# Patient Record
Sex: MALE | Race: WHITE | Employment: OTHER | URBAN - METROPOLITAN AREA
[De-identification: names, ages, dates, MRNs, and addresses within clinical notes are randomized per-mention and may not be internally consistent; named-entity substitution may affect disease eponyms.]

---

## 2017-03-21 ENCOUNTER — HOSPITAL ENCOUNTER (OUTPATIENT)
Dept: GENERAL RADIOLOGY | Age: 82
Discharge: HOME | End: 2017-03-21
Attending: DERMATOLOGY | Admitting: DERMATOLOGY

## 2017-03-21 LAB
ALP BLD-CCNC: 63 IU/L (ref 40–128)
ALT SERPL-CCNC: 15 U/L (ref 10–40)
AST SERPL-CCNC: 15 IU/L (ref 15–37)
BASOPHILS ABSOLUTE: 0.1 K/CU MM
BASOPHILS RELATIVE PERCENT: 1.4 % (ref 0–1)
BILIRUB SERPL-MCNC: 0.5 MG/DL (ref 0–1)
BUN BLDV-MCNC: 26 MG/DL (ref 6–23)
CREAT SERPL-MCNC: 1.4 MG/DL (ref 0.9–1.3)
DIFFERENTIAL TYPE: ABNORMAL
EOSINOPHILS ABSOLUTE: 0.3 K/CU MM
EOSINOPHILS RELATIVE PERCENT: 4.5 % (ref 0–3)
GFR AFRICAN AMERICAN: 58 ML/MIN/1.73M2
GFR NON-AFRICAN AMERICAN: 48 ML/MIN/1.73M2
HCT VFR BLD CALC: 49 % (ref 42–52)
HEMOGLOBIN: 15.3 GM/DL (ref 13.5–18)
IMMATURE NEUTROPHIL %: 0.3 % (ref 0–0.43)
LYMPHOCYTES ABSOLUTE: 0.8 K/CU MM
LYMPHOCYTES RELATIVE PERCENT: 12.7 % (ref 24–44)
MCH RBC QN AUTO: 28.6 PG (ref 27–31)
MCHC RBC AUTO-ENTMCNC: 31.2 % (ref 32–36)
MCV RBC AUTO: 91.6 FL (ref 78–100)
MONOCYTES ABSOLUTE: 0.6 K/CU MM
MONOCYTES RELATIVE PERCENT: 8.8 % (ref 0–4)
NUCLEATED RBC %: 0 %
PDW BLD-RTO: 14.7 % (ref 11.7–14.9)
PLATELET # BLD: 580 K/CU MM (ref 140–440)
PMV BLD AUTO: 10 FL (ref 7.5–11.1)
RBC # BLD: 5.35 M/CU MM (ref 4.6–6.2)
SEGMENTED NEUTROPHILS ABSOLUTE COUNT: 4.7 K/CU MM
SEGMENTED NEUTROPHILS RELATIVE PERCENT: 72.3 % (ref 36–66)
TOTAL IMMATURE NEUTOROPHIL: 0.02 K/CU MM
TOTAL NUCLEATED RBC: 0 K/CU MM
WBC # BLD: 6.5 K/CU MM (ref 4–10.5)

## 2017-04-18 ENCOUNTER — HOSPITAL ENCOUNTER (OUTPATIENT)
Dept: GENERAL RADIOLOGY | Age: 82
Discharge: HOME | End: 2017-04-18
Attending: DERMATOLOGY | Admitting: DERMATOLOGY

## 2017-04-18 LAB
ALP BLD-CCNC: 66 IU/L (ref 40–128)
ALT SERPL-CCNC: 17 U/L (ref 10–40)
AST SERPL-CCNC: 16 IU/L (ref 15–37)
BASOPHILS ABSOLUTE: 0.1 K/CU MM
BASOPHILS RELATIVE PERCENT: 1.2 % (ref 0–1)
BILIRUB SERPL-MCNC: 0.5 MG/DL (ref 0–1)
BUN BLDV-MCNC: 22 MG/DL (ref 6–23)
CREAT SERPL-MCNC: 1.1 MG/DL (ref 0.9–1.3)
DIFFERENTIAL TYPE: ABNORMAL
EOSINOPHILS ABSOLUTE: 0.2 K/CU MM
EOSINOPHILS RELATIVE PERCENT: 3.2 % (ref 0–3)
GFR AFRICAN AMERICAN: >60 ML/MIN/1.73M2
GFR NON-AFRICAN AMERICAN: >60 ML/MIN/1.73M2
HCT VFR BLD CALC: 49.2 % (ref 42–52)
HEMOGLOBIN: 15.5 GM/DL (ref 13.5–18)
IMMATURE NEUTROPHIL %: 0.4 % (ref 0–0.43)
LYMPHOCYTES ABSOLUTE: 0.8 K/CU MM
LYMPHOCYTES RELATIVE PERCENT: 10.1 % (ref 24–44)
MCH RBC QN AUTO: 28.7 PG (ref 27–31)
MCHC RBC AUTO-ENTMCNC: 31.5 % (ref 32–36)
MCV RBC AUTO: 91.1 FL (ref 78–100)
MONOCYTES ABSOLUTE: 0.7 K/CU MM
MONOCYTES RELATIVE PERCENT: 8.8 % (ref 0–4)
NUCLEATED RBC %: 0 %
PDW BLD-RTO: 15.2 % (ref 11.7–14.9)
PLATELET # BLD: 620 K/CU MM (ref 140–440)
PMV BLD AUTO: 9.7 FL (ref 7.5–11.1)
RBC # BLD: 5.4 M/CU MM (ref 4.6–6.2)
SEGMENTED NEUTROPHILS ABSOLUTE COUNT: 5.6 K/CU MM
SEGMENTED NEUTROPHILS RELATIVE PERCENT: 76.3 % (ref 36–66)
TOTAL IMMATURE NEUTOROPHIL: 0.03 K/CU MM
TOTAL NUCLEATED RBC: 0 K/CU MM
WBC # BLD: 7.4 K/CU MM (ref 4–10.5)

## 2017-06-27 ENCOUNTER — HOSPITAL ENCOUNTER (OUTPATIENT)
Dept: OTHER | Age: 82
Discharge: HOME | End: 2017-06-27
Attending: INTERNAL MEDICINE | Admitting: INTERNAL MEDICINE

## 2017-06-27 LAB
ALBUMIN SERPL-MCNC: 4.4 GM/DL (ref 3.4–5)
ALP BLD-CCNC: 62 IU/L (ref 40–129)
ALT SERPL-CCNC: 20 U/L (ref 10–40)
ANION GAP SERPL CALCULATED.3IONS-SCNC: 17 MMOL/L (ref 4–16)
ANISOCYTOSIS: ABNORMAL
AST SERPL-CCNC: 28 IU/L (ref 15–37)
BANDED NEUTROPHILS ABSOLUTE COUNT: 0.13 K/CU MM
BANDED NEUTROPHILS RELATIVE PERCENT: 2 % (ref 5–11)
BASOPHILS ABSOLUTE: 0.1 K/CU MM
BASOPHILS RELATIVE PERCENT: 1 % (ref 0–1)
BILIRUB SERPL-MCNC: 0.5 MG/DL (ref 0–1)
BUN BLDV-MCNC: 21 MG/DL (ref 6–23)
CALCIUM SERPL-MCNC: 9.3 MG/DL (ref 8.3–10.6)
CHLORIDE BLD-SCNC: 99 MMOL/L (ref 99–110)
CO2: 22 MMOL/L (ref 21–32)
CREAT SERPL-MCNC: 1.1 MG/DL (ref 0.9–1.3)
DIFFERENTIAL TYPE: ABNORMAL
EOSINOPHILS ABSOLUTE: 0.4 K/CU MM
EOSINOPHILS RELATIVE PERCENT: 6 % (ref 0–3)
GFR AFRICAN AMERICAN: >60 ML/MIN/1.73M2
GFR NON-AFRICAN AMERICAN: >60 ML/MIN/1.73M2
GIANT PLATELETS: PRESENT
GLUCOSE BLD-MCNC: 91 MG/DL (ref 70–140)
HCT VFR BLD CALC: 47.8 % (ref 42–52)
HEMOGLOBIN: 15.8 GM/DL (ref 13.5–18)
LACTATE DEHYDROGENASE: 326 IU/L (ref 120–246)
LYMPHOCYTES ABSOLUTE: 1.3 K/CU MM
LYMPHOCYTES RELATIVE PERCENT: 21 % (ref 24–44)
MCH RBC QN AUTO: 30.2 PG (ref 27–31)
MCHC RBC AUTO-ENTMCNC: 33.1 % (ref 32–36)
MCV RBC AUTO: 91.4 FL (ref 78–100)
MONOCYTES ABSOLUTE: 0.6 K/CU MM
MONOCYTES RELATIVE PERCENT: 9 % (ref 0–4)
PDW BLD-RTO: 15.6 % (ref 11.7–14.9)
PLATELET # BLD: 558 K/CU MM (ref 140–440)
PMV BLD AUTO: 10.2 FL (ref 7.5–11.1)
POTASSIUM SERPL-SCNC: 5.2 MMOL/L (ref 3.5–5.1)
RBC # BLD: 5.23 M/CU MM (ref 4.6–6.2)
SEGMENTED NEUTROPHILS ABSOLUTE COUNT: 3.9 K/CU MM
SEGMENTED NEUTROPHILS RELATIVE PERCENT: 61 % (ref 36–66)
SODIUM BLD-SCNC: 138 MMOL/L (ref 135–145)
TOTAL PROTEIN: 6.5 GM/DL (ref 6.4–8.2)
URIC ACID: 6.3 MG/DL (ref 3.5–7.2)
WBC # BLD: 6.4 K/CU MM (ref 4–10.5)

## 2017-08-01 ENCOUNTER — HOSPITAL ENCOUNTER (OUTPATIENT)
Dept: GENERAL RADIOLOGY | Age: 82
Discharge: HOME | End: 2017-08-01
Attending: DERMATOLOGY | Admitting: DERMATOLOGY

## 2017-08-01 LAB
ALP BLD-CCNC: 62 IU/L (ref 40–128)
ALT SERPL-CCNC: 15 U/L (ref 10–40)
AST SERPL-CCNC: 18 IU/L (ref 15–37)
BASOPHILS ABSOLUTE: 0.1 K/CU MM
BASOPHILS RELATIVE PERCENT: 1.2 % (ref 0–1)
BILIRUB SERPL-MCNC: 0.4 MG/DL (ref 0–1)
BUN BLDV-MCNC: 24 MG/DL (ref 6–23)
CREAT SERPL-MCNC: 1.3 MG/DL (ref 0.9–1.3)
DIFFERENTIAL TYPE: ABNORMAL
EOSINOPHILS ABSOLUTE: 0.3 K/CU MM
EOSINOPHILS RELATIVE PERCENT: 4.3 % (ref 0–3)
GFR AFRICAN AMERICAN: >60 ML/MIN/1.73M2
GFR NON-AFRICAN AMERICAN: 52 ML/MIN/1.73M2
HCT VFR BLD CALC: 46.9 % (ref 42–52)
HEMOGLOBIN: 15.4 GM/DL (ref 13.5–18)
IMMATURE NEUTROPHIL %: 0.3 % (ref 0–0.43)
LYMPHOCYTES ABSOLUTE: 1 K/CU MM
LYMPHOCYTES RELATIVE PERCENT: 13.9 % (ref 24–44)
MCH RBC QN AUTO: 29.9 PG (ref 27–31)
MCHC RBC AUTO-ENTMCNC: 32.8 % (ref 32–36)
MCV RBC AUTO: 91.1 FL (ref 78–100)
MONOCYTES ABSOLUTE: 0.7 K/CU MM
MONOCYTES RELATIVE PERCENT: 10.4 % (ref 0–4)
NUCLEATED RBC %: 0 %
PDW BLD-RTO: 14.6 % (ref 11.7–14.9)
PLATELET # BLD: 591 K/CU MM (ref 140–440)
PMV BLD AUTO: 9.7 FL (ref 7.5–11.1)
RBC # BLD: 5.15 M/CU MM (ref 4.6–6.2)
SEGMENTED NEUTROPHILS ABSOLUTE COUNT: 4.8 K/CU MM
SEGMENTED NEUTROPHILS RELATIVE PERCENT: 69.9 % (ref 36–66)
TOTAL IMMATURE NEUTOROPHIL: 0.02 K/CU MM
TOTAL NUCLEATED RBC: 0 K/CU MM
WBC # BLD: 6.9 K/CU MM (ref 4–10.5)

## 2017-08-18 ENCOUNTER — HOSPITAL ENCOUNTER (OUTPATIENT)
Dept: OTHER | Age: 82
Discharge: HOME | End: 2017-08-18
Attending: INTERNAL MEDICINE | Admitting: INTERNAL MEDICINE

## 2017-08-18 LAB
ALBUMIN SERPL-MCNC: 4.5 GM/DL (ref 3.4–5)
ALP BLD-CCNC: 68 IU/L (ref 40–129)
ALT SERPL-CCNC: 17 U/L (ref 10–40)
ANION GAP SERPL CALCULATED.3IONS-SCNC: 14 MMOL/L (ref 4–16)
AST SERPL-CCNC: 17 IU/L (ref 15–37)
BANDED NEUTROPHILS ABSOLUTE COUNT: 0.08 K/CU MM
BANDED NEUTROPHILS RELATIVE PERCENT: 1 % (ref 5–11)
BILIRUB SERPL-MCNC: 0.5 MG/DL (ref 0–1)
BUN BLDV-MCNC: 24 MG/DL (ref 6–23)
CALCIUM SERPL-MCNC: 9.3 MG/DL (ref 8.3–10.6)
CHLORIDE BLD-SCNC: 102 MMOL/L (ref 99–110)
CO2: 24 MMOL/L (ref 21–32)
CREAT SERPL-MCNC: 1.2 MG/DL (ref 0.9–1.3)
DIFFERENTIAL TYPE: ABNORMAL
EOSINOPHILS ABSOLUTE: 0.3 K/CU MM
EOSINOPHILS RELATIVE PERCENT: 4 % (ref 0–3)
GFR AFRICAN AMERICAN: >60 ML/MIN/1.73M2
GFR NON-AFRICAN AMERICAN: 57 ML/MIN/1.73M2
GLUCOSE BLD-MCNC: 95 MG/DL (ref 70–140)
HCT VFR BLD CALC: 46.9 % (ref 42–52)
HEMOGLOBIN: 15.5 GM/DL (ref 13.5–18)
LACTATE DEHYDROGENASE: 198 IU/L (ref 120–246)
LYMPHOCYTES ABSOLUTE: 0.7 K/CU MM
LYMPHOCYTES RELATIVE PERCENT: 9 % (ref 24–44)
MCH RBC QN AUTO: 29.9 PG (ref 27–31)
MCHC RBC AUTO-ENTMCNC: 33 % (ref 32–36)
MCV RBC AUTO: 90.5 FL (ref 78–100)
METAMYELOCYTES ABSOLUTE COUNT: 0.08 K/CU MM
METAMYELOCYTES PERCENT: 1 %
MONOCYTES ABSOLUTE: 0.4 K/CU MM
MONOCYTES RELATIVE PERCENT: 5 % (ref 0–4)
PDW BLD-RTO: 14.4 % (ref 11.7–14.9)
PLATELET # BLD: 635 K/CU MM (ref 140–440)
PLT MORPHOLOGY: ABNORMAL
PMV BLD AUTO: 9.9 FL (ref 7.5–11.1)
POLYCHROMASIA: ABNORMAL
POTASSIUM SERPL-SCNC: 5.1 MMOL/L (ref 3.5–5.1)
RBC # BLD: 5.18 M/CU MM (ref 4.6–6.2)
SEGMENTED NEUTROPHILS ABSOLUTE COUNT: 6.2 K/CU MM
SEGMENTED NEUTROPHILS RELATIVE PERCENT: 80 % (ref 36–66)
SODIUM BLD-SCNC: 140 MMOL/L (ref 135–145)
TOTAL PROTEIN: 6.3 GM/DL (ref 6.4–8.2)
URIC ACID: 6.3 MG/DL (ref 3.5–7.2)
WBC # BLD: 7.8 K/CU MM (ref 4–10.5)

## 2017-11-28 ENCOUNTER — HOSPITAL ENCOUNTER (OUTPATIENT)
Dept: OTHER | Age: 82
Discharge: HOME | End: 2017-11-28
Attending: INTERNAL MEDICINE | Admitting: INTERNAL MEDICINE

## 2017-11-28 LAB
ALBUMIN SERPL-MCNC: 4.2 GM/DL (ref 3.4–5)
ALP BLD-CCNC: 70 IU/L (ref 40–129)
ALT SERPL-CCNC: 14 U/L (ref 10–40)
ANION GAP SERPL CALCULATED.3IONS-SCNC: 13 MMOL/L (ref 4–16)
AST SERPL-CCNC: 15 IU/L (ref 15–37)
BILIRUB SERPL-MCNC: 0.4 MG/DL (ref 0–1)
BUN BLDV-MCNC: 27 MG/DL (ref 6–23)
CALCIUM SERPL-MCNC: 9.2 MG/DL (ref 8.3–10.6)
CHLORIDE BLD-SCNC: 104 MMOL/L (ref 99–110)
CO2: 24 MMOL/L (ref 21–32)
CREAT SERPL-MCNC: 1.2 MG/DL (ref 0.9–1.3)
GFR AFRICAN AMERICAN: >60 ML/MIN/1.73M2
GFR NON-AFRICAN AMERICAN: 57 ML/MIN/1.73M2
GLUCOSE BLD-MCNC: 99 MG/DL (ref 70–99)
LACTATE DEHYDROGENASE: 200 IU/L (ref 120–246)
POTASSIUM SERPL-SCNC: 5 MMOL/L (ref 3.5–5.1)
SODIUM BLD-SCNC: 141 MMOL/L (ref 135–145)
TOTAL PROTEIN: 6.2 GM/DL (ref 6.4–8.2)

## 2018-03-13 ENCOUNTER — HOSPITAL ENCOUNTER (OUTPATIENT)
Dept: OTHER | Age: 83
Discharge: HOME | End: 2018-03-13
Attending: INTERNAL MEDICINE | Admitting: INTERNAL MEDICINE

## 2018-03-13 LAB — LACTATE DEHYDROGENASE: 177 IU/L (ref 120–246)

## 2018-03-23 PROBLEM — K25.3 ACUTE GASTRIC ULCER: Status: ACTIVE | Noted: 2018-03-23

## 2018-03-23 PROBLEM — Z43.3 COLOSTOMY CARE (HCC): Status: ACTIVE | Noted: 2018-03-23

## 2018-03-23 PROBLEM — K80.20 CHOLELITHIASIS: Status: ACTIVE | Noted: 2018-03-23

## 2018-03-24 PROBLEM — K80.50 BILIARY COLIC: Status: ACTIVE | Noted: 2018-03-24

## 2018-03-24 PROBLEM — N12 PYELONEPHRITIS: Status: ACTIVE | Noted: 2018-03-24

## 2018-04-02 PROBLEM — K80.10 CCC (CHRONIC CALCULOUS CHOLECYSTITIS): Status: ACTIVE | Noted: 2018-04-02

## 2018-05-29 ENCOUNTER — HOSPITAL ENCOUNTER (OUTPATIENT)
Dept: OTHER | Age: 83
Discharge: HOME | End: 2018-05-29
Attending: INTERNAL MEDICINE | Admitting: INTERNAL MEDICINE

## 2018-05-29 LAB
FERRITIN: 178 NG/ML (ref 30–400)
IRON: 89 UG/DL (ref 59–158)
PCT TRANSFERRIN: 39 % (ref 10–44)
TOTAL IRON BINDING CAPACITY: 230 UG/DL (ref 250–450)
UNSATURATED IRON BINDING CAPACITY: 141 UG/DL (ref 110–370)

## 2018-06-26 ENCOUNTER — HOSPITAL ENCOUNTER (OUTPATIENT)
Dept: OTHER | Age: 83
Discharge: HOME | End: 2018-06-26
Attending: INTERNAL MEDICINE | Admitting: INTERNAL MEDICINE

## 2018-06-26 LAB
ALBUMIN SERPL-MCNC: 4.5 GM/DL (ref 3.4–5)
ALP BLD-CCNC: 66 IU/L (ref 40–128)
ALT SERPL-CCNC: 15 U/L (ref 10–40)
ANION GAP SERPL CALCULATED.3IONS-SCNC: 16 MMOL/L (ref 4–16)
AST SERPL-CCNC: 17 IU/L (ref 15–37)
BILIRUB SERPL-MCNC: 0.5 MG/DL (ref 0–1)
BUN BLDV-MCNC: 21 MG/DL (ref 6–23)
CALCIUM SERPL-MCNC: 9 MG/DL (ref 8.3–10.6)
CHLORIDE BLD-SCNC: 102 MMOL/L (ref 99–110)
CO2: 22 MMOL/L (ref 21–32)
CREAT SERPL-MCNC: 1.1 MG/DL (ref 0.9–1.3)
GFR AFRICAN AMERICAN: >60 ML/MIN/1.73M2
GFR NON-AFRICAN AMERICAN: >60 ML/MIN/1.73M2
GLUCOSE BLD-MCNC: 98 MG/DL (ref 70–99)
POTASSIUM SERPL-SCNC: 4.7 MMOL/L (ref 3.5–5.1)
SODIUM BLD-SCNC: 140 MMOL/L (ref 135–145)
TOTAL PROTEIN: 6.3 GM/DL (ref 6.4–8.2)

## 2018-09-25 ENCOUNTER — HOSPITAL ENCOUNTER (OUTPATIENT)
Age: 83
Discharge: HOME | End: 2018-09-25
Payer: MEDICARE

## 2018-09-25 LAB
ALBUMIN SERPL-MCNC: 4.1 GM/DL (ref 3.4–5)
ALP BLD-CCNC: 58 IU/L (ref 40–128)
ALT SERPL-CCNC: 12 U/L (ref 10–40)
ANION GAP SERPL CALCULATED.3IONS-SCNC: 11 MMOL/L (ref 4–16)
AST SERPL-CCNC: 14 IU/L (ref 15–37)
BILIRUB SERPL-MCNC: 0.5 MG/DL (ref 0–1)
BUN BLDV-MCNC: 25 MG/DL (ref 6–23)
CALCIUM SERPL-MCNC: 8.9 MG/DL (ref 8.3–10.6)
CHLORIDE BLD-SCNC: 102 MMOL/L (ref 99–110)
CO2: 26 MMOL/L (ref 21–32)
CREAT SERPL-MCNC: 1.3 MG/DL (ref 0.9–1.3)
GFR AFRICAN AMERICAN: >60 ML/MIN/1.73M2
GFR NON-AFRICAN AMERICAN: 52 ML/MIN/1.73M2
GLUCOSE BLD-MCNC: 94 MG/DL (ref 70–99)
POTASSIUM SERPL-SCNC: 4.7 MMOL/L (ref 3.5–5.1)
SODIUM BLD-SCNC: 139 MMOL/L (ref 135–145)
TOTAL PROTEIN: 5.7 GM/DL (ref 6.4–8.2)

## 2018-09-25 PROCEDURE — 80053 COMPREHEN METABOLIC PANEL: CPT

## 2018-11-05 ENCOUNTER — HOSPITAL ENCOUNTER (OUTPATIENT)
Dept: ULTRASOUND IMAGING | Age: 83
Discharge: HOME | End: 2018-11-05
Payer: MEDICARE

## 2018-11-05 DIAGNOSIS — I71.40 ABDOMINAL AORTIC ANEURYSM WITHOUT RUPTURE: ICD-10-CM

## 2018-11-05 PROCEDURE — 93978 VASCULAR STUDY: CPT

## 2018-12-27 ENCOUNTER — HOSPITAL ENCOUNTER (OUTPATIENT)
Age: 83
Setting detail: SPECIMEN
Discharge: HOME | End: 2018-12-27
Payer: MEDICARE

## 2018-12-27 LAB
ALBUMIN SERPL-MCNC: 4.1 GM/DL (ref 3.4–5)
ALP BLD-CCNC: 63 IU/L (ref 40–128)
ALT SERPL-CCNC: 12 U/L (ref 10–40)
ANION GAP SERPL CALCULATED.3IONS-SCNC: 13 MMOL/L (ref 4–16)
AST SERPL-CCNC: 16 IU/L (ref 15–37)
BILIRUB SERPL-MCNC: 0.4 MG/DL (ref 0–1)
BUN BLDV-MCNC: 27 MG/DL (ref 6–23)
CALCIUM SERPL-MCNC: 9 MG/DL (ref 8.3–10.6)
CHLORIDE BLD-SCNC: 99 MMOL/L (ref 99–110)
CO2: 24 MMOL/L (ref 21–32)
CREAT SERPL-MCNC: 1.2 MG/DL (ref 0.9–1.3)
FERRITIN: 187 NG/ML (ref 30–400)
GFR AFRICAN AMERICAN: >60 ML/MIN/1.73M2
GFR NON-AFRICAN AMERICAN: 57 ML/MIN/1.73M2
GLUCOSE BLD-MCNC: 107 MG/DL (ref 70–99)
IRON: 65 UG/DL (ref 59–158)
PCT TRANSFERRIN: 27 % (ref 10–44)
POTASSIUM SERPL-SCNC: 4.7 MMOL/L (ref 3.5–5.1)
SODIUM BLD-SCNC: 136 MMOL/L (ref 135–145)
TOTAL IRON BINDING CAPACITY: 244 UG/DL (ref 250–450)
TOTAL PROTEIN: 6.1 GM/DL (ref 6.4–8.2)
UNSATURATED IRON BINDING CAPACITY: 179 UG/DL (ref 110–370)

## 2018-12-27 PROCEDURE — 80053 COMPREHEN METABOLIC PANEL: CPT

## 2018-12-27 PROCEDURE — 83540 ASSAY OF IRON: CPT

## 2018-12-27 PROCEDURE — 83550 IRON BINDING TEST: CPT

## 2018-12-27 PROCEDURE — 82728 ASSAY OF FERRITIN: CPT

## 2019-03-28 ENCOUNTER — HOSPITAL ENCOUNTER (OUTPATIENT)
Age: 84
Discharge: HOME | End: 2019-03-28
Payer: MEDICARE

## 2019-03-28 LAB
ALP BLD-CCNC: 64 IU/L (ref 40–128)
ALT SERPL-CCNC: 13 U/L (ref 10–40)
AST SERPL-CCNC: 15 IU/L (ref 15–37)
BILIRUB SERPL-MCNC: 0.6 MG/DL (ref 0–1)
BUN BLDV-MCNC: 23 MG/DL (ref 6–23)
CREAT SERPL-MCNC: 1.2 MG/DL (ref 0.9–1.3)
DIFFERENTIAL TYPE: ABNORMAL
EOSINOPHILS ABSOLUTE: 0.1 K/CU MM
EOSINOPHILS RELATIVE PERCENT: 2 % (ref 0–3)
GFR AFRICAN AMERICAN: >60 ML/MIN/1.73M2
GFR NON-AFRICAN AMERICAN: 57 ML/MIN/1.73M2
HCT VFR BLD CALC: 39.8 % (ref 42–52)
HEMOGLOBIN: 12.7 GM/DL (ref 13.5–18)
LYMPHOCYTES ABSOLUTE: 0.8 K/CU MM
LYMPHOCYTES RELATIVE PERCENT: 20 % (ref 24–44)
MACROCYTES: ABNORMAL
MCH RBC QN AUTO: 40.6 PG (ref 27–31)
MCHC RBC AUTO-ENTMCNC: 31.9 % (ref 32–36)
MCV RBC AUTO: 127.2 FL (ref 78–100)
MONOCYTES ABSOLUTE: 0.3 K/CU MM
MONOCYTES RELATIVE PERCENT: 7 % (ref 0–4)
PDW BLD-RTO: 15.4 % (ref 11.7–14.9)
PLATELET # BLD: 270 K/CU MM (ref 140–440)
PMV BLD AUTO: 9.4 FL (ref 7.5–11.1)
RBC # BLD: 3.13 M/CU MM (ref 4.6–6.2)
SEGMENTED NEUTROPHILS ABSOLUTE COUNT: 2.9 K/CU MM
SEGMENTED NEUTROPHILS RELATIVE PERCENT: 71 % (ref 36–66)
WBC # BLD: 4.1 K/CU MM (ref 4–10.5)

## 2019-03-28 PROCEDURE — 84450 TRANSFERASE (AST) (SGOT): CPT

## 2019-03-28 PROCEDURE — 86480 TB TEST CELL IMMUN MEASURE: CPT

## 2019-03-28 PROCEDURE — 85007 BL SMEAR W/DIFF WBC COUNT: CPT

## 2019-03-28 PROCEDURE — 84520 ASSAY OF UREA NITROGEN: CPT

## 2019-03-28 PROCEDURE — 36415 COLL VENOUS BLD VENIPUNCTURE: CPT

## 2019-03-28 PROCEDURE — 85027 COMPLETE CBC AUTOMATED: CPT

## 2019-03-28 PROCEDURE — 82247 BILIRUBIN TOTAL: CPT

## 2019-03-28 PROCEDURE — 84460 ALANINE AMINO (ALT) (SGPT): CPT

## 2019-03-28 PROCEDURE — 82565 ASSAY OF CREATININE: CPT

## 2019-03-28 PROCEDURE — 84075 ASSAY ALKALINE PHOSPHATASE: CPT

## 2019-03-31 LAB
QUANTI TB1 MINUS NIL: 0 IU/ML (ref 0–0.34)
QUANTI TB2 MINUS NIL: 0 IU/ML (ref 0–0.34)
QUANTIFERON (R) TB GOLD (INCUBATED): NEGATIVE
QUANTIFERON (R) TB GOLD (INCUBATED): NORMAL
QUANTIFERON MITOGEN MINUS NIL: >10 IU/ML
QUANTIFERON NIL: 0.14 IU/ML
QUANTIFERON NIL: NORMAL IU/ML

## 2019-05-06 ENCOUNTER — TELEPHONE (OUTPATIENT)
Dept: FAMILY MEDICINE CLINIC | Age: 84
End: 2019-05-06

## 2019-05-06 RX ORDER — ALFUZOSIN HYDROCHLORIDE 10 MG/1
10 TABLET, EXTENDED RELEASE ORAL DAILY
Qty: 30 TABLET | Refills: 5 | Status: SHIPPED | OUTPATIENT
Start: 2019-05-06 | End: 2019-09-05

## 2019-05-06 NOTE — TELEPHONE ENCOUNTER
PT ADVISED PER PREV MESSAGE: PER DR JOHNSON START UROXATROL 10MG DAILY. PT VOICED UNDERSTANDING. /NIK (E) UROXATROL 10MG 1 QD #30/5.       Electronically signed by Sneha Boggs MA on 5/6/2019 at 2:17 PM

## 2019-06-17 ENCOUNTER — HOSPITAL ENCOUNTER (OUTPATIENT)
Age: 84
Setting detail: SPECIMEN
Discharge: HOME | End: 2019-06-17
Payer: MEDICARE

## 2019-06-17 LAB
ALBUMIN SERPL-MCNC: 4.4 GM/DL (ref 3.4–5)
ALP BLD-CCNC: 64 IU/L (ref 40–128)
ALT SERPL-CCNC: 14 U/L (ref 10–40)
ANION GAP SERPL CALCULATED.3IONS-SCNC: 9 MMOL/L (ref 4–16)
AST SERPL-CCNC: 15 IU/L (ref 15–37)
BILIRUB SERPL-MCNC: 0.5 MG/DL (ref 0–1)
BUN BLDV-MCNC: 27 MG/DL (ref 6–23)
CALCIUM SERPL-MCNC: 9 MG/DL (ref 8.3–10.6)
CHLORIDE BLD-SCNC: 102 MMOL/L (ref 99–110)
CO2: 26 MMOL/L (ref 21–32)
CREAT SERPL-MCNC: 1.3 MG/DL (ref 0.9–1.3)
GFR AFRICAN AMERICAN: >60 ML/MIN/1.73M2
GFR NON-AFRICAN AMERICAN: 52 ML/MIN/1.73M2
GLUCOSE BLD-MCNC: 106 MG/DL (ref 70–99)
POTASSIUM SERPL-SCNC: 4.7 MMOL/L (ref 3.5–5.1)
SODIUM BLD-SCNC: 137 MMOL/L (ref 135–145)
TOTAL PROTEIN: 6.4 GM/DL (ref 6.4–8.2)

## 2019-06-17 PROCEDURE — 80053 COMPREHEN METABOLIC PANEL: CPT

## 2019-08-09 ENCOUNTER — TELEPHONE (OUTPATIENT)
Dept: FAMILY MEDICINE CLINIC | Age: 84
End: 2019-08-09

## 2019-08-15 ENCOUNTER — TELEPHONE (OUTPATIENT)
Dept: FAMILY MEDICINE CLINIC | Age: 84
End: 2019-08-15

## 2019-08-17 DIAGNOSIS — C20 PRIMARY MALIGNANT NEOPLASM OF RECTUM (HCC): ICD-10-CM

## 2019-08-17 DIAGNOSIS — I20.8 STABLE ANGINA (HCC): ICD-10-CM

## 2019-08-17 DIAGNOSIS — L40.9 PSORIASIS: ICD-10-CM

## 2019-08-17 DIAGNOSIS — L40.50 PSORIATIC ARTHRITIS (HCC): ICD-10-CM

## 2019-08-17 PROBLEM — I20.89 STABLE ANGINA: Status: ACTIVE | Noted: 2019-08-17

## 2019-08-17 RX ORDER — HYDROXYUREA 500 MG/1
CAPSULE ORAL DAILY
COMMUNITY
End: 2020-09-01 | Stop reason: SDUPTHER

## 2019-08-17 RX ORDER — TAMSULOSIN HYDROCHLORIDE 0.4 MG/1
0.4 CAPSULE ORAL DAILY
COMMUNITY
End: 2019-11-25

## 2019-08-17 RX ORDER — CLOBETASOL PROPIONATE 0.5 MG/G
CREAM TOPICAL 2 TIMES DAILY
COMMUNITY
End: 2021-01-01

## 2019-08-19 ENCOUNTER — TELEPHONE (OUTPATIENT)
Dept: FAMILY MEDICINE CLINIC | Age: 84
End: 2019-08-19

## 2019-08-23 ENCOUNTER — TELEPHONE (OUTPATIENT)
Dept: FAMILY MEDICINE CLINIC | Age: 84
End: 2019-08-23

## 2019-09-05 ENCOUNTER — OFFICE VISIT (OUTPATIENT)
Dept: FAMILY MEDICINE CLINIC | Age: 84
End: 2019-09-05
Payer: MEDICARE

## 2019-09-05 VITALS
TEMPERATURE: 97.7 F | WEIGHT: 205.6 LBS | HEART RATE: 62 BPM | BODY MASS INDEX: 31.16 KG/M2 | HEIGHT: 68 IN | SYSTOLIC BLOOD PRESSURE: 98 MMHG | DIASTOLIC BLOOD PRESSURE: 52 MMHG | OXYGEN SATURATION: 92 %

## 2019-09-05 DIAGNOSIS — N30.00 ACUTE CYSTITIS WITHOUT HEMATURIA: ICD-10-CM

## 2019-09-05 DIAGNOSIS — M25.512 CHRONIC LEFT SHOULDER PAIN: ICD-10-CM

## 2019-09-05 DIAGNOSIS — G89.29 CHRONIC LEFT SHOULDER PAIN: ICD-10-CM

## 2019-09-05 DIAGNOSIS — N39.498 OTHER URINARY INCONTINENCE: Primary | ICD-10-CM

## 2019-09-05 PROCEDURE — 99213 OFFICE O/P EST LOW 20 MIN: CPT | Performed by: PHYSICIAN ASSISTANT

## 2019-09-05 PROCEDURE — 1123F ACP DISCUSS/DSCN MKR DOCD: CPT | Performed by: PHYSICIAN ASSISTANT

## 2019-09-05 PROCEDURE — G8599 NO ASA/ANTIPLAT THER USE RNG: HCPCS | Performed by: PHYSICIAN ASSISTANT

## 2019-09-05 PROCEDURE — 4040F PNEUMOC VAC/ADMIN/RCVD: CPT | Performed by: PHYSICIAN ASSISTANT

## 2019-09-05 PROCEDURE — G8417 CALC BMI ABV UP PARAM F/U: HCPCS | Performed by: PHYSICIAN ASSISTANT

## 2019-09-05 PROCEDURE — 96372 THER/PROPH/DIAG INJ SC/IM: CPT | Performed by: PHYSICIAN ASSISTANT

## 2019-09-05 PROCEDURE — G8427 DOCREV CUR MEDS BY ELIG CLIN: HCPCS | Performed by: PHYSICIAN ASSISTANT

## 2019-09-05 PROCEDURE — 1036F TOBACCO NON-USER: CPT | Performed by: PHYSICIAN ASSISTANT

## 2019-09-05 RX ORDER — CEPHALEXIN 500 MG/1
500 CAPSULE ORAL 3 TIMES DAILY
COMMUNITY
End: 2019-11-25 | Stop reason: ALTCHOICE

## 2019-09-05 RX ORDER — METHYLPREDNISOLONE ACETATE 80 MG/ML
80 INJECTION, SUSPENSION INTRA-ARTICULAR; INTRALESIONAL; INTRAMUSCULAR; SOFT TISSUE ONCE
Status: COMPLETED | OUTPATIENT
Start: 2019-09-05 | End: 2019-09-05

## 2019-09-05 RX ADMIN — METHYLPREDNISOLONE ACETATE 80 MG: 80 INJECTION, SUSPENSION INTRA-ARTICULAR; INTRALESIONAL; INTRAMUSCULAR; SOFT TISSUE at 11:28

## 2019-09-05 ASSESSMENT — ENCOUNTER SYMPTOMS: NAUSEA: 0

## 2019-09-05 NOTE — PROGRESS NOTES
(FLUOCINOLONE CREAM & EMOLLIENT EX) Apply topically daily      VITAMIN E PO Take 1 tablet by mouth daily       No current facility-administered medications for this visit. PHYSICAL EXAM    BP (!) 98/52 (Site: Left Upper Arm, Position: Sitting, Cuff Size: Large Adult)   Pulse 62   Temp 97.7 °F (36.5 °C)   Ht 5' 8\" (1.727 m)   Wt 205 lb 9.6 oz (93.3 kg)   SpO2 92% Comment: RA  BMI 31.26 kg/m²     Physical Exam   Constitutional: He is oriented to person, place, and time. He appears well-developed and well-nourished. Musculoskeletal:        Left shoulder: He exhibits tenderness (over AC joint, supraspinatus tendon, and biceps tendon (short and long head)). He exhibits normal range of motion, no swelling, no deformity and no laceration. Pain with abduction against resistance. Positive empty can test.  Pain with internal and external rotation. Neurological: He is alert and oriented to person, place, and time. Psychiatric: He has a normal mood and affect. His behavior is normal.   Nursing note and vitals reviewed. ASSESSMENT & PLAN    1. Other urinary incontinence  Referral to Dr. Jasmin Moreland per patient request to see if there is any aid he can get for the incontinence  - Tatyana Lora MD, Urology, [facility]    2. Acute cystitis without hematuria  Resolving, finish out antibiotic and notify if symptoms do not completely resolve    3. Chronic left shoulder pain  Patient given Depo-Medrol 80 mg injection, there seems to be poly-tendinopathy. If symptoms do not improve would recommend x-ray. Patient was also given exercises and stretches to do at home. May apply ice for 20 minutes at a time. - methylPREDNISolone acetate (DEPO-MEDROL) injection 80 mg           Electronically signed by Jessica Bronson PA-C on 9/5/2019    Please note that this chart was generated using dragon dictation software.   Although every effort was made to ensure the accuracy of this automated transcription, some errors in transcription may have occurred.

## 2019-09-09 ENCOUNTER — OFFICE VISIT (OUTPATIENT)
Dept: FAMILY MEDICINE CLINIC | Age: 84
End: 2019-09-09
Payer: MEDICARE

## 2019-09-09 ENCOUNTER — TELEPHONE (OUTPATIENT)
Dept: FAMILY MEDICINE CLINIC | Age: 84
End: 2019-09-09

## 2019-09-09 VITALS
OXYGEN SATURATION: 93 % | SYSTOLIC BLOOD PRESSURE: 118 MMHG | HEART RATE: 88 BPM | WEIGHT: 202.6 LBS | BODY MASS INDEX: 30.71 KG/M2 | DIASTOLIC BLOOD PRESSURE: 62 MMHG | HEIGHT: 68 IN | TEMPERATURE: 98.3 F

## 2019-09-09 DIAGNOSIS — R32 URINARY INCONTINENCE, UNSPECIFIED TYPE: Primary | ICD-10-CM

## 2019-09-09 PROCEDURE — 1123F ACP DISCUSS/DSCN MKR DOCD: CPT | Performed by: PHYSICIAN ASSISTANT

## 2019-09-09 PROCEDURE — 4040F PNEUMOC VAC/ADMIN/RCVD: CPT | Performed by: PHYSICIAN ASSISTANT

## 2019-09-09 PROCEDURE — 81002 URINALYSIS NONAUTO W/O SCOPE: CPT | Performed by: PHYSICIAN ASSISTANT

## 2019-09-09 PROCEDURE — G8427 DOCREV CUR MEDS BY ELIG CLIN: HCPCS | Performed by: PHYSICIAN ASSISTANT

## 2019-09-09 PROCEDURE — G8417 CALC BMI ABV UP PARAM F/U: HCPCS | Performed by: PHYSICIAN ASSISTANT

## 2019-09-09 PROCEDURE — 1036F TOBACCO NON-USER: CPT | Performed by: PHYSICIAN ASSISTANT

## 2019-09-09 PROCEDURE — 99213 OFFICE O/P EST LOW 20 MIN: CPT | Performed by: PHYSICIAN ASSISTANT

## 2019-09-09 PROCEDURE — G8599 NO ASA/ANTIPLAT THER USE RNG: HCPCS | Performed by: PHYSICIAN ASSISTANT

## 2019-09-09 RX ORDER — OXYBUTYNIN CHLORIDE 5 MG/1
5 TABLET, EXTENDED RELEASE ORAL DAILY
Qty: 30 TABLET | Refills: 3 | Status: SHIPPED | OUTPATIENT
Start: 2019-09-09 | End: 2019-11-25

## 2019-09-09 ASSESSMENT — ENCOUNTER SYMPTOMS
ABDOMINAL PAIN: 0
DIARRHEA: 0
NAUSEA: 0

## 2019-09-09 NOTE — TELEPHONE ENCOUNTER
----- Message from Theghazala Layman sent at 9/9/2019  8:30 AM EDT -----  Contact: Magalys Gal [phone]  DR KAMARA CALLED IN 2 MED FOR PT ON SAT. PT IS NO BETTER THIS AM. URINATING A LOT. DR KAMARA PLEASE ADVISE.

## 2019-11-01 RX ORDER — DILTIAZEM HYDROCHLORIDE 240 MG/1
240 CAPSULE, EXTENDED RELEASE ORAL DAILY
Qty: 30 CAPSULE | Refills: 0 | Status: SHIPPED | OUTPATIENT
Start: 2019-11-01 | End: 2019-11-25

## 2019-11-07 ENCOUNTER — TELEPHONE (OUTPATIENT)
Dept: FAMILY MEDICINE CLINIC | Age: 84
End: 2019-11-07

## 2019-11-14 ENCOUNTER — TELEPHONE (OUTPATIENT)
Dept: FAMILY MEDICINE CLINIC | Age: 84
End: 2019-11-14

## 2019-11-25 ENCOUNTER — OFFICE VISIT (OUTPATIENT)
Dept: FAMILY MEDICINE CLINIC | Age: 84
End: 2019-11-25
Payer: MEDICARE

## 2019-11-25 VITALS
DIASTOLIC BLOOD PRESSURE: 74 MMHG | SYSTOLIC BLOOD PRESSURE: 110 MMHG | WEIGHT: 193.8 LBS | BODY MASS INDEX: 29.37 KG/M2 | HEIGHT: 68 IN | HEART RATE: 70 BPM

## 2019-11-25 DIAGNOSIS — N40.1 BENIGN PROSTATIC HYPERPLASIA WITH INCOMPLETE BLADDER EMPTYING: Chronic | ICD-10-CM

## 2019-11-25 DIAGNOSIS — I20.8 STABLE ANGINA (HCC): ICD-10-CM

## 2019-11-25 DIAGNOSIS — K56.50 INTESTINAL ADHESIONS WITH OBSTRUCTION, UNSPECIFIED WHETHER PARTIAL OR COMPLETE (HCC): ICD-10-CM

## 2019-11-25 DIAGNOSIS — I10 ESSENTIAL HYPERTENSION: Chronic | ICD-10-CM

## 2019-11-25 DIAGNOSIS — Z23 NEED FOR PROPHYLACTIC VACCINATION AND INOCULATION AGAINST VARICELLA: Primary | ICD-10-CM

## 2019-11-25 DIAGNOSIS — C20 PRIMARY MALIGNANT NEOPLASM OF RECTUM (HCC): ICD-10-CM

## 2019-11-25 DIAGNOSIS — R39.14 BENIGN PROSTATIC HYPERPLASIA WITH INCOMPLETE BLADDER EMPTYING: Chronic | ICD-10-CM

## 2019-11-25 DIAGNOSIS — L40.50 PSORIATIC ARTHRITIS (HCC): ICD-10-CM

## 2019-11-25 PROCEDURE — G8599 NO ASA/ANTIPLAT THER USE RNG: HCPCS | Performed by: FAMILY MEDICINE

## 2019-11-25 PROCEDURE — 99214 OFFICE O/P EST MOD 30 MIN: CPT | Performed by: FAMILY MEDICINE

## 2019-11-25 PROCEDURE — G8427 DOCREV CUR MEDS BY ELIG CLIN: HCPCS | Performed by: FAMILY MEDICINE

## 2019-11-25 PROCEDURE — 1036F TOBACCO NON-USER: CPT | Performed by: FAMILY MEDICINE

## 2019-11-25 PROCEDURE — G8417 CALC BMI ABV UP PARAM F/U: HCPCS | Performed by: FAMILY MEDICINE

## 2019-11-25 PROCEDURE — 4040F PNEUMOC VAC/ADMIN/RCVD: CPT | Performed by: FAMILY MEDICINE

## 2019-11-25 PROCEDURE — 1123F ACP DISCUSS/DSCN MKR DOCD: CPT | Performed by: FAMILY MEDICINE

## 2019-11-25 PROCEDURE — G8482 FLU IMMUNIZE ORDER/ADMIN: HCPCS | Performed by: FAMILY MEDICINE

## 2019-11-25 RX ORDER — ISOSORBIDE MONONITRATE 30 MG/1
30 TABLET, EXTENDED RELEASE ORAL DAILY
Qty: 30 TABLET | Refills: 5 | Status: SHIPPED | OUTPATIENT
Start: 2019-11-25 | End: 2020-05-21 | Stop reason: SDUPTHER

## 2019-11-25 RX ORDER — PANTOPRAZOLE SODIUM 40 MG/1
40 TABLET, DELAYED RELEASE ORAL DAILY
Qty: 30 TABLET | Refills: 5 | Status: SHIPPED | OUTPATIENT
Start: 2019-11-25 | End: 2020-05-21 | Stop reason: SDUPTHER

## 2019-11-25 RX ORDER — DILTIAZEM HYDROCHLORIDE 240 MG/1
CAPSULE, EXTENDED RELEASE ORAL
Qty: 30 CAPSULE | Refills: 5 | Status: SHIPPED | OUTPATIENT
Start: 2019-11-25 | End: 2020-05-21 | Stop reason: SDUPTHER

## 2019-11-25 ASSESSMENT — PATIENT HEALTH QUESTIONNAIRE - PHQ9
1. LITTLE INTEREST OR PLEASURE IN DOING THINGS: 0
SUM OF ALL RESPONSES TO PHQ9 QUESTIONS 1 & 2: 0
2. FEELING DOWN, DEPRESSED OR HOPELESS: 0
SUM OF ALL RESPONSES TO PHQ QUESTIONS 1-9: 0
SUM OF ALL RESPONSES TO PHQ QUESTIONS 1-9: 0

## 2019-11-25 ASSESSMENT — ENCOUNTER SYMPTOMS
SHORTNESS OF BREATH: 0
ABDOMINAL PAIN: 0
RECTAL PAIN: 0
ROS SKIN COMMENTS: CHRONIC PSORIASIS
COUGH: 0
CHEST TIGHTNESS: 0
ABDOMINAL DISTENTION: 0
WHEEZING: 0
EYES NEGATIVE: 1
BLOOD IN STOOL: 0
RESPIRATORY NEGATIVE: 1

## 2019-12-10 ENCOUNTER — HOSPITAL ENCOUNTER (OUTPATIENT)
Age: 84
Setting detail: SPECIMEN
Discharge: HOME | End: 2019-12-10
Payer: MEDICARE

## 2019-12-10 LAB
ALBUMIN SERPL-MCNC: 4 GM/DL (ref 3.4–5)
ALP BLD-CCNC: 70 IU/L (ref 40–128)
ALT SERPL-CCNC: 9 U/L (ref 10–40)
ANION GAP SERPL CALCULATED.3IONS-SCNC: 14 MMOL/L (ref 4–16)
AST SERPL-CCNC: 13 IU/L (ref 15–37)
BILIRUB SERPL-MCNC: 0.4 MG/DL (ref 0–1)
BUN BLDV-MCNC: 20 MG/DL (ref 6–23)
CALCIUM SERPL-MCNC: 9 MG/DL (ref 8.3–10.6)
CHLORIDE BLD-SCNC: 100 MMOL/L (ref 99–110)
CO2: 23 MMOL/L (ref 21–32)
CREAT SERPL-MCNC: 1.2 MG/DL (ref 0.9–1.3)
GFR AFRICAN AMERICAN: >60 ML/MIN/1.73M2
GFR NON-AFRICAN AMERICAN: 57 ML/MIN/1.73M2
GLUCOSE BLD-MCNC: 103 MG/DL (ref 70–99)
POTASSIUM SERPL-SCNC: 4.4 MMOL/L (ref 3.5–5.1)
SODIUM BLD-SCNC: 137 MMOL/L (ref 135–145)
TOTAL PROTEIN: 6.2 GM/DL (ref 6.4–8.2)

## 2019-12-10 PROCEDURE — 80053 COMPREHEN METABOLIC PANEL: CPT

## 2020-01-10 ENCOUNTER — HOSPITAL ENCOUNTER (OUTPATIENT)
Dept: INFUSION THERAPY | Age: 85
Discharge: HOME | End: 2020-01-10
Payer: MEDICARE

## 2020-01-10 LAB
DIFFERENTIAL TYPE: ABNORMAL
EOSINOPHILS ABSOLUTE: 0.1 K/CU MM
EOSINOPHILS RELATIVE PERCENT: 1.3 % (ref 0–3)
HCT VFR BLD CALC: 34.7 % (ref 42–52)
HEMOGLOBIN: 11.8 GM/DL (ref 13.5–18)
LYMPHOCYTES ABSOLUTE: 0.7 K/CU MM
LYMPHOCYTES RELATIVE PERCENT: 13.4 % (ref 24–44)
MCH RBC QN AUTO: 40.5 PG (ref 27–31)
MCHC RBC AUTO-ENTMCNC: 34 % (ref 32–36)
MCV RBC AUTO: 119.2 FL (ref 78–100)
MONOCYTES ABSOLUTE: 0.5 K/CU MM
MONOCYTES RELATIVE PERCENT: 9.7 % (ref 0–4)
PDW BLD-RTO: 13.4 % (ref 11.7–14.9)
PLATELET # BLD: 283 K/CU MM (ref 140–440)
PMV BLD AUTO: 8.9 FL (ref 7.5–11.1)
RBC # BLD: 2.91 M/CU MM (ref 4.6–6.2)
SEGMENTED NEUTROPHILS ABSOLUTE COUNT: 4 K/CU MM
SEGMENTED NEUTROPHILS RELATIVE PERCENT: 75.6 % (ref 36–66)
SEGMENTED NEUTROPHILS RELATIVE PERCENT: ABNORMAL % (ref 36–66)
WBC # BLD: 5.3 K/CU MM (ref 4–10.5)

## 2020-01-10 PROCEDURE — 85025 COMPLETE CBC W/AUTO DIFF WBC: CPT

## 2020-02-28 ENCOUNTER — HOSPITAL ENCOUNTER (OUTPATIENT)
Dept: INFUSION THERAPY | Age: 85
Discharge: HOME | End: 2020-02-28
Payer: MEDICARE

## 2020-02-28 LAB
ALBUMIN SERPL-MCNC: 4.1 GM/DL (ref 3.4–5)
ALP BLD-CCNC: 68 IU/L (ref 40–128)
ALT SERPL-CCNC: 11 U/L (ref 10–40)
ANION GAP SERPL CALCULATED.3IONS-SCNC: 13 MMOL/L (ref 4–16)
AST SERPL-CCNC: 14 IU/L (ref 15–37)
BASOPHILS ABSOLUTE: 0.1 K/CU MM
BASOPHILS RELATIVE PERCENT: 3 % (ref 0–1)
BILIRUB SERPL-MCNC: 0.3 MG/DL (ref 0–1)
BUN BLDV-MCNC: 19 MG/DL (ref 6–23)
CALCIUM SERPL-MCNC: 9 MG/DL (ref 8.3–10.6)
CHLORIDE BLD-SCNC: 101 MMOL/L (ref 99–110)
CO2: 24 MMOL/L (ref 21–32)
CREAT SERPL-MCNC: 1.2 MG/DL (ref 0.9–1.3)
DIFFERENTIAL TYPE: ABNORMAL
EOSINOPHILS ABSOLUTE: 0.1 K/CU MM
EOSINOPHILS RELATIVE PERCENT: 1.4 % (ref 0–3)
GFR AFRICAN AMERICAN: >60 ML/MIN/1.73M2
GFR NON-AFRICAN AMERICAN: 57 ML/MIN/1.73M2
GLUCOSE BLD-MCNC: 97 MG/DL (ref 70–99)
HCT VFR BLD CALC: 36.1 % (ref 42–52)
HEMOGLOBIN: 12.1 GM/DL (ref 13.5–18)
LYMPHOCYTES ABSOLUTE: 0.7 K/CU MM
LYMPHOCYTES RELATIVE PERCENT: 15.6 % (ref 24–44)
MCH RBC QN AUTO: 41.2 PG (ref 27–31)
MCHC RBC AUTO-ENTMCNC: 33.5 % (ref 32–36)
MCV RBC AUTO: 122.8 FL (ref 78–100)
MONOCYTES ABSOLUTE: 0.6 K/CU MM
MONOCYTES RELATIVE PERCENT: 13.1 % (ref 0–4)
PDW BLD-RTO: 13.5 % (ref 11.7–14.9)
PLATELET # BLD: 283 K/CU MM (ref 140–440)
PMV BLD AUTO: 9.1 FL (ref 7.5–11.1)
POTASSIUM SERPL-SCNC: 4.7 MMOL/L (ref 3.5–5.1)
RBC # BLD: 2.94 M/CU MM (ref 4.6–6.2)
SEGMENTED NEUTROPHILS ABSOLUTE COUNT: 2.9 K/CU MM
SEGMENTED NEUTROPHILS RELATIVE PERCENT: 66.9 % (ref 36–66)
SODIUM BLD-SCNC: 138 MMOL/L (ref 135–145)
TOTAL PROTEIN: 6.4 GM/DL (ref 6.4–8.2)
WBC # BLD: ABNORMAL K/CU MM (ref 4–10.5)

## 2020-02-28 PROCEDURE — 85025 COMPLETE CBC W/AUTO DIFF WBC: CPT

## 2020-02-28 PROCEDURE — 36415 COLL VENOUS BLD VENIPUNCTURE: CPT

## 2020-02-28 PROCEDURE — 80053 COMPREHEN METABOLIC PANEL: CPT

## 2020-04-29 RX ORDER — FOLIC ACID 1 MG/1
TABLET ORAL
Qty: 30 TABLET | Refills: 0 | Status: SHIPPED | OUTPATIENT
Start: 2020-04-29 | End: 2020-05-21 | Stop reason: SDUPTHER

## 2020-04-30 PROBLEM — D72.821 MONOCYTOSIS (SYMPTOMATIC): Status: ACTIVE | Noted: 2020-04-30

## 2020-04-30 PROBLEM — D47.1 CHRONIC MYELOPROLIFERATIVE DISEASE (HCC): Status: ACTIVE | Noted: 2020-04-30

## 2020-05-21 ENCOUNTER — VIRTUAL VISIT (OUTPATIENT)
Dept: FAMILY MEDICINE CLINIC | Age: 85
End: 2020-05-21
Payer: MEDICARE

## 2020-05-21 VITALS — WEIGHT: 175 LBS | BODY MASS INDEX: 25.92 KG/M2 | HEIGHT: 69 IN

## 2020-05-21 PROBLEM — K80.50 BILIARY COLIC: Status: RESOLVED | Noted: 2018-03-24 | Resolved: 2020-05-21

## 2020-05-21 PROBLEM — R33.9 URINARY RETENTION: Chronic | Status: ACTIVE | Noted: 2020-05-21

## 2020-05-21 PROBLEM — N12 PYELONEPHRITIS: Status: RESOLVED | Noted: 2018-03-24 | Resolved: 2020-05-21

## 2020-05-21 PROBLEM — K80.10 CCC (CHRONIC CALCULOUS CHOLECYSTITIS): Status: RESOLVED | Noted: 2018-04-02 | Resolved: 2020-05-21

## 2020-05-21 PROBLEM — K21.9 GASTROESOPHAGEAL REFLUX DISEASE WITHOUT ESOPHAGITIS: Chronic | Status: ACTIVE | Noted: 2020-05-21

## 2020-05-21 PROCEDURE — 1036F TOBACCO NON-USER: CPT | Performed by: FAMILY MEDICINE

## 2020-05-21 PROCEDURE — 4040F PNEUMOC VAC/ADMIN/RCVD: CPT | Performed by: FAMILY MEDICINE

## 2020-05-21 PROCEDURE — G8417 CALC BMI ABV UP PARAM F/U: HCPCS | Performed by: FAMILY MEDICINE

## 2020-05-21 PROCEDURE — 99442 PR PHYS/QHP TELEPHONE EVALUATION 11-20 MIN: CPT | Performed by: FAMILY MEDICINE

## 2020-05-21 PROCEDURE — G8427 DOCREV CUR MEDS BY ELIG CLIN: HCPCS | Performed by: FAMILY MEDICINE

## 2020-05-21 PROCEDURE — 1123F ACP DISCUSS/DSCN MKR DOCD: CPT | Performed by: FAMILY MEDICINE

## 2020-05-21 RX ORDER — DILTIAZEM HYDROCHLORIDE 240 MG/1
CAPSULE, EXTENDED RELEASE ORAL
Qty: 90 CAPSULE | Refills: 1 | Status: SHIPPED | OUTPATIENT
Start: 2020-05-21 | End: 2020-12-30

## 2020-05-21 RX ORDER — ISOSORBIDE MONONITRATE 30 MG/1
30 TABLET, EXTENDED RELEASE ORAL DAILY
Qty: 90 TABLET | Refills: 1 | Status: SHIPPED | OUTPATIENT
Start: 2020-05-21 | End: 2020-09-29

## 2020-05-21 RX ORDER — PANTOPRAZOLE SODIUM 40 MG/1
40 TABLET, DELAYED RELEASE ORAL DAILY
Qty: 90 TABLET | Refills: 1 | Status: SHIPPED | OUTPATIENT
Start: 2020-05-21 | End: 2020-11-30

## 2020-05-21 RX ORDER — FOLIC ACID 1 MG/1
1 TABLET ORAL DAILY
Qty: 90 TABLET | Refills: 1 | Status: SHIPPED | OUTPATIENT
Start: 2020-05-21 | End: 2021-01-18 | Stop reason: SDUPTHER

## 2020-05-21 ASSESSMENT — PATIENT HEALTH QUESTIONNAIRE - PHQ9
SUM OF ALL RESPONSES TO PHQ9 QUESTIONS 1 & 2: 0
SUM OF ALL RESPONSES TO PHQ QUESTIONS 1-9: 0
SUM OF ALL RESPONSES TO PHQ QUESTIONS 1-9: 0
1. LITTLE INTEREST OR PLEASURE IN DOING THINGS: 0
2. FEELING DOWN, DEPRESSED OR HOPELESS: 0

## 2020-05-21 NOTE — PROGRESS NOTES
Zia Mari is a 80 y.o. male evaluated via telephone on 5/21/2020. Consent:  He and/or health care decision maker is aware that that he may receive a bill for this telephone service, depending on his insurance coverage, and has provided verbal consent to proceed: Yes      Documentation:  I communicated with the patient and/or health care decision maker about several problems for follow-up.    Details of this discussion including any medical advice provided: Patient is been generally doing well  He is following guidelines for Covid 19 precautions  He lives by himself he has a lady friend that he sees infrequently but she is pretty much confined to home as well neither of them has been sick with fever respiratory symptoms diarrhea or other symptoms of virus  He is up-to-date on immunizations  He is on Cardizem for hypertension which is under control  He sees urology for urinary retention probably due to radiation bladder injury from previous radiation for rectal cancer  He has been cancer free probably for close to 20 years  He has a Weaver catheter that is clean change every month  He is not interested in suprapubic catheter  He has no problem tolerating the catheter is no hematuria there is no history of infections  Patient reports he is on Humira every 2 weeks for psoriatic arthritis  He is doing well on that he was advised of course that he is immunocompromised and needs to take extra precautions for COVID-19 and other infections  Sees oncology for a myeloproliferative disorder  He is on hydroxyurea for that and appears to be responding  He has no bruising or bleeding or recent infections  Weight is stable at 175  Had a cholecystectomy within the past years recovered completely and that resolved his symptoms  I reviewed his labs from oncology and his CBC looks pretty good hemoglobin is in the 12 range  White count is 4.4 and platelets are normal  A- currently stable  p-continue same treatments and consult

## 2020-06-30 ENCOUNTER — HOSPITAL ENCOUNTER (OUTPATIENT)
Dept: INFUSION THERAPY | Age: 85
Discharge: HOME | End: 2020-06-30
Payer: MEDICARE

## 2020-06-30 LAB
ALBUMIN SERPL-MCNC: 4.2 GM/DL (ref 3.4–5)
ALP BLD-CCNC: 75 IU/L (ref 40–128)
ALT SERPL-CCNC: 11 U/L (ref 10–40)
ANION GAP SERPL CALCULATED.3IONS-SCNC: 11 MMOL/L (ref 4–16)
AST SERPL-CCNC: 16 IU/L (ref 15–37)
BASOPHILS ABSOLUTE: 0.1 K/CU MM
BASOPHILS RELATIVE PERCENT: 1.2 % (ref 0–1)
BILIRUB SERPL-MCNC: 0.4 MG/DL (ref 0–1)
BUN BLDV-MCNC: 19 MG/DL (ref 6–23)
CALCIUM SERPL-MCNC: 9 MG/DL (ref 8.3–10.6)
CHLORIDE BLD-SCNC: 105 MMOL/L (ref 99–110)
CO2: 22 MMOL/L (ref 21–32)
CREAT SERPL-MCNC: 1.3 MG/DL (ref 0.9–1.3)
DIFFERENTIAL TYPE: ABNORMAL
EOSINOPHILS ABSOLUTE: 0.1 K/CU MM
EOSINOPHILS RELATIVE PERCENT: 1.9 % (ref 0–3)
GFR AFRICAN AMERICAN: >60 ML/MIN/1.73M2
GFR NON-AFRICAN AMERICAN: 52 ML/MIN/1.73M2
GLUCOSE BLD-MCNC: 113 MG/DL (ref 70–99)
HCT VFR BLD CALC: 36.7 % (ref 42–52)
HEMOGLOBIN: 12.6 GM/DL (ref 13.5–18)
LYMPHOCYTES ABSOLUTE: 0.8 K/CU MM
LYMPHOCYTES RELATIVE PERCENT: 17 % (ref 24–44)
MCH RBC QN AUTO: 39.5 PG (ref 27–31)
MCHC RBC AUTO-ENTMCNC: 34.3 % (ref 32–36)
MCV RBC AUTO: 115 FL (ref 78–100)
MONOCYTES ABSOLUTE: 0.6 K/CU MM
MONOCYTES RELATIVE PERCENT: 12.9 % (ref 0–4)
PDW BLD-RTO: 12.8 % (ref 11.7–14.9)
PLATELET # BLD: 340 K/CU MM (ref 140–440)
PMV BLD AUTO: 9.1 FL (ref 7.5–11.1)
POTASSIUM SERPL-SCNC: 4.4 MMOL/L (ref 3.5–5.1)
RBC # BLD: 3.19 M/CU MM (ref 4.6–6.2)
SEGMENTED NEUTROPHILS ABSOLUTE COUNT: 3.2 K/CU MM
SEGMENTED NEUTROPHILS RELATIVE PERCENT: 67 % (ref 36–66)
SODIUM BLD-SCNC: 138 MMOL/L (ref 135–145)
TOTAL PROTEIN: 6.6 GM/DL (ref 6.4–8.2)
WBC # BLD: 4.8 K/CU MM (ref 4–10.5)

## 2020-06-30 PROCEDURE — 36415 COLL VENOUS BLD VENIPUNCTURE: CPT

## 2020-06-30 PROCEDURE — 85025 COMPLETE CBC W/AUTO DIFF WBC: CPT

## 2020-06-30 PROCEDURE — 99211 OFF/OP EST MAY X REQ PHY/QHP: CPT

## 2020-06-30 PROCEDURE — 80053 COMPREHEN METABOLIC PANEL: CPT

## 2020-09-01 ENCOUNTER — HOSPITAL ENCOUNTER (OUTPATIENT)
Dept: INFUSION THERAPY | Age: 85
Discharge: HOME | End: 2020-09-01
Payer: MEDICARE

## 2020-09-01 DIAGNOSIS — C20 PRIMARY MALIGNANT NEOPLASM OF RECTUM (HCC): ICD-10-CM

## 2020-09-01 LAB
ALBUMIN SERPL-MCNC: 4.4 GM/DL (ref 3.4–5)
ALP BLD-CCNC: 69 IU/L (ref 40–128)
ALT SERPL-CCNC: 12 U/L (ref 10–40)
ANION GAP SERPL CALCULATED.3IONS-SCNC: 12 MMOL/L (ref 4–16)
AST SERPL-CCNC: 15 IU/L (ref 15–37)
BASOPHILS ABSOLUTE: 0.1 K/CU MM
BASOPHILS RELATIVE PERCENT: 1.6 % (ref 0–1)
BILIRUB SERPL-MCNC: 0.5 MG/DL (ref 0–1)
BUN BLDV-MCNC: 20 MG/DL (ref 6–23)
CALCIUM SERPL-MCNC: 9.1 MG/DL (ref 8.3–10.6)
CHLORIDE BLD-SCNC: 103 MMOL/L (ref 99–110)
CO2: 23 MMOL/L (ref 21–32)
CREAT SERPL-MCNC: 1.3 MG/DL (ref 0.9–1.3)
DIFFERENTIAL TYPE: ABNORMAL
EOSINOPHILS ABSOLUTE: 0.1 K/CU MM
EOSINOPHILS RELATIVE PERCENT: 1.6 % (ref 0–3)
GFR AFRICAN AMERICAN: >60 ML/MIN/1.73M2
GFR NON-AFRICAN AMERICAN: 52 ML/MIN/1.73M2
GLUCOSE BLD-MCNC: 116 MG/DL (ref 70–99)
HCT VFR BLD CALC: 37.7 % (ref 42–52)
HEMOGLOBIN: 12.7 GM/DL (ref 13.5–18)
LYMPHOCYTES ABSOLUTE: 0.8 K/CU MM
LYMPHOCYTES RELATIVE PERCENT: 16.4 % (ref 24–44)
MCH RBC QN AUTO: 39.6 PG (ref 27–31)
MCHC RBC AUTO-ENTMCNC: 33.7 % (ref 32–36)
MCV RBC AUTO: 117.4 FL (ref 78–100)
MONOCYTES ABSOLUTE: 0.5 K/CU MM
MONOCYTES RELATIVE PERCENT: 10.7 % (ref 0–4)
PDW BLD-RTO: 12.4 % (ref 11.7–14.9)
PLATELET # BLD: 276 K/CU MM (ref 140–440)
PMV BLD AUTO: 9.1 FL (ref 7.5–11.1)
POTASSIUM SERPL-SCNC: 4.4 MMOL/L (ref 3.5–5.1)
RBC # BLD: 3.21 M/CU MM (ref 4.6–6.2)
SEGMENTED NEUTROPHILS ABSOLUTE COUNT: 3.5 K/CU MM
SEGMENTED NEUTROPHILS RELATIVE PERCENT: 69.7 % (ref 36–66)
SODIUM BLD-SCNC: 138 MMOL/L (ref 135–145)
TOTAL PROTEIN: 6.2 GM/DL (ref 6.4–8.2)
WBC # BLD: 5 K/CU MM (ref 4–10.5)

## 2020-09-01 PROCEDURE — 85025 COMPLETE CBC W/AUTO DIFF WBC: CPT

## 2020-09-01 PROCEDURE — 36415 COLL VENOUS BLD VENIPUNCTURE: CPT

## 2020-09-01 PROCEDURE — 80053 COMPREHEN METABOLIC PANEL: CPT

## 2020-09-01 RX ORDER — HYDROXYUREA 500 MG/1
1000 CAPSULE ORAL DAILY
Qty: 60 CAPSULE | Refills: 1 | Status: SHIPPED | OUTPATIENT
Start: 2020-09-01 | End: 2020-11-30

## 2020-09-01 NOTE — TELEPHONE ENCOUNTER
Hydrea Results:    Current dose 500 MG  New dose Cont same    Lab Results   Component Value Date    WBC 5.0 09/01/2020    WBC 4.8 06/30/2020    HGB 12.7 (L) 09/01/2020    HGB 12.6 (L) 06/30/2020     09/01/2020     06/30/2020        When to recheck CBC? Unsure?     Thank you Patient Information     Patient Name MRN Rajesh Ball [id number] Male 1941      Progress Notes by Lety Marie at 2017  8:19 AM     Author:  Lety Marie Service:  (none) Author Type:  Other Clinical Staff     Filed:  2017  8:19 AM Date of Service:  2017  8:19 AM Status:  Signed     :  Lety Marie (Other Clinical Staff)            Falls Risk Criteria:    Age 65 and older or under age 4        Sensory deficits    Poor vision    Use of ambulatory aides    Impaired judgment    Unable to walk independently    Meets High Risk criteria for falls:  Yes               1.  Do you have dizziness or vertigo?    yes                    2.  Do you need help standing or walking?   no                 3.  Have you fallen within the last 6 months?    no           4.  Has the patient been fasting?      no       If any risks are marked Yes, the following interventions are utilized:    Do not leave patient unattended     Assist patient in the dressing room and bathroom    Have ambulatory aides available throughout procedure    Involve patient s family if available

## 2020-09-03 RX ORDER — HYDROXYUREA 500 MG/1
500 CAPSULE ORAL DAILY
Qty: 30 CAPSULE | Refills: 1 | Status: SHIPPED | OUTPATIENT
Start: 2020-09-03 | End: 2020-11-30

## 2020-09-29 RX ORDER — ISOSORBIDE MONONITRATE 30 MG/1
TABLET, EXTENDED RELEASE ORAL
Qty: 90 TABLET | Refills: 0 | Status: SHIPPED | OUTPATIENT
Start: 2020-09-29 | End: 2021-01-18 | Stop reason: SDUPTHER

## 2020-09-29 NOTE — TELEPHONE ENCOUNTER
Requested Prescriptions     Signed Prescriptions Disp Refills    isosorbide mononitrate (IMDUR) 30 MG extended release tablet 90 tablet 0     Sig: Take 1 tablet by mouth once daily     Authorizing Provider: Jonas Hernandez     Ordering User: Delphine Denson

## 2020-10-29 NOTE — PROGRESS NOTES
Patient Name: Karin Rodriguez  Patient : 1929  Patient MRN: [id number]     Primary Oncologist: Es Medina MD  Referring Provider: Nikki Cornejo MD     Date of Service: 11/3/2020      Chief Complaint:   Chief Complaint   Patient presents with    Follow-up     He came in for follow-up visit. Patient Active Problem List:     Hypertension     Benign prostatic hyperplasia     Psoriatic arthritis ([address]     Primary malignant neoplasm of rectum (HCC)     Psoriasis     Stable angina (HCC)     Chronic myeloproliferative disease (HCC)     Monocytosis (symptomatic)     Gastroesophageal reflux disease without esophagitis     Urinary retention     HPI:   1. Tiffany Mcqueen is a very delightful gentleman [ 1929] who came in to see Dr Gama Maldonado in 2017 for elevated platelets and mild monocytosis. He has been in relatively good health, except for a history of rectal cancer, for which he was treated in [address] [place] [address] with abdominoperineal resection, permanent colostomy, followed by chemotherapy and concurrent radiation therapy. He has been in good clinical remission since. 2. He was also diagnosed with a psoriatic rash on his trunk and legs, for which he was put on methotrexate 7.5 mg q weekly in 2017. 3. However, his CBC in 2017 showed platelet count of 063,955 and repeat values in May and  showed the platelet counts around 570,000 and 580,000  4. In addition to that, he had mild monocytosis ranging from 8 to 18 percent. 5. Looking back in Epic, in  his white count and hemoglobin were normal. Platelet count was also normal between 286,000 and 334,000 and in March of this year platelets were 840,810. He had mild monocytosis between 8 and 10 percent in . 6. Peripheral smear also showed occasional giant platelets, anisocytosis, and elliptocytosis. 7. 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    He had history of left CVA in 1982. We discussed about his risks of CVA due to elevated plaletet counts. I offered him hydrea to normalize his platelet counts. I discussed about risks and benefits of hydrea. He is on ASA. He had history of skin cancer, I advise him to follow up with his dermatologist.  He was in the hospital in March 2018 due to R abdominal pain likely related to gall stone. HIDA scan in March 2018 revealed o cystic obstruction. CXR : no acute process. RUQ abdomen US: cholelithiasis without evidence of acute cholecystitis. CT abdomen: infrarenal aortic aneurysm 6.4 cm in length. Psoriatic lesion has improved with Humira injection since June 2018. Labs in August 2019 were reviewed. Platelet was 774. He is taking Hydrea to 1500 mg alternating with 1000 mg daily. He has a Weaver cath and was seen by Dr. Brisa Persaud for the infection in the bladder. He was told that he has damage to the bladder from the previous radiation therapy. He had 33 days of radiation. Labs in February 2020 was stable. CBC today on June 30, 2020 is stable. On November 3, 2020 he came in for follow-up visit. CBC and CMP in September 2020 was stable. He takes Hydrea 1000 mg daily. He also takes baby aspirin. He remains active. He already had pneumonia and flu shot. He has been receiving shot for the psoriasis in the last 2 years with good response. He was seen by urologist and had permanent catheter through the penis. He denies any nausea, vomiting or diarrhea. He denies any headaches or dizzy spell. He denies any acute pain. No depression. PAST MEDICAL HISTORY:   1. Rectal cancer in 1999, APR, followed by chemotherapy plus RT. 2. Hypertension. 3. Psoriasis as above on Methotrexate 7.5 mg/week. 4. Angina. 5. Some GI bleed 13 years ago, for which he was put on Protonix and has been on it since then. PAST SURGICAL HISTORY:   1. Appendectomy as a young adult. 2. APR surgery as above.      FAMILY HISTORY:   Father had prostate cancer, mother had bone cancer in her 63's. He had six sisters. Two  of nonmalignant causes. PERSONAL HISTORY:   He quit smoking 44 years ago, nondrinker. He has been a  since . He has four stepchildren. A daughter, Johnny Wilson, accompanied him. He is retired, but very active with the [employer]. He participates in multiple programs there and keeps himself very b    Review of Systems: \"Per interval history; otherwise 10 point ROS is negative. \"     Vital Signs:  /80 (Site: Left Upper Arm, Position: Sitting, Cuff Size: Medium Adult)   Pulse 79   Temp 97.2 °F (36.2 °C) (Temporal)   Ht 5' 9\" (1.753 m)   Wt 195 lb 9.6 oz (88.7 kg)   SpO2 95%   BMI 28.89 kg/m²     Physical Exam:  CONSTITUTIONAL: awake, alert, cooperative, no apparent distress   EYES: pupils equal, round and reactive to light, sclera clear and conjunctiva normal  ENT: Normocephalic, without obvious abnormality, atraumatic  NECK: supple, symmetrical, no jugular venous distension and no carotid bruits   HEMATOLOGIC/LYMPHATIC: no cervical, supraclavicular or axillary lymphadenopathy   LUNGS: no increased work of breathing and clear to auscultation   CARDIOVASCULAR: regular rate and rhythm, normal S1 and S2, no murmur noted  ABDOMEN: normal bowel sounds x 4, soft, non-distended, non-tender, no masses palpated, no hepatosplenomegaly. Catheter noted   MUSCULOSKELETAL: full range of motion noted, tone is normal  NEUROLOGIC: awake, alert, oriented to name, place and time. Motor skills grossly intact. SKIN: No jaundice. appears intact. Dry skin. EXTREMITIES: no LE edema. No cyanosis.      Labs:  Hematology:  Lab Results   Component Value Date    WBC 4.6 2020    RBC 3.16 (L) 2020    HGB 12.5 (L) 2020    HCT 36.3 (L) 2020    .9 (H) 2020    MCH 39.6 (H) 2020    MCHC 34.4 2020    RDW 12.7 2020     2020    MPV 9.1 11/03/2020    BANDSPCT 1 (L) 08/18/2017    SEGSPCT 67.5 (H) 11/03/2020    EOSRELPCT 1.3 11/03/2020    BASOPCT 1.5 (H) 11/03/2020    LYMPHOPCT 16.7 (L) 11/03/2020    MONOPCT 13.0 (H) 11/03/2020    BANDABS 0.08 08/18/2017    SEGSABS 3.1 11/03/2020    EOSABS 0.1 11/03/2020    BASOSABS 0.1 11/03/2020    LYMPHSABS 0.8 11/03/2020    MONOSABS 0.6 11/03/2020    DIFFTYPE AUTOMATED DIFFERENTIAL 11/03/2020    ANISOCYTOSIS 1+ 06/27/2017    POLYCHROM 1+ 08/18/2017    PLTM INCREASED 08/18/2017     Chemistry:  Lab Results   Component Value Date     09/01/2020    K 4.4 09/01/2020     09/01/2020    CO2 23 09/01/2020    BUN 20 09/01/2020    CREATININE 1.3 09/01/2020    GLUCOSE 116 (H) 09/01/2020    CALCIUM 9.1 09/01/2020    PROT 6.2 (L) 09/01/2020    LABALBU 4.4 09/01/2020    BILITOT 0.5 09/01/2020    ALKPHOS 69 09/01/2020    AST 15 09/01/2020    ALT 12 09/01/2020    LABGLOM 52 (L) 09/01/2020    GFRAA >60 09/01/2020     Lab Results   Component Value Date     03/13/2018     Immunology:  Lab Results   Component Value Date    PROT 6.2 (L) 09/01/2020     Coagulation Panel:  Lab Results   Component Value Date    PROTIME 10.4 12/11/2011    INR 0.95 12/11/2011    APTT 21.6 (L) 12/11/2011      Observations:  PHQ-9 Total Score: 0 (11/3/2020 10:01 AM)      Assessment & Plan:    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 JAMEL-2 was +ve 19%  Due to risk of CVA, I offered him hydrea. I remind him to continue to take daily ASA. CBC on February 28, 2020 was reviewed. He is agreeable to continue with hydrea and ASA. CBC in June 2020 was stable. He will continue with 1000 mg daily Hydrea. I will check CBC and CMP today. CBC every 12 weeks. 2. Survivorship issues:   He has been cured of rectal cancer more than 18 years. No sign of any recurrence at this time.    Other than psoriasis, he really does not have any major issues or problems and doing very well functionally as an 80year-old gentleman. We talked about rectal cancer, which is now 18 years ago. We talked about colonoscopy; however, at age 80, he may continue to be monitored without putting him through a colonoscopy, except if or when he develops any symptoms related to that. 3. He had bladder infections. He is being followed by Dr. Palomo Callejas. He has permanent catheter through his penis. 4. He has on injection for psoriasis every other week since 2018. RTC in 6 months or sooner. All of his questions have been answered for today. Recent imaging and labs were reviewed and discussed with the patient.       Electronically signed by Yasmin Becerril MD on 10/29/20 at 10:05 AM [facility]

## 2020-11-03 ENCOUNTER — HOSPITAL ENCOUNTER (OUTPATIENT)
Dept: INFUSION THERAPY | Age: 85
Discharge: HOME | End: 2020-11-03
Payer: MEDICARE

## 2020-11-03 ENCOUNTER — OFFICE VISIT (OUTPATIENT)
Dept: ONCOLOGY | Age: 85
End: 2020-11-03
Payer: MEDICARE

## 2020-11-03 VITALS
DIASTOLIC BLOOD PRESSURE: 80 MMHG | HEART RATE: 79 BPM | BODY MASS INDEX: 28.97 KG/M2 | SYSTOLIC BLOOD PRESSURE: 128 MMHG | TEMPERATURE: 97.2 F | HEIGHT: 69 IN | WEIGHT: 195.6 LBS | OXYGEN SATURATION: 95 %

## 2020-11-03 DIAGNOSIS — D47.1 CHRONIC MYELOPROLIFERATIVE DISEASE (HCC): ICD-10-CM

## 2020-11-03 LAB
ALBUMIN SERPL-MCNC: 3.9 GM/DL (ref 3.4–5)
ALP BLD-CCNC: 67 IU/L (ref 40–129)
ALT SERPL-CCNC: 10 U/L (ref 10–40)
ANION GAP SERPL CALCULATED.3IONS-SCNC: 13 MMOL/L (ref 4–16)
AST SERPL-CCNC: 17 IU/L (ref 15–37)
BASOPHILS ABSOLUTE: 0.1 K/CU MM
BASOPHILS RELATIVE PERCENT: 1.5 % (ref 0–1)
BILIRUB SERPL-MCNC: 0.4 MG/DL (ref 0–1)
BUN BLDV-MCNC: 21 MG/DL (ref 6–23)
CALCIUM SERPL-MCNC: 9 MG/DL (ref 8.3–10.6)
CHLORIDE BLD-SCNC: 99 MMOL/L (ref 99–110)
CO2: 23 MMOL/L (ref 21–32)
CREAT SERPL-MCNC: 1.2 MG/DL (ref 0.9–1.3)
DIFFERENTIAL TYPE: ABNORMAL
EOSINOPHILS ABSOLUTE: 0.1 K/CU MM
EOSINOPHILS RELATIVE PERCENT: 1.3 % (ref 0–3)
GFR AFRICAN AMERICAN: >60 ML/MIN/1.73M2
GFR NON-AFRICAN AMERICAN: 57 ML/MIN/1.73M2
GLUCOSE BLD-MCNC: 100 MG/DL (ref 70–99)
HCT VFR BLD CALC: 36.3 % (ref 42–52)
HEMOGLOBIN: 12.5 GM/DL (ref 13.5–18)
LYMPHOCYTES ABSOLUTE: 0.8 K/CU MM
LYMPHOCYTES RELATIVE PERCENT: 16.7 % (ref 24–44)
MCH RBC QN AUTO: 39.6 PG (ref 27–31)
MCHC RBC AUTO-ENTMCNC: 34.4 % (ref 32–36)
MCV RBC AUTO: 114.9 FL (ref 78–100)
MONOCYTES ABSOLUTE: 0.6 K/CU MM
MONOCYTES RELATIVE PERCENT: 13 % (ref 0–4)
PDW BLD-RTO: 12.7 % (ref 11.7–14.9)
PLATELET # BLD: 305 K/CU MM (ref 140–440)
PMV BLD AUTO: 9.1 FL (ref 7.5–11.1)
POTASSIUM SERPL-SCNC: 4.2 MMOL/L (ref 3.5–5.1)
RBC # BLD: 3.16 M/CU MM (ref 4.6–6.2)
SEGMENTED NEUTROPHILS ABSOLUTE COUNT: 3.1 K/CU MM
SEGMENTED NEUTROPHILS RELATIVE PERCENT: 67.5 % (ref 36–66)
SODIUM BLD-SCNC: 135 MMOL/L (ref 135–145)
TOTAL PROTEIN: 6.1 GM/DL (ref 6.4–8.2)
WBC # BLD: 4.6 K/CU MM (ref 4–10.5)

## 2020-11-03 PROCEDURE — 99213 OFFICE O/P EST LOW 20 MIN: CPT | Performed by: INTERNAL MEDICINE

## 2020-11-03 PROCEDURE — 36415 COLL VENOUS BLD VENIPUNCTURE: CPT

## 2020-11-03 PROCEDURE — 99211 OFF/OP EST MAY X REQ PHY/QHP: CPT

## 2020-11-03 PROCEDURE — G8484 FLU IMMUNIZE NO ADMIN: HCPCS | Performed by: INTERNAL MEDICINE

## 2020-11-03 PROCEDURE — 4040F PNEUMOC VAC/ADMIN/RCVD: CPT | Performed by: INTERNAL MEDICINE

## 2020-11-03 PROCEDURE — 80053 COMPREHEN METABOLIC PANEL: CPT

## 2020-11-03 PROCEDURE — 1036F TOBACCO NON-USER: CPT | Performed by: INTERNAL MEDICINE

## 2020-11-03 PROCEDURE — 85025 COMPLETE CBC W/AUTO DIFF WBC: CPT

## 2020-11-03 PROCEDURE — G8417 CALC BMI ABV UP PARAM F/U: HCPCS | Performed by: INTERNAL MEDICINE

## 2020-11-03 PROCEDURE — 1123F ACP DISCUSS/DSCN MKR DOCD: CPT | Performed by: INTERNAL MEDICINE

## 2020-11-03 PROCEDURE — G8427 DOCREV CUR MEDS BY ELIG CLIN: HCPCS | Performed by: INTERNAL MEDICINE

## 2020-11-03 ASSESSMENT — PATIENT HEALTH QUESTIONNAIRE - PHQ9
SUM OF ALL RESPONSES TO PHQ9 QUESTIONS 1 & 2: 0
SUM OF ALL RESPONSES TO PHQ QUESTIONS 1-9: 0
2. FEELING DOWN, DEPRESSED OR HOPELESS: 0
SUM OF ALL RESPONSES TO PHQ QUESTIONS 1-9: 0
SUM OF ALL RESPONSES TO PHQ QUESTIONS 1-9: 0
1. LITTLE INTEREST OR PLEASURE IN DOING THINGS: 0

## 2020-11-03 NOTE — PROGRESS NOTES
MA Rooming Questions  Patient: Ye Lara  MRN: [id number]    Date: 11/3/2020        1. Do you have any new issues?   no         2. Do you need any refills on medications?    no    3. Have you had any imaging done since your last visit?   no    4. Have you been hospitalized or seen in the emergency room since your last visit here?   no    5. Did the patient have a depression screening completed today?  Yes    PHQ-9 Total Score: 0 (11/3/2020 10:01 AM)       PHQ-9 Given to (if applicable):               PHQ-9 Score (if applicable):                     [] Positive     []  Negative              Does question #9 need addressed (if applicable)                     [] Yes    []  No               Moraima Hdez

## 2020-11-30 RX ORDER — PANTOPRAZOLE SODIUM 40 MG/1
TABLET, DELAYED RELEASE ORAL
Qty: 90 TABLET | Refills: 0 | Status: SHIPPED | OUTPATIENT
Start: 2020-11-30 | End: 2021-03-01

## 2020-11-30 RX ORDER — HYDROXYUREA 500 MG/1
CAPSULE ORAL
Qty: 60 CAPSULE | Refills: 0 | Status: SHIPPED | OUTPATIENT
Start: 2020-11-30 | End: 2021-01-04

## 2020-12-30 RX ORDER — DILTIAZEM HYDROCHLORIDE 240 MG/1
CAPSULE, EXTENDED RELEASE ORAL
Qty: 90 CAPSULE | Refills: 0 | Status: SHIPPED | OUTPATIENT
Start: 2020-12-30 | End: 2021-01-18 | Stop reason: SDUPTHER

## 2020-12-30 NOTE — TELEPHONE ENCOUNTER
Patient has 3 pills left ----can this be called in either today or tomorrow------    [facility]

## 2021-01-01 ENCOUNTER — TELEPHONE (OUTPATIENT)
Dept: CARDIOLOGY CLINIC | Age: 86
End: 2021-01-01

## 2021-01-01 ENCOUNTER — PROCEDURE VISIT (OUTPATIENT)
Dept: CARDIOLOGY CLINIC | Age: 86
End: 2021-01-01
Payer: MEDICARE

## 2021-01-01 ENCOUNTER — TELEPHONE (OUTPATIENT)
Dept: FAMILY MEDICINE CLINIC | Age: 86
End: 2021-01-01

## 2021-01-01 ENCOUNTER — HOSPITAL ENCOUNTER (OUTPATIENT)
Dept: INFUSION THERAPY | Age: 86
Discharge: HOME | End: 2021-08-03
Payer: MEDICARE

## 2021-01-01 ENCOUNTER — HOSPITAL ENCOUNTER (OUTPATIENT)
Dept: INFUSION THERAPY | Age: 86
Discharge: HOME | End: 2021-11-04
Payer: MEDICARE

## 2021-01-01 ENCOUNTER — CLINICAL DOCUMENTATION (OUTPATIENT)
Dept: ONCOLOGY | Age: 86
End: 2021-01-01

## 2021-01-01 ENCOUNTER — INITIAL CONSULT (OUTPATIENT)
Dept: CARDIOLOGY CLINIC | Age: 86
End: 2021-01-01
Payer: MEDICARE

## 2021-01-01 ENCOUNTER — OFFICE VISIT (OUTPATIENT)
Dept: FAMILY MEDICINE CLINIC | Age: 86
End: 2021-01-01
Payer: MEDICARE

## 2021-01-01 ENCOUNTER — OFFICE VISIT (OUTPATIENT)
Dept: CARDIOLOGY CLINIC | Age: 86
End: 2021-01-01
Payer: MEDICARE

## 2021-01-01 ENCOUNTER — NURSE ONLY (OUTPATIENT)
Dept: CARDIOLOGY CLINIC | Age: 86
End: 2021-01-01
Payer: MEDICARE

## 2021-01-01 ENCOUNTER — OFFICE VISIT (OUTPATIENT)
Dept: ONCOLOGY | Age: 86
End: 2021-01-01
Payer: MEDICARE

## 2021-01-01 VITALS
SYSTOLIC BLOOD PRESSURE: 108 MMHG | BODY MASS INDEX: 28.64 KG/M2 | HEIGHT: 68 IN | DIASTOLIC BLOOD PRESSURE: 68 MMHG | OXYGEN SATURATION: 96 % | WEIGHT: 189 LBS | HEART RATE: 76 BPM

## 2021-01-01 VITALS
OXYGEN SATURATION: 96 % | WEIGHT: 183 LBS | RESPIRATION RATE: 12 BRPM | SYSTOLIC BLOOD PRESSURE: 127 MMHG | DIASTOLIC BLOOD PRESSURE: 77 MMHG | HEIGHT: 68 IN | HEART RATE: 86 BPM | BODY MASS INDEX: 27.74 KG/M2 | TEMPERATURE: 96.4 F

## 2021-01-01 VITALS
BODY MASS INDEX: 28.85 KG/M2 | HEIGHT: 68 IN | RESPIRATION RATE: 18 BRPM | DIASTOLIC BLOOD PRESSURE: 78 MMHG | HEART RATE: 77 BPM | OXYGEN SATURATION: 98 % | WEIGHT: 190.4 LBS | SYSTOLIC BLOOD PRESSURE: 130 MMHG

## 2021-01-01 VITALS
HEIGHT: 68 IN | DIASTOLIC BLOOD PRESSURE: 82 MMHG | BODY MASS INDEX: 28.4 KG/M2 | WEIGHT: 187.4 LBS | SYSTOLIC BLOOD PRESSURE: 132 MMHG | HEART RATE: 88 BPM

## 2021-01-01 DIAGNOSIS — I10 ESSENTIAL HYPERTENSION: Chronic | ICD-10-CM

## 2021-01-01 DIAGNOSIS — D47.1 CHRONIC MYELOPROLIFERATIVE DISEASE (HCC): ICD-10-CM

## 2021-01-01 DIAGNOSIS — I35.0 NONRHEUMATIC AORTIC VALVE STENOSIS: ICD-10-CM

## 2021-01-01 DIAGNOSIS — I49.9 IRREGULAR HEART BEAT: ICD-10-CM

## 2021-01-01 DIAGNOSIS — R53.1 WEAKNESS: Primary | ICD-10-CM

## 2021-01-01 DIAGNOSIS — D47.1 CHRONIC MYELOPROLIFERATIVE DISEASE (HCC): Primary | ICD-10-CM

## 2021-01-01 DIAGNOSIS — I10 PRIMARY HYPERTENSION: Primary | Chronic | ICD-10-CM

## 2021-01-01 DIAGNOSIS — I49.9 CARDIAC ARRHYTHMIA, UNSPECIFIED CARDIAC ARRHYTHMIA TYPE: ICD-10-CM

## 2021-01-01 DIAGNOSIS — Z86.73 H/O: CVA (CEREBROVASCULAR ACCIDENT): ICD-10-CM

## 2021-01-01 DIAGNOSIS — I48.0 PAROXYSMAL A-FIB (HCC): Primary | ICD-10-CM

## 2021-01-01 DIAGNOSIS — I48.91 ATRIAL FIBRILLATION, UNSPECIFIED TYPE (HCC): ICD-10-CM

## 2021-01-01 DIAGNOSIS — I48.0 PAROXYSMAL ATRIAL FIBRILLATION (HCC): ICD-10-CM

## 2021-01-01 DIAGNOSIS — L40.50 PSORIATIC ARTHRITIS (HCC): ICD-10-CM

## 2021-01-01 DIAGNOSIS — I10 ESSENTIAL HYPERTENSION: Primary | Chronic | ICD-10-CM

## 2021-01-01 LAB
ALBUMIN SERPL-MCNC: 4.1 GM/DL (ref 3.4–5)
ALBUMIN SERPL-MCNC: 4.2 GM/DL (ref 3.4–5)
ALP BLD-CCNC: 65 IU/L (ref 40–129)
ALP BLD-CCNC: 67 IU/L (ref 40–128)
ALT SERPL-CCNC: 11 U/L (ref 10–40)
ALT SERPL-CCNC: 11 U/L (ref 10–40)
ANION GAP SERPL CALCULATED.3IONS-SCNC: 11 MMOL/L (ref 4–16)
ANION GAP SERPL CALCULATED.3IONS-SCNC: 9 MMOL/L (ref 4–16)
AST SERPL-CCNC: 14 IU/L (ref 15–37)
AST SERPL-CCNC: 16 IU/L (ref 15–37)
BASOPHILS ABSOLUTE: 0 K/CU MM
BASOPHILS ABSOLUTE: 0.1 K/CU MM
BASOPHILS RELATIVE PERCENT: 0.7 % (ref 0–1)
BASOPHILS RELATIVE PERCENT: 1.4 % (ref 0–1)
BILIRUB SERPL-MCNC: 0.3 MG/DL (ref 0–1)
BILIRUB SERPL-MCNC: 0.5 MG/DL (ref 0–1)
BUN BLDV-MCNC: 23 MG/DL (ref 6–23)
BUN BLDV-MCNC: 24 MG/DL (ref 6–23)
CALCIUM SERPL-MCNC: 9 MG/DL (ref 8.3–10.6)
CALCIUM SERPL-MCNC: 9.3 MG/DL (ref 8.3–10.6)
CHLORIDE BLD-SCNC: 100 MMOL/L (ref 99–110)
CHLORIDE BLD-SCNC: 102 MMOL/L (ref 99–110)
CO2: 23 MMOL/L (ref 21–32)
CO2: 26 MMOL/L (ref 21–32)
CREAT SERPL-MCNC: 1.2 MG/DL (ref 0.9–1.3)
CREAT SERPL-MCNC: 1.2 MG/DL (ref 0.9–1.3)
DIFFERENTIAL TYPE: ABNORMAL
DIFFERENTIAL TYPE: ABNORMAL
EOSINOPHILS ABSOLUTE: 0.1 K/CU MM
EOSINOPHILS ABSOLUTE: 0.1 K/CU MM
EOSINOPHILS RELATIVE PERCENT: 1.1 % (ref 0–3)
EOSINOPHILS RELATIVE PERCENT: 1.3 % (ref 0–3)
FERRITIN: 248 NG/ML (ref 30–400)
GFR AFRICAN AMERICAN: >60 ML/MIN/1.73M2
GFR AFRICAN AMERICAN: >60 ML/MIN/1.73M2
GFR NON-AFRICAN AMERICAN: 57 ML/MIN/1.73M2
GFR NON-AFRICAN AMERICAN: 57 ML/MIN/1.73M2
GLUCOSE BLD-MCNC: 107 MG/DL (ref 70–99)
GLUCOSE BLD-MCNC: 95 MG/DL (ref 70–99)
HCT VFR BLD CALC: 36.4 % (ref 42–52)
HCT VFR BLD CALC: 37.5 % (ref 42–52)
HEMOGLOBIN: 12.4 GM/DL (ref 13.5–18)
HEMOGLOBIN: 12.6 GM/DL (ref 13.5–18)
IRON: 76 UG/DL (ref 59–158)
LV EF: 58 %
LV EF: 62 %
LVEF MODALITY: NORMAL
LVEF MODALITY: NORMAL
LYMPHOCYTES ABSOLUTE: 0.7 K/CU MM
LYMPHOCYTES ABSOLUTE: 0.8 K/CU MM
LYMPHOCYTES RELATIVE PERCENT: 13 % (ref 24–44)
LYMPHOCYTES RELATIVE PERCENT: 18.1 % (ref 24–44)
MCH RBC QN AUTO: 38.7 PG (ref 27–31)
MCH RBC QN AUTO: 39.2 PG (ref 27–31)
MCHC RBC AUTO-ENTMCNC: 33.6 % (ref 32–36)
MCHC RBC AUTO-ENTMCNC: 34.1 % (ref 32–36)
MCV RBC AUTO: 115 FL (ref 78–100)
MCV RBC AUTO: 115.2 FL (ref 78–100)
MONOCYTES ABSOLUTE: 0.7 K/CU MM
MONOCYTES ABSOLUTE: 0.7 K/CU MM
MONOCYTES RELATIVE PERCENT: 11.9 % (ref 0–4)
MONOCYTES RELATIVE PERCENT: 14.9 % (ref 0–4)
PCT TRANSFERRIN: 24 % (ref 10–44)
PDW BLD-RTO: 12.5 % (ref 11.7–14.9)
PDW BLD-RTO: 12.6 % (ref 11.7–14.9)
PLATELET # BLD: 270 K/CU MM (ref 140–440)
PLATELET # BLD: 303 K/CU MM (ref 140–440)
PMV BLD AUTO: 8.9 FL (ref 7.5–11.1)
PMV BLD AUTO: 9.1 FL (ref 7.5–11.1)
POTASSIUM SERPL-SCNC: 4.3 MMOL/L (ref 3.5–5.1)
POTASSIUM SERPL-SCNC: 4.6 MMOL/L (ref 3.5–5.1)
RBC # BLD: 3.16 M/CU MM (ref 4.6–6.2)
RBC # BLD: 3.26 M/CU MM (ref 4.6–6.2)
SEGMENTED NEUTROPHILS ABSOLUTE COUNT: 2.8 K/CU MM
SEGMENTED NEUTROPHILS ABSOLUTE COUNT: 4 K/CU MM
SEGMENTED NEUTROPHILS RELATIVE PERCENT: 65.2 % (ref 36–66)
SEGMENTED NEUTROPHILS RELATIVE PERCENT: 72.4 % (ref 36–66)
SODIUM BLD-SCNC: 134 MMOL/L (ref 135–145)
SODIUM BLD-SCNC: 137 MMOL/L (ref 135–145)
TOTAL IRON BINDING CAPACITY: 311 UG/DL (ref 250–450)
TOTAL PROTEIN: 6.4 GM/DL (ref 6.4–8.2)
TOTAL PROTEIN: 6.4 GM/DL (ref 6.4–8.2)
UNSATURATED IRON BINDING CAPACITY: 235 UG/DL (ref 110–370)
WBC # BLD: 4.4 K/CU MM (ref 4–10.5)
WBC # BLD: 5.5 K/CU MM (ref 4–10.5)

## 2021-01-01 PROCEDURE — 4040F PNEUMOC VAC/ADMIN/RCVD: CPT | Performed by: INTERNAL MEDICINE

## 2021-01-01 PROCEDURE — 93000 ELECTROCARDIOGRAM COMPLETE: CPT | Performed by: INTERNAL MEDICINE

## 2021-01-01 PROCEDURE — 99214 OFFICE O/P EST MOD 30 MIN: CPT | Performed by: INTERNAL MEDICINE

## 2021-01-01 PROCEDURE — G8417 CALC BMI ABV UP PARAM F/U: HCPCS | Performed by: INTERNAL MEDICINE

## 2021-01-01 PROCEDURE — 1123F ACP DISCUSS/DSCN MKR DOCD: CPT | Performed by: FAMILY MEDICINE

## 2021-01-01 PROCEDURE — 1036F TOBACCO NON-USER: CPT | Performed by: INTERNAL MEDICINE

## 2021-01-01 PROCEDURE — 36415 COLL VENOUS BLD VENIPUNCTURE: CPT

## 2021-01-01 PROCEDURE — 93015 CV STRESS TEST SUPVJ I&R: CPT | Performed by: INTERNAL MEDICINE

## 2021-01-01 PROCEDURE — 1036F TOBACCO NON-USER: CPT | Performed by: FAMILY MEDICINE

## 2021-01-01 PROCEDURE — G8428 CUR MEDS NOT DOCUMENT: HCPCS | Performed by: INTERNAL MEDICINE

## 2021-01-01 PROCEDURE — 78452 HT MUSCLE IMAGE SPECT MULT: CPT | Performed by: INTERNAL MEDICINE

## 2021-01-01 PROCEDURE — 93000 ELECTROCARDIOGRAM COMPLETE: CPT | Performed by: FAMILY MEDICINE

## 2021-01-01 PROCEDURE — 93228 REMOTE 30 DAY ECG REV/REPORT: CPT | Performed by: INTERNAL MEDICINE

## 2021-01-01 PROCEDURE — 99214 OFFICE O/P EST MOD 30 MIN: CPT | Performed by: FAMILY MEDICINE

## 2021-01-01 PROCEDURE — G8417 CALC BMI ABV UP PARAM F/U: HCPCS | Performed by: FAMILY MEDICINE

## 2021-01-01 PROCEDURE — G8427 DOCREV CUR MEDS BY ELIG CLIN: HCPCS | Performed by: FAMILY MEDICINE

## 2021-01-01 PROCEDURE — 83550 IRON BINDING TEST: CPT

## 2021-01-01 PROCEDURE — 99204 OFFICE O/P NEW MOD 45 MIN: CPT | Performed by: INTERNAL MEDICINE

## 2021-01-01 PROCEDURE — 80053 COMPREHEN METABOLIC PANEL: CPT

## 2021-01-01 PROCEDURE — G8427 DOCREV CUR MEDS BY ELIG CLIN: HCPCS | Performed by: INTERNAL MEDICINE

## 2021-01-01 PROCEDURE — 3288F FALL RISK ASSESSMENT DOCD: CPT | Performed by: FAMILY MEDICINE

## 2021-01-01 PROCEDURE — 99211 OFF/OP EST MAY X REQ PHY/QHP: CPT

## 2021-01-01 PROCEDURE — G8484 FLU IMMUNIZE NO ADMIN: HCPCS | Performed by: INTERNAL MEDICINE

## 2021-01-01 PROCEDURE — 93306 TTE W/DOPPLER COMPLETE: CPT | Performed by: INTERNAL MEDICINE

## 2021-01-01 PROCEDURE — 1123F ACP DISCUSS/DSCN MKR DOCD: CPT | Performed by: INTERNAL MEDICINE

## 2021-01-01 PROCEDURE — A9500 TC99M SESTAMIBI: HCPCS | Performed by: INTERNAL MEDICINE

## 2021-01-01 PROCEDURE — 85025 COMPLETE CBC W/AUTO DIFF WBC: CPT

## 2021-01-01 PROCEDURE — 99213 OFFICE O/P EST LOW 20 MIN: CPT | Performed by: INTERNAL MEDICINE

## 2021-01-01 PROCEDURE — 82728 ASSAY OF FERRITIN: CPT

## 2021-01-01 PROCEDURE — 83540 ASSAY OF IRON: CPT

## 2021-01-01 PROCEDURE — 4040F PNEUMOC VAC/ADMIN/RCVD: CPT | Performed by: FAMILY MEDICINE

## 2021-01-01 RX ORDER — HYDROXYUREA 500 MG/1
CAPSULE ORAL
Qty: 180 CAPSULE | Refills: 1 | Status: SHIPPED | OUTPATIENT
Start: 2021-01-01 | End: 2022-01-01 | Stop reason: SDUPTHER

## 2021-01-01 RX ORDER — DILTIAZEM HYDROCHLORIDE 240 MG/1
CAPSULE, EXTENDED RELEASE ORAL
Qty: 90 CAPSULE | Refills: 1 | Status: SHIPPED | OUTPATIENT
Start: 2021-01-01 | End: 2022-01-01 | Stop reason: SDUPTHER

## 2021-01-01 RX ORDER — PANTOPRAZOLE SODIUM 40 MG/1
TABLET, DELAYED RELEASE ORAL
Qty: 90 TABLET | Refills: 0 | Status: SHIPPED | OUTPATIENT
Start: 2021-01-01 | End: 2021-01-01

## 2021-01-01 RX ORDER — ISOSORBIDE MONONITRATE 30 MG/1
TABLET, EXTENDED RELEASE ORAL
Qty: 90 TABLET | Refills: 1 | Status: SHIPPED | OUTPATIENT
Start: 2021-01-01 | End: 2021-01-01

## 2021-01-01 RX ORDER — ASPIRIN 81 MG/1
81 TABLET, CHEWABLE ORAL DAILY
COMMUNITY
End: 2022-01-01 | Stop reason: SDUPTHER

## 2021-01-01 RX ORDER — HYDROXYUREA 500 MG/1
CAPSULE ORAL
Qty: 180 CAPSULE | Refills: 1 | Status: SHIPPED | OUTPATIENT
Start: 2021-01-01 | End: 2021-01-01 | Stop reason: SDUPTHER

## 2021-01-01 RX ORDER — HYDROXYUREA 500 MG/1
CAPSULE ORAL
Qty: 180 CAPSULE | Refills: 1 | OUTPATIENT
Start: 2021-01-01

## 2021-01-01 RX ORDER — FOLIC ACID 1 MG/1
1 TABLET ORAL DAILY
Qty: 90 TABLET | Refills: 1 | Status: SHIPPED | OUTPATIENT
Start: 2021-01-01 | End: 2022-01-01 | Stop reason: SDUPTHER

## 2021-01-01 ASSESSMENT — PATIENT HEALTH QUESTIONNAIRE - PHQ9
SUM OF ALL RESPONSES TO PHQ QUESTIONS 1-9: 0
1. LITTLE INTEREST OR PLEASURE IN DOING THINGS: 0
SUM OF ALL RESPONSES TO PHQ9 QUESTIONS 1 & 2: 0
SUM OF ALL RESPONSES TO PHQ QUESTIONS 1-9: 0
SUM OF ALL RESPONSES TO PHQ QUESTIONS 1-9: 0
2. FEELING DOWN, DEPRESSED OR HOPELESS: 0

## 2021-01-04 RX ORDER — HYDROXYUREA 500 MG/1
CAPSULE ORAL
Qty: 60 CAPSULE | Refills: 0 | Status: SHIPPED | OUTPATIENT
Start: 2021-01-04 | End: 2021-01-18 | Stop reason: SDUPTHER

## 2021-01-13 ENCOUNTER — TELEPHONE (OUTPATIENT)
Dept: FAMILY MEDICINE CLINIC | Age: 86
End: 2021-01-13

## 2021-01-13 NOTE — TELEPHONE ENCOUNTER
To Dr. Yash Sandhu-    Patient wants to know if he should get the vaccine---his name is on a vaccine list and he will be contacted next week by the [facility] to get one.

## 2021-01-18 ENCOUNTER — OFFICE VISIT (OUTPATIENT)
Dept: FAMILY MEDICINE CLINIC | Age: 86
End: 2021-01-18
Payer: MEDICARE

## 2021-01-18 VITALS
TEMPERATURE: 97.2 F | BODY MASS INDEX: 28.85 KG/M2 | HEART RATE: 68 BPM | WEIGHT: 190.4 LBS | DIASTOLIC BLOOD PRESSURE: 78 MMHG | HEIGHT: 68 IN | OXYGEN SATURATION: 98 % | SYSTOLIC BLOOD PRESSURE: 120 MMHG

## 2021-01-18 DIAGNOSIS — L40.9 PSORIASIS: ICD-10-CM

## 2021-01-18 DIAGNOSIS — L40.50 PSORIATIC ARTHRITIS (HCC): Primary | ICD-10-CM

## 2021-01-18 DIAGNOSIS — I10 ESSENTIAL HYPERTENSION: Chronic | ICD-10-CM

## 2021-01-18 DIAGNOSIS — D47.1 CHRONIC MYELOPROLIFERATIVE DISEASE (HCC): ICD-10-CM

## 2021-01-18 PROCEDURE — 1036F TOBACCO NON-USER: CPT | Performed by: FAMILY MEDICINE

## 2021-01-18 PROCEDURE — 1123F ACP DISCUSS/DSCN MKR DOCD: CPT | Performed by: FAMILY MEDICINE

## 2021-01-18 PROCEDURE — G8417 CALC BMI ABV UP PARAM F/U: HCPCS | Performed by: FAMILY MEDICINE

## 2021-01-18 PROCEDURE — G8427 DOCREV CUR MEDS BY ELIG CLIN: HCPCS | Performed by: FAMILY MEDICINE

## 2021-01-18 PROCEDURE — G8484 FLU IMMUNIZE NO ADMIN: HCPCS | Performed by: FAMILY MEDICINE

## 2021-01-18 PROCEDURE — 4040F PNEUMOC VAC/ADMIN/RCVD: CPT | Performed by: FAMILY MEDICINE

## 2021-01-18 PROCEDURE — 99214 OFFICE O/P EST MOD 30 MIN: CPT | Performed by: FAMILY MEDICINE

## 2021-01-18 RX ORDER — FOLIC ACID 1 MG/1
1 TABLET ORAL DAILY
Qty: 90 TABLET | Refills: 1 | Status: SHIPPED | OUTPATIENT
Start: 2021-01-18 | End: 2021-01-01 | Stop reason: SDUPTHER

## 2021-01-18 RX ORDER — ISOSORBIDE MONONITRATE 30 MG/1
TABLET, EXTENDED RELEASE ORAL
Qty: 90 TABLET | Refills: 1 | Status: SHIPPED | OUTPATIENT
Start: 2021-01-18 | End: 2021-01-01 | Stop reason: SDUPTHER

## 2021-01-18 RX ORDER — HYDROXYUREA 500 MG/1
CAPSULE ORAL
Qty: 180 CAPSULE | Refills: 1 | Status: SHIPPED | OUTPATIENT
Start: 2021-01-18 | End: 2021-01-01 | Stop reason: SDUPTHER

## 2021-01-18 RX ORDER — PANTOPRAZOLE SODIUM 40 MG/1
TABLET, DELAYED RELEASE ORAL
Qty: 90 TABLET | Refills: 1 | Status: CANCELLED | OUTPATIENT
Start: 2021-01-18

## 2021-01-18 RX ORDER — DILTIAZEM HYDROCHLORIDE 240 MG/1
CAPSULE, EXTENDED RELEASE ORAL
Qty: 90 CAPSULE | Refills: 1 | Status: SHIPPED | OUTPATIENT
Start: 2021-01-18 | End: 2021-01-01 | Stop reason: SDUPTHER

## 2021-01-18 ASSESSMENT — ENCOUNTER SYMPTOMS
SHORTNESS OF BREATH: 0
SORE THROAT: 0
COUGH: 0

## 2021-01-18 ASSESSMENT — PATIENT HEALTH QUESTIONNAIRE - PHQ9
SUM OF ALL RESPONSES TO PHQ QUESTIONS 1-9: 0
SUM OF ALL RESPONSES TO PHQ9 QUESTIONS 1 & 2: 0
1. LITTLE INTEREST OR PLEASURE IN DOING THINGS: 0

## 2021-01-18 NOTE — PROGRESS NOTES
2021     [facility]      Chief Complaint   Patient presents with    6 Month Follow-Up    Immunizations     Scheduled this Wed for his COVID vaccine       HPI      Marvel Ramirez is a 80 y.o. male who presents today with the followin. Psoriatic arthritis ([address]    2. Essential hypertension    3. Psoriasis    4. Chronic myeloproliferative disease ([address]      Patient doing very well  He lives by himself  He is doing all the precautions for COVID-19 avoidance and he will be immunized next week  REVIEW OF SYMPTOMS    Review of Systems   Constitutional: Negative for activity change. HENT: Negative for congestion and sore throat. Respiratory: Negative for cough and shortness of breath. Cardiovascular: Negative for chest pain. Has a history of hypertension which is well controlled on calcium channel blocker   Genitourinary:        History of mild mild BPH   Hematological:        Myeloproliferative disorder  Predominantly thrombocytosis  He is on medication and follows up with oncology is responding well  Distant history of AP resection of a rectal cancer  Is been over 20 years no signs of recurrence   Psychiatric/Behavioral: Negative.         PAST MEDICAL HISTORY  Past Medical History:   Diagnosis Date    AAA (abdominal aortic aneurysm) (HCC)     Angina, class I ([address]     Benign prostatic hyperplasia 2011     Updating Deprecated Diagnoses    Bowel obstruction ([address]     CAD (coronary artery disease)     Cancer ([address]     rectal    CCC (chronic calculous cholecystitis) 2018    Gastroesophageal reflux disease without esophagitis 2020    Hypertension     Primary malignant neoplasm of rectum ([address] 2019    Psoriasis     Psoriatic arthritis ([address] 2019    Pyelonephritis 3/24/2018    Stable angina ([address] 2019    Unspecified cerebral artery occlusion with cerebral infarction        FAMILY HISTORY  Family History   Problem Relation Age of Onset    Cancer Mother  Cancer Father        SOCIAL HISTORY  Social History     Socioeconomic History    Marital status:       Spouse name: None    Number of children: None    Years of education: None    Highest education level: None   Occupational History    None   Social Needs    Financial resource strain: None    Food insecurity     Worry: None     Inability: None    Transportation needs     Medical: None     Non-medical: None   Tobacco Use    Smoking status: Former Smoker     Packs/day: 1.50     Years: 40.00     Pack years: 60.00     Types: Cigarettes     Quit date:      Years since quittin.0    Smokeless tobacco: Never Used   Substance and Sexual Activity    Alcohol use: No    Drug use: No    Sexual activity: Never   Lifestyle    Physical activity     Days per week: None     Minutes per session: None    Stress: None   Relationships    Social connections     Talks on phone: None     Gets together: None     Attends [employer] service: None     Active member of club or organization: None     Attends meetings of clubs or organizations: None     Relationship status: None    Intimate partner violence     Fear of current or ex partner: None     Emotionally abused: None     Physically abused: None     Forced sexual activity: None   Other Topics Concern    None   Social History Narrative    None        SURGICAL HISTORY  Past Surgical History:   Procedure Laterality Date    APPENDECTOMY      COLOSTOMY         CURRENT MEDICATIONS  Current Outpatient Medications   Medication Sig Dispense Refill    isosorbide mononitrate (IMDUR) 30 MG extended release tablet Take 1 tablet by mouth once daily 90 tablet 1    hydroxyurea (HYDREA) 500 MG chemo capsule Take 2 capsules by mouth once daily 180 capsule 1    folic acid (FOLVITE) 1 MG tablet Take 1 tablet by mouth daily 90 tablet 1    dilTIAZem (DILT-XR) 240 MG extended release capsule Take 1 capsule by mouth once daily 90 capsule 1  pantoprazole (PROTONIX) 40 MG tablet Take 1 tablet by mouth once daily 90 tablet 0    adalimumab (HUMIRA) 40 MG/0.8ML injection Inject 40 mg into the skin once      clobetasol (TEMOVATE) 0.05 % cream Apply topically 2 times daily Apply topically 2 times daily.  Multiple Vitamins-Minerals (THERAPEUTIC MULTIVITAMIN-MINERALS) tablet Take 1 tablet by mouth daily      Omega-3 Fatty Acids (FISH OIL) 1000 MG CAPS Take 1,000 mg by mouth every other day      Fluocinolone-Emollient (FLUOCINOLONE CREAM & EMOLLIENT EX) Apply topically daily      VITAMIN E PO Take 1 tablet by mouth daily       No current facility-administered medications for this visit. ALLERGIES  Allergies   Allergen Reactions    Morphine     Sulfa Antibiotics        PHYSICAL EXAM    /78 (Site: Left Upper Arm, Position: Sitting, Cuff Size: Medium Adult)   Pulse 68   Temp 97.2 °F (36.2 °C) (Temporal)   Ht 5' 8\" (1.727 m)   Wt 190 lb 6.4 oz (86.4 kg)   SpO2 98%   BMI 28.95 kg/m²     Physical Exam  Nursing note reviewed. Patient general exam is negative  He has normal vital signs  Weight is stable      ASSESSMENT and PLAN    Garrison Luna was seen today for 6 month follow-up and immunizations. Diagnoses and all orders for this visit:    Psoriatic arthritis ([facility]    Essential hypertension    Psoriasis    Chronic myeloproliferative disease ([facility]    Other orders  -     isosorbide mononitrate (IMDUR) 30 MG extended release tablet; Take 1 tablet by mouth once daily  -     hydroxyurea (HYDREA) 500 MG chemo capsule; Take 2 capsules by mouth once daily  -     folic acid (FOLVITE) 1 MG tablet; Take 1 tablet by mouth daily  -     dilTIAZem (DILT-XR) 240 MG extended release capsule; Take 1 capsule by mouth once daily    Proceed with Covid vaccination  Follow-up here in 6 months  We will get labs then    Return in about 6 months (around 7/18/2021).           Electronically signed by Montse Thomas MD on 1/18/2021 Please note that this chart was generated using dragon dictation software. Although every effort was made to ensure the accuracy of this automated transcription, some errors in transcription may have occurred.

## 2021-02-03 ENCOUNTER — CLINICAL DOCUMENTATION (OUTPATIENT)
Dept: ONCOLOGY | Age: 86
End: 2021-02-03

## 2021-02-03 ENCOUNTER — HOSPITAL ENCOUNTER (OUTPATIENT)
Dept: INFUSION THERAPY | Age: 86
Discharge: HOME | End: 2021-02-03
Payer: MEDICARE

## 2021-02-03 DIAGNOSIS — D47.1 CHRONIC MYELOPROLIFERATIVE DISEASE (HCC): ICD-10-CM

## 2021-02-03 LAB
ALBUMIN SERPL-MCNC: 4 GM/DL (ref 3.4–5)
ALP BLD-CCNC: 66 IU/L (ref 40–128)
ALT SERPL-CCNC: 9 U/L (ref 10–40)
ANION GAP SERPL CALCULATED.3IONS-SCNC: 14 MMOL/L (ref 4–16)
AST SERPL-CCNC: 15 IU/L (ref 15–37)
BASOPHILS ABSOLUTE: 0.1 K/CU MM
BASOPHILS RELATIVE PERCENT: 1.5 % (ref 0–1)
BILIRUB SERPL-MCNC: 0.5 MG/DL (ref 0–1)
BUN BLDV-MCNC: 20 MG/DL (ref 6–23)
CALCIUM SERPL-MCNC: 8.7 MG/DL (ref 8.3–10.6)
CHLORIDE BLD-SCNC: 102 MMOL/L (ref 99–110)
CO2: 22 MMOL/L (ref 21–32)
CREAT SERPL-MCNC: 1.4 MG/DL (ref 0.9–1.3)
DIFFERENTIAL TYPE: ABNORMAL
EOSINOPHILS ABSOLUTE: 0.1 K/CU MM
EOSINOPHILS RELATIVE PERCENT: 1.5 % (ref 0–3)
GFR AFRICAN AMERICAN: 57 ML/MIN/1.73M2
GFR NON-AFRICAN AMERICAN: 48 ML/MIN/1.73M2
GLUCOSE BLD-MCNC: 110 MG/DL (ref 70–99)
HCT VFR BLD CALC: 37.4 % (ref 42–52)
HEMOGLOBIN: 12.7 GM/DL (ref 13.5–18)
LYMPHOCYTES ABSOLUTE: 0.7 K/CU MM
LYMPHOCYTES RELATIVE PERCENT: 15.4 % (ref 24–44)
MCH RBC QN AUTO: 39.7 PG (ref 27–31)
MCHC RBC AUTO-ENTMCNC: 34 % (ref 32–36)
MCV RBC AUTO: 116.9 FL (ref 78–100)
MONOCYTES ABSOLUTE: 0.6 K/CU MM
MONOCYTES RELATIVE PERCENT: 12.6 % (ref 0–4)
PDW BLD-RTO: 13 % (ref 11.7–14.9)
PLATELET # BLD: 302 K/CU MM (ref 140–440)
PMV BLD AUTO: 9.3 FL (ref 7.5–11.1)
POTASSIUM SERPL-SCNC: 4.3 MMOL/L (ref 3.5–5.1)
RBC # BLD: 3.2 M/CU MM (ref 4.6–6.2)
SEGMENTED NEUTROPHILS ABSOLUTE COUNT: 3.2 K/CU MM
SEGMENTED NEUTROPHILS RELATIVE PERCENT: 69 % (ref 36–66)
SODIUM BLD-SCNC: 138 MMOL/L (ref 135–145)
TOTAL PROTEIN: 6.3 GM/DL (ref 6.4–8.2)
WBC # BLD: 4.6 K/CU MM (ref 4–10.5)

## 2021-02-03 PROCEDURE — 80053 COMPREHEN METABOLIC PANEL: CPT

## 2021-02-03 PROCEDURE — 36415 COLL VENOUS BLD VENIPUNCTURE: CPT

## 2021-02-03 PROCEDURE — 85025 COMPLETE CBC W/AUTO DIFF WBC: CPT

## 2021-02-03 NOTE — PROGRESS NOTES
Hydrea Results:    Current dose 500mg 2x a day  New dose continue same dose    Lab Results   Component Value Date    WBC 4.6 02/03/2021    WBC 4.6 11/03/2020    HGB 12.7 (L) 02/03/2021    HGB 12.5 (L) 11/03/2020     02/03/2021     11/03/2020     Recheck CBC on 5/3/21    Thank you

## 2021-03-01 RX ORDER — PANTOPRAZOLE SODIUM 40 MG/1
TABLET, DELAYED RELEASE ORAL
Qty: 90 TABLET | Refills: 0 | Status: SHIPPED | OUTPATIENT
Start: 2021-03-01 | End: 2021-01-01

## 2021-03-03 RX ORDER — PANTOPRAZOLE SODIUM 40 MG/1
TABLET, DELAYED RELEASE ORAL
Qty: 90 TABLET | Refills: 0 | OUTPATIENT
Start: 2021-03-03

## 2021-03-03 RX ORDER — ISOSORBIDE MONONITRATE 30 MG/1
TABLET, EXTENDED RELEASE ORAL
Qty: 90 TABLET | Refills: 1 | OUTPATIENT
Start: 2021-03-03

## 2021-03-03 RX ORDER — FOLIC ACID 1 MG/1
1 TABLET ORAL DAILY
Qty: 90 TABLET | Refills: 1 | OUTPATIENT
Start: 2021-03-03

## 2021-04-06 NOTE — PROGRESS NOTES
Patient Name: Elpidio Older  Patient : 1929  Patient MRN: [id number]     Primary Oncologist: Kendall Mojica MD  Referring Provider: Jarod Castillo MD     Date of Service: 5/3/2021      Chief Complaint:   Chief Complaint   Patient presents with    Follow-up     He came in for follow-up visit. Patient Active Problem List:     Hypertension     Benign prostatic hyperplasia     Psoriatic arthritis ([address]     Primary malignant neoplasm of rectum (HCC)     Psoriasis     Stable angina (HCC)     Chronic myeloproliferative disease (HCC)     Monocytosis (symptomatic)     Gastroesophageal reflux disease without esophagitis     Urinary retention     HPI:   1. Yadiel Tapia is a very delightful gentleman [ 1929] who came in to see Dr Merlin Beckwith in 2017 for elevated platelets and mild monocytosis. He has been in relatively good health, except for a history of rectal cancer, for which he was treated in [address] [place] [address] with abdominoperineal resection, permanent colostomy, followed by chemotherapy and concurrent radiation therapy. He has been in good clinical remission since. 2. He was also diagnosed with a psoriatic rash on his trunk and legs, for which he was put on methotrexate 7.5 mg q weekly in 2017. 3. However, CBC in 2017 showed platelet count of 800,851 and repeat values in May and  showed the platelet counts around 570,000 and 580,000  4. In addition to that, he had mild monocytosis ranging from 8 to 18 percent. 5. Looking back in Epic, in  his white count and hemoglobin were normal. Platelet count was also normal between 286,000 and 334,000 and in March of this year platelets were 174,704. He had mild monocytosis between 8 and 10 percent in . 6. Peripheral smear also showed occasional giant platelets, anisocytosis, and elliptocytosis. 7. 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 He had history of left CVA in 1982. We discussed about his risks of CVA due to elevated plaletet counts. I offered him hydrea to normalize his platelet counts. I discussed about risks and benefits of hydrea. He is on ASA. He had history of skin cancer, I advise him to follow up with his dermatologist.  He was in the hospital in March 2018 due to R abdominal pain likely related to gall stone. HIDA scan in March 2018 revealed o cystic obstruction. CXR : no acute process. RUQ abdomen US: cholelithiasis without evidence of acute cholecystitis. CT abdomen: infrarenal aortic aneurysm 6.4 cm in length. Psoriatic lesion has improved with Humira injection since June 2018. Labs in August 2019 were reviewed. Platelet was 375. He is taking Hydrea to 1500 mg alternating with 1000 mg daily. He has a Weaver cath and was seen by Dr. Triston Saravia for the infection in the bladder. He was told that he has damage to the bladder from the previous radiation therapy. He had 33 days of radiation. Labs in February 2020 was stable. CBC on June 30, 2020 is stable. On November 3, 2020 he came in for follow-up visit. CBC and CMP in September 2020 was stable. On May 3, 2021 he came in for follow up visit. He takes Hydrea 1000 mg daily. He also takes baby aspirin. He had Covid vaccine. He has been receiving shot for the psoriasis in the last 2 years with good response. He was seen by urologist and had permanent catheter through the penis. No acute pain. I will check labs today. Advised to call for the test result. He denies any nausea, vomiting or diarrhea. He denies any headaches or dizzy spell. Denied any chest pain, shortness of breath or palpitation. No depression. PAST MEDICAL HISTORY:   1. Rectal cancer in 1999, APR, followed by chemotherapy plus RT. 2. Hypertension. 3. Psoriasis as above on Methotrexate 7.5 mg/week. 4. Angina.    5. Some GI bleed 13 years ago, for which he was put on Protonix and HCT 36.3 (L) 05/03/2021    .9 (H) 05/03/2021    MCH 39.9 (H) 05/03/2021    MCHC 33.9 05/03/2021    RDW 12.3 05/03/2021     05/03/2021    MPV 9.5 05/03/2021    BANDSPCT 1 (L) 08/18/2017    SEGSPCT 69.6 (H) 05/03/2021    EOSRELPCT 1.7 05/03/2021    BASOPCT 1.5 (H) 05/03/2021    LYMPHOPCT 15.6 (L) 05/03/2021    MONOPCT 11.6 (H) 05/03/2021    BANDABS 0.08 08/18/2017    SEGSABS 3.3 05/03/2021    EOSABS 0.1 05/03/2021    BASOSABS 0.1 05/03/2021    LYMPHSABS 0.7 05/03/2021    MONOSABS 0.5 05/03/2021    DIFFTYPE AUTOMATED DIFFERENTIAL 05/03/2021    ANISOCYTOSIS 1+ 06/27/2017    POLYCHROM 1+ 08/18/2017    PLTM INCREASED 08/18/2017     Chemistry:  Lab Results   Component Value Date     02/03/2021    K 4.3 02/03/2021     02/03/2021    CO2 22 02/03/2021    BUN 20 02/03/2021    CREATININE 1.4 (H) 02/03/2021    GLUCOSE 110 (H) 02/03/2021    CALCIUM 8.7 02/03/2021    PROT 6.3 (L) 02/03/2021    LABALBU 4.0 02/03/2021    BILITOT 0.5 02/03/2021    ALKPHOS 66 02/03/2021    AST 15 02/03/2021    ALT 9 (L) 02/03/2021    LABGLOM 48 (L) 02/03/2021    GFRAA 57 (L) 02/03/2021     Lab Results   Component Value Date     03/13/2018     Immunology:  Lab Results   Component Value Date    PROT 6.3 (L) 02/03/2021     Coagulation Panel:  Lab Results   Component Value Date    PROTIME 10.4 12/11/2011    INR 0.95 12/11/2011    APTT 21.6 (L) 12/11/2011      Observations:  No data recorded      Assessment & Plan:    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 JAMEL-2 was +ve 19%  Due to risk of CVA, I offered him hydrea. I remind him to continue to take daily ASA. CBC in June 2020 was stable. He will continue with 1000 mg daily Hydrea. He does not need any refill. I will check CBC and CMP today. CBC every 12 weeks. 2. Survivorship issues:   He has been cured of rectal cancer more than 18 years. No sign of any recurrence at this time.    Other than psoriasis, he really does not

## 2021-05-03 ENCOUNTER — HOSPITAL ENCOUNTER (OUTPATIENT)
Dept: INFUSION THERAPY | Age: 86
Discharge: HOME | End: 2021-05-03
Payer: MEDICARE

## 2021-05-03 ENCOUNTER — OFFICE VISIT (OUTPATIENT)
Dept: ONCOLOGY | Age: 86
End: 2021-05-03
Payer: MEDICARE

## 2021-05-03 ENCOUNTER — CLINICAL DOCUMENTATION (OUTPATIENT)
Dept: ONCOLOGY | Age: 86
End: 2021-05-03

## 2021-05-03 VITALS
HEIGHT: 68 IN | TEMPERATURE: 98.5 F | SYSTOLIC BLOOD PRESSURE: 147 MMHG | RESPIRATION RATE: 16 BRPM | WEIGHT: 184 LBS | BODY MASS INDEX: 27.89 KG/M2 | OXYGEN SATURATION: 95 % | DIASTOLIC BLOOD PRESSURE: 87 MMHG | HEART RATE: 94 BPM

## 2021-05-03 DIAGNOSIS — D47.1 CHRONIC MYELOPROLIFERATIVE DISEASE (HCC): ICD-10-CM

## 2021-05-03 DIAGNOSIS — D47.1 CHRONIC MYELOPROLIFERATIVE DISEASE (HCC): Primary | ICD-10-CM

## 2021-05-03 DIAGNOSIS — C20 PRIMARY MALIGNANT NEOPLASM OF RECTUM (HCC): ICD-10-CM

## 2021-05-03 LAB
ALBUMIN SERPL-MCNC: 4.2 GM/DL (ref 3.4–5)
ALP BLD-CCNC: 65 IU/L (ref 40–128)
ALT SERPL-CCNC: 10 U/L (ref 10–40)
ANION GAP SERPL CALCULATED.3IONS-SCNC: 10 MMOL/L (ref 4–16)
AST SERPL-CCNC: 16 IU/L (ref 15–37)
BASOPHILS ABSOLUTE: 0.1 K/CU MM
BASOPHILS RELATIVE PERCENT: 1.5 % (ref 0–1)
BILIRUB SERPL-MCNC: 0.3 MG/DL (ref 0–1)
BUN BLDV-MCNC: 22 MG/DL (ref 6–23)
CALCIUM SERPL-MCNC: 9.2 MG/DL (ref 8.3–10.6)
CHLORIDE BLD-SCNC: 104 MMOL/L (ref 99–110)
CO2: 22 MMOL/L (ref 21–32)
CREAT SERPL-MCNC: 1.3 MG/DL (ref 0.9–1.3)
DIFFERENTIAL TYPE: ABNORMAL
EOSINOPHILS ABSOLUTE: 0.1 K/CU MM
EOSINOPHILS RELATIVE PERCENT: 1.7 % (ref 0–3)
GFR AFRICAN AMERICAN: >60 ML/MIN/1.73M2
GFR NON-AFRICAN AMERICAN: 52 ML/MIN/1.73M2
GLUCOSE BLD-MCNC: 110 MG/DL (ref 70–99)
HCT VFR BLD CALC: 36.3 % (ref 42–52)
HEMOGLOBIN: 12.3 GM/DL (ref 13.5–18)
LYMPHOCYTES ABSOLUTE: 0.7 K/CU MM
LYMPHOCYTES RELATIVE PERCENT: 15.6 % (ref 24–44)
MCH RBC QN AUTO: 39.9 PG (ref 27–31)
MCHC RBC AUTO-ENTMCNC: 33.9 % (ref 32–36)
MCV RBC AUTO: 117.9 FL (ref 78–100)
MONOCYTES ABSOLUTE: 0.5 K/CU MM
MONOCYTES RELATIVE PERCENT: 11.6 % (ref 0–4)
PDW BLD-RTO: 12.3 % (ref 11.7–14.9)
PLATELET # BLD: 286 K/CU MM (ref 140–440)
PMV BLD AUTO: 9.5 FL (ref 7.5–11.1)
POTASSIUM SERPL-SCNC: 4.6 MMOL/L (ref 3.5–5.1)
RBC # BLD: 3.08 M/CU MM (ref 4.6–6.2)
SEGMENTED NEUTROPHILS ABSOLUTE COUNT: 3.3 K/CU MM
SEGMENTED NEUTROPHILS RELATIVE PERCENT: 69.6 % (ref 36–66)
SODIUM BLD-SCNC: 136 MMOL/L (ref 135–145)
TOTAL PROTEIN: 6.4 GM/DL (ref 6.4–8.2)
WBC # BLD: 4.7 K/CU MM (ref 4–10.5)

## 2021-05-03 PROCEDURE — 99213 OFFICE O/P EST LOW 20 MIN: CPT | Performed by: INTERNAL MEDICINE

## 2021-05-03 PROCEDURE — 80053 COMPREHEN METABOLIC PANEL: CPT

## 2021-05-03 PROCEDURE — 4040F PNEUMOC VAC/ADMIN/RCVD: CPT | Performed by: INTERNAL MEDICINE

## 2021-05-03 PROCEDURE — 36415 COLL VENOUS BLD VENIPUNCTURE: CPT

## 2021-05-03 PROCEDURE — 1036F TOBACCO NON-USER: CPT | Performed by: INTERNAL MEDICINE

## 2021-05-03 PROCEDURE — G8417 CALC BMI ABV UP PARAM F/U: HCPCS | Performed by: INTERNAL MEDICINE

## 2021-05-03 PROCEDURE — 99211 OFF/OP EST MAY X REQ PHY/QHP: CPT

## 2021-05-03 PROCEDURE — 1123F ACP DISCUSS/DSCN MKR DOCD: CPT | Performed by: INTERNAL MEDICINE

## 2021-05-03 PROCEDURE — G8427 DOCREV CUR MEDS BY ELIG CLIN: HCPCS | Performed by: INTERNAL MEDICINE

## 2021-05-03 PROCEDURE — 85025 COMPLETE CBC W/AUTO DIFF WBC: CPT

## 2021-05-03 NOTE — PROGRESS NOTES
MA Rooming Questions  Patient: Jennifer Buenrostro  MRN: [id number]    Date: 5/3/2021        1. Do you have any new issues?   no         2. Do you need any refills on medications?    no    3. Have you had any imaging done since your last visit?   no    4. Have you been hospitalized or seen in the emergency room since your last visit here?   no    5. Did the patient have a depression screening completed today?  No    No data recorded     PHQ-9 Given to (if applicable):               PHQ-9 Score (if applicable):                     [] Positive     []  Negative              Does question #9 need addressed (if applicable)                     [] Yes    []  No               Estrella Engle MA

## 2021-05-03 NOTE — PROGRESS NOTES
Hydrea Results:    Current dose 1000 MG   New dose Cont same    Lab Results   Component Value Date    WBC 4.7 05/03/2021    WBC 4.6 02/03/2021    HGB 12.3 (L) 05/03/2021    HGB 12.7 (L) 02/03/2021     05/03/2021     02/03/2021        When to recheck CBC?  3 Months    Thank you

## 2021-07-27 PROBLEM — I20.8 STABLE ANGINA (HCC): Status: RESOLVED | Noted: 2019-08-17 | Resolved: 2021-01-01

## 2021-07-27 PROBLEM — I20.89 STABLE ANGINA: Status: RESOLVED | Noted: 2019-08-17 | Resolved: 2021-01-01

## 2021-07-27 NOTE — PROGRESS NOTES
2021     [facility]      Chief Complaint   Patient presents with    6 Month Follow-Up    Fatigue     pt reprots extreme weakness on 21, pt states \"i felt like i was about to die\", pt states he has not felt like this since       HPI      Tiffany Mcqueen is a 80 y.o. male who presents today with the followin. Weakness    2. Chronic myeloproliferative disease ([address]    3. Essential hypertension    4. Psoriatic arthritis ([address]    5. Cardiac arrhythmia, unspecified cardiac arrhythmia type    6.  Atrial fibrillation, unspecified type ([address]      Is here for his routine follow-up he said a couple weeks ago he got up one morning he was so weak he could not get out of the chair he thought he was going to die he did not have any chest pain he did not have any palpitation did not feel like he was going to faint  A 4 hours later he is better got up and was fine the rest of the day  Has never had anything like this before  REVIEW OF SYMPTOMS    Review of Systems   Cardiovascular:        See history of hypertension  Distant history of mild coronary artery disease     Gastrointestinal:        Treated over 20 years ago with AP resection for rectal cancer no signs of recurrence   Hematological:        He has a chronic myeloproliferative disease which is under control on his current treatment he goes regularly to oncology       PAST MEDICAL HISTORY  Past Medical History:   Diagnosis Date    AAA (abdominal aortic aneurysm) ([address]     Angina, class I ([address]     Benign prostatic hyperplasia 2011     Updating Deprecated Diagnoses    Bowel obstruction ([address]     CAD (coronary artery disease)     Cancer ([address]     rectal    CCC (chronic calculous cholecystitis) 2018    Gastroesophageal reflux disease without esophagitis 2020    Hypertension     Primary malignant neoplasm of rectum ([address] 2019    Psoriasis     Psoriatic arthritis ([address] 2019    Pyelonephritis 3/24/2018    Stable angina ([address] 2019 extended release capsule Take 1 capsule by mouth once daily 90 capsule 1    folic acid (FOLVITE) 1 MG tablet Take 1 tablet by mouth daily 90 tablet 1    hydroxyurea (HYDREA) 500 MG chemo capsule Take 2 capsules by mouth once daily 180 capsule 1    isosorbide mononitrate (IMDUR) 30 MG extended release tablet Take 1 tablet by mouth once daily 90 tablet 1    apixaban (ELIQUIS) 2.5 MG TABS tablet Take 1 tablet by mouth 2 times daily 60 tablet 5    adalimumab (HUMIRA) 40 MG/0.8ML injection Inject 40 mg into the skin once      Multiple Vitamins-Minerals (THERAPEUTIC MULTIVITAMIN-MINERALS) tablet Take 1 tablet by mouth daily      VITAMIN E PO Take 1 tablet by mouth daily      pantoprazole (PROTONIX) 40 MG tablet Take 1 tablet by mouth once daily (Patient not taking: Reported on 7/27/2021) 90 tablet 0    clobetasol (TEMOVATE) 0.05 % cream Apply topically 2 times daily Apply topically 2 times daily. (Patient not taking: Reported on 7/27/2021)      Omega-3 Fatty Acids (FISH OIL) 1000 MG CAPS Take 1,000 mg by mouth every other day (Patient not taking: Reported on 7/27/2021)      Fluocinolone-Emollient (FLUOCINOLONE CREAM & EMOLLIENT EX) Apply topically daily (Patient not taking: Reported on 7/27/2021)       No current facility-administered medications for this visit. ALLERGIES  Allergies   Allergen Reactions    Morphine     Sulfa Antibiotics        PHYSICAL EXAM    /68 (Site: Right Upper Arm, Position: Sitting, Cuff Size: Medium Adult)   Pulse 76   Ht 5' 8\" (1.727 m)   Wt 189 lb (85.7 kg)   SpO2 96%   BMI 28.74 kg/m²     Physical Exam  Vitals and nursing note reviewed. Constitutional:       Appearance: Normal appearance. Cardiovascular:      Rate and Rhythm: Normal rate. Rhythm irregular. Pulmonary:      Effort: Pulmonary effort is normal.     EKG shows atrial fibrillation with a normal rate    ASSESSMENT and PLAN    Rafa Vu was seen today for 6 month follow-up and fatigue.     Diagnoses and all orders for this visit:    Weakness    Chronic myeloproliferative disease ([facility]    Essential hypertension  -     Comprehensive Metabolic Panel; Future    Psoriatic arthritis (HCC)    Cardiac arrhythmia, unspecified cardiac arrhythmia type  -     [address] - [place] ELECTROCARDIOGRAM, COMPLETE    Atrial fibrillation, unspecified type [facility])  -     Zeenat Blair MD, Cardiology, [place]    Other orders  -     dilTIAZem (DILT-XR) 240 MG extended release capsule; Take 1 capsule by mouth once daily  -     folic acid (FOLVITE) 1 MG tablet; Take 1 tablet by mouth daily  -     hydroxyurea (HYDREA) 500 MG chemo capsule; Take 2 capsules by mouth once daily  -     isosorbide mononitrate (IMDUR) 30 MG extended release tablet; Take 1 tablet by mouth once daily  -     apixaban (ELIQUIS) 2.5 MG TABS tablet; Take 1 tablet by mouth 2 times daily    Patient has new onset atrial fibrillation I wonder whether he had bradycardia because of his profound weakness  Today I started on Eliquis 2.5 mg twice daily and make an appoint with cardiology  He is to call the squad if he feels like this again I showed him how to check his pulse rate at home  He is to follow-up here in 6 months    Return in about 6 months (around 1/27/2022). Electronically signed by Hobert Runner, MD on 7/27/2021    Please note that this chart was generated using dragon dictation software. Although every effort was made to ensure the accuracy of this automated transcription, some errors in transcription may have occurred.

## 2021-08-02 PROBLEM — I49.9 IRREGULAR HEART BEAT: Status: ACTIVE | Noted: 2021-01-01

## 2021-08-02 PROBLEM — Z86.73 H/O: CVA (CEREBROVASCULAR ACCIDENT): Status: ACTIVE | Noted: 2021-01-01

## 2021-08-02 NOTE — PROGRESS NOTES
30 days e-cardio monitor placed.  # Bgm [id number]. Instructed patient on how to record the event and to call monitoring center at [phone] if any problems arise. Instructed patient to disconnect the lead wires from the electrodes before bathing or showering and reattach them afterwards. Instructed patient that the electrodes should be changed every 3 days or if they no longer adhere to the skin. Patient to mail package after the monitor has ended. Patient verbalized understanding.

## 2021-08-02 NOTE — PROGRESS NOTES
CARDIOLOGY CONSULT   NOTE    Chief Complaint: Afib? HPI:   [facility] is a 80y.o. year old who has Past medical history as noted below. Very pleasant gentleman who is overall in very good shape and good health he looks much younger than his stated age. He was noted to have irregular heartbeat by Dr. Todd Jonas and started on anticoagulation due to concerns for A. fib hence was started on anticoagulation. He does not have any palpitations or shortness of breath denies any previous history of stroke. He does have history of colostomy due to rectal cancer and the urinary bladder retention hence s/p chronic Weaver      Current Outpatient Medications   Medication Sig Dispense Refill    dilTIAZem (DILT-XR) 240 MG extended release capsule Take 1 capsule by mouth once daily 90 capsule 1    folic acid (FOLVITE) 1 MG tablet Take 1 tablet by mouth daily 90 tablet 1    hydroxyurea (HYDREA) 500 MG chemo capsule Take 2 capsules by mouth once daily 180 capsule 1    isosorbide mononitrate (IMDUR) 30 MG extended release tablet Take 1 tablet by mouth once daily 90 tablet 1    apixaban (ELIQUIS) 2.5 MG TABS tablet Take 1 tablet by mouth 2 times daily 60 tablet 5    pantoprazole (PROTONIX) 40 MG tablet Take 1 tablet by mouth once daily 90 tablet 0    adalimumab (HUMIRA) 40 MG/0.8ML injection Inject 40 mg into the skin once      clobetasol (TEMOVATE) 0.05 % cream Apply topically 2 times daily Apply topically 2 times daily.  Multiple Vitamins-Minerals (THERAPEUTIC MULTIVITAMIN-MINERALS) tablet Take 1 tablet by mouth daily      Omega-3 Fatty Acids (FISH OIL) 1000 MG CAPS Take 1,000 mg by mouth every other day       Fluocinolone-Emollient (FLUOCINOLONE CREAM & EMOLLIENT EX) Apply topically daily       VITAMIN E PO Take 1 tablet by mouth daily       No current facility-administered medications for this visit.        Allergies:   Morphine and Sulfa antibiotics    Patient History:  Past Medical History:   Diagnosis Date    AAA (abdominal aortic aneurysm) (HCC)     [facility]     Benign prostatic hyperplasia 2011     Updating Deprecated Diagnoses    Bowel obstruction ([facility]     CAD (coronary artery disease)     Cancer ([facility]     rectal    CCC (chronic calculous cholecystitis) 2018    Gastroesophageal reflux disease without esophagitis 2020    Hypertension     Primary malignant neoplasm of rectum ([facility] 2019    Psoriasis     Psoriatic arthritis ([facility] 2019    Pyelonephritis 3/24/2018    St[facility] 2019    Unspecified cerebral artery occlusion with cerebral infarction      Past Surgical History:   Procedure Laterality Date    APPENDECTOMY      COLOSTOMY       Family History   Problem Relation Age of Onset    Cancer Mother     Cancer Father      Social History     Tobacco Use    Smoking status: Former Smoker     Packs/day: 1.50     Years: 40.00     Pack years: 60.00     Types: Cigarettes     Quit date:      Years since quittin.6    Smokeless tobacco: Never Used   Substance Use Topics    Alcohol use: No        Review of Systems:   · Constitutional: No Fever or Weight Loss   · Eyes: No Decreased Vision  · ENT: No Headaches, Hearing Loss or Vertigo  · Cardiovascular: as per note above   · Respiratory: No cough or wheezing and as per note above.    · Gastrointestinal: No abdominal pain, appetite loss, blood in stools, constipation, diarrhea or heartburn  · Genitourinary: No dysuria, trouble voiding, or hematuria  · Musculoskeletal:  denies any new  joint aches , swelling  or pain   · Integumentary: No rash or pruritis  · Neurological: No TIA or stroke symptoms  · Psychiatric: No anxiety or depression  · Endocrine: No malaise, fatigue or temperature intolerance  · Hematologic/Lymphatic: No bleeding problems, blood clots or swollen lymph nodes  · Allergic/Immunologic: No nasal congestion or hives    Objective:      Physical Exam:  BP 130/78   Pulse 77   Resp 18   Ht 5' 8\" (1.727 m)   Wt 190 lb 6.4 oz (86.4 kg)   SpO2 98%   BMI 28.95 kg/m²   Wt Readings from Last 3 Encounters:   08/02/21 190 lb 6.4 oz (86.4 kg)   07/27/21 189 lb (85.7 kg)   05/03/21 184 lb (83.5 kg)     Body mass index is 28.95 kg/m². Vitals:    08/02/21 1032   BP: 130/78   Pulse: 77   Resp: 18   SpO2: 98%        General Appearance:  No distress, conversant  Constitutional:  Well developed, Well nourished, No acute distress, Non-toxic appearance. HENT:  Normocephalic, Atraumatic, Bilateral external ears normal, Oropharynx moist, No oral exudates, Nose normal. Neck- Normal range of motion, No tenderness, Supple, No stridor,no apical-carotid delay  Eyes:  PERRL, EOMI, Conjunctiva normal, No discharge. Respiratory:  Normal breath sounds, No respiratory distress, No wheezing, No chest tenderness. ,no use of accessory muscles, NO crackles  Cardiovascular: (PMI) apex non displaced,no lifts no thrills,S1 and S2 audible, No added heart sounds, No signs of ankle edema, or volume overload, No evidence of JVD, No crackles  GI: post colostomy bag bowel sounds normal, Soft, No tenderness, No masses, No gross visceromegaly   : Chronic Weaver's catheter no costovertebral angle tenderness   Musculoskeletal:  No edema, no tenderness, no deformities.  Back- no tenderness  Integument:  Well hydrated, no rash   Lymphatic:  No lymphadenopathy noted   Neurologic:  Alert & oriented x 3, CN 2-12 normal, normal motor function, normal sensory function, no focal deficits noted   Psychiatric:  Speech and behavior appropriate       Medical decision making and Data review:  DATA:  Lab Results   Component Value Date    TROPONINT <0.010 03/23/2018     BNP:    Lab Results   Component Value Date    PROBNP 115.2 03/22/2018     PT/INR:  No results found for: PTINR  No results found for: LABA1C  No results found for: CHOL, TRIG, HDL, LDLCALC, LDLDIRECT  Lab Results   Component Value Date    ALT 10 05/03/2021    AST 16 05/03/2021     No results for input(s): WBC, HGB, HCT, MCV, PLT in the last 72 hours. TSH: No results found for: TSH  Lab Results   Component Value Date    AST 16 05/03/2021    ALT 10 05/03/2021    BILIDIR 0.1 02/11/2011    BILITOT 0.3 05/03/2021    ALKPHOS 65 05/03/2021     Lab Results   Component Value Date    PROBNP 115.2 03/22/2018     No results found for: LABA1C  Lab Results   Component Value Date    WBC 4.7 05/03/2021    HGB 12.3 (L) 05/03/2021    HCT 36.3 (L) 05/03/2021     05/03/2021     All labs, medications and tests reviewed by myself including data and history from outside source , patient and available family . Assessment & Plan:      1. Essential hypertension    2. Chronic myeloproliferative disease (HCC)    3. Paroxysmal atrial fibrillation ([address]    4. H/O: CVA (cerebrovascular accident)    5. Irregular heart beat         Irregular heart beat  His EKG suggests possible sinus with PACs we will place him on a 30-day monitor to look for A. fib burden for now continue anticoagulation since he started on it. We will also get an echo to rule out structural heart disease and a stress test ideally a Cardiolite to see if he can unmask any arrhythmia and look for ischemia as etiology? Check TSH    H/O: CVA (cerebrovascular accident)  History of CVA if he really has A. fib he would need anticoagulation    Chronic myeloproliferative disease ([address]  Currently hematocrit is stable at 36    Hypertension   Pressures well controlled get echo     Dyslipidemia :  All available lab work was reviewed. Patient was advised to repeat lab work before next visit. Necessary orders were placed , instructions given by myself       Counseled extensively and medication compliance urged. We discussed that for the  prevention of ASCVD our  goal is aggressive risk modification. Patient is encouraged to exercise if they can , educated about  brisk walk for 30 minutes  at least 3 to 4 times a week if there are no physical limitations  Various goals were discussed and questions answered. Continue current medications. Appropriate prescriptions are addressed and refills ordered. Questions answered and patient verbalizes understanding. Call for any problems, questions, or concerns. Greater than 60 % of time spent counseling besides reviewing data and images     Continue all other medications of all above medical condition listed as is. Return in about 2 months (around 10/2/2021). Please note this report has been partially produced using speech recognition software and may contain errors related to that system including errors in grammar, punctuation, and spelling, as well as words and phrases that may be inappropriate. If there are any questions or concerns please feel free to contact the dictating provider for clarification.

## 2021-08-02 NOTE — PATIENT INSTRUCTIONS
Please be informed that if you contact our office outside of normal business hours the physician on call cannot help with any scheduling or rescheduling issues, procedure instruction questions or any type of medication issue. We advise you for any urgent/emergency that you go to the nearest emergency room! PLEASE CALL OUR OFFICE DURING NORMAL BUSINESS HOURS    Monday - Friday   8 am to 5 pm    [place] [address]: [phone]    [place]:  [phone]  **It is YOUR responsibilty to bring medication bottles and/or updated medication list to [address] [place] [address]. This will allow us to better serve you and all your healthcare needs**  Please hold on to these instructions the  will call you within 1-9 business days when we receive authorization from your insurance. Nuclear Stress Test    WHAT TO EXPECT:   ? You will need to confirm the test or it could be cancelled. ? This test will take approximately 2 hours: 1 hour in the AM &    1 hour in the PM. You will be given a time by the Technologist after the first part is completed to come back. ? You will be given a medication, through an IV in the hand, this will safely simulate exercise. This IV is also needed to inject the radioactive isotope unless you are able toe walk the treadmill. ? You will receive an injection in the AM & PM before the pictures. ? Using a special camera, you will have one set of pictures of your heart taken in the AM and a set of pictures in the PM.     PREPARATION FOR TEST:  ? Eat a light breakfast such as water or juice and toast.  ? If you are DIABETIC: Eat a normal breakfast with NO CAFFEINE and take your insulin as normal.   ? AVOID ALL FOODS & DRINKS containing CAFFEINE 12 HOURS PRIOR TO THE TEST: Including coffee, Tea, Javy and other soft drinks even those labeled  caffeine free or decaffeinated.  ? HOLD THESE MEDICATIONS Persantine & Theophylline (Theodur)  24 hours prior & bring your inhaler with you. The physician will specify if the following Beta blockers need to be held for 24 hours prior to test:

## 2021-08-02 NOTE — ASSESSMENT & PLAN NOTE
His EKG suggests possible sinus with PACs we will place him on a 30-day monitor to look for A. fib burden for now continue anticoagulation since he started on it. We will also get an echo to rule out structural heart disease and a stress test ideally a Cardiolite to see if he can unmask any arrhythmia and look for ischemia as etiology?

## 2021-08-03 NOTE — TELEPHONE ENCOUNTER
LV-07/27  NV-08/12    DONTE PRIETO         PT HAD ALREADY TRIED TO REFILL MEDICATION WITH [facility] AND HE SAID IT HAS GOTTEN HIM NO WHERE. HE IS TRYING TO REFILL HIS CHEMO PILL.

## 2021-08-03 NOTE — TELEPHONE ENCOUNTER
Patient called and stated the Eliquis is a $500.00 co pay. Patient also stated that he saw his heart doctor and he did not think that patient needed to be on a blood thinner.

## 2021-08-03 NOTE — PROGRESS NOTES
Hydrea Results:    Current dose 1000 MG  New dose 1000 MG    Lab Results   Component Value Date    WBC 4.4 08/03/2021    WBC 4.7 05/03/2021    HGB 12.4 (L) 08/03/2021    HGB 12.3 (L) 05/03/2021     08/03/2021     05/03/2021        When to recheck CBC?  3 MONTHS  Thank you

## 2021-08-06 NOTE — TELEPHONE ENCOUNTER
Pt called and states he was getting up ready for the day, got very dizzy and weak. He sat back down and is felling a little better. He has been sitting since 730 am. He is a non diabetic he is wearing 30 day event monitor. Please advise. Viki Alvarado

## 2021-08-06 NOTE — TELEPHONE ENCOUNTER
Patient states he is feeling better now, just fatigued. Does not know B/P. Printed event monitor reading from time he was feeling dizziness which show afib. Patient states he is on eliquis only for 2 weeks, that he doesn't need to continue because he takes hydroxyurea (?).

## 2021-08-06 NOTE — TELEPHONE ENCOUNTER
Continue cadizem and eliquis until seen in office , do not stop eliquis in 2 weeks  Will see in office   Check bp at home and document sitting and standing bp especially when dizzy

## 2021-08-09 NOTE — TELEPHONE ENCOUNTER
Left detailed voice message for the patient's daughter informing her we do have samples for the patient. Asked the patients daughter to return my call with any questions.

## 2021-08-09 NOTE — TELEPHONE ENCOUNTER
To Dr. Kathy Ngo--    Patient needs some sort of help with the cost of the Eliquis----it is $500.00 and he cannot afford that. Do you have any samples? He doesn't have any more right now.

## 2021-08-13 NOTE — TELEPHONE ENCOUNTER
Summary    Normal study.    Normal perfusion study with normal distribution in all coronal, short, and    horizontal axis.    The observed defect is consistent with diaphragmatic attenuation.    Normal LV function. LVEF is 62 %.    Supervising physician Dr. Santa Miles .        Recommendation    Recommendation: Routine follow-up. Spoke to patient regarding results of stress test. Pt voiced understanding.

## 2021-08-16 NOTE — TELEPHONE ENCOUNTER
To Dr. Chago Wren-    Patient wants to know if he should get the COVID vaccine booster because of his compromised immune system due to taking adalimumab (HUMIRA) 40 MG/0.8ML injection

## 2021-08-23 NOTE — TELEPHONE ENCOUNTER
Yes he should take this check with his local pharmacy make sure he takes the same vaccine that he had before

## 2021-10-04 PROBLEM — I35.0 NONRHEUMATIC AORTIC VALVE STENOSIS: Status: ACTIVE | Noted: 2021-01-01

## 2021-10-04 NOTE — PROGRESS NOTES
(Patient not taking: Reported on 10/4/2021) 90 tablet 1    clobetasol (TEMOVATE) 0.05 % cream Apply topically 2 times daily Apply topically 2 times daily. (Patient not taking: Reported on 10/4/2021)      Fluocinolone-Emollient (FLUOCINOLONE CREAM & EMOLLIENT EX) Apply topically daily  (Patient not taking: Reported on 10/4/2021)       No current facility-administered medications for this visit. Allergies:   Morphine and Sulfa antibiotics    Patient History:  Past Medical History:   Diagnosis Date    AAA (abdominal aortic aneurysm) (HCC)     Angina, class I ([address]     Benign prostatic hyperplasia 2011     Updating Deprecated Diagnoses    Bowel obstruction ([address]     CAD (coronary artery disease)     Cancer ([address]     rectal    CCC (chronic calculous cholecystitis) 2018    Gastroesophageal reflux disease without esophagitis 2020    Hx of cardiovascular stress test 2021    EF 62% Normal study    Hypertension     Primary malignant neoplasm of rectum ([address] 2019    Psoriasis     Psoriatic arthritis ([address] 2019    Pyelonephritis 2018    Stable angina ([address] 2019    Unspecified cerebral artery occlusion with cerebral infarction      Past Surgical History:   Procedure Laterality Date    APPENDECTOMY      COLOSTOMY       Family History   Problem Relation Age of Onset    Cancer Mother     Cancer Father      Social History     Tobacco Use    Smoking status: Former Smoker     Packs/day: 1.50     Years: 40.00     Pack years: 60.00     Types: Cigarettes     Quit date:      Years since quittin.7    Smokeless tobacco: Never Used   Substance Use Topics    Alcohol use: No        Review of Systems:   · Constitutional: No Fever or Weight Loss   · Eyes: No Decreased Vision  · ENT: No Headaches, Hearing Loss or Vertigo  · Cardiovascular: as per note above   · Respiratory: No cough or wheezing and as per note above.    · Gastrointestinal: No abdominal pain, appetite loss, blood in stools, constipation, diarrhea or heartburn  · Genitourinary: No dysuria, trouble voiding, or hematuria  · Musculoskeletal:  denies any new  joint aches , swelling  or pain   · Integumentary: No rash or pruritis  · Neurological: No TIA or stroke symptoms  · Psychiatric: No anxiety or depression  · Endocrine: No malaise, fatigue or temperature intolerance  · Hematologic/Lymphatic: No bleeding problems, blood clots or swollen lymph nodes  · Allergic/Immunologic: No nasal congestion or hives    Objective:      Physical Exam:  /82   Pulse 88   Ht 5' 8\" (1.727 m)   Wt 187 lb 6.4 oz (85 kg)   BMI 28.49 kg/m²   Wt Readings from Last 3 Encounters:   10/04/21 187 lb 6.4 oz (85 kg)   08/02/21 190 lb 6.4 oz (86.4 kg)   07/27/21 189 lb (85.7 kg)     Body mass index is 28.49 kg/m². Vitals:    10/04/21 1050   BP: 132/82   Pulse: 88        General Appearance:  No distress, conversant  Constitutional:  Well developed, Well nourished, No acute distress, Non-toxic appearance. HENT:  Normocephalic, Atraumatic, Bilateral external ears normal, Oropharynx moist, No oral exudates, Nose normal. Neck- Normal range of motion, No tenderness, Supple, No stridor,no apical-carotid delay  Eyes:  PERRL, EOMI, Conjunctiva normal, No discharge. Respiratory:  Normal breath sounds, No respiratory distress, No wheezing, No chest tenderness. ,no use of accessory muscles, NO crackles  Cardiovascular: (PMI) apex non displaced,no lifts no thrills,S1 and S2 audible, systolic murmur, No signs of ankle edema, or volume overload, No evidence of JVD, No crackles  GI: post colostomy bag bowel sounds normal, Soft, No tenderness, No masses, No gross visceromegaly   : Chronic Weaver's catheter no costovertebral angle tenderness   Musculoskeletal:  No edema, no tenderness, no deformities.  Back- no tenderness  Integument:  Well hydrated, no rash   Lymphatic:  No lymphadenopathy noted   Neurologic:  Alert & oriented x 3, CN 2-12 normal, normal motor function, normal sensory function, no focal deficits noted   Psychiatric:  Speech and behavior appropriate       Medical decision making and Data review:  DATA:  Lab Results   Component Value Date    TROPONINT <0.010 03/23/2018     BNP:    Lab Results   Component Value Date    PROBNP 115.2 03/22/2018     PT/INR:  No results found for: PTINR  No results found for: LABA1C  No results found for: CHOL, TRIG, HDL, LDLCALC, LDLDIRECT  Lab Results   Component Value Date    ALT 11 08/03/2021    AST 14 (L) 08/03/2021     No results for input(s): WBC, HGB, HCT, MCV, PLT in the last 72 hours. TSH: No results found for: TSH  Lab Results   Component Value Date    AST 14 (L) 08/03/2021    ALT 11 08/03/2021    BILIDIR 0.1 02/11/2011    BILITOT 0.3 08/03/2021    ALKPHOS 67 08/03/2021     Lab Results   Component Value Date    PROBNP 115.2 03/22/2018     No results found for: LABA1C  Lab Results   Component Value Date    WBC 4.4 08/03/2021    HGB 12.4 (L) 08/03/2021    HCT 36.4 (L) 08/03/2021     08/03/2021     Echo 8/24/21  Summary   Left ventricular function and size is normal, EF is estimated at 55-60%. Normal diastolic filling pattern for age. No regional wall motion abnormalities were detected. Bi atrial enlargement noted. Mildly sclerotic aortic valve with mild aortic stenosis mean gradient is   13mmHg, NADEEM 1.6 cm2. Mild mitral , tricuspid and moderate aortic regurgitation is present. RVSP is 29 mmHg. No evidence of pericardial effusion. Stress  8/12/21  Normal study.    Normal perfusion study with normal distribution in all coronal, short, and    horizontal axis.    The observed defect is consistent with diaphragmatic attenuation.    Normal LV function.  LVEF is 62 %.    Supervising physician Dr. Justin Cardoso      Event monitor  30-day monitor showing episodes of atrial fibrillation worn from 8/2/2021 to 8/31/2021 there were 3 patient triggers and 43 auto triggers.  Lowest heart rate was 49 average heart rate was 82 maximum heart rate was 159.  Patient was noted to be in A. fib 5% of the time with a maximal heart rate around 60.  Patient did not report any symptoms associated with A. fib.  Reported symptoms of lightheadedness and dizziness did not correlate with any arrhythmias but was noted to have PACs with heart rate of 119 at that time.  Abnormal 30-day monitor showing runs of SVT, atrial fibrillation, PACs, reported symptoms of lightheadedness associated with PVCs and occasional tachycardia clinical correlation needed abnormal monitor. All labs, medications and tests reviewed by myself including data and history from outside source , patient and available family . Assessment & Plan:      1. Primary hypertension    2. Chronic myeloproliferative disease ([facility]    3. H/O: CVA (cerebrovascular accident)    4. Irregular heart beat    5. Nonrheumatic aortic valve stenosis         AFIB  Noted to be in afib during 30 day monitor in august 2021 , Eliquis is too expensive he cannot afford it   We will switch to coumadin if we cannot get him any assistance   HE had one fall but it was mechanical . If he has more falls will revaluate anticoagulation   HE does have thrombocytosis hence he is on hydroxyurea        H/O: CVA (cerebrovascular accident)  History of CVA , no deficit had a mechanical fall     Chronic myeloproliferative disease (HCC)  Currently hematocrit is stable at 36    Aortic stenosis  Moderate stenosis , will repeat echo in 1 year     Hypertension   Pressures well controlled . Dyslipidemia :  All available lab work was reviewed. Patient was advised to repeat lab work before next visit. Necessary orders were placed , instructions given by myself       Counseled extensively and medication compliance urged. We discussed that for the  prevention of ASCVD our  goal is aggressive risk modification. Patient is encouraged to exercise if they can , educated about  brisk walk for 30 minutes  at least 3 to 4 times a week if there are no physical limitations  Various goals were discussed and questions answered. Continue current medications. Appropriate prescriptions are addressed and refills ordered. Questions answered and patient verbalizes understanding. Call for any problems, questions, or concerns. Greater than 60 % of time spent counseling besides reviewing data and images     Continue all other medications of all above medical condition listed as is. Return in about 6 months (around 4/4/2022). Please note this report has been partially produced using speech recognition software and may contain errors related to that system including errors in grammar, punctuation, and spelling, as well as words and phrases that may be inappropriate. If there are any questions or concerns please feel free to contact the dictating provider for clarification.

## 2021-10-04 NOTE — PATIENT INSTRUCTIONS
**It is YOUR responsibilty to bring medication bottles and/or updated medication list to [address] [place] [address]. This will allow us to better serve you and all your healthcare needs**   Please be informed that if you contact our office outside of normal business hours the physician on call cannot help with any scheduling or rescheduling issues, procedure instruction questions or any type of medication issue. We advise you for any urgent/emergency that you go to the nearest emergency room!     PLEASE CALL OUR OFFICE DURING NORMAL BUSINESS HOURS    Monday - Friday   8 am to 5 pm    [place]: [address]: [phone]    [place]:  [phone]

## 2021-10-05 NOTE — PROGRESS NOTES
Patient Name: Nixon Bernstein  Patient : 1929  Patient MRN: [id number]     Primary Oncologist: Edmar Ruggiero MD  Referring Provider: Rsusell Ramires MD     Date of Service: 2021      Chief Complaint:   No chief complaint on file. He came in for follow-up visit. Patient Active Problem List:     Hypertension     Benign prostatic hyperplasia     Psoriatic arthritis ([address]     Primary malignant neoplasm of rectum (HCC)     Psoriasis     Stable angina (HCC)     Chronic myeloproliferative disease (HCC)     Monocytosis (symptomatic)     Gastroesophageal reflux disease without esophagitis     Urinary retention     HPI:   1. George Salas is a very delightful gentleman [ 1929] who came in to see Dr Jorge Benites in 2017 for elevated platelets and mild monocytosis. He has been in relatively good health, except for a history of rectal cancer, for which he was treated in [address] [place] [address] with abdominoperineal resection, permanent colostomy, followed by chemotherapy and concurrent radiation therapy. He has been in good clinical remission since. 2. He was also diagnosed with a psoriatic rash on his trunk and legs, for which he was put on methotrexate 7.5 mg q weekly in 2017. 3. However, CBC in 2017 showed platelet count of 620,315 and repeat values in May and  showed the platelet counts around 570,000 and 580,000  4. In addition to that, he had mild monocytosis ranging from 8 to 18 percent. 5. Looking back in Epic, in  his white count and hemoglobin were normal. Platelet count was also normal between 286,000 and 334,000 and in March of this year platelets were 221,554. He had mild monocytosis between 8 and 10 percent in . 6. Peripheral smear also showed occasional giant platelets, anisocytosis, and elliptocytosis. 7. 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    He had history of left CVA in 1982. We discussed about his risks of CVA due to elevated plaletet counts. I offered him hydrea to normalize his platelet counts. I discussed about risks and benefits of hydrea. He is on ASA. He had history of skin cancer, I advise him to follow up with his dermatologist.  He was in the hospital in March 2018 due to R abdominal pain likely related to gall stone. HIDA scan in March 2018 revealed o cystic obstruction. CXR : no acute process. RUQ abdomen US: cholelithiasis without evidence of acute cholecystitis. CT abdomen: infrarenal aortic aneurysm 6.4 cm in length. Psoriatic lesion has improved with Humira injection since June 2018. Labs in August 2019 were reviewed. Platelet was 476. He has a Weaver cath and was seen by Dr. Willey Curling for the infection in the bladder. He was told that he has damage to the bladder from the previous radiation therapy. He had 33 days of radiation. Labs in February 2020 was stable. CBC on June 30, 2020 is stable. CBC and CMP in September 2020 was stable. He had Covid vaccine. He has been receiving shot for the psoriasis in the last 2 years with good response. He was seen by urologist and had permanent catheter through the penis. On 11/4/2021 he came in for follow-up visit. He has been placed on Eliquis for A. fib in August 2021. I advised him to discuss with cardiologist whether he should continue with aspirin. He understands that aspirin and Eliquis increased risk of bleeding. He has been adherent to Hydrea 1000 mg daily. CBC on November 4, 2021 was stable with hemoglobin 12.6, platelet 131, WBC 5.5, ferritin was 248, TIBC 211, transferrin saturation 24%. No acute pain. Denied any nausea, vomiting or diarrhea. No fever or chills. No chest pain, shortness of breath or palpitation. No headache or dizzy spell. He denies any specific bone pain. No melena or hematochezia. Denied any dysuria or hematuria. PAST MEDICAL HISTORY:   1.  Rectal cancer in [address] [place] [address], APR, followed by chemotherapy plus RT. 2. Hypertension. 3. Psoriasis as above on Methotrexate 7.5 mg/week. 4. Angina. 5. Some GI bleed 13 years ago, for which he was put on Protonix and has been on it since then. PAST SURGICAL HISTORY:   1. Appendectomy as a young adult. 2. APR surgery as above. FAMILY HISTORY:   Father had prostate cancer, mother had bone cancer in her 63's. He had six sisters. Two  of nonmalignant causes. PERSONAL HISTORY:   He quit smoking 44 years ago, nondrinker. He has been a  since . He has four stepchildren. A daughter, Jamee Siegel, accompanied him. He is retired, but very active with the [employer]. He participates in multiple programs there and keeps himself very b    Review of Systems: \"Per interval history; otherwise 10 point ROS is negative. \"     Vital Signs: There were no vitals taken for this visit. Physical Exam:  CONSTITUTIONAL: awake, alert, cooperative, no apparent distress   EYES: pupils equal, round and reactive to light, sclera clear and conjunctiva normal  ENT: Normocephalic, without obvious abnormality, atraumatic  NECK: supple, symmetrical, no jugular venous distension and no carotid bruits   HEMATOLOGIC/LYMPHATIC: no cervical, supraclavicular or axillary lymphadenopathy   LUNGS: no increased work of breathing and clear to auscultation   CARDIOVASCULAR: irregular rhythm, normal S1 and S2, no murmur  ABDOMEN: normal bowel sound soft, non-distended, non-tender, no masses palpated, no hepatosplenomegaly. Weaver catheter noted  MUSCULOSKELETAL: full range of motion noted, tone is normal  NEUROLOGIC: awake, alert, oriented to name, place and time. Motor grossly intact. CN II - XII grossly intact. SKIN: No jaundice. appears intact. Dry skin. EXTREMITIES: no LE edema. No cyanosis.      Labs:  Hematology:  Lab Results   Component Value Date    WBC 4.4 2021    RBC 3.16 (L) 2021    HGB 12.4 (L) 2021 HCT 36.4 (L) 08/03/2021    .2 (H) 08/03/2021    MCH 39.2 (H) 08/03/2021    MCHC 34.1 08/03/2021    RDW 12.5 08/03/2021     08/03/2021    MPV 8.9 08/03/2021    BANDSPCT 1 (L) 08/18/2017    SEGSPCT 65.2 08/03/2021    EOSRELPCT 1.1 08/03/2021    BASOPCT 0.7 08/03/2021    LYMPHOPCT 18.1 (L) 08/03/2021    MONOPCT 14.9 (H) 08/03/2021    BANDABS 0.08 08/18/2017    SEGSABS 2.8 08/03/2021    EOSABS 0.1 08/03/2021    BASOSABS 0.0 08/03/2021    LYMPHSABS 0.8 08/03/2021    MONOSABS 0.7 08/03/2021    DIFFTYPE AUTOMATED DIFFERENTIAL 08/03/2021    ANISOCYTOSIS 1+ 06/27/2017    POLYCHROM 1+ 08/18/2017    PLTM INCREASED 08/18/2017     Chemistry:  Lab Results   Component Value Date     08/03/2021    K 4.3 08/03/2021     08/03/2021    CO2 26 08/03/2021    BUN 23 08/03/2021    CREATININE 1.2 08/03/2021    GLUCOSE 95 08/03/2021    CALCIUM 9.0 08/03/2021    PROT 6.4 08/03/2021    LABALBU 4.1 08/03/2021    BILITOT 0.3 08/03/2021    ALKPHOS 67 08/03/2021    AST 14 (L) 08/03/2021    ALT 11 08/03/2021    LABGLOM 57 (L) 08/03/2021    GFRAA >60 08/03/2021     Lab Results   Component Value Date     03/13/2018     Immunology:  Lab Results   Component Value Date    PROT 6.4 08/03/2021     Coagulation Panel:  Lab Results   Component Value Date    PROTIME 10.4 12/11/2011    INR 0.95 12/11/2011    APTT 21.6 (L) 12/11/2011      Observations:  No data recorded      Assessment & Plan:    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 JAMEL-2 was +ve 19%  Due to risk of CVA, I offered him hydrea. I remind him to continue to take daily ASA. He has been adherent to Hydrea 1000 mg daily. Recent CBC was reviewed. 2. Survivorship issues:   He has been cured of rectal cancer more than 18 years. No sign of any recurrence at this time. Other than psoriasis, he really does not have any major issues or problems and doing very well functionally as an 80year-old gentleman.    We talked

## 2021-10-20 NOTE — TELEPHONE ENCOUNTER
2021 Patient assistance application complete, scanned into [employer] and faxed to [employer] for Eliquis. Awaiting response.

## 2021-10-25 NOTE — TELEPHONE ENCOUNTER
Left message on voicemail of patient to advise that he has been approved for assistance through the [facility] patient assistance foundation. Patient approved through 12/31/21. Patient advised to contact [facility] to set up delivery and to call the office with further needs. Approval scanned to chart.

## 2021-11-04 NOTE — PROGRESS NOTES
MA Rooming Questions  Patient: Timmy Velasquez  MRN: [id number]    Date: 11/4/2021        1. Do you have any new issues?   no         2. Do you need any refills on medications?    no    3. Have you had any imaging done since your last visit?   no    4. Have you been hospitalized or seen in the emergency room since your last visit here?   no    5. Did the patient have a depression screening completed today?  Yes    No data recorded     PHQ-9 Given to (if applicable):               PHQ-9 Score (if applicable):                     [] Positive     [x]  Negative              Does question #9 need addressed (if applicable)                     [] Yes    []  No               Brandyn Watts MA

## 2021-11-05 NOTE — PROGRESS NOTES
Hydrea Results:    Current dose 1,000MG  New dose Same    Lab Results   Component Value Date    WBC 5.5 11/04/2021    WBC 4.4 08/03/2021    HGB 12.6 (L) 11/04/2021    HGB 12.4 (L) 08/03/2021     11/04/2021     08/03/2021        When to recheck CBC?  3 months    Thank you

## 2021-12-20 NOTE — TELEPHONE ENCOUNTER
2022 Patient assistance application complete, scanned into [employer] and faxed to [employer] for Eliquis. Awaiting response.

## 2021-12-21 NOTE — TELEPHONE ENCOUNTER
The patient called and doubled checked his patient asst. Forms . He said that he is in good shape with the medication right now he has a couple months left .

## 2022-01-01 ENCOUNTER — APPOINTMENT (OUTPATIENT)
Dept: GENERAL RADIOLOGY | Age: 87
DRG: 389 | End: 2022-01-01
Payer: MEDICARE

## 2022-01-01 ENCOUNTER — APPOINTMENT (OUTPATIENT)
Dept: GENERAL RADIOLOGY | Age: 87
DRG: 853 | End: 2022-01-01
Payer: MEDICARE

## 2022-01-01 ENCOUNTER — APPOINTMENT (OUTPATIENT)
Dept: ULTRASOUND IMAGING | Age: 87
DRG: 853 | End: 2022-01-01
Payer: MEDICARE

## 2022-01-01 ENCOUNTER — CARE COORDINATION (OUTPATIENT)
Dept: CASE MANAGEMENT | Age: 87
End: 2022-01-01

## 2022-01-01 ENCOUNTER — APPOINTMENT (OUTPATIENT)
Dept: CT IMAGING | Age: 87
DRG: 862 | End: 2022-01-01
Payer: MEDICARE

## 2022-01-01 ENCOUNTER — APPOINTMENT (OUTPATIENT)
Dept: GENERAL RADIOLOGY | Age: 87
DRG: 862 | End: 2022-01-01
Payer: MEDICARE

## 2022-01-01 ENCOUNTER — APPOINTMENT (OUTPATIENT)
Dept: ULTRASOUND IMAGING | Age: 87
DRG: 862 | End: 2022-01-01
Payer: MEDICARE

## 2022-01-01 ENCOUNTER — TELEPHONE (OUTPATIENT)
Dept: CARDIOLOGY CLINIC | Age: 87
End: 2022-01-01

## 2022-01-01 ENCOUNTER — ANESTHESIA EVENT (OUTPATIENT)
Dept: OPERATING ROOM | Age: 87
DRG: 853 | End: 2022-01-01
Payer: MEDICARE

## 2022-01-01 ENCOUNTER — ANESTHESIA (OUTPATIENT)
Dept: ENDOSCOPY | Age: 87
DRG: 378 | End: 2022-01-01
Payer: MEDICARE

## 2022-01-01 ENCOUNTER — ANESTHESIA (OUTPATIENT)
Dept: OPERATING ROOM | Age: 87
DRG: 853 | End: 2022-01-01
Payer: MEDICARE

## 2022-01-01 ENCOUNTER — APPOINTMENT (OUTPATIENT)
Dept: CT IMAGING | Age: 87
DRG: 853 | End: 2022-01-01
Payer: MEDICARE

## 2022-01-01 ENCOUNTER — APPOINTMENT (OUTPATIENT)
Dept: GENERAL RADIOLOGY | Age: 87
DRG: 378 | End: 2022-01-01
Payer: MEDICARE

## 2022-01-01 ENCOUNTER — APPOINTMENT (OUTPATIENT)
Dept: CT IMAGING | Age: 87
DRG: 389 | End: 2022-01-01
Payer: MEDICARE

## 2022-01-01 ENCOUNTER — ANESTHESIA EVENT (OUTPATIENT)
Dept: ENDOSCOPY | Age: 87
DRG: 378 | End: 2022-01-01
Payer: MEDICARE

## 2022-01-01 ENCOUNTER — HOSPITAL ENCOUNTER (INPATIENT)
Age: 87
LOS: 23 days | DRG: 862 | End: 2022-05-15
Attending: EMERGENCY MEDICINE | Admitting: INTERNAL MEDICINE
Payer: MEDICARE

## 2022-01-01 ENCOUNTER — HOSPITAL ENCOUNTER (OUTPATIENT)
Dept: INFUSION THERAPY | Age: 87
Discharge: HOME | End: 2022-02-08
Payer: MEDICARE

## 2022-01-01 ENCOUNTER — OFFICE VISIT (OUTPATIENT)
Dept: FAMILY MEDICINE CLINIC | Age: 87
End: 2022-01-01
Payer: MEDICARE

## 2022-01-01 ENCOUNTER — HOSPITAL ENCOUNTER (INPATIENT)
Age: 87
LOS: 19 days | Discharge: NURSING, REHAB OR LONG-TERM CARE | DRG: 853 | End: 2022-04-21
Attending: EMERGENCY MEDICINE | Admitting: INTERNAL MEDICINE
Payer: MEDICARE

## 2022-01-01 ENCOUNTER — HOSPITAL ENCOUNTER (INPATIENT)
Age: 87
LOS: 5 days | Discharge: HOME | DRG: 378 | End: 2022-03-04
Attending: EMERGENCY MEDICINE | Admitting: INTERNAL MEDICINE
Payer: MEDICARE

## 2022-01-01 ENCOUNTER — HOSPITAL ENCOUNTER (OUTPATIENT)
Dept: INFUSION THERAPY | Age: 87
Setting detail: INFUSION SERIES
Discharge: HOME | End: 2022-01-25
Payer: MEDICARE

## 2022-01-01 ENCOUNTER — TELEPHONE (OUTPATIENT)
Dept: FAMILY MEDICINE CLINIC | Age: 87
End: 2022-01-01

## 2022-01-01 ENCOUNTER — APPOINTMENT (OUTPATIENT)
Dept: INTERVENTIONAL RADIOLOGY/VASCULAR | Age: 87
DRG: 853 | End: 2022-01-01
Payer: MEDICARE

## 2022-01-01 ENCOUNTER — APPOINTMENT (OUTPATIENT)
Dept: INTERVENTIONAL RADIOLOGY/VASCULAR | Age: 87
DRG: 862 | End: 2022-01-01
Payer: MEDICARE

## 2022-01-01 ENCOUNTER — HOSPITAL ENCOUNTER (INPATIENT)
Age: 87
LOS: 5 days | Discharge: HOME HEALTH | DRG: 389 | End: 2022-03-25
Attending: STUDENT IN AN ORGANIZED HEALTH CARE EDUCATION/TRAINING PROGRAM | Admitting: INTERNAL MEDICINE
Payer: MEDICARE

## 2022-01-01 ENCOUNTER — APPOINTMENT (OUTPATIENT)
Dept: MRI IMAGING | Age: 87
DRG: 862 | End: 2022-01-01
Payer: MEDICARE

## 2022-01-01 VITALS
SYSTOLIC BLOOD PRESSURE: 106 MMHG | BODY MASS INDEX: 23.42 KG/M2 | DIASTOLIC BLOOD PRESSURE: 59 MMHG | RESPIRATION RATE: 10 BRPM | HEART RATE: 90 BPM | OXYGEN SATURATION: 93 % | WEIGHT: 158.09 LBS | HEIGHT: 69 IN | TEMPERATURE: 97.7 F

## 2022-01-01 VITALS — SYSTOLIC BLOOD PRESSURE: 126 MMHG | OXYGEN SATURATION: 99 % | DIASTOLIC BLOOD PRESSURE: 79 MMHG

## 2022-01-01 VITALS
DIASTOLIC BLOOD PRESSURE: 92 MMHG | OXYGEN SATURATION: 97 % | RESPIRATION RATE: 16 BRPM | TEMPERATURE: 98.1 F | WEIGHT: 178.3 LBS | SYSTOLIC BLOOD PRESSURE: 156 MMHG | BODY MASS INDEX: 26.41 KG/M2 | HEIGHT: 69 IN | HEART RATE: 77 BPM

## 2022-01-01 VITALS
TEMPERATURE: 96.3 F | DIASTOLIC BLOOD PRESSURE: 76 MMHG | HEART RATE: 82 BPM | WEIGHT: 166.25 LBS | SYSTOLIC BLOOD PRESSURE: 105 MMHG | OXYGEN SATURATION: 97 % | HEIGHT: 69 IN | BODY MASS INDEX: 24.62 KG/M2 | RESPIRATION RATE: 17 BRPM

## 2022-01-01 VITALS
SYSTOLIC BLOOD PRESSURE: 93 MMHG | OXYGEN SATURATION: 100 % | TEMPERATURE: 97.4 F | DIASTOLIC BLOOD PRESSURE: 60 MMHG | RESPIRATION RATE: 39 BRPM

## 2022-01-01 VITALS
OXYGEN SATURATION: 98 % | HEART RATE: 99 BPM | DIASTOLIC BLOOD PRESSURE: 78 MMHG | TEMPERATURE: 97.9 F | SYSTOLIC BLOOD PRESSURE: 118 MMHG | WEIGHT: 181 LBS | BODY MASS INDEX: 26.73 KG/M2

## 2022-01-01 VITALS
HEIGHT: 68 IN | SYSTOLIC BLOOD PRESSURE: 120 MMHG | BODY MASS INDEX: 26.67 KG/M2 | HEART RATE: 82 BPM | DIASTOLIC BLOOD PRESSURE: 74 MMHG | WEIGHT: 176 LBS | OXYGEN SATURATION: 96 %

## 2022-01-01 VITALS
SYSTOLIC BLOOD PRESSURE: 134 MMHG | OXYGEN SATURATION: 98 % | DIASTOLIC BLOOD PRESSURE: 73 MMHG | BODY MASS INDEX: 27.82 KG/M2 | RESPIRATION RATE: 16 BRPM | WEIGHT: 182.98 LBS | TEMPERATURE: 96.8 F | HEART RATE: 106 BPM

## 2022-01-01 VITALS
HEART RATE: 94 BPM | BODY MASS INDEX: 25.48 KG/M2 | SYSTOLIC BLOOD PRESSURE: 125 MMHG | TEMPERATURE: 97.9 F | OXYGEN SATURATION: 97 % | DIASTOLIC BLOOD PRESSURE: 76 MMHG | HEIGHT: 69 IN | WEIGHT: 172 LBS | RESPIRATION RATE: 17 BRPM

## 2022-01-01 VITALS
SYSTOLIC BLOOD PRESSURE: 131 MMHG | DIASTOLIC BLOOD PRESSURE: 65 MMHG | OXYGEN SATURATION: 100 % | RESPIRATION RATE: 19 BRPM

## 2022-01-01 DIAGNOSIS — Z09 HOSPITAL DISCHARGE FOLLOW-UP: Primary | ICD-10-CM

## 2022-01-01 DIAGNOSIS — L40.50 PSORIATIC ARTHRITIS (HCC): ICD-10-CM

## 2022-01-01 DIAGNOSIS — K94.11 HEMORRHAGE FROM ILEOSTOMY (HCC): Primary | ICD-10-CM

## 2022-01-01 DIAGNOSIS — C20 PRIMARY MALIGNANT NEOPLASM OF RECTUM (HCC): ICD-10-CM

## 2022-01-01 DIAGNOSIS — D72.829 LEUKOCYTOSIS, UNSPECIFIED TYPE: ICD-10-CM

## 2022-01-01 DIAGNOSIS — N39.0 URINARY TRACT INFECTION WITH HEMATURIA, SITE UNSPECIFIED: ICD-10-CM

## 2022-01-01 DIAGNOSIS — D47.1 CHRONIC MYELOPROLIFERATIVE DISEASE (HCC): ICD-10-CM

## 2022-01-01 DIAGNOSIS — Z86.73 H/O: CVA (CEREBROVASCULAR ACCIDENT): Primary | ICD-10-CM

## 2022-01-01 DIAGNOSIS — U07.1 COVID-19: ICD-10-CM

## 2022-01-01 DIAGNOSIS — R53.1 GENERAL WEAKNESS: ICD-10-CM

## 2022-01-01 DIAGNOSIS — Z87.19 HISTORY OF GI BLEED: ICD-10-CM

## 2022-01-01 DIAGNOSIS — K56.7 ILEUS (HCC): ICD-10-CM

## 2022-01-01 DIAGNOSIS — K52.9 COLITIS: Primary | ICD-10-CM

## 2022-01-01 DIAGNOSIS — I48.0 PAROXYSMAL ATRIAL FIBRILLATION (HCC): ICD-10-CM

## 2022-01-01 DIAGNOSIS — Z86.2 HISTORY OF ANEMIA: ICD-10-CM

## 2022-01-01 DIAGNOSIS — K92.2 GASTROINTESTINAL HEMORRHAGE, UNSPECIFIED GASTROINTESTINAL HEMORRHAGE TYPE: Primary | ICD-10-CM

## 2022-01-01 DIAGNOSIS — T81.43XA POSTOPERATIVE INTRA-ABDOMINAL ABSCESS: ICD-10-CM

## 2022-01-01 DIAGNOSIS — I48.91 ATRIAL FIBRILLATION WITH RVR (HCC): ICD-10-CM

## 2022-01-01 DIAGNOSIS — A41.9 SEPSIS WITHOUT ACUTE ORGAN DYSFUNCTION, DUE TO UNSPECIFIED ORGANISM (HCC): ICD-10-CM

## 2022-01-01 DIAGNOSIS — R19.5 OCCULT BLOOD POSITIVE STOOL: ICD-10-CM

## 2022-01-01 DIAGNOSIS — N39.0 URINARY TRACT INFECTION WITHOUT HEMATURIA, SITE UNSPECIFIED: Primary | ICD-10-CM

## 2022-01-01 DIAGNOSIS — R31.9 URINARY TRACT INFECTION WITH HEMATURIA, SITE UNSPECIFIED: ICD-10-CM

## 2022-01-01 DIAGNOSIS — J06.9 VIRAL URI: ICD-10-CM

## 2022-01-01 DIAGNOSIS — K92.2 GASTROINTESTINAL HEMORRHAGE, UNSPECIFIED GASTROINTESTINAL HEMORRHAGE TYPE: ICD-10-CM

## 2022-01-01 LAB
1,3 BETA-D-GLUCAN INTERP: NEGATIVE
1,3 BETA-D-GLUCAN: 35 PG/ML
A/G RATIO: 1.6 (ref 1.1–2.2)
ABO/RH: NORMAL
ALBUMIN SERPL-MCNC: 2.2 GM/DL (ref 3.4–5)
ALBUMIN SERPL-MCNC: 2.3 GM/DL (ref 3.4–5)
ALBUMIN SERPL-MCNC: 2.3 GM/DL (ref 3.4–5)
ALBUMIN SERPL-MCNC: 2.4 GM/DL (ref 3.4–5)
ALBUMIN SERPL-MCNC: 2.6 GM/DL (ref 3.4–5)
ALBUMIN SERPL-MCNC: 2.8 GM/DL (ref 3.4–5)
ALBUMIN SERPL-MCNC: 3 GM/DL (ref 3.4–5)
ALBUMIN SERPL-MCNC: 3 GM/DL (ref 3.4–5)
ALBUMIN SERPL-MCNC: 3.1 GM/DL (ref 3.4–5)
ALBUMIN SERPL-MCNC: 3.1 GM/DL (ref 3.4–5)
ALBUMIN SERPL-MCNC: 3.2 GM/DL (ref 3.4–5)
ALBUMIN SERPL-MCNC: 3.2 GM/DL (ref 3.4–5)
ALBUMIN SERPL-MCNC: 3.3 GM/DL (ref 3.4–5)
ALBUMIN SERPL-MCNC: 3.4 GM/DL (ref 3.4–5)
ALBUMIN SERPL-MCNC: 3.5 GM/DL (ref 3.4–5)
ALBUMIN SERPL-MCNC: 3.6 GM/DL (ref 3.4–5)
ALBUMIN SERPL-MCNC: 4.1 G/DL (ref 3.4–5)
ALP BLD-CCNC: 106 IU/L (ref 40–129)
ALP BLD-CCNC: 121 IU/L (ref 40–129)
ALP BLD-CCNC: 203 IU/L (ref 40–128)
ALP BLD-CCNC: 210 IU/L (ref 40–128)
ALP BLD-CCNC: 263 IU/L (ref 40–128)
ALP BLD-CCNC: 273 IU/L (ref 40–129)
ALP BLD-CCNC: 278 IU/L (ref 40–128)
ALP BLD-CCNC: 299 IU/L (ref 40–128)
ALP BLD-CCNC: 308 IU/L (ref 40–129)
ALP BLD-CCNC: 325 IU/L (ref 40–128)
ALP BLD-CCNC: 346 IU/L (ref 40–128)
ALP BLD-CCNC: 64 IU/L (ref 40–129)
ALP BLD-CCNC: 66 IU/L (ref 40–128)
ALP BLD-CCNC: 66 IU/L (ref 40–129)
ALP BLD-CCNC: 67 U/L (ref 40–129)
ALP BLD-CCNC: 76 IU/L (ref 40–129)
ALP BLD-CCNC: 86 IU/L (ref 40–129)
ALT SERPL-CCNC: 10 U/L (ref 10–40)
ALT SERPL-CCNC: 10 U/L (ref 10–40)
ALT SERPL-CCNC: 13 U/L (ref 10–40)
ALT SERPL-CCNC: 13 U/L (ref 10–40)
ALT SERPL-CCNC: 21 U/L (ref 10–40)
ALT SERPL-CCNC: 21 U/L (ref 10–40)
ALT SERPL-CCNC: 22 U/L (ref 10–40)
ALT SERPL-CCNC: 24 U/L (ref 10–40)
ALT SERPL-CCNC: 28 U/L (ref 10–40)
ALT SERPL-CCNC: 29 U/L (ref 10–40)
ALT SERPL-CCNC: 38 U/L (ref 10–40)
ALT SERPL-CCNC: 46 U/L (ref 10–40)
ALT SERPL-CCNC: 46 U/L (ref 10–40)
ALT SERPL-CCNC: 6 U/L (ref 10–40)
ALT SERPL-CCNC: 7 U/L (ref 10–40)
ALT SERPL-CCNC: 8 U/L (ref 10–40)
ALT SERPL-CCNC: 9 U/L (ref 10–40)
AMMONIA: 17 UMOL/L (ref 16–60)
ANION GAP SERPL CALCULATED.3IONS-SCNC: 10 MMOL/L (ref 4–16)
ANION GAP SERPL CALCULATED.3IONS-SCNC: 11 MMOL/L (ref 4–16)
ANION GAP SERPL CALCULATED.3IONS-SCNC: 12 MMOL/L (ref 4–16)
ANION GAP SERPL CALCULATED.3IONS-SCNC: 13 MMOL/L (ref 4–16)
ANION GAP SERPL CALCULATED.3IONS-SCNC: 14 MMOL/L (ref 4–16)
ANION GAP SERPL CALCULATED.3IONS-SCNC: 15 MMOL/L (ref 4–16)
ANION GAP SERPL CALCULATED.3IONS-SCNC: 16 MMOL/L (ref 4–16)
ANION GAP SERPL CALCULATED.3IONS-SCNC: 16 MMOL/L (ref 4–16)
ANION GAP SERPL CALCULATED.3IONS-SCNC: 17 MMOL/L (ref 4–16)
ANION GAP SERPL CALCULATED.3IONS-SCNC: 18 MMOL/L (ref 3–16)
ANION GAP SERPL CALCULATED.3IONS-SCNC: 18 MMOL/L (ref 4–16)
ANION GAP SERPL CALCULATED.3IONS-SCNC: 20 MMOL/L (ref 4–16)
ANION GAP SERPL CALCULATED.3IONS-SCNC: 7 MMOL/L (ref 4–16)
ANION GAP SERPL CALCULATED.3IONS-SCNC: 8 MMOL/L (ref 4–16)
ANION GAP SERPL CALCULATED.3IONS-SCNC: 9 MMOL/L (ref 4–16)
ANISOCYTOSIS: ABNORMAL
ANTIBODY SCREEN: NEGATIVE
APTT: 26.2 SECONDS (ref 25.1–37.1)
APTT: 29.9 SECONDS (ref 25.1–37.1)
APTT: 30.9 SECONDS (ref 25.1–37.1)
APTT: 33.6 SECONDS (ref 25.1–37.1)
APTT: 35 SECONDS (ref 25.1–37.1)
APTT: 37.2 SECONDS (ref 25.1–37.1)
AST SERPL-CCNC: 11 IU/L (ref 15–37)
AST SERPL-CCNC: 12 IU/L (ref 15–37)
AST SERPL-CCNC: 13 IU/L (ref 15–37)
AST SERPL-CCNC: 14 IU/L (ref 15–37)
AST SERPL-CCNC: 14 U/L (ref 15–37)
AST SERPL-CCNC: 15 IU/L (ref 15–37)
AST SERPL-CCNC: 17 IU/L (ref 15–37)
AST SERPL-CCNC: 19 IU/L (ref 15–37)
AST SERPL-CCNC: 20 IU/L (ref 15–37)
AST SERPL-CCNC: 22 IU/L (ref 15–37)
AST SERPL-CCNC: 22 IU/L (ref 15–37)
AST SERPL-CCNC: 29 IU/L (ref 15–37)
AST SERPL-CCNC: 34 IU/L (ref 15–37)
AST SERPL-CCNC: 39 IU/L (ref 15–37)
AST SERPL-CCNC: 9 IU/L (ref 15–37)
BACTERIA: ABNORMAL /HPF
BACTERIA: NEGATIVE /HPF
BANDED NEUTROPHILS ABSOLUTE COUNT: 0.54 K/CU MM
BANDED NEUTROPHILS ABSOLUTE COUNT: 0.8 K/CU MM
BANDED NEUTROPHILS ABSOLUTE COUNT: 0.83 K/CU MM
BANDED NEUTROPHILS ABSOLUTE COUNT: 0.97 K/CU MM
BANDED NEUTROPHILS ABSOLUTE COUNT: 1.56 K/CU MM
BANDED NEUTROPHILS ABSOLUTE COUNT: 1.76 K/CU MM
BANDED NEUTROPHILS ABSOLUTE COUNT: 1.99 K/CU MM
BANDED NEUTROPHILS RELATIVE PERCENT: 4 % (ref 5–11)
BANDED NEUTROPHILS RELATIVE PERCENT: 5 % (ref 5–11)
BANDED NEUTROPHILS RELATIVE PERCENT: 5 % (ref 5–11)
BANDED NEUTROPHILS RELATIVE PERCENT: 6 % (ref 5–11)
BANDED NEUTROPHILS RELATIVE PERCENT: 6 % (ref 5–11)
BANDED NEUTROPHILS RELATIVE PERCENT: 9 % (ref 5–11)
BANDED NEUTROPHILS RELATIVE PERCENT: 9 % (ref 5–11)
BASOPHILS ABSOLUTE: 0 K/CU MM
BASOPHILS ABSOLUTE: 0.1 K/CU MM
BASOPHILS ABSOLUTE: 0.1 K/UL (ref 0–0.2)
BASOPHILS RELATIVE PERCENT: 0.1 % (ref 0–1)
BASOPHILS RELATIVE PERCENT: 0.2 % (ref 0–1)
BASOPHILS RELATIVE PERCENT: 0.3 % (ref 0–1)
BASOPHILS RELATIVE PERCENT: 0.4 % (ref 0–1)
BASOPHILS RELATIVE PERCENT: 0.5 % (ref 0–1)
BASOPHILS RELATIVE PERCENT: 0.5 % (ref 0–1)
BASOPHILS RELATIVE PERCENT: 0.6 % (ref 0–1)
BASOPHILS RELATIVE PERCENT: 0.6 % (ref 0–1)
BASOPHILS RELATIVE PERCENT: 0.7 % (ref 0–1)
BASOPHILS RELATIVE PERCENT: 0.9 % (ref 0–1)
BASOPHILS RELATIVE PERCENT: 1.3 %
BASOPHILS RELATIVE PERCENT: 2.1 % (ref 0–1)
BILIRUB SERPL-MCNC: 0.3 MG/DL (ref 0–1)
BILIRUB SERPL-MCNC: 0.4 MG/DL (ref 0–1)
BILIRUB SERPL-MCNC: 0.5 MG/DL (ref 0–1)
BILIRUB SERPL-MCNC: 0.6 MG/DL (ref 0–1)
BILIRUBIN DIRECT: 0.2 MG/DL (ref 0–0.3)
BILIRUBIN DIRECT: 0.2 MG/DL (ref 0–0.3)
BILIRUBIN DIRECT: 0.3 MG/DL (ref 0–0.3)
BILIRUBIN DIRECT: 0.4 MG/DL (ref 0–0.3)
BILIRUBIN URINE: NEGATIVE MG/DL
BILIRUBIN, INDIRECT: 0.1 MG/DL (ref 0–0.7)
BILIRUBIN, INDIRECT: 0.2 MG/DL (ref 0–0.7)
BLOOD, URINE: ABNORMAL
BUN BLDV-MCNC: 10 MG/DL (ref 6–23)
BUN BLDV-MCNC: 101 MG/DL (ref 6–23)
BUN BLDV-MCNC: 11 MG/DL (ref 6–23)
BUN BLDV-MCNC: 11 MG/DL (ref 6–23)
BUN BLDV-MCNC: 12 MG/DL (ref 6–23)
BUN BLDV-MCNC: 134 MG/DL (ref 6–23)
BUN BLDV-MCNC: 14 MG/DL (ref 6–23)
BUN BLDV-MCNC: 15 MG/DL (ref 6–23)
BUN BLDV-MCNC: 16 MG/DL (ref 6–23)
BUN BLDV-MCNC: 18 MG/DL (ref 6–23)
BUN BLDV-MCNC: 19 MG/DL (ref 6–23)
BUN BLDV-MCNC: 20 MG/DL (ref 6–23)
BUN BLDV-MCNC: 20 MG/DL (ref 7–20)
BUN BLDV-MCNC: 21 MG/DL (ref 6–23)
BUN BLDV-MCNC: 22 MG/DL (ref 6–23)
BUN BLDV-MCNC: 23 MG/DL (ref 6–23)
BUN BLDV-MCNC: 23 MG/DL (ref 6–23)
BUN BLDV-MCNC: 24 MG/DL (ref 6–23)
BUN BLDV-MCNC: 27 MG/DL (ref 6–23)
BUN BLDV-MCNC: 27 MG/DL (ref 6–23)
BUN BLDV-MCNC: 28 MG/DL (ref 6–23)
BUN BLDV-MCNC: 28 MG/DL (ref 6–23)
BUN BLDV-MCNC: 29 MG/DL (ref 6–23)
BUN BLDV-MCNC: 30 MG/DL (ref 6–23)
BUN BLDV-MCNC: 30 MG/DL (ref 6–23)
BUN BLDV-MCNC: 31 MG/DL (ref 6–23)
BUN BLDV-MCNC: 33 MG/DL (ref 6–23)
BUN BLDV-MCNC: 34 MG/DL (ref 6–23)
BUN BLDV-MCNC: 34 MG/DL (ref 6–23)
BUN BLDV-MCNC: 35 MG/DL (ref 6–23)
BUN BLDV-MCNC: 37 MG/DL (ref 6–23)
BUN BLDV-MCNC: 39 MG/DL (ref 6–23)
BUN BLDV-MCNC: 39 MG/DL (ref 6–23)
BUN BLDV-MCNC: 50 MG/DL (ref 6–23)
BUN BLDV-MCNC: 54 MG/DL (ref 6–23)
BUN BLDV-MCNC: 67 MG/DL (ref 6–23)
BUN BLDV-MCNC: 83 MG/DL (ref 6–23)
BUN BLDV-MCNC: 9 MG/DL (ref 6–23)
C DIFF AG + TOXIN: NORMAL
CALCIUM SERPL-MCNC: 6.9 MG/DL (ref 8.3–10.6)
CALCIUM SERPL-MCNC: 7.1 MG/DL (ref 8.3–10.6)
CALCIUM SERPL-MCNC: 7.1 MG/DL (ref 8.3–10.6)
CALCIUM SERPL-MCNC: 7.3 MG/DL (ref 8.3–10.6)
CALCIUM SERPL-MCNC: 7.3 MG/DL (ref 8.3–10.6)
CALCIUM SERPL-MCNC: 7.4 MG/DL (ref 8.3–10.6)
CALCIUM SERPL-MCNC: 7.5 MG/DL (ref 8.3–10.6)
CALCIUM SERPL-MCNC: 7.5 MG/DL (ref 8.3–10.6)
CALCIUM SERPL-MCNC: 7.6 MG/DL (ref 8.3–10.6)
CALCIUM SERPL-MCNC: 7.6 MG/DL (ref 8.3–10.6)
CALCIUM SERPL-MCNC: 7.7 MG/DL (ref 8.3–10.6)
CALCIUM SERPL-MCNC: 8 MG/DL (ref 8.3–10.6)
CALCIUM SERPL-MCNC: 8.1 MG/DL (ref 8.3–10.6)
CALCIUM SERPL-MCNC: 8.1 MG/DL (ref 8.3–10.6)
CALCIUM SERPL-MCNC: 8.2 MG/DL (ref 8.3–10.6)
CALCIUM SERPL-MCNC: 8.3 MG/DL (ref 8.3–10.6)
CALCIUM SERPL-MCNC: 8.3 MG/DL (ref 8.3–10.6)
CALCIUM SERPL-MCNC: 8.4 MG/DL (ref 8.3–10.6)
CALCIUM SERPL-MCNC: 8.5 MG/DL (ref 8.3–10.6)
CALCIUM SERPL-MCNC: 8.6 MG/DL (ref 8.3–10.6)
CALCIUM SERPL-MCNC: 8.7 MG/DL (ref 8.3–10.6)
CALCIUM SERPL-MCNC: 8.8 MG/DL (ref 8.3–10.6)
CALCIUM SERPL-MCNC: 8.9 MG/DL (ref 8.3–10.6)
CALCIUM SERPL-MCNC: 8.9 MG/DL (ref 8.3–10.6)
CALCIUM SERPL-MCNC: 9 MG/DL (ref 8.3–10.6)
CALCIUM SERPL-MCNC: 9.1 MG/DL (ref 8.3–10.6)
CALCIUM SERPL-MCNC: 9.2 MG/DL (ref 8.3–10.6)
CEA: 6.1 NG/ML
CHLORIDE BLD-SCNC: 100 MMOL/L (ref 99–110)
CHLORIDE BLD-SCNC: 101 MMOL/L (ref 99–110)
CHLORIDE BLD-SCNC: 102 MMOL/L (ref 99–110)
CHLORIDE BLD-SCNC: 103 MMOL/L (ref 99–110)
CHLORIDE BLD-SCNC: 104 MMOL/L (ref 99–110)
CHLORIDE BLD-SCNC: 105 MMOL/L (ref 99–110)
CHLORIDE BLD-SCNC: 105 MMOL/L (ref 99–110)
CHLORIDE BLD-SCNC: 86 MMOL/L (ref 99–110)
CHLORIDE BLD-SCNC: 91 MMOL/L (ref 99–110)
CHLORIDE BLD-SCNC: 92 MMOL/L (ref 99–110)
CHLORIDE BLD-SCNC: 94 MMOL/L (ref 99–110)
CHLORIDE BLD-SCNC: 94 MMOL/L (ref 99–110)
CHLORIDE BLD-SCNC: 95 MMOL/L (ref 99–110)
CHLORIDE BLD-SCNC: 96 MMOL/L (ref 99–110)
CHLORIDE BLD-SCNC: 97 MMOL/L (ref 99–110)
CHLORIDE BLD-SCNC: 98 MMOL/L (ref 99–110)
CHLORIDE BLD-SCNC: 99 MMOL/L (ref 99–110)
CHOLESTEROL: 140 MG/DL
CLARITY: ABNORMAL
CLARITY: CLEAR
CO2: 12 MMOL/L (ref 21–32)
CO2: 15 MMOL/L (ref 21–32)
CO2: 16 MMOL/L (ref 21–32)
CO2: 17 MMOL/L (ref 21–32)
CO2: 18 MMOL/L (ref 21–32)
CO2: 19 MMOL/L (ref 21–32)
CO2: 20 MMOL/L (ref 21–32)
CO2: 21 MMOL/L (ref 21–32)
CO2: 22 MMOL/L (ref 21–32)
CO2: 23 MMOL/L (ref 21–32)
CO2: 24 MMOL/L (ref 21–32)
CO2: 25 MMOL/L (ref 21–32)
CO2: 27 MMOL/L (ref 21–32)
COLOR: YELLOW
COMPLEMENT C3: 155 MG/DL (ref 90–180)
COMPLEMENT C4: 31 MG/DL (ref 10–40)
COMPONENT: NORMAL
CREAT SERPL-MCNC: 1 MG/DL (ref 0.9–1.3)
CREAT SERPL-MCNC: 1.1 MG/DL (ref 0.9–1.3)
CREAT SERPL-MCNC: 1.2 MG/DL (ref 0.8–1.3)
CREAT SERPL-MCNC: 1.2 MG/DL (ref 0.9–1.3)
CREAT SERPL-MCNC: 1.3 MG/DL (ref 0.9–1.3)
CREAT SERPL-MCNC: 1.4 MG/DL (ref 0.9–1.3)
CREAT SERPL-MCNC: 1.5 MG/DL (ref 0.9–1.3)
CREAT SERPL-MCNC: 1.6 MG/DL (ref 0.9–1.3)
CREAT SERPL-MCNC: 1.8 MG/DL (ref 0.9–1.3)
CREAT SERPL-MCNC: 1.8 MG/DL (ref 0.9–1.3)
CREAT SERPL-MCNC: 2.8 MG/DL (ref 0.9–1.3)
CREAT SERPL-MCNC: 3.2 MG/DL (ref 0.9–1.3)
CREAT SERPL-MCNC: 3.5 MG/DL (ref 0.9–1.3)
CREAT SERPL-MCNC: 3.8 MG/DL (ref 0.9–1.3)
CREAT SERPL-MCNC: 3.9 MG/DL (ref 0.9–1.3)
CREAT SERPL-MCNC: 4 MG/DL (ref 0.9–1.3)
CREAT SERPL-MCNC: 5.1 MG/DL (ref 0.9–1.3)
CREAT SERPL-MCNC: 5.9 MG/DL (ref 0.9–1.3)
CREAT SERPL-MCNC: 7.2 MG/DL (ref 0.9–1.3)
CREATININE URINE: 20.4 MG/DL (ref 39–259)
CROSSMATCH RESULT: NORMAL
CULTURE: ABNORMAL
CULTURE: NORMAL
DIFFERENTIAL TYPE: ABNORMAL
DIGOXIN LEVEL: 1.1 NG/ML (ref 0.8–2)
DIGOXIN LEVEL: 1.6 NG/ML (ref 0.8–2)
DIGOXIN LEVEL: 2.1 NG/ML (ref 0.8–2)
DIGOXIN LEVEL: 2.8 NG/ML (ref 0.8–2)
DIGOXIN LEVEL: 3 NG/ML (ref 0.8–2)
DOHLE BODIES: PRESENT
DOHLE BODIES: PRESENT
DOSE AMOUNT: ABNORMAL
DOSE AMOUNT: NORMAL
DOSE TIME: ABNORMAL
DOSE TIME: NORMAL
EKG ATRIAL RATE: 108 BPM
EKG ATRIAL RATE: 127 BPM
EKG ATRIAL RATE: 138 BPM
EKG ATRIAL RATE: 159 BPM
EKG ATRIAL RATE: 174 BPM
EKG ATRIAL RATE: 197 BPM
EKG ATRIAL RATE: 76 BPM
EKG DIAGNOSIS: NORMAL
EKG Q-T INTERVAL: 260 MS
EKG Q-T INTERVAL: 290 MS
EKG Q-T INTERVAL: 292 MS
EKG Q-T INTERVAL: 332 MS
EKG Q-T INTERVAL: 342 MS
EKG Q-T INTERVAL: 364 MS
EKG Q-T INTERVAL: 372 MS
EKG QRS DURATION: 74 MS
EKG QRS DURATION: 76 MS
EKG QRS DURATION: 78 MS
EKG QRS DURATION: 82 MS
EKG QTC CALCULATION (BAZETT): 401 MS
EKG QTC CALCULATION (BAZETT): 411 MS
EKG QTC CALCULATION (BAZETT): 426 MS
EKG QTC CALCULATION (BAZETT): 438 MS
EKG QTC CALCULATION (BAZETT): 445 MS
EKG QTC CALCULATION (BAZETT): 450 MS
EKG QTC CALCULATION (BAZETT): 471 MS
EKG R AXIS: -11 DEGREES
EKG R AXIS: -16 DEGREES
EKG R AXIS: -4 DEGREES
EKG R AXIS: 0 DEGREES
EKG R AXIS: 10 DEGREES
EKG R AXIS: 5 DEGREES
EKG R AXIS: 6 DEGREES
EKG T AXIS: 11 DEGREES
EKG T AXIS: 20 DEGREES
EKG T AXIS: 25 DEGREES
EKG T AXIS: 42 DEGREES
EKG T AXIS: 43 DEGREES
EKG T AXIS: 46 DEGREES
EKG T AXIS: 57 DEGREES
EKG VENTRICULAR RATE: 121 BPM
EKG VENTRICULAR RATE: 121 BPM
EKG VENTRICULAR RATE: 143 BPM
EKG VENTRICULAR RATE: 162 BPM
EKG VENTRICULAR RATE: 83 BPM
EKG VENTRICULAR RATE: 86 BPM
EKG VENTRICULAR RATE: 87 BPM
EOSINOPHIL, URINE: POSITIVE /HPF
EOSINOPHILS ABSOLUTE: 0 K/CU MM
EOSINOPHILS ABSOLUTE: 0 K/UL (ref 0–0.6)
EOSINOPHILS ABSOLUTE: 0.1 K/CU MM
EOSINOPHILS ABSOLUTE: 0.2 K/CU MM
EOSINOPHILS ABSOLUTE: 0.3 K/CU MM
EOSINOPHILS ABSOLUTE: 0.4 K/CU MM
EOSINOPHILS ABSOLUTE: 0.5 K/CU MM
EOSINOPHILS RELATIVE PERCENT: 0 % (ref 0–3)
EOSINOPHILS RELATIVE PERCENT: 0.1 % (ref 0–3)
EOSINOPHILS RELATIVE PERCENT: 0.2 % (ref 0–3)
EOSINOPHILS RELATIVE PERCENT: 0.6 %
EOSINOPHILS RELATIVE PERCENT: 0.6 % (ref 0–3)
EOSINOPHILS RELATIVE PERCENT: 0.6 % (ref 0–3)
EOSINOPHILS RELATIVE PERCENT: 0.7 % (ref 0–3)
EOSINOPHILS RELATIVE PERCENT: 0.9 % (ref 0–3)
EOSINOPHILS RELATIVE PERCENT: 1 % (ref 0–3)
EOSINOPHILS RELATIVE PERCENT: 1.1 % (ref 0–3)
EOSINOPHILS RELATIVE PERCENT: 1.1 % (ref 0–3)
EOSINOPHILS RELATIVE PERCENT: 1.2 % (ref 0–3)
EOSINOPHILS RELATIVE PERCENT: 1.3 % (ref 0–3)
EOSINOPHILS RELATIVE PERCENT: 1.4 % (ref 0–3)
EOSINOPHILS RELATIVE PERCENT: 1.5 % (ref 0–3)
EOSINOPHILS RELATIVE PERCENT: 1.7 % (ref 0–3)
EOSINOPHILS RELATIVE PERCENT: 1.8 % (ref 0–3)
EOSINOPHILS RELATIVE PERCENT: 1.8 % (ref 0–3)
EOSINOPHILS RELATIVE PERCENT: 2 % (ref 0–3)
EOSINOPHILS RELATIVE PERCENT: 2.2 % (ref 0–3)
EOSINOPHILS RELATIVE PERCENT: 2.6 % (ref 0–3)
EOSINOPHILS RELATIVE PERCENT: 2.7 % (ref 0–3)
EOSINOPHILS RELATIVE PERCENT: 2.8 % (ref 0–3)
EOSINOPHILS RELATIVE PERCENT: 3.2 % (ref 0–3)
EOSINOPHILS RELATIVE PERCENT: 3.6 % (ref 0–3)
EOSINOPHILS RELATIVE PERCENT: 3.9 % (ref 0–3)
EOSINOPHILS RELATIVE PERCENT: 5 % (ref 0–3)
ESTIMATED AVERAGE GLUCOSE: 103 MG/DL
FERRITIN: 210 NG/ML (ref 30–400)
FOLATE: >20 NG/ML (ref 3.1–17.5)
GFR AFRICAN AMERICAN: 11 ML/MIN/1.73M2
GFR AFRICAN AMERICAN: 13 ML/MIN/1.73M2
GFR AFRICAN AMERICAN: 17 ML/MIN/1.73M2
GFR AFRICAN AMERICAN: 18 ML/MIN/1.73M2
GFR AFRICAN AMERICAN: 18 ML/MIN/1.73M2
GFR AFRICAN AMERICAN: 20 ML/MIN/1.73M2
GFR AFRICAN AMERICAN: 22 ML/MIN/1.73M2
GFR AFRICAN AMERICAN: 26 ML/MIN/1.73M2
GFR AFRICAN AMERICAN: 43 ML/MIN/1.73M2
GFR AFRICAN AMERICAN: 43 ML/MIN/1.73M2
GFR AFRICAN AMERICAN: 49 ML/MIN/1.73M2
GFR AFRICAN AMERICAN: 53 ML/MIN/1.73M2
GFR AFRICAN AMERICAN: 57 ML/MIN/1.73M2
GFR AFRICAN AMERICAN: 9 ML/MIN/1.73M2
GFR AFRICAN AMERICAN: >60
GFR AFRICAN AMERICAN: >60 ML/MIN/1.73M2
GFR NON-AFRICAN AMERICAN: 11 ML/MIN/1.73M2
GFR NON-AFRICAN AMERICAN: 14 ML/MIN/1.73M2
GFR NON-AFRICAN AMERICAN: 15 ML/MIN/1.73M2
GFR NON-AFRICAN AMERICAN: 15 ML/MIN/1.73M2
GFR NON-AFRICAN AMERICAN: 16 ML/MIN/1.73M2
GFR NON-AFRICAN AMERICAN: 18 ML/MIN/1.73M2
GFR NON-AFRICAN AMERICAN: 21 ML/MIN/1.73M2
GFR NON-AFRICAN AMERICAN: 35 ML/MIN/1.73M2
GFR NON-AFRICAN AMERICAN: 35 ML/MIN/1.73M2
GFR NON-AFRICAN AMERICAN: 41 ML/MIN/1.73M2
GFR NON-AFRICAN AMERICAN: 44 ML/MIN/1.73M2
GFR NON-AFRICAN AMERICAN: 47 ML/MIN/1.73M2
GFR NON-AFRICAN AMERICAN: 52 ML/MIN/1.73M2
GFR NON-AFRICAN AMERICAN: 57
GFR NON-AFRICAN AMERICAN: 57 ML/MIN/1.73M2
GFR NON-AFRICAN AMERICAN: 7 ML/MIN/1.73M2
GFR NON-AFRICAN AMERICAN: 9 ML/MIN/1.73M2
GFR NON-AFRICAN AMERICAN: >60 ML/MIN/1.73M2
GLUCOSE BLD-MCNC: 100 MG/DL (ref 70–99)
GLUCOSE BLD-MCNC: 101 MG/DL (ref 70–99)
GLUCOSE BLD-MCNC: 101 MG/DL (ref 70–99)
GLUCOSE BLD-MCNC: 102 MG/DL (ref 70–99)
GLUCOSE BLD-MCNC: 102 MG/DL (ref 70–99)
GLUCOSE BLD-MCNC: 104 MG/DL (ref 70–99)
GLUCOSE BLD-MCNC: 104 MG/DL (ref 70–99)
GLUCOSE BLD-MCNC: 105 MG/DL (ref 70–99)
GLUCOSE BLD-MCNC: 106 MG/DL (ref 70–99)
GLUCOSE BLD-MCNC: 107 MG/DL (ref 70–99)
GLUCOSE BLD-MCNC: 108 MG/DL (ref 70–99)
GLUCOSE BLD-MCNC: 108 MG/DL (ref 70–99)
GLUCOSE BLD-MCNC: 109 MG/DL (ref 70–99)
GLUCOSE BLD-MCNC: 109 MG/DL (ref 70–99)
GLUCOSE BLD-MCNC: 110 MG/DL (ref 70–99)
GLUCOSE BLD-MCNC: 111 MG/DL (ref 70–99)
GLUCOSE BLD-MCNC: 111 MG/DL (ref 70–99)
GLUCOSE BLD-MCNC: 112 MG/DL (ref 70–99)
GLUCOSE BLD-MCNC: 113 MG/DL (ref 70–99)
GLUCOSE BLD-MCNC: 114 MG/DL (ref 70–99)
GLUCOSE BLD-MCNC: 115 MG/DL (ref 70–99)
GLUCOSE BLD-MCNC: 116 MG/DL (ref 70–99)
GLUCOSE BLD-MCNC: 117 MG/DL (ref 70–99)
GLUCOSE BLD-MCNC: 120 MG/DL (ref 70–99)
GLUCOSE BLD-MCNC: 120 MG/DL (ref 70–99)
GLUCOSE BLD-MCNC: 121 MG/DL (ref 70–99)
GLUCOSE BLD-MCNC: 121 MG/DL (ref 70–99)
GLUCOSE BLD-MCNC: 122 MG/DL (ref 70–99)
GLUCOSE BLD-MCNC: 123 MG/DL (ref 70–99)
GLUCOSE BLD-MCNC: 124 MG/DL (ref 70–99)
GLUCOSE BLD-MCNC: 125 MG/DL (ref 70–99)
GLUCOSE BLD-MCNC: 126 MG/DL (ref 70–99)
GLUCOSE BLD-MCNC: 126 MG/DL (ref 70–99)
GLUCOSE BLD-MCNC: 127 MG/DL (ref 70–99)
GLUCOSE BLD-MCNC: 128 MG/DL (ref 70–99)
GLUCOSE BLD-MCNC: 128 MG/DL (ref 70–99)
GLUCOSE BLD-MCNC: 129 MG/DL (ref 70–99)
GLUCOSE BLD-MCNC: 130 MG/DL (ref 70–99)
GLUCOSE BLD-MCNC: 130 MG/DL (ref 70–99)
GLUCOSE BLD-MCNC: 131 MG/DL (ref 70–99)
GLUCOSE BLD-MCNC: 133 MG/DL (ref 70–99)
GLUCOSE BLD-MCNC: 133 MG/DL (ref 70–99)
GLUCOSE BLD-MCNC: 134 MG/DL (ref 70–99)
GLUCOSE BLD-MCNC: 136 MG/DL (ref 70–99)
GLUCOSE BLD-MCNC: 137 MG/DL (ref 70–99)
GLUCOSE BLD-MCNC: 138 MG/DL (ref 70–99)
GLUCOSE BLD-MCNC: 139 MG/DL (ref 70–99)
GLUCOSE BLD-MCNC: 140 MG/DL (ref 70–99)
GLUCOSE BLD-MCNC: 140 MG/DL (ref 70–99)
GLUCOSE BLD-MCNC: 141 MG/DL (ref 70–99)
GLUCOSE BLD-MCNC: 142 MG/DL (ref 70–99)
GLUCOSE BLD-MCNC: 142 MG/DL (ref 70–99)
GLUCOSE BLD-MCNC: 145 MG/DL (ref 70–99)
GLUCOSE BLD-MCNC: 146 MG/DL (ref 70–99)
GLUCOSE BLD-MCNC: 146 MG/DL (ref 70–99)
GLUCOSE BLD-MCNC: 147 MG/DL (ref 70–99)
GLUCOSE BLD-MCNC: 149 MG/DL (ref 70–99)
GLUCOSE BLD-MCNC: 150 MG/DL (ref 70–99)
GLUCOSE BLD-MCNC: 150 MG/DL (ref 70–99)
GLUCOSE BLD-MCNC: 152 MG/DL (ref 70–99)
GLUCOSE BLD-MCNC: 154 MG/DL (ref 70–99)
GLUCOSE BLD-MCNC: 156 MG/DL (ref 70–99)
GLUCOSE BLD-MCNC: 156 MG/DL (ref 70–99)
GLUCOSE BLD-MCNC: 158 MG/DL (ref 70–99)
GLUCOSE BLD-MCNC: 160 MG/DL (ref 70–99)
GLUCOSE BLD-MCNC: 170 MG/DL (ref 70–99)
GLUCOSE BLD-MCNC: 172 MG/DL (ref 70–99)
GLUCOSE BLD-MCNC: 175 MG/DL (ref 70–99)
GLUCOSE BLD-MCNC: 183 MG/DL (ref 70–99)
GLUCOSE BLD-MCNC: 199 MG/DL (ref 70–99)
GLUCOSE BLD-MCNC: 216 MG/DL (ref 70–99)
GLUCOSE BLD-MCNC: 229 MG/DL (ref 70–99)
GLUCOSE BLD-MCNC: 230 MG/DL (ref 70–99)
GLUCOSE BLD-MCNC: 245 MG/DL (ref 70–99)
GLUCOSE BLD-MCNC: 50 MG/DL (ref 70–99)
GLUCOSE BLD-MCNC: 78 MG/DL (ref 70–99)
GLUCOSE BLD-MCNC: 87 MG/DL (ref 70–99)
GLUCOSE BLD-MCNC: 88 MG/DL (ref 70–99)
GLUCOSE BLD-MCNC: 89 MG/DL (ref 70–99)
GLUCOSE BLD-MCNC: 90 MG/DL (ref 70–99)
GLUCOSE BLD-MCNC: 95 MG/DL (ref 70–99)
GLUCOSE BLD-MCNC: 95 MG/DL (ref 70–99)
GLUCOSE BLD-MCNC: 96 MG/DL (ref 70–99)
GLUCOSE BLD-MCNC: 97 MG/DL (ref 70–99)
GLUCOSE BLD-MCNC: 98 MG/DL (ref 70–99)
GLUCOSE BLD-MCNC: 98 MG/DL (ref 70–99)
GLUCOSE BLD-MCNC: 99 MG/DL (ref 70–99)
GLUCOSE, URINE: NEGATIVE MG/DL
GRAM SMEAR: ABNORMAL
GRAM SMEAR: NORMAL
GRAM SMEAR: NORMAL
GRANULAR CASTS: 3 /LPF
HBA1C MFR BLD: 5.2 % (ref 4.2–6.3)
HCT VFR BLD CALC: 22.1 % (ref 42–52)
HCT VFR BLD CALC: 22.8 % (ref 42–52)
HCT VFR BLD CALC: 23.6 % (ref 42–52)
HCT VFR BLD CALC: 24.7 % (ref 42–52)
HCT VFR BLD CALC: 25.8 % (ref 42–52)
HCT VFR BLD CALC: 25.8 % (ref 42–52)
HCT VFR BLD CALC: 25.9 % (ref 42–52)
HCT VFR BLD CALC: 26.1 % (ref 42–52)
HCT VFR BLD CALC: 26.2 % (ref 42–52)
HCT VFR BLD CALC: 26.3 % (ref 42–52)
HCT VFR BLD CALC: 26.4 % (ref 42–52)
HCT VFR BLD CALC: 26.4 % (ref 42–52)
HCT VFR BLD CALC: 26.5 % (ref 42–52)
HCT VFR BLD CALC: 26.7 % (ref 42–52)
HCT VFR BLD CALC: 26.7 % (ref 42–52)
HCT VFR BLD CALC: 26.9 % (ref 42–52)
HCT VFR BLD CALC: 27 % (ref 42–52)
HCT VFR BLD CALC: 27.3 % (ref 42–52)
HCT VFR BLD CALC: 27.5 % (ref 42–52)
HCT VFR BLD CALC: 27.8 % (ref 42–52)
HCT VFR BLD CALC: 27.9 % (ref 42–52)
HCT VFR BLD CALC: 28.1 % (ref 42–52)
HCT VFR BLD CALC: 28.2 % (ref 42–52)
HCT VFR BLD CALC: 28.5 % (ref 42–52)
HCT VFR BLD CALC: 28.5 % (ref 42–52)
HCT VFR BLD CALC: 28.6 % (ref 42–52)
HCT VFR BLD CALC: 28.8 % (ref 42–52)
HCT VFR BLD CALC: 28.9 % (ref 42–52)
HCT VFR BLD CALC: 29 % (ref 42–52)
HCT VFR BLD CALC: 29 % (ref 42–52)
HCT VFR BLD CALC: 29.1 % (ref 42–52)
HCT VFR BLD CALC: 29.2 % (ref 42–52)
HCT VFR BLD CALC: 29.5 % (ref 42–52)
HCT VFR BLD CALC: 29.7 % (ref 42–52)
HCT VFR BLD CALC: 29.7 % (ref 42–52)
HCT VFR BLD CALC: 29.8 % (ref 40.5–52.5)
HCT VFR BLD CALC: 29.9 % (ref 42–52)
HCT VFR BLD CALC: 30.3 % (ref 42–52)
HCT VFR BLD CALC: 30.5 % (ref 42–52)
HCT VFR BLD CALC: 30.9 % (ref 42–52)
HCT VFR BLD CALC: 30.9 % (ref 42–52)
HCT VFR BLD CALC: 31 % (ref 42–52)
HCT VFR BLD CALC: 31.3 % (ref 42–52)
HCT VFR BLD CALC: 31.3 % (ref 42–52)
HCT VFR BLD CALC: 32 % (ref 42–52)
HCT VFR BLD CALC: 32.2 % (ref 42–52)
HCT VFR BLD CALC: 32.2 % (ref 42–52)
HCT VFR BLD CALC: 33.2 % (ref 42–52)
HCT VFR BLD CALC: 33.3 % (ref 42–52)
HCT VFR BLD CALC: 33.4 % (ref 42–52)
HCT VFR BLD CALC: 34.6 % (ref 42–52)
HCT VFR BLD CALC: 36.3 % (ref 42–52)
HCT VFR BLD CALC: 39 % (ref 42–52)
HDLC SERPL-MCNC: 53 MG/DL
HEMATOLOGY PATH CONSULT: NORMAL
HEMATOLOGY PATH CONSULT: YES
HEMOCCULT STL QL: POSITIVE
HEMOGLOBIN: 10.1 GM/DL (ref 13.5–18)
HEMOGLOBIN: 10.2 GM/DL (ref 13.5–18)
HEMOGLOBIN: 10.4 GM/DL (ref 13.5–18)
HEMOGLOBIN: 10.5 GM/DL (ref 13.5–18)
HEMOGLOBIN: 10.5 GM/DL (ref 13.5–18)
HEMOGLOBIN: 10.8 GM/DL (ref 13.5–18)
HEMOGLOBIN: 11.3 GM/DL (ref 13.5–18)
HEMOGLOBIN: 12 GM/DL (ref 13.5–18)
HEMOGLOBIN: 6.7 GM/DL (ref 13.5–18)
HEMOGLOBIN: 7.1 GM/DL (ref 13.5–18)
HEMOGLOBIN: 7.4 GM/DL (ref 13.5–18)
HEMOGLOBIN: 7.8 GM/DL (ref 13.5–18)
HEMOGLOBIN: 7.9 GM/DL (ref 13.5–18)
HEMOGLOBIN: 8 GM/DL (ref 13.5–18)
HEMOGLOBIN: 8.1 GM/DL (ref 13.5–18)
HEMOGLOBIN: 8.2 GM/DL (ref 13.5–18)
HEMOGLOBIN: 8.4 GM/DL (ref 13.5–18)
HEMOGLOBIN: 8.4 GM/DL (ref 13.5–18)
HEMOGLOBIN: 8.7 GM/DL (ref 13.5–18)
HEMOGLOBIN: 8.8 GM/DL (ref 13.5–18)
HEMOGLOBIN: 8.9 GM/DL (ref 13.5–18)
HEMOGLOBIN: 9 GM/DL (ref 13.5–18)
HEMOGLOBIN: 9 GM/DL (ref 13.5–18)
HEMOGLOBIN: 9.1 GM/DL (ref 13.5–18)
HEMOGLOBIN: 9.1 GM/DL (ref 13.5–18)
HEMOGLOBIN: 9.2 GM/DL (ref 13.5–18)
HEMOGLOBIN: 9.3 GM/DL (ref 13.5–18)
HEMOGLOBIN: 9.4 GM/DL (ref 13.5–18)
HEMOGLOBIN: 9.5 GM/DL (ref 13.5–18)
HEMOGLOBIN: 9.5 GM/DL (ref 13.5–18)
HEMOGLOBIN: 9.6 GM/DL (ref 13.5–18)
HEMOGLOBIN: 9.6 GM/DL (ref 13.5–18)
HEMOGLOBIN: 9.7 GM/DL (ref 13.5–18)
HEMOGLOBIN: 9.8 GM/DL (ref 13.5–18)
HEMOGLOBIN: 9.8 GM/DL (ref 13.5–18)
HEMOGLOBIN: 9.9 G/DL (ref 13.5–17.5)
HEMOGLOBIN: 9.9 GM/DL (ref 13.5–18)
HIGH SENSITIVE C-REACTIVE PROTEIN: 104 MG/L
HIGH SENSITIVE C-REACTIVE PROTEIN: 106 MG/L
HIGH SENSITIVE C-REACTIVE PROTEIN: 106.4 MG/L
HIGH SENSITIVE C-REACTIVE PROTEIN: 108.9 MG/L
HIGH SENSITIVE C-REACTIVE PROTEIN: 109.4 MG/L
HIGH SENSITIVE C-REACTIVE PROTEIN: 11.8 MG/L
HIGH SENSITIVE C-REACTIVE PROTEIN: 110.7 MG/L
HIGH SENSITIVE C-REACTIVE PROTEIN: 112.6 MG/L
HIGH SENSITIVE C-REACTIVE PROTEIN: 119.5 MG/L
HIGH SENSITIVE C-REACTIVE PROTEIN: 12.8 MG/L
HIGH SENSITIVE C-REACTIVE PROTEIN: 131.7 MG/L
HIGH SENSITIVE C-REACTIVE PROTEIN: 146.8 MG/L
HIGH SENSITIVE C-REACTIVE PROTEIN: 15.4 MG/L
HIGH SENSITIVE C-REACTIVE PROTEIN: 17.4 MG/L
HIGH SENSITIVE C-REACTIVE PROTEIN: 17.9 MG/L
HIGH SENSITIVE C-REACTIVE PROTEIN: 189.1 MG/L
HIGH SENSITIVE C-REACTIVE PROTEIN: 199.1 MG/L
HIGH SENSITIVE C-REACTIVE PROTEIN: 27 MG/L
HIGH SENSITIVE C-REACTIVE PROTEIN: 29.9 MG/L
HIGH SENSITIVE C-REACTIVE PROTEIN: 33 MG/L
HIGH SENSITIVE C-REACTIVE PROTEIN: 42.5 MG/L
HIGH SENSITIVE C-REACTIVE PROTEIN: 63 MG/L
HIGH SENSITIVE C-REACTIVE PROTEIN: 67 MG/L
HIGH SENSITIVE C-REACTIVE PROTEIN: 87.1 MG/L
HIGH SENSITIVE C-REACTIVE PROTEIN: 98.7 MG/L
IMMATURE NEUTROPHIL %: 0.2 % (ref 0–0.43)
IMMATURE NEUTROPHIL %: 0.3 % (ref 0–0.43)
IMMATURE NEUTROPHIL %: 0.4 % (ref 0–0.43)
IMMATURE NEUTROPHIL %: 0.5 % (ref 0–0.43)
IMMATURE NEUTROPHIL %: 0.5 % (ref 0–0.43)
IMMATURE NEUTROPHIL %: 0.7 % (ref 0–0.43)
IMMATURE NEUTROPHIL %: 0.8 % (ref 0–0.43)
IMMATURE NEUTROPHIL %: 0.8 % (ref 0–0.43)
IMMATURE NEUTROPHIL %: 1 % (ref 0–0.43)
IMMATURE NEUTROPHIL %: 1.1 % (ref 0–0.43)
IMMATURE NEUTROPHIL %: 1.2 % (ref 0–0.43)
IMMATURE NEUTROPHIL %: 1.2 % (ref 0–0.43)
IMMATURE NEUTROPHIL %: 1.3 % (ref 0–0.43)
IMMATURE NEUTROPHIL %: 1.5 % (ref 0–0.43)
IMMATURE NEUTROPHIL %: 1.6 % (ref 0–0.43)
IMMATURE NEUTROPHIL %: 1.7 % (ref 0–0.43)
IMMATURE NEUTROPHIL %: 1.8 % (ref 0–0.43)
IMMATURE NEUTROPHIL %: 2 % (ref 0–0.43)
IMMATURE NEUTROPHIL %: 2 % (ref 0–0.43)
IMMATURE NEUTROPHIL %: 2.2 % (ref 0–0.43)
IMMATURE NEUTROPHIL %: 2.3 % (ref 0–0.43)
IMMATURE NEUTROPHIL %: 2.4 % (ref 0–0.43)
IMMATURE NEUTROPHIL %: 2.4 % (ref 0–0.43)
IMMATURE NEUTROPHIL %: 2.8 % (ref 0–0.43)
IMMATURE NEUTROPHIL %: 2.9 % (ref 0–0.43)
INR BLD: 1.09 INDEX
INR BLD: 1.16 INDEX
INR BLD: 1.21 INDEX
INR BLD: 1.25 INDEX
INR BLD: 1.36 INDEX
INR BLD: 1.39 INDEX
INR BLD: 1.51 INDEX
IRON: 35 UG/DL (ref 59–158)
KETONES, URINE: ABNORMAL MG/DL
KETONES, URINE: NEGATIVE MG/DL
LACTATE DEHYDROGENASE, FLUID: 90 IU/L
LACTATE DEHYDROGENASE, FLUID: 94 IU/L
LACTATE DEHYDROGENASE: 198 IU/L (ref 120–246)
LACTATE: 1.1 MMOL/L (ref 0.4–2)
LACTATE: 1.2 MMOL/L (ref 0.4–2)
LACTATE: 1.2 MMOL/L (ref 0.4–2)
LACTATE: 1.5 MMOL/L (ref 0.4–2)
LACTIC ACID, SEPSIS: 1 MMOL/L (ref 0.5–1.9)
LDL CHOLESTEROL CALCULATED: 63 MG/DL
LEUKOCYTE ESTERASE, URINE: ABNORMAL
LEUKOCYTE ESTERASE, URINE: NEGATIVE
LIPASE: 11 IU/L (ref 13–60)
LIPASE: 15 IU/L (ref 13–60)
LIPASE: 27 IU/L (ref 13–60)
LIPASE: 28 IU/L (ref 13–60)
LYMPHOCYTES ABSOLUTE: 0.3 K/CU MM
LYMPHOCYTES ABSOLUTE: 0.3 K/CU MM
LYMPHOCYTES ABSOLUTE: 0.4 K/CU MM
LYMPHOCYTES ABSOLUTE: 0.5 K/CU MM
LYMPHOCYTES ABSOLUTE: 0.6 K/CU MM
LYMPHOCYTES ABSOLUTE: 0.7 K/CU MM
LYMPHOCYTES ABSOLUTE: 0.7 K/UL (ref 1–5.1)
LYMPHOCYTES ABSOLUTE: 0.8 K/CU MM
LYMPHOCYTES ABSOLUTE: 0.9 K/CU MM
LYMPHOCYTES ABSOLUTE: 1 K/CU MM
LYMPHOCYTES ABSOLUTE: 1.1 K/CU MM
LYMPHOCYTES ABSOLUTE: 1.2 K/CU MM
LYMPHOCYTES ABSOLUTE: 1.3 K/CU MM
LYMPHOCYTES ABSOLUTE: 1.4 K/CU MM
LYMPHOCYTES ABSOLUTE: 1.5 K/CU MM
LYMPHOCYTES RELATIVE PERCENT: 1 % (ref 24–44)
LYMPHOCYTES RELATIVE PERCENT: 1 % (ref 24–44)
LYMPHOCYTES RELATIVE PERCENT: 1.7 % (ref 24–44)
LYMPHOCYTES RELATIVE PERCENT: 10 % (ref 24–44)
LYMPHOCYTES RELATIVE PERCENT: 10.3 % (ref 24–44)
LYMPHOCYTES RELATIVE PERCENT: 10.5 % (ref 24–44)
LYMPHOCYTES RELATIVE PERCENT: 11.9 % (ref 24–44)
LYMPHOCYTES RELATIVE PERCENT: 12.2 % (ref 24–44)
LYMPHOCYTES RELATIVE PERCENT: 13.4 % (ref 24–44)
LYMPHOCYTES RELATIVE PERCENT: 14.4 % (ref 24–44)
LYMPHOCYTES RELATIVE PERCENT: 15.6 %
LYMPHOCYTES RELATIVE PERCENT: 16.7 % (ref 24–44)
LYMPHOCYTES RELATIVE PERCENT: 18.1 % (ref 24–44)
LYMPHOCYTES RELATIVE PERCENT: 18.4 % (ref 24–44)
LYMPHOCYTES RELATIVE PERCENT: 2 % (ref 24–44)
LYMPHOCYTES RELATIVE PERCENT: 2.9 % (ref 24–44)
LYMPHOCYTES RELATIVE PERCENT: 2.9 % (ref 24–44)
LYMPHOCYTES RELATIVE PERCENT: 3 % (ref 24–44)
LYMPHOCYTES RELATIVE PERCENT: 3 % (ref 24–44)
LYMPHOCYTES RELATIVE PERCENT: 3.5 % (ref 24–44)
LYMPHOCYTES RELATIVE PERCENT: 3.9 % (ref 24–44)
LYMPHOCYTES RELATIVE PERCENT: 4.5 % (ref 24–44)
LYMPHOCYTES RELATIVE PERCENT: 4.5 % (ref 24–44)
LYMPHOCYTES RELATIVE PERCENT: 4.6 % (ref 24–44)
LYMPHOCYTES RELATIVE PERCENT: 5 % (ref 24–44)
LYMPHOCYTES RELATIVE PERCENT: 5 % (ref 24–44)
LYMPHOCYTES RELATIVE PERCENT: 5.2 % (ref 24–44)
LYMPHOCYTES RELATIVE PERCENT: 5.6 % (ref 24–44)
LYMPHOCYTES RELATIVE PERCENT: 5.8 % (ref 24–44)
LYMPHOCYTES RELATIVE PERCENT: 6 % (ref 24–44)
LYMPHOCYTES RELATIVE PERCENT: 6 % (ref 24–44)
LYMPHOCYTES RELATIVE PERCENT: 6.8 % (ref 24–44)
LYMPHOCYTES RELATIVE PERCENT: 7.2 % (ref 24–44)
LYMPHOCYTES RELATIVE PERCENT: 7.9 % (ref 24–44)
LYMPHOCYTES RELATIVE PERCENT: 8 % (ref 24–44)
LYMPHOCYTES RELATIVE PERCENT: 8 % (ref 24–44)
LYMPHOCYTES RELATIVE PERCENT: 8.4 % (ref 24–44)
LYMPHOCYTES RELATIVE PERCENT: 8.5 % (ref 24–44)
Lab: ABNORMAL
Lab: NORMAL
MACROCYTES: ABNORMAL
MAGNESIUM: 1.6 MG/DL (ref 1.8–2.4)
MAGNESIUM: 1.7 MG/DL (ref 1.8–2.4)
MAGNESIUM: 1.8 MG/DL (ref 1.8–2.4)
MAGNESIUM: 1.9 MG/DL (ref 1.8–2.4)
MAGNESIUM: 2 MG/DL (ref 1.8–2.4)
MAGNESIUM: 2.1 MG/DL (ref 1.8–2.4)
MAGNESIUM: 2.1 MG/DL (ref 1.8–2.4)
MAGNESIUM: 2.2 MG/DL (ref 1.8–2.4)
MAGNESIUM: 2.3 MG/DL (ref 1.8–2.4)
MCH RBC QN AUTO: 30 PG (ref 27–31)
MCH RBC QN AUTO: 30.7 PG (ref 27–31)
MCH RBC QN AUTO: 30.7 PG (ref 27–31)
MCH RBC QN AUTO: 30.8 PG (ref 27–31)
MCH RBC QN AUTO: 30.8 PG (ref 27–31)
MCH RBC QN AUTO: 30.9 PG (ref 27–31)
MCH RBC QN AUTO: 30.9 PG (ref 27–31)
MCH RBC QN AUTO: 31 PG (ref 27–31)
MCH RBC QN AUTO: 31.1 PG (ref 27–31)
MCH RBC QN AUTO: 31.5 PG (ref 27–31)
MCH RBC QN AUTO: 31.6 PG (ref 27–31)
MCH RBC QN AUTO: 32.4 PG (ref 27–31)
MCH RBC QN AUTO: 32.5 PG (ref 27–31)
MCH RBC QN AUTO: 32.8 PG (ref 27–31)
MCH RBC QN AUTO: 33.1 PG (ref 27–31)
MCH RBC QN AUTO: 33.1 PG (ref 27–31)
MCH RBC QN AUTO: 33.3 PG (ref 27–31)
MCH RBC QN AUTO: 33.3 PG (ref 27–31)
MCH RBC QN AUTO: 33.5 PG (ref 27–31)
MCH RBC QN AUTO: 33.6 PG (ref 27–31)
MCH RBC QN AUTO: 33.7 PG (ref 27–31)
MCH RBC QN AUTO: 33.9 PG (ref 27–31)
MCH RBC QN AUTO: 34 PG (ref 27–31)
MCH RBC QN AUTO: 34.3 PG (ref 27–31)
MCH RBC QN AUTO: 34.6 PG (ref 27–31)
MCH RBC QN AUTO: 34.9 PG (ref 27–31)
MCH RBC QN AUTO: 35 PG (ref 27–31)
MCH RBC QN AUTO: 35.3 PG (ref 27–31)
MCH RBC QN AUTO: 36.2 PG (ref 27–31)
MCH RBC QN AUTO: 36.7 PG (ref 27–31)
MCH RBC QN AUTO: 36.8 PG (ref 27–31)
MCH RBC QN AUTO: 37.2 PG (ref 27–31)
MCH RBC QN AUTO: 37.3 PG (ref 27–31)
MCH RBC QN AUTO: 37.6 PG (ref 27–31)
MCH RBC QN AUTO: 37.6 PG (ref 27–31)
MCH RBC QN AUTO: 37.7 PG (ref 27–31)
MCH RBC QN AUTO: 37.9 PG (ref 27–31)
MCH RBC QN AUTO: 38 PG (ref 27–31)
MCH RBC QN AUTO: 38.1 PG (ref 27–31)
MCH RBC QN AUTO: 38.3 PG (ref 27–31)
MCH RBC QN AUTO: 38.5 PG (ref 26–34)
MCH RBC QN AUTO: 38.7 PG (ref 27–31)
MCHC RBC AUTO-ENTMCNC: 28.3 % (ref 32–36)
MCHC RBC AUTO-ENTMCNC: 29.7 % (ref 32–36)
MCHC RBC AUTO-ENTMCNC: 30.2 % (ref 32–36)
MCHC RBC AUTO-ENTMCNC: 30.3 % (ref 32–36)
MCHC RBC AUTO-ENTMCNC: 30.4 % (ref 32–36)
MCHC RBC AUTO-ENTMCNC: 30.5 % (ref 32–36)
MCHC RBC AUTO-ENTMCNC: 30.5 % (ref 32–36)
MCHC RBC AUTO-ENTMCNC: 30.6 % (ref 32–36)
MCHC RBC AUTO-ENTMCNC: 30.7 % (ref 32–36)
MCHC RBC AUTO-ENTMCNC: 30.7 % (ref 32–36)
MCHC RBC AUTO-ENTMCNC: 30.8 % (ref 32–36)
MCHC RBC AUTO-ENTMCNC: 30.9 % (ref 32–36)
MCHC RBC AUTO-ENTMCNC: 30.9 % (ref 32–36)
MCHC RBC AUTO-ENTMCNC: 31 % (ref 32–36)
MCHC RBC AUTO-ENTMCNC: 31 % (ref 32–36)
MCHC RBC AUTO-ENTMCNC: 31.1 % (ref 32–36)
MCHC RBC AUTO-ENTMCNC: 31.3 % (ref 32–36)
MCHC RBC AUTO-ENTMCNC: 31.4 % (ref 32–36)
MCHC RBC AUTO-ENTMCNC: 31.4 % (ref 32–36)
MCHC RBC AUTO-ENTMCNC: 31.6 % (ref 32–36)
MCHC RBC AUTO-ENTMCNC: 31.6 % (ref 32–36)
MCHC RBC AUTO-ENTMCNC: 31.7 % (ref 32–36)
MCHC RBC AUTO-ENTMCNC: 31.8 % (ref 32–36)
MCHC RBC AUTO-ENTMCNC: 32 % (ref 32–36)
MCHC RBC AUTO-ENTMCNC: 32.2 % (ref 32–36)
MCHC RBC AUTO-ENTMCNC: 32.3 % (ref 32–36)
MCHC RBC AUTO-ENTMCNC: 32.3 % (ref 32–36)
MCHC RBC AUTO-ENTMCNC: 32.6 % (ref 32–36)
MCHC RBC AUTO-ENTMCNC: 32.7 % (ref 32–36)
MCHC RBC AUTO-ENTMCNC: 33.1 G/DL (ref 31–36)
MCV RBC AUTO: 100 FL (ref 78–100)
MCV RBC AUTO: 101 FL (ref 78–100)
MCV RBC AUTO: 101.3 FL (ref 78–100)
MCV RBC AUTO: 101.5 FL (ref 78–100)
MCV RBC AUTO: 101.6 FL (ref 78–100)
MCV RBC AUTO: 106.6 FL (ref 78–100)
MCV RBC AUTO: 106.8 FL (ref 78–100)
MCV RBC AUTO: 107.4 FL (ref 78–100)
MCV RBC AUTO: 107.8 FL (ref 78–100)
MCV RBC AUTO: 108.2 FL (ref 78–100)
MCV RBC AUTO: 108.5 FL (ref 78–100)
MCV RBC AUTO: 108.5 FL (ref 78–100)
MCV RBC AUTO: 108.6 FL (ref 78–100)
MCV RBC AUTO: 108.6 FL (ref 78–100)
MCV RBC AUTO: 108.8 FL (ref 78–100)
MCV RBC AUTO: 109.2 FL (ref 78–100)
MCV RBC AUTO: 109.2 FL (ref 78–100)
MCV RBC AUTO: 109.6 FL (ref 78–100)
MCV RBC AUTO: 109.7 FL (ref 78–100)
MCV RBC AUTO: 111 FL (ref 78–100)
MCV RBC AUTO: 111.3 FL (ref 78–100)
MCV RBC AUTO: 111.6 FL (ref 78–100)
MCV RBC AUTO: 112.3 FL (ref 78–100)
MCV RBC AUTO: 112.6 FL (ref 78–100)
MCV RBC AUTO: 116.2 FL (ref 80–100)
MCV RBC AUTO: 116.4 FL (ref 78–100)
MCV RBC AUTO: 117.3 FL (ref 78–100)
MCV RBC AUTO: 117.5 FL (ref 78–100)
MCV RBC AUTO: 118.1 FL (ref 78–100)
MCV RBC AUTO: 118.4 FL (ref 78–100)
MCV RBC AUTO: 118.9 FL (ref 78–100)
MCV RBC AUTO: 119 FL (ref 78–100)
MCV RBC AUTO: 119.1 FL (ref 78–100)
MCV RBC AUTO: 120.2 FL (ref 78–100)
MCV RBC AUTO: 120.5 FL (ref 78–100)
MCV RBC AUTO: 120.9 FL (ref 78–100)
MCV RBC AUTO: 121.1 FL (ref 78–100)
MCV RBC AUTO: 91.9 FL (ref 78–100)
MCV RBC AUTO: 94.3 FL (ref 78–100)
MCV RBC AUTO: 97.1 FL (ref 78–100)
MCV RBC AUTO: 97.8 FL (ref 78–100)
MCV RBC AUTO: 98.9 FL (ref 78–100)
MCV RBC AUTO: 99.2 FL (ref 78–100)
MCV RBC AUTO: 99.4 FL (ref 78–100)
METAMYELOCYTES ABSOLUTE COUNT: 0.16 K/CU MM
METAMYELOCYTES ABSOLUTE COUNT: 0.2 K/CU MM
METAMYELOCYTES ABSOLUTE COUNT: 0.32 K/CU MM
METAMYELOCYTES ABSOLUTE COUNT: 0.36 K/CU MM
METAMYELOCYTES ABSOLUTE COUNT: 0.5 K/CU MM
METAMYELOCYTES PERCENT: 1 %
METAMYELOCYTES PERCENT: 1 %
METAMYELOCYTES PERCENT: 2 %
METAMYELOCYTES PERCENT: 2 %
METAMYELOCYTES PERCENT: 3 %
MICROCYTES: ABNORMAL
MONOCYTES ABSOLUTE: 0.2 K/CU MM
MONOCYTES ABSOLUTE: 0.3 K/CU MM
MONOCYTES ABSOLUTE: 0.4 K/CU MM
MONOCYTES ABSOLUTE: 0.4 K/CU MM
MONOCYTES ABSOLUTE: 0.4 K/UL (ref 0–1.3)
MONOCYTES ABSOLUTE: 0.5 K/CU MM
MONOCYTES ABSOLUTE: 0.6 K/CU MM
MONOCYTES ABSOLUTE: 0.7 K/CU MM
MONOCYTES ABSOLUTE: 0.8 K/CU MM
MONOCYTES ABSOLUTE: 0.9 K/CU MM
MONOCYTES ABSOLUTE: 1 K/CU MM
MONOCYTES ABSOLUTE: 1.1 K/CU MM
MONOCYTES ABSOLUTE: 1.1 K/CU MM
MONOCYTES ABSOLUTE: 1.2 K/CU MM
MONOCYTES ABSOLUTE: 1.3 K/CU MM
MONOCYTES ABSOLUTE: 1.4 K/CU MM
MONOCYTES ABSOLUTE: 1.4 K/CU MM
MONOCYTES ABSOLUTE: 1.5 K/CU MM
MONOCYTES ABSOLUTE: 1.6 K/CU MM
MONOCYTES ABSOLUTE: 1.7 K/CU MM
MONOCYTES ABSOLUTE: 1.9 K/CU MM
MONOCYTES ABSOLUTE: 2.3 K/CU MM
MONOCYTES ABSOLUTE: 2.6 K/CU MM
MONOCYTES RELATIVE PERCENT: 1 % (ref 0–4)
MONOCYTES RELATIVE PERCENT: 1 % (ref 0–4)
MONOCYTES RELATIVE PERCENT: 10 % (ref 0–4)
MONOCYTES RELATIVE PERCENT: 10.1 % (ref 0–4)
MONOCYTES RELATIVE PERCENT: 10.2 % (ref 0–4)
MONOCYTES RELATIVE PERCENT: 10.3 %
MONOCYTES RELATIVE PERCENT: 10.6 % (ref 0–4)
MONOCYTES RELATIVE PERCENT: 11.2 % (ref 0–4)
MONOCYTES RELATIVE PERCENT: 11.4 % (ref 0–4)
MONOCYTES RELATIVE PERCENT: 11.7 % (ref 0–4)
MONOCYTES RELATIVE PERCENT: 13 % (ref 0–4)
MONOCYTES RELATIVE PERCENT: 13.6 % (ref 0–4)
MONOCYTES RELATIVE PERCENT: 14.6 % (ref 0–4)
MONOCYTES RELATIVE PERCENT: 4 % (ref 0–4)
MONOCYTES RELATIVE PERCENT: 4.6 % (ref 0–4)
MONOCYTES RELATIVE PERCENT: 5 % (ref 0–4)
MONOCYTES RELATIVE PERCENT: 5 % (ref 0–4)
MONOCYTES RELATIVE PERCENT: 5.2 % (ref 0–4)
MONOCYTES RELATIVE PERCENT: 5.4 % (ref 0–4)
MONOCYTES RELATIVE PERCENT: 6.1 % (ref 0–4)
MONOCYTES RELATIVE PERCENT: 6.4 % (ref 0–4)
MONOCYTES RELATIVE PERCENT: 6.9 % (ref 0–4)
MONOCYTES RELATIVE PERCENT: 7 % (ref 0–4)
MONOCYTES RELATIVE PERCENT: 7 % (ref 0–4)
MONOCYTES RELATIVE PERCENT: 7.3 % (ref 0–4)
MONOCYTES RELATIVE PERCENT: 7.5 % (ref 0–4)
MONOCYTES RELATIVE PERCENT: 7.6 % (ref 0–4)
MONOCYTES RELATIVE PERCENT: 7.7 % (ref 0–4)
MONOCYTES RELATIVE PERCENT: 8.1 % (ref 0–4)
MONOCYTES RELATIVE PERCENT: 8.1 % (ref 0–4)
MONOCYTES RELATIVE PERCENT: 8.5 % (ref 0–4)
MONOCYTES RELATIVE PERCENT: 8.7 % (ref 0–4)
MONOCYTES RELATIVE PERCENT: 8.8 % (ref 0–4)
MONOCYTES RELATIVE PERCENT: 8.9 % (ref 0–4)
MONOCYTES RELATIVE PERCENT: 9 % (ref 0–4)
MONOCYTES RELATIVE PERCENT: 9 % (ref 0–4)
MONOCYTES RELATIVE PERCENT: 9.1 % (ref 0–4)
MONOCYTES RELATIVE PERCENT: 9.5 % (ref 0–4)
MONOCYTES RELATIVE PERCENT: 9.6 % (ref 0–4)
MONOCYTES RELATIVE PERCENT: 9.7 % (ref 0–4)
MONOCYTES RELATIVE PERCENT: 9.8 % (ref 0–4)
MONOCYTES RELATIVE PERCENT: 9.9 % (ref 0–4)
MUCUS: ABNORMAL HPF
MYELOCYTE PERCENT: 1 %
MYELOCYTE PERCENT: 1 %
MYELOCYTE PERCENT: 2 %
MYELOCYTES ABSOLUTE COUNT: 0.17 K/CU MM
MYELOCYTES ABSOLUTE COUNT: 0.2 K/CU MM
MYELOCYTES ABSOLUTE COUNT: 0.36 K/CU MM
NEUTROPHILS ABSOLUTE: 3.1 K/UL (ref 1.7–7.7)
NEUTROPHILS RELATIVE PERCENT: 72.2 %
NITRITE URINE, QUANTITATIVE: NEGATIVE
NITRITE URINE, QUANTITATIVE: POSITIVE
NUCLEATED RBC %: 0 %
OSMOLALITY URINE: 285 MOS/L (ref 292–1090)
OTHER CELL MORPHOLOGY: ABNORMAL
OVALOCYTES: ABNORMAL
PCT TRANSFERRIN: 13 % (ref 10–44)
PDW BLD-RTO: 12.3 % (ref 11.7–14.9)
PDW BLD-RTO: 12.8 % (ref 11.7–14.9)
PDW BLD-RTO: 12.9 % (ref 11.7–14.9)
PDW BLD-RTO: 13 % (ref 11.7–14.9)
PDW BLD-RTO: 13.1 % (ref 11.7–14.9)
PDW BLD-RTO: 13.2 % (ref 11.7–14.9)
PDW BLD-RTO: 13.2 % (ref 11.7–14.9)
PDW BLD-RTO: 14 % (ref 11.7–14.9)
PDW BLD-RTO: 14.3 % (ref 11.7–14.9)
PDW BLD-RTO: 14.4 % (ref 11.7–14.9)
PDW BLD-RTO: 14.5 % (ref 11.7–14.9)
PDW BLD-RTO: 14.8 % (ref 11.7–14.9)
PDW BLD-RTO: 15 % (ref 12.4–15.4)
PDW BLD-RTO: 17.8 % (ref 11.7–14.9)
PDW BLD-RTO: 17.9 % (ref 11.7–14.9)
PDW BLD-RTO: 18.2 % (ref 11.7–14.9)
PDW BLD-RTO: 18.2 % (ref 11.7–14.9)
PDW BLD-RTO: 18.3 % (ref 11.7–14.9)
PDW BLD-RTO: 18.5 % (ref 11.7–14.9)
PDW BLD-RTO: 18.6 % (ref 11.7–14.9)
PDW BLD-RTO: 18.7 % (ref 11.7–14.9)
PDW BLD-RTO: 18.8 % (ref 11.7–14.9)
PDW BLD-RTO: 18.9 % (ref 11.7–14.9)
PDW BLD-RTO: 19.1 % (ref 11.7–14.9)
PDW BLD-RTO: 19.1 % (ref 11.7–14.9)
PDW BLD-RTO: 19.3 % (ref 11.7–14.9)
PDW BLD-RTO: 19.4 % (ref 11.7–14.9)
PDW BLD-RTO: 19.8 % (ref 11.7–14.9)
PDW BLD-RTO: 19.9 % (ref 11.7–14.9)
PDW BLD-RTO: 20 % (ref 11.7–14.9)
PDW BLD-RTO: 20 % (ref 11.7–14.9)
PDW BLD-RTO: 20.5 % (ref 11.7–14.9)
PDW BLD-RTO: 20.7 % (ref 11.7–14.9)
PDW BLD-RTO: 20.8 % (ref 11.7–14.9)
PDW BLD-RTO: 20.9 % (ref 11.7–14.9)
PH, URINE: 5 (ref 5–8)
PH, URINE: 5.5 (ref 5–8)
PH, URINE: 5.5 (ref 5–8)
PH, URINE: 6 (ref 5–8)
PH, URINE: 6 (ref 5–8)
PHOSPHORUS: 2.2 MG/DL (ref 2.5–4.9)
PHOSPHORUS: 2.5 MG/DL (ref 2.5–4.9)
PHOSPHORUS: 2.5 MG/DL (ref 2.5–4.9)
PHOSPHORUS: 2.6 MG/DL (ref 2.5–4.9)
PHOSPHORUS: 2.7 MG/DL (ref 2.5–4.9)
PHOSPHORUS: 2.8 MG/DL (ref 2.5–4.9)
PHOSPHORUS: 2.8 MG/DL (ref 2.5–4.9)
PHOSPHORUS: 2.9 MG/DL (ref 2.5–4.9)
PHOSPHORUS: 2.9 MG/DL (ref 2.5–4.9)
PHOSPHORUS: 3 MG/DL (ref 2.5–4.9)
PHOSPHORUS: 3 MG/DL (ref 2.5–4.9)
PHOSPHORUS: 3.1 MG/DL (ref 2.5–4.9)
PHOSPHORUS: 3.2 MG/DL (ref 2.5–4.9)
PHOSPHORUS: 3.3 MG/DL (ref 2.5–4.9)
PHOSPHORUS: 3.5 MG/DL (ref 2.5–4.9)
PHOSPHORUS: 3.7 MG/DL (ref 2.5–4.9)
PHOSPHORUS: 3.8 MG/DL (ref 2.5–4.9)
PHOSPHORUS: 3.9 MG/DL (ref 2.5–4.9)
PHOSPHORUS: 4.1 MG/DL (ref 2.5–4.9)
PHOSPHORUS: 4.2 MG/DL (ref 2.5–4.9)
PHOSPHORUS: 4.4 MG/DL (ref 2.5–4.9)
PLATELET # BLD: 202 K/CU MM (ref 140–440)
PLATELET # BLD: 218 K/CU MM (ref 140–440)
PLATELET # BLD: 227 K/CU MM (ref 140–440)
PLATELET # BLD: 234 K/CU MM (ref 140–440)
PLATELET # BLD: 235 K/CU MM (ref 140–440)
PLATELET # BLD: 246 K/CU MM (ref 140–440)
PLATELET # BLD: 248 K/CU MM (ref 140–440)
PLATELET # BLD: 257 K/CU MM (ref 140–440)
PLATELET # BLD: 258 K/CU MM (ref 140–440)
PLATELET # BLD: 266 K/CU MM (ref 140–440)
PLATELET # BLD: 270 K/CU MM (ref 140–440)
PLATELET # BLD: 331 K/CU MM (ref 140–440)
PLATELET # BLD: 340 K/CU MM (ref 140–440)
PLATELET # BLD: 363 K/CU MM (ref 140–440)
PLATELET # BLD: 368 K/CU MM (ref 140–440)
PLATELET # BLD: 370 K/CU MM (ref 140–440)
PLATELET # BLD: 374 K/CU MM (ref 140–440)
PLATELET # BLD: 381 K/CU MM (ref 140–440)
PLATELET # BLD: 384 K/CU MM (ref 140–440)
PLATELET # BLD: 387 K/CU MM (ref 140–440)
PLATELET # BLD: 390 K/CU MM (ref 140–440)
PLATELET # BLD: 391 K/CU MM (ref 140–440)
PLATELET # BLD: 400 K/CU MM (ref 140–440)
PLATELET # BLD: 404 K/CU MM (ref 140–440)
PLATELET # BLD: 408 K/CU MM (ref 140–440)
PLATELET # BLD: 409 K/CU MM (ref 140–440)
PLATELET # BLD: 411 K/UL (ref 135–450)
PLATELET # BLD: 421 K/CU MM (ref 140–440)
PLATELET # BLD: 423 K/CU MM (ref 140–440)
PLATELET # BLD: 429 K/CU MM (ref 140–440)
PLATELET # BLD: 431 K/CU MM (ref 140–440)
PLATELET # BLD: 434 K/CU MM (ref 140–440)
PLATELET # BLD: 437 K/CU MM (ref 140–440)
PLATELET # BLD: 444 K/CU MM (ref 140–440)
PLATELET # BLD: 444 K/CU MM (ref 140–440)
PLATELET # BLD: 448 K/CU MM (ref 140–440)
PLATELET # BLD: 466 K/CU MM (ref 140–440)
PLATELET # BLD: 492 K/CU MM (ref 140–440)
PLATELET # BLD: 524 K/CU MM (ref 140–440)
PLATELET # BLD: 533 K/CU MM (ref 140–440)
PLATELET # BLD: 539 K/CU MM (ref 140–440)
PLATELET # BLD: 559 K/CU MM (ref 140–440)
PLATELET # BLD: 577 K/CU MM (ref 140–440)
PLATELET # BLD: 663 K/CU MM (ref 140–440)
PLT MORPHOLOGY: ABNORMAL
PLT MORPHOLOGY: ADEQUATE
PLT MORPHOLOGY: ADEQUATE
PMV BLD AUTO: 10 FL (ref 7.5–11.1)
PMV BLD AUTO: 10 FL (ref 7.5–11.1)
PMV BLD AUTO: 10.1 FL (ref 7.5–11.1)
PMV BLD AUTO: 10.3 FL (ref 7.5–11.1)
PMV BLD AUTO: 10.4 FL (ref 7.5–11.1)
PMV BLD AUTO: 10.6 FL (ref 7.5–11.1)
PMV BLD AUTO: 7.3 FL (ref 5–10.5)
PMV BLD AUTO: 8.6 FL (ref 7.5–11.1)
PMV BLD AUTO: 8.9 FL (ref 7.5–11.1)
PMV BLD AUTO: 9 FL (ref 7.5–11.1)
PMV BLD AUTO: 9 FL (ref 7.5–11.1)
PMV BLD AUTO: 9.1 FL (ref 7.5–11.1)
PMV BLD AUTO: 9.2 FL (ref 7.5–11.1)
PMV BLD AUTO: 9.2 FL (ref 7.5–11.1)
PMV BLD AUTO: 9.3 FL (ref 7.5–11.1)
PMV BLD AUTO: 9.4 FL (ref 7.5–11.1)
PMV BLD AUTO: 9.4 FL (ref 7.5–11.1)
PMV BLD AUTO: 9.5 FL (ref 7.5–11.1)
PMV BLD AUTO: 9.6 FL (ref 7.5–11.1)
PMV BLD AUTO: 9.7 FL (ref 7.5–11.1)
PMV BLD AUTO: 9.8 FL (ref 7.5–11.1)
PMV BLD AUTO: 9.9 FL (ref 7.5–11.1)
POLYCHROMASIA: ABNORMAL
POTASSIUM SERPL-SCNC: 3.3 MMOL/L (ref 3.5–5.1)
POTASSIUM SERPL-SCNC: 3.7 MMOL/L (ref 3.5–5.1)
POTASSIUM SERPL-SCNC: 3.8 MMOL/L (ref 3.5–5.1)
POTASSIUM SERPL-SCNC: 3.9 MMOL/L (ref 3.5–5.1)
POTASSIUM SERPL-SCNC: 4 MMOL/L (ref 3.5–5.1)
POTASSIUM SERPL-SCNC: 4.1 MMOL/L (ref 3.5–5.1)
POTASSIUM SERPL-SCNC: 4.2 MMOL/L (ref 3.5–5.1)
POTASSIUM SERPL-SCNC: 4.3 MMOL/L (ref 3.5–5.1)
POTASSIUM SERPL-SCNC: 4.3 MMOL/L (ref 3.5–5.1)
POTASSIUM SERPL-SCNC: 4.4 MMOL/L (ref 3.5–5.1)
POTASSIUM SERPL-SCNC: 4.6 MMOL/L (ref 3.5–5.1)
PREALBUMIN: 10 MG/DL (ref 20–40)
PREALBUMIN: 3 MG/DL (ref 20–40)
PRO-BNP: 217.9 PG/ML
PRO-BNP: 2348 PG/ML
PRO-BNP: 2585 PG/ML
PRO-BNP: 3534 PG/ML
PROCALCITONIN: 0.1
PROCALCITONIN: 0.11
PROCALCITONIN: 0.12
PROCALCITONIN: 0.13
PROCALCITONIN: 0.16
PROCALCITONIN: 0.25
PROCALCITONIN: 0.26
PROCALCITONIN: 0.31
PROCALCITONIN: 0.32
PROCALCITONIN: 0.32
PROCALCITONIN: 0.52
PROCALCITONIN: 1.1
PROCALCITONIN: 1.38
PROCALCITONIN: 10.54
PROT/CREAT RATIO, UR: 0.3
PROTEIN FLUID: 1.6 GM/DL
PROTEIN FLUID: 1.6 GM/DL
PROTEIN UA: 100 MG/DL
PROTEIN UA: 100 MG/DL
PROTEIN UA: 30 MG/DL
PROTEIN UA: 30 MG/DL
PROTEIN UA: ABNORMAL MG/DL
PROTEIN UA: NEGATIVE MG/DL
PROTEIN UA: NEGATIVE MG/DL
PROTHROMBIN TIME: 14.1 SECONDS (ref 11.7–14.5)
PROTHROMBIN TIME: 15 SECONDS (ref 11.7–14.5)
PROTHROMBIN TIME: 15.7 SECONDS (ref 11.7–14.5)
PROTHROMBIN TIME: 16.1 SECONDS (ref 11.7–14.5)
PROTHROMBIN TIME: 17.6 SECONDS (ref 11.7–14.5)
PROTHROMBIN TIME: 18 SECONDS (ref 11.7–14.5)
PROTHROMBIN TIME: 19.6 SECONDS (ref 11.7–14.5)
RBC # BLD: 2.03 M/CU MM (ref 4.6–6.2)
RBC # BLD: 2.07 M/CU MM (ref 4.6–6.2)
RBC # BLD: 2.12 M/CU MM (ref 4.6–6.2)
RBC # BLD: 2.15 M/CU MM (ref 4.6–6.2)
RBC # BLD: 2.17 M/CU MM (ref 4.6–6.2)
RBC # BLD: 2.21 M/CU MM (ref 4.6–6.2)
RBC # BLD: 2.39 M/CU MM (ref 4.6–6.2)
RBC # BLD: 2.39 M/CU MM (ref 4.6–6.2)
RBC # BLD: 2.42 M/CU MM (ref 4.6–6.2)
RBC # BLD: 2.43 M/CU MM (ref 4.6–6.2)
RBC # BLD: 2.44 M/CU MM (ref 4.6–6.2)
RBC # BLD: 2.46 M/CU MM (ref 4.6–6.2)
RBC # BLD: 2.47 M/CU MM (ref 4.6–6.2)
RBC # BLD: 2.49 M/CU MM (ref 4.6–6.2)
RBC # BLD: 2.52 M/CU MM (ref 4.6–6.2)
RBC # BLD: 2.56 M/CU MM (ref 4.6–6.2)
RBC # BLD: 2.57 M/UL (ref 4.2–5.9)
RBC # BLD: 2.59 M/CU MM (ref 4.6–6.2)
RBC # BLD: 2.61 M/CU MM (ref 4.6–6.2)
RBC # BLD: 2.63 M/CU MM (ref 4.6–6.2)
RBC # BLD: 2.66 M/CU MM (ref 4.6–6.2)
RBC # BLD: 2.69 M/CU MM (ref 4.6–6.2)
RBC # BLD: 2.69 M/CU MM (ref 4.6–6.2)
RBC # BLD: 2.71 M/CU MM (ref 4.6–6.2)
RBC # BLD: 2.72 M/CU MM (ref 4.6–6.2)
RBC # BLD: 2.72 M/CU MM (ref 4.6–6.2)
RBC # BLD: 2.87 M/CU MM (ref 4.6–6.2)
RBC # BLD: 2.9 M/CU MM (ref 4.6–6.2)
RBC # BLD: 2.96 M/CU MM (ref 4.6–6.2)
RBC # BLD: 3.06 M/CU MM (ref 4.6–6.2)
RBC # BLD: 3.08 M/CU MM (ref 4.6–6.2)
RBC # BLD: 3.1 M/CU MM (ref 4.6–6.2)
RBC # BLD: 3.14 M/CU MM (ref 4.6–6.2)
RBC # BLD: 3.18 M/CU MM (ref 4.6–6.2)
RBC # BLD: 3.2 M/CU MM (ref 4.6–6.2)
RBC # BLD: 3.22 M/CU MM (ref 4.6–6.2)
RBC # BLD: 3.28 M/CU MM (ref 4.6–6.2)
RBC # BLD: 3.34 M/CU MM (ref 4.6–6.2)
RBC # BLD: 3.67 M/CU MM (ref 4.6–6.2)
RBC # BLD: 3.86 M/CU MM (ref 4.6–6.2)
RBC URINE: 1 /HPF (ref 0–3)
RBC URINE: 15 /HPF (ref 0–3)
RBC URINE: 2 /HPF (ref 0–3)
RBC URINE: 30 /HPF (ref 0–3)
RBC URINE: 38 /HPF (ref 0–3)
RBC URINE: 4 /HPF (ref 0–3)
RBC URINE: 91 /HPF (ref 0–3)
REASON FOR REJECTION: NORMAL
REASON FOR REJECTION: NORMAL
REJECTED TEST: NORMAL
REJECTED TEST: NORMAL
SARS-COV-2, NAAT: NOT DETECTED
SARS-COV-2, NAAT: NOT DETECTED
SEGMENTED NEUTROPHILS ABSOLUTE COUNT: 10 K/CU MM
SEGMENTED NEUTROPHILS ABSOLUTE COUNT: 10.3 K/CU MM
SEGMENTED NEUTROPHILS ABSOLUTE COUNT: 10.3 K/CU MM
SEGMENTED NEUTROPHILS ABSOLUTE COUNT: 10.4 K/CU MM
SEGMENTED NEUTROPHILS ABSOLUTE COUNT: 10.5 K/CU MM
SEGMENTED NEUTROPHILS ABSOLUTE COUNT: 10.5 K/CU MM
SEGMENTED NEUTROPHILS ABSOLUTE COUNT: 10.7 K/CU MM
SEGMENTED NEUTROPHILS ABSOLUTE COUNT: 11 K/CU MM
SEGMENTED NEUTROPHILS ABSOLUTE COUNT: 11.5 K/CU MM
SEGMENTED NEUTROPHILS ABSOLUTE COUNT: 11.6 K/CU MM
SEGMENTED NEUTROPHILS ABSOLUTE COUNT: 12.7 K/CU MM
SEGMENTED NEUTROPHILS ABSOLUTE COUNT: 13.2 K/CU MM
SEGMENTED NEUTROPHILS ABSOLUTE COUNT: 13.4 K/CU MM
SEGMENTED NEUTROPHILS ABSOLUTE COUNT: 13.5 K/CU MM
SEGMENTED NEUTROPHILS ABSOLUTE COUNT: 13.6 K/CU MM
SEGMENTED NEUTROPHILS ABSOLUTE COUNT: 14.1 K/CU MM
SEGMENTED NEUTROPHILS ABSOLUTE COUNT: 14.3 K/CU MM
SEGMENTED NEUTROPHILS ABSOLUTE COUNT: 15.1 K/CU MM
SEGMENTED NEUTROPHILS ABSOLUTE COUNT: 15.8 K/CU MM
SEGMENTED NEUTROPHILS ABSOLUTE COUNT: 16.7 K/CU MM
SEGMENTED NEUTROPHILS ABSOLUTE COUNT: 16.9 K/CU MM
SEGMENTED NEUTROPHILS ABSOLUTE COUNT: 17 K/CU MM
SEGMENTED NEUTROPHILS ABSOLUTE COUNT: 2.5 K/CU MM
SEGMENTED NEUTROPHILS ABSOLUTE COUNT: 2.8 K/CU MM
SEGMENTED NEUTROPHILS ABSOLUTE COUNT: 24.7 K/CU MM
SEGMENTED NEUTROPHILS ABSOLUTE COUNT: 25.7 K/CU MM
SEGMENTED NEUTROPHILS ABSOLUTE COUNT: 3.1 K/CU MM
SEGMENTED NEUTROPHILS ABSOLUTE COUNT: 3.4 K/CU MM
SEGMENTED NEUTROPHILS ABSOLUTE COUNT: 3.5 K/CU MM
SEGMENTED NEUTROPHILS ABSOLUTE COUNT: 30.2 K/CU MM
SEGMENTED NEUTROPHILS ABSOLUTE COUNT: 31.7 K/CU MM
SEGMENTED NEUTROPHILS ABSOLUTE COUNT: 4.1 K/CU MM
SEGMENTED NEUTROPHILS ABSOLUTE COUNT: 4.2 K/CU MM
SEGMENTED NEUTROPHILS ABSOLUTE COUNT: 4.7 K/CU MM
SEGMENTED NEUTROPHILS ABSOLUTE COUNT: 5.2 K/CU MM
SEGMENTED NEUTROPHILS ABSOLUTE COUNT: 7.2 K/CU MM
SEGMENTED NEUTROPHILS ABSOLUTE COUNT: 7.4 K/CU MM
SEGMENTED NEUTROPHILS ABSOLUTE COUNT: 7.7 K/CU MM
SEGMENTED NEUTROPHILS ABSOLUTE COUNT: 9.7 K/CU MM
SEGMENTED NEUTROPHILS RELATIVE PERCENT: 65.4 % (ref 36–66)
SEGMENTED NEUTROPHILS RELATIVE PERCENT: 67.6 % (ref 36–66)
SEGMENTED NEUTROPHILS RELATIVE PERCENT: 68.8 % (ref 36–66)
SEGMENTED NEUTROPHILS RELATIVE PERCENT: 70.3 % (ref 36–66)
SEGMENTED NEUTROPHILS RELATIVE PERCENT: 73.3 % (ref 36–66)
SEGMENTED NEUTROPHILS RELATIVE PERCENT: 74.7 % (ref 36–66)
SEGMENTED NEUTROPHILS RELATIVE PERCENT: 74.8 % (ref 36–66)
SEGMENTED NEUTROPHILS RELATIVE PERCENT: 75.5 % (ref 36–66)
SEGMENTED NEUTROPHILS RELATIVE PERCENT: 75.9 % (ref 36–66)
SEGMENTED NEUTROPHILS RELATIVE PERCENT: 76 % (ref 36–66)
SEGMENTED NEUTROPHILS RELATIVE PERCENT: 76.1 % (ref 36–66)
SEGMENTED NEUTROPHILS RELATIVE PERCENT: 76.6 % (ref 36–66)
SEGMENTED NEUTROPHILS RELATIVE PERCENT: 77 % (ref 36–66)
SEGMENTED NEUTROPHILS RELATIVE PERCENT: 78 % (ref 36–66)
SEGMENTED NEUTROPHILS RELATIVE PERCENT: 78 % (ref 36–66)
SEGMENTED NEUTROPHILS RELATIVE PERCENT: 80.9 % (ref 36–66)
SEGMENTED NEUTROPHILS RELATIVE PERCENT: 81 % (ref 36–66)
SEGMENTED NEUTROPHILS RELATIVE PERCENT: 81.2 % (ref 36–66)
SEGMENTED NEUTROPHILS RELATIVE PERCENT: 81.2 % (ref 36–66)
SEGMENTED NEUTROPHILS RELATIVE PERCENT: 81.3 % (ref 36–66)
SEGMENTED NEUTROPHILS RELATIVE PERCENT: 82 % (ref 36–66)
SEGMENTED NEUTROPHILS RELATIVE PERCENT: 82.4 % (ref 36–66)
SEGMENTED NEUTROPHILS RELATIVE PERCENT: 82.7 % (ref 36–66)
SEGMENTED NEUTROPHILS RELATIVE PERCENT: 82.8 % (ref 36–66)
SEGMENTED NEUTROPHILS RELATIVE PERCENT: 82.9 % (ref 36–66)
SEGMENTED NEUTROPHILS RELATIVE PERCENT: 83 % (ref 36–66)
SEGMENTED NEUTROPHILS RELATIVE PERCENT: 83 % (ref 36–66)
SEGMENTED NEUTROPHILS RELATIVE PERCENT: 83.1 % (ref 36–66)
SEGMENTED NEUTROPHILS RELATIVE PERCENT: 83.7 % (ref 36–66)
SEGMENTED NEUTROPHILS RELATIVE PERCENT: 83.7 % (ref 36–66)
SEGMENTED NEUTROPHILS RELATIVE PERCENT: 84 % (ref 36–66)
SEGMENTED NEUTROPHILS RELATIVE PERCENT: 84.2 % (ref 36–66)
SEGMENTED NEUTROPHILS RELATIVE PERCENT: 84.9 % (ref 36–66)
SEGMENTED NEUTROPHILS RELATIVE PERCENT: 87 % (ref 36–66)
SEGMENTED NEUTROPHILS RELATIVE PERCENT: 87.7 % (ref 36–66)
SEGMENTED NEUTROPHILS RELATIVE PERCENT: 88.5 % (ref 36–66)
SEGMENTED NEUTROPHILS RELATIVE PERCENT: 90.3 % (ref 36–66)
SEGMENTED NEUTROPHILS RELATIVE PERCENT: 90.4 % (ref 36–66)
SEGMENTED NEUTROPHILS RELATIVE PERCENT: 91 % (ref 36–66)
SEGMENTED NEUTROPHILS RELATIVE PERCENT: 91.3 % (ref 36–66)
SEGMENTED NEUTROPHILS RELATIVE PERCENT: 91.7 % (ref 36–66)
SLIDE REVIEW: ABNORMAL
SODIUM BLD-SCNC: 119 MMOL/L (ref 135–145)
SODIUM BLD-SCNC: 123 MMOL/L (ref 135–145)
SODIUM BLD-SCNC: 124 MMOL/L (ref 135–145)
SODIUM BLD-SCNC: 127 MMOL/L (ref 135–145)
SODIUM BLD-SCNC: 128 MMOL/L (ref 135–145)
SODIUM BLD-SCNC: 129 MMOL/L (ref 135–145)
SODIUM BLD-SCNC: 130 MMOL/L (ref 135–145)
SODIUM BLD-SCNC: 131 MMOL/L (ref 135–145)
SODIUM BLD-SCNC: 131 MMOL/L (ref 135–145)
SODIUM BLD-SCNC: 132 MMOL/L (ref 135–145)
SODIUM BLD-SCNC: 133 MMOL/L (ref 135–145)
SODIUM BLD-SCNC: 134 MMOL/L (ref 135–145)
SODIUM BLD-SCNC: 135 MMOL/L (ref 135–145)
SODIUM BLD-SCNC: 136 MMOL/L (ref 135–145)
SODIUM BLD-SCNC: 136 MMOL/L (ref 136–145)
SODIUM BLD-SCNC: 137 MMOL/L (ref 135–145)
SODIUM BLD-SCNC: 138 MMOL/L (ref 135–145)
SODIUM URINE: 111 MMOL/L (ref 35–167)
SOURCE: NORMAL
SPECIFIC GRAVITY UA: 1.01 (ref 1–1.03)
SPECIFIC GRAVITY UA: 1.02 (ref 1–1.03)
SPECIFIC GRAVITY UA: 1.02 (ref 1–1.03)
SPECIFIC GRAVITY UA: >1.03 (ref 1–1.03)
SPECIFIC GRAVITY UA: >1.03 (ref 1–1.03)
SPECIMEN: ABNORMAL
SPECIMEN: NORMAL
SQUAMOUS EPITHELIAL: 1 /HPF
SQUAMOUS EPITHELIAL: <1 /HPF
SQUAMOUS EPITHELIAL: <1 /HPF
STATUS: NORMAL
TEAR DROP CELLS: ABNORMAL
TOTAL IMMATURE NEUTOROPHIL: 0.01 K/CU MM
TOTAL IMMATURE NEUTOROPHIL: 0.02 K/CU MM
TOTAL IMMATURE NEUTOROPHIL: 0.03 K/CU MM
TOTAL IMMATURE NEUTOROPHIL: 0.06 K/CU MM
TOTAL IMMATURE NEUTOROPHIL: 0.08 K/CU MM
TOTAL IMMATURE NEUTOROPHIL: 0.1 K/CU MM
TOTAL IMMATURE NEUTOROPHIL: 0.11 K/CU MM
TOTAL IMMATURE NEUTOROPHIL: 0.13 K/CU MM
TOTAL IMMATURE NEUTOROPHIL: 0.13 K/CU MM
TOTAL IMMATURE NEUTOROPHIL: 0.14 K/CU MM
TOTAL IMMATURE NEUTOROPHIL: 0.17 K/CU MM
TOTAL IMMATURE NEUTOROPHIL: 0.17 K/CU MM
TOTAL IMMATURE NEUTOROPHIL: 0.18 K/CU MM
TOTAL IMMATURE NEUTOROPHIL: 0.2 K/CU MM
TOTAL IMMATURE NEUTOROPHIL: 0.23 K/CU MM
TOTAL IMMATURE NEUTOROPHIL: 0.24 K/CU MM
TOTAL IMMATURE NEUTOROPHIL: 0.26 K/CU MM
TOTAL IMMATURE NEUTOROPHIL: 0.27 K/CU MM
TOTAL IMMATURE NEUTOROPHIL: 0.29 K/CU MM
TOTAL IMMATURE NEUTOROPHIL: 0.3 K/CU MM
TOTAL IMMATURE NEUTOROPHIL: 0.36 K/CU MM
TOTAL IMMATURE NEUTOROPHIL: 0.38 K/CU MM
TOTAL IMMATURE NEUTOROPHIL: 0.4 K/CU MM
TOTAL IMMATURE NEUTOROPHIL: 0.46 K/CU MM
TOTAL IMMATURE NEUTOROPHIL: 0.48 K/CU MM
TOTAL IMMATURE NEUTOROPHIL: 0.62 K/CU MM
TOTAL IRON BINDING CAPACITY: 280 UG/DL (ref 250–450)
TOTAL NUCLEATED RBC: 0 K/CU MM
TOTAL PROTEIN: 3.8 GM/DL (ref 6.4–8.2)
TOTAL PROTEIN: 4 GM/DL (ref 6.4–8.2)
TOTAL PROTEIN: 4.2 GM/DL (ref 6.4–8.2)
TOTAL PROTEIN: 4.4 GM/DL (ref 6.4–8.2)
TOTAL PROTEIN: 4.9 GM/DL (ref 6.4–8.2)
TOTAL PROTEIN: 5 GM/DL (ref 6.4–8.2)
TOTAL PROTEIN: 5.1 GM/DL (ref 6.4–8.2)
TOTAL PROTEIN: 5.3 GM/DL (ref 6.4–8.2)
TOTAL PROTEIN: 5.6 GM/DL (ref 6.4–8.2)
TOTAL PROTEIN: 5.7 GM/DL (ref 6.4–8.2)
TOTAL PROTEIN: 5.9 GM/DL (ref 6.4–8.2)
TOTAL PROTEIN: 6.2 GM/DL (ref 6.4–8.2)
TOTAL PROTEIN: 6.3 GM/DL (ref 6.4–8.2)
TOTAL PROTEIN: 6.3 GM/DL (ref 6.4–8.2)
TOTAL PROTEIN: 6.4 GM/DL (ref 6.4–8.2)
TOTAL PROTEIN: 6.6 G/DL (ref 6.4–8.2)
TOTAL PROTEIN: 6.6 GM/DL (ref 6.4–8.2)
TOXIC GRANULATION: PRESENT
TOXIC GRANULATION: PRESENT
TRANSFUSION STATUS: NORMAL
TRANSITIONAL EPITHELIAL: <1 /HPF
TRICHOMONAS: ABNORMAL /HPF
TRIGL SERPL-MCNC: 113 MG/DL
TRIGL SERPL-MCNC: 118 MG/DL
TRIGL SERPL-MCNC: 136 MG/DL
TRIGL SERPL-MCNC: 194 MG/DL
TRIGL SERPL-MCNC: 56 MG/DL
TROPONIN T: 0.01 NG/ML
TROPONIN T: 0.01 NG/ML
TROPONIN T: <0.01 NG/ML
TSH HIGH SENSITIVITY: 1.99 UIU/ML (ref 0.27–4.2)
UNDIFFERENTIATED CELL: 1 %
UNIT DIVISION: 0
UNIT NUMBER: NORMAL
UNSATURATED IRON BINDING CAPACITY: 245 UG/DL (ref 110–370)
URINE TOTAL PROTEIN: 5.7 MG/DL
UROBILINOGEN, URINE: 0.2 MG/DL (ref 0.2–1)
UROBILINOGEN, URINE: NORMAL MG/DL (ref 0.2–1)
VANCOMYCIN RANDOM: 12.7 UG/ML
VANCOMYCIN RANDOM: 15.1 UG/ML
VANCOMYCIN RANDOM: 8.5 UG/ML
VITAMIN B-12: 1143 PG/ML (ref 211–911)
VITAMIN B-12: 517.7 PG/ML (ref 211–911)
WBC # BLD: 10 K/CU MM (ref 4–10.5)
WBC # BLD: 10.1 K/CU MM (ref 4–10.5)
WBC # BLD: 11.9 K/CU MM (ref 4–10.5)
WBC # BLD: 11.9 K/CU MM (ref 4–10.5)
WBC # BLD: 12.3 K/CU MM (ref 4–10.5)
WBC # BLD: 12.7 K/CU MM (ref 4–10.5)
WBC # BLD: 12.7 K/CU MM (ref 4–10.5)
WBC # BLD: 13 K/CU MM (ref 4–10.5)
WBC # BLD: 13.4 K/CU MM (ref 4–10.5)
WBC # BLD: 13.7 K/CU MM (ref 4–10.5)
WBC # BLD: 13.8 K/CU MM (ref 4–10.5)
WBC # BLD: 13.8 K/CU MM (ref 4–10.5)
WBC # BLD: 13.9 K/CU MM (ref 4–10.5)
WBC # BLD: 14 K/CU MM (ref 4–10.5)
WBC # BLD: 15.2 K/CU MM (ref 4–10.5)
WBC # BLD: 15.3 K/CU MM (ref 4–10.5)
WBC # BLD: 15.4 K/CU MM (ref 4–10.5)
WBC # BLD: 16 K/CU MM (ref 4–10.5)
WBC # BLD: 16.1 K/CU MM (ref 4–10.5)
WBC # BLD: 16.2 K/CU MM (ref 4–10.5)
WBC # BLD: 16.6 K/CU MM (ref 4–10.5)
WBC # BLD: 17.3 K/CU MM (ref 4–10.5)
WBC # BLD: 18 K/CU MM (ref 4–10.5)
WBC # BLD: 18.5 K/CU MM (ref 4–10.5)
WBC # BLD: 19 K/CU MM (ref 4–10.5)
WBC # BLD: 19.2 K/CU MM (ref 4–10.5)
WBC # BLD: 19.4 K/CU MM (ref 4–10.5)
WBC # BLD: 19.6 K/CU MM (ref 4–10.5)
WBC # BLD: 26.9 K/CU MM (ref 4–10.5)
WBC # BLD: 28.4 K/CU MM (ref 4–10.5)
WBC # BLD: 3.8 K/CU MM (ref 4–10.5)
WBC # BLD: 33.2 K/CU MM (ref 4–10.5)
WBC # BLD: 35.1 K/CU MM (ref 4–10.5)
WBC # BLD: 4 K/CU MM (ref 4–10.5)
WBC # BLD: 4.2 K/CU MM (ref 4–10.5)
WBC # BLD: 4.2 K/UL (ref 4–11)
WBC # BLD: 4.5 K/CU MM (ref 4–10.5)
WBC # BLD: 5.1 K/CU MM (ref 4–10.5)
WBC # BLD: 5.5 K/CU MM (ref 4–10.5)
WBC # BLD: 5.8 K/CU MM (ref 4–10.5)
WBC # BLD: 6.1 K/CU MM (ref 4–10.5)
WBC # BLD: 6.5 K/CU MM (ref 4–10.5)
WBC # BLD: 8.8 K/CU MM (ref 4–10.5)
WBC # BLD: 9.5 K/CU MM (ref 4–10.5)
WBC # BLD: ABNORMAL 10*3/UL
WBC CLUMP: ABNORMAL /HPF
WBC UA: 109 /HPF (ref 0–2)
WBC UA: 11 /HPF (ref 0–2)
WBC UA: 607 /HPF (ref 0–2)
WBC UA: 715 /HPF (ref 0–2)
WBC UA: 9 /HPF (ref 0–2)
WBC UA: <1 /HPF (ref 0–2)
WBC UA: <1 /HPF (ref 0–2)

## 2022-01-01 PROCEDURE — 2580000003 HC RX 258: Performed by: SURGERY

## 2022-01-01 PROCEDURE — 51702 INSERT TEMP BLADDER CATH: CPT

## 2022-01-01 PROCEDURE — 99233 SBSQ HOSP IP/OBS HIGH 50: CPT | Performed by: INTERNAL MEDICINE

## 2022-01-01 PROCEDURE — 1200000000 HC SEMI PRIVATE

## 2022-01-01 PROCEDURE — 2580000003 HC RX 258: Performed by: INTERNAL MEDICINE

## 2022-01-01 PROCEDURE — 2580000003 HC RX 258: Performed by: NURSE PRACTITIONER

## 2022-01-01 PROCEDURE — 93010 ELECTROCARDIOGRAM REPORT: CPT | Performed by: INTERNAL MEDICINE

## 2022-01-01 PROCEDURE — 6370000000 HC RX 637 (ALT 250 FOR IP): Performed by: INTERNAL MEDICINE

## 2022-01-01 PROCEDURE — 6370000000 HC RX 637 (ALT 250 FOR IP): Performed by: SPECIALIST

## 2022-01-01 PROCEDURE — 45385 COLONOSCOPY W/LESION REMOVAL: CPT | Performed by: SPECIALIST

## 2022-01-01 PROCEDURE — 83735 ASSAY OF MAGNESIUM: CPT

## 2022-01-01 PROCEDURE — 6360000002 HC RX W HCPCS: Performed by: SPECIALIST

## 2022-01-01 PROCEDURE — 86900 BLOOD TYPING SEROLOGIC ABO: CPT

## 2022-01-01 PROCEDURE — 76705 ECHO EXAM OF ABDOMEN: CPT

## 2022-01-01 PROCEDURE — 2500000003 HC RX 250 WO HCPCS: Performed by: INTERNAL MEDICINE

## 2022-01-01 PROCEDURE — 70450 CT HEAD/BRAIN W/O DYE: CPT

## 2022-01-01 PROCEDURE — 6360000002 HC RX W HCPCS: Performed by: NURSE PRACTITIONER

## 2022-01-01 PROCEDURE — APPSS15 APP SPLIT SHARED TIME 0-15 MINUTES: Performed by: NURSE PRACTITIONER

## 2022-01-01 PROCEDURE — 2060000000 HC ICU INTERMEDIATE R&B

## 2022-01-01 PROCEDURE — 2709999900 HC NON-CHARGEABLE SUPPLY: Performed by: SPECIALIST

## 2022-01-01 PROCEDURE — 6360000002 HC RX W HCPCS: Performed by: INTERNAL MEDICINE

## 2022-01-01 PROCEDURE — 87040 BLOOD CULTURE FOR BACTERIA: CPT

## 2022-01-01 PROCEDURE — 84478 ASSAY OF TRIGLYCERIDES: CPT

## 2022-01-01 PROCEDURE — 2709999900 HC NON-CHARGEABLE SUPPLY

## 2022-01-01 PROCEDURE — 74018 RADEX ABDOMEN 1 VIEW: CPT

## 2022-01-01 PROCEDURE — C9113 INJ PANTOPRAZOLE SODIUM, VIA: HCPCS | Performed by: SURGERY

## 2022-01-01 PROCEDURE — 82962 GLUCOSE BLOOD TEST: CPT

## 2022-01-01 PROCEDURE — 2140000000 HC CCU INTERMEDIATE R&B

## 2022-01-01 PROCEDURE — 97116 GAIT TRAINING THERAPY: CPT

## 2022-01-01 PROCEDURE — 80048 BASIC METABOLIC PNL TOTAL CA: CPT

## 2022-01-01 PROCEDURE — 80053 COMPREHEN METABOLIC PANEL: CPT

## 2022-01-01 PROCEDURE — C1713 ANCHOR/SCREW BN/BN,TIS/BN: HCPCS | Performed by: SPECIALIST

## 2022-01-01 PROCEDURE — 2500000003 HC RX 250 WO HCPCS: Performed by: SURGERY

## 2022-01-01 PROCEDURE — 97535 SELF CARE MNGMENT TRAINING: CPT

## 2022-01-01 PROCEDURE — 6360000002 HC RX W HCPCS: Performed by: SURGERY

## 2022-01-01 PROCEDURE — 6360000002 HC RX W HCPCS: Performed by: HOSPITALIST

## 2022-01-01 PROCEDURE — 85730 THROMBOPLASTIN TIME PARTIAL: CPT

## 2022-01-01 PROCEDURE — 99231 SBSQ HOSP IP/OBS SF/LOW 25: CPT | Performed by: NURSE PRACTITIONER

## 2022-01-01 PROCEDURE — 84484 ASSAY OF TROPONIN QUANT: CPT

## 2022-01-01 PROCEDURE — 99232 SBSQ HOSP IP/OBS MODERATE 35: CPT | Performed by: NURSE PRACTITIONER

## 2022-01-01 PROCEDURE — 76937 US GUIDE VASCULAR ACCESS: CPT

## 2022-01-01 PROCEDURE — 94761 N-INVAS EAR/PLS OXIMETRY MLT: CPT

## 2022-01-01 PROCEDURE — 2000000000 HC ICU R&B

## 2022-01-01 PROCEDURE — 2580000003 HC RX 258: Performed by: HOSPITALIST

## 2022-01-01 PROCEDURE — 99222 1ST HOSP IP/OBS MODERATE 55: CPT | Performed by: SPECIALIST

## 2022-01-01 PROCEDURE — 71045 X-RAY EXAM CHEST 1 VIEW: CPT

## 2022-01-01 PROCEDURE — 36592 COLLECT BLOOD FROM PICC: CPT

## 2022-01-01 PROCEDURE — 99231 SBSQ HOSP IP/OBS SF/LOW 25: CPT | Performed by: SPECIALIST

## 2022-01-01 PROCEDURE — A4216 STERILE WATER/SALINE, 10 ML: HCPCS | Performed by: SPECIALIST

## 2022-01-01 PROCEDURE — 94150 VITAL CAPACITY TEST: CPT

## 2022-01-01 PROCEDURE — 36415 COLL VENOUS BLD VENIPUNCTURE: CPT

## 2022-01-01 PROCEDURE — P9047 ALBUMIN (HUMAN), 25%, 50ML: HCPCS | Performed by: INTERNAL MEDICINE

## 2022-01-01 PROCEDURE — 85027 COMPLETE CBC AUTOMATED: CPT

## 2022-01-01 PROCEDURE — 2580000003 HC RX 258: Performed by: EMERGENCY MEDICINE

## 2022-01-01 PROCEDURE — 84145 PROCALCITONIN (PCT): CPT

## 2022-01-01 PROCEDURE — 84100 ASSAY OF PHOSPHORUS: CPT

## 2022-01-01 PROCEDURE — 85025 COMPLETE CBC W/AUTO DIFF WBC: CPT

## 2022-01-01 PROCEDURE — 87205 SMEAR GRAM STAIN: CPT

## 2022-01-01 PROCEDURE — 97530 THERAPEUTIC ACTIVITIES: CPT

## 2022-01-01 PROCEDURE — 81001 URINALYSIS AUTO W/SCOPE: CPT

## 2022-01-01 PROCEDURE — 86850 RBC ANTIBODY SCREEN: CPT

## 2022-01-01 PROCEDURE — 96376 TX/PRO/DX INJ SAME DRUG ADON: CPT

## 2022-01-01 PROCEDURE — G8417 CALC BMI ABV UP PARAM F/U: HCPCS | Performed by: FAMILY MEDICINE

## 2022-01-01 PROCEDURE — 80069 RENAL FUNCTION PANEL: CPT

## 2022-01-01 PROCEDURE — 6370000000 HC RX 637 (ALT 250 FOR IP): Performed by: STUDENT IN AN ORGANIZED HEALTH CARE EDUCATION/TRAINING PROGRAM

## 2022-01-01 PROCEDURE — 97162 PT EVAL MOD COMPLEX 30 MIN: CPT

## 2022-01-01 PROCEDURE — 6360000002 HC RX W HCPCS

## 2022-01-01 PROCEDURE — 6370000000 HC RX 637 (ALT 250 FOR IP): Performed by: SURGERY

## 2022-01-01 PROCEDURE — 86141 C-REACTIVE PROTEIN HS: CPT

## 2022-01-01 PROCEDURE — 99495 TRANSJ CARE MGMT MOD F2F 14D: CPT | Performed by: PHYSICIAN ASSISTANT

## 2022-01-01 PROCEDURE — 51798 US URINE CAPACITY MEASURE: CPT

## 2022-01-01 PROCEDURE — 2580000003 HC RX 258: Performed by: SPECIALIST

## 2022-01-01 PROCEDURE — 85007 BL SMEAR W/DIFF WBC COUNT: CPT

## 2022-01-01 PROCEDURE — 2500000003 HC RX 250 WO HCPCS: Performed by: EMERGENCY MEDICINE

## 2022-01-01 PROCEDURE — 87635 SARS-COV-2 COVID-19 AMP PRB: CPT

## 2022-01-01 PROCEDURE — 83036 HEMOGLOBIN GLYCOSYLATED A1C: CPT

## 2022-01-01 PROCEDURE — M0245 HC IV INFUSION BAMLANIVIMAB & ETESEVIMAB W/MONITORING: HCPCS

## 2022-01-01 PROCEDURE — 6370000000 HC RX 637 (ALT 250 FOR IP): Performed by: NURSE PRACTITIONER

## 2022-01-01 PROCEDURE — 6360000004 HC RX CONTRAST MEDICATION: Performed by: PHYSICIAN ASSISTANT

## 2022-01-01 PROCEDURE — 6370000000 HC RX 637 (ALT 250 FOR IP): Performed by: HOSPITALIST

## 2022-01-01 PROCEDURE — 93005 ELECTROCARDIOGRAM TRACING: CPT | Performed by: EMERGENCY MEDICINE

## 2022-01-01 PROCEDURE — 0W993ZZ DRAINAGE OF RIGHT PLEURAL CAVITY, PERCUTANEOUS APPROACH: ICD-10-PCS | Performed by: STUDENT IN AN ORGANIZED HEALTH CARE EDUCATION/TRAINING PROGRAM

## 2022-01-01 PROCEDURE — 87086 URINE CULTURE/COLONY COUNT: CPT

## 2022-01-01 PROCEDURE — 93971 EXTREMITY STUDY: CPT

## 2022-01-01 PROCEDURE — 87186 SC STD MICRODIL/AGAR DIL: CPT

## 2022-01-01 PROCEDURE — 99285 EMERGENCY DEPT VISIT HI MDM: CPT

## 2022-01-01 PROCEDURE — 87181 SC STD AGAR DILUTION PER AGT: CPT

## 2022-01-01 PROCEDURE — 85018 HEMOGLOBIN: CPT

## 2022-01-01 PROCEDURE — 99232 SBSQ HOSP IP/OBS MODERATE 35: CPT | Performed by: INTERNAL MEDICINE

## 2022-01-01 PROCEDURE — 99223 1ST HOSP IP/OBS HIGH 75: CPT | Performed by: PSYCHIATRY & NEUROLOGY

## 2022-01-01 PROCEDURE — C9113 INJ PANTOPRAZOLE SODIUM, VIA: HCPCS | Performed by: SPECIALIST

## 2022-01-01 PROCEDURE — 2700000000 HC OXYGEN THERAPY PER DAY

## 2022-01-01 PROCEDURE — 2580000003 HC RX 258: Performed by: PHYSICIAN ASSISTANT

## 2022-01-01 PROCEDURE — APPSS45 APP SPLIT SHARED TIME 31-45 MINUTES: Performed by: NURSE PRACTITIONER

## 2022-01-01 PROCEDURE — 6360000002 HC RX W HCPCS: Performed by: NURSE ANESTHETIST, CERTIFIED REGISTERED

## 2022-01-01 PROCEDURE — 83690 ASSAY OF LIPASE: CPT

## 2022-01-01 PROCEDURE — 84443 ASSAY THYROID STIM HORMONE: CPT

## 2022-01-01 PROCEDURE — C1894 INTRO/SHEATH, NON-LASER: HCPCS

## 2022-01-01 PROCEDURE — 70551 MRI BRAIN STEM W/O DYE: CPT

## 2022-01-01 PROCEDURE — 44369 SMALL BOWEL ENDOSCOPY: CPT | Performed by: SPECIALIST

## 2022-01-01 PROCEDURE — 85014 HEMATOCRIT: CPT

## 2022-01-01 PROCEDURE — 0W3P8ZZ CONTROL BLEEDING IN GASTROINTESTINAL TRACT, VIA NATURAL OR ARTIFICIAL OPENING ENDOSCOPIC: ICD-10-PCS | Performed by: SPECIALIST

## 2022-01-01 PROCEDURE — 0DBH8ZX EXCISION OF CECUM, VIA NATURAL OR ARTIFICIAL OPENING ENDOSCOPIC, DIAGNOSTIC: ICD-10-PCS | Performed by: SPECIALIST

## 2022-01-01 PROCEDURE — 96365 THER/PROPH/DIAG IV INF INIT: CPT

## 2022-01-01 PROCEDURE — 87070 CULTURE OTHR SPECIMN AEROBIC: CPT

## 2022-01-01 PROCEDURE — 92526 ORAL FUNCTION THERAPY: CPT | Performed by: SPEECH-LANGUAGE PATHOLOGIST

## 2022-01-01 PROCEDURE — 83605 ASSAY OF LACTIC ACID: CPT

## 2022-01-01 PROCEDURE — 83880 ASSAY OF NATRIURETIC PEPTIDE: CPT

## 2022-01-01 PROCEDURE — 84134 ASSAY OF PREALBUMIN: CPT

## 2022-01-01 PROCEDURE — 93005 ELECTROCARDIOGRAM TRACING: CPT | Performed by: PHYSICIAN ASSISTANT

## 2022-01-01 PROCEDURE — 87077 CULTURE AEROBIC IDENTIFY: CPT

## 2022-01-01 PROCEDURE — 85610 PROTHROMBIN TIME: CPT

## 2022-01-01 PROCEDURE — 86922 COMPATIBILITY TEST ANTIGLOB: CPT

## 2022-01-01 PROCEDURE — 97166 OT EVAL MOD COMPLEX 45 MIN: CPT

## 2022-01-01 PROCEDURE — 3700000001 HC ADD 15 MINUTES (ANESTHESIA): Performed by: SURGERY

## 2022-01-01 PROCEDURE — 1123F ACP DISCUSS/DSCN MKR DOCD: CPT | Performed by: FAMILY MEDICINE

## 2022-01-01 PROCEDURE — 83615 LACTATE (LD) (LDH) ENZYME: CPT

## 2022-01-01 PROCEDURE — 0D1B0Z4 BYPASS ILEUM TO CUTANEOUS, OPEN APPROACH: ICD-10-PCS | Performed by: SURGERY

## 2022-01-01 PROCEDURE — 2500000003 HC RX 250 WO HCPCS: Performed by: NURSE PRACTITIONER

## 2022-01-01 PROCEDURE — G8427 DOCREV CUR MEDS BY ELIG CLIN: HCPCS | Performed by: FAMILY MEDICINE

## 2022-01-01 PROCEDURE — G0328 FECAL BLOOD SCRN IMMUNOASSAY: HCPCS

## 2022-01-01 PROCEDURE — 87324 CLOSTRIDIUM AG IA: CPT

## 2022-01-01 PROCEDURE — 82248 BILIRUBIN DIRECT: CPT

## 2022-01-01 PROCEDURE — 2580000003 HC RX 258: Performed by: NURSE ANESTHETIST, CERTIFIED REGISTERED

## 2022-01-01 PROCEDURE — 97163 PT EVAL HIGH COMPLEX 45 MIN: CPT

## 2022-01-01 PROCEDURE — 99221 1ST HOSP IP/OBS SF/LOW 40: CPT | Performed by: INTERNAL MEDICINE

## 2022-01-01 PROCEDURE — 80162 ASSAY OF DIGOXIN TOTAL: CPT

## 2022-01-01 PROCEDURE — 2580000003 HC RX 258: Performed by: STUDENT IN AN ORGANIZED HEALTH CARE EDUCATION/TRAINING PROGRAM

## 2022-01-01 PROCEDURE — 3609010100 HC COLONOSCOPY STOMA ABLATION LESION: Performed by: SPECIALIST

## 2022-01-01 PROCEDURE — 10030 IMG GID FLU COLL DRG SFT TIS: CPT

## 2022-01-01 PROCEDURE — 84300 ASSAY OF URINE SODIUM: CPT

## 2022-01-01 PROCEDURE — APPSS60 APP SPLIT SHARED TIME 46-60 MINUTES: Performed by: NURSE PRACTITIONER

## 2022-01-01 PROCEDURE — 97110 THERAPEUTIC EXERCISES: CPT

## 2022-01-01 PROCEDURE — 7100000000 HC PACU RECOVERY - FIRST 15 MIN

## 2022-01-01 PROCEDURE — P9045 ALBUMIN (HUMAN), 5%, 250 ML: HCPCS

## 2022-01-01 PROCEDURE — 74177 CT ABD & PELVIS W/CONTRAST: CPT

## 2022-01-01 PROCEDURE — 82378 CARCINOEMBRYONIC ANTIGEN: CPT

## 2022-01-01 PROCEDURE — 74176 CT ABD & PELVIS W/O CONTRAST: CPT

## 2022-01-01 PROCEDURE — C9113 INJ PANTOPRAZOLE SODIUM, VIA: HCPCS | Performed by: INTERNAL MEDICINE

## 2022-01-01 PROCEDURE — 83550 IRON BINDING TEST: CPT

## 2022-01-01 PROCEDURE — 84157 ASSAY OF PROTEIN OTHER: CPT

## 2022-01-01 PROCEDURE — 6360000004 HC RX CONTRAST MEDICATION: Performed by: SURGERY

## 2022-01-01 PROCEDURE — 6360000002 HC RX W HCPCS: Performed by: EMERGENCY MEDICINE

## 2022-01-01 PROCEDURE — 45390 COLONOSCOPY W/RESECTION: CPT | Performed by: SPECIALIST

## 2022-01-01 PROCEDURE — 88304 TISSUE EXAM BY PATHOLOGIST: CPT

## 2022-01-01 PROCEDURE — 3600000004 HC SURGERY LEVEL 4 BASE: Performed by: SURGERY

## 2022-01-01 PROCEDURE — 82570 ASSAY OF URINE CREATININE: CPT

## 2022-01-01 PROCEDURE — 3700000000 HC ANESTHESIA ATTENDED CARE: Performed by: SPECIALIST

## 2022-01-01 PROCEDURE — 02HV33Z INSERTION OF INFUSION DEVICE INTO SUPERIOR VENA CAVA, PERCUTANEOUS APPROACH: ICD-10-PCS | Performed by: SURGERY

## 2022-01-01 PROCEDURE — 6360000002 HC RX W HCPCS: Performed by: STUDENT IN AN ORGANIZED HEALTH CARE EDUCATION/TRAINING PROGRAM

## 2022-01-01 PROCEDURE — 99223 1ST HOSP IP/OBS HIGH 75: CPT | Performed by: INTERNAL MEDICINE

## 2022-01-01 PROCEDURE — 82607 VITAMIN B-12: CPT

## 2022-01-01 PROCEDURE — 88307 TISSUE EXAM BY PATHOLOGIST: CPT

## 2022-01-01 PROCEDURE — 80076 HEPATIC FUNCTION PANEL: CPT

## 2022-01-01 PROCEDURE — 88305 TISSUE EXAM BY PATHOLOGIST: CPT

## 2022-01-01 PROCEDURE — 83935 ASSAY OF URINE OSMOLALITY: CPT

## 2022-01-01 PROCEDURE — 97167 OT EVAL HIGH COMPLEX 60 MIN: CPT

## 2022-01-01 PROCEDURE — 0W9F30Z DRAINAGE OF ABDOMINAL WALL WITH DRAINAGE DEVICE, PERCUTANEOUS APPROACH: ICD-10-PCS | Performed by: INTERNAL MEDICINE

## 2022-01-01 PROCEDURE — 4040F PNEUMOC VAC/ADMIN/RCVD: CPT | Performed by: FAMILY MEDICINE

## 2022-01-01 PROCEDURE — 2500000003 HC RX 250 WO HCPCS

## 2022-01-01 PROCEDURE — 3600000014 HC SURGERY LEVEL 4 ADDTL 15MIN: Performed by: SURGERY

## 2022-01-01 PROCEDURE — C1751 CATH, INF, PER/CENT/MIDLINE: HCPCS

## 2022-01-01 PROCEDURE — 6360000002 HC RX W HCPCS: Performed by: FAMILY MEDICINE

## 2022-01-01 PROCEDURE — 84156 ASSAY OF PROTEIN URINE: CPT

## 2022-01-01 PROCEDURE — 02HV33Z INSERTION OF INFUSION DEVICE INTO SUPERIOR VENA CAVA, PERCUTANEOUS APPROACH: ICD-10-PCS | Performed by: INTERNAL MEDICINE

## 2022-01-01 PROCEDURE — C9113 INJ PANTOPRAZOLE SODIUM, VIA: HCPCS | Performed by: PHYSICIAN ASSISTANT

## 2022-01-01 PROCEDURE — C1729 CATH, DRAINAGE: HCPCS

## 2022-01-01 PROCEDURE — 93005 ELECTROCARDIOGRAM TRACING: CPT | Performed by: STUDENT IN AN ORGANIZED HEALTH CARE EDUCATION/TRAINING PROGRAM

## 2022-01-01 PROCEDURE — 87449 NOS EACH ORGANISM AG IA: CPT

## 2022-01-01 PROCEDURE — 92610 EVALUATE SWALLOWING FUNCTION: CPT

## 2022-01-01 PROCEDURE — 82728 ASSAY OF FERRITIN: CPT

## 2022-01-01 PROCEDURE — 1111F DSCHRG MED/CURRENT MED MERGE: CPT | Performed by: FAMILY MEDICINE

## 2022-01-01 PROCEDURE — 86901 BLOOD TYPING SEROLOGIC RH(D): CPT

## 2022-01-01 PROCEDURE — 2580000003 HC RX 258

## 2022-01-01 PROCEDURE — 3700000001 HC ADD 15 MINUTES (ANESTHESIA): Performed by: SPECIALIST

## 2022-01-01 PROCEDURE — 2500000003 HC RX 250 WO HCPCS: Performed by: NURSE ANESTHETIST, CERTIFIED REGISTERED

## 2022-01-01 PROCEDURE — 99233 SBSQ HOSP IP/OBS HIGH 50: CPT | Performed by: NURSE PRACTITIONER

## 2022-01-01 PROCEDURE — P9016 RBC LEUKOCYTES REDUCED: HCPCS

## 2022-01-01 PROCEDURE — G8484 FLU IMMUNIZE NO ADMIN: HCPCS | Performed by: FAMILY MEDICINE

## 2022-01-01 PROCEDURE — 99213 OFFICE O/P EST LOW 20 MIN: CPT | Performed by: FAMILY MEDICINE

## 2022-01-01 PROCEDURE — 82746 ASSAY OF FOLIC ACID SERUM: CPT

## 2022-01-01 PROCEDURE — 6360000004 HC RX CONTRAST MEDICATION: Performed by: EMERGENCY MEDICINE

## 2022-01-01 PROCEDURE — 96374 THER/PROPH/DIAG INJ IV PUSH: CPT

## 2022-01-01 PROCEDURE — 2500000003 HC RX 250 WO HCPCS: Performed by: FAMILY MEDICINE

## 2022-01-01 PROCEDURE — 99223 1ST HOSP IP/OBS HIGH 75: CPT | Performed by: SPECIALIST

## 2022-01-01 PROCEDURE — 87075 CULTR BACTERIA EXCEPT BLOOD: CPT

## 2022-01-01 PROCEDURE — 96361 HYDRATE IV INFUSION ADD-ON: CPT

## 2022-01-01 PROCEDURE — 0DB80ZZ EXCISION OF SMALL INTESTINE, OPEN APPROACH: ICD-10-PCS | Performed by: SURGERY

## 2022-01-01 PROCEDURE — 82140 ASSAY OF AMMONIA: CPT

## 2022-01-01 PROCEDURE — 92526 ORAL FUNCTION THERAPY: CPT

## 2022-01-01 PROCEDURE — C9113 INJ PANTOPRAZOLE SODIUM, VIA: HCPCS | Performed by: EMERGENCY MEDICINE

## 2022-01-01 PROCEDURE — 99284 EMERGENCY DEPT VISIT MOD MDM: CPT

## 2022-01-01 PROCEDURE — 93005 ELECTROCARDIOGRAM TRACING: CPT | Performed by: INTERNAL MEDICINE

## 2022-01-01 PROCEDURE — 36430 TRANSFUSION BLD/BLD COMPNT: CPT

## 2022-01-01 PROCEDURE — C1769 GUIDE WIRE: HCPCS

## 2022-01-01 PROCEDURE — 2709999900 HC NON-CHARGEABLE SUPPLY: Performed by: SURGERY

## 2022-01-01 PROCEDURE — 32555 ASPIRATE PLEURA W/ IMAGING: CPT

## 2022-01-01 PROCEDURE — 99232 SBSQ HOSP IP/OBS MODERATE 35: CPT | Performed by: SPECIALIST

## 2022-01-01 PROCEDURE — 80061 LIPID PANEL: CPT

## 2022-01-01 PROCEDURE — 76775 US EXAM ABDO BACK WALL LIM: CPT

## 2022-01-01 PROCEDURE — 87150 DNA/RNA AMPLIFIED PROBE: CPT

## 2022-01-01 PROCEDURE — 2580000003 HC RX 258: Performed by: FAMILY MEDICINE

## 2022-01-01 PROCEDURE — 80202 ASSAY OF VANCOMYCIN: CPT

## 2022-01-01 PROCEDURE — 86160 COMPLEMENT ANTIGEN: CPT

## 2022-01-01 PROCEDURE — 1036F TOBACCO NON-USER: CPT | Performed by: FAMILY MEDICINE

## 2022-01-01 PROCEDURE — 93005 ELECTROCARDIOGRAM TRACING: CPT | Performed by: NURSE PRACTITIONER

## 2022-01-01 PROCEDURE — 3700000000 HC ANESTHESIA ATTENDED CARE: Performed by: SURGERY

## 2022-01-01 PROCEDURE — 2720000010 HC SURG SUPPLY STERILE: Performed by: SPECIALIST

## 2022-01-01 PROCEDURE — 6360000004 HC RX CONTRAST MEDICATION: Performed by: SPECIALIST

## 2022-01-01 PROCEDURE — 6370000000 HC RX 637 (ALT 250 FOR IP)

## 2022-01-01 PROCEDURE — 83540 ASSAY OF IRON: CPT

## 2022-01-01 PROCEDURE — 0DBK8ZX EXCISION OF ASCENDING COLON, VIA NATURAL OR ARTIFICIAL OPENING ENDOSCOPIC, DIAGNOSTIC: ICD-10-PCS | Performed by: SPECIALIST

## 2022-01-01 PROCEDURE — 96375 TX/PRO/DX INJ NEW DRUG ADDON: CPT

## 2022-01-01 PROCEDURE — 99222 1ST HOSP IP/OBS MODERATE 55: CPT | Performed by: INTERNAL MEDICINE

## 2022-01-01 PROCEDURE — 6360000002 HC RX W HCPCS: Performed by: PHYSICIAN ASSISTANT

## 2022-01-01 PROCEDURE — 97112 NEUROMUSCULAR REEDUCATION: CPT

## 2022-01-01 PROCEDURE — 82272 OCCULT BLD FECES 1-3 TESTS: CPT

## 2022-01-01 PROCEDURE — 3609014100 HC ENTEROSCOPY > 2ND PRTN W/CONTROL BLEEDING: Performed by: SPECIALIST

## 2022-01-01 PROCEDURE — 36569 INSJ PICC 5 YR+ W/O IMAGING: CPT

## 2022-01-01 PROCEDURE — 0W9B3ZZ DRAINAGE OF LEFT PLEURAL CAVITY, PERCUTANEOUS APPROACH: ICD-10-PCS | Performed by: STUDENT IN AN ORGANIZED HEALTH CARE EDUCATION/TRAINING PROGRAM

## 2022-01-01 PROCEDURE — 2720000010 HC SURG SUPPLY STERILE: Performed by: SURGERY

## 2022-01-01 RX ORDER — ALBUMIN (HUMAN) 12.5 G/50ML
25 SOLUTION INTRAVENOUS ONCE
Status: COMPLETED | OUTPATIENT
Start: 2022-01-01 | End: 2022-01-01

## 2022-01-01 RX ORDER — DIPHENHYDRAMINE HYDROCHLORIDE 50 MG/ML
12.5 INJECTION INTRAMUSCULAR; INTRAVENOUS
Status: DISCONTINUED | OUTPATIENT
Start: 2022-01-01 | End: 2022-01-01 | Stop reason: HOSPADM

## 2022-01-01 RX ORDER — DIGOXIN 0.25 MG/ML
250 INJECTION INTRAMUSCULAR; INTRAVENOUS DAILY
Status: DISCONTINUED | OUTPATIENT
Start: 2022-01-01 | End: 2022-01-01

## 2022-01-01 RX ORDER — SODIUM CHLORIDE 9 MG/ML
INJECTION, SOLUTION INTRAVENOUS CONTINUOUS
Status: ACTIVE | OUTPATIENT
Start: 2022-01-01 | End: 2022-01-01

## 2022-01-01 RX ORDER — DIGOXIN 0.25 MG/ML
625 INJECTION INTRAMUSCULAR; INTRAVENOUS ONCE
Status: COMPLETED | OUTPATIENT
Start: 2022-01-01 | End: 2022-01-01

## 2022-01-01 RX ORDER — MAGNESIUM SULFATE IN WATER 40 MG/ML
2000 INJECTION, SOLUTION INTRAVENOUS ONCE
Status: COMPLETED | OUTPATIENT
Start: 2022-01-01 | End: 2022-01-01

## 2022-01-01 RX ORDER — FENTANYL CITRATE 50 UG/ML
12.5 INJECTION, SOLUTION INTRAMUSCULAR; INTRAVENOUS EVERY 4 HOURS PRN
Status: DISCONTINUED | OUTPATIENT
Start: 2022-01-01 | End: 2022-01-01

## 2022-01-01 RX ORDER — 0.9 % SODIUM CHLORIDE 0.9 %
500 INTRAVENOUS SOLUTION INTRAVENOUS ONCE
Status: COMPLETED | OUTPATIENT
Start: 2022-01-01 | End: 2022-01-01

## 2022-01-01 RX ORDER — SODIUM CHLORIDE 0.9 % (FLUSH) 0.9 %
10 SYRINGE (ML) INJECTION PRN
Status: DISCONTINUED | OUTPATIENT
Start: 2022-01-01 | End: 2022-05-16 | Stop reason: HOSPADM

## 2022-01-01 RX ORDER — LORAZEPAM 2 MG/ML
0.5 INJECTION INTRAMUSCULAR EVERY 6 HOURS PRN
Status: DISCONTINUED | OUTPATIENT
Start: 2022-01-01 | End: 2022-01-01

## 2022-01-01 RX ORDER — SODIUM CHLORIDE 0.9 % (FLUSH) 0.9 %
10 SYRINGE (ML) INJECTION PRN
Status: DISCONTINUED | OUTPATIENT
Start: 2022-01-01 | End: 2022-01-01 | Stop reason: HOSPADM

## 2022-01-01 RX ORDER — PROPOFOL 10 MG/ML
INJECTION, EMULSION INTRAVENOUS PRN
Status: DISCONTINUED | OUTPATIENT
Start: 2022-01-01 | End: 2022-01-01 | Stop reason: SDUPTHER

## 2022-01-01 RX ORDER — METOPROLOL TARTRATE 5 MG/5ML
5 INJECTION INTRAVENOUS ONCE
Status: COMPLETED | OUTPATIENT
Start: 2022-01-01 | End: 2022-01-01

## 2022-01-01 RX ORDER — IPRATROPIUM BROMIDE AND ALBUTEROL SULFATE 2.5; .5 MG/3ML; MG/3ML
1 SOLUTION RESPIRATORY (INHALATION)
Status: DISCONTINUED | OUTPATIENT
Start: 2022-01-01 | End: 2022-01-01 | Stop reason: HOSPADM

## 2022-01-01 RX ORDER — FUROSEMIDE 10 MG/ML
20 INJECTION INTRAMUSCULAR; INTRAVENOUS ONCE
Status: COMPLETED | OUTPATIENT
Start: 2022-01-01 | End: 2022-01-01

## 2022-01-01 RX ORDER — OXYCODONE HYDROCHLORIDE AND ACETAMINOPHEN 5; 325 MG/1; MG/1
1 TABLET ORAL EVERY 6 HOURS PRN
Status: DISCONTINUED | OUTPATIENT
Start: 2022-01-01 | End: 2022-01-01

## 2022-01-01 RX ORDER — LIDOCAINE HYDROCHLORIDE 20 MG/ML
INJECTION, SOLUTION EPIDURAL; INFILTRATION; INTRACAUDAL; PERINEURAL PRN
Status: DISCONTINUED | OUTPATIENT
Start: 2022-01-01 | End: 2022-01-01 | Stop reason: SDUPTHER

## 2022-01-01 RX ORDER — FUROSEMIDE 10 MG/ML
80 INJECTION INTRAMUSCULAR; INTRAVENOUS 2 TIMES DAILY
Status: DISCONTINUED | OUTPATIENT
Start: 2022-01-01 | End: 2022-01-01

## 2022-01-01 RX ORDER — METOPROLOL TARTRATE 5 MG/5ML
5 INJECTION INTRAVENOUS EVERY 6 HOURS
Status: DISCONTINUED | OUTPATIENT
Start: 2022-01-01 | End: 2022-01-01 | Stop reason: SDUPTHER

## 2022-01-01 RX ORDER — SUCCINYLCHOLINE/SOD CL,ISO/PF 100 MG/5ML
SYRINGE (ML) INTRAVENOUS PRN
Status: DISCONTINUED | OUTPATIENT
Start: 2022-01-01 | End: 2022-01-01 | Stop reason: SDUPTHER

## 2022-01-01 RX ORDER — DEXTROSE AND SODIUM CHLORIDE 5; .45 G/100ML; G/100ML
INJECTION, SOLUTION INTRAVENOUS CONTINUOUS
Status: DISCONTINUED | OUTPATIENT
Start: 2022-01-01 | End: 2022-01-01

## 2022-01-01 RX ORDER — DILTIAZEM HYDROCHLORIDE 240 MG/1
240 CAPSULE, COATED, EXTENDED RELEASE ORAL DAILY
Status: DISCONTINUED | OUTPATIENT
Start: 2022-01-01 | End: 2022-01-01

## 2022-01-01 RX ORDER — PANTOPRAZOLE SODIUM 40 MG/1
40 TABLET, DELAYED RELEASE ORAL
Status: DISCONTINUED | OUTPATIENT
Start: 2022-01-01 | End: 2022-01-01 | Stop reason: HOSPADM

## 2022-01-01 RX ORDER — M-VIT,TX,IRON,MINS/CALC/FOLIC 27MG-0.4MG
1 TABLET ORAL DAILY
Status: DISCONTINUED | OUTPATIENT
Start: 2022-01-01 | End: 2022-01-01

## 2022-01-01 RX ORDER — DIGOXIN 0.25 MG/ML
125 INJECTION INTRAMUSCULAR; INTRAVENOUS DAILY
Status: DISCONTINUED | OUTPATIENT
Start: 2022-01-01 | End: 2022-01-01

## 2022-01-01 RX ORDER — SODIUM CHLORIDE 0.9 % (FLUSH) 0.9 %
5-40 SYRINGE (ML) INJECTION PRN
Status: DISCONTINUED | OUTPATIENT
Start: 2022-01-01 | End: 2022-05-16 | Stop reason: HOSPADM

## 2022-01-01 RX ORDER — DIGOXIN 0.25 MG/ML
250 INJECTION INTRAMUSCULAR; INTRAVENOUS ONCE
Status: COMPLETED | OUTPATIENT
Start: 2022-01-01 | End: 2022-01-01

## 2022-01-01 RX ORDER — LIDOCAINE HYDROCHLORIDE 20 MG/ML
INJECTION, SOLUTION INFILTRATION; PERINEURAL PRN
Status: DISCONTINUED | OUTPATIENT
Start: 2022-01-01 | End: 2022-01-01 | Stop reason: SDUPTHER

## 2022-01-01 RX ORDER — FERROUS SULFATE 325(65) MG
325 TABLET ORAL 2 TIMES DAILY
Status: DISCONTINUED | OUTPATIENT
Start: 2022-01-01 | End: 2022-01-01

## 2022-01-01 RX ORDER — FERROUS SULFATE 325(65) MG
325 TABLET ORAL 2 TIMES DAILY
Qty: 60 TABLET | Refills: 1 | Status: SHIPPED | OUTPATIENT
Start: 2022-01-01 | End: 2022-01-01 | Stop reason: SDUPTHER

## 2022-01-01 RX ORDER — ACETAMINOPHEN 650 MG/1
650 SUPPOSITORY RECTAL EVERY 6 HOURS PRN
Status: DISCONTINUED | OUTPATIENT
Start: 2022-01-01 | End: 2022-01-01

## 2022-01-01 RX ORDER — CALCIUM GLUCONATE 20 MG/ML
2000 INJECTION, SOLUTION INTRAVENOUS ONCE
Status: COMPLETED | OUTPATIENT
Start: 2022-01-01 | End: 2022-01-01

## 2022-01-01 RX ORDER — ONDANSETRON 4 MG/1
4 TABLET, ORALLY DISINTEGRATING ORAL EVERY 8 HOURS PRN
Status: DISCONTINUED | OUTPATIENT
Start: 2022-01-01 | End: 2022-01-01

## 2022-01-01 RX ORDER — SODIUM CHLORIDE 9 MG/ML
INJECTION, SOLUTION INTRAVENOUS PRN
Status: DISCONTINUED | OUTPATIENT
Start: 2022-01-01 | End: 2022-01-01

## 2022-01-01 RX ORDER — PANTOPRAZOLE SODIUM 40 MG/10ML
40 INJECTION, POWDER, LYOPHILIZED, FOR SOLUTION INTRAVENOUS EVERY 12 HOURS
Status: DISCONTINUED | OUTPATIENT
Start: 2022-01-01 | End: 2022-01-01

## 2022-01-01 RX ORDER — FERROUS SULFATE 325(65) MG
325 TABLET ORAL DAILY
Status: DISCONTINUED | OUTPATIENT
Start: 2022-01-01 | End: 2022-01-01

## 2022-01-01 RX ORDER — METOPROLOL TARTRATE 5 MG/5ML
5 INJECTION INTRAVENOUS EVERY 4 HOURS
Status: DISCONTINUED | OUTPATIENT
Start: 2022-01-01 | End: 2022-01-01

## 2022-01-01 RX ORDER — MEPERIDINE HYDROCHLORIDE 25 MG/ML
12.5 INJECTION INTRAMUSCULAR; INTRAVENOUS; SUBCUTANEOUS EVERY 5 MIN PRN
Status: DISCONTINUED | OUTPATIENT
Start: 2022-01-01 | End: 2022-01-01 | Stop reason: HOSPADM

## 2022-01-01 RX ORDER — ACETAMINOPHEN 650 MG/1
650 SUPPOSITORY RECTAL EVERY 6 HOURS PRN
Status: DISCONTINUED | OUTPATIENT
Start: 2022-01-01 | End: 2022-01-01 | Stop reason: HOSPADM

## 2022-01-01 RX ORDER — DILTIAZEM HYDROCHLORIDE 240 MG/1
CAPSULE, EXTENDED RELEASE ORAL
Qty: 90 CAPSULE | Refills: 1 | Status: SHIPPED | OUTPATIENT
Start: 2022-01-01 | End: 2022-01-01 | Stop reason: SDUPTHER

## 2022-01-01 RX ORDER — ASPIRIN 81 MG/1
81 TABLET, CHEWABLE ORAL DAILY
Status: DISCONTINUED | OUTPATIENT
Start: 2022-01-01 | End: 2022-01-01 | Stop reason: HOSPADM

## 2022-01-01 RX ORDER — ACETAMINOPHEN 325 MG/1
650 TABLET ORAL EVERY 6 HOURS PRN
Status: DISCONTINUED | OUTPATIENT
Start: 2022-01-01 | End: 2022-01-01 | Stop reason: HOSPADM

## 2022-01-01 RX ORDER — DEXTROSE MONOHYDRATE 25 G/50ML
12.5 INJECTION, SOLUTION INTRAVENOUS PRN
Status: DISCONTINUED | OUTPATIENT
Start: 2022-01-01 | End: 2022-01-01 | Stop reason: CLARIF

## 2022-01-01 RX ORDER — SODIUM CHLORIDE 0.9 % (FLUSH) 0.9 %
5-40 SYRINGE (ML) INJECTION EVERY 12 HOURS SCHEDULED
Status: DISCONTINUED | OUTPATIENT
Start: 2022-01-01 | End: 2022-01-01

## 2022-01-01 RX ORDER — ONDANSETRON 2 MG/ML
4 INJECTION INTRAMUSCULAR; INTRAVENOUS EVERY 6 HOURS PRN
Status: DISCONTINUED | OUTPATIENT
Start: 2022-01-01 | End: 2022-01-01

## 2022-01-01 RX ORDER — FERROUS SULFATE 325(65) MG
325 TABLET ORAL 2 TIMES DAILY
Qty: 180 TABLET | Refills: 1 | Status: SHIPPED | OUTPATIENT
Start: 2022-01-01

## 2022-01-01 RX ORDER — DEXTROSE AND SODIUM CHLORIDE 5; .9 G/100ML; G/100ML
INJECTION, SOLUTION INTRAVENOUS CONTINUOUS
Status: DISCONTINUED | OUTPATIENT
Start: 2022-01-01 | End: 2022-01-01 | Stop reason: HOSPADM

## 2022-01-01 RX ORDER — LANSOPRAZOLE
30 KIT
Status: DISCONTINUED | OUTPATIENT
Start: 2022-01-01 | End: 2022-01-01

## 2022-01-01 RX ORDER — KETAMINE HCL 50MG/ML(1)
SYRINGE (ML) INTRAVENOUS PRN
Status: DISCONTINUED | OUTPATIENT
Start: 2022-01-01 | End: 2022-01-01 | Stop reason: SDUPTHER

## 2022-01-01 RX ORDER — SODIUM CHLORIDE 9 MG/ML
25 INJECTION, SOLUTION INTRAVENOUS PRN
Status: DISCONTINUED | OUTPATIENT
Start: 2022-01-01 | End: 2022-01-01 | Stop reason: HOSPADM

## 2022-01-01 RX ORDER — SODIUM CHLORIDE 9 MG/ML
INJECTION, SOLUTION INTRAVENOUS CONTINUOUS
Status: DISCONTINUED | OUTPATIENT
Start: 2022-01-01 | End: 2022-01-01

## 2022-01-01 RX ORDER — LORAZEPAM 2 MG/ML
1 INJECTION INTRAMUSCULAR EVERY 6 HOURS PRN
Status: DISCONTINUED | OUTPATIENT
Start: 2022-01-01 | End: 2022-01-01

## 2022-01-01 RX ORDER — LOPERAMIDE HYDROCHLORIDE 2 MG/1
4 CAPSULE ORAL 2 TIMES DAILY
Status: DISCONTINUED | OUTPATIENT
Start: 2022-01-01 | End: 2022-01-01 | Stop reason: HOSPADM

## 2022-01-01 RX ORDER — INSULIN LISPRO 100 [IU]/ML
0-3 INJECTION, SOLUTION INTRAVENOUS; SUBCUTANEOUS
Status: DISCONTINUED | OUTPATIENT
Start: 2022-01-01 | End: 2022-05-16 | Stop reason: HOSPADM

## 2022-01-01 RX ORDER — DIGOXIN 125 MCG
0.06 TABLET ORAL ONCE
Status: DISCONTINUED | OUTPATIENT
Start: 2022-01-01 | End: 2022-01-01

## 2022-01-01 RX ORDER — LACTOBACILLUS RHAMNOSUS GG 10B CELL
1 CAPSULE ORAL
Status: COMPLETED | OUTPATIENT
Start: 2022-01-01 | End: 2022-01-01

## 2022-01-01 RX ORDER — SODIUM CHLORIDE, SODIUM LACTATE, POTASSIUM CHLORIDE, CALCIUM CHLORIDE 600; 310; 30; 20 MG/100ML; MG/100ML; MG/100ML; MG/100ML
INJECTION, SOLUTION INTRAVENOUS CONTINUOUS PRN
Status: DISCONTINUED | OUTPATIENT
Start: 2022-01-01 | End: 2022-01-01 | Stop reason: SDUPTHER

## 2022-01-01 RX ORDER — HALOPERIDOL 5 MG/ML
1 INJECTION INTRAMUSCULAR EVERY 4 HOURS PRN
Status: CANCELLED | OUTPATIENT
Start: 2022-01-01

## 2022-01-01 RX ORDER — ONDANSETRON 2 MG/ML
4 INJECTION INTRAMUSCULAR; INTRAVENOUS EVERY 6 HOURS PRN
Status: DISCONTINUED | OUTPATIENT
Start: 2022-01-01 | End: 2022-01-01 | Stop reason: HOSPADM

## 2022-01-01 RX ORDER — PROMETHAZINE HYDROCHLORIDE 25 MG/ML
25 INJECTION, SOLUTION INTRAMUSCULAR; INTRAVENOUS ONCE
Status: COMPLETED | OUTPATIENT
Start: 2022-01-01 | End: 2022-01-01

## 2022-01-01 RX ORDER — DILTIAZEM HYDROCHLORIDE 120 MG/1
240 CAPSULE, COATED, EXTENDED RELEASE ORAL DAILY
Status: DISCONTINUED | OUTPATIENT
Start: 2022-01-01 | End: 2022-01-01 | Stop reason: HOSPADM

## 2022-01-01 RX ORDER — LORAZEPAM 2 MG/ML
1 INJECTION INTRAMUSCULAR EVERY 4 HOURS PRN
Status: DISCONTINUED | OUTPATIENT
Start: 2022-01-01 | End: 2022-05-16 | Stop reason: HOSPADM

## 2022-01-01 RX ORDER — SODIUM CHLORIDE 0.9 % (FLUSH) 0.9 %
5-40 SYRINGE (ML) INJECTION PRN
Status: DISCONTINUED | OUTPATIENT
Start: 2022-01-01 | End: 2022-01-01 | Stop reason: HOSPADM

## 2022-01-01 RX ORDER — ISOSORBIDE MONONITRATE 30 MG/1
TABLET, EXTENDED RELEASE ORAL
Qty: 90 TABLET | Refills: 0 | OUTPATIENT
Start: 2022-01-01

## 2022-01-01 RX ORDER — FUROSEMIDE 10 MG/ML
60 INJECTION INTRAMUSCULAR; INTRAVENOUS ONCE
Status: COMPLETED | OUTPATIENT
Start: 2022-01-01 | End: 2022-01-01

## 2022-01-01 RX ORDER — MORPHINE SULFATE 2 MG/ML
1 INJECTION, SOLUTION INTRAMUSCULAR; INTRAVENOUS
Status: DISCONTINUED | OUTPATIENT
Start: 2022-01-01 | End: 2022-01-01

## 2022-01-01 RX ORDER — MORPHINE SULFATE 4 MG/ML
4 INJECTION INTRAVENOUS
Status: DISCONTINUED | OUTPATIENT
Start: 2022-01-01 | End: 2022-01-01 | Stop reason: HOSPADM

## 2022-01-01 RX ORDER — HYDROXYUREA 500 MG/1
1000 CAPSULE ORAL DAILY
Status: DISCONTINUED | OUTPATIENT
Start: 2022-01-01 | End: 2022-01-01 | Stop reason: HOSPADM

## 2022-01-01 RX ORDER — LEVOFLOXACIN 500 MG/1
250 TABLET, FILM COATED ORAL DAILY
Status: DISCONTINUED | OUTPATIENT
Start: 2022-01-01 | End: 2022-01-01

## 2022-01-01 RX ORDER — MORPHINE SULFATE 2 MG/ML
2 INJECTION, SOLUTION INTRAMUSCULAR; INTRAVENOUS EVERY 4 HOURS PRN
Status: DISCONTINUED | OUTPATIENT
Start: 2022-01-01 | End: 2022-05-16 | Stop reason: HOSPADM

## 2022-01-01 RX ORDER — FOLIC ACID 1 MG/1
1 TABLET ORAL DAILY
Status: DISCONTINUED | OUTPATIENT
Start: 2022-01-01 | End: 2022-01-01

## 2022-01-01 RX ORDER — DILTIAZEM HYDROCHLORIDE 5 MG/ML
2.5 INJECTION INTRAVENOUS ONCE
Status: COMPLETED | OUTPATIENT
Start: 2022-01-01 | End: 2022-01-01

## 2022-01-01 RX ORDER — LEVOFLOXACIN 5 MG/ML
250 INJECTION, SOLUTION INTRAVENOUS DAILY
Status: DISCONTINUED | OUTPATIENT
Start: 2022-01-01 | End: 2022-01-01

## 2022-01-01 RX ORDER — HALOPERIDOL 5 MG/ML
0.5 INJECTION INTRAMUSCULAR EVERY 4 HOURS PRN
Status: DISCONTINUED | OUTPATIENT
Start: 2022-01-01 | End: 2022-01-01

## 2022-01-01 RX ORDER — SODIUM CHLORIDE 0.9 % (FLUSH) 0.9 %
5-40 SYRINGE (ML) INJECTION EVERY 12 HOURS SCHEDULED
Status: DISCONTINUED | OUTPATIENT
Start: 2022-01-01 | End: 2022-01-01 | Stop reason: HOSPADM

## 2022-01-01 RX ORDER — FOLIC ACID 1 MG/1
1 TABLET ORAL DAILY
Status: DISCONTINUED | OUTPATIENT
Start: 2022-01-01 | End: 2022-01-01 | Stop reason: HOSPADM

## 2022-01-01 RX ORDER — LINEZOLID 2 MG/ML
600 INJECTION, SOLUTION INTRAVENOUS EVERY 12 HOURS
Status: DISCONTINUED | OUTPATIENT
Start: 2022-01-01 | End: 2022-01-01

## 2022-01-01 RX ORDER — DIPHENHYDRAMINE HCL 25 MG
50 TABLET ORAL NIGHTLY PRN
Status: DISCONTINUED | OUTPATIENT
Start: 2022-01-01 | End: 2022-05-16 | Stop reason: HOSPADM

## 2022-01-01 RX ORDER — CHOLESTYRAMINE LIGHT 4 G/5.7G
1 POWDER, FOR SUSPENSION ORAL 2 TIMES DAILY
Status: DISCONTINUED | OUTPATIENT
Start: 2022-01-01 | End: 2022-01-01

## 2022-01-01 RX ORDER — PANTOPRAZOLE SODIUM 40 MG/1
40 TABLET, DELAYED RELEASE ORAL DAILY
Qty: 30 TABLET | Refills: 5 | Status: SHIPPED | OUTPATIENT
Start: 2022-01-01 | End: 2022-01-01 | Stop reason: SDUPTHER

## 2022-01-01 RX ORDER — DILTIAZEM HYDROCHLORIDE 240 MG/1
240 CAPSULE, COATED, EXTENDED RELEASE ORAL DAILY
Status: DISCONTINUED | OUTPATIENT
Start: 2022-01-01 | End: 2022-01-01 | Stop reason: HOSPADM

## 2022-01-01 RX ORDER — POLYETHYLENE GLYCOL 3350 17 G/17G
17 POWDER, FOR SOLUTION ORAL DAILY PRN
Status: DISCONTINUED | OUTPATIENT
Start: 2022-01-01 | End: 2022-01-01

## 2022-01-01 RX ORDER — ACETAMINOPHEN 160 MG/5ML
10 SUSPENSION, ORAL (FINAL DOSE FORM) ORAL EVERY 6 HOURS PRN
Status: DISCONTINUED | OUTPATIENT
Start: 2022-01-01 | End: 2022-01-01 | Stop reason: CLARIF

## 2022-01-01 RX ORDER — TRANEXAMIC ACID 10 MG/ML
1000 INJECTION, SOLUTION INTRAVENOUS ONCE
Status: DISCONTINUED | OUTPATIENT
Start: 2022-01-01 | End: 2022-01-01

## 2022-01-01 RX ORDER — LIDOCAINE HYDROCHLORIDE AND EPINEPHRINE 10; 10 MG/ML; UG/ML
20 INJECTION, SOLUTION INFILTRATION; PERINEURAL ONCE
Status: COMPLETED | OUTPATIENT
Start: 2022-01-01 | End: 2022-01-01

## 2022-01-01 RX ORDER — HYDROXYUREA 500 MG/1
CAPSULE ORAL
Qty: 180 CAPSULE | Refills: 1 | Status: ON HOLD | OUTPATIENT
Start: 2022-01-01 | End: 2022-01-01 | Stop reason: HOSPADM

## 2022-01-01 RX ORDER — LACTOBACILLUS RHAMNOSUS GG 10B CELL
1 CAPSULE ORAL
Status: DISCONTINUED | OUTPATIENT
Start: 2022-01-01 | End: 2022-01-01

## 2022-01-01 RX ORDER — HYDROXYZINE HYDROCHLORIDE 50 MG/ML
50 INJECTION, SOLUTION INTRAMUSCULAR ONCE
Status: COMPLETED | OUTPATIENT
Start: 2022-01-01 | End: 2022-01-01

## 2022-01-01 RX ORDER — DILTIAZEM HYDROCHLORIDE 120 MG/1
120 CAPSULE, COATED, EXTENDED RELEASE ORAL DAILY
Qty: 30 CAPSULE | Refills: 3 | Status: SHIPPED | OUTPATIENT
Start: 2022-01-01

## 2022-01-01 RX ORDER — FOLIC ACID 1 MG/1
1 TABLET ORAL DAILY
Qty: 90 TABLET | Refills: 1 | Status: SHIPPED | OUTPATIENT
Start: 2022-01-01 | End: 2022-01-01 | Stop reason: SDUPTHER

## 2022-01-01 RX ORDER — ASPIRIN 81 MG/1
81 TABLET, CHEWABLE ORAL DAILY
Qty: 30 TABLET | Refills: 5 | Status: SHIPPED | OUTPATIENT
Start: 2022-01-01

## 2022-01-01 RX ORDER — FOLIC ACID 1 MG/1
1 TABLET ORAL DAILY
Qty: 90 TABLET | Refills: 1 | Status: SHIPPED | OUTPATIENT
Start: 2022-01-01

## 2022-01-01 RX ORDER — MORPHINE SULFATE 2 MG/ML
2 INJECTION, SOLUTION INTRAMUSCULAR; INTRAVENOUS EVERY 4 HOURS PRN
Status: DISCONTINUED | OUTPATIENT
Start: 2022-01-01 | End: 2022-01-01

## 2022-01-01 RX ORDER — ROCURONIUM BROMIDE 10 MG/ML
INJECTION, SOLUTION INTRAVENOUS PRN
Status: DISCONTINUED | OUTPATIENT
Start: 2022-01-01 | End: 2022-01-01 | Stop reason: SDUPTHER

## 2022-01-01 RX ORDER — MORPHINE SULFATE 2 MG/ML
2 INJECTION, SOLUTION INTRAMUSCULAR; INTRAVENOUS
Status: DISCONTINUED | OUTPATIENT
Start: 2022-01-01 | End: 2022-01-01

## 2022-01-01 RX ORDER — FUROSEMIDE 10 MG/ML
20 INJECTION INTRAMUSCULAR; INTRAVENOUS DAILY
Status: COMPLETED | OUTPATIENT
Start: 2022-01-01 | End: 2022-01-01

## 2022-01-01 RX ORDER — CALCIUM GLUCONATE 20 MG/ML
1000 INJECTION, SOLUTION INTRAVENOUS ONCE
Status: COMPLETED | OUTPATIENT
Start: 2022-01-01 | End: 2022-01-01

## 2022-01-01 RX ORDER — MORPHINE SULFATE 2 MG/ML
2 INJECTION, SOLUTION INTRAMUSCULAR; INTRAVENOUS EVERY 4 HOURS PRN
Status: DISCONTINUED | OUTPATIENT
Start: 2022-01-01 | End: 2022-01-01 | Stop reason: SDUPTHER

## 2022-01-01 RX ORDER — SODIUM CHLORIDE 9 MG/ML
INJECTION, SOLUTION INTRAVENOUS PRN
Status: DISCONTINUED | OUTPATIENT
Start: 2022-01-01 | End: 2022-05-16 | Stop reason: HOSPADM

## 2022-01-01 RX ORDER — METOPROLOL TARTRATE 5 MG/5ML
5 INJECTION INTRAVENOUS EVERY 6 HOURS PRN
Status: DISCONTINUED | OUTPATIENT
Start: 2022-01-01 | End: 2022-01-01

## 2022-01-01 RX ORDER — LIDOCAINE HYDROCHLORIDE 10 MG/ML
5 INJECTION, SOLUTION EPIDURAL; INFILTRATION; INTRACAUDAL; PERINEURAL ONCE
Status: DISCONTINUED | OUTPATIENT
Start: 2022-01-01 | End: 2022-01-01

## 2022-01-01 RX ORDER — VITAMIN E 268 MG
400 CAPSULE ORAL EVERY OTHER DAY
Status: DISCONTINUED | OUTPATIENT
Start: 2022-01-01 | End: 2022-01-01

## 2022-01-01 RX ORDER — PHENYLEPHRINE HCL IN 0.9% NACL 1 MG/10 ML
SYRINGE (ML) INTRAVENOUS PRN
Status: DISCONTINUED | OUTPATIENT
Start: 2022-01-01 | End: 2022-01-01

## 2022-01-01 RX ORDER — HYDRALAZINE HYDROCHLORIDE 20 MG/ML
10 INJECTION INTRAMUSCULAR; INTRAVENOUS
Status: DISCONTINUED | OUTPATIENT
Start: 2022-01-01 | End: 2022-01-01 | Stop reason: HOSPADM

## 2022-01-01 RX ORDER — DIGOXIN 125 MCG
125 TABLET ORAL DAILY
Qty: 30 TABLET | Refills: 3 | Status: SHIPPED | OUTPATIENT
Start: 2022-01-01

## 2022-01-01 RX ORDER — ISOSORBIDE MONONITRATE 30 MG/1
30 TABLET, EXTENDED RELEASE ORAL DAILY
Status: DISCONTINUED | OUTPATIENT
Start: 2022-01-01 | End: 2022-01-01

## 2022-01-01 RX ORDER — PANTOPRAZOLE SODIUM 40 MG/1
40 TABLET, DELAYED RELEASE ORAL DAILY
Qty: 90 TABLET | Refills: 1 | Status: SHIPPED | OUTPATIENT
Start: 2022-01-01

## 2022-01-01 RX ORDER — GLYCOPYRROLATE 1 MG/5 ML
0.2 SYRINGE (ML) INTRAVENOUS EVERY 4 HOURS PRN
Status: DISCONTINUED | OUTPATIENT
Start: 2022-01-01 | End: 2022-05-16 | Stop reason: HOSPADM

## 2022-01-01 RX ORDER — PANTOPRAZOLE SODIUM 40 MG/10ML
40 INJECTION, POWDER, LYOPHILIZED, FOR SOLUTION INTRAVENOUS ONCE
Status: COMPLETED | OUTPATIENT
Start: 2022-01-01 | End: 2022-01-01

## 2022-01-01 RX ORDER — DILTIAZEM HYDROCHLORIDE 5 MG/ML
10 INJECTION INTRAVENOUS ONCE
Status: COMPLETED | OUTPATIENT
Start: 2022-01-01 | End: 2022-01-01

## 2022-01-01 RX ORDER — METOPROLOL TARTRATE 50 MG/1
50 TABLET, FILM COATED ORAL ONCE
Status: COMPLETED | OUTPATIENT
Start: 2022-01-01 | End: 2022-01-01

## 2022-01-01 RX ORDER — PANTOPRAZOLE SODIUM 40 MG/1
40 TABLET, DELAYED RELEASE ORAL
Status: DISCONTINUED | OUTPATIENT
Start: 2022-01-01 | End: 2022-01-01 | Stop reason: SDUPTHER

## 2022-01-01 RX ORDER — MAGNESIUM HYDROXIDE/ALUMINUM HYDROXICE/SIMETHICONE 120; 1200; 1200 MG/30ML; MG/30ML; MG/30ML
30 SUSPENSION ORAL EVERY 6 HOURS PRN
Status: DISCONTINUED | OUTPATIENT
Start: 2022-01-01 | End: 2022-01-01 | Stop reason: HOSPADM

## 2022-01-01 RX ORDER — DILTIAZEM HYDROCHLORIDE 240 MG/1
CAPSULE, EXTENDED RELEASE ORAL
Qty: 90 CAPSULE | Refills: 1 | Status: ON HOLD | OUTPATIENT
Start: 2022-01-01 | End: 2022-01-01 | Stop reason: HOSPADM

## 2022-01-01 RX ORDER — PHENYLEPHRINE HYDROCHLORIDE 10 MG/ML
INJECTION INTRAVENOUS PRN
Status: DISCONTINUED | OUTPATIENT
Start: 2022-01-01 | End: 2022-01-01 | Stop reason: SDUPTHER

## 2022-01-01 RX ORDER — MORPHINE SULFATE 4 MG/ML
4 INJECTION, SOLUTION INTRAMUSCULAR; INTRAVENOUS ONCE
Status: COMPLETED | OUTPATIENT
Start: 2022-01-01 | End: 2022-01-01

## 2022-01-01 RX ORDER — M-VIT,TX,IRON,MINS/CALC/FOLIC 27MG-0.4MG
1 TABLET ORAL DAILY
Status: DISCONTINUED | OUTPATIENT
Start: 2022-01-01 | End: 2022-01-01 | Stop reason: HOSPADM

## 2022-01-01 RX ORDER — METOCLOPRAMIDE HYDROCHLORIDE 5 MG/ML
5 INJECTION INTRAMUSCULAR; INTRAVENOUS ONCE
Status: COMPLETED | OUTPATIENT
Start: 2022-01-01 | End: 2022-01-01

## 2022-01-01 RX ORDER — INSULIN LISPRO 100 [IU]/ML
0-6 INJECTION, SOLUTION INTRAVENOUS; SUBCUTANEOUS
Status: DISCONTINUED | OUTPATIENT
Start: 2022-01-01 | End: 2022-05-16 | Stop reason: HOSPADM

## 2022-01-01 RX ORDER — ONDANSETRON 2 MG/ML
4 INJECTION INTRAMUSCULAR; INTRAVENOUS EVERY 4 HOURS PRN
Status: DISCONTINUED | OUTPATIENT
Start: 2022-01-01 | End: 2022-01-01 | Stop reason: HOSPADM

## 2022-01-01 RX ORDER — DIGOXIN 125 MCG
125 TABLET ORAL DAILY
Status: DISCONTINUED | OUTPATIENT
Start: 2022-01-01 | End: 2022-01-01

## 2022-01-01 RX ORDER — 0.9 % SODIUM CHLORIDE 0.9 %
500 INTRAVENOUS SOLUTION INTRAVENOUS ONCE
Status: DISCONTINUED | OUTPATIENT
Start: 2022-01-01 | End: 2022-01-01

## 2022-01-01 RX ORDER — INSULIN LISPRO 100 [IU]/ML
0-6 INJECTION, SOLUTION INTRAVENOUS; SUBCUTANEOUS
Status: DISCONTINUED | OUTPATIENT
Start: 2022-01-01 | End: 2022-01-01

## 2022-01-01 RX ORDER — 0.9 % SODIUM CHLORIDE 0.9 %
1000 INTRAVENOUS SOLUTION INTRAVENOUS ONCE
Status: COMPLETED | OUTPATIENT
Start: 2022-01-01 | End: 2022-01-01

## 2022-01-01 RX ORDER — HYDROXYUREA 500 MG/1
500 CAPSULE ORAL DAILY
Status: DISCONTINUED | OUTPATIENT
Start: 2022-01-01 | End: 2022-01-01

## 2022-01-01 RX ORDER — DRONABINOL 2.5 MG/1
2.5 CAPSULE ORAL 2 TIMES DAILY
Status: DISCONTINUED | OUTPATIENT
Start: 2022-01-01 | End: 2022-01-01

## 2022-01-01 RX ORDER — SODIUM CHLORIDE 9 MG/ML
20 INJECTION, SOLUTION INTRAVENOUS CONTINUOUS PRN
Status: DISCONTINUED | OUTPATIENT
Start: 2022-01-01 | End: 2022-01-01 | Stop reason: HOSPADM

## 2022-01-01 RX ORDER — LABETALOL HYDROCHLORIDE 5 MG/ML
10 INJECTION, SOLUTION INTRAVENOUS
Status: DISCONTINUED | OUTPATIENT
Start: 2022-01-01 | End: 2022-01-01 | Stop reason: HOSPADM

## 2022-01-01 RX ORDER — MIRTAZAPINE 15 MG/1
15 TABLET, ORALLY DISINTEGRATING ORAL NIGHTLY
Status: DISCONTINUED | OUTPATIENT
Start: 2022-01-01 | End: 2022-01-01

## 2022-01-01 RX ORDER — ISOSORBIDE MONONITRATE 30 MG/1
30 TABLET, EXTENDED RELEASE ORAL DAILY
Status: ON HOLD | COMMUNITY
End: 2022-01-01 | Stop reason: HOSPADM

## 2022-01-01 RX ORDER — DIGOXIN 0.25 MG/ML
INJECTION INTRAMUSCULAR; INTRAVENOUS
Status: COMPLETED
Start: 2022-01-01 | End: 2022-01-01

## 2022-01-01 RX ORDER — SIMETHICONE 20 MG/.3ML
40 EMULSION ORAL 4 TIMES DAILY PRN
Status: DISCONTINUED | OUTPATIENT
Start: 2022-01-01 | End: 2022-05-16 | Stop reason: HOSPADM

## 2022-01-01 RX ORDER — DIGOXIN 0.25 MG/ML
125 INJECTION INTRAMUSCULAR; INTRAVENOUS ONCE
Status: DISCONTINUED | OUTPATIENT
Start: 2022-01-01 | End: 2022-01-01

## 2022-01-01 RX ORDER — LORAZEPAM 2 MG/ML
0.25 INJECTION INTRAMUSCULAR DAILY PRN
Status: DISCONTINUED | OUTPATIENT
Start: 2022-01-01 | End: 2022-01-01

## 2022-01-01 RX ORDER — PANTOPRAZOLE SODIUM 40 MG/10ML
40 INJECTION, POWDER, LYOPHILIZED, FOR SOLUTION INTRAVENOUS DAILY
Status: DISCONTINUED | OUTPATIENT
Start: 2022-01-01 | End: 2022-01-01 | Stop reason: HOSPADM

## 2022-01-01 RX ORDER — MORPHINE SULFATE 2 MG/ML
2 INJECTION, SOLUTION INTRAMUSCULAR; INTRAVENOUS
Status: COMPLETED | OUTPATIENT
Start: 2022-01-01 | End: 2022-01-01

## 2022-01-01 RX ORDER — HYDROXYUREA 500 MG/1
500 CAPSULE ORAL 2 TIMES DAILY
Status: DISCONTINUED | OUTPATIENT
Start: 2022-01-01 | End: 2022-01-01

## 2022-01-01 RX ORDER — PANTOPRAZOLE SODIUM 40 MG/1
40 TABLET, DELAYED RELEASE ORAL DAILY
Status: DISCONTINUED | OUTPATIENT
Start: 2022-01-01 | End: 2022-01-01

## 2022-01-01 RX ORDER — ONDANSETRON 2 MG/ML
4 INJECTION INTRAMUSCULAR; INTRAVENOUS ONCE
Status: COMPLETED | OUTPATIENT
Start: 2022-01-01 | End: 2022-01-01

## 2022-01-01 RX ORDER — ACETAMINOPHEN 160 MG/5ML
650 SOLUTION ORAL EVERY 6 HOURS PRN
Status: DISCONTINUED | OUTPATIENT
Start: 2022-01-01 | End: 2022-01-01 | Stop reason: HOSPADM

## 2022-01-01 RX ORDER — SODIUM CHLORIDE 9 MG/ML
10 INJECTION INTRAVENOUS EVERY 12 HOURS
Status: DISCONTINUED | OUTPATIENT
Start: 2022-01-01 | End: 2022-01-01

## 2022-01-01 RX ORDER — ONDANSETRON 4 MG/1
4 TABLET, ORALLY DISINTEGRATING ORAL EVERY 8 HOURS PRN
Status: DISCONTINUED | OUTPATIENT
Start: 2022-01-01 | End: 2022-01-01 | Stop reason: HOSPADM

## 2022-01-01 RX ORDER — HYDROXYUREA 500 MG/1
500 CAPSULE ORAL 2 TIMES DAILY
Status: DISCONTINUED | OUTPATIENT
Start: 2022-01-01 | End: 2022-01-01 | Stop reason: HOSPADM

## 2022-01-01 RX ORDER — INSULIN LISPRO 100 [IU]/ML
0-3 INJECTION, SOLUTION INTRAVENOUS; SUBCUTANEOUS NIGHTLY
Status: DISCONTINUED | OUTPATIENT
Start: 2022-01-01 | End: 2022-01-01

## 2022-01-01 RX ORDER — ONDANSETRON 2 MG/ML
4 INJECTION INTRAMUSCULAR; INTRAVENOUS ONCE
Status: DISCONTINUED | OUTPATIENT
Start: 2022-01-01 | End: 2022-01-01

## 2022-01-01 RX ORDER — GLYCOPYRROLATE 0.2 MG/ML
0.2 INJECTION INTRAMUSCULAR; INTRAVENOUS EVERY 4 HOURS PRN
Status: CANCELLED | OUTPATIENT
Start: 2022-01-01

## 2022-01-01 RX ORDER — LORAZEPAM 2 MG/ML
0.5 INJECTION INTRAMUSCULAR NIGHTLY PRN
Status: DISCONTINUED | OUTPATIENT
Start: 2022-01-01 | End: 2022-01-01

## 2022-01-01 RX ORDER — DEXAMETHASONE SODIUM PHOSPHATE 4 MG/ML
INJECTION, SOLUTION INTRA-ARTICULAR; INTRALESIONAL; INTRAMUSCULAR; INTRAVENOUS; SOFT TISSUE PRN
Status: DISCONTINUED | OUTPATIENT
Start: 2022-01-01 | End: 2022-01-01 | Stop reason: SDUPTHER

## 2022-01-01 RX ORDER — ONDANSETRON 2 MG/ML
INJECTION INTRAMUSCULAR; INTRAVENOUS PRN
Status: DISCONTINUED | OUTPATIENT
Start: 2022-01-01 | End: 2022-01-01 | Stop reason: SDUPTHER

## 2022-01-01 RX ORDER — POLYETHYLENE GLYCOL 3350 17 G/17G
17 POWDER, FOR SOLUTION ORAL DAILY PRN
Status: DISCONTINUED | OUTPATIENT
Start: 2022-01-01 | End: 2022-01-01 | Stop reason: HOSPADM

## 2022-01-01 RX ORDER — DEXTROSE MONOHYDRATE 50 MG/ML
100 INJECTION, SOLUTION INTRAVENOUS PRN
Status: DISCONTINUED | OUTPATIENT
Start: 2022-01-01 | End: 2022-01-01 | Stop reason: HOSPADM

## 2022-01-01 RX ORDER — AMOXICILLIN 875 MG/1
875 TABLET, COATED ORAL 2 TIMES DAILY
Qty: 20 TABLET | Refills: 0 | Status: SHIPPED | OUTPATIENT
Start: 2022-01-01 | End: 2022-01-01

## 2022-01-01 RX ORDER — ACETAMINOPHEN 325 MG/1
650 TABLET ORAL EVERY 4 HOURS PRN
Status: DISCONTINUED | OUTPATIENT
Start: 2022-01-01 | End: 2022-01-01 | Stop reason: SDUPTHER

## 2022-01-01 RX ORDER — SIMETHICONE 20 MG/.3ML
40 EMULSION ORAL 4 TIMES DAILY
Status: DISCONTINUED | OUTPATIENT
Start: 2022-01-01 | End: 2022-01-01

## 2022-01-01 RX ORDER — SODIUM CHLORIDE 0.9 % (FLUSH) 0.9 %
5-40 SYRINGE (ML) INJECTION 2 TIMES DAILY
Status: DISCONTINUED | OUTPATIENT
Start: 2022-01-01 | End: 2022-01-01

## 2022-01-01 RX ORDER — DILTIAZEM HYDROCHLORIDE 120 MG/1
120 CAPSULE, COATED, EXTENDED RELEASE ORAL DAILY
Status: DISCONTINUED | OUTPATIENT
Start: 2022-01-01 | End: 2022-01-01

## 2022-01-01 RX ORDER — FENTANYL CITRATE 50 UG/ML
INJECTION, SOLUTION INTRAMUSCULAR; INTRAVENOUS PRN
Status: DISCONTINUED | OUTPATIENT
Start: 2022-01-01 | End: 2022-01-01 | Stop reason: SDUPTHER

## 2022-01-01 RX ORDER — FENTANYL CITRATE 50 UG/ML
50 INJECTION, SOLUTION INTRAMUSCULAR; INTRAVENOUS EVERY 5 MIN PRN
Status: DISCONTINUED | OUTPATIENT
Start: 2022-01-01 | End: 2022-01-01 | Stop reason: HOSPADM

## 2022-01-01 RX ORDER — METOPROLOL TARTRATE 5 MG/5ML
2.5 INJECTION INTRAVENOUS EVERY 6 HOURS
Status: DISCONTINUED | OUTPATIENT
Start: 2022-01-01 | End: 2022-01-01

## 2022-01-01 RX ORDER — FUROSEMIDE 10 MG/ML
80 INJECTION INTRAMUSCULAR; INTRAVENOUS ONCE
Status: COMPLETED | OUTPATIENT
Start: 2022-01-01 | End: 2022-01-01

## 2022-01-01 RX ORDER — ONDANSETRON 2 MG/ML
4 INJECTION INTRAMUSCULAR; INTRAVENOUS EVERY 6 HOURS PRN
Status: DISCONTINUED | OUTPATIENT
Start: 2022-01-01 | End: 2022-05-16 | Stop reason: HOSPADM

## 2022-01-01 RX ORDER — LEVOFLOXACIN 500 MG/1
500 TABLET, FILM COATED ORAL ONCE
Status: COMPLETED | OUTPATIENT
Start: 2022-01-01 | End: 2022-01-01

## 2022-01-01 RX ORDER — METRONIDAZOLE 500 MG/100ML
500 INJECTION, SOLUTION INTRAVENOUS EVERY 8 HOURS
Status: DISCONTINUED | OUTPATIENT
Start: 2022-01-01 | End: 2022-01-01

## 2022-01-01 RX ORDER — MORPHINE SULFATE 4 MG/ML
4 INJECTION, SOLUTION INTRAMUSCULAR; INTRAVENOUS
Status: DISCONTINUED | OUTPATIENT
Start: 2022-01-01 | End: 2022-01-01

## 2022-01-01 RX ORDER — ACETAMINOPHEN 325 MG/1
650 TABLET ORAL EVERY 6 HOURS PRN
Status: DISCONTINUED | OUTPATIENT
Start: 2022-01-01 | End: 2022-05-16 | Stop reason: HOSPADM

## 2022-01-01 RX ORDER — LEVOFLOXACIN 500 MG/1
250 TABLET, FILM COATED ORAL DAILY
Status: DISCONTINUED | OUTPATIENT
Start: 2022-01-01 | End: 2022-01-01 | Stop reason: DRUGHIGH

## 2022-01-01 RX ORDER — DIGOXIN 125 MCG
125 TABLET ORAL DAILY
Status: DISCONTINUED | OUTPATIENT
Start: 2022-01-01 | End: 2022-01-01 | Stop reason: HOSPADM

## 2022-01-01 RX ORDER — DILTIAZEM HYDROCHLORIDE 60 MG/1
60 TABLET, FILM COATED ORAL EVERY 12 HOURS SCHEDULED
Status: DISCONTINUED | OUTPATIENT
Start: 2022-01-01 | End: 2022-01-01 | Stop reason: HOSPADM

## 2022-01-01 RX ORDER — LORAZEPAM 2 MG/ML
1 INJECTION INTRAMUSCULAR EVERY 4 HOURS PRN
Status: CANCELLED | OUTPATIENT
Start: 2022-01-01

## 2022-01-01 RX ORDER — PANTOPRAZOLE SODIUM 40 MG/10ML
40 INJECTION, POWDER, LYOPHILIZED, FOR SOLUTION INTRAVENOUS DAILY
Status: DISCONTINUED | OUTPATIENT
Start: 2022-01-01 | End: 2022-01-01

## 2022-01-01 RX ORDER — DILTIAZEM HYDROCHLORIDE 60 MG/1
60 TABLET, FILM COATED ORAL EVERY 6 HOURS SCHEDULED
Status: DISCONTINUED | OUTPATIENT
Start: 2022-01-01 | End: 2022-01-01

## 2022-01-01 RX ORDER — ONDANSETRON 4 MG/1
4 TABLET, ORALLY DISINTEGRATING ORAL EVERY 8 HOURS PRN
Status: DISCONTINUED | OUTPATIENT
Start: 2022-01-01 | End: 2022-05-16 | Stop reason: HOSPADM

## 2022-01-01 RX ORDER — NOREPINEPHRINE BIT/0.9 % NACL 16MG/250ML
1-50 INFUSION BOTTLE (ML) INTRAVENOUS CONTINUOUS
Status: DISCONTINUED | OUTPATIENT
Start: 2022-01-01 | End: 2022-01-01

## 2022-01-01 RX ORDER — DIGOXIN 0.25 MG/ML
125 INJECTION INTRAMUSCULAR; INTRAVENOUS ONCE
Status: COMPLETED | OUTPATIENT
Start: 2022-01-01 | End: 2022-01-01

## 2022-01-01 RX ORDER — PANTOPRAZOLE SODIUM 40 MG/10ML
80 INJECTION, POWDER, LYOPHILIZED, FOR SOLUTION INTRAVENOUS ONCE
Status: COMPLETED | OUTPATIENT
Start: 2022-01-01 | End: 2022-01-01

## 2022-01-01 RX ORDER — LACTOBACILLUS RHAMNOSUS GG 10B CELL
1 CAPSULE ORAL DAILY
Status: DISCONTINUED | OUTPATIENT
Start: 2022-01-01 | End: 2022-01-01 | Stop reason: HOSPADM

## 2022-01-01 RX ORDER — NICOTINE POLACRILEX 4 MG
15 LOZENGE BUCCAL PRN
Status: DISCONTINUED | OUTPATIENT
Start: 2022-01-01 | End: 2022-01-01 | Stop reason: HOSPADM

## 2022-01-01 RX ORDER — HEPARIN SODIUM 5000 [USP'U]/ML
5000 INJECTION, SOLUTION INTRAVENOUS; SUBCUTANEOUS EVERY 8 HOURS SCHEDULED
Status: DISCONTINUED | OUTPATIENT
Start: 2022-01-01 | End: 2022-01-01

## 2022-01-01 RX ORDER — ACETAMINOPHEN 325 MG/1
650 TABLET ORAL EVERY 6 HOURS PRN
Status: DISCONTINUED | OUTPATIENT
Start: 2022-01-01 | End: 2022-01-01

## 2022-01-01 RX ORDER — DIGOXIN 0.25 MG/ML
250 INJECTION INTRAMUSCULAR; INTRAVENOUS EVERY 4 HOURS
Status: COMPLETED | OUTPATIENT
Start: 2022-01-01 | End: 2022-01-01

## 2022-01-01 RX ORDER — HYDROCODONE BITARTRATE AND ACETAMINOPHEN 5; 325 MG/1; MG/1
1 TABLET ORAL EVERY 6 HOURS PRN
Status: DISCONTINUED | OUTPATIENT
Start: 2022-01-01 | End: 2022-01-01 | Stop reason: HOSPADM

## 2022-01-01 RX ORDER — KETOROLAC TROMETHAMINE 30 MG/ML
15 INJECTION, SOLUTION INTRAMUSCULAR; INTRAVENOUS EVERY 12 HOURS PRN
Status: DISCONTINUED | OUTPATIENT
Start: 2022-01-01 | End: 2022-01-01

## 2022-01-01 RX ORDER — SODIUM CHLORIDE, SODIUM LACTATE, POTASSIUM CHLORIDE, CALCIUM CHLORIDE 600; 310; 30; 20 MG/100ML; MG/100ML; MG/100ML; MG/100ML
INJECTION, SOLUTION INTRAVENOUS CONTINUOUS
Status: DISCONTINUED | OUTPATIENT
Start: 2022-01-01 | End: 2022-01-01

## 2022-01-01 RX ORDER — SCOLOPAMINE TRANSDERMAL SYSTEM 1 MG/1
1 PATCH, EXTENDED RELEASE TRANSDERMAL
Status: DISCONTINUED | OUTPATIENT
Start: 2022-01-01 | End: 2022-05-16 | Stop reason: HOSPADM

## 2022-01-01 RX ORDER — METOPROLOL TARTRATE 50 MG/1
50 TABLET, FILM COATED ORAL ONCE
Status: DISCONTINUED | OUTPATIENT
Start: 2022-01-01 | End: 2022-01-01

## 2022-01-01 RX ORDER — FERROUS SULFATE 325(65) MG
325 TABLET ORAL 2 TIMES DAILY WITH MEALS
Status: DISCONTINUED | OUTPATIENT
Start: 2022-01-01 | End: 2022-01-01

## 2022-01-01 RX ORDER — CEPHALEXIN 500 MG/1
500 CAPSULE ORAL 2 TIMES DAILY
Qty: 10 CAPSULE | Refills: 0 | Status: SHIPPED | OUTPATIENT
Start: 2022-01-01 | End: 2022-01-01

## 2022-01-01 RX ORDER — ALBUMIN, HUMAN INJ 5% 5 %
SOLUTION INTRAVENOUS PRN
Status: DISCONTINUED | OUTPATIENT
Start: 2022-01-01 | End: 2022-01-01 | Stop reason: SDUPTHER

## 2022-01-01 RX ADMIN — DRONABINOL 2.5 MG: 2.5 CAPSULE ORAL at 20:34

## 2022-01-01 RX ADMIN — SODIUM CHLORIDE, PRESERVATIVE FREE 10 ML: 5 INJECTION INTRAVENOUS at 09:33

## 2022-01-01 RX ADMIN — PROPOFOL 220 MG: 10 INJECTION, EMULSION INTRAVENOUS at 09:17

## 2022-01-01 RX ADMIN — HYDROMORPHONE HYDROCHLORIDE 1 MG: 1 INJECTION, SOLUTION INTRAMUSCULAR; INTRAVENOUS; SUBCUTANEOUS at 11:36

## 2022-01-01 RX ADMIN — DILTIAZEM HYDROCHLORIDE 60 MG: 60 TABLET, FILM COATED ORAL at 21:54

## 2022-01-01 RX ADMIN — HYDROMORPHONE HYDROCHLORIDE 0.5 MG: 1 INJECTION, SOLUTION INTRAMUSCULAR; INTRAVENOUS; SUBCUTANEOUS at 15:44

## 2022-01-01 RX ADMIN — DIGOXIN 250 MCG: 0.25 INJECTION INTRAMUSCULAR; INTRAVENOUS at 15:37

## 2022-01-01 RX ADMIN — METRONIDAZOLE 500 MG: 500 SOLUTION INTRAVENOUS at 13:55

## 2022-01-01 RX ADMIN — Medication 1 CAPSULE: at 10:27

## 2022-01-01 RX ADMIN — DILTIAZEM HYDROCHLORIDE 30 MG: 30 TABLET, FILM COATED ORAL at 21:06

## 2022-01-01 RX ADMIN — PIPERACILLIN SODIUM AND TAZOBACTAM SODIUM 3375 MG: 3; 375 INJECTION, POWDER, FOR SOLUTION INTRAVENOUS at 20:12

## 2022-01-01 RX ADMIN — FERROUS SULFATE TAB 325 MG (65 MG ELEMENTAL FE) 325 MG: 325 (65 FE) TAB at 18:23

## 2022-01-01 RX ADMIN — FERROUS SULFATE TAB 325 MG (65 MG ELEMENTAL FE) 325 MG: 325 (65 FE) TAB at 10:18

## 2022-01-01 RX ADMIN — Medication 1 CAPSULE: at 17:44

## 2022-01-01 RX ADMIN — SODIUM CHLORIDE, PRESERVATIVE FREE 10 ML: 5 INJECTION INTRAVENOUS at 09:04

## 2022-01-01 RX ADMIN — APIXABAN 2.5 MG: 2.5 TABLET, FILM COATED ORAL at 09:37

## 2022-01-01 RX ADMIN — PIPERACILLIN AND TAZOBACTAM 3375 MG: 3; .375 INJECTION, POWDER, LYOPHILIZED, FOR SOLUTION INTRAVENOUS at 09:32

## 2022-01-01 RX ADMIN — DEXTROSE 100 MG: 50 INJECTION, SOLUTION INTRAVENOUS at 16:04

## 2022-01-01 RX ADMIN — ACETAMINOPHEN 650 MG: 650 SOLUTION ORAL at 13:58

## 2022-01-01 RX ADMIN — PANTOPRAZOLE SODIUM 40 MG: 40 TABLET, DELAYED RELEASE ORAL at 06:31

## 2022-01-01 RX ADMIN — PROPOFOL 20 MG: 10 INJECTION, EMULSION INTRAVENOUS at 07:10

## 2022-01-01 RX ADMIN — HYDROMORPHONE HYDROCHLORIDE 0.5 MG: 1 INJECTION, SOLUTION INTRAMUSCULAR; INTRAVENOUS; SUBCUTANEOUS at 14:13

## 2022-01-01 RX ADMIN — METOPROLOL TARTRATE 2.5 MG: 5 INJECTION, SOLUTION INTRAVENOUS at 14:34

## 2022-01-01 RX ADMIN — PIPERACILLIN AND TAZOBACTAM 2250 MG: 2; .25 INJECTION, POWDER, LYOPHILIZED, FOR SOLUTION INTRAVENOUS at 04:09

## 2022-01-01 RX ADMIN — SODIUM CHLORIDE, PRESERVATIVE FREE 10 ML: 5 INJECTION INTRAVENOUS at 22:32

## 2022-01-01 RX ADMIN — SODIUM CHLORIDE, PRESERVATIVE FREE 10 ML: 5 INJECTION INTRAVENOUS at 21:21

## 2022-01-01 RX ADMIN — NYSTATIN 500000 UNITS: 100000 SUSPENSION ORAL at 08:54

## 2022-01-01 RX ADMIN — IRON SUPPLEMENT 325 MG: 325 TABLET ORAL at 08:19

## 2022-01-01 RX ADMIN — FERROUS SULFATE TAB 325 MG (65 MG ELEMENTAL FE) 325 MG: 325 (65 FE) TAB at 10:14

## 2022-01-01 RX ADMIN — APIXABAN 2.5 MG: 2.5 TABLET, FILM COATED ORAL at 09:58

## 2022-01-01 RX ADMIN — DILTIAZEM HYDROCHLORIDE 60 MG: 60 TABLET, FILM COATED ORAL at 20:31

## 2022-01-01 RX ADMIN — GLUCAGON HYDROCHLORIDE 0.5 MG: KIT at 07:50

## 2022-01-01 RX ADMIN — APIXABAN 2.5 MG: 2.5 TABLET, FILM COATED ORAL at 08:06

## 2022-01-01 RX ADMIN — ENOXAPARIN SODIUM 70 MG: 100 INJECTION SUBCUTANEOUS at 08:25

## 2022-01-01 RX ADMIN — SODIUM CHLORIDE, PRESERVATIVE FREE 10 ML: 5 INJECTION INTRAVENOUS at 22:52

## 2022-01-01 RX ADMIN — CEFTRIAXONE SODIUM 1000 MG: 1 INJECTION, POWDER, FOR SOLUTION INTRAMUSCULAR; INTRAVENOUS at 17:45

## 2022-01-01 RX ADMIN — SODIUM CHLORIDE, PRESERVATIVE FREE 10 ML: 5 INJECTION INTRAVENOUS at 20:32

## 2022-01-01 RX ADMIN — DILTIAZEM HYDROCHLORIDE 120 MG: 120 CAPSULE, COATED, EXTENDED RELEASE ORAL at 09:38

## 2022-01-01 RX ADMIN — PANTOPRAZOLE SODIUM 40 MG: 40 INJECTION, POWDER, FOR SOLUTION INTRAVENOUS at 08:50

## 2022-01-01 RX ADMIN — SODIUM CHLORIDE 500 ML: 9 INJECTION, SOLUTION INTRAVENOUS at 11:35

## 2022-01-01 RX ADMIN — SODIUM CHLORIDE, POTASSIUM CHLORIDE, SODIUM LACTATE AND CALCIUM CHLORIDE: 600; 310; 30; 20 INJECTION, SOLUTION INTRAVENOUS at 13:35

## 2022-01-01 RX ADMIN — ENOXAPARIN SODIUM 70 MG: 100 INJECTION SUBCUTANEOUS at 12:11

## 2022-01-01 RX ADMIN — Medication 1 TABLET: at 08:43

## 2022-01-01 RX ADMIN — HYDROMORPHONE HYDROCHLORIDE 0.5 MG: 1 INJECTION, SOLUTION INTRAMUSCULAR; INTRAVENOUS; SUBCUTANEOUS at 22:03

## 2022-01-01 RX ADMIN — FIDAXOMICIN 200 MG: 200 TABLET, FILM COATED ORAL at 22:13

## 2022-01-01 RX ADMIN — FOLIC ACID 1 MG: 1 TABLET ORAL at 09:19

## 2022-01-01 RX ADMIN — LEVOFLOXACIN 250 MG: 500 TABLET, FILM COATED ORAL at 10:17

## 2022-01-01 RX ADMIN — SODIUM ACETATE: 164 INJECTION, SOLUTION, CONCENTRATE INTRAVENOUS at 18:09

## 2022-01-01 RX ADMIN — SODIUM CHLORIDE, PRESERVATIVE FREE 10 ML: 5 INJECTION INTRAVENOUS at 11:08

## 2022-01-01 RX ADMIN — Medication 1 TABLET: at 08:18

## 2022-01-01 RX ADMIN — Medication 0.2 MG: at 09:53

## 2022-01-01 RX ADMIN — SOYBEAN OIL 250 ML: 20 INJECTION, SOLUTION INTRAVENOUS at 18:41

## 2022-01-01 RX ADMIN — GLUCAGON HYDROCHLORIDE 0.5 MG: KIT at 09:24

## 2022-01-01 RX ADMIN — SOYBEAN OIL 250 ML: 20 INJECTION, SOLUTION INTRAVENOUS at 16:49

## 2022-01-01 RX ADMIN — SODIUM CHLORIDE, PRESERVATIVE FREE 10 ML: 5 INJECTION INTRAVENOUS at 21:54

## 2022-01-01 RX ADMIN — ACETAMINOPHEN 650 MG: 325 TABLET ORAL at 08:00

## 2022-01-01 RX ADMIN — SODIUM CHLORIDE, PRESERVATIVE FREE 10 ML: 5 INJECTION INTRAVENOUS at 22:09

## 2022-01-01 RX ADMIN — SODIUM CHLORIDE: 9 INJECTION, SOLUTION INTRAVENOUS at 23:26

## 2022-01-01 RX ADMIN — PROPOFOL 20 MG: 10 INJECTION, EMULSION INTRAVENOUS at 07:21

## 2022-01-01 RX ADMIN — SODIUM CHLORIDE: 9 INJECTION, SOLUTION INTRAVENOUS at 00:03

## 2022-01-01 RX ADMIN — FUROSEMIDE 80 MG: 10 INJECTION, SOLUTION INTRAMUSCULAR; INTRAVENOUS at 08:44

## 2022-01-01 RX ADMIN — HYDROCODONE BITARTRATE AND ACETAMINOPHEN 5 ML: 7.5; 325 SOLUTION ORAL at 21:40

## 2022-01-01 RX ADMIN — TRACE ELEMENTS INJECTION 4: 7.4; .75; 98; 151 INJECTION, SOLUTION INTRAVENOUS at 18:02

## 2022-01-01 RX ADMIN — HEPARIN SODIUM 5000 UNITS: 5000 INJECTION INTRAVENOUS; SUBCUTANEOUS at 05:43

## 2022-01-01 RX ADMIN — CEFEPIME HYDROCHLORIDE 2000 MG: 2 INJECTION, POWDER, FOR SOLUTION INTRAVENOUS at 03:01

## 2022-01-01 RX ADMIN — HYDROXYUREA 500 MG: 500 CAPSULE ORAL at 21:25

## 2022-01-01 RX ADMIN — SODIUM CHLORIDE, PRESERVATIVE FREE 10 ML: 5 INJECTION INTRAVENOUS at 08:21

## 2022-01-01 RX ADMIN — HEPARIN SODIUM 5000 UNITS: 5000 INJECTION INTRAVENOUS; SUBCUTANEOUS at 14:44

## 2022-01-01 RX ADMIN — SODIUM CHLORIDE: 9 INJECTION, SOLUTION INTRAVENOUS at 18:38

## 2022-01-01 RX ADMIN — IRON SUPPLEMENT 325 MG: 325 TABLET ORAL at 08:44

## 2022-01-01 RX ADMIN — DICLOFENAC SODIUM 2 G: 10 GEL TOPICAL at 09:15

## 2022-01-01 RX ADMIN — SODIUM CHLORIDE, PRESERVATIVE FREE 10 ML: 5 INJECTION INTRAVENOUS at 08:55

## 2022-01-01 RX ADMIN — SODIUM CHLORIDE, PRESERVATIVE FREE 10 ML: 5 INJECTION INTRAVENOUS at 21:50

## 2022-01-01 RX ADMIN — CEFTRIAXONE SODIUM 1000 MG: 1 INJECTION, POWDER, FOR SOLUTION INTRAMUSCULAR; INTRAVENOUS at 18:41

## 2022-01-01 RX ADMIN — MEROPENEM 1000 MG: 1 INJECTION, POWDER, FOR SOLUTION INTRAVENOUS at 10:34

## 2022-01-01 RX ADMIN — APIXABAN 2.5 MG: 2.5 TABLET, FILM COATED ORAL at 20:34

## 2022-01-01 RX ADMIN — MEROPENEM 1000 MG: 1 INJECTION, POWDER, FOR SOLUTION INTRAVENOUS at 19:09

## 2022-01-01 RX ADMIN — SODIUM CHLORIDE, PRESERVATIVE FREE 10 ML: 5 INJECTION INTRAVENOUS at 20:48

## 2022-01-01 RX ADMIN — METOPROLOL TARTRATE 25 MG: 25 TABLET, FILM COATED ORAL at 21:31

## 2022-01-01 RX ADMIN — DILTIAZEM HYDROCHLORIDE 240 MG: 240 CAPSULE, COATED, EXTENDED RELEASE ORAL at 08:05

## 2022-01-01 RX ADMIN — Medication 1 TABLET: at 09:38

## 2022-01-01 RX ADMIN — SOYBEAN OIL 250 ML: 20 INJECTION, SOLUTION INTRAVENOUS at 18:37

## 2022-01-01 RX ADMIN — METOPROLOL TARTRATE 25 MG: 25 TABLET, FILM COATED ORAL at 22:41

## 2022-01-01 RX ADMIN — FOLIC ACID 1 MG: 1 TABLET ORAL at 09:38

## 2022-01-01 RX ADMIN — APIXABAN 2.5 MG: 2.5 TABLET, FILM COATED ORAL at 08:04

## 2022-01-01 RX ADMIN — IOPAMIDOL 80 ML: 755 INJECTION, SOLUTION INTRAVENOUS at 13:37

## 2022-01-01 RX ADMIN — PANTOPRAZOLE SODIUM 40 MG: 40 TABLET, DELAYED RELEASE ORAL at 08:33

## 2022-01-01 RX ADMIN — LOPERAMIDE HYDROCHLORIDE 4 MG: 2 CAPSULE ORAL at 08:16

## 2022-01-01 RX ADMIN — SODIUM CHLORIDE, POTASSIUM CHLORIDE, SODIUM LACTATE AND CALCIUM CHLORIDE: 600; 310; 30; 20 INJECTION, SOLUTION INTRAVENOUS at 18:20

## 2022-01-01 RX ADMIN — HYDROMORPHONE HYDROCHLORIDE 1 MG: 1 INJECTION, SOLUTION INTRAMUSCULAR; INTRAVENOUS; SUBCUTANEOUS at 18:02

## 2022-01-01 RX ADMIN — NYSTATIN 500000 UNITS: 100000 SUSPENSION ORAL at 21:34

## 2022-01-01 RX ADMIN — HEPARIN SODIUM 5000 UNITS: 5000 INJECTION INTRAVENOUS; SUBCUTANEOUS at 06:36

## 2022-01-01 RX ADMIN — SODIUM CHLORIDE, PRESERVATIVE FREE 10 ML: 5 INJECTION INTRAVENOUS at 11:00

## 2022-01-01 RX ADMIN — SODIUM ACETATE: 164 INJECTION, SOLUTION, CONCENTRATE INTRAVENOUS at 16:41

## 2022-01-01 RX ADMIN — DILTIAZEM HYDROCHLORIDE 240 MG: 240 CAPSULE, COATED, EXTENDED RELEASE ORAL at 08:02

## 2022-01-01 RX ADMIN — SODIUM CHLORIDE, PRESERVATIVE FREE 10 ML: 5 INJECTION INTRAVENOUS at 21:32

## 2022-01-01 RX ADMIN — METRONIDAZOLE 500 MG: 500 INJECTION, SOLUTION INTRAVENOUS at 12:08

## 2022-01-01 RX ADMIN — CEFEPIME HYDROCHLORIDE 2000 MG: 2 INJECTION, POWDER, FOR SOLUTION INTRAVENOUS at 02:59

## 2022-01-01 RX ADMIN — SODIUM CHLORIDE, PRESERVATIVE FREE 40 ML: 5 INJECTION INTRAVENOUS at 10:27

## 2022-01-01 RX ADMIN — DILTIAZEM HYDROCHLORIDE 120 MG: 120 CAPSULE, COATED, EXTENDED RELEASE ORAL at 08:49

## 2022-01-01 RX ADMIN — SODIUM CHLORIDE, PRESERVATIVE FREE 10 ML: 5 INJECTION INTRAVENOUS at 21:30

## 2022-01-01 RX ADMIN — SODIUM BICARBONATE: 84 INJECTION, SOLUTION INTRAVENOUS at 22:58

## 2022-01-01 RX ADMIN — Medication 1 TABLET: at 08:03

## 2022-01-01 RX ADMIN — DILTIAZEM HYDROCHLORIDE 30 MG: 30 TABLET, FILM COATED ORAL at 21:42

## 2022-01-01 RX ADMIN — METRONIDAZOLE 500 MG: 500 INJECTION, SOLUTION INTRAVENOUS at 10:27

## 2022-01-01 RX ADMIN — DILTIAZEM HYDROCHLORIDE 60 MG: 60 TABLET, FILM COATED ORAL at 08:06

## 2022-01-01 RX ADMIN — SODIUM CHLORIDE, PRESERVATIVE FREE 10 ML: 5 INJECTION INTRAVENOUS at 11:25

## 2022-01-01 RX ADMIN — METOPROLOL TARTRATE 25 MG: 25 TABLET, FILM COATED ORAL at 20:34

## 2022-01-01 RX ADMIN — FOLIC ACID 1 MG: 1 TABLET ORAL at 09:56

## 2022-01-01 RX ADMIN — SODIUM CHLORIDE, PRESERVATIVE FREE 10 ML: 5 INJECTION INTRAVENOUS at 10:13

## 2022-01-01 RX ADMIN — SODIUM ACETATE: 164 INJECTION, SOLUTION, CONCENTRATE INTRAVENOUS at 18:39

## 2022-01-01 RX ADMIN — DIGOXIN 250 MCG: 0.25 INJECTION INTRAMUSCULAR; INTRAVENOUS at 07:58

## 2022-01-01 RX ADMIN — ASCORBIC ACID, VITAMIN A PALMITATE, CHOLECALCIFEROL, THIAMINE HYDROCHLORIDE, RIBOFLAVIN-5 PHOSPHATE SODIUM, PYRIDOXINE HYDROCHLORIDE, NIACINAMIDE, DEXPANTHENOL, ALPHA-TOCOPHEROL ACETATE, VITAMIN K1, FOLIC ACID, BIOTIN, CYANOCOBALAMIN: 200; 3300; 200; 6; 3.6; 6; 40; 15; 10; 150; 600; 60; 5 INJECTION, SOLUTION INTRAVENOUS at 18:50

## 2022-01-01 RX ADMIN — DIGOXIN 125 MCG: 125 TABLET ORAL at 08:55

## 2022-01-01 RX ADMIN — SODIUM CHLORIDE, PRESERVATIVE FREE 10 ML: 5 INJECTION INTRAVENOUS at 21:10

## 2022-01-01 RX ADMIN — IRON SUCROSE 300 MG: 20 INJECTION, SOLUTION INTRAVENOUS at 06:31

## 2022-01-01 RX ADMIN — DILTIAZEM HYDROCHLORIDE 120 MG: 120 CAPSULE, COATED, EXTENDED RELEASE ORAL at 09:14

## 2022-01-01 RX ADMIN — LOPERAMIDE HYDROCHLORIDE 4 MG: 2 CAPSULE ORAL at 12:07

## 2022-01-01 RX ADMIN — ASCORBIC ACID, VITAMIN A PALMITATE, CHOLECALCIFEROL, THIAMINE HYDROCHLORIDE, RIBOFLAVIN-5 PHOSPHATE SODIUM, PYRIDOXINE HYDROCHLORIDE, NIACINAMIDE, DEXPANTHENOL, ALPHA-TOCOPHEROL ACETATE, VITAMIN K1, FOLIC ACID, BIOTIN, CYANOCOBALAMIN: 200; 3300; 200; 6; 3.6; 6; 40; 15; 10; 150; 600; 60; 5 INJECTION, SOLUTION INTRAVENOUS at 18:15

## 2022-01-01 RX ADMIN — HYDROMORPHONE HYDROCHLORIDE 1 MG: 1 INJECTION, SOLUTION INTRAMUSCULAR; INTRAVENOUS; SUBCUTANEOUS at 07:48

## 2022-01-01 RX ADMIN — METOPROLOL TARTRATE 2.5 MG: 5 INJECTION, SOLUTION INTRAVENOUS at 16:33

## 2022-01-01 RX ADMIN — NYSTATIN 500000 UNITS: 100000 SUSPENSION ORAL at 20:33

## 2022-01-01 RX ADMIN — LINEZOLID 600 MG: 600 INJECTION, SOLUTION INTRAVENOUS at 18:42

## 2022-01-01 RX ADMIN — DILTIAZEM HYDROCHLORIDE 10 MG: 5 INJECTION INTRAVENOUS at 12:18

## 2022-01-01 RX ADMIN — ASPIRIN 81 MG 81 MG: 81 TABLET ORAL at 08:54

## 2022-01-01 RX ADMIN — APIXABAN 2.5 MG: 2.5 TABLET, FILM COATED ORAL at 22:52

## 2022-01-01 RX ADMIN — Medication 500 MG: at 05:31

## 2022-01-01 RX ADMIN — Medication 1 TABLET: at 07:50

## 2022-01-01 RX ADMIN — FOLIC ACID 1 MG: 1 TABLET ORAL at 09:52

## 2022-01-01 RX ADMIN — DIGOXIN 125 MCG: 125 TABLET ORAL at 09:47

## 2022-01-01 RX ADMIN — METRONIDAZOLE 500 MG: 500 INJECTION, SOLUTION INTRAVENOUS at 20:17

## 2022-01-01 RX ADMIN — MORPHINE SULFATE 2 MG: 2 INJECTION, SOLUTION INTRAMUSCULAR; INTRAVENOUS at 09:52

## 2022-01-01 RX ADMIN — PANTOPRAZOLE SODIUM 40 MG: 40 INJECTION, POWDER, FOR SOLUTION INTRAVENOUS at 07:58

## 2022-01-01 RX ADMIN — DIPHENHYDRAMINE HYDROCHLORIDE 50 MG: 25 TABLET ORAL at 23:15

## 2022-01-01 RX ADMIN — DILTIAZEM HYDROCHLORIDE 120 MG: 120 CAPSULE, COATED, EXTENDED RELEASE ORAL at 09:56

## 2022-01-01 RX ADMIN — GLUCAGON HYDROCHLORIDE 0.5 MG: KIT at 07:41

## 2022-01-01 RX ADMIN — APIXABAN 2.5 MG: 2.5 TABLET, FILM COATED ORAL at 10:26

## 2022-01-01 RX ADMIN — SODIUM CHLORIDE, PRESERVATIVE FREE 10 ML: 5 INJECTION INTRAVENOUS at 09:59

## 2022-01-01 RX ADMIN — CALCIUM GLUCONATE 2000 MG: 20 INJECTION, SOLUTION INTRAVENOUS at 20:51

## 2022-01-01 RX ADMIN — SODIUM CHLORIDE, PRESERVATIVE FREE 10 ML: 5 INJECTION INTRAVENOUS at 09:25

## 2022-01-01 RX ADMIN — METOPROLOL TARTRATE 25 MG: 25 TABLET, FILM COATED ORAL at 09:14

## 2022-01-01 RX ADMIN — DILTIAZEM HYDROCHLORIDE 30 MG: 30 TABLET, FILM COATED ORAL at 09:33

## 2022-01-01 RX ADMIN — DILTIAZEM HYDROCHLORIDE 60 MG: 60 TABLET, FILM COATED ORAL at 23:27

## 2022-01-01 RX ADMIN — DILTIAZEM HYDROCHLORIDE 30 MG: 30 TABLET, FILM COATED ORAL at 09:03

## 2022-01-01 RX ADMIN — PANTOPRAZOLE SODIUM 40 MG: 40 INJECTION, POWDER, FOR SOLUTION INTRAVENOUS at 10:04

## 2022-01-01 RX ADMIN — LOPERAMIDE HYDROCHLORIDE 4 MG: 2 CAPSULE ORAL at 08:54

## 2022-01-01 RX ADMIN — FOLIC ACID 1 MG: 1 TABLET ORAL at 09:32

## 2022-01-01 RX ADMIN — SODIUM CHLORIDE, PRESERVATIVE FREE 10 ML: 5 INJECTION INTRAVENOUS at 08:07

## 2022-01-01 RX ADMIN — DILTIAZEM HYDROCHLORIDE 30 MG: 30 TABLET, FILM COATED ORAL at 21:50

## 2022-01-01 RX ADMIN — FIDAXOMICIN 200 MG: 200 TABLET, FILM COATED ORAL at 21:36

## 2022-01-01 RX ADMIN — SODIUM CHLORIDE, PRESERVATIVE FREE 5 ML: 5 INJECTION INTRAVENOUS at 05:15

## 2022-01-01 RX ADMIN — Medication 50 MG: at 13:45

## 2022-01-01 RX ADMIN — METOPROLOL TARTRATE 25 MG: 25 TABLET, FILM COATED ORAL at 22:07

## 2022-01-01 RX ADMIN — FUROSEMIDE 80 MG: 10 INJECTION, SOLUTION INTRAMUSCULAR; INTRAVENOUS at 12:36

## 2022-01-01 RX ADMIN — LEVOFLOXACIN 250 MG: 500 TABLET, FILM COATED ORAL at 09:14

## 2022-01-01 RX ADMIN — DICLOFENAC SODIUM 2 G: 10 GEL TOPICAL at 20:51

## 2022-01-01 RX ADMIN — HYDROCODONE BITARTRATE AND ACETAMINOPHEN 5 ML: 7.5; 325 SOLUTION ORAL at 10:06

## 2022-01-01 RX ADMIN — NYSTATIN 500000 UNITS: 100000 SUSPENSION ORAL at 18:17

## 2022-01-01 RX ADMIN — PANTOPRAZOLE SODIUM 40 MG: 40 TABLET, DELAYED RELEASE ORAL at 05:32

## 2022-01-01 RX ADMIN — DEXTROSE MONOHYDRATE 5 MG/HR: 50 INJECTION, SOLUTION INTRAVENOUS at 20:33

## 2022-01-01 RX ADMIN — PANTOPRAZOLE SODIUM 40 MG: 40 INJECTION, POWDER, FOR SOLUTION INTRAVENOUS at 11:27

## 2022-01-01 RX ADMIN — FUROSEMIDE 20 MG: 10 INJECTION, SOLUTION INTRAMUSCULAR; INTRAVENOUS at 08:54

## 2022-01-01 RX ADMIN — CHOLESTYRAMINE 4 G: 4 POWDER, FOR SUSPENSION ORAL at 10:31

## 2022-01-01 RX ADMIN — ASPIRIN 81 MG 81 MG: 81 TABLET ORAL at 10:25

## 2022-01-01 RX ADMIN — METOPROLOL TARTRATE 25 MG: 25 TABLET, FILM COATED ORAL at 21:13

## 2022-01-01 RX ADMIN — METOPROLOL TARTRATE 25 MG: 25 TABLET, FILM COATED ORAL at 23:18

## 2022-01-01 RX ADMIN — DILTIAZEM HYDROCHLORIDE 120 MG: 120 CAPSULE, COATED, EXTENDED RELEASE ORAL at 09:47

## 2022-01-01 RX ADMIN — KETOROLAC TROMETHAMINE 15 MG: 30 INJECTION, SOLUTION INTRAMUSCULAR at 08:06

## 2022-01-01 RX ADMIN — DILTIAZEM HYDROCHLORIDE 60 MG: 60 TABLET, FILM COATED ORAL at 00:15

## 2022-01-01 RX ADMIN — METRONIDAZOLE 500 MG: 500 INJECTION, SOLUTION INTRAVENOUS at 12:12

## 2022-01-01 RX ADMIN — AMIODARONE HYDROCHLORIDE 1 MG/MIN: 50 INJECTION, SOLUTION INTRAVENOUS at 18:09

## 2022-01-01 RX ADMIN — METOPROLOL TARTRATE 25 MG: 25 TABLET, FILM COATED ORAL at 22:00

## 2022-01-01 RX ADMIN — LEVOFLOXACIN 250 MG: 500 TABLET, FILM COATED ORAL at 09:47

## 2022-01-01 RX ADMIN — MORPHINE SULFATE 2 MG: 2 INJECTION, SOLUTION INTRAMUSCULAR; INTRAVENOUS at 11:27

## 2022-01-01 RX ADMIN — METOPROLOL TARTRATE 2.5 MG: 5 INJECTION, SOLUTION INTRAVENOUS at 21:50

## 2022-01-01 RX ADMIN — Medication 1 TABLET: at 09:14

## 2022-01-01 RX ADMIN — SODIUM CHLORIDE, POTASSIUM CHLORIDE, SODIUM LACTATE AND CALCIUM CHLORIDE: 600; 310; 30; 20 INJECTION, SOLUTION INTRAVENOUS at 06:50

## 2022-01-01 RX ADMIN — Medication 1 TABLET: at 08:30

## 2022-01-01 RX ADMIN — PROPOFOL 20 MG: 10 INJECTION, EMULSION INTRAVENOUS at 07:31

## 2022-01-01 RX ADMIN — NYSTATIN 500000 UNITS: 100000 SUSPENSION ORAL at 12:25

## 2022-01-01 RX ADMIN — METOPROLOL TARTRATE 25 MG: 25 TABLET, FILM COATED ORAL at 09:37

## 2022-01-01 RX ADMIN — FOLIC ACID 1 MG: 1 TABLET ORAL at 10:18

## 2022-01-01 RX ADMIN — DEXTROSE AND SODIUM CHLORIDE: 5; 450 INJECTION, SOLUTION INTRAVENOUS at 21:48

## 2022-01-01 RX ADMIN — Medication 1 TABLET: at 08:23

## 2022-01-01 RX ADMIN — SODIUM CHLORIDE, PRESERVATIVE FREE 10 ML: 5 INJECTION INTRAVENOUS at 20:18

## 2022-01-01 RX ADMIN — ONDANSETRON 4 MG: 2 INJECTION INTRAMUSCULAR; INTRAVENOUS at 13:55

## 2022-01-01 RX ADMIN — SODIUM CHLORIDE, PRESERVATIVE FREE 10 ML: 5 INJECTION INTRAVENOUS at 00:15

## 2022-01-01 RX ADMIN — Medication 1 CAPSULE: at 09:00

## 2022-01-01 RX ADMIN — PIPERACILLIN SODIUM AND TAZOBACTAM SODIUM 3375 MG: 3; 375 INJECTION, POWDER, FOR SOLUTION INTRAVENOUS at 04:45

## 2022-01-01 RX ADMIN — PANTOPRAZOLE SODIUM 40 MG: 40 INJECTION, POWDER, FOR SOLUTION INTRAVENOUS at 08:25

## 2022-01-01 RX ADMIN — ENOXAPARIN SODIUM 70 MG: 100 INJECTION SUBCUTANEOUS at 21:34

## 2022-01-01 RX ADMIN — DILTIAZEM HYDROCHLORIDE 2.5 MG: 5 INJECTION INTRAVENOUS at 22:18

## 2022-01-01 RX ADMIN — HYDROXYUREA 500 MG: 500 CAPSULE ORAL at 09:03

## 2022-01-01 RX ADMIN — NYSTATIN 500000 UNITS: 100000 SUSPENSION ORAL at 21:41

## 2022-01-01 RX ADMIN — METOPROLOL TARTRATE 2.5 MG: 5 INJECTION, SOLUTION INTRAVENOUS at 04:22

## 2022-01-01 RX ADMIN — SODIUM CHLORIDE: 9 INJECTION, SOLUTION INTRAVENOUS at 05:05

## 2022-01-01 RX ADMIN — PANTOPRAZOLE SODIUM 40 MG: 40 INJECTION, POWDER, FOR SOLUTION INTRAVENOUS at 06:00

## 2022-01-01 RX ADMIN — DICLOFENAC SODIUM 2 G: 10 GEL TOPICAL at 20:44

## 2022-01-01 RX ADMIN — APIXABAN 2.5 MG: 2.5 TABLET, FILM COATED ORAL at 20:31

## 2022-01-01 RX ADMIN — METRONIDAZOLE 500 MG: 500 INJECTION, SOLUTION INTRAVENOUS at 21:55

## 2022-01-01 RX ADMIN — DILTIAZEM HYDROCHLORIDE 60 MG: 60 TABLET, FILM COATED ORAL at 10:25

## 2022-01-01 RX ADMIN — APIXABAN 2.5 MG: 2.5 TABLET, FILM COATED ORAL at 21:54

## 2022-01-01 RX ADMIN — DIGOXIN 125 MCG: 125 TABLET ORAL at 07:49

## 2022-01-01 RX ADMIN — PANTOPRAZOLE SODIUM 40 MG: 40 INJECTION, POWDER, FOR SOLUTION INTRAVENOUS at 09:55

## 2022-01-01 RX ADMIN — APIXABAN 2.5 MG: 2.5 TABLET, FILM COATED ORAL at 21:20

## 2022-01-01 RX ADMIN — NYSTATIN 500000 UNITS: 100000 SUSPENSION ORAL at 13:32

## 2022-01-01 RX ADMIN — ASCORBIC ACID, VITAMIN A PALMITATE, CHOLECALCIFEROL, THIAMINE HYDROCHLORIDE, RIBOFLAVIN-5 PHOSPHATE SODIUM, PYRIDOXINE HYDROCHLORIDE, NIACINAMIDE, DEXPANTHENOL, ALPHA-TOCOPHEROL ACETATE, VITAMIN K1, FOLIC ACID, BIOTIN, CYANOCOBALAMIN: 200; 3300; 200; 6; 3.6; 6; 40; 15; 10; 150; 600; 60; 5 INJECTION, SOLUTION INTRAVENOUS at 18:41

## 2022-01-01 RX ADMIN — HYDROXYUREA 1000 MG: 500 CAPSULE ORAL at 08:03

## 2022-01-01 RX ADMIN — Medication 1 TABLET: at 09:18

## 2022-01-01 RX ADMIN — METOPROLOL TARTRATE 25 MG: 25 TABLET, FILM COATED ORAL at 10:20

## 2022-01-01 RX ADMIN — CEFEPIME HYDROCHLORIDE 1000 MG: 1 INJECTION, POWDER, FOR SOLUTION INTRAMUSCULAR; INTRAVENOUS at 05:04

## 2022-01-01 RX ADMIN — SODIUM CHLORIDE, PRESERVATIVE FREE 10 ML: 5 INJECTION INTRAVENOUS at 21:46

## 2022-01-01 RX ADMIN — SODIUM CHLORIDE: 9 INJECTION, SOLUTION INTRAVENOUS at 02:58

## 2022-01-01 RX ADMIN — DILTIAZEM HYDROCHLORIDE 120 MG: 120 CAPSULE, COATED, EXTENDED RELEASE ORAL at 08:54

## 2022-01-01 RX ADMIN — SODIUM CHLORIDE, PRESERVATIVE FREE 10 ML: 5 INJECTION INTRAVENOUS at 20:24

## 2022-01-01 RX ADMIN — FERROUS SULFATE TAB 325 MG (65 MG ELEMENTAL FE) 325 MG: 325 (65 FE) TAB at 09:59

## 2022-01-01 RX ADMIN — LOPERAMIDE HYDROCHLORIDE 4 MG: 2 CAPSULE ORAL at 09:58

## 2022-01-01 RX ADMIN — FERROUS SULFATE TAB 325 MG (65 MG ELEMENTAL FE) 325 MG: 325 (65 FE) TAB at 09:11

## 2022-01-01 RX ADMIN — APIXABAN 2.5 MG: 2.5 TABLET, FILM COATED ORAL at 08:31

## 2022-01-01 RX ADMIN — FUROSEMIDE 80 MG: 10 INJECTION, SOLUTION INTRAMUSCULAR; INTRAVENOUS at 11:08

## 2022-01-01 RX ADMIN — METOPROLOL TARTRATE 2.5 MG: 5 INJECTION, SOLUTION INTRAVENOUS at 02:58

## 2022-01-01 RX ADMIN — Medication 1 CAPSULE: at 08:49

## 2022-01-01 RX ADMIN — PIPERACILLIN SODIUM AND TAZOBACTAM SODIUM 3375 MG: 3; 375 INJECTION, POWDER, FOR SOLUTION INTRAVENOUS at 21:44

## 2022-01-01 RX ADMIN — SODIUM CHLORIDE, PRESERVATIVE FREE 10 ML: 5 INJECTION INTRAVENOUS at 09:22

## 2022-01-01 RX ADMIN — Medication 1 CAPSULE: at 10:05

## 2022-01-01 RX ADMIN — CEFEPIME HYDROCHLORIDE 1000 MG: 1 INJECTION, POWDER, FOR SOLUTION INTRAMUSCULAR; INTRAVENOUS at 12:20

## 2022-01-01 RX ADMIN — APIXABAN 2.5 MG: 2.5 TABLET, FILM COATED ORAL at 08:16

## 2022-01-01 RX ADMIN — ONDANSETRON 4 MG: 2 INJECTION INTRAMUSCULAR; INTRAVENOUS at 19:08

## 2022-01-01 RX ADMIN — SODIUM CHLORIDE, PRESERVATIVE FREE 10 ML: 5 INJECTION INTRAVENOUS at 20:29

## 2022-01-01 RX ADMIN — FIDAXOMICIN 200 MG: 200 TABLET, FILM COATED ORAL at 10:01

## 2022-01-01 RX ADMIN — LANSOPRAZOLE 30 MG: KIT at 09:39

## 2022-01-01 RX ADMIN — METOPROLOL TARTRATE 2.5 MG: 5 INJECTION, SOLUTION INTRAVENOUS at 15:23

## 2022-01-01 RX ADMIN — LANSOPRAZOLE 30 MG: KIT at 06:24

## 2022-01-01 RX ADMIN — DILTIAZEM HYDROCHLORIDE 120 MG: 120 CAPSULE, COATED, EXTENDED RELEASE ORAL at 08:30

## 2022-01-01 RX ADMIN — DIGOXIN 125 MCG: 125 TABLET ORAL at 09:51

## 2022-01-01 RX ADMIN — PANTOPRAZOLE SODIUM 40 MG: 40 INJECTION, POWDER, FOR SOLUTION INTRAVENOUS at 17:49

## 2022-01-01 RX ADMIN — NYSTATIN 500000 UNITS: 100000 SUSPENSION ORAL at 17:23

## 2022-01-01 RX ADMIN — MORPHINE SULFATE 4 MG: 4 INJECTION, SOLUTION INTRAMUSCULAR; INTRAVENOUS at 02:53

## 2022-01-01 RX ADMIN — Medication 1 CAPSULE: at 10:25

## 2022-01-01 RX ADMIN — LOPERAMIDE HYDROCHLORIDE 4 MG: 2 CAPSULE ORAL at 09:26

## 2022-01-01 RX ADMIN — PANTOPRAZOLE SODIUM 40 MG: 40 INJECTION, POWDER, FOR SOLUTION INTRAVENOUS at 08:47

## 2022-01-01 RX ADMIN — FUROSEMIDE 20 MG: 10 INJECTION, SOLUTION INTRAMUSCULAR; INTRAVENOUS at 09:26

## 2022-01-01 RX ADMIN — HYDROXYUREA 500 MG: 500 CAPSULE ORAL at 10:43

## 2022-01-01 RX ADMIN — PANTOPRAZOLE SODIUM 80 MG: 40 INJECTION, POWDER, FOR SOLUTION INTRAVENOUS at 06:59

## 2022-01-01 RX ADMIN — SODIUM CHLORIDE, PRESERVATIVE FREE 10 ML: 5 INJECTION INTRAVENOUS at 11:38

## 2022-01-01 RX ADMIN — PHENYLEPHRINE HYDROCHLORIDE 50 MCG: 10 INJECTION INTRAVENOUS at 14:02

## 2022-01-01 RX ADMIN — METOPROLOL TARTRATE 2.5 MG: 5 INJECTION, SOLUTION INTRAVENOUS at 10:26

## 2022-01-01 RX ADMIN — DICLOFENAC SODIUM 2 G: 10 GEL TOPICAL at 08:29

## 2022-01-01 RX ADMIN — PANTOPRAZOLE SODIUM 40 MG: 40 TABLET, DELAYED RELEASE ORAL at 06:09

## 2022-01-01 RX ADMIN — PROMETHAZINE HYDROCHLORIDE 25 MG: 25 INJECTION INTRAMUSCULAR; INTRAVENOUS at 02:54

## 2022-01-01 RX ADMIN — FUROSEMIDE 80 MG: 10 INJECTION, SOLUTION INTRAMUSCULAR; INTRAVENOUS at 18:10

## 2022-01-01 RX ADMIN — SODIUM ACETATE: 164 INJECTION, SOLUTION, CONCENTRATE INTRAVENOUS at 18:13

## 2022-01-01 RX ADMIN — METOPROLOL TARTRATE 25 MG: 25 TABLET, FILM COATED ORAL at 21:59

## 2022-01-01 RX ADMIN — LINEZOLID 600 MG: 600 INJECTION, SOLUTION INTRAVENOUS at 15:35

## 2022-01-01 RX ADMIN — MORPHINE SULFATE 2 MG: 2 INJECTION, SOLUTION INTRAMUSCULAR; INTRAVENOUS at 18:17

## 2022-01-01 RX ADMIN — SODIUM ACETATE: 164 INJECTION, SOLUTION, CONCENTRATE INTRAVENOUS at 18:51

## 2022-01-01 RX ADMIN — PIPERACILLIN AND TAZOBACTAM 2250 MG: 2; .25 INJECTION, POWDER, LYOPHILIZED, FOR SOLUTION INTRAVENOUS at 12:16

## 2022-01-01 RX ADMIN — DRONABINOL 2.5 MG: 2.5 CAPSULE ORAL at 09:14

## 2022-01-01 RX ADMIN — ASCORBIC ACID, VITAMIN A PALMITATE, CHOLECALCIFEROL, THIAMINE HYDROCHLORIDE, RIBOFLAVIN-5 PHOSPHATE SODIUM, PYRIDOXINE HYDROCHLORIDE, NIACINAMIDE, DEXPANTHENOL, ALPHA-TOCOPHEROL ACETATE, VITAMIN K1, FOLIC ACID, BIOTIN, CYANOCOBALAMIN: 200; 3300; 200; 6; 3.6; 6; 40; 15; 10; 150; 600; 60; 5 INJECTION, SOLUTION INTRAVENOUS at 18:40

## 2022-01-01 RX ADMIN — SODIUM ACETATE: 164 INJECTION, SOLUTION, CONCENTRATE INTRAVENOUS at 17:50

## 2022-01-01 RX ADMIN — PANTOPRAZOLE SODIUM 40 MG: 40 INJECTION, POWDER, FOR SOLUTION INTRAVENOUS at 06:50

## 2022-01-01 RX ADMIN — METOPROLOL TARTRATE 2.5 MG: 5 INJECTION, SOLUTION INTRAVENOUS at 21:42

## 2022-01-01 RX ADMIN — FIDAXOMICIN 200 MG: 200 TABLET, FILM COATED ORAL at 09:53

## 2022-01-01 RX ADMIN — METOPROLOL TARTRATE 2.5 MG: 5 INJECTION, SOLUTION INTRAVENOUS at 08:24

## 2022-01-01 RX ADMIN — METOPROLOL TARTRATE 5 MG: 5 INJECTION, SOLUTION INTRAVENOUS at 21:20

## 2022-01-01 RX ADMIN — LIDOCAINE HYDROCHLORIDE 100 MG: 20 INJECTION, SOLUTION EPIDURAL; INFILTRATION; INTRACAUDAL; PERINEURAL at 13:45

## 2022-01-01 RX ADMIN — METOPROLOL TARTRATE 2.5 MG: 5 INJECTION, SOLUTION INTRAVENOUS at 14:21

## 2022-01-01 RX ADMIN — FOLIC ACID 1 MG: 1 TABLET ORAL at 08:17

## 2022-01-01 RX ADMIN — LINEZOLID 600 MG: 600 INJECTION, SOLUTION INTRAVENOUS at 03:18

## 2022-01-01 RX ADMIN — LEVOFLOXACIN 250 MG: 500 TABLET, FILM COATED ORAL at 08:30

## 2022-01-01 RX ADMIN — METOPROLOL TARTRATE 25 MG: 25 TABLET, FILM COATED ORAL at 21:35

## 2022-01-01 RX ADMIN — APIXABAN 2.5 MG: 2.5 TABLET, FILM COATED ORAL at 08:17

## 2022-01-01 RX ADMIN — PANTOPRAZOLE SODIUM 40 MG: 40 INJECTION, POWDER, FOR SOLUTION INTRAVENOUS at 09:50

## 2022-01-01 RX ADMIN — Medication 1 CAPSULE: at 08:16

## 2022-01-01 RX ADMIN — CHOLESTYRAMINE 4 G: 4 POWDER, FOR SUSPENSION ORAL at 09:38

## 2022-01-01 RX ADMIN — FOLIC ACID 1 MG: 1 TABLET ORAL at 10:23

## 2022-01-01 RX ADMIN — FERROUS SULFATE TAB 325 MG (65 MG ELEMENTAL FE) 325 MG: 325 (65 FE) TAB at 18:06

## 2022-01-01 RX ADMIN — LINEZOLID 600 MG: 600 INJECTION, SOLUTION INTRAVENOUS at 19:56

## 2022-01-01 RX ADMIN — APIXABAN 2.5 MG: 2.5 TABLET, FILM COATED ORAL at 20:48

## 2022-01-01 RX ADMIN — METOCLOPRAMIDE 5 MG: 5 INJECTION, SOLUTION INTRAMUSCULAR; INTRAVENOUS at 22:42

## 2022-01-01 RX ADMIN — FOLIC ACID 1 MG: 1 TABLET ORAL at 09:16

## 2022-01-01 RX ADMIN — MORPHINE SULFATE 2 MG: 2 INJECTION, SOLUTION INTRAMUSCULAR; INTRAVENOUS at 19:19

## 2022-01-01 RX ADMIN — HYDROXYUREA 500 MG: 500 CAPSULE ORAL at 20:46

## 2022-01-01 RX ADMIN — PIPERACILLIN SODIUM AND TAZOBACTAM SODIUM 3375 MG: 3; 375 INJECTION, POWDER, FOR SOLUTION INTRAVENOUS at 11:37

## 2022-01-01 RX ADMIN — HYDROXYUREA 500 MG: 500 CAPSULE ORAL at 12:25

## 2022-01-01 RX ADMIN — APIXABAN 2.5 MG: 2.5 TABLET, FILM COATED ORAL at 08:43

## 2022-01-01 RX ADMIN — CHOLESTYRAMINE 4 G: 4 POWDER, FOR SUSPENSION ORAL at 17:05

## 2022-01-01 RX ADMIN — PIPERACILLIN AND TAZOBACTAM 3375 MG: 3; .375 INJECTION, POWDER, LYOPHILIZED, FOR SOLUTION INTRAVENOUS at 17:42

## 2022-01-01 RX ADMIN — SODIUM CHLORIDE, PRESERVATIVE FREE 10 ML: 5 INJECTION INTRAVENOUS at 20:04

## 2022-01-01 RX ADMIN — LINEZOLID 600 MG: 600 INJECTION, SOLUTION INTRAVENOUS at 03:06

## 2022-01-01 RX ADMIN — SOYBEAN OIL 250 ML: 20 INJECTION, SOLUTION INTRAVENOUS at 18:53

## 2022-01-01 RX ADMIN — DIGOXIN 125 MCG: 125 TABLET ORAL at 11:00

## 2022-01-01 RX ADMIN — MORPHINE SULFATE 2 MG: 2 INJECTION, SOLUTION INTRAMUSCULAR; INTRAVENOUS at 23:21

## 2022-01-01 RX ADMIN — PANTOPRAZOLE SODIUM 40 MG: 40 TABLET, DELAYED RELEASE ORAL at 06:21

## 2022-01-01 RX ADMIN — FUROSEMIDE 20 MG: 10 INJECTION, SOLUTION INTRAMUSCULAR; INTRAVENOUS at 07:58

## 2022-01-01 RX ADMIN — CEFTRIAXONE SODIUM 1000 MG: 1 INJECTION, POWDER, FOR SOLUTION INTRAMUSCULAR; INTRAVENOUS at 15:11

## 2022-01-01 RX ADMIN — PROPOFOL 20 MG: 10 INJECTION, EMULSION INTRAVENOUS at 07:07

## 2022-01-01 RX ADMIN — PANTOPRAZOLE SODIUM 40 MG: 40 INJECTION, POWDER, FOR SOLUTION INTRAVENOUS at 17:53

## 2022-01-01 RX ADMIN — MORPHINE SULFATE 2 MG: 2 INJECTION, SOLUTION INTRAMUSCULAR; INTRAVENOUS at 18:29

## 2022-01-01 RX ADMIN — HYDROMORPHONE HYDROCHLORIDE 1 MG: 1 INJECTION, SOLUTION INTRAMUSCULAR; INTRAVENOUS; SUBCUTANEOUS at 08:29

## 2022-01-01 RX ADMIN — IRON SUCROSE 300 MG: 20 INJECTION, SOLUTION INTRAVENOUS at 06:30

## 2022-01-01 RX ADMIN — SODIUM CHLORIDE: 9 INJECTION, SOLUTION INTRAVENOUS at 13:49

## 2022-01-01 RX ADMIN — METOPROLOL TARTRATE 2.5 MG: 5 INJECTION, SOLUTION INTRAVENOUS at 04:31

## 2022-01-01 RX ADMIN — LIDOCAINE HYDROCHLORIDE 100 MG: 20 INJECTION, SOLUTION INFILTRATION; PERINEURAL at 09:16

## 2022-01-01 RX ADMIN — VANCOMYCIN HYDROCHLORIDE 1000 MG: 1 INJECTION, POWDER, LYOPHILIZED, FOR SOLUTION INTRAVENOUS at 01:03

## 2022-01-01 RX ADMIN — ENOXAPARIN SODIUM 70 MG: 100 INJECTION SUBCUTANEOUS at 09:03

## 2022-01-01 RX ADMIN — LINEZOLID 600 MG: 600 INJECTION, SOLUTION INTRAVENOUS at 06:20

## 2022-01-01 RX ADMIN — SODIUM CHLORIDE, PRESERVATIVE FREE 10 ML: 5 INJECTION INTRAVENOUS at 08:17

## 2022-01-01 RX ADMIN — MORPHINE SULFATE 2 MG: 2 INJECTION, SOLUTION INTRAMUSCULAR; INTRAVENOUS at 08:49

## 2022-01-01 RX ADMIN — APIXABAN 2.5 MG: 2.5 TABLET, FILM COATED ORAL at 13:35

## 2022-01-01 RX ADMIN — CEFEPIME 2000 MG: 2 INJECTION, POWDER, FOR SOLUTION INTRAVENOUS at 14:20

## 2022-01-01 RX ADMIN — DILTIAZEM HYDROCHLORIDE 60 MG: 60 TABLET, FILM COATED ORAL at 18:36

## 2022-01-01 RX ADMIN — MORPHINE SULFATE 2 MG: 2 INJECTION, SOLUTION INTRAMUSCULAR; INTRAVENOUS at 06:48

## 2022-01-01 RX ADMIN — CEFTRIAXONE 1000 MG: 1 INJECTION, POWDER, FOR SOLUTION INTRAMUSCULAR; INTRAVENOUS at 18:57

## 2022-01-01 RX ADMIN — Medication 1 CAPSULE: at 08:54

## 2022-01-01 RX ADMIN — ONDANSETRON 4 MG: 2 INJECTION INTRAMUSCULAR; INTRAVENOUS at 09:14

## 2022-01-01 RX ADMIN — SOYBEAN OIL 250 ML: 20 INJECTION, SOLUTION INTRAVENOUS at 18:30

## 2022-01-01 RX ADMIN — HYDROMORPHONE HYDROCHLORIDE 0.5 MG: 1 INJECTION, SOLUTION INTRAMUSCULAR; INTRAVENOUS; SUBCUTANEOUS at 03:39

## 2022-01-01 RX ADMIN — DILTIAZEM HYDROCHLORIDE 240 MG: 120 CAPSULE, COATED, EXTENDED RELEASE ORAL at 08:23

## 2022-01-01 RX ADMIN — SODIUM CHLORIDE, PRESERVATIVE FREE 10 ML: 5 INJECTION INTRAVENOUS at 21:42

## 2022-01-01 RX ADMIN — SODIUM CHLORIDE, PRESERVATIVE FREE 10 ML: 5 INJECTION INTRAVENOUS at 09:17

## 2022-01-01 RX ADMIN — DIGOXIN 125 MCG: 125 TABLET ORAL at 08:54

## 2022-01-01 RX ADMIN — MORPHINE SULFATE 4 MG: 4 INJECTION, SOLUTION INTRAMUSCULAR; INTRAVENOUS at 12:20

## 2022-01-01 RX ADMIN — Medication 1 CAPSULE: at 09:59

## 2022-01-01 RX ADMIN — SODIUM ACETATE: 164 INJECTION, SOLUTION, CONCENTRATE INTRAVENOUS at 18:24

## 2022-01-01 RX ADMIN — METOPROLOL TARTRATE 25 MG: 25 TABLET, FILM COATED ORAL at 09:52

## 2022-01-01 RX ADMIN — SODIUM CHLORIDE, PRESERVATIVE FREE 10 ML: 5 INJECTION INTRAVENOUS at 22:01

## 2022-01-01 RX ADMIN — METOPROLOL TARTRATE 25 MG: 25 TABLET, FILM COATED ORAL at 08:43

## 2022-01-01 RX ADMIN — Medication 500 MG: at 00:00

## 2022-01-01 RX ADMIN — SODIUM CHLORIDE, PRESERVATIVE FREE 10 ML: 5 INJECTION INTRAVENOUS at 21:45

## 2022-01-01 RX ADMIN — SODIUM CHLORIDE, PRESERVATIVE FREE 10 ML: 5 INJECTION INTRAVENOUS at 16:15

## 2022-01-01 RX ADMIN — METOPROLOL TARTRATE 2.5 MG: 5 INJECTION, SOLUTION INTRAVENOUS at 21:05

## 2022-01-01 RX ADMIN — LORAZEPAM 0.5 MG: 2 INJECTION INTRAMUSCULAR; INTRAVENOUS at 11:33

## 2022-01-01 RX ADMIN — SODIUM CHLORIDE, PRESERVATIVE FREE 10 ML: 5 INJECTION INTRAVENOUS at 08:32

## 2022-01-01 RX ADMIN — FERROUS SULFATE TAB 325 MG (65 MG ELEMENTAL FE) 325 MG: 325 (65 FE) TAB at 10:13

## 2022-01-01 RX ADMIN — Medication 1 CAPSULE: at 07:58

## 2022-01-01 RX ADMIN — Medication 1 TABLET: at 10:43

## 2022-01-01 RX ADMIN — MORPHINE SULFATE 2 MG: 2 INJECTION, SOLUTION INTRAMUSCULAR; INTRAVENOUS at 14:50

## 2022-01-01 RX ADMIN — APIXABAN 2.5 MG: 2.5 TABLET, FILM COATED ORAL at 20:35

## 2022-01-01 RX ADMIN — METOPROLOL TARTRATE 2.5 MG: 5 INJECTION, SOLUTION INTRAVENOUS at 09:34

## 2022-01-01 RX ADMIN — PHENYLEPHRINE HYDROCHLORIDE 100 MCG: 10 INJECTION INTRAVENOUS at 13:51

## 2022-01-01 RX ADMIN — DILTIAZEM HYDROCHLORIDE 30 MG: 30 TABLET, FILM COATED ORAL at 09:00

## 2022-01-01 RX ADMIN — METOPROLOL TARTRATE 5 MG: 5 INJECTION INTRAVENOUS at 18:39

## 2022-01-01 RX ADMIN — DILTIAZEM HYDROCHLORIDE 120 MG: 120 CAPSULE, COATED, EXTENDED RELEASE ORAL at 07:50

## 2022-01-01 RX ADMIN — Medication 1 CAPSULE: at 10:13

## 2022-01-01 RX ADMIN — SODIUM CHLORIDE, PRESERVATIVE FREE 10 ML: 5 INJECTION INTRAVENOUS at 10:33

## 2022-01-01 RX ADMIN — SODIUM CHLORIDE, PRESERVATIVE FREE 10 ML: 5 INJECTION INTRAVENOUS at 08:05

## 2022-01-01 RX ADMIN — DILTIAZEM HYDROCHLORIDE 60 MG: 60 TABLET, FILM COATED ORAL at 06:00

## 2022-01-01 RX ADMIN — HYDROXYUREA 1000 MG: 500 CAPSULE ORAL at 09:16

## 2022-01-01 RX ADMIN — MORPHINE SULFATE 2 MG: 2 INJECTION, SOLUTION INTRAMUSCULAR; INTRAVENOUS at 23:42

## 2022-01-01 RX ADMIN — SODIUM CHLORIDE, PRESERVATIVE FREE 10 ML: 5 INJECTION INTRAVENOUS at 21:15

## 2022-01-01 RX ADMIN — ASCORBIC ACID, VITAMIN A PALMITATE, CHOLECALCIFEROL, THIAMINE HYDROCHLORIDE, RIBOFLAVIN-5 PHOSPHATE SODIUM, PYRIDOXINE HYDROCHLORIDE, NIACINAMIDE, DEXPANTHENOL, ALPHA-TOCOPHEROL ACETATE, VITAMIN K1, FOLIC ACID, BIOTIN, CYANOCOBALAMIN: 200; 3300; 200; 6; 3.6; 6; 40; 15; 10; 150; 600; 60; 5 INJECTION, SOLUTION INTRAVENOUS at 19:04

## 2022-01-01 RX ADMIN — CHOLESTYRAMINE 4 G: 4 POWDER, FOR SUSPENSION ORAL at 16:41

## 2022-01-01 RX ADMIN — METOPROLOL TARTRATE 25 MG: 25 TABLET, FILM COATED ORAL at 20:40

## 2022-01-01 RX ADMIN — MORPHINE SULFATE 2 MG: 2 INJECTION, SOLUTION INTRAMUSCULAR; INTRAVENOUS at 00:36

## 2022-01-01 RX ADMIN — Medication 1 TABLET: at 11:02

## 2022-01-01 RX ADMIN — MEROPENEM 1000 MG: 1 INJECTION, POWDER, FOR SOLUTION INTRAVENOUS at 12:40

## 2022-01-01 RX ADMIN — MORPHINE SULFATE 2 MG: 2 INJECTION, SOLUTION INTRAMUSCULAR; INTRAVENOUS at 20:23

## 2022-01-01 RX ADMIN — SODIUM CHLORIDE, PRESERVATIVE FREE 10 ML: 5 INJECTION INTRAVENOUS at 09:15

## 2022-01-01 RX ADMIN — DICLOFENAC SODIUM 2 G: 10 GEL TOPICAL at 21:14

## 2022-01-01 RX ADMIN — DILTIAZEM HYDROCHLORIDE 60 MG: 60 TABLET, FILM COATED ORAL at 12:36

## 2022-01-01 RX ADMIN — FERROUS SULFATE TAB 325 MG (65 MG ELEMENTAL FE) 325 MG: 325 (65 FE) TAB at 16:54

## 2022-01-01 RX ADMIN — DIGOXIN 125 MCG: 125 TABLET ORAL at 08:16

## 2022-01-01 RX ADMIN — NYSTATIN 500000 UNITS: 100000 SUSPENSION ORAL at 21:20

## 2022-01-01 RX ADMIN — METOPROLOL TARTRATE 25 MG: 25 TABLET, FILM COATED ORAL at 08:16

## 2022-01-01 RX ADMIN — OXYCODONE AND ACETAMINOPHEN 1 TABLET: 5; 325 TABLET ORAL at 21:55

## 2022-01-01 RX ADMIN — ACETAMINOPHEN 650 MG: 325 TABLET ORAL at 23:17

## 2022-01-01 RX ADMIN — DEXTROSE MONOHYDRATE 5 MG/HR: 50 INJECTION, SOLUTION INTRAVENOUS at 17:01

## 2022-01-01 RX ADMIN — FERROUS SULFATE TAB 325 MG (65 MG ELEMENTAL FE) 325 MG: 325 (65 FE) TAB at 17:45

## 2022-01-01 RX ADMIN — METOPROLOL TARTRATE 25 MG: 25 TABLET, FILM COATED ORAL at 08:30

## 2022-01-01 RX ADMIN — FUROSEMIDE 20 MG: 10 INJECTION, SOLUTION INTRAMUSCULAR; INTRAVENOUS at 10:26

## 2022-01-01 RX ADMIN — SODIUM CHLORIDE, PRESERVATIVE FREE 10 ML: 5 INJECTION INTRAVENOUS at 21:51

## 2022-01-01 RX ADMIN — DILTIAZEM HYDROCHLORIDE 60 MG: 60 TABLET, FILM COATED ORAL at 07:58

## 2022-01-01 RX ADMIN — METOPROLOL TARTRATE 25 MG: 25 TABLET, FILM COATED ORAL at 19:58

## 2022-01-01 RX ADMIN — PIPERACILLIN AND TAZOBACTAM 2250 MG: 2; .25 INJECTION, POWDER, LYOPHILIZED, FOR SOLUTION INTRAVENOUS at 06:33

## 2022-01-01 RX ADMIN — HYDROCODONE BITARTRATE AND ACETAMINOPHEN 5 ML: 7.5; 325 SOLUTION ORAL at 13:33

## 2022-01-01 RX ADMIN — METRONIDAZOLE 500 MG: 500 INJECTION, SOLUTION INTRAVENOUS at 11:44

## 2022-01-01 RX ADMIN — MORPHINE SULFATE 2 MG: 2 INJECTION, SOLUTION INTRAMUSCULAR; INTRAVENOUS at 05:08

## 2022-01-01 RX ADMIN — FERROUS SULFATE TAB 325 MG (65 MG ELEMENTAL FE) 325 MG: 325 (65 FE) TAB at 09:14

## 2022-01-01 RX ADMIN — Medication 1 CAPSULE: at 09:57

## 2022-01-01 RX ADMIN — NYSTATIN 500000 UNITS: 100000 SUSPENSION ORAL at 17:05

## 2022-01-01 RX ADMIN — APIXABAN 2.5 MG: 2.5 TABLET, FILM COATED ORAL at 20:24

## 2022-01-01 RX ADMIN — METOPROLOL TARTRATE 25 MG: 25 TABLET, FILM COATED ORAL at 09:25

## 2022-01-01 RX ADMIN — PANTOPRAZOLE SODIUM 40 MG: 40 INJECTION, POWDER, FOR SOLUTION INTRAVENOUS at 09:33

## 2022-01-01 RX ADMIN — Medication: at 01:44

## 2022-01-01 RX ADMIN — DILTIAZEM HYDROCHLORIDE 60 MG: 60 TABLET, FILM COATED ORAL at 20:48

## 2022-01-01 RX ADMIN — MIRTAZAPINE 15 MG: 15 TABLET, ORALLY DISINTEGRATING ORAL at 21:10

## 2022-01-01 RX ADMIN — CALCIUM GLUCONATE 2000 MG: 20 INJECTION, SOLUTION INTRAVENOUS at 09:44

## 2022-01-01 RX ADMIN — VANCOMYCIN HYDROCHLORIDE 1250 MG: 5 INJECTION, POWDER, LYOPHILIZED, FOR SOLUTION INTRAVENOUS at 16:19

## 2022-01-01 RX ADMIN — Medication 500 MG: at 12:25

## 2022-01-01 RX ADMIN — OXYCODONE AND ACETAMINOPHEN 1 TABLET: 5; 325 TABLET ORAL at 14:34

## 2022-01-01 RX ADMIN — METOPROLOL TARTRATE 25 MG: 25 TABLET, FILM COATED ORAL at 09:58

## 2022-01-01 RX ADMIN — SOYBEAN OIL 250 ML: 20 INJECTION, SOLUTION INTRAVENOUS at 16:42

## 2022-01-01 RX ADMIN — METOPROLOL TARTRATE 25 MG: 25 TABLET, FILM COATED ORAL at 21:45

## 2022-01-01 RX ADMIN — METOPROLOL TARTRATE 2.5 MG: 5 INJECTION, SOLUTION INTRAVENOUS at 08:26

## 2022-01-01 RX ADMIN — FOLIC ACID 1 MG: 1 TABLET ORAL at 11:00

## 2022-01-01 RX ADMIN — DILTIAZEM HYDROCHLORIDE 60 MG: 60 TABLET, FILM COATED ORAL at 20:28

## 2022-01-01 RX ADMIN — CEFEPIME HYDROCHLORIDE 1000 MG: 1 INJECTION, POWDER, FOR SOLUTION INTRAMUSCULAR; INTRAVENOUS at 21:53

## 2022-01-01 RX ADMIN — METOPROLOL TARTRATE 25 MG: 25 TABLET, FILM COATED ORAL at 08:06

## 2022-01-01 RX ADMIN — FUROSEMIDE 20 MG: 10 INJECTION, SOLUTION INTRAVENOUS at 08:54

## 2022-01-01 RX ADMIN — Medication 1 CAPSULE: at 09:36

## 2022-01-01 RX ADMIN — METOPROLOL TARTRATE 25 MG: 25 TABLET, FILM COATED ORAL at 06:58

## 2022-01-01 RX ADMIN — Medication 1 CAPSULE: at 07:48

## 2022-01-01 RX ADMIN — SODIUM CHLORIDE, PRESERVATIVE FREE 10 ML: 5 INJECTION INTRAVENOUS at 08:50

## 2022-01-01 RX ADMIN — DEXTROSE AND SODIUM CHLORIDE: 5; 900 INJECTION, SOLUTION INTRAVENOUS at 04:34

## 2022-01-01 RX ADMIN — METOPROLOL TARTRATE 25 MG: 25 TABLET, FILM COATED ORAL at 21:30

## 2022-01-01 RX ADMIN — NYSTATIN 500000 UNITS: 100000 SUSPENSION ORAL at 16:33

## 2022-01-01 RX ADMIN — DIGOXIN 125 MCG: 0.25 INJECTION INTRAMUSCULAR; INTRAVENOUS at 14:31

## 2022-01-01 RX ADMIN — Medication 1 CAPSULE: at 13:06

## 2022-01-01 RX ADMIN — FUROSEMIDE 20 MG: 10 INJECTION, SOLUTION INTRAMUSCULAR; INTRAVENOUS at 08:17

## 2022-01-01 RX ADMIN — DILTIAZEM HYDROCHLORIDE 60 MG: 60 TABLET, FILM COATED ORAL at 05:50

## 2022-01-01 RX ADMIN — DILTIAZEM HYDROCHLORIDE 30 MG: 30 TABLET, FILM COATED ORAL at 20:33

## 2022-01-01 RX ADMIN — METOPROLOL TARTRATE 25 MG: 25 TABLET, FILM COATED ORAL at 07:50

## 2022-01-01 RX ADMIN — MEROPENEM 1000 MG: 1 INJECTION, POWDER, FOR SOLUTION INTRAVENOUS at 02:19

## 2022-01-01 RX ADMIN — IOPAMIDOL 80 ML: 755 INJECTION, SOLUTION INTRAVENOUS at 09:13

## 2022-01-01 RX ADMIN — DIGOXIN 250 MCG: 0.25 INJECTION INTRAMUSCULAR; INTRAVENOUS at 06:54

## 2022-01-01 RX ADMIN — AMIODARONE HYDROCHLORIDE 0.5 MG/MIN: 50 INJECTION, SOLUTION INTRAVENOUS at 23:41

## 2022-01-01 RX ADMIN — LIDOCAINE HYDROCHLORIDE,EPINEPHRINE BITARTRATE 20 ML: 10; .01 INJECTION, SOLUTION INFILTRATION; PERINEURAL at 01:43

## 2022-01-01 RX ADMIN — LOPERAMIDE HYDROCHLORIDE 4 MG: 2 CAPSULE ORAL at 21:54

## 2022-01-01 RX ADMIN — HYDROCODONE BITARTRATE AND ACETAMINOPHEN 5 ML: 7.5; 325 SOLUTION ORAL at 05:11

## 2022-01-01 RX ADMIN — CEFEPIME HYDROCHLORIDE 2000 MG: 2 INJECTION, POWDER, FOR SOLUTION INTRAVENOUS at 13:07

## 2022-01-01 RX ADMIN — MORPHINE SULFATE 2 MG: 2 INJECTION, SOLUTION INTRAMUSCULAR; INTRAVENOUS at 04:08

## 2022-01-01 RX ADMIN — DILTIAZEM HYDROCHLORIDE 120 MG: 120 CAPSULE, COATED, EXTENDED RELEASE ORAL at 09:06

## 2022-01-01 RX ADMIN — DIGOXIN 125 MCG: 125 TABLET ORAL at 08:49

## 2022-01-01 RX ADMIN — METOPROLOL TARTRATE 2.5 MG: 5 INJECTION, SOLUTION INTRAVENOUS at 21:23

## 2022-01-01 RX ADMIN — DIGOXIN 125 MCG: 125 TABLET ORAL at 10:25

## 2022-01-01 RX ADMIN — METOPROLOL TARTRATE 2.5 MG: 5 INJECTION, SOLUTION INTRAVENOUS at 20:26

## 2022-01-01 RX ADMIN — DILTIAZEM HYDROCHLORIDE 240 MG: 240 CAPSULE, COATED, EXTENDED RELEASE ORAL at 10:18

## 2022-01-01 RX ADMIN — DEXTROSE AND SODIUM CHLORIDE: 5; 450 INJECTION, SOLUTION INTRAVENOUS at 16:39

## 2022-01-01 RX ADMIN — DICLOFENAC SODIUM 2 G: 10 GEL TOPICAL at 21:48

## 2022-01-01 RX ADMIN — CEFEPIME HYDROCHLORIDE 2000 MG: 2 INJECTION, POWDER, FOR SOLUTION INTRAVENOUS at 14:26

## 2022-01-01 RX ADMIN — HYDROMORPHONE HYDROCHLORIDE 0.5 MG: 1 INJECTION, SOLUTION INTRAMUSCULAR; INTRAVENOUS; SUBCUTANEOUS at 20:38

## 2022-01-01 RX ADMIN — ACETAMINOPHEN 650 MG: 325 TABLET ORAL at 23:52

## 2022-01-01 RX ADMIN — DILTIAZEM HYDROCHLORIDE 30 MG: 30 TABLET, FILM COATED ORAL at 07:47

## 2022-01-01 RX ADMIN — PROPOFOL 20 MG: 10 INJECTION, EMULSION INTRAVENOUS at 07:36

## 2022-01-01 RX ADMIN — SODIUM CHLORIDE: 9 INJECTION, SOLUTION INTRAVENOUS at 19:40

## 2022-01-01 RX ADMIN — METOPROLOL TARTRATE 25 MG: 25 TABLET, FILM COATED ORAL at 20:48

## 2022-01-01 RX ADMIN — METRONIDAZOLE 500 MG: 500 INJECTION, SOLUTION INTRAVENOUS at 16:56

## 2022-01-01 RX ADMIN — FERROUS SULFATE TAB 325 MG (65 MG ELEMENTAL FE) 325 MG: 325 (65 FE) TAB at 08:49

## 2022-01-01 RX ADMIN — PIPERACILLIN AND TAZOBACTAM 2250 MG: 2; .25 INJECTION, POWDER, LYOPHILIZED, FOR SOLUTION INTRAVENOUS at 20:43

## 2022-01-01 RX ADMIN — METOPROLOL TARTRATE 2.5 MG: 5 INJECTION, SOLUTION INTRAVENOUS at 02:25

## 2022-01-01 RX ADMIN — DEXTROSE MONOHYDRATE 150 MG: 50 INJECTION, SOLUTION INTRAVENOUS at 17:48

## 2022-01-01 RX ADMIN — DILTIAZEM HYDROCHLORIDE 60 MG: 60 TABLET, FILM COATED ORAL at 18:11

## 2022-01-01 RX ADMIN — CEFTRIAXONE SODIUM 1000 MG: 1 INJECTION, POWDER, FOR SOLUTION INTRAMUSCULAR; INTRAVENOUS at 17:47

## 2022-01-01 RX ADMIN — SODIUM CHLORIDE, PRESERVATIVE FREE 10 ML: 5 INJECTION INTRAVENOUS at 09:13

## 2022-01-01 RX ADMIN — SODIUM CHLORIDE, PRESERVATIVE FREE 10 ML: 5 INJECTION INTRAVENOUS at 23:03

## 2022-01-01 RX ADMIN — DILTIAZEM HYDROCHLORIDE 60 MG: 60 TABLET, FILM COATED ORAL at 09:36

## 2022-01-01 RX ADMIN — NYSTATIN 500000 UNITS: 100000 SUSPENSION ORAL at 17:47

## 2022-01-01 RX ADMIN — Medication 1 TABLET: at 10:12

## 2022-01-01 RX ADMIN — FERROUS SULFATE TAB 325 MG (65 MG ELEMENTAL FE) 325 MG: 325 (65 FE) TAB at 17:42

## 2022-01-01 RX ADMIN — PANTOPRAZOLE SODIUM 40 MG: 40 TABLET, DELAYED RELEASE ORAL at 06:54

## 2022-01-01 RX ADMIN — DILTIAZEM HYDROCHLORIDE 240 MG: 120 CAPSULE, COATED, EXTENDED RELEASE ORAL at 09:50

## 2022-01-01 RX ADMIN — ENOXAPARIN SODIUM 70 MG: 100 INJECTION SUBCUTANEOUS at 20:34

## 2022-01-01 RX ADMIN — PIPERACILLIN AND TAZOBACTAM 2250 MG: 2; .25 INJECTION, POWDER, LYOPHILIZED, FOR SOLUTION INTRAVENOUS at 14:57

## 2022-01-01 RX ADMIN — METOPROLOL TARTRATE 25 MG: 25 TABLET, FILM COATED ORAL at 10:59

## 2022-01-01 RX ADMIN — SODIUM CHLORIDE: 9 INJECTION, SOLUTION INTRAVENOUS at 07:13

## 2022-01-01 RX ADMIN — FERROUS SULFATE TAB 325 MG (65 MG ELEMENTAL FE) 325 MG: 325 (65 FE) TAB at 18:46

## 2022-01-01 RX ADMIN — DICLOFENAC SODIUM 2 G: 10 GEL TOPICAL at 21:07

## 2022-01-01 RX ADMIN — FOLIC ACID 1 MG: 1 TABLET ORAL at 08:54

## 2022-01-01 RX ADMIN — ACETAMINOPHEN 650 MG: 325 TABLET ORAL at 13:35

## 2022-01-01 RX ADMIN — PANTOPRAZOLE SODIUM 40 MG: 40 TABLET, DELAYED RELEASE ORAL at 06:33

## 2022-01-01 RX ADMIN — SODIUM CHLORIDE 25 ML: 9 INJECTION, SOLUTION INTRAVENOUS at 06:17

## 2022-01-01 RX ADMIN — SODIUM CHLORIDE: 9 INJECTION, SOLUTION INTRAVENOUS at 21:46

## 2022-01-01 RX ADMIN — ASPIRIN 81 MG 81 MG: 81 TABLET ORAL at 08:16

## 2022-01-01 RX ADMIN — DEXTROSE AND SODIUM CHLORIDE: 5; 900 INJECTION, SOLUTION INTRAVENOUS at 08:13

## 2022-01-01 RX ADMIN — NYSTATIN 500000 UNITS: 100000 SUSPENSION ORAL at 18:32

## 2022-01-01 RX ADMIN — MEROPENEM 1000 MG: 1 INJECTION, POWDER, FOR SOLUTION INTRAVENOUS at 19:03

## 2022-01-01 RX ADMIN — FUROSEMIDE 20 MG: 10 INJECTION, SOLUTION INTRAMUSCULAR; INTRAVENOUS at 09:58

## 2022-01-01 RX ADMIN — PANTOPRAZOLE SODIUM 40 MG: 40 TABLET, DELAYED RELEASE ORAL at 06:12

## 2022-01-01 RX ADMIN — DIGOXIN 125 MCG: 125 TABLET ORAL at 08:30

## 2022-01-01 RX ADMIN — NYSTATIN 500000 UNITS: 100000 SUSPENSION ORAL at 09:57

## 2022-01-01 RX ADMIN — SOYBEAN OIL 250 ML: 20 INJECTION, SOLUTION INTRAVENOUS at 18:57

## 2022-01-01 RX ADMIN — CEFTRIAXONE 1000 MG: 1 INJECTION, POWDER, FOR SOLUTION INTRAMUSCULAR; INTRAVENOUS at 17:53

## 2022-01-01 RX ADMIN — DILTIAZEM HYDROCHLORIDE 120 MG: 120 CAPSULE, COATED, EXTENDED RELEASE ORAL at 11:00

## 2022-01-01 RX ADMIN — ACETAMINOPHEN 650 MG: 325 TABLET ORAL at 06:06

## 2022-01-01 RX ADMIN — MEROPENEM 1000 MG: 1 INJECTION, POWDER, FOR SOLUTION INTRAVENOUS at 21:47

## 2022-01-01 RX ADMIN — PIPERACILLIN AND TAZOBACTAM 2250 MG: 2; .25 INJECTION, POWDER, LYOPHILIZED, FOR SOLUTION INTRAVENOUS at 05:33

## 2022-01-01 RX ADMIN — SODIUM CHLORIDE, PRESERVATIVE FREE 10 ML: 5 INJECTION INTRAVENOUS at 11:03

## 2022-01-01 RX ADMIN — DIGOXIN 125 MCG: 0.25 INJECTION INTRAMUSCULAR; INTRAVENOUS at 09:38

## 2022-01-01 RX ADMIN — SODIUM CHLORIDE, PRESERVATIVE FREE 10 ML: 5 INJECTION INTRAVENOUS at 22:44

## 2022-01-01 RX ADMIN — ENOXAPARIN SODIUM 70 MG: 100 INJECTION SUBCUTANEOUS at 21:41

## 2022-01-01 RX ADMIN — NYSTATIN 500000 UNITS: 100000 SUSPENSION ORAL at 13:57

## 2022-01-01 RX ADMIN — CALCIUM CHLORIDE 1000 MG: 100 INJECTION, SOLUTION INTRAVENOUS at 11:39

## 2022-01-01 RX ADMIN — DILTIAZEM HYDROCHLORIDE 120 MG: 120 CAPSULE, COATED, EXTENDED RELEASE ORAL at 09:19

## 2022-01-01 RX ADMIN — Medication 1 CAPSULE: at 11:59

## 2022-01-01 RX ADMIN — PANTOPRAZOLE SODIUM 40 MG: 40 INJECTION, POWDER, FOR SOLUTION INTRAVENOUS at 10:43

## 2022-01-01 RX ADMIN — PANTOPRAZOLE SODIUM 40 MG: 40 INJECTION, POWDER, FOR SOLUTION INTRAVENOUS at 18:23

## 2022-01-01 RX ADMIN — ENOXAPARIN SODIUM 70 MG: 100 INJECTION SUBCUTANEOUS at 09:00

## 2022-01-01 RX ADMIN — IRON SUPPLEMENT 325 MG: 325 TABLET ORAL at 09:39

## 2022-01-01 RX ADMIN — NYSTATIN 500000 UNITS: 100000 SUSPENSION ORAL at 21:06

## 2022-01-01 RX ADMIN — ASPIRIN 81 MG 81 MG: 81 TABLET ORAL at 08:06

## 2022-01-01 RX ADMIN — METOPROLOL TARTRATE 25 MG: 25 TABLET, FILM COATED ORAL at 09:06

## 2022-01-01 RX ADMIN — DEXTROSE 100 MG: 50 INJECTION, SOLUTION INTRAVENOUS at 16:31

## 2022-01-01 RX ADMIN — LIDOCAINE HYDROCHLORIDE 100 MG: 20 INJECTION, SOLUTION EPIDURAL; INFILTRATION; INTRACAUDAL; PERINEURAL at 07:03

## 2022-01-01 RX ADMIN — PANTOPRAZOLE SODIUM 40 MG: 40 TABLET, DELAYED RELEASE ORAL at 09:20

## 2022-01-01 RX ADMIN — FOLIC ACID 1 MG: 1 TABLET ORAL at 11:02

## 2022-01-01 RX ADMIN — SODIUM ACETATE: 164 INJECTION, SOLUTION, CONCENTRATE INTRAVENOUS at 18:30

## 2022-01-01 RX ADMIN — SODIUM CHLORIDE, PRESERVATIVE FREE 10 ML: 5 INJECTION INTRAVENOUS at 20:52

## 2022-01-01 RX ADMIN — HYDROXYUREA 1000 MG: 500 CAPSULE ORAL at 10:18

## 2022-01-01 RX ADMIN — MEROPENEM 1000 MG: 1 INJECTION, POWDER, FOR SOLUTION INTRAVENOUS at 01:54

## 2022-01-01 RX ADMIN — DIGOXIN 125 MCG: 125 TABLET ORAL at 08:04

## 2022-01-01 RX ADMIN — IOPAMIDOL 75 ML: 755 INJECTION, SOLUTION INTRAVENOUS at 06:21

## 2022-01-01 RX ADMIN — INSULIN LISPRO 1 UNITS: 100 INJECTION, SOLUTION INTRAVENOUS; SUBCUTANEOUS at 12:17

## 2022-01-01 RX ADMIN — CEFEPIME HYDROCHLORIDE 2000 MG: 2 INJECTION, POWDER, FOR SOLUTION INTRAVENOUS at 02:17

## 2022-01-01 RX ADMIN — DRONABINOL 2.5 MG: 2.5 CAPSULE ORAL at 21:50

## 2022-01-01 RX ADMIN — SOYBEAN OIL 250 ML: 20 INJECTION, SOLUTION INTRAVENOUS at 17:37

## 2022-01-01 RX ADMIN — PANTOPRAZOLE SODIUM 40 MG: 40 INJECTION, POWDER, FOR SOLUTION INTRAVENOUS at 06:41

## 2022-01-01 RX ADMIN — DEXAMETHASONE SODIUM PHOSPHATE 4 MG: 4 INJECTION, SOLUTION INTRAMUSCULAR; INTRAVENOUS at 13:55

## 2022-01-01 RX ADMIN — METOPROLOL TARTRATE 10 MG: 1 INJECTION, SOLUTION INTRAVENOUS at 09:47

## 2022-01-01 RX ADMIN — ASPIRIN 81 MG 81 MG: 81 TABLET ORAL at 09:26

## 2022-01-01 RX ADMIN — PIPERACILLIN AND TAZOBACTAM 2250 MG: 2; .25 INJECTION, POWDER, LYOPHILIZED, FOR SOLUTION INTRAVENOUS at 05:12

## 2022-01-01 RX ADMIN — HYDROMORPHONE HYDROCHLORIDE 0.5 MG: 1 INJECTION, SOLUTION INTRAMUSCULAR; INTRAVENOUS; SUBCUTANEOUS at 13:01

## 2022-01-01 RX ADMIN — METOPROLOL TARTRATE 25 MG: 25 TABLET, FILM COATED ORAL at 21:29

## 2022-01-01 RX ADMIN — SODIUM CHLORIDE, PRESERVATIVE FREE 10 ML: 5 INJECTION INTRAVENOUS at 21:23

## 2022-01-01 RX ADMIN — DILTIAZEM HYDROCHLORIDE 120 MG: 120 CAPSULE, COATED, EXTENDED RELEASE ORAL at 08:03

## 2022-01-01 RX ADMIN — SODIUM CHLORIDE, PRESERVATIVE FREE 10 ML: 5 INJECTION INTRAVENOUS at 11:46

## 2022-01-01 RX ADMIN — PANTOPRAZOLE SODIUM 40 MG: 40 INJECTION, POWDER, FOR SOLUTION INTRAVENOUS at 09:00

## 2022-01-01 RX ADMIN — SODIUM CHLORIDE, PRESERVATIVE FREE 10 ML: 5 INJECTION INTRAVENOUS at 23:18

## 2022-01-01 RX ADMIN — METOPROLOL TARTRATE 25 MG: 25 TABLET, FILM COATED ORAL at 08:18

## 2022-01-01 RX ADMIN — DICLOFENAC SODIUM 2 G: 10 GEL TOPICAL at 20:53

## 2022-01-01 RX ADMIN — Medication 1 CAPSULE: at 09:25

## 2022-01-01 RX ADMIN — APIXABAN 2.5 MG: 2.5 TABLET, FILM COATED ORAL at 19:58

## 2022-01-01 RX ADMIN — HYDROXYUREA 500 MG: 500 CAPSULE ORAL at 22:10

## 2022-01-01 RX ADMIN — PANTOPRAZOLE SODIUM 40 MG: 40 INJECTION, POWDER, FOR SOLUTION INTRAVENOUS at 08:26

## 2022-01-01 RX ADMIN — NYSTATIN 500000 UNITS: 100000 SUSPENSION ORAL at 14:52

## 2022-01-01 RX ADMIN — ONDANSETRON 4 MG: 2 INJECTION INTRAMUSCULAR; INTRAVENOUS at 00:20

## 2022-01-01 RX ADMIN — Medication 1 CAPSULE: at 19:52

## 2022-01-01 RX ADMIN — DIGOXIN 250 MCG: 0.25 INJECTION INTRAMUSCULAR; INTRAVENOUS at 19:51

## 2022-01-01 RX ADMIN — Medication 1 CAPSULE: at 08:17

## 2022-01-01 RX ADMIN — ACETAMINOPHEN 650 MG: 325 TABLET ORAL at 21:06

## 2022-01-01 RX ADMIN — FERROUS SULFATE TAB 325 MG (65 MG ELEMENTAL FE) 325 MG: 325 (65 FE) TAB at 11:00

## 2022-01-01 RX ADMIN — SODIUM CHLORIDE, PRESERVATIVE FREE 10 ML: 5 INJECTION INTRAVENOUS at 21:34

## 2022-01-01 RX ADMIN — LOPERAMIDE HYDROCHLORIDE 4 MG: 2 CAPSULE ORAL at 20:28

## 2022-01-01 RX ADMIN — SODIUM CHLORIDE, PRESERVATIVE FREE 10 ML: 5 INJECTION INTRAVENOUS at 00:21

## 2022-01-01 RX ADMIN — Medication 1 CAPSULE: at 16:54

## 2022-01-01 RX ADMIN — SODIUM CHLORIDE, PRESERVATIVE FREE 10 ML: 5 INJECTION INTRAVENOUS at 06:59

## 2022-01-01 RX ADMIN — MEROPENEM 1000 MG: 1 INJECTION, POWDER, FOR SOLUTION INTRAVENOUS at 09:14

## 2022-01-01 RX ADMIN — DILTIAZEM HYDROCHLORIDE 30 MG: 30 TABLET, FILM COATED ORAL at 11:34

## 2022-01-01 RX ADMIN — DICLOFENAC SODIUM 2 G: 10 GEL TOPICAL at 20:27

## 2022-01-01 RX ADMIN — METOPROLOL TARTRATE 25 MG: 25 TABLET, FILM COATED ORAL at 20:28

## 2022-01-01 RX ADMIN — PIPERACILLIN AND TAZOBACTAM 2250 MG: 2; .25 INJECTION, POWDER, LYOPHILIZED, FOR SOLUTION INTRAVENOUS at 20:47

## 2022-01-01 RX ADMIN — CEFEPIME HYDROCHLORIDE 2000 MG: 2 INJECTION, POWDER, FOR SOLUTION INTRAVENOUS at 14:50

## 2022-01-01 RX ADMIN — METOPROLOL TARTRATE 25 MG: 25 TABLET, FILM COATED ORAL at 21:54

## 2022-01-01 RX ADMIN — ENOXAPARIN SODIUM 70 MG: 100 INJECTION SUBCUTANEOUS at 20:26

## 2022-01-01 RX ADMIN — METOPROLOL TARTRATE 25 MG: 25 TABLET, FILM COATED ORAL at 20:49

## 2022-01-01 RX ADMIN — Medication 1 CAPSULE: at 08:30

## 2022-01-01 RX ADMIN — APIXABAN 2.5 MG: 2.5 TABLET, FILM COATED ORAL at 21:07

## 2022-01-01 RX ADMIN — VANCOMYCIN HYDROCHLORIDE 1250 MG: 5 INJECTION, POWDER, LYOPHILIZED, FOR SOLUTION INTRAVENOUS at 14:47

## 2022-01-01 RX ADMIN — HYDROMORPHONE HYDROCHLORIDE 1 MG: 1 INJECTION, SOLUTION INTRAMUSCULAR; INTRAVENOUS; SUBCUTANEOUS at 12:08

## 2022-01-01 RX ADMIN — SODIUM BICARBONATE: 84 INJECTION, SOLUTION INTRAVENOUS at 16:26

## 2022-01-01 RX ADMIN — DILTIAZEM HYDROCHLORIDE 240 MG: 120 CAPSULE, COATED, EXTENDED RELEASE ORAL at 10:43

## 2022-01-01 RX ADMIN — MORPHINE SULFATE 2 MG: 2 INJECTION, SOLUTION INTRAMUSCULAR; INTRAVENOUS at 00:44

## 2022-01-01 RX ADMIN — SODIUM CHLORIDE, PRESERVATIVE FREE 10 ML: 5 INJECTION INTRAVENOUS at 09:39

## 2022-01-01 RX ADMIN — INSULIN LISPRO 1 UNITS: 100 INJECTION, SOLUTION INTRAVENOUS; SUBCUTANEOUS at 16:21

## 2022-01-01 RX ADMIN — PROPOFOL 20 MG: 10 INJECTION, EMULSION INTRAVENOUS at 07:17

## 2022-01-01 RX ADMIN — DIGOXIN 125 MCG: 125 TABLET ORAL at 09:58

## 2022-01-01 RX ADMIN — SODIUM CHLORIDE, PRESERVATIVE FREE 10 ML: 5 INJECTION INTRAVENOUS at 21:29

## 2022-01-01 RX ADMIN — LINEZOLID 600 MG: 600 INJECTION, SOLUTION INTRAVENOUS at 18:10

## 2022-01-01 RX ADMIN — METOPROLOL TARTRATE 25 MG: 25 TABLET, FILM COATED ORAL at 21:58

## 2022-01-01 RX ADMIN — LOPERAMIDE HYDROCHLORIDE 4 MG: 2 CAPSULE ORAL at 21:35

## 2022-01-01 RX ADMIN — SODIUM CHLORIDE, PRESERVATIVE FREE 10 ML: 5 INJECTION INTRAVENOUS at 08:56

## 2022-01-01 RX ADMIN — PIPERACILLIN AND TAZOBACTAM 3375 MG: 3; .375 INJECTION, POWDER, LYOPHILIZED, FOR SOLUTION INTRAVENOUS at 03:18

## 2022-01-01 RX ADMIN — APIXABAN 2.5 MG: 2.5 TABLET, FILM COATED ORAL at 07:49

## 2022-01-01 RX ADMIN — DILTIAZEM HYDROCHLORIDE 60 MG: 60 TABLET, FILM COATED ORAL at 12:19

## 2022-01-01 RX ADMIN — Medication 1 CAPSULE: at 09:43

## 2022-01-01 RX ADMIN — SODIUM CHLORIDE, PRESERVATIVE FREE 10 ML: 5 INJECTION INTRAVENOUS at 08:03

## 2022-01-01 RX ADMIN — METOPROLOL TARTRATE 2.5 MG: 5 INJECTION, SOLUTION INTRAVENOUS at 12:26

## 2022-01-01 RX ADMIN — DIGOXIN 125 MCG: 125 TABLET ORAL at 09:37

## 2022-01-01 RX ADMIN — FUROSEMIDE 20 MG: 10 INJECTION, SOLUTION INTRAMUSCULAR; INTRAVENOUS at 21:01

## 2022-01-01 RX ADMIN — LINEZOLID 600 MG: 600 INJECTION, SOLUTION INTRAVENOUS at 06:29

## 2022-01-01 RX ADMIN — DILTIAZEM HYDROCHLORIDE 60 MG: 60 TABLET, FILM COATED ORAL at 21:35

## 2022-01-01 RX ADMIN — DICLOFENAC SODIUM 2 G: 10 GEL TOPICAL at 11:20

## 2022-01-01 RX ADMIN — LANSOPRAZOLE 30 MG: KIT at 06:00

## 2022-01-01 RX ADMIN — METOPROLOL TARTRATE 5 MG: 5 INJECTION, SOLUTION INTRAVENOUS at 17:05

## 2022-01-01 RX ADMIN — MAGNESIUM SULFATE 2000 MG: 2 INJECTION INTRAVENOUS at 10:25

## 2022-01-01 RX ADMIN — GLUCAGON HYDROCHLORIDE 0.5 MG: KIT at 09:33

## 2022-01-01 RX ADMIN — METOPROLOL TARTRATE 2.5 MG: 5 INJECTION, SOLUTION INTRAVENOUS at 03:04

## 2022-01-01 RX ADMIN — APIXABAN 2.5 MG: 2.5 TABLET, FILM COATED ORAL at 21:45

## 2022-01-01 RX ADMIN — PANTOPRAZOLE SODIUM 40 MG: 40 TABLET, DELAYED RELEASE ORAL at 06:16

## 2022-01-01 RX ADMIN — FERROUS SULFATE TAB 325 MG (65 MG ELEMENTAL FE) 325 MG: 325 (65 FE) TAB at 19:07

## 2022-01-01 RX ADMIN — SODIUM CHLORIDE, PRESERVATIVE FREE 10 ML: 5 INJECTION INTRAVENOUS at 21:25

## 2022-01-01 RX ADMIN — CEFTRIAXONE 1000 MG: 1 INJECTION, POWDER, FOR SOLUTION INTRAMUSCULAR; INTRAVENOUS at 18:33

## 2022-01-01 RX ADMIN — ACETAMINOPHEN 650 MG: 650 SOLUTION ORAL at 11:36

## 2022-01-01 RX ADMIN — METOPROLOL TARTRATE 2.5 MG: 5 INJECTION, SOLUTION INTRAVENOUS at 03:08

## 2022-01-01 RX ADMIN — APIXABAN 2.5 MG: 2.5 TABLET, FILM COATED ORAL at 21:59

## 2022-01-01 RX ADMIN — DRONABINOL 2.5 MG: 2.5 CAPSULE ORAL at 08:51

## 2022-01-01 RX ADMIN — APIXABAN 2.5 MG: 2.5 TABLET, FILM COATED ORAL at 09:38

## 2022-01-01 RX ADMIN — PROPOFOL 20 MG: 10 INJECTION, EMULSION INTRAVENOUS at 07:26

## 2022-01-01 RX ADMIN — ACETAMINOPHEN 650 MG: 325 TABLET ORAL at 20:36

## 2022-01-01 RX ADMIN — ASPIRIN 81 MG 81 MG: 81 TABLET ORAL at 09:57

## 2022-01-01 RX ADMIN — PROPOFOL 20 MG: 10 INJECTION, EMULSION INTRAVENOUS at 07:41

## 2022-01-01 RX ADMIN — METOPROLOL TARTRATE 25 MG: 25 TABLET, FILM COATED ORAL at 20:26

## 2022-01-01 RX ADMIN — MIRTAZAPINE 15 MG: 15 TABLET, ORALLY DISINTEGRATING ORAL at 22:52

## 2022-01-01 RX ADMIN — FERROUS SULFATE TAB 325 MG (65 MG ELEMENTAL FE) 325 MG: 325 (65 FE) TAB at 17:43

## 2022-01-01 RX ADMIN — APIXABAN 2.5 MG: 2.5 TABLET, FILM COATED ORAL at 09:26

## 2022-01-01 RX ADMIN — HEPARIN SODIUM 5000 UNITS: 5000 INJECTION INTRAVENOUS; SUBCUTANEOUS at 22:00

## 2022-01-01 RX ADMIN — NYSTATIN 500000 UNITS: 100000 SUSPENSION ORAL at 20:31

## 2022-01-01 RX ADMIN — METOPROLOL TARTRATE 25 MG: 25 TABLET, FILM COATED ORAL at 20:31

## 2022-01-01 RX ADMIN — SODIUM CHLORIDE, PRESERVATIVE FREE 10 ML: 5 INJECTION INTRAVENOUS at 08:58

## 2022-01-01 RX ADMIN — HYDROCODONE BITARTRATE AND ACETAMINOPHEN 5 ML: 7.5; 325 SOLUTION ORAL at 19:03

## 2022-01-01 RX ADMIN — APIXABAN 2.5 MG: 2.5 TABLET, FILM COATED ORAL at 10:19

## 2022-01-01 RX ADMIN — METOPROLOL TARTRATE 25 MG: 25 TABLET, FILM COATED ORAL at 09:50

## 2022-01-01 RX ADMIN — SODIUM CHLORIDE, PRESERVATIVE FREE 10 ML: 5 INJECTION INTRAVENOUS at 17:53

## 2022-01-01 RX ADMIN — DIGOXIN 125 MCG: 125 TABLET ORAL at 10:11

## 2022-01-01 RX ADMIN — FENTANYL CITRATE 50 MCG: 50 INJECTION, SOLUTION INTRAMUSCULAR; INTRAVENOUS at 13:46

## 2022-01-01 RX ADMIN — DILTIAZEM HYDROCHLORIDE 240 MG: 120 CAPSULE, COATED, EXTENDED RELEASE ORAL at 09:56

## 2022-01-01 RX ADMIN — DIGOXIN 125 MCG: 125 TABLET ORAL at 09:25

## 2022-01-01 RX ADMIN — PANTOPRAZOLE SODIUM 40 MG: 40 TABLET, DELAYED RELEASE ORAL at 06:13

## 2022-01-01 RX ADMIN — PROPOFOL 50 MG: 10 INJECTION, EMULSION INTRAVENOUS at 13:45

## 2022-01-01 RX ADMIN — VANCOMYCIN HYDROCHLORIDE 1250 MG: 5 INJECTION, POWDER, LYOPHILIZED, FOR SOLUTION INTRAVENOUS at 16:16

## 2022-01-01 RX ADMIN — METOPROLOL TARTRATE 25 MG: 25 TABLET, FILM COATED ORAL at 20:52

## 2022-01-01 RX ADMIN — SODIUM CHLORIDE, PRESERVATIVE FREE 10 ML: 5 INJECTION INTRAVENOUS at 12:37

## 2022-01-01 RX ADMIN — PANTOPRAZOLE SODIUM 40 MG: 40 TABLET, DELAYED RELEASE ORAL at 06:24

## 2022-01-01 RX ADMIN — ENOXAPARIN SODIUM 70 MG: 100 INJECTION SUBCUTANEOUS at 08:26

## 2022-01-01 RX ADMIN — CALCIUM GLUCONATE 2000 MG: 20 INJECTION, SOLUTION INTRAVENOUS at 11:25

## 2022-01-01 RX ADMIN — PANTOPRAZOLE SODIUM 40 MG: 40 TABLET, DELAYED RELEASE ORAL at 06:23

## 2022-01-01 RX ADMIN — SODIUM CHLORIDE, PRESERVATIVE FREE 10 ML: 5 INJECTION INTRAVENOUS at 19:00

## 2022-01-01 RX ADMIN — FOLIC ACID 1 MG: 1 TABLET ORAL at 09:50

## 2022-01-01 RX ADMIN — FOLIC ACID 1 MG: 1 TABLET ORAL at 08:23

## 2022-01-01 RX ADMIN — DILTIAZEM HYDROCHLORIDE 240 MG: 240 CAPSULE, COATED, EXTENDED RELEASE ORAL at 09:16

## 2022-01-01 RX ADMIN — NYSTATIN 500000 UNITS: 100000 SUSPENSION ORAL at 09:00

## 2022-01-01 RX ADMIN — METRONIDAZOLE 500 MG: 500 INJECTION, SOLUTION INTRAVENOUS at 00:01

## 2022-01-01 RX ADMIN — DILTIAZEM HYDROCHLORIDE 60 MG: 60 TABLET, FILM COATED ORAL at 09:58

## 2022-01-01 RX ADMIN — PIPERACILLIN AND TAZOBACTAM 2250 MG: 2; .25 INJECTION, POWDER, LYOPHILIZED, FOR SOLUTION INTRAVENOUS at 22:01

## 2022-01-01 RX ADMIN — Medication 1 TABLET: at 09:06

## 2022-01-01 RX ADMIN — DEXTROSE 100 MG: 50 INJECTION, SOLUTION INTRAVENOUS at 16:42

## 2022-01-01 RX ADMIN — METOPROLOL TARTRATE 25 MG: 25 TABLET, FILM COATED ORAL at 09:36

## 2022-01-01 RX ADMIN — DILTIAZEM HYDROCHLORIDE 120 MG: 120 CAPSULE, COATED, EXTENDED RELEASE ORAL at 13:46

## 2022-01-01 RX ADMIN — FOLIC ACID 1 MG: 1 TABLET ORAL at 09:14

## 2022-01-01 RX ADMIN — FIDAXOMICIN 200 MG: 200 TABLET, FILM COATED ORAL at 21:35

## 2022-01-01 RX ADMIN — FUROSEMIDE 60 MG: 10 INJECTION, SOLUTION INTRAVENOUS at 09:51

## 2022-01-01 RX ADMIN — Medication 1 CAPSULE: at 09:35

## 2022-01-01 RX ADMIN — MORPHINE SULFATE 4 MG: 4 INJECTION, SOLUTION INTRAMUSCULAR; INTRAVENOUS at 20:48

## 2022-01-01 RX ADMIN — HYDROCODONE BITARTRATE AND ACETAMINOPHEN 5 ML: 7.5; 325 SOLUTION ORAL at 00:02

## 2022-01-01 RX ADMIN — SODIUM BICARBONATE: 84 INJECTION, SOLUTION INTRAVENOUS at 10:20

## 2022-01-01 RX ADMIN — VANCOMYCIN HYDROCHLORIDE 1250 MG: 5 INJECTION, POWDER, LYOPHILIZED, FOR SOLUTION INTRAVENOUS at 14:40

## 2022-01-01 RX ADMIN — METOPROLOL TARTRATE 25 MG: 25 TABLET, FILM COATED ORAL at 10:43

## 2022-01-01 RX ADMIN — SODIUM CHLORIDE, PRESERVATIVE FREE 10 ML: 5 INJECTION INTRAVENOUS at 09:30

## 2022-01-01 RX ADMIN — PIPERACILLIN AND TAZOBACTAM 2250 MG: 2; .25 INJECTION, POWDER, LYOPHILIZED, FOR SOLUTION INTRAVENOUS at 14:50

## 2022-01-01 RX ADMIN — SODIUM CHLORIDE, PRESERVATIVE FREE 10 ML: 5 INJECTION INTRAVENOUS at 08:25

## 2022-01-01 RX ADMIN — HYDROCODONE BITARTRATE AND ACETAMINOPHEN 1 TABLET: 5; 325 TABLET ORAL at 08:06

## 2022-01-01 RX ADMIN — PANTOPRAZOLE SODIUM 40 MG: 40 TABLET, DELAYED RELEASE ORAL at 06:28

## 2022-01-01 RX ADMIN — PIPERACILLIN SODIUM AND TAZOBACTAM SODIUM 3375 MG: 3; 375 INJECTION, POWDER, FOR SOLUTION INTRAVENOUS at 04:53

## 2022-01-01 RX ADMIN — SODIUM CHLORIDE, PRESERVATIVE FREE 10 ML: 5 INJECTION INTRAVENOUS at 20:25

## 2022-01-01 RX ADMIN — IRON SUCROSE 200 MG: 20 INJECTION, SOLUTION INTRAVENOUS at 09:38

## 2022-01-01 RX ADMIN — ASCORBIC ACID, VITAMIN A PALMITATE, CHOLECALCIFEROL, THIAMINE HYDROCHLORIDE, RIBOFLAVIN-5 PHOSPHATE SODIUM, PYRIDOXINE HYDROCHLORIDE, NIACINAMIDE, DEXPANTHENOL, ALPHA-TOCOPHEROL ACETATE, VITAMIN K1, FOLIC ACID, BIOTIN, CYANOCOBALAMIN: 200; 3300; 200; 6; 3.6; 6; 40; 15; 10; 150; 600; 60; 5 INJECTION, SOLUTION INTRAVENOUS at 17:37

## 2022-01-01 RX ADMIN — SODIUM CHLORIDE, PRESERVATIVE FREE 10 ML: 5 INJECTION INTRAVENOUS at 13:23

## 2022-01-01 RX ADMIN — CALCIUM GLUCONATE 1000 MG: 20 INJECTION, SOLUTION INTRAVENOUS at 13:08

## 2022-01-01 RX ADMIN — METOPROLOL TARTRATE 5 MG: 5 INJECTION, SOLUTION INTRAVENOUS at 00:39

## 2022-01-01 RX ADMIN — SODIUM CHLORIDE, PRESERVATIVE FREE 10 ML: 5 INJECTION INTRAVENOUS at 22:00

## 2022-01-01 RX ADMIN — Medication 1 TABLET: at 09:52

## 2022-01-01 RX ADMIN — Medication 500 MG: at 19:05

## 2022-01-01 RX ADMIN — Medication 1 TABLET: at 10:48

## 2022-01-01 RX ADMIN — DIGOXIN 250 MCG: 0.25 INJECTION INTRAMUSCULAR; INTRAVENOUS at 20:55

## 2022-01-01 RX ADMIN — Medication 1 TABLET: at 09:56

## 2022-01-01 RX ADMIN — HYDROCODONE BITARTRATE AND ACETAMINOPHEN 5 ML: 7.5; 325 SOLUTION ORAL at 00:10

## 2022-01-01 RX ADMIN — NYSTATIN 500000 UNITS: 100000 SUSPENSION ORAL at 16:22

## 2022-01-01 RX ADMIN — HYDROCODONE BITARTRATE AND ACETAMINOPHEN 5 ML: 7.5; 325 SOLUTION ORAL at 15:31

## 2022-01-01 RX ADMIN — PANTOPRAZOLE SODIUM 40 MG: 40 TABLET, DELAYED RELEASE ORAL at 08:05

## 2022-01-01 RX ADMIN — INSULIN LISPRO 1 UNITS: 100 INJECTION, SOLUTION INTRAVENOUS; SUBCUTANEOUS at 11:39

## 2022-01-01 RX ADMIN — SODIUM CHLORIDE, PRESERVATIVE FREE 10 ML: 5 INJECTION INTRAVENOUS at 10:04

## 2022-01-01 RX ADMIN — Medication 1 CAPSULE: at 09:26

## 2022-01-01 RX ADMIN — SODIUM CHLORIDE, PRESERVATIVE FREE 10 ML: 5 INJECTION INTRAVENOUS at 13:07

## 2022-01-01 RX ADMIN — Medication 1 CAPSULE: at 12:18

## 2022-01-01 RX ADMIN — METOPROLOL TARTRATE 25 MG: 25 TABLET, FILM COATED ORAL at 11:08

## 2022-01-01 RX ADMIN — SODIUM CHLORIDE, PRESERVATIVE FREE 10 ML: 5 INJECTION INTRAVENOUS at 10:45

## 2022-01-01 RX ADMIN — SODIUM CHLORIDE, PRESERVATIVE FREE 10 ML: 5 INJECTION INTRAVENOUS at 09:57

## 2022-01-01 RX ADMIN — CEFTRIAXONE 1000 MG: 1 INJECTION, POWDER, FOR SOLUTION INTRAMUSCULAR; INTRAVENOUS at 17:54

## 2022-01-01 RX ADMIN — MORPHINE SULFATE 4 MG: 4 INJECTION, SOLUTION INTRAMUSCULAR; INTRAVENOUS at 15:36

## 2022-01-01 RX ADMIN — FIDAXOMICIN 200 MG: 200 TABLET, FILM COATED ORAL at 10:24

## 2022-01-01 RX ADMIN — SODIUM CHLORIDE, PRESERVATIVE FREE 10 ML: 5 INJECTION INTRAVENOUS at 08:19

## 2022-01-01 RX ADMIN — IRON SUPPLEMENT 325 MG: 325 TABLET ORAL at 09:15

## 2022-01-01 RX ADMIN — METOPROLOL TARTRATE 25 MG: 25 TABLET, FILM COATED ORAL at 21:06

## 2022-01-01 RX ADMIN — DEXTROSE AND SODIUM CHLORIDE: 5; 900 INJECTION, SOLUTION INTRAVENOUS at 17:33

## 2022-01-01 RX ADMIN — FOLIC ACID 1 MG: 1 TABLET ORAL at 08:05

## 2022-01-01 RX ADMIN — PANTOPRAZOLE SODIUM 40 MG: 40 INJECTION, POWDER, FOR SOLUTION INTRAVENOUS at 09:37

## 2022-01-01 RX ADMIN — FUROSEMIDE 80 MG: 10 INJECTION, SOLUTION INTRAMUSCULAR; INTRAVENOUS at 16:39

## 2022-01-01 RX ADMIN — NYSTATIN 500000 UNITS: 100000 SUSPENSION ORAL at 10:25

## 2022-01-01 RX ADMIN — FERROUS SULFATE TAB 325 MG (65 MG ELEMENTAL FE) 325 MG: 325 (65 FE) TAB at 08:31

## 2022-01-01 RX ADMIN — METOPROLOL TARTRATE 2.5 MG: 5 INJECTION, SOLUTION INTRAVENOUS at 09:10

## 2022-01-01 RX ADMIN — MAGNESIUM SULFATE HEPTAHYDRATE 2000 MG: 2 INJECTION, SOLUTION INTRAVENOUS at 22:59

## 2022-01-01 RX ADMIN — DILTIAZEM HYDROCHLORIDE 240 MG: 240 CAPSULE, COATED, EXTENDED RELEASE ORAL at 09:33

## 2022-01-01 RX ADMIN — SODIUM CHLORIDE, PRESERVATIVE FREE 10 ML: 5 INJECTION INTRAVENOUS at 06:00

## 2022-01-01 RX ADMIN — LEVOFLOXACIN 250 MG: 500 TABLET, FILM COATED ORAL at 07:49

## 2022-01-01 RX ADMIN — PANTOPRAZOLE SODIUM 40 MG: 40 TABLET, DELAYED RELEASE ORAL at 07:00

## 2022-01-01 RX ADMIN — METRONIDAZOLE 500 MG: 500 INJECTION, SOLUTION INTRAVENOUS at 06:30

## 2022-01-01 RX ADMIN — PROPOFOL 20 MG: 10 INJECTION, EMULSION INTRAVENOUS at 07:14

## 2022-01-01 RX ADMIN — METOPROLOL TARTRATE 25 MG: 25 TABLET, FILM COATED ORAL at 20:24

## 2022-01-01 RX ADMIN — APIXABAN 2.5 MG: 2.5 TABLET, FILM COATED ORAL at 09:14

## 2022-01-01 RX ADMIN — DIGOXIN 250 MCG: 0.25 INJECTION INTRAMUSCULAR; INTRAVENOUS at 01:13

## 2022-01-01 RX ADMIN — METOPROLOL TARTRATE 25 MG: 25 TABLET, FILM COATED ORAL at 09:19

## 2022-01-01 RX ADMIN — FERROUS SULFATE TAB 325 MG (65 MG ELEMENTAL FE) 325 MG: 325 (65 FE) TAB at 08:03

## 2022-01-01 RX ADMIN — SODIUM CHLORIDE, PRESERVATIVE FREE 10 ML: 5 INJECTION INTRAVENOUS at 20:26

## 2022-01-01 RX ADMIN — METOPROLOL TARTRATE 25 MG: 25 TABLET, FILM COATED ORAL at 08:54

## 2022-01-01 RX ADMIN — Medication 100 MG: at 13:45

## 2022-01-01 RX ADMIN — MEROPENEM 1000 MG: 1 INJECTION, POWDER, FOR SOLUTION INTRAVENOUS at 01:56

## 2022-01-01 RX ADMIN — METRONIDAZOLE 500 MG: 500 INJECTION, SOLUTION INTRAVENOUS at 04:09

## 2022-01-01 RX ADMIN — SODIUM CHLORIDE, PRESERVATIVE FREE 10 ML: 5 INJECTION INTRAVENOUS at 21:07

## 2022-01-01 RX ADMIN — SODIUM CHLORIDE 1000 ML: 9 INJECTION, SOLUTION INTRAVENOUS at 21:00

## 2022-01-01 RX ADMIN — Medication 1 CAPSULE: at 10:59

## 2022-01-01 RX ADMIN — APIXABAN 2.5 MG: 2.5 TABLET, FILM COATED ORAL at 20:28

## 2022-01-01 RX ADMIN — SODIUM CHLORIDE, PRESERVATIVE FREE 10 ML: 5 INJECTION INTRAVENOUS at 22:07

## 2022-01-01 RX ADMIN — APIXABAN 2.5 MG: 2.5 TABLET, FILM COATED ORAL at 10:13

## 2022-01-01 RX ADMIN — Medication 4 MCG/MIN: at 08:42

## 2022-01-01 RX ADMIN — FENTANYL CITRATE 50 MCG: 50 INJECTION, SOLUTION INTRAMUSCULAR; INTRAVENOUS at 17:05

## 2022-01-01 RX ADMIN — METOPROLOL TARTRATE 25 MG: 25 TABLET, FILM COATED ORAL at 20:43

## 2022-01-01 RX ADMIN — FOLIC ACID 1 MG: 1 TABLET ORAL at 07:49

## 2022-01-01 RX ADMIN — LANSOPRAZOLE 30 MG: KIT at 06:20

## 2022-01-01 RX ADMIN — PIPERACILLIN AND TAZOBACTAM 2250 MG: 2; .25 INJECTION, POWDER, LYOPHILIZED, FOR SOLUTION INTRAVENOUS at 12:09

## 2022-01-01 RX ADMIN — PHENYLEPHRINE HYDROCHLORIDE 30 MCG/MIN: 10 INJECTION INTRAVENOUS at 14:16

## 2022-01-01 RX ADMIN — METOPROLOL TARTRATE 25 MG: 25 TABLET, FILM COATED ORAL at 10:26

## 2022-01-01 RX ADMIN — NYSTATIN 500000 UNITS: 100000 SUSPENSION ORAL at 20:28

## 2022-01-01 RX ADMIN — METOPROLOL TARTRATE 5 MG: 5 INJECTION, SOLUTION INTRAVENOUS at 13:32

## 2022-01-01 RX ADMIN — LINEZOLID 600 MG: 600 INJECTION, SOLUTION INTRAVENOUS at 19:22

## 2022-01-01 RX ADMIN — SODIUM CHLORIDE, PRESERVATIVE FREE 10 ML: 5 INJECTION INTRAVENOUS at 08:00

## 2022-01-01 RX ADMIN — METOPROLOL TARTRATE 2.5 MG: 5 INJECTION, SOLUTION INTRAVENOUS at 08:17

## 2022-01-01 RX ADMIN — LOPERAMIDE HYDROCHLORIDE 4 MG: 2 CAPSULE ORAL at 08:06

## 2022-01-01 RX ADMIN — FUROSEMIDE 60 MG: 10 INJECTION, SOLUTION INTRAVENOUS at 22:42

## 2022-01-01 RX ADMIN — SODIUM ACETATE: 164 INJECTION, SOLUTION, CONCENTRATE INTRAVENOUS at 18:41

## 2022-01-01 RX ADMIN — METOPROLOL TARTRATE 25 MG: 25 TABLET, FILM COATED ORAL at 22:52

## 2022-01-01 RX ADMIN — Medication 1 TABLET: at 08:54

## 2022-01-01 RX ADMIN — SODIUM CHLORIDE, PRESERVATIVE FREE 10 ML: 5 INJECTION INTRAVENOUS at 08:48

## 2022-01-01 RX ADMIN — METOPROLOL TARTRATE 2.5 MG: 5 INJECTION, SOLUTION INTRAVENOUS at 16:22

## 2022-01-01 RX ADMIN — ACETAMINOPHEN 650 MG: 650 SOLUTION ORAL at 14:37

## 2022-01-01 RX ADMIN — SODIUM CHLORIDE, PRESERVATIVE FREE 10 ML: 5 INJECTION INTRAVENOUS at 06:41

## 2022-01-01 RX ADMIN — IOPAMIDOL 75 ML: 755 INJECTION, SOLUTION INTRAVENOUS at 02:23

## 2022-01-01 RX ADMIN — APIXABAN 2.5 MG: 2.5 TABLET, FILM COATED ORAL at 21:35

## 2022-01-01 RX ADMIN — Medication 1 CAPSULE: at 08:43

## 2022-01-01 RX ADMIN — Medication 1 TABLET: at 08:49

## 2022-01-01 RX ADMIN — APIXABAN 2.5 MG: 2.5 TABLET, FILM COATED ORAL at 20:41

## 2022-01-01 RX ADMIN — LEVOFLOXACIN 250 MG: 500 TABLET, FILM COATED ORAL at 08:03

## 2022-01-01 RX ADMIN — FIDAXOMICIN 200 MG: 200 TABLET, FILM COATED ORAL at 20:48

## 2022-01-01 RX ADMIN — MEROPENEM 1000 MG: 1 INJECTION, POWDER, FOR SOLUTION INTRAVENOUS at 18:43

## 2022-01-01 RX ADMIN — FUROSEMIDE 80 MG: 10 INJECTION, SOLUTION INTRAMUSCULAR; INTRAVENOUS at 09:04

## 2022-01-01 RX ADMIN — FIDAXOMICIN 200 MG: 200 TABLET, FILM COATED ORAL at 11:17

## 2022-01-01 RX ADMIN — PIPERACILLIN AND TAZOBACTAM 2250 MG: 2; .25 INJECTION, POWDER, LYOPHILIZED, FOR SOLUTION INTRAVENOUS at 23:28

## 2022-01-01 RX ADMIN — DILTIAZEM HYDROCHLORIDE 60 MG: 60 TABLET, FILM COATED ORAL at 08:16

## 2022-01-01 RX ADMIN — SODIUM ACETATE: 164 INJECTION, SOLUTION, CONCENTRATE INTRAVENOUS at 18:29

## 2022-01-01 RX ADMIN — APIXABAN 2.5 MG: 2.5 TABLET, FILM COATED ORAL at 21:13

## 2022-01-01 RX ADMIN — HALOPERIDOL LACTATE 0.5 MG: 5 INJECTION, SOLUTION INTRAMUSCULAR at 15:09

## 2022-01-01 RX ADMIN — SODIUM CHLORIDE, PRESERVATIVE FREE 10 ML: 5 INJECTION INTRAVENOUS at 20:41

## 2022-01-01 RX ADMIN — PANTOPRAZOLE SODIUM 40 MG: 40 TABLET, DELAYED RELEASE ORAL at 06:01

## 2022-01-01 RX ADMIN — PANTOPRAZOLE SODIUM 40 MG: 40 INJECTION, POWDER, FOR SOLUTION INTRAVENOUS at 07:48

## 2022-01-01 RX ADMIN — ALUMINUM HYDROXIDE, MAGNESIUM HYDROXIDE, AND SIMETHICONE 30 ML: 200; 200; 20 SUSPENSION ORAL at 23:24

## 2022-01-01 RX ADMIN — PHENYLEPHRINE HYDROCHLORIDE 100 MCG: 10 INJECTION INTRAVENOUS at 14:07

## 2022-01-01 RX ADMIN — MORPHINE SULFATE 2 MG: 2 INJECTION, SOLUTION INTRAMUSCULAR; INTRAVENOUS at 20:32

## 2022-01-01 RX ADMIN — SODIUM CHLORIDE, PRESERVATIVE FREE 10 ML: 5 INJECTION INTRAVENOUS at 12:31

## 2022-01-01 RX ADMIN — DICLOFENAC SODIUM 2 G: 10 GEL TOPICAL at 16:36

## 2022-01-01 RX ADMIN — PANTOPRAZOLE SODIUM 40 MG: 40 TABLET, DELAYED RELEASE ORAL at 06:06

## 2022-01-01 RX ADMIN — PANTOPRAZOLE SODIUM 40 MG: 40 TABLET, DELAYED RELEASE ORAL at 10:25

## 2022-01-01 RX ADMIN — Medication 1 CAPSULE: at 18:18

## 2022-01-01 RX ADMIN — ALBUMIN (HUMAN) 12.5 G: 12.5 INJECTION, SOLUTION INTRAVENOUS at 15:07

## 2022-01-01 RX ADMIN — Medication 1 CAPSULE: at 09:15

## 2022-01-01 RX ADMIN — SODIUM CHLORIDE: 9 INJECTION, SOLUTION INTRAVENOUS at 03:16

## 2022-01-01 RX ADMIN — SODIUM CHLORIDE, POTASSIUM CHLORIDE, SODIUM LACTATE AND CALCIUM CHLORIDE: 600; 310; 30; 20 INJECTION, SOLUTION INTRAVENOUS at 09:12

## 2022-01-01 RX ADMIN — LANSOPRAZOLE 30 MG: KIT at 08:20

## 2022-01-01 RX ADMIN — PANTOPRAZOLE SODIUM 40 MG: 40 TABLET, DELAYED RELEASE ORAL at 05:55

## 2022-01-01 RX ADMIN — APIXABAN 2.5 MG: 2.5 TABLET, FILM COATED ORAL at 22:07

## 2022-01-01 RX ADMIN — APIXABAN 2.5 MG: 2.5 TABLET, FILM COATED ORAL at 20:30

## 2022-01-01 RX ADMIN — DILTIAZEM HYDROCHLORIDE 30 MG: 30 TABLET, FILM COATED ORAL at 21:41

## 2022-01-01 RX ADMIN — SODIUM CHLORIDE, PRESERVATIVE FREE 10 ML: 5 INJECTION INTRAVENOUS at 21:28

## 2022-01-01 RX ADMIN — ONDANSETRON 4 MG: 2 INJECTION INTRAMUSCULAR; INTRAVENOUS at 23:03

## 2022-01-01 RX ADMIN — HYDROXYUREA 1000 MG: 500 CAPSULE ORAL at 08:05

## 2022-01-01 RX ADMIN — FERROUS SULFATE TAB 325 MG (65 MG ELEMENTAL FE) 325 MG: 325 (65 FE) TAB at 18:20

## 2022-01-01 RX ADMIN — INSULIN LISPRO 1 UNITS: 100 INJECTION, SOLUTION INTRAVENOUS; SUBCUTANEOUS at 08:36

## 2022-01-01 RX ADMIN — APIXABAN 2.5 MG: 2.5 TABLET, FILM COATED ORAL at 21:58

## 2022-01-01 RX ADMIN — SODIUM CHLORIDE, PRESERVATIVE FREE 10 ML: 5 INJECTION INTRAVENOUS at 17:36

## 2022-01-01 RX ADMIN — ASPIRIN 81 MG 81 MG: 81 TABLET ORAL at 09:36

## 2022-01-01 RX ADMIN — DILTIAZEM HYDROCHLORIDE 2.5 MG: 5 INJECTION INTRAVENOUS at 12:09

## 2022-01-01 RX ADMIN — ACETAMINOPHEN 650 MG: 650 SOLUTION ORAL at 03:01

## 2022-01-01 RX ADMIN — DICLOFENAC SODIUM 2 G: 10 GEL TOPICAL at 12:20

## 2022-01-01 RX ADMIN — METOPROLOL TARTRATE 2.5 MG: 5 INJECTION, SOLUTION INTRAVENOUS at 15:54

## 2022-01-01 RX ADMIN — LEVOFLOXACIN 250 MG: 500 TABLET, FILM COATED ORAL at 08:51

## 2022-01-01 RX ADMIN — HYDROMORPHONE HYDROCHLORIDE 0.5 MG: 1 INJECTION, SOLUTION INTRAMUSCULAR; INTRAVENOUS; SUBCUTANEOUS at 05:57

## 2022-01-01 RX ADMIN — MORPHINE SULFATE 2 MG: 2 INJECTION, SOLUTION INTRAMUSCULAR; INTRAVENOUS at 00:32

## 2022-01-01 RX ADMIN — DILTIAZEM HYDROCHLORIDE 120 MG: 120 CAPSULE, COATED, EXTENDED RELEASE ORAL at 10:26

## 2022-01-01 RX ADMIN — METOPROLOL TARTRATE 2.5 MG: 5 INJECTION, SOLUTION INTRAVENOUS at 11:30

## 2022-01-01 RX ADMIN — ENOXAPARIN SODIUM 70 MG: 100 INJECTION SUBCUTANEOUS at 21:01

## 2022-01-01 RX ADMIN — ONDANSETRON 4 MG: 2 INJECTION INTRAMUSCULAR; INTRAVENOUS at 12:18

## 2022-01-01 RX ADMIN — METOPROLOL TARTRATE 25 MG: 25 TABLET, FILM COATED ORAL at 08:49

## 2022-01-01 RX ADMIN — SODIUM CHLORIDE, PRESERVATIVE FREE 10 ML: 5 INJECTION INTRAVENOUS at 09:21

## 2022-01-01 RX ADMIN — SODIUM CHLORIDE: 9 INJECTION, SOLUTION INTRAVENOUS at 14:48

## 2022-01-01 RX ADMIN — ACETAMINOPHEN 650 MG: 325 TABLET ORAL at 03:09

## 2022-01-01 RX ADMIN — FUROSEMIDE 20 MG: 10 INJECTION, SOLUTION INTRAMUSCULAR; INTRAVENOUS at 09:37

## 2022-01-01 RX ADMIN — ENOXAPARIN SODIUM 70 MG: 100 INJECTION SUBCUTANEOUS at 21:30

## 2022-01-01 RX ADMIN — SODIUM CHLORIDE, PRESERVATIVE FREE 10 ML: 5 INJECTION INTRAVENOUS at 09:34

## 2022-01-01 RX ADMIN — SODIUM CHLORIDE, PRESERVATIVE FREE 10 ML: 5 INJECTION INTRAVENOUS at 20:28

## 2022-01-01 RX ADMIN — FOLIC ACID 1 MG: 1 TABLET ORAL at 10:43

## 2022-01-01 RX ADMIN — PANTOPRAZOLE SODIUM 40 MG: 40 INJECTION, POWDER, FOR SOLUTION INTRAVENOUS at 18:53

## 2022-01-01 RX ADMIN — HYDROXYZINE HYDROCHLORIDE 50 MG: 50 INJECTION, SOLUTION INTRAMUSCULAR at 23:23

## 2022-01-01 RX ADMIN — METOPROLOL TARTRATE 2.5 MG: 5 INJECTION, SOLUTION INTRAVENOUS at 20:33

## 2022-01-01 RX ADMIN — DIGOXIN 625 MCG: 0.25 INJECTION INTRAMUSCULAR; INTRAVENOUS at 13:22

## 2022-01-01 RX ADMIN — SODIUM ACETATE: 164 INJECTION, SOLUTION, CONCENTRATE INTRAVENOUS at 18:44

## 2022-01-01 RX ADMIN — LEVOFLOXACIN 500 MG: 500 TABLET, FILM COATED ORAL at 11:58

## 2022-01-01 RX ADMIN — Medication 1 CAPSULE: at 09:14

## 2022-01-01 RX ADMIN — FUROSEMIDE 20 MG: 10 INJECTION, SOLUTION INTRAVENOUS at 11:34

## 2022-01-01 RX ADMIN — METOPROLOL TARTRATE 5 MG: 5 INJECTION, SOLUTION INTRAVENOUS at 04:30

## 2022-01-01 RX ADMIN — FOLIC ACID 1 MG: 1 TABLET ORAL at 08:49

## 2022-01-01 RX ADMIN — Medication 1 CAPSULE: at 08:06

## 2022-01-01 RX ADMIN — SODIUM CHLORIDE, PRESERVATIVE FREE 10 ML: 5 INJECTION INTRAVENOUS at 21:13

## 2022-01-01 RX ADMIN — SODIUM CHLORIDE, PRESERVATIVE FREE 10 ML: 5 INJECTION INTRAVENOUS at 20:03

## 2022-01-01 RX ADMIN — METOPROLOL TARTRATE 50 MG: 50 TABLET ORAL at 16:23

## 2022-01-01 RX ADMIN — DILTIAZEM HYDROCHLORIDE 60 MG: 60 TABLET, FILM COATED ORAL at 08:54

## 2022-01-01 RX ADMIN — ASCORBIC ACID, VITAMIN A PALMITATE, CHOLECALCIFEROL, THIAMINE HYDROCHLORIDE, RIBOFLAVIN-5 PHOSPHATE SODIUM, PYRIDOXINE HYDROCHLORIDE, NIACINAMIDE, DEXPANTHENOL, ALPHA-TOCOPHEROL ACETATE, VITAMIN K1, FOLIC ACID, BIOTIN, CYANOCOBALAMIN: 200; 3300; 200; 6; 3.6; 6; 40; 15; 10; 150; 600; 60; 5 INJECTION, SOLUTION INTRAVENOUS at 19:20

## 2022-01-01 RX ADMIN — FIDAXOMICIN 200 MG: 200 TABLET, FILM COATED ORAL at 23:00

## 2022-01-01 RX ADMIN — METRONIDAZOLE 500 MG: 500 INJECTION, SOLUTION INTRAVENOUS at 04:54

## 2022-01-01 RX ADMIN — HYDROXYUREA 1000 MG: 500 CAPSULE ORAL at 09:32

## 2022-01-01 RX ADMIN — DILTIAZEM HYDROCHLORIDE 60 MG: 60 TABLET, FILM COATED ORAL at 23:15

## 2022-01-01 RX ADMIN — HYDROCODONE BITARTRATE AND ACETAMINOPHEN 5 ML: 7.5; 325 SOLUTION ORAL at 07:57

## 2022-01-01 RX ADMIN — DILTIAZEM HYDROCHLORIDE 60 MG: 60 TABLET, FILM COATED ORAL at 06:24

## 2022-01-01 RX ADMIN — FERROUS SULFATE TAB 325 MG (65 MG ELEMENTAL FE) 325 MG: 325 (65 FE) TAB at 19:52

## 2022-01-01 RX ADMIN — ALBUMIN (HUMAN) 25 G: 0.25 INJECTION, SOLUTION INTRAVENOUS at 21:51

## 2022-01-01 RX ADMIN — PROPOFOL 40 MG: 10 INJECTION, EMULSION INTRAVENOUS at 07:03

## 2022-01-01 RX ADMIN — CEFEPIME HYDROCHLORIDE 2000 MG: 2 INJECTION, POWDER, FOR SOLUTION INTRAVENOUS at 01:56

## 2022-01-01 RX ADMIN — FIDAXOMICIN 200 MG: 200 TABLET, FILM COATED ORAL at 10:05

## 2022-01-01 RX ADMIN — SODIUM CHLORIDE, PRESERVATIVE FREE 10 ML: 5 INJECTION INTRAVENOUS at 09:51

## 2022-01-01 RX ADMIN — FERROUS SULFATE TAB 325 MG (65 MG ELEMENTAL FE) 325 MG: 325 (65 FE) TAB at 18:38

## 2022-01-01 RX ADMIN — SOYBEAN OIL 250 ML: 20 INJECTION, SOLUTION INTRAVENOUS at 18:01

## 2022-01-01 RX ADMIN — IRON SUCROSE 200 MG: 20 INJECTION, SOLUTION INTRAVENOUS at 12:43

## 2022-01-01 RX ADMIN — DILTIAZEM HYDROCHLORIDE 60 MG: 60 TABLET, FILM COATED ORAL at 00:13

## 2022-01-01 RX ADMIN — MORPHINE SULFATE 4 MG: 4 INJECTION, SOLUTION INTRAMUSCULAR; INTRAVENOUS at 11:17

## 2022-01-01 RX ADMIN — DIGOXIN 125 MCG: 125 TABLET ORAL at 09:14

## 2022-01-01 RX ADMIN — SODIUM CHLORIDE, PRESERVATIVE FREE 10 ML: 5 INJECTION INTRAVENOUS at 09:38

## 2022-01-01 RX ADMIN — FERROUS SULFATE TAB 325 MG (65 MG ELEMENTAL FE) 325 MG: 325 (65 FE) TAB at 09:36

## 2022-01-01 RX ADMIN — FERROUS SULFATE TAB 325 MG (65 MG ELEMENTAL FE) 325 MG: 325 (65 FE) TAB at 08:55

## 2022-01-01 RX ADMIN — MORPHINE SULFATE 2 MG: 2 INJECTION, SOLUTION INTRAMUSCULAR; INTRAVENOUS at 15:14

## 2022-01-01 RX ADMIN — POLYETHYLENE GLYCOL 3350, SODIUM SULFATE ANHYDROUS, SODIUM BICARBONATE, SODIUM CHLORIDE, POTASSIUM CHLORIDE 2000 ML: 236; 22.74; 6.74; 5.86; 2.97 POWDER, FOR SOLUTION ORAL at 23:15

## 2022-01-01 RX ADMIN — CHOLESTYRAMINE 4 G: 4 POWDER, FOR SUSPENSION ORAL at 16:33

## 2022-01-01 RX ADMIN — VANCOMYCIN HYDROCHLORIDE 1250 MG: 5 INJECTION, POWDER, LYOPHILIZED, FOR SOLUTION INTRAVENOUS at 15:37

## 2022-01-01 RX ADMIN — DILTIAZEM HYDROCHLORIDE 60 MG: 60 TABLET, FILM COATED ORAL at 06:21

## 2022-01-01 RX ADMIN — ACETAMINOPHEN 650 MG: 325 TABLET ORAL at 23:15

## 2022-01-01 RX ADMIN — SODIUM BICARBONATE: 84 INJECTION, SOLUTION INTRAVENOUS at 07:51

## 2022-01-01 RX ADMIN — SODIUM CHLORIDE: 9 INJECTION, SOLUTION INTRAVENOUS at 00:22

## 2022-01-01 RX ADMIN — FENTANYL CITRATE 12.5 MCG: 50 INJECTION, SOLUTION INTRAMUSCULAR; INTRAVENOUS at 09:13

## 2022-01-01 RX ADMIN — DIGOXIN 125 MCG: 125 TABLET ORAL at 08:06

## 2022-01-01 RX ADMIN — CEFTRIAXONE SODIUM 1000 MG: 1 INJECTION, POWDER, FOR SOLUTION INTRAMUSCULAR; INTRAVENOUS at 17:13

## 2022-01-01 RX ADMIN — SODIUM CHLORIDE: 9 INJECTION, SOLUTION INTRAVENOUS at 20:32

## 2022-01-01 RX ADMIN — SODIUM CHLORIDE, PRESERVATIVE FREE 10 ML: 5 INJECTION INTRAVENOUS at 20:34

## 2022-01-01 RX ADMIN — SODIUM CHLORIDE, PRESERVATIVE FREE 10 ML: 5 INJECTION INTRAVENOUS at 03:11

## 2022-01-01 RX ADMIN — Medication 1 CAPSULE: at 09:10

## 2022-01-01 RX ADMIN — PANTOPRAZOLE SODIUM 40 MG: 40 TABLET, DELAYED RELEASE ORAL at 06:10

## 2022-01-01 RX ADMIN — CEFEPIME HYDROCHLORIDE 1000 MG: 1 INJECTION, POWDER, FOR SOLUTION INTRAMUSCULAR; INTRAVENOUS at 20:38

## 2022-01-01 RX ADMIN — SODIUM CHLORIDE 25 ML: 9 INJECTION, SOLUTION INTRAVENOUS at 06:08

## 2022-01-01 RX ADMIN — NYSTATIN 500000 UNITS: 100000 SUSPENSION ORAL at 07:58

## 2022-01-01 RX ADMIN — DEXTROSE AND SODIUM CHLORIDE: 5; 450 INJECTION, SOLUTION INTRAVENOUS at 00:14

## 2022-01-01 RX ADMIN — PANTOPRAZOLE SODIUM 40 MG: 40 INJECTION, POWDER, FOR SOLUTION INTRAVENOUS at 11:31

## 2022-01-01 RX ADMIN — Medication 500 MG: at 12:52

## 2022-01-01 RX ADMIN — METOPROLOL TARTRATE 25 MG: 25 TABLET, FILM COATED ORAL at 21:10

## 2022-01-01 RX ADMIN — MEROPENEM 1000 MG: 1 INJECTION, POWDER, FOR SOLUTION INTRAVENOUS at 09:06

## 2022-01-01 RX ADMIN — SODIUM CHLORIDE, PRESERVATIVE FREE 10 ML: 5 INJECTION INTRAVENOUS at 20:50

## 2022-01-01 RX ADMIN — SODIUM CHLORIDE, PRESERVATIVE FREE 10 ML: 5 INJECTION INTRAVENOUS at 10:14

## 2022-01-01 RX ADMIN — SODIUM ACETATE: 164 INJECTION, SOLUTION, CONCENTRATE INTRAVENOUS at 18:57

## 2022-01-01 RX ADMIN — PANTOPRAZOLE SODIUM 40 MG: 40 TABLET, DELAYED RELEASE ORAL at 09:10

## 2022-01-01 RX ADMIN — FUROSEMIDE 80 MG: 10 INJECTION, SOLUTION INTRAMUSCULAR; INTRAVENOUS at 09:14

## 2022-01-01 RX ADMIN — HYDROMORPHONE HYDROCHLORIDE 1 MG: 1 INJECTION, SOLUTION INTRAMUSCULAR; INTRAVENOUS; SUBCUTANEOUS at 01:30

## 2022-01-01 RX ADMIN — PANTOPRAZOLE SODIUM 40 MG: 40 TABLET, DELAYED RELEASE ORAL at 05:04

## 2022-01-01 RX ADMIN — MAGNESIUM CITRATE 296 ML: 1.75 LIQUID ORAL at 12:39

## 2022-01-01 RX ADMIN — CEFTRIAXONE 1000 MG: 1 INJECTION, POWDER, FOR SOLUTION INTRAMUSCULAR; INTRAVENOUS at 19:43

## 2022-01-01 RX ADMIN — CALCIUM GLUCONATE 2000 MG: 20 INJECTION, SOLUTION INTRAVENOUS at 08:55

## 2022-01-01 RX ADMIN — APIXABAN 2.5 MG: 2.5 TABLET, FILM COATED ORAL at 08:50

## 2022-01-01 RX ADMIN — FUROSEMIDE 80 MG: 10 INJECTION, SOLUTION INTRAMUSCULAR; INTRAVENOUS at 18:36

## 2022-01-01 RX ADMIN — DILTIAZEM HYDROCHLORIDE 120 MG: 120 CAPSULE, COATED, EXTENDED RELEASE ORAL at 10:13

## 2022-01-01 RX ADMIN — BISACODYL 10 MG: 5 TABLET, COATED ORAL at 13:11

## 2022-01-01 RX ADMIN — METOPROLOL TARTRATE 25 MG: 25 TABLET, FILM COATED ORAL at 21:50

## 2022-01-01 RX ADMIN — DILTIAZEM HYDROCHLORIDE 30 MG: 30 TABLET, FILM COATED ORAL at 10:05

## 2022-01-01 RX ADMIN — SODIUM CHLORIDE, PRESERVATIVE FREE 10 ML: 5 INJECTION INTRAVENOUS at 06:51

## 2022-01-01 RX ADMIN — DEXTROSE MONOHYDRATE 200 MG: 50 INJECTION, SOLUTION INTRAVENOUS at 18:37

## 2022-01-01 RX ADMIN — SODIUM CHLORIDE, PRESERVATIVE FREE 10 ML: 5 INJECTION INTRAVENOUS at 21:26

## 2022-01-01 RX ADMIN — PANTOPRAZOLE SODIUM 40 MG: 40 TABLET, DELAYED RELEASE ORAL at 06:36

## 2022-01-01 RX ADMIN — DEXTROSE AND SODIUM CHLORIDE: 5; 450 INJECTION, SOLUTION INTRAVENOUS at 08:44

## 2022-01-01 RX ADMIN — APIXABAN 2.5 MG: 2.5 TABLET, FILM COATED ORAL at 22:41

## 2022-01-01 RX ADMIN — FOLIC ACID 1 MG: 1 TABLET ORAL at 08:03

## 2022-01-01 RX ADMIN — SODIUM CHLORIDE, PRESERVATIVE FREE 10 ML: 5 INJECTION INTRAVENOUS at 21:06

## 2022-01-01 RX ADMIN — MORPHINE SULFATE 2 MG: 2 INJECTION, SOLUTION INTRAMUSCULAR; INTRAVENOUS at 12:28

## 2022-01-01 RX ADMIN — APIXABAN 2.5 MG: 2.5 TABLET, FILM COATED ORAL at 09:06

## 2022-01-01 RX ADMIN — METOPROLOL TARTRATE 2.5 MG: 5 INJECTION, SOLUTION INTRAVENOUS at 14:32

## 2022-01-01 RX ADMIN — FOLIC ACID 1 MG: 1 TABLET ORAL at 08:02

## 2022-01-01 RX ADMIN — PANTOPRAZOLE SODIUM 40 MG: 40 INJECTION, POWDER, FOR SOLUTION INTRAVENOUS at 19:55

## 2022-01-01 RX ADMIN — INSULIN LISPRO 1 UNITS: 100 INJECTION, SOLUTION INTRAVENOUS; SUBCUTANEOUS at 18:24

## 2022-01-01 RX ADMIN — SODIUM CHLORIDE, PRESERVATIVE FREE 10 ML: 5 INJECTION INTRAVENOUS at 12:36

## 2022-01-01 RX ADMIN — APIXABAN 2.5 MG: 2.5 TABLET, FILM COATED ORAL at 21:10

## 2022-01-01 RX ADMIN — DEXTROSE AND SODIUM CHLORIDE: 5; 450 INJECTION, SOLUTION INTRAVENOUS at 15:35

## 2022-01-01 RX ADMIN — METRONIDAZOLE 500 MG: 500 INJECTION, SOLUTION INTRAVENOUS at 20:46

## 2022-01-01 RX ADMIN — FOLIC ACID 1 MG: 1 TABLET ORAL at 10:13

## 2022-01-01 RX ADMIN — METOPROLOL TARTRATE 25 MG: 25 TABLET, FILM COATED ORAL at 20:35

## 2022-01-01 RX ADMIN — APIXABAN 2.5 MG: 2.5 TABLET, FILM COATED ORAL at 09:46

## 2022-01-01 RX ADMIN — Medication 1 TABLET: at 11:00

## 2022-01-01 RX ADMIN — HYDROXYUREA 500 MG: 500 CAPSULE ORAL at 20:49

## 2022-01-01 RX ADMIN — CHOLESTYRAMINE 4 G: 4 POWDER, FOR SUSPENSION ORAL at 17:23

## 2022-01-01 RX ADMIN — LINEZOLID 600 MG: 600 INJECTION, SOLUTION INTRAVENOUS at 06:08

## 2022-01-01 RX ADMIN — FOLIC ACID 1 MG: 1 TABLET ORAL at 08:43

## 2022-01-01 RX ADMIN — SOYBEAN OIL 250 ML: 20 INJECTION, SOLUTION INTRAVENOUS at 17:41

## 2022-01-01 RX ADMIN — PIPERACILLIN AND TAZOBACTAM 2250 MG: 2; .25 INJECTION, POWDER, LYOPHILIZED, FOR SOLUTION INTRAVENOUS at 14:34

## 2022-01-01 RX ADMIN — Medication 1 CAPSULE: at 18:46

## 2022-01-01 RX ADMIN — MORPHINE SULFATE 2 MG: 2 INJECTION, SOLUTION INTRAMUSCULAR; INTRAVENOUS at 07:00

## 2022-01-01 RX ADMIN — SODIUM CHLORIDE, POTASSIUM CHLORIDE, SODIUM LACTATE AND CALCIUM CHLORIDE: 600; 310; 30; 20 INJECTION, SOLUTION INTRAVENOUS at 14:50

## 2022-01-01 RX ADMIN — DILTIAZEM HYDROCHLORIDE 60 MG: 60 TABLET, FILM COATED ORAL at 13:38

## 2022-01-01 RX ADMIN — SODIUM CHLORIDE, PRESERVATIVE FREE 10 ML: 5 INJECTION INTRAVENOUS at 07:49

## 2022-01-01 RX ADMIN — FOLIC ACID 1 MG: 1 TABLET ORAL at 08:31

## 2022-01-01 RX ADMIN — METOPROLOL TARTRATE 2.5 MG: 5 INJECTION, SOLUTION INTRAVENOUS at 10:31

## 2022-01-01 RX ADMIN — FERROUS SULFATE TAB 325 MG (65 MG ELEMENTAL FE) 325 MG: 325 (65 FE) TAB at 09:25

## 2022-01-01 RX ADMIN — METOPROLOL TARTRATE 25 MG: 25 TABLET, FILM COATED ORAL at 10:13

## 2022-01-01 RX ADMIN — APIXABAN 2.5 MG: 2.5 TABLET, FILM COATED ORAL at 08:54

## 2022-01-01 RX ADMIN — DICLOFENAC SODIUM 2 G: 10 GEL TOPICAL at 08:03

## 2022-01-01 RX ADMIN — HYDROXYUREA 500 MG: 500 CAPSULE ORAL at 21:31

## 2022-01-01 RX ADMIN — CEFAZOLIN 1000 MG: 1 INJECTION, POWDER, FOR SOLUTION INTRAMUSCULAR; INTRAVENOUS; PARENTERAL at 13:30

## 2022-01-01 RX ADMIN — SODIUM CHLORIDE, PRESERVATIVE FREE 10 ML: 5 INJECTION INTRAVENOUS at 10:22

## 2022-01-01 RX ADMIN — DIGOXIN 125 MCG: 125 TABLET ORAL at 09:18

## 2022-01-01 RX ADMIN — Medication 1 TABLET: at 09:50

## 2022-01-01 RX ADMIN — FERROUS SULFATE TAB 325 MG (65 MG ELEMENTAL FE) 325 MG: 325 (65 FE) TAB at 07:48

## 2022-01-01 RX ADMIN — NYSTATIN 500000 UNITS: 100000 SUSPENSION ORAL at 09:37

## 2022-01-01 RX ADMIN — HEPARIN SODIUM 5000 UNITS: 5000 INJECTION INTRAVENOUS; SUBCUTANEOUS at 14:22

## 2022-01-01 RX ADMIN — SODIUM CHLORIDE, PRESERVATIVE FREE 10 ML: 5 INJECTION INTRAVENOUS at 10:43

## 2022-01-01 RX ADMIN — LINEZOLID 600 MG: 600 INJECTION, SOLUTION INTRAVENOUS at 07:00

## 2022-01-01 RX ADMIN — MORPHINE SULFATE 2 MG: 2 INJECTION, SOLUTION INTRAMUSCULAR; INTRAVENOUS at 14:22

## 2022-01-01 RX ADMIN — MORPHINE SULFATE 4 MG: 4 INJECTION INTRAVENOUS at 21:40

## 2022-01-01 RX ADMIN — DILTIAZEM HYDROCHLORIDE 60 MG: 60 TABLET, FILM COATED ORAL at 22:06

## 2022-01-01 RX ADMIN — METRONIDAZOLE 500 MG: 500 INJECTION, SOLUTION INTRAVENOUS at 04:49

## 2022-01-01 RX ADMIN — METOPROLOL TARTRATE 2.5 MG: 5 INJECTION, SOLUTION INTRAVENOUS at 03:00

## 2022-01-01 RX ADMIN — SOYBEAN OIL 250 ML: 20 INJECTION, SOLUTION INTRAVENOUS at 19:46

## 2022-01-01 RX ADMIN — HYDROCODONE BITARTRATE AND ACETAMINOPHEN 5 ML: 7.5; 325 SOLUTION ORAL at 16:33

## 2022-01-01 RX ADMIN — APIXABAN 2.5 MG: 2.5 TABLET, FILM COATED ORAL at 21:29

## 2022-01-01 RX ADMIN — METOPROLOL TARTRATE 5 MG: 1 INJECTION, SOLUTION INTRAVENOUS at 21:47

## 2022-01-01 RX ADMIN — SODIUM CHLORIDE, PRESERVATIVE FREE 10 ML: 5 INJECTION INTRAVENOUS at 21:44

## 2022-01-01 RX ADMIN — Medication 5 MCG/MIN: at 00:54

## 2022-01-01 RX ADMIN — LOPERAMIDE HYDROCHLORIDE 4 MG: 2 CAPSULE ORAL at 20:48

## 2022-01-01 RX ADMIN — MEROPENEM 1000 MG: 1 INJECTION, POWDER, FOR SOLUTION INTRAVENOUS at 02:13

## 2022-01-01 RX ADMIN — SODIUM ACETATE: 164 INJECTION, SOLUTION, CONCENTRATE INTRAVENOUS at 18:12

## 2022-01-01 RX ADMIN — FOLIC ACID 1 MG: 1 TABLET ORAL at 09:06

## 2022-01-01 RX ADMIN — IRON SUCROSE 200 MG: 20 INJECTION, SOLUTION INTRAVENOUS at 09:22

## 2022-01-01 RX ADMIN — PANTOPRAZOLE SODIUM 40 MG: 40 INJECTION, POWDER, FOR SOLUTION INTRAVENOUS at 09:03

## 2022-01-01 RX ADMIN — HYDROMORPHONE HYDROCHLORIDE 1 MG: 1 INJECTION, SOLUTION INTRAMUSCULAR; INTRAVENOUS; SUBCUTANEOUS at 00:38

## 2022-01-01 RX ADMIN — METRONIDAZOLE 500 MG: 500 INJECTION, SOLUTION INTRAVENOUS at 21:35

## 2022-01-01 RX ADMIN — Medication 1 CAPSULE: at 12:11

## 2022-01-01 RX ADMIN — SODIUM CHLORIDE, PRESERVATIVE FREE 10 ML: 5 INJECTION INTRAVENOUS at 20:30

## 2022-01-01 RX ADMIN — LINEZOLID 600 MG: 600 INJECTION, SOLUTION INTRAVENOUS at 15:31

## 2022-01-01 RX ADMIN — DEXTROSE MONOHYDRATE 5 MG/HR: 50 INJECTION, SOLUTION INTRAVENOUS at 13:42

## 2022-01-01 RX ADMIN — ENOXAPARIN SODIUM 70 MG: 100 INJECTION SUBCUTANEOUS at 07:47

## 2022-01-01 RX ADMIN — ASCORBIC ACID, VITAMIN A PALMITATE, CHOLECALCIFEROL, THIAMINE HYDROCHLORIDE, RIBOFLAVIN-5 PHOSPHATE SODIUM, PYRIDOXINE HYDROCHLORIDE, NIACINAMIDE, DEXPANTHENOL, ALPHA-TOCOPHEROL ACETATE, VITAMIN K1, FOLIC ACID, BIOTIN, CYANOCOBALAMIN: 200; 3300; 200; 6; 3.6; 6; 40; 15; 10; 150; 600; 60; 5 INJECTION, SOLUTION INTRAVENOUS at 16:45

## 2022-01-01 RX ADMIN — DRONABINOL 2.5 MG: 2.5 CAPSULE ORAL at 21:07

## 2022-01-01 RX ADMIN — NYSTATIN 500000 UNITS: 100000 SUSPENSION ORAL at 20:48

## 2022-01-01 RX ADMIN — PROPOFOL 20 MG: 10 INJECTION, EMULSION INTRAVENOUS at 07:47

## 2022-01-01 RX ADMIN — Medication 500 MG: at 18:27

## 2022-01-01 RX ADMIN — APIXABAN 2.5 MG: 2.5 TABLET, FILM COATED ORAL at 21:50

## 2022-01-01 RX ADMIN — APIXABAN 2.5 MG: 2.5 TABLET, FILM COATED ORAL at 11:02

## 2022-01-01 RX ADMIN — NYSTATIN 500000 UNITS: 100000 SUSPENSION ORAL at 10:05

## 2022-01-01 RX ADMIN — ROCURONIUM BROMIDE 50 MG: 50 INJECTION, SOLUTION INTRAVENOUS at 13:55

## 2022-01-01 RX ADMIN — APIXABAN 2.5 MG: 2.5 TABLET, FILM COATED ORAL at 23:18

## 2022-01-01 RX ADMIN — DILTIAZEM HYDROCHLORIDE 30 MG: 30 TABLET, FILM COATED ORAL at 21:34

## 2022-01-01 RX ADMIN — ASPIRIN 81 MG 81 MG: 81 TABLET ORAL at 08:05

## 2022-01-01 RX ADMIN — FIDAXOMICIN 200 MG: 200 TABLET, FILM COATED ORAL at 22:00

## 2022-01-01 RX ADMIN — FIDAXOMICIN 200 MG: 200 TABLET, FILM COATED ORAL at 08:54

## 2022-01-01 RX ADMIN — LOPERAMIDE HYDROCHLORIDE 4 MG: 2 CAPSULE ORAL at 22:07

## 2022-01-01 RX ADMIN — APIXABAN 2.5 MG: 2.5 TABLET, FILM COATED ORAL at 20:52

## 2022-01-01 RX ADMIN — DILTIAZEM HYDROCHLORIDE 60 MG: 60 TABLET, FILM COATED ORAL at 21:20

## 2022-01-01 RX ADMIN — SODIUM CHLORIDE, PRESERVATIVE FREE 10 ML: 5 INJECTION INTRAVENOUS at 09:36

## 2022-01-01 RX ADMIN — Medication 500 MG: at 07:01

## 2022-01-01 RX ADMIN — HEPARIN SODIUM 5000 UNITS: 5000 INJECTION INTRAVENOUS; SUBCUTANEOUS at 22:33

## 2022-01-01 RX ADMIN — NYSTATIN 500000 UNITS: 100000 SUSPENSION ORAL at 16:41

## 2022-01-01 RX ADMIN — PHENYLEPHRINE HYDROCHLORIDE 100 MCG: 10 INJECTION INTRAVENOUS at 13:56

## 2022-01-01 RX ADMIN — MORPHINE SULFATE 2 MG: 2 INJECTION, SOLUTION INTRAMUSCULAR; INTRAVENOUS at 20:52

## 2022-01-01 RX ADMIN — METOPROLOL TARTRATE 2.5 MG: 5 INJECTION, SOLUTION INTRAVENOUS at 05:08

## 2022-01-01 RX ADMIN — MORPHINE SULFATE 2 MG: 2 INJECTION, SOLUTION INTRAMUSCULAR; INTRAVENOUS at 02:52

## 2022-01-01 RX ADMIN — CEFEPIME HYDROCHLORIDE 1000 MG: 1 INJECTION, POWDER, FOR SOLUTION INTRAMUSCULAR; INTRAVENOUS at 05:52

## 2022-01-01 RX ADMIN — DILTIAZEM HYDROCHLORIDE 60 MG: 60 TABLET, FILM COATED ORAL at 09:25

## 2022-01-01 RX ADMIN — SODIUM CHLORIDE, PRESERVATIVE FREE 10 ML: 5 INJECTION INTRAVENOUS at 21:24

## 2022-01-01 RX ADMIN — INSULIN LISPRO 1 UNITS: 100 INJECTION, SOLUTION INTRAVENOUS; SUBCUTANEOUS at 17:25

## 2022-01-01 ASSESSMENT — PULMONARY FUNCTION TESTS
PIF_VALUE: 18
PIF_VALUE: 12
PIF_VALUE: 19
PIF_VALUE: 19
PIF_VALUE: 0
PIF_VALUE: 22
PIF_VALUE: 22
PIF_VALUE: 0
PIF_VALUE: 21
PIF_VALUE: 11
PIF_VALUE: 20
PIF_VALUE: 0
PIF_VALUE: 0
PIF_VALUE: 19
PIF_VALUE: 22
PIF_VALUE: 19
PIF_VALUE: 21
PIF_VALUE: 22
PIF_VALUE: 20
PIF_VALUE: 22
PIF_VALUE: 13
PIF_VALUE: 22
PIF_VALUE: 22
PIF_VALUE: 20
PIF_VALUE: 18
PIF_VALUE: 20
PIF_VALUE: 19
PIF_VALUE: 22
PIF_VALUE: 19
PIF_VALUE: 16
PIF_VALUE: 20
PIF_VALUE: 20
PIF_VALUE: 11
PIF_VALUE: 11
PIF_VALUE: 22
PIF_VALUE: 1
PIF_VALUE: 11
PIF_VALUE: 18
PIF_VALUE: 19
PIF_VALUE: 18
PIF_VALUE: 0
PIF_VALUE: 11
PIF_VALUE: 20
PIF_VALUE: 22
PIF_VALUE: 22
PIF_VALUE: 20
PIF_VALUE: 19
PIF_VALUE: 21
PIF_VALUE: 1
PIF_VALUE: 11
PIF_VALUE: 11
PIF_VALUE: 1
PIF_VALUE: 18
PIF_VALUE: 20
PIF_VALUE: 11
PIF_VALUE: 12
PIF_VALUE: 16
PIF_VALUE: 0
PIF_VALUE: 22
PIF_VALUE: 21
PIF_VALUE: 0
PIF_VALUE: 11
PIF_VALUE: 21
PIF_VALUE: 18
PIF_VALUE: 22
PIF_VALUE: 21
PIF_VALUE: 22
PIF_VALUE: 11
PIF_VALUE: 20
PIF_VALUE: 22
PIF_VALUE: 0
PIF_VALUE: 0
PIF_VALUE: 4
PIF_VALUE: 22
PIF_VALUE: 0
PIF_VALUE: 21
PIF_VALUE: 19
PIF_VALUE: 1
PIF_VALUE: 21
PIF_VALUE: 22
PIF_VALUE: 20
PIF_VALUE: 11
PIF_VALUE: 0
PIF_VALUE: 11
PIF_VALUE: 21
PIF_VALUE: 20
PIF_VALUE: 21
PIF_VALUE: 16
PIF_VALUE: 11
PIF_VALUE: 20
PIF_VALUE: 22
PIF_VALUE: 23
PIF_VALUE: 21
PIF_VALUE: 23
PIF_VALUE: 21
PIF_VALUE: 0
PIF_VALUE: 18
PIF_VALUE: 22
PIF_VALUE: 0
PIF_VALUE: 21
PIF_VALUE: 2
PIF_VALUE: 19
PIF_VALUE: 22
PIF_VALUE: 0
PIF_VALUE: 11
PIF_VALUE: 11
PIF_VALUE: 4
PIF_VALUE: 21
PIF_VALUE: 22
PIF_VALUE: 11
PIF_VALUE: 22
PIF_VALUE: 21
PIF_VALUE: 23
PIF_VALUE: 21
PIF_VALUE: 19
PIF_VALUE: 22
PIF_VALUE: 19
PIF_VALUE: 21
PIF_VALUE: 20
PIF_VALUE: 18
PIF_VALUE: 19
PIF_VALUE: 19
PIF_VALUE: 11
PIF_VALUE: 21
PIF_VALUE: 0
PIF_VALUE: 20
PIF_VALUE: 21
PIF_VALUE: 21
PIF_VALUE: 0
PIF_VALUE: 11
PIF_VALUE: 21
PIF_VALUE: 23
PIF_VALUE: 19
PIF_VALUE: 19
PIF_VALUE: 11
PIF_VALUE: 0
PIF_VALUE: 19
PIF_VALUE: 22
PIF_VALUE: 23
PIF_VALUE: 0
PIF_VALUE: 19
PIF_VALUE: 20
PIF_VALUE: 21
PIF_VALUE: 18
PIF_VALUE: 20
PIF_VALUE: 20
PIF_VALUE: 22
PIF_VALUE: 21
PIF_VALUE: 21
PIF_VALUE: 20
PIF_VALUE: 11
PIF_VALUE: 19
PIF_VALUE: 20
PIF_VALUE: 22
PIF_VALUE: 19
PIF_VALUE: 21
PIF_VALUE: 12
PIF_VALUE: 19
PIF_VALUE: 21
PIF_VALUE: 2
PIF_VALUE: 19
PIF_VALUE: 22
PIF_VALUE: 0
PIF_VALUE: 11
PIF_VALUE: 21
PIF_VALUE: 20
PIF_VALUE: 0
PIF_VALUE: 24
PIF_VALUE: 11
PIF_VALUE: 18
PIF_VALUE: 19
PIF_VALUE: 11
PIF_VALUE: 11
PIF_VALUE: 19
PIF_VALUE: 19
PIF_VALUE: 1
PIF_VALUE: 18
PIF_VALUE: 18
PIF_VALUE: 20
PIF_VALUE: 11
PIF_VALUE: 0
PIF_VALUE: 22
PIF_VALUE: 11
PIF_VALUE: 11
PIF_VALUE: 19
PIF_VALUE: 20
PIF_VALUE: 11
PIF_VALUE: 19
PIF_VALUE: 22
PIF_VALUE: 19
PIF_VALUE: 0
PIF_VALUE: 2
PIF_VALUE: 19

## 2022-01-01 ASSESSMENT — PAIN SCALES - GENERAL
PAINLEVEL_OUTOF10: 7
PAINLEVEL_OUTOF10: 2
PAINLEVEL_OUTOF10: 10
PAINLEVEL_OUTOF10: 0
PAINLEVEL_OUTOF10: 4
PAINLEVEL_OUTOF10: 0
PAINLEVEL_OUTOF10: 8
PAINLEVEL_OUTOF10: 7
PAINLEVEL_OUTOF10: 0
PAINLEVEL_OUTOF10: 2
PAINLEVEL_OUTOF10: 0
PAINLEVEL_OUTOF10: 4
PAINLEVEL_OUTOF10: 0
PAINLEVEL_OUTOF10: 0
PAINLEVEL_OUTOF10: 3
PAINLEVEL_OUTOF10: 2
PAINLEVEL_OUTOF10: 2
PAINLEVEL_OUTOF10: 5
PAINLEVEL_OUTOF10: 0
PAINLEVEL_OUTOF10: 4
PAINLEVEL_OUTOF10: 0
PAINLEVEL_OUTOF10: 0
PAINLEVEL_OUTOF10: 1
PAINLEVEL_OUTOF10: 10
PAINLEVEL_OUTOF10: 6
PAINLEVEL_OUTOF10: 10
PAINLEVEL_OUTOF10: 10
PAINLEVEL_OUTOF10: 8
PAINLEVEL_OUTOF10: 9
PAINLEVEL_OUTOF10: 9
PAINLEVEL_OUTOF10: 3
PAINLEVEL_OUTOF10: 8
PAINLEVEL_OUTOF10: 10
PAINLEVEL_OUTOF10: 8
PAINLEVEL_OUTOF10: 0
PAINLEVEL_OUTOF10: 6
PAINLEVEL_OUTOF10: 10
PAINLEVEL_OUTOF10: 0
PAINLEVEL_OUTOF10: 10
PAINLEVEL_OUTOF10: 9
PAINLEVEL_OUTOF10: 0
PAINLEVEL_OUTOF10: 0
PAINLEVEL_OUTOF10: 2
PAINLEVEL_OUTOF10: 2
PAINLEVEL_OUTOF10: 0
PAINLEVEL_OUTOF10: 7
PAINLEVEL_OUTOF10: 0
PAINLEVEL_OUTOF10: 3
PAINLEVEL_OUTOF10: 5
PAINLEVEL_OUTOF10: 8
PAINLEVEL_OUTOF10: 0
PAINLEVEL_OUTOF10: 10
PAINLEVEL_OUTOF10: 5
PAINLEVEL_OUTOF10: 0
PAINLEVEL_OUTOF10: 8
PAINLEVEL_OUTOF10: 0
PAINLEVEL_OUTOF10: 0
PAINLEVEL_OUTOF10: 6
PAINLEVEL_OUTOF10: 2
PAINLEVEL_OUTOF10: 2
PAINLEVEL_OUTOF10: 0
PAINLEVEL_OUTOF10: 1
PAINLEVEL_OUTOF10: 0
PAINLEVEL_OUTOF10: 10
PAINLEVEL_OUTOF10: 0
PAINLEVEL_OUTOF10: 0
PAINLEVEL_OUTOF10: 3
PAINLEVEL_OUTOF10: 5
PAINLEVEL_OUTOF10: 0
PAINLEVEL_OUTOF10: 10
PAINLEVEL_OUTOF10: 6
PAINLEVEL_OUTOF10: 7
PAINLEVEL_OUTOF10: 10
PAINLEVEL_OUTOF10: 10
PAINLEVEL_OUTOF10: 3
PAINLEVEL_OUTOF10: 0
PAINLEVEL_OUTOF10: 0
PAINLEVEL_OUTOF10: 7
PAINLEVEL_OUTOF10: 0
PAINLEVEL_OUTOF10: 1
PAINLEVEL_OUTOF10: 0
PAINLEVEL_OUTOF10: 0
PAINLEVEL_OUTOF10: 10
PAINLEVEL_OUTOF10: 2
PAINLEVEL_OUTOF10: 10
PAINLEVEL_OUTOF10: 0
PAINLEVEL_OUTOF10: 6
PAINLEVEL_OUTOF10: 10
PAINLEVEL_OUTOF10: 0
PAINLEVEL_OUTOF10: 2
PAINLEVEL_OUTOF10: 8
PAINLEVEL_OUTOF10: 0
PAINLEVEL_OUTOF10: 2
PAINLEVEL_OUTOF10: 0
PAINLEVEL_OUTOF10: 9
PAINLEVEL_OUTOF10: 10
PAINLEVEL_OUTOF10: 8
PAINLEVEL_OUTOF10: 0
PAINLEVEL_OUTOF10: 2
PAINLEVEL_OUTOF10: 8
PAINLEVEL_OUTOF10: 0
PAINLEVEL_OUTOF10: 1
PAINLEVEL_OUTOF10: 9
PAINLEVEL_OUTOF10: 0
PAINLEVEL_OUTOF10: 0
PAINLEVEL_OUTOF10: 10
PAINLEVEL_OUTOF10: 0
PAINLEVEL_OUTOF10: 6
PAINLEVEL_OUTOF10: 3
PAINLEVEL_OUTOF10: 4
PAINLEVEL_OUTOF10: 0
PAINLEVEL_OUTOF10: 10
PAINLEVEL_OUTOF10: 0
PAINLEVEL_OUTOF10: 0
PAINLEVEL_OUTOF10: 10
PAINLEVEL_OUTOF10: 10
PAINLEVEL_OUTOF10: 0
PAINLEVEL_OUTOF10: 10
PAINLEVEL_OUTOF10: 0
PAINLEVEL_OUTOF10: 9
PAINLEVEL_OUTOF10: 0
PAINLEVEL_OUTOF10: 0
PAINLEVEL_OUTOF10: 4
PAINLEVEL_OUTOF10: 0
PAINLEVEL_OUTOF10: 4
PAINLEVEL_OUTOF10: 0
PAINLEVEL_OUTOF10: 0
PAINLEVEL_OUTOF10: 10
PAINLEVEL_OUTOF10: 2
PAINLEVEL_OUTOF10: 10
PAINLEVEL_OUTOF10: 10
PAINLEVEL_OUTOF10: 2
PAINLEVEL_OUTOF10: 10
PAINLEVEL_OUTOF10: 8
PAINLEVEL_OUTOF10: 0
PAINLEVEL_OUTOF10: 0
PAINLEVEL_OUTOF10: 2
PAINLEVEL_OUTOF10: 10
PAINLEVEL_OUTOF10: 8
PAINLEVEL_OUTOF10: 0
PAINLEVEL_OUTOF10: 2
PAINLEVEL_OUTOF10: 6
PAINLEVEL_OUTOF10: 0
PAINLEVEL_OUTOF10: 6
PAINLEVEL_OUTOF10: 0
PAINLEVEL_OUTOF10: 0
PAINLEVEL_OUTOF10: 8
PAINLEVEL_OUTOF10: 0
PAINLEVEL_OUTOF10: 2
PAINLEVEL_OUTOF10: 8
PAINLEVEL_OUTOF10: 0
PAINLEVEL_OUTOF10: 6
PAINLEVEL_OUTOF10: 0
PAINLEVEL_OUTOF10: 3
PAINLEVEL_OUTOF10: 0
PAINLEVEL_OUTOF10: 3
PAINLEVEL_OUTOF10: 0
PAINLEVEL_OUTOF10: 8
PAINLEVEL_OUTOF10: 9
PAINLEVEL_OUTOF10: 0
PAINLEVEL_OUTOF10: 8
PAINLEVEL_OUTOF10: 4
PAINLEVEL_OUTOF10: 2
PAINLEVEL_OUTOF10: 10
PAINLEVEL_OUTOF10: 8
PAINLEVEL_OUTOF10: 0
PAINLEVEL_OUTOF10: 4
PAINLEVEL_OUTOF10: 0
PAINLEVEL_OUTOF10: 0
PAINLEVEL_OUTOF10: 9
PAINLEVEL_OUTOF10: 0
PAINLEVEL_OUTOF10: 5
PAINLEVEL_OUTOF10: 0
PAINLEVEL_OUTOF10: 2
PAINLEVEL_OUTOF10: 4
PAINLEVEL_OUTOF10: 0
PAINLEVEL_OUTOF10: 3
PAINLEVEL_OUTOF10: 10
PAINLEVEL_OUTOF10: 0
PAINLEVEL_OUTOF10: 6
PAINLEVEL_OUTOF10: 4
PAINLEVEL_OUTOF10: 8
PAINLEVEL_OUTOF10: 3
PAINLEVEL_OUTOF10: 0
PAINLEVEL_OUTOF10: 3
PAINLEVEL_OUTOF10: 6
PAINLEVEL_OUTOF10: 6
PAINLEVEL_OUTOF10: 0
PAINLEVEL_OUTOF10: 0
PAINLEVEL_OUTOF10: 5
PAINLEVEL_OUTOF10: 0
PAINLEVEL_OUTOF10: 4
PAINLEVEL_OUTOF10: 5

## 2022-01-01 ASSESSMENT — PAIN DESCRIPTION - PROGRESSION
CLINICAL_PROGRESSION: NOT CHANGED
CLINICAL_PROGRESSION: GRADUALLY IMPROVING
CLINICAL_PROGRESSION: NOT CHANGED
CLINICAL_PROGRESSION: GRADUALLY WORSENING
CLINICAL_PROGRESSION: NOT CHANGED
CLINICAL_PROGRESSION: GRADUALLY WORSENING
CLINICAL_PROGRESSION: GRADUALLY WORSENING
CLINICAL_PROGRESSION: NOT CHANGED
CLINICAL_PROGRESSION: GRADUALLY WORSENING
CLINICAL_PROGRESSION: NOT CHANGED
CLINICAL_PROGRESSION: GRADUALLY WORSENING
CLINICAL_PROGRESSION: NOT CHANGED
CLINICAL_PROGRESSION: GRADUALLY WORSENING
CLINICAL_PROGRESSION: NOT CHANGED
CLINICAL_PROGRESSION: GRADUALLY IMPROVING
CLINICAL_PROGRESSION: GRADUALLY WORSENING
CLINICAL_PROGRESSION: NOT CHANGED
CLINICAL_PROGRESSION: GRADUALLY WORSENING
CLINICAL_PROGRESSION: GRADUALLY WORSENING
CLINICAL_PROGRESSION: NOT CHANGED
CLINICAL_PROGRESSION: GRADUALLY WORSENING
CLINICAL_PROGRESSION: NOT CHANGED
CLINICAL_PROGRESSION: GRADUALLY WORSENING
CLINICAL_PROGRESSION: NOT CHANGED
CLINICAL_PROGRESSION: GRADUALLY WORSENING
CLINICAL_PROGRESSION: NOT CHANGED
CLINICAL_PROGRESSION: GRADUALLY WORSENING
CLINICAL_PROGRESSION: NOT CHANGED
CLINICAL_PROGRESSION: GRADUALLY WORSENING
CLINICAL_PROGRESSION: NOT CHANGED
CLINICAL_PROGRESSION: GRADUALLY WORSENING
CLINICAL_PROGRESSION: NOT CHANGED
CLINICAL_PROGRESSION: NOT CHANGED
CLINICAL_PROGRESSION: GRADUALLY IMPROVING
CLINICAL_PROGRESSION: NOT CHANGED
CLINICAL_PROGRESSION: GRADUALLY WORSENING
CLINICAL_PROGRESSION: NOT CHANGED
CLINICAL_PROGRESSION: GRADUALLY WORSENING
CLINICAL_PROGRESSION: NOT CHANGED
CLINICAL_PROGRESSION: NOT CHANGED
CLINICAL_PROGRESSION: GRADUALLY WORSENING
CLINICAL_PROGRESSION: NOT CHANGED
CLINICAL_PROGRESSION: GRADUALLY WORSENING
CLINICAL_PROGRESSION: NOT CHANGED
CLINICAL_PROGRESSION: NOT CHANGED
CLINICAL_PROGRESSION: GRADUALLY WORSENING
CLINICAL_PROGRESSION: GRADUALLY WORSENING
CLINICAL_PROGRESSION: NOT CHANGED
CLINICAL_PROGRESSION: NOT CHANGED
CLINICAL_PROGRESSION: GRADUALLY WORSENING
CLINICAL_PROGRESSION: NOT CHANGED
CLINICAL_PROGRESSION: GRADUALLY WORSENING
CLINICAL_PROGRESSION: NOT CHANGED
CLINICAL_PROGRESSION: GRADUALLY WORSENING
CLINICAL_PROGRESSION: NOT CHANGED
CLINICAL_PROGRESSION: GRADUALLY WORSENING
CLINICAL_PROGRESSION: NOT CHANGED
CLINICAL_PROGRESSION: GRADUALLY WORSENING
CLINICAL_PROGRESSION: NOT CHANGED
CLINICAL_PROGRESSION: GRADUALLY WORSENING
CLINICAL_PROGRESSION: GRADUALLY WORSENING
CLINICAL_PROGRESSION: NOT CHANGED
CLINICAL_PROGRESSION: GRADUALLY WORSENING
CLINICAL_PROGRESSION: NOT CHANGED
CLINICAL_PROGRESSION: NOT CHANGED

## 2022-01-01 ASSESSMENT — PAIN DESCRIPTION - FREQUENCY
FREQUENCY: CONTINUOUS
FREQUENCY: INTERMITTENT
FREQUENCY: CONTINUOUS
FREQUENCY: INTERMITTENT
FREQUENCY: CONTINUOUS
FREQUENCY: INTERMITTENT
FREQUENCY: INTERMITTENT
FREQUENCY: CONTINUOUS
FREQUENCY: CONTINUOUS
FREQUENCY: INTERMITTENT
FREQUENCY: CONTINUOUS
FREQUENCY: INTERMITTENT
FREQUENCY: CONTINUOUS
FREQUENCY: INTERMITTENT
FREQUENCY: CONTINUOUS
FREQUENCY: INTERMITTENT
FREQUENCY: CONTINUOUS
FREQUENCY: INTERMITTENT
FREQUENCY: INTERMITTENT
FREQUENCY: CONTINUOUS
FREQUENCY: INTERMITTENT
FREQUENCY: CONTINUOUS
FREQUENCY: INTERMITTENT
FREQUENCY: CONTINUOUS
FREQUENCY: INTERMITTENT
FREQUENCY: CONTINUOUS
FREQUENCY: INTERMITTENT
FREQUENCY: INTERMITTENT
FREQUENCY: CONTINUOUS
FREQUENCY: INTERMITTENT
FREQUENCY: INTERMITTENT
FREQUENCY: CONTINUOUS
FREQUENCY: INTERMITTENT
FREQUENCY: INTERMITTENT
FREQUENCY: CONTINUOUS
FREQUENCY: CONTINUOUS

## 2022-01-01 ASSESSMENT — PAIN SCALES - WONG BAKER
WONGBAKER_NUMERICALRESPONSE: 0
WONGBAKER_NUMERICALRESPONSE: 4
WONGBAKER_NUMERICALRESPONSE: 2
WONGBAKER_NUMERICALRESPONSE: 0
WONGBAKER_NUMERICALRESPONSE: 4
WONGBAKER_NUMERICALRESPONSE: 0
WONGBAKER_NUMERICALRESPONSE: 0
WONGBAKER_NUMERICALRESPONSE: 2
WONGBAKER_NUMERICALRESPONSE: 0
WONGBAKER_NUMERICALRESPONSE: 0
WONGBAKER_NUMERICALRESPONSE: 8
WONGBAKER_NUMERICALRESPONSE: 0
WONGBAKER_NUMERICALRESPONSE: 0
WONGBAKER_NUMERICALRESPONSE: 4
WONGBAKER_NUMERICALRESPONSE: 2
WONGBAKER_NUMERICALRESPONSE: 0
WONGBAKER_NUMERICALRESPONSE: 0
WONGBAKER_NUMERICALRESPONSE: 4
WONGBAKER_NUMERICALRESPONSE: 0
WONGBAKER_NUMERICALRESPONSE: 4
WONGBAKER_NUMERICALRESPONSE: 0
WONGBAKER_NUMERICALRESPONSE: 2
WONGBAKER_NUMERICALRESPONSE: 4
WONGBAKER_NUMERICALRESPONSE: 2
WONGBAKER_NUMERICALRESPONSE: 0
WONGBAKER_NUMERICALRESPONSE: 4
WONGBAKER_NUMERICALRESPONSE: 0
WONGBAKER_NUMERICALRESPONSE: 2
WONGBAKER_NUMERICALRESPONSE: 4
WONGBAKER_NUMERICALRESPONSE: 0
WONGBAKER_NUMERICALRESPONSE: 2
WONGBAKER_NUMERICALRESPONSE: 2
WONGBAKER_NUMERICALRESPONSE: 4
WONGBAKER_NUMERICALRESPONSE: 0
WONGBAKER_NUMERICALRESPONSE: 2
WONGBAKER_NUMERICALRESPONSE: 0
WONGBAKER_NUMERICALRESPONSE: 4
WONGBAKER_NUMERICALRESPONSE: 2
WONGBAKER_NUMERICALRESPONSE: 0
WONGBAKER_NUMERICALRESPONSE: 0
WONGBAKER_NUMERICALRESPONSE: 6
WONGBAKER_NUMERICALRESPONSE: 0
WONGBAKER_NUMERICALRESPONSE: 6
WONGBAKER_NUMERICALRESPONSE: 2
WONGBAKER_NUMERICALRESPONSE: 0
WONGBAKER_NUMERICALRESPONSE: 2
WONGBAKER_NUMERICALRESPONSE: 0
WONGBAKER_NUMERICALRESPONSE: 4
WONGBAKER_NUMERICALRESPONSE: 2
WONGBAKER_NUMERICALRESPONSE: 6
WONGBAKER_NUMERICALRESPONSE: 0
WONGBAKER_NUMERICALRESPONSE: 4
WONGBAKER_NUMERICALRESPONSE: 0
WONGBAKER_NUMERICALRESPONSE: 10
WONGBAKER_NUMERICALRESPONSE: 4
WONGBAKER_NUMERICALRESPONSE: 4
WONGBAKER_NUMERICALRESPONSE: 0
WONGBAKER_NUMERICALRESPONSE: 2
WONGBAKER_NUMERICALRESPONSE: 0
WONGBAKER_NUMERICALRESPONSE: 0
WONGBAKER_NUMERICALRESPONSE: 4
WONGBAKER_NUMERICALRESPONSE: 2
WONGBAKER_NUMERICALRESPONSE: 2
WONGBAKER_NUMERICALRESPONSE: 4
WONGBAKER_NUMERICALRESPONSE: 2
WONGBAKER_NUMERICALRESPONSE: 0
WONGBAKER_NUMERICALRESPONSE: 4
WONGBAKER_NUMERICALRESPONSE: 0
WONGBAKER_NUMERICALRESPONSE: 0
WONGBAKER_NUMERICALRESPONSE: 4
WONGBAKER_NUMERICALRESPONSE: 0
WONGBAKER_NUMERICALRESPONSE: 2
WONGBAKER_NUMERICALRESPONSE: 0
WONGBAKER_NUMERICALRESPONSE: 4
WONGBAKER_NUMERICALRESPONSE: 2
WONGBAKER_NUMERICALRESPONSE: 2
WONGBAKER_NUMERICALRESPONSE: 0
WONGBAKER_NUMERICALRESPONSE: 2
WONGBAKER_NUMERICALRESPONSE: 0
WONGBAKER_NUMERICALRESPONSE: 4
WONGBAKER_NUMERICALRESPONSE: 4
WONGBAKER_NUMERICALRESPONSE: 0
WONGBAKER_NUMERICALRESPONSE: 4
WONGBAKER_NUMERICALRESPONSE: 4
WONGBAKER_NUMERICALRESPONSE: 0
WONGBAKER_NUMERICALRESPONSE: 2
WONGBAKER_NUMERICALRESPONSE: 0
WONGBAKER_NUMERICALRESPONSE: 4
WONGBAKER_NUMERICALRESPONSE: 2
WONGBAKER_NUMERICALRESPONSE: 0
WONGBAKER_NUMERICALRESPONSE: 0
WONGBAKER_NUMERICALRESPONSE: 4
WONGBAKER_NUMERICALRESPONSE: 4
WONGBAKER_NUMERICALRESPONSE: 0

## 2022-01-01 ASSESSMENT — PAIN DESCRIPTION - DESCRIPTORS
DESCRIPTORS: DISCOMFORT
DESCRIPTORS: DISCOMFORT
DESCRIPTORS: ACHING
DESCRIPTORS: ACHING;DISCOMFORT
DESCRIPTORS: ACHING
DESCRIPTORS: SORE
DESCRIPTORS: ACHING;CONSTANT
DESCRIPTORS: ACHING;CONSTANT;SHARP
DESCRIPTORS: ACHING
DESCRIPTORS: ACHING
DESCRIPTORS: ACHING;CONSTANT
DESCRIPTORS: CRAMPING
DESCRIPTORS: ACHING;SHARP;SORE
DESCRIPTORS: ACHING
DESCRIPTORS: ACHING;SHARP
DESCRIPTORS: ACHING;DISCOMFORT
DESCRIPTORS: ACHING
DESCRIPTORS: ACHING;CONSTANT
DESCRIPTORS: ACHING
DESCRIPTORS: ACHING
DESCRIPTORS: DISCOMFORT
DESCRIPTORS: ACHING;CRAMPING;DISCOMFORT
DESCRIPTORS: ACHING;CONSTANT
DESCRIPTORS: ACHING
DESCRIPTORS: SHARP
DESCRIPTORS: ACHING
DESCRIPTORS: ACHING;CONSTANT;SHARP
DESCRIPTORS: ACHING;DISCOMFORT
DESCRIPTORS: ACHING;CONSTANT;SHARP
DESCRIPTORS: ACHING
DESCRIPTORS: ACHING;CONSTANT
DESCRIPTORS: ACHING;DISCOMFORT
DESCRIPTORS: PATIENT UNABLE TO DESCRIBE
DESCRIPTORS: DISCOMFORT
DESCRIPTORS: SORE
DESCRIPTORS: ACHING;DISCOMFORT
DESCRIPTORS: ACHING;CRAMPING
DESCRIPTORS: ACHING;DISCOMFORT
DESCRIPTORS: ACHING
DESCRIPTORS: ACHING;CONSTANT
DESCRIPTORS: ACHING;DISCOMFORT;CRAMPING
DESCRIPTORS: ACHING;CRAMPING;DISCOMFORT
DESCRIPTORS: ACHING;CONSTANT;SHARP
DESCRIPTORS: PRESSURE
DESCRIPTORS: DISCOMFORT
DESCRIPTORS: DISCOMFORT
DESCRIPTORS: CRUSHING
DESCRIPTORS: SORE
DESCRIPTORS: ACHING
DESCRIPTORS: PATIENT UNABLE TO DESCRIBE
DESCRIPTORS: SORE

## 2022-01-01 ASSESSMENT — PAIN DESCRIPTION - LOCATION
LOCATION: NECK
LOCATION: ABDOMEN
LOCATION: SHOULDER
LOCATION: ABDOMEN
LOCATION: SHOULDER
LOCATION: ABDOMEN
LOCATION: ABDOMEN;RIB CAGE
LOCATION: ABDOMEN;CHEST
LOCATION: BACK
LOCATION: ABDOMEN
LOCATION: OTHER (COMMENT)
LOCATION: ABDOMEN
LOCATION: BACK
LOCATION: ABDOMEN
LOCATION: ABDOMEN
LOCATION: OTHER (COMMENT)
LOCATION: ABDOMEN
LOCATION: OTHER (COMMENT)
LOCATION: ABDOMEN;CHEST;BACK
LOCATION: ABDOMEN
LOCATION: BACK
LOCATION: ABDOMEN
LOCATION: OTHER (COMMENT)

## 2022-01-01 ASSESSMENT — PAIN DESCRIPTION - ORIENTATION
ORIENTATION: LEFT
ORIENTATION: MID
ORIENTATION: MID
ORIENTATION: UPPER;MID
ORIENTATION: LOWER
ORIENTATION: OTHER (COMMENT)
ORIENTATION: RIGHT
ORIENTATION: RIGHT;LOWER;MID
ORIENTATION: RIGHT
ORIENTATION: RIGHT;LEFT
ORIENTATION: LEFT;RIGHT
ORIENTATION: MID;UPPER
ORIENTATION: MID;UPPER
ORIENTATION: RIGHT;LOWER;MID
ORIENTATION: MID
ORIENTATION: UPPER;MID
ORIENTATION: RIGHT;LOWER;MID
ORIENTATION: RIGHT
ORIENTATION: LOWER
ORIENTATION: RIGHT
ORIENTATION: UPPER;MID
ORIENTATION: UPPER;MID
ORIENTATION: LOWER
ORIENTATION: RIGHT;LEFT
ORIENTATION: RIGHT;LEFT
ORIENTATION: RIGHT
ORIENTATION: LEFT
ORIENTATION: MID
ORIENTATION: RIGHT;LEFT
ORIENTATION: MID;LOWER
ORIENTATION: RIGHT;LEFT
ORIENTATION: RIGHT;LEFT
ORIENTATION: MID
ORIENTATION: RIGHT;MID;LOWER

## 2022-01-01 ASSESSMENT — PAIN DESCRIPTION - ONSET
ONSET: ON-GOING
ONSET: GRADUAL
ONSET: ON-GOING
ONSET: PROGRESSIVE
ONSET: ON-GOING
ONSET: ON-GOING
ONSET: SUDDEN
ONSET: ON-GOING
ONSET: ON-GOING
ONSET: SUDDEN
ONSET: GRADUAL
ONSET: ON-GOING
ONSET: ON-GOING
ONSET: PROGRESSIVE
ONSET: ON-GOING
ONSET: ON-GOING
ONSET: GRADUAL
ONSET: ON-GOING
ONSET: SUDDEN
ONSET: ON-GOING
ONSET: ON-GOING
ONSET: GRADUAL
ONSET: SUDDEN
ONSET: ON-GOING
ONSET: ON-GOING
ONSET: GRADUAL
ONSET: GRADUAL
ONSET: ON-GOING
ONSET: ON-GOING
ONSET: PROGRESSIVE
ONSET: GRADUAL
ONSET: ON-GOING
ONSET: AWAKENED FROM SLEEP
ONSET: OTHER (COMMENT)
ONSET: ON-GOING
ONSET: GRADUAL

## 2022-01-01 ASSESSMENT — PAIN - FUNCTIONAL ASSESSMENT
PAIN_FUNCTIONAL_ASSESSMENT: PREVENTS OR INTERFERES SOME ACTIVE ACTIVITIES AND ADLS
PAIN_FUNCTIONAL_ASSESSMENT: ACTIVITIES ARE NOT PREVENTED
PAIN_FUNCTIONAL_ASSESSMENT: PREVENTS OR INTERFERES SOME ACTIVE ACTIVITIES AND ADLS
PAIN_FUNCTIONAL_ASSESSMENT: 0-10
PAIN_FUNCTIONAL_ASSESSMENT: PREVENTS OR INTERFERES SOME ACTIVE ACTIVITIES AND ADLS
PAIN_FUNCTIONAL_ASSESSMENT: PREVENTS OR INTERFERES WITH ALL ACTIVE AND SOME PASSIVE ACTIVITIES
PAIN_FUNCTIONAL_ASSESSMENT: PREVENTS OR INTERFERES SOME ACTIVE ACTIVITIES AND ADLS
PAIN_FUNCTIONAL_ASSESSMENT: ACTIVITIES ARE NOT PREVENTED
PAIN_FUNCTIONAL_ASSESSMENT: PREVENTS OR INTERFERES SOME ACTIVE ACTIVITIES AND ADLS
PAIN_FUNCTIONAL_ASSESSMENT: PREVENTS OR INTERFERES SOME ACTIVE ACTIVITIES AND ADLS
PAIN_FUNCTIONAL_ASSESSMENT: ACTIVITIES ARE NOT PREVENTED
PAIN_FUNCTIONAL_ASSESSMENT: PREVENTS OR INTERFERES SOME ACTIVE ACTIVITIES AND ADLS
PAIN_FUNCTIONAL_ASSESSMENT: PREVENTS OR INTERFERES SOME ACTIVE ACTIVITIES AND ADLS
PAIN_FUNCTIONAL_ASSESSMENT: PREVENTS OR INTERFERES WITH MANY ACTIVE NOT PASSIVE ACTIVITIES
PAIN_FUNCTIONAL_ASSESSMENT: ACTIVITIES ARE NOT PREVENTED
PAIN_FUNCTIONAL_ASSESSMENT: ACTIVITIES ARE NOT PREVENTED
PAIN_FUNCTIONAL_ASSESSMENT: PREVENTS OR INTERFERES SOME ACTIVE ACTIVITIES AND ADLS
PAIN_FUNCTIONAL_ASSESSMENT: PREVENTS OR INTERFERES WITH MANY ACTIVE NOT PASSIVE ACTIVITIES
PAIN_FUNCTIONAL_ASSESSMENT: 0-10
PAIN_FUNCTIONAL_ASSESSMENT: PREVENTS OR INTERFERES WITH MANY ACTIVE NOT PASSIVE ACTIVITIES
PAIN_FUNCTIONAL_ASSESSMENT: PREVENTS OR INTERFERES SOME ACTIVE ACTIVITIES AND ADLS
PAIN_FUNCTIONAL_ASSESSMENT: ACTIVITIES ARE NOT PREVENTED
PAIN_FUNCTIONAL_ASSESSMENT: PREVENTS OR INTERFERES SOME ACTIVE ACTIVITIES AND ADLS
PAIN_FUNCTIONAL_ASSESSMENT: PREVENTS OR INTERFERES WITH ALL ACTIVE AND SOME PASSIVE ACTIVITIES
PAIN_FUNCTIONAL_ASSESSMENT: PREVENTS OR INTERFERES SOME ACTIVE ACTIVITIES AND ADLS
PAIN_FUNCTIONAL_ASSESSMENT: ACTIVITIES ARE NOT PREVENTED
PAIN_FUNCTIONAL_ASSESSMENT: PREVENTS OR INTERFERES SOME ACTIVE ACTIVITIES AND ADLS
PAIN_FUNCTIONAL_ASSESSMENT: PREVENTS OR INTERFERES WITH ALL ACTIVE AND SOME PASSIVE ACTIVITIES

## 2022-01-01 ASSESSMENT — PAIN DESCRIPTION - PAIN TYPE
TYPE: ACUTE PAIN;SURGICAL PAIN
TYPE: ACUTE PAIN
TYPE: ACUTE PAIN;SURGICAL PAIN
TYPE: SURGICAL PAIN
TYPE: ACUTE PAIN;SURGICAL PAIN
TYPE: SURGICAL PAIN
TYPE: ACUTE PAIN
TYPE: SURGICAL PAIN
TYPE: ACUTE PAIN
TYPE: ACUTE PAIN
TYPE: SURGICAL PAIN
TYPE: ACUTE PAIN
TYPE: ACUTE PAIN
TYPE: OTHER (COMMENT)
TYPE: ACUTE PAIN
TYPE: ACUTE PAIN;SURGICAL PAIN
TYPE: ACUTE PAIN
TYPE: SURGICAL PAIN
TYPE: ACUTE PAIN
TYPE: ACUTE PAIN;SURGICAL PAIN
TYPE: ACUTE PAIN
TYPE: ACUTE PAIN
TYPE: ACUTE PAIN;SURGICAL PAIN
TYPE: SURGICAL PAIN
TYPE: SURGICAL PAIN
TYPE: ACUTE PAIN
TYPE: ACUTE PAIN;SURGICAL PAIN
TYPE: ACUTE PAIN;SURGICAL PAIN
TYPE: SURGICAL PAIN
TYPE: ACUTE PAIN;SURGICAL PAIN
TYPE: ACUTE PAIN;SURGICAL PAIN
TYPE: ACUTE PAIN
TYPE: SURGICAL PAIN
TYPE: SURGICAL PAIN
TYPE: ACUTE PAIN
TYPE: ACUTE PAIN
TYPE: ACUTE PAIN;SURGICAL PAIN
TYPE: SURGICAL PAIN
TYPE: ACUTE PAIN
TYPE: ACUTE PAIN;SURGICAL PAIN
TYPE: ACUTE PAIN;SURGICAL PAIN
TYPE: ACUTE PAIN
TYPE: SURGICAL PAIN
TYPE: ACUTE PAIN

## 2022-01-01 ASSESSMENT — ENCOUNTER SYMPTOMS
SORE THROAT: 0
SHORTNESS OF BREATH: 0
COUGH: 0
NAUSEA: 0
EYE PAIN: 0
VOMITING: 0
CHEST TIGHTNESS: 0
DIARRHEA: 0
ABDOMINAL PAIN: 0
BLOOD IN STOOL: 1
VOICE CHANGE: 0
RHINORRHEA: 0
EYE REDNESS: 0

## 2022-01-01 ASSESSMENT — PAIN DESCRIPTION - DIRECTION: RADIATING_TOWARDS: UPWARD

## 2022-01-25 NOTE — PROGRESS NOTES
Tolerated INFUSION well. Reviewed discharge instruction, voiced understanding. Copies of AVS given. Pt discharged HOME. Pt to exit via [facility]. Orders Placed This Encounter   Medications    0.9 % sodium chloride infusion    bamlanivimab 700 mg, etesevimab 1,400 mg in sodium chloride 0.9 % 160 mL IVPB     Order Specific Question:   Does this patient qualify for COVID-19 antibody therapy based on criteria for treatment? Answer:    Yes

## 2022-01-26 NOTE — TELEPHONE ENCOUNTER
----- Message from Audrey Lora sent at 1/26/2022  9:32 AM EST -----  Subject: Message to Provider    QUESTIONS  Information for Provider? bárbara ortiz, positive for covid on 01/21/2022--   needs to know quartine period, does not have symptoms, when can he   schedule next doctor's appointment? --- next appointment scheduled at   1/31/2022 at 10:45   ---------------------------------------------------------------------------  --------------  [address]  What is the best way for the office to contact you? OK to leave message on   voicemail  Preferred Call Back Phone Number? [phone]  ---------------------------------------------------------------------------  --------------  SCRIPT ANSWERS  Relationship to Patient?  Self

## 2022-01-26 NOTE — TELEPHONE ENCOUNTER
Spoke with the patient and advised him he is past day 5 of being positive for covid and as long as he is no longer having an symptoms, he does not need to quarantine. Advised patient to still wear a mask when out in public spaces, patient voiced understanding.

## 2022-01-31 PROBLEM — I48.0 PAROXYSMAL ATRIAL FIBRILLATION (HCC): Status: ACTIVE | Noted: 2022-01-01

## 2022-01-31 NOTE — TELEPHONE ENCOUNTER
Received approval for patient assistance for Eliquis from [employer]. Left message on voicemail to advise patient.

## 2022-01-31 NOTE — TELEPHONE ENCOUNTER
Patient called to let Steffany Elizondo know that he received his Eliquis from [facility].

## 2022-01-31 NOTE — PROGRESS NOTES
2022     Sree Martinez      Chief Complaint   Patient presents with    6 Month Follow-Up    Fatigue     pt reports concerns for having little energy and SOB, patient feeling like this since summer       HPI      Jessica Pineda is a 80 y.o. male who presents today with the followin. H/O: CVA (cerebrovascular accident)    2. Paroxysmal atrial fibrillation (HCC)    3. Psoriatic arthritis ([address]    4. Primary malignant neoplasm of rectum ([address]    5. Chronic myeloproliferative disease ([address]    6. COVID-19    7.  Viral URI    Is here for follow-up  The patient had COVID-19 pneumonia he was treated with an infusion of antibiotic as an outpatient  He avoided hospitalization he says he is better but still is weak  Thinks he has a secondary bacterial sinus to which developed after the Covid is pretty well gone away    REVIEW OF SYMPTOMS    Review of Systems   Cardiovascular:        Has a history of paroxysmal atrial fibrillation he is on anticoagulation  History of hypertension which is well controlled     Neurological:        May have had a TIA or small stroke in the past   Hematological:        Remote history of rectal cancer treated successfully with radical excision       PAST MEDICAL HISTORY  Past Medical History:   Diagnosis Date    AAA (abdominal aortic aneurysm) ([address]     Angina, class I ([address]     Benign prostatic hyperplasia 2011     Updating Deprecated Diagnoses    Bowel obstruction ([address]     CAD (coronary artery disease)     Cancer ([address]     rectal    CCC (chronic calculous cholecystitis) 2018    Gastroesophageal reflux disease without esophagitis 2020    Hx of cardiovascular stress test 2021    EF 62% Normal study    Hypertension     Primary malignant neoplasm of rectum ([address] 2019    Psoriasis     Psoriatic arthritis ([address] 2019    Pyelonephritis 2018    Stable angina ([address] 2019    Unspecified cerebral artery occlusion with cerebral infarction FAMILY HISTORY  Family History   Problem Relation Age of Onset    Cancer Mother     Cancer Father        SOCIAL HISTORY  Social History     Socioeconomic History    Marital status:      Spouse name: Not on file    Number of children: Not on file    Years of education: Not on file    Highest education level: Not on file   Occupational History    Not on file   Tobacco Use    Smoking status: Former Smoker     Packs/day: 1.50     Years: 40.00     Pack years: 60.00     Types: Cigarettes     Quit date: 80     Years since quittin.1    Smokeless tobacco: Never Used   Vaping Use    Vaping Use: Never used   Substance and Sexual Activity    Alcohol use: No    Drug use: No    Sexual activity: Never   Other Topics Concern    Not on file   Social History Narrative    Not on file     Social Determinants of Health     Financial Resource Strain:     Difficulty of Paying Living Expenses: Not on file   Food Insecurity:     Worried About [address] in the Last Year: Not on file    Haily of Food in the Last Year: Not on file   Transportation Needs:     Lack of Transportation (Medical): Not on file    Lack of Transportation (Non-Medical):  Not on file   Physical Activity:     Days of Exercise per Week: Not on file    Minutes of Exercise per Session: Not on file   Stress:     Feeling of Stress : Not on file   Social Connections:     Frequency of Communication with Friends and Family: Not on file    Frequency of Social Gatherings with Friends and Family: Not on file    Attends Yazidi Services: Not on file    Active Member of Clubs or Organizations: Not on file    Attends Club or Organization Meetings: Not on file    Marital Status: Not on file   Intimate Partner Violence:     Fear of Current or Ex-Partner: Not on file    Emotionally Abused: Not on file    Physically Abused: Not on file    Sexually Abused: Not on file   Housing Stability:     Unable to Pay for Housing in the Last Year: Not on file    Number of Places Lived in the Last Year: Not on file    Unstable Housing in the Last Year: Not on file        SURGICAL HISTORY  Past Surgical History:   Procedure Laterality Date    APPENDECTOMY      COLOSTOMY         CURRENT MEDICATIONS  Current Outpatient Medications   Medication Sig Dispense Refill    dilTIAZem (DILT-XR) 240 MG extended release capsule Take 1 capsule by mouth once daily 90 capsule 1    folic acid (FOLVITE) 1 MG tablet Take 1 tablet by mouth daily 90 tablet 1    amoxicillin (AMOXIL) 875 MG tablet Take 1 tablet by mouth 2 times daily for 10 days 20 tablet 0    hydroxyurea (HYDREA) 500 MG chemo capsule Take 2 capsules by mouth once daily 180 capsule 1    apixaban (ELIQUIS) 2.5 MG TABS tablet Take 1 tablet by mouth 2 times daily 42 tablet 0    aspirin 81 MG chewable tablet Take 81 mg by mouth daily      apixaban (ELIQUIS) 2.5 MG TABS tablet Take 1 tablet by mouth 2 times daily 60 tablet 5    adalimumab (HUMIRA) 40 MG/0.8ML injection Inject 40 mg into the skin once      Multiple Vitamins-Minerals (THERAPEUTIC MULTIVITAMIN-MINERALS) tablet Take 1 tablet by mouth daily      VITAMIN E PO Take 1 tablet by mouth daily       No current facility-administered medications for this visit. ALLERGIES  Allergies   Allergen Reactions    Sulfa Antibiotics        PHYSICAL EXAM    /74   Pulse 82   Ht 5' 8\" (1.727 m)   Wt 176 lb (79.8 kg)   SpO2 96%   BMI 26.76 kg/m²     Physical Exam  Vitals and nursing note reviewed. Constitutional:       Appearance: Normal appearance. Cardiovascular:      Rate and Rhythm: Normal rate. Pulmonary:      Effort: Pulmonary effort is normal.     Reviewed his most recent labs  Reviewed cardiology notes  Reviewed immunizations    ASSESSMENT and PLAN    Tucker Black was seen today for 6 month follow-up and fatigue.     Diagnoses and all orders for this visit:    H/O: CVA (cerebrovascular accident)    Paroxysmal atrial fibrillation ([facility]    Psoriatic arthritis ([facility]    Primary malignant neoplasm of rectum (HCC)    Chronic myeloproliferative disease (HCC)    COVID-19    Viral URI    Other orders  -     dilTIAZem (DILT-XR) 240 MG extended release capsule; Take 1 capsule by mouth once daily  -     folic acid (FOLVITE) 1 MG tablet; Take 1 tablet by mouth daily  -     amoxicillin (AMOXIL) 875 MG tablet; Take 1 tablet by mouth 2 times daily for 10 days    Amoxicillin 875 twice daily for possible bacterial sinusitis  Continue other medicines the same  Follow-up here in 6 months    Return in about 6 months (around 7/31/2022). Electronically signed by Jocelyn Gurrola MD on 1/31/2022    Please note that this chart was generated using dragon dictation software. Although every effort was made to ensure the accuracy of this automated transcription, some errors in transcription may have occurred.

## 2022-02-27 PROBLEM — K92.2 GI BLEED: Status: ACTIVE | Noted: 2022-01-01

## 2022-02-27 NOTE — ED PROVIDER NOTES
EMERGENCY DEPARTMENT ENCOUNTER      PCP: Chelle Carranza MD    CHIEF COMPLAINT    Chief Complaint   Patient presents with    Fatigue    Rectal Bleeding     noticing dark tarry stools in colostomy        Of note, this patient was also evaluated by the attending physician, Dr. Narda Shields      HPI    Natacha Vargas is a 80 y.o. male who presents to the emergency department today with rectal bleeding, fatigue, dark-colored stools and associated generalized weakness. Patient states this has been progressive in nature, states that he been noticing dark color stool within his colostomy bag intermittently over the last week. He states that he was at [employer] today and could not stand and get around. He states that been having some dyspnea/weakness on exertion lately. Patient is on Eliquis. States he has history of GI bleeding. Also has history of A. fib. He denies any other chest pain shortness of breath peripheral edema. No cough congestion fevers. No abdominal pain. REVIEW OF SYSTEMS    Constitutional: Admits generalized weakness. denies fever, chills, weight loss or weakness   HENT:  Denies sore throat or ear pain   Cardiovascular:  Denies chest pain, palpitations   Respiratory:  Denies cough or shortness of breath    GI: See HPI. denies abdominal pain, nausea, vomiting, or diarrhea  :  Denies any urinary symptoms or vaginal symptoms.    Musculoskeletal:  Denies back pain  Skin:  Denies rash  Neurologic:  Denies headache, focal weakness or sensory changes   Endocrine:  Denies polyuria or polydypsia   Lymphatic:  Denies swollen glands     All other review of systems are negative  See HPI and nursing notes for additional information     PAST MEDICAL AND SURGICAL HISTORY    Past Medical History:   Diagnosis Date    [facility]) [facility]     Angina, class I ([address]     Benign prostatic hyperplasia 12/12/2011     Updating Deprecated Diagnoses    Bowel obstruction ([address]     CAD (coronary artery disease)     Cancer ([facility]     rectal    CCC (chronic calculous cholecystitis) 2018    Gastroesophageal reflux disease without esophagitis 2020    Hx of cardiovascular stress test 2021    EF 62% Normal study    Hypertension     Primary malignant neoplasm of rectum ([facility] 2019    Psoriasis     Psoriatic arthritis ([facility] 2019    Pyelonephritis 2018    Stable angina (HCC) 2019    Unspecified cerebral artery occlusion with cerebral infarction      Past Surgical History:   Procedure Laterality Date    APPENDECTOMY      COLOSTOMY         CURRENT MEDICATIONS    Current Outpatient Rx   Medication Sig Dispense Refill    dilTIAZem (DILT-XR) 240 MG extended release capsule Take 1 capsule by mouth once daily 90 capsule 1    folic acid (FOLVITE) 1 MG tablet Take 1 tablet by mouth daily 90 tablet 1    hydroxyurea (HYDREA) 500 MG chemo capsule Take 2 capsules by mouth once daily 180 capsule 1    apixaban (ELIQUIS) 2.5 MG TABS tablet Take 1 tablet by mouth 2 times daily 42 tablet 0    aspirin 81 MG chewable tablet Take 81 mg by mouth daily      apixaban (ELIQUIS) 2.5 MG TABS tablet Take 1 tablet by mouth 2 times daily 60 tablet 5    adalimumab (HUMIRA) 40 MG/0.8ML injection Inject 40 mg into the skin once      Multiple Vitamins-Minerals (THERAPEUTIC MULTIVITAMIN-MINERALS) tablet Take 1 tablet by mouth daily      VITAMIN E PO Take 1 tablet by mouth daily         ALLERGIES    Allergies   Allergen Reactions    Sulfa Antibiotics        SOCIAL AND FAMILY HISTORY    Social History     Socioeconomic History    Marital status:       Spouse name: None    Number of children: None    Years of education: None    Highest education level: None   Occupational History    None   Tobacco Use    Smoking status: Former Smoker     Packs/day: 1.50     Years: 40.00     Pack years: 60.00     Types: Cigarettes     Quit date:      Years since quittin.1    Smokeless tobacco: Never Used   Vaping Use    Vaping Use: Never used   Substance and Sexual Activity    Alcohol use: No    Drug use: No    Sexual activity: Never   Other Topics Concern    None   Social History Narrative    None     Social Determinants of Health     Financial Resource Strain:     Difficulty of Paying Living Expenses: Not on file   Food Insecurity:     Worried About Running Out of Food in the Last Year: Not on file    Haily of Food in the Last Year: Not on file   Transportation Needs:     Lack of Transportation (Medical): Not on file    Lack of Transportation (Non-Medical): Not on file   Physical Activity:     Days of Exercise per Week: Not on file    Minutes of Exercise per Session: Not on file   Stress:     Feeling of Stress : Not on file   Social Connections:     Frequency of Communication with Friends and Family: Not on file    Frequency of Social Gatherings with Friends and Family: Not on file    Attends Hindu Services: Not on file    Active Member of [address] [place] [address] or Organizations: Not on file    Attends Club or Organization Meetings: Not on file    Marital Status: Not on file   Intimate Partner Violence:     Fear of Current or Ex-Partner: Not on file    Emotionally Abused: Not on file    Physically Abused: Not on file    Sexually Abused: Not on file   Housing Stability:     Unable to Pay for Housing in the Last Year: Not on file    Number of [place] in the Last Year: Not on file    Unstable Housing in the Last Year: Not on file     Family History   Problem Relation Age of Onset    Cancer Mother     Cancer Father          PHYSICAL EXAM    VITAL SIGNS: /82   Pulse 105   Temp 97.9 °F (36.6 °C) (Oral)   Resp 18   SpO2 97%    Constitutional:  Well developed, Well nourished. No distress  HENT:  Normocephalic, Atraumatic, PERRL. EOMI. Sclera clear. Conjunctiva normal, No discharge.   Neck/Lymphatics: supple, no JVD, no swollen nodes  Cardiovascular:   RRR,  no murmurs/rubs/gallops. No JVD  No carotid bruits or murmurs heard in carotids. Respiratory:  Nonlabored breathing. Normal breath sounds, No wheezing  Abdomen: Bowel sounds normal, Soft, No tenderness, no masses. There is evidence of some dark-colored stool in patient's colostomy bag. No obvious maroon or red blood  Musculoskeletal:    There is no edema, asymmetry, or calf / thigh tenderness bilaterally. No cyanosis. No cool or pale-appearing limb. Distal cap refill and pulses intact bilateral upper and lower extremities  Bilateral upper and lower extremity ROM intact without pain or obvious deficit  Integument:  Warm, Dry  Neurologic: Alert & oriented , No focal deficits noted. Cranial nerves II through XII grossly intact. Normal gross motor coordination & motor strength bilateral upper and lower extremities  Sensation intact.   Psychiatric:  Affect normal, Mood normal.       Labs:  Results for orders placed or performed during the hospital encounter of 02/27/22   CBC with Auto Differential   Result Value Ref Range    WBC 4.5 4.0 - 10.5 K/CU MM    RBC 2.39 (L) 4.6 - 6.2 M/CU MM    Hemoglobin 8.9 (L) 13.5 - 18.0 GM/DL    Hematocrit 28.9 (L) 42 - 52 %    .9 (H) 78 - 100 FL    MCH 37.2 (H) 27 - 31 PG    MCHC 30.8 (L) 32.0 - 36.0 %    RDW 13.1 11.7 - 14.9 %    Platelets 293 005 - 260 K/CU MM    MPV 9.3 7.5 - 11.1 FL    Differential Type AUTOMATED DIFFERENTIAL     Segs Relative 76.1 (H) 36 - 66 %    Lymphocytes % 13.4 (L) 24 - 44 %    Monocytes % 8.7 (H) 0 - 4 %    Eosinophils % 0.9 0 - 3 %    Basophils % 0.7 0 - 1 %    Segs Absolute 3.4 K/CU MM    Lymphocytes Absolute 0.6 K/CU MM    Monocytes Absolute 0.4 K/CU MM    Eosinophils Absolute 0.0 K/CU MM    Basophils Absolute 0.0 K/CU MM    Nucleated RBC % 0.0 %    Total Nucleated RBC 0.0 K/CU MM    Total Immature Neutrophil 0.01 K/CU MM    Immature Neutrophil % 0.2 0 - 0.43 %   Comprehensive Metabolic Panel   Result Value Ref Range    Sodium 129 (L) 135 - 145 MMOL/L    Potassium 4.2 3.5 - 5.1 MMOL/L    Chloride 97 (L) 99 - 110 mMol/L    CO2 23 21 - 32 MMOL/L    BUN 24 (H) 6 - 23 MG/DL    CREATININE 1.4 (H) 0.9 - 1.3 MG/DL    Glucose 115 (H) 70 - 99 MG/DL    Calcium 8.7 8.3 - 10.6 MG/DL    Albumin 3.5 3.4 - 5.0 GM/DL    Total Protein 5.9 (L) 6.4 - 8.2 GM/DL    Total Bilirubin 0.3 0.0 - 1.0 MG/DL    ALT 10 10 - 40 U/L    AST 15 15 - 37 IU/L    Alkaline Phosphatase 66 40 - 129 IU/L    GFR Non- 47 (L) >60 mL/min/1.73m2    GFR  57 (L) >60 mL/min/1.73m2    Anion Gap 9 4 - 16   Lipase   Result Value Ref Range    Lipase 27 13 - 60 IU/L   Protime-INR   Result Value Ref Range    Protime 14.1 11.7 - 14.5 SECONDS    INR 1.09 INDEX   PTT   Result Value Ref Range    aPTT 30.9 25.1 - 37.1 SECONDS   Urinalysis   Result Value Ref Range    Color, UA YELLOW YELLOW    Clarity, UA CLOUDY (A) CLEAR    Glucose, Urine NEGATIVE NEGATIVE MG/DL    Bilirubin Urine NEGATIVE NEGATIVE MG/DL    Ketones, Urine TRACE (A) NEGATIVE MG/DL    Specific Gravity, UA >1.030 1.001 - 1.035    Blood, Urine MODERATE (A) NEGATIVE    pH, Urine 5.5 5.0 - 8.0    Protein, UA 30 (A) NEGATIVE MG/DL    Urobilinogen, Urine NORMAL 0.2 - 1.0 MG/DL    Nitrite Urine, Quantitative POSITIVE (A) NEGATIVE    Leukocyte Esterase, Urine MODERATE (A) NEGATIVE    RBC, UA 38 (H) 0 - 3 /HPF    WBC,  (H) 0 - 2 /HPF    Bacteria, UA OCCASIONAL (A) NEGATIVE /HPF    WBC Clumps, UA MODERATE /HPF    Mucus, UA FEW (A) NEGATIVE HPF   Troponin   Result Value Ref Range    Troponin T <0.010 <0.01 NG/ML   Brain Natriuretic Peptide   Result Value Ref Range    Pro-.9 <300 PG/ML   EKG 12 Lead   Result Value Ref Range    Ventricular Rate 86 BPM    Atrial Rate 76 BPM    QRS Duration 82 ms    Q-T Interval 372 ms    QTc Calculation (Bazett) 445 ms    R Axis -16 degrees    T Axis 11 degrees    Diagnosis       Atrial fibrillation  Abnormal ECG  When compared with ECG of 23-MAR-2018 11:04,  Atrial fibrillation has replaced Sinus rhythm  Criteria for Septal infarct are no longer present     TYPE AND SCREEN   Result Value Ref Range    ABO/Rh A POSITIVE     Antibody Screen NEGATIVE      EKG    See supervising physician note for EKG interpretation. RADIOLOGY    XR CHEST PORTABLE    Result Date: 2/27/2022  EXAMINATION: ONE XRAY VIEW OF THE CHEST 2/27/2022 5:36 pm COMPARISON: 03/23/2018 HISTORY: ORDERING SYSTEM PROVIDED HISTORY: weakness TECHNOLOGIST PROVIDED HISTORY: Reason for exam:->weakness Reason for Exam: weakness Additional signs and symptoms: na Relevant Medical/Surgical History: cad, rectal cancer FINDINGS: The cardiomediastinal silhouette is unchanged in appearance. Mild cardiac silhouette enlargement. Generalized interstitial prominence again demonstrated. There is no consolidation, pneumothorax, or evidence of edema. No effusion is appreciated. The osseous structures are unchanged in appearance. Unchanged appearance of the chest without acute airspace disease identified. ED COURSE & MEDICAL DECISION MAKING       Patient presents as above. Emergent etiologies considered. Patient seen and examined. Patient having generalized weakness, contributing it to having dark-colored stools in his colostomy and being on blood thinning medication. He denies any chest pain shortness of breath  In the point he has significant dyspnea on exertion with unable to  [employer] today. Hemoglobin is downtrending. EKG demonstrating no significant abnormalities, chest x-ray negative. Did have a nitrite positive urine, will send a culture and treat empirically with antibiotics. Secondary to his downtrending hemoglobin and being on Eliquis and being symptomatic, did touch base with gastroenterology, family is requesting Dr. Danny Ross service. Will give Protonix, advised to hold Eliquis. Will admit to the hospitalist team for further monitoring.     Vital signs and nursing notes reviewed during ED course. Clinical  IMPRESSION    1. Gastrointestinal hemorrhage, unspecified gastrointestinal hemorrhage type    2. General weakness    3. Urinary tract infection with hematuria, site unspecified      Comment: Please note this report has been produced using speech recognition software and may contain errors related to that system including errors in grammar, punctuation, and spelling, as well as words and phrases that may be inappropriate. If there are any questions or concerns please feel free to contact the dictating provider for clarification.           Danny Ayoub [address], PA  02/27/22 2022

## 2022-02-27 NOTE — ED PROVIDER NOTES
Please refer to the documentation completed by FESTUS Sharp PA-C for full details of the encounter. I have personally performed a face-to-face evaluation and have fully participated in the care of this patient. I have discussed this case with the Advance Practice Practitioner. I have reviewed and agreed with all pertinent clinical information including history, physical exam, and plan. I have also reviewed and agreed with the medications, allergies, and past medical history for this patient. My pertinent findings are as follows. Patient presents to the emergency department with concerns for rectal bleeding into his colostomy. He has noted very dark stools as well. He has had generalized weakness and fatigue. He has noted this over about the past week. The weakness today was generalized, causing significant difficulty standing. There was no other focal weakness, numbness, or tingling. He is on Eliquis for history of atrial fibrillation. He denies other chest pain or discomfort. There have been no recent fevers. Physician evaluation:  Vital signs are documented and reviewed. Patient is resting comfortably on the exam table. GCS 15.  Appropriately alert and oriented. Speaks in full and complete sentences with a clear voice. Respiratory pattern is unlabored without wheeze or stridor. Colostomy noted. No gross blood noted. Very mild generalized tenderness without McBurney's or Lea's point tenderness. No rebound or rigidity. Results:  Results for orders placed or performed during the hospital encounter of 02/27/22   Culture, Urine    Specimen: Urine, clean catch   Result Value Ref Range    Specimen URINE CLEAN CATCH     Special Requests NONE     Culture       Final Report >50,000 CFU/ml Mixed pathogens Multiple organisms isolated, no predominance. Culture indicates probable contamination. Please review colony count and clinical indications to determine if a repeat culture is necessary. YELLOW YELLOW    Clarity, UA CLOUDY (A) CLEAR    Glucose, Urine NEGATIVE NEGATIVE MG/DL    Bilirubin Urine NEGATIVE NEGATIVE MG/DL    Ketones, Urine TRACE (A) NEGATIVE MG/DL    Specific Gravity, UA >1.030 1.001 - 1.035    Blood, Urine MODERATE (A) NEGATIVE    pH, Urine 5.5 5.0 - 8.0    Protein, UA 30 (A) NEGATIVE MG/DL    Urobilinogen, Urine NORMAL 0.2 - 1.0 MG/DL    Nitrite Urine, Quantitative POSITIVE (A) NEGATIVE    Leukocyte Esterase, Urine MODERATE (A) NEGATIVE    RBC, UA 38 (H) 0 - 3 /HPF    WBC,  (H) 0 - 2 /HPF    Bacteria, UA OCCASIONAL (A) NEGATIVE /HPF    WBC Clumps, UA MODERATE /HPF    Mucus, UA FEW (A) NEGATIVE HPF   Troponin   Result Value Ref Range    Troponin T <0.010 <0.01 NG/ML   Brain Natriuretic Peptide   Result Value Ref Range    Pro-.9 <300 PG/ML   Basic Metabolic Panel w/ Reflex to MG   Result Value Ref Range    Sodium 136 135 - 145 MMOL/L    Potassium 4.2 3.5 - 5.1 MMOL/L    Chloride 101 99 - 110 mMol/L    CO2 20 (L) 21 - 32 MMOL/L    Anion Gap 15 4 - 16    BUN 20 6 - 23 MG/DL    CREATININE 1.1 0.9 - 1.3 MG/DL    Glucose 87 70 - 99 MG/DL    Calcium 8.7 8.3 - 10.6 MG/DL    GFR Non-African American >60 >60 mL/min/1.73m2    GFR African American >60 >60 mL/min/1.73m2   CBC with Auto Differential   Result Value Ref Range    WBC 6.5 4.0 - 10.5 K/CU MM    RBC 2.56 (L) 4.6 - 6.2 M/CU MM    Hemoglobin 9.7 (L) 13.5 - 18.0 GM/DL    Hematocrit 30.5 (L) 42 - 52 %    .1 (H) 78 - 100 FL    MCH 37.9 (H) 27 - 31 PG    MCHC 31.8 (L) 32.0 - 36.0 %    RDW 13.0 11.7 - 14.9 %    Platelets 335 613 - 802 K/CU MM    MPV 9.7 7.5 - 11.1 FL    Differential Type AUTOMATED DIFFERENTIAL     Segs Relative 80.9 (H) 36 - 66 %    Lymphocytes % 10.0 (L) 24 - 44 %    Monocytes % 7.7 (H) 0 - 4 %    Eosinophils % 0.6 0 - 3 %    Basophils % 0.3 0 - 1 %    Segs Absolute 5.2 K/CU MM    Lymphocytes Absolute 0.7 K/CU MM    Monocytes Absolute 0.5 K/CU MM    Eosinophils Absolute 0.0 K/CU MM    Basophils Absolute 0.0 K/CU MM    Nucleated RBC % 0.0 %    Total Nucleated RBC 0.0 K/CU MM    Total Immature Neutrophil 0.03 K/CU MM    Immature Neutrophil % 0.5 (H) 0 - 0.43 %   Basic Metabolic Panel   Result Value Ref Range    Sodium 135 135 - 145 MMOL/L    Potassium 4.1 3.5 - 5.1 MMOL/L    Chloride 102 99 - 110 mMol/L    CO2 24 21 - 32 MMOL/L    Anion Gap 9 4 - 16    BUN 15 6 - 23 MG/DL    CREATININE 1.1 0.9 - 1.3 MG/DL    Glucose 97 70 - 99 MG/DL    Calcium 8.2 (L) 8.3 - 10.6 MG/DL    GFR Non-African American >60 >60 mL/min/1.73m2    GFR African American >60 >60 mL/min/1.73m2   CBC with Auto Differential   Result Value Ref Range    WBC 4.2 4.0 - 10.5 K/CU MM    RBC 2.42 (L) 4.6 - 6.2 M/CU MM    Hemoglobin 8.9 (L) 13.5 - 18.0 GM/DL    Hematocrit 28.8 (L) 42 - 52 %    .0 (H) 78 - 100 FL    MCH 36.8 (H) 27 - 31 PG    MCHC 30.9 (L) 32.0 - 36.0 %    RDW 12.8 11.7 - 14.9 %    Platelets 124 973 - 278 K/CU MM    MPV 9.6 7.5 - 11.1 FL    Differential Type AUTOMATED DIFFERENTIAL     Segs Relative 73.3 (H) 36 - 66 %    Lymphocytes % 14.4 (L) 24 - 44 %    Monocytes % 9.9 (H) 0 - 4 %    Eosinophils % 1.2 0 - 3 %    Basophils % 0.5 0 - 1 %    Segs Absolute 3.1 K/CU MM    Lymphocytes Absolute 0.6 K/CU MM    Monocytes Absolute 0.4 K/CU MM    Eosinophils Absolute 0.1 K/CU MM    Basophils Absolute 0.0 K/CU MM    Nucleated RBC % 0.0 %    Total Nucleated RBC 0.0 K/CU MM    Total Immature Neutrophil 0.03 K/CU MM    Immature Neutrophil % 0.7 (H) 0 - 0.43 %   Iron and TIBC   Result Value Ref Range    Iron 35 (L) 59 - 158 ug/dL    UIBC 245 110 - 370 ug/dL    TIBC 280 250 - 450 ug/dL    Transferrin % 13 10 - 44 %   Ferritin   Result Value Ref Range    Ferritin 210 30 - 400 NG/ML   Vitamin B12 & Folate   Result Value Ref Range    Vitamin B-12 517.7 211 - 911 pg/ml    Folate >20.0 (H) 3.1 - 17.5 NG/ML   Basic Metabolic Panel   Result Value Ref Range    Sodium 138 135 - 145 MMOL/L    Potassium 4.0 3.5 - 5.1 MMOL/L    Chloride 102 99 - 110 mMol/L    CO2 24 21 - 32 MMOL/L    Anion Gap 12 4 - 16    BUN 15 6 - 23 MG/DL    CREATININE 1.1 0.9 - 1.3 MG/DL    Glucose 100 (H) 70 - 99 MG/DL    Calcium 8.5 8.3 - 10.6 MG/DL    GFR Non-African American >60 >60 mL/min/1.73m2    GFR African American >60 >60 mL/min/1.73m2   CBC with Auto Differential   Result Value Ref Range    WBC 3.8 (L) 4.0 - 10.5 K/CU MM    RBC 2.21 (L) 4.6 - 6.2 M/CU MM    Hemoglobin 8.4 (L) 13.5 - 18.0 GM/DL    Hematocrit 26.1 (L) 42 - 52 %    .1 (H) 78 - 100 FL    MCH 38.0 (H) 27 - 31 PG    MCHC 32.2 32.0 - 36.0 %    RDW 12.9 11.7 - 14.9 %    Platelets 007 057 - 222 K/CU MM    MPV 9.5 7.5 - 11.1 FL    Differential Type AUTOMATED DIFFERENTIAL     Segs Relative 65.4 36 - 66 %    Lymphocytes % 18.4 (L) 24 - 44 %    Monocytes % 13.6 (H) 0 - 4 %    Eosinophils % 1.8 0 - 3 %    Basophils % 0.3 0 - 1 %    Segs Absolute 2.5 K/CU MM    Lymphocytes Absolute 0.7 K/CU MM    Monocytes Absolute 0.5 K/CU MM    Eosinophils Absolute 0.1 K/CU MM    Basophils Absolute 0.0 K/CU MM    Nucleated RBC % 0.0 %    Total Nucleated RBC 0.0 K/CU MM    Total Immature Neutrophil 0.02 K/CU MM    Immature Neutrophil % 0.5 (H) 0 - 0.43 %   EKG 12 Lead   Result Value Ref Range    Ventricular Rate 86 BPM    Atrial Rate 76 BPM    QRS Duration 82 ms    Q-T Interval 372 ms    QTc Calculation (Bazett) 445 ms    R Axis -16 degrees    T Axis 11 degrees    Diagnosis       Atrial fibrillation  Abnormal ECG  When compared with ECG of 23-MAR-2018 11:04,  Atrial fibrillation has replaced Sinus rhythm  Criteria for Septal infarct are no longer present  Confirmed by GENIE Ordaz ([id number]) on 2/28/2022 8:28:38 PM     TYPE AND SCREEN   Result Value Ref Range    ABO/Rh A POSITIVE     Antibody Screen NEGATIVE      Radiology:        XR CHEST PORTABLE (Final result)  Result time 02/27/22 18:10:16  Final result by Jamie Robertson MD (02/27/22 18:10:16)                Impression:    Unchanged appearance of the chest without acute airspace disease identified. Narrative:    EXAMINATION:   ONE XRAY VIEW OF THE CHEST     2/27/2022 5:36 pm     COMPARISON:   03/23/2018     HISTORY:   ORDERING SYSTEM PROVIDED HISTORY: weakness   TECHNOLOGIST PROVIDED HISTORY:   Reason for exam:->weakness   Reason for Exam: weakness   Additional signs and symptoms: na   Relevant Medical/Surgical History: cad, rectal cancer     FINDINGS:   The cardiomediastinal silhouette is unchanged in appearance.  Mild cardiac   silhouette enlargement.  Generalized interstitial prominence again   demonstrated.  There is no consolidation, pneumothorax, or evidence of edema. No effusion is appreciated. The osseous structures are unchanged in   appearance. EKG:  Twelve-lead EKG obtained at 1730 on 2/27/2022  and interpreted by me in the absence of a cardiologist.  There is no criteria ST elevation or reciprocal change. There are no hyperacute T wave changes. There is no sign of acute ischemia or infarction. This tracing shows atrial fibrillation. Rate and intervals are 86 beats per minute, WV interval indeterminate milliseconds, QRS duration 82 milliseconds, QTc interval 445 milliseconds, and R axis normal at -16 degrees. Emergency department course:  Patient was initially seen by the Advanced Practice Practitioner. . Patient is assessed shortly after. Abdomen is soft and nontender. There has been no respiratory distress, hypoxia, or cyanosis. Exam does not suggest an acute surgical emergency. Based upon the apparent GI hemorrhage, patient will be admitted to the hospital for further evaluation. Patient does have incidental finding of probable urinary tract infection, though this may represent contamination due to catheterization. Care has been discussed with hospital service. Clinical Impression:  1. Gastrointestinal hemorrhage, unspecified gastrointestinal hemorrhage type    2. General weakness    3.  Urinary tract infection with hematuria, site unspecified Disposition referral (if applicable):  No follow-up provider specified.   Disposition medications (if applicable):  Current Discharge Medication List        ED Provider Disposition Time  DISPOSITION Admitted 02/27/2022 07:13:44 PM        Yudelka Neves MD  03/02/22 [id number]

## 2022-02-27 NOTE — ED NOTES
José Miguel Liu covering Dr Soco Mcgowan  02/27/22 1062 2025 Dr Sanchez Liu returned call      Juancarlos Massey  02/27/22 8162

## 2022-02-27 NOTE — ED NOTES
2223 paged hospitalist     Mic Millan  02/27/22 25 [facility] hospitalist returned call      Mic Millan  02/27/22 1917

## 2022-02-28 NOTE — ANESTHESIA PRE PROCEDURE
Department of Anesthesiology  Preprocedure Note       Name:  Emily Lopez   Age:  80 y.o.  :  1929                                          MRN:  [id number]         Date:  2022      Surgeon: Ok Floor):  Dimas Hutson MD    Procedure: Procedure(s):  EGD ESOPHAGOGASTRODUODENOSCOPY    Medications prior to admission:   Prior to Admission medications    Medication Sig Start Date End Date Taking? Authorizing Provider   dilTIAZem (DILT-XR) 240 MG extended release capsule Take 1 capsule by mouth once daily 22   Ezequiel uLcero MD   folic acid (FOLVITE) 1 MG tablet Take 1 tablet by mouth daily 22   Ezequiel Lucero MD   hydroxyurea (HYDREA) 500 MG chemo capsule Take 2 capsules by mouth once daily 21   Gilbert Davis MD   apixaban (ELIQUIS) 2.5 MG TABS tablet Take 1 tablet by mouth 2 times daily 10/21/21   Mariah Pickett APRN - CNP   aspirin 81 MG chewable tablet Take 81 mg by mouth daily    Historical Provider, MD   apixaban (ELIQUIS) 2.5 MG TABS tablet Take 1 tablet by mouth 2 times daily 21   Ezequiel Lucero MD   adalimumab (HUMIRA) 40 MG/0.8ML injection Inject 40 mg into the skin once    Historical Provider, MD   Multiple Vitamins-Minerals (THERAPEUTIC MULTIVITAMIN-MINERALS) tablet Take 1 tablet by mouth daily    Historical Provider, MD   VITAMIN E PO Take 1 tablet by mouth daily    Historical Provider, MD       Current medications:    No current facility-administered medications for this visit. No current outpatient medications on file.      Facility-Administered Medications Ordered in Other Visits   Medication Dose Route Frequency Provider Last Rate Last Admin    sodium chloride flush 0.9 % injection 5-40 mL  5-40 mL IntraVENous 2 times per day Sheryle Shone, MD   10 mL at 22 0015    sodium chloride flush 0.9 % injection 5-40 mL  5-40 mL IntraVENous PRN Sheryle Shone, MD        0.9 % sodium chloride infusion  25 mL IntraVENous PRN Olegario Polanco MD        ondansetron (ZOFRAN-ODT) disintegrating tablet 4 mg  4 mg Oral Q8H PRN Ivonne Joyce MD        Or    [facility]dansetron [facility]) injection 4 mg  4 mg IntraVENous Q6H PRN Ivonne Joyce MD        acetaminophen (TYLENOL) tablet 650 mg  650 mg Oral Q6H PRN Ivonne Joyce MD        Or    acetaminophen (TYLENOL) suppository 650 mg  650 mg Rectal Q6H PRN Ivonne Joyce MD        pantoprazole (PROTONIX) injection 40 mg  40 mg IntraVENous Q12H Ivonne Joyce MD   40 mg at 02/28/22 0650    And    sodium chloride (PF) 0.9 % injection 10 mL  10 mL IntraVENous Q12H Ivonne Joyce MD   10 mL at 02/28/22 0651    cefTRIAXone (ROCEPHIN) 1000 mg IVPB in 50 mL D5W minibag  1,000 mg IntraVENous Q24H Ivonne Joyce MD        dilTIAZem (CARDIZEM CD) extended release capsule 240 mg  240 mg Oral Daily Ivonne Joyce MD        folic acid (FOLVITE) tablet 1 mg  1 mg Oral Daily Ivonne Joyce MD        hydroxyurea (HYDREA) chemo capsule 1,000 mg  1,000 mg Oral Daily Ivonne Joyce MD           Allergies:     Allergies   Allergen Reactions    Sulfa Antibiotics        Problem List:    Patient Active Problem List   Diagnosis Code    Hypertension I10    Benign prostatic hyperplasia N40.0    Psoriatic arthritis ([facility] L40.50    Primary malignant neoplasm of rectum ([address] C20    Psoriasis L40.9    Chronic myeloproliferative disease ([address] D47.1    Monocytosis (symptomatic) G14.960    Gastroesophageal reflux disease without esophagitis K21.9    Urinary retention R33.9    H/O: CVA (cerebrovascular accident) Z80.78    Irregular heart beat I49.9    Nonrheumatic aortic valve stenosis I35.0    Paroxysmal atrial fibrillation (HCC) I48.0    GI bleed K92.2    Melena K92.1       Past Medical History:        Diagnosis Date    AAA (abdominal aortic aneurysm) (HCC)     Angina, class I ([address]     Benign prostatic hyperplasia 12/12/2011     Updating Deprecated Diagnoses    Bowel obstruction (HCC)     CAD (coronary artery disease)     Cancer (HCC)     rectal    CCC (chronic calculous cholecystitis) 2018    Gastroesophageal reflux disease without esophagitis 2020    Hx of cardiovascular stress test 2021    EF 62% Normal study    Hypertension     Primary malignant neoplasm of rectum ([facility] 2019    Psoriasis     Psoriatic arthritis ([facility] 2019    Pyelonephritis 2018    Stable angina ([facility] 2019    Unspecified cerebral artery occlusion with cerebral infarction        Past Surgical History:        Procedure Laterality Date    APPENDECTOMY      COLOSTOMY         Social History:    Social History     Tobacco Use    Smoking status: Former Smoker     Packs/day: 1.50     Years: 40.00     Pack years: 60.00     Types: Cigarettes     Quit date:      Years since quittin.1    Smokeless tobacco: Never Used   Substance Use Topics    Alcohol use: No                                Counseling given: Not Answered      Vital Signs (Current): There were no vitals filed for this visit.                                            BP Readings from Last 3 Encounters:   22 134/87   22 120/74   22 134/73       NPO Status:                                                                                 BMI:   Wt Readings from Last 3 Encounters:   22 172 lb (78 kg)   22 176 lb (79.8 kg)   22 182 lb 15.7 oz (83 kg)     There is no height or weight on file to calculate BMI.    CBC:   Lab Results   Component Value Date    WBC 4.5 2022    RBC 2.39 2022    RBC 5.19 2017    HGB 8.9 2022    HCT 28.9 2022    .9 2022    RDW 13.1 2022     2022       CMP:   Lab Results   Component Value Date     2022    K 4.2 2022    CL 97 2022    CO2 23 2022    BUN 24 2022    CREATININE 1.4 2022    GFRAA 57 2022    LABGLOM 47 2022 GLUCOSE 115 02/27/2022    PROT 5.9 02/27/2022    PROT 6.2 12/11/2011    CALCIUM 8.7 02/27/2022    BILITOT 0.3 02/27/2022    ALKPHOS 66 02/27/2022    AST 15 02/27/2022    ALT 10 02/27/2022       POC Tests: No results for input(s): POCGLU, POCNA, POCK, POCCL, POCBUN, POCHEMO, POCHCT in the last 72 hours. Coags:   Lab Results   Component Value Date    PROTIME 14.1 02/27/2022    PROTIME 10.4 12/11/2011    INR 1.09 02/27/2022    APTT 30.9 02/27/2022       HCG (If Applicable): No results found for: PREGTESTUR, PREGSERUM, HCG, HCGQUANT     ABGs: No results found for: PHART, PO2ART, FBS8KBZ, TZI8OHK, BEART, Y8RKUBTA     Type & Screen (If Applicable):  No results found for: LABABO, LABRH    Drug/Infectious Status (If Applicable):  No results found for: HIV, HEPCAB    COVID-19 Screening (If Applicable):   Lab Results   Component Value Date    COVID19 NOT DETECTED 02/28/2022           Anesthesia Evaluation  Patient summary reviewed  Airway: Mallampati: II        Dental:    (+) edentulous      Pulmonary:                              Cardiovascular:    (+) hypertension:, valvular problems/murmurs: AS, angina:, CAD:, dysrhythmias: atrial fibrillation,       ECG reviewed      Echocardiogram reviewed  Stress test reviewed             ROS comment:  Summary   Left ventricular function and size is normal, EF is estimated at 55-60%. Normal diastolic filling pattern for age. No regional wall motion abnormalities were detected. Bi atrial enlargement noted. Mildly sclerotic aortic valve with mild aortic stenosis mean gradient is   13mmHg, NADEEM 1.6 cm2. Mild mitral , tricuspid and moderate aortic regurgitation is present. RVSP is 29 mmHg. No evidence of pericardial effusion.       Signature      ------------------------------------------------------------------   Electronically signed by Arnol Ortega MD (Interpreting   physician) on 08/24/2021 at 06:26 PM   Summary   Normal study.   Normal perfusion study with normal distribution in all coronal, short, and   horizontal axis.   The observed defect is consistent with diaphragmatic attenuation.   Normal LV function. LVEF is 62 %.   Supervising physician Dr. Soco Hanson .      Recommendation   Recommendation: Routine follow-up.      Signatures      ------------------------------------------------------------------   Electronically signed by Williams Warren MD (Interpreting   cardiologist) on 08/12/2021 at 15:28   ------------------------------------------------------------------           Neuro/Psych:   (+) CVA:,             GI/Hepatic/Renal:   (+) GERD:,           Endo/Other:    (+) blood dyscrasia: anticoagulation therapy, arthritis:., malignancy/cancer. Abdominal:             Vascular:   + PVD, aortic or cerebral, . ROS comment: aaa. Other Findings:             Anesthesia Plan      MAC     ASA 3       Induction: intravenous. Anesthetic plan and risks discussed with patient. Use of blood products discussed with patient whom. Plan discussed with surgical team and attending.                   Michael Matos, APRN - CRNA   2/28/2022

## 2022-02-28 NOTE — ANESTHESIA POSTPROCEDURE EVALUATION
Department of Anesthesiology  Postprocedure Note    Patient: José Miguel Mittal  MRN: [id number]  YOB: 1929  Date of evaluation: 2/28/2022  Time:  9:52 AM     Procedure Summary     Date: 02/28/22 Room / Location: [address] [place] [address]    Anesthesia Start: 0912 Anesthesia Stop: 9390    Procedure: ENTEROSCOPY PUSH CONTROL HEMORRHAGE WITH APC ABLATION OF AVM (N/A ) Diagnosis: (GI BLEEDING)    Surgeons: Charles Clifton MD Responsible Provider: CHEMA Hu CRNA    Anesthesia Type: MAC ASA Status: 3          Anesthesia Type: MAC    Nesha Phase I:      Nesha Phase II:      Last vitals: Reviewed and per EMR flowsheets.        Anesthesia Post Evaluation    Patient location during evaluation: bedside  Patient participation: complete - patient participated  Level of consciousness: awake and alert  Pain score: 0  Airway patency: patent  Nausea & Vomiting: no vomiting and no nausea  Complications: no  Cardiovascular status: hemodynamically stable  Respiratory status: room air  Hydration status: stable

## 2022-02-28 NOTE — CARE COORDINATION
.CM met with pt for d/c planning. Introduced self and updated white board. Pt lives alone in a condo and is independent with ADL's. Pt drives, has a PCP, has insurance, and is able to afford his medication. Pt has  Walker, cane, and a life alert. [facility] offered and pt declined. Pt denies any d/c needs at this time. D/c plan is home alone, no needs. Notify CM if any d/c needs arise.   TE

## 2022-02-28 NOTE — H&P
Original H &P in soft chart. I have examined the patient immediately before the procedure and there is no change in the previous history and physical exam, which has been reviewed. There is no history of sleep apnea, snoring, or stridor. There has been no  previous adverse experience with sedation/anesthesia. There is no increased risk for aspiration of gastric contents. The patient has been instructed that all resuscitative measures (during the operative and immediate perioperative period) will be instituted in the unlikely event that they will be needed. The patient has no pertinent past surgical or family history other than listed in the original H&P. The patient was counseled about the risks of shahzad Covid-19 during their perioperative period and any recovery window from their procedure. The patient was made aware that shahzad Covid-19  may worsen their prognosis for recovering from their procedure  and lend to a higher morbidity and/or mortality risk. All material risks, benefits, and reasonable alternatives including postponing the procedure were discussed. The patient does wish to proceed with the procedure at this time.     ASA Class: 3  AIRWAY Class: 1

## 2022-02-28 NOTE — ED PROVIDER NOTES
12 lead EKG per my interpretation:  Atrial Fibrillation 86  Axis is   Left axis deviation  QTc is  within an acceptable range  There is no specific T wave changes appreciated. There is no specific ST wave changes appreciated.   No STEMI  Prior EKG to compare with was available and similar        Nabeel Interiano MD  02/27/22 1698

## 2022-02-28 NOTE — CONSULTS
Department of Internal Medicine  Gastroenterology Consult Note  Yimi Shaikh MD      Reason for Consult:  GI bleed    Primary Care Physician:  Citlalli Narayan MD    History Obtained From:  patient    CC: dark stool and weakness    HISTORY OF PRESENT ILLNESS:              The patient is a 80 y.o.  male with a history of rectal cancer S/P colostomy in 2019. I 2013 he had GI bleeding treated elsewhere- he had \"2 blood vessels that burst\" but not sure if colonic or UGI. He was admitted now with dark colostomy output intermittently for a couple of months but much worse the past few days--- and associated with weakness. He denies abd pain or vomiting. He husain rarely take Advil.he denies diarrhea or constipation. His past history includes atrial fib on anticoag, hypertension, AAA, chronic myeloproliferative disorder, SP catheter for urinary retention,psoriatic arthritis, prior CVA. 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    Gastroesophageal reflux disease without esophagitis 05/21/2020    Hx of cardiovascular stress test 08/12/2021    EF 62% Normal study    Hypertension     Primary malignant neoplasm of rectum ([address] 08/17/2019    Psoriasis     Psoriatic arthritis ([address] 08/17/2019    Pyelonephritis 03/24/2018    Stable angina ([address] 08/17/2019    Unspecified cerebral artery occlusion with cerebral infarction        Past Surgical History:        Procedure Laterality Date    APPENDECTOMY      COLOSTOMY         Medications Prior to Admission:    Prior to Admission medications    Medication Sig Start Date End Date Taking?  Authorizing Provider   dilTIAZem (DILT-XR) 240 MG extended release capsule Take 1 capsule by mouth once daily 1/31/22   Alicia Link MD   folic acid (FOLVITE) 1 MG tablet Take 1 tablet by mouth daily 1/31/22   Alicia Link MD   hydroxyurea (HYDREA) 500 MG chemo capsule Take 2 capsules by mouth once daily 11/4/21   Tereza Samano MD   apixaban (ELIQUIS) 2.5 MG TABS tablet Take 1 tablet by mouth 2 times daily 10/21/21   Noreen Love APRN - CNP   aspirin 81 MG chewable tablet Take 81 mg by mouth daily    Historical Provider, MD   apixaban (ELIQUIS) 2.5 MG TABS tablet Take 1 tablet by mouth 2 times daily 7/27/21   Alicia Link MD   adalimumab (HUMIRA) 40 MG/0.8ML injection Inject 40 mg into the skin once    Historical Provider, MD   Multiple Vitamins-Minerals (THERAPEUTIC MULTIVITAMIN-MINERALS) tablet Take 1 tablet by mouth daily    Historical Provider, MD   VITAMIN E PO Take 1 tablet by mouth daily    Historical Provider, MD       Allergies: Allergies   Allergen Reactions    Sulfa Antibiotics    . Social History:    TOBACCO:  No  ETOH:  No    Family History: reviewed and positives included in HPI- all other pertinent GI family history negative      REVIEW OF SYSTEMS: see HPI for positives and pertinent negatives. All other systems reviewed and are negative    PHYSICAL EXAM:    Vitals:  /65   Pulse 66   Temp 98 °F (36.7 °C) (Oral)   Resp 15   SpO2 98%   CONSTITUTIONAL: alert, cooperative, no apparent distress,   EYES:  pupils equal, round and reactive to light and sclera clear  ENT:  normocepalic, without obvious abnormality  NECK:  supple, symmetrical, trachea midline  HEMATOLOGIC/LYMPHATICS:  no cervical lymphadenopathy and no supraclavicular lymphadenopathy  LUNGS:  clear to auscultation  CARDIOVASCULAR:  regular rate and rhythm and no murmur noted  ABDOMEN:  Soft, non-tender with normal bowel sounds. No organomegaly or masses.  Colostomy in place  NEUROLOGIC: no focal deficit detected  SKIN:  no lesions  EXTREMITIES: no clubbing, cyanosis, or edema    IMPRESSION:  1) history of rectal cancer- no recent colonoscopy  2) melena      RECOMMENDATIONS:  1) EGD this am- increased risk due to atrial fib and other comorbidities  2) needs colonoscopy at some point          [facility].  Rey Lacy M.D

## 2022-02-28 NOTE — H&P
History and Physical      Name:  Ankit Lynch /Age/Sex: 1929  (80 y.o. male)   MRN & CSN:  [id number] & [id number] Encounter Date/Time: 2022 7:14 PM EST   Location:  [facility] PCP: Ml Chavez MD       Hospital Day: 1    Assessment and Plan:     #. GI bleed:  -Patient complains of melanotic stool in colostomy  -Hemoglobin 8.9, was 10.1-2022  -Patient is on aspirin, Eliquis-hold  -Check H&H every 6 hours. Transfuse as needed.  -N.p.o. after midnight  -IV Protonix twice daily  -Consult GI    #.  Generalized weakness/exertional dyspnea  -Could be secondary to above    #. Hypertension-Cardizem    #. History of CVA- as per PCPs documentation  -No focal deficits  -Patient is on aspirin, statin. #.  Paroxysmal atrial fibrillation  -On Cardizem, Eliquis    #. Infrarenal abdominal aortic aneurysm measuring 4.3 x 4 cm-2018  -Measuring 48 x 48 mm as per CT abdomen-2019    #. Psoriatic arthritis    #. History of rectal cancer status post colostomy    #. History of urinary retention s/p chronic suprapubic catheter    #. Chronic myeloproliferative disease    #. History of COVID-19 2022. Disposition:   Current Living situation: Home    Diet  n.p.o. after midnight   DVT Prophylaxis [] Lovenox, []  Heparin, [x] SCDs, [] Ambulation,  [] Eliquis, [] Xarelto   Code Status  FULL-discussed with patient   Surrogate Decision Maker/ POA      History from:   EMR, patient. History of Present Illness:     Chief Complaint: GI bleed  Ankit Lynch is a 80 y.o. male with hypertension, atrial fibrillation, on anticoagulation, AAA, psoriatic arthritis, history of rectal cancer status post colostomy, has chronic suprapubic catheter, chronic myeloproliferative disorder, history of COVID- presented to ED with complaints of generalized weakness. Patient reported that he was at [employer] and was unable to get up and move around. Felt extremely fatigued.   Patient reported similar symptoms for the last month, progressively getting worse. Patient also complained of shortness of breath with minimal activity. Patient reported noticing black-colored stool in his colostomy bag for the last 1 week. Denied any bright red blood. Patient denied any chest pain, shortness of breath, dizziness. Patient denied any nausea, vomiting, fever, chills, cough, denied any abdominal pain. Denied any urinary complaints. Has chronic bilateral lower extremity edema. At presentation patient was noted to have /82, , RR 18, temp 97.9, saturating 97% on room air. Lab work significant for sodium 129, chloride 97, BUN 24, creatinine 1.4, troponin<0.010, hemoglobin 8.9. INR 1.09. UA-moderate leukocyte esterase, nitrite positive, , bacteria occasional.  Pt received protonix IV 40mg, IV ceftriaxone. Review of Systems: Need 10 Elements   10 point review of systems conducted and pertinent positives and negatives as per HPI. Objective:   No intake or output data in the 24 hours ending 02/27/22 1914   Vitals:   Vitals:    02/27/22 1650   BP: 135/82   Pulse: 105   Resp: 18   Temp: 97.9 °F (36.6 °C)   TempSrc: Oral   SpO2: 97%       Medications Prior to Admission   Reviewed medications with patient    Prior to Admission medications    Medication Sig Start Date End Date Taking?  Authorizing Provider   dilTIAZem (DILT-XR) 240 MG extended release capsule Take 1 capsule by mouth once daily 1/31/22   Flavio Martin MD   folic acid (FOLVITE) 1 MG tablet Take 1 tablet by mouth daily 1/31/22   Flavio Martin MD   hydroxyurea (HYDREA) 500 MG chemo capsule Take 2 capsules by mouth once daily 11/4/21   Dianne Arita MD   apixaban (ELIQUIS) 2.5 MG TABS tablet Take 1 tablet by mouth 2 times daily 10/21/21   CHEMA Avila - CNP   aspirin 81 MG chewable tablet Take 81 mg by mouth daily    Historical Provider, MD   apixaban (ELIQUIS) 2.5 MG TABS tablet Take 1 tablet by mouth 2 times daily 7/27/21 Danny Higuera MD   adalimumab (HUMIRA) 40 MG/0.8ML injection Inject 40 mg into the skin once    Historical Provider, MD   Multiple Vitamins-Minerals (THERAPEUTIC MULTIVITAMIN-MINERALS) tablet Take 1 tablet by mouth daily    Historical Provider, MD   VITAMIN E PO Take 1 tablet by mouth daily    Historical Provider, MD       Physical Exam: Need 8 Elements   Physical Exam     GEN  -Awake, alert, NAD.   EYES   -PERRL. HENT  -MM are moist.   RESP  -LS CTA equal bilat, no wheezes, rales or rhonchi. Symmetric chest movement. No respiratory distress noted. C/V  -S1/S2 auscultated, tachycardia without appreciable M/R/G. + peripheral edema. No reproducible chest wall tenderness. GI  -Abdomen is soft, non-distended, no significant tenderness. Colostomy with dark-colored stool     -No CVA tenderness. SPC present  MS  -B/L extremities- No gross joint deformities. + swelling, intact sensation symmetrical.   SKIN  -Normal coloration, warm, dry. NEURO  - Awake, alert, oriented x 3, no focal deficits. PSYC  - Appropriate affect. Past Medical History:   Reviewed patient's past medical, surgical, social, family history and allergies.       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 Allergies: Allergies   Allergen Reactions    Sulfa Antibiotics      Fam HX: family history includes Cancer in his father and mother. Soc HX:   Social History     Socioeconomic History    Marital status:      Spouse name: None    Number of children: None    Years of education: None    Highest education level: None   Occupational History    None   Tobacco Use    Smoking status: Former Smoker     Packs/day: 1.50     Years: 40.00     Pack years: 60.00     Types: Cigarettes     Quit date:      Years since quittin.1    Smokeless tobacco: Never Used   Vaping Use    Vaping Use: Never used   Substance and Sexual Activity    Alcohol use: No    Drug use: No    Sexual activity: Never   Other Topics Concern    None   Social History Narrative    None     Social Determinants of Health     Financial Resource Strain:     Difficulty of Paying Living Expenses: Not on file   Food Insecurity:     Worried About Running Out of Food in the Last Year: Not on file    Haily of Food in the Last Year: Not on file   Transportation Needs:     Lack of Transportation (Medical): Not on file    Lack of Transportation (Non-Medical):  Not on file   Physical Activity:     Days of Exercise per Week: Not on file    Minutes of Exercise per Session: Not on file   Stress:     Feeling of Stress : Not on file   Social Connections:     Frequency of Communication with Friends and Family: Not on file    Frequency of Social Gatherings with Friends and Family: Not on file    Attends Hinduism Services: Not on file    Active Member of Clubs or Organizations: Not on file    Attends Club or Organization Meetings: Not on file    Marital Status: Not on file   Intimate Partner Violence:     Fear of Current or Ex-Partner: Not on file    Emotionally Abused: Not on file    Physically Abused: Not on file    Sexually Abused: Not on file   Housing Stability:     Unable to Pay for Housing in the Last Year: Not on file    Number of Places Lived in the Last Year: Not on file    Unstable Housing in the Last Year: Not on file       Medications:   Medications:    Infusions:   PRN Meds:     Labs      CBC:   Recent Labs     02/27/22  1700   WBC 4.5   HGB 8.9*        BMP:    Recent Labs     02/27/22  1700   *   K 4.2   CL 97*   CO2 23   BUN 24*   CREATININE 1.4*   GLUCOSE 115*     Hepatic:   Recent Labs     02/27/22  1700   AST 15   ALT 10   BILITOT 0.3   ALKPHOS 66     Lipids: No results found for: CHOL, HDL, TRIG  Hemoglobin A1C: No results found for: LABA1C  TSH: No results found for: TSH  Troponin:   Lab Results   Component Value Date    TROPONINT <0.010 02/27/2022    TROPONINT <0.010 03/23/2018    TROPONINT <0.010 03/22/2018     Lactic Acid: No results for input(s): LACTA in the last 72 hours.   BNP:   Recent Labs     02/27/22  1700   PROBNP 217.9     UA:  Lab Results   Component Value Date    NITRU POSITIVE 02/27/2022    COLORU YELLOW 02/27/2022    WBCUA 715 02/27/2022    RBCUA 38 02/27/2022    MUCUS FEW 02/27/2022    TRICHOMONAS NONE SEEN 03/23/2018    BACTERIA OCCASIONAL 02/27/2022    CLARITYU CLOUDY 02/27/2022    SPECGRAV >1.030 02/27/2022    LEUKOCYTESUR MODERATE 02/27/2022    UROBILINOGEN NORMAL 02/27/2022    BILIRUBINUR NEGATIVE 02/27/2022    BLOODU MODERATE 02/27/2022    KETUA TRACE 02/27/2022     Urine Cultures: No results found for: Joseph Neves  Blood Cultures: No results found for: BC  No results found for: BLOODCULT2  Organism: No results found for: ORG    Imaging/Diagnostics Last 24 Hours   XR CHEST PORTABLE    Result Date: 2/27/2022  EXAMINATION: ONE XRAY VIEW OF THE CHEST 2/27/2022 5:36 pm COMPARISON: 03/23/2018 HISTORY: ORDERING SYSTEM PROVIDED HISTORY: weakness TECHNOLOGIST PROVIDED HISTORY: Reason for exam:->weakness Reason for Exam: weakness Additional signs and symptoms: na Relevant Medical/Surgical History: cad, rectal cancer FINDINGS: The cardiomediastinal silhouette is unchanged in appearance. Mild cardiac silhouette enlargement. Generalized interstitial prominence again demonstrated. There is no consolidation, pneumothorax, or evidence of edema. No effusion is appreciated. The osseous structures are unchanged in appearance. Unchanged appearance of the chest without acute airspace disease identified. Personally reviewed Lab Studies, Imaging, and discussed case with ED physician.     Electronically signed by Yoly Deng MD on 2/27/2022 at 7:14 PM

## 2022-02-28 NOTE — PROGRESS NOTES
Hospitalist Progress Note      PCP: Chelle Carranza MD    Date of Admission: 2022    Chief Complaint on Admission: melena    Pt Seen/Examined and Chart Reviewed. Admitting dx acute GI hemorrhage    SUBJECTIVE:     Pt with chronic colostomy, reports of melena since starting on eliquis several months ago, has chronic silveira as well, had EGD this am      OBJECTIVE:   Vitals    TEMPERATURE:  Current - Temp: 98 °F (36.7 °C); Max - Temp  Av.9 °F (36.6 °C)  Min: 97.5 °F (36.4 °C)  Max: 98 °F (36.7 °C)  RESPIRATIONS RANGE: Resp  Av.5  Min: 15  Max: 19  PULSE RANGE: Pulse  Av  Min: 63  Max: 105  BLOOD PRESSURE RANGE:  Systolic (50FEV), TEV:181 , Min:100 , KIY:717   ; Diastolic (93SMQ), XOT:25, Min:65, Max:102    PULSE OXIMETRY RANGE: SpO2  Av.6 %  Min: 96 %  Max: 100 %  24HR INTAKE/OUTPUT:    Intake/Output Summary (Last 24 hours) at 2022 1621  Last data filed at 2022 1310  Gross per 24 hour   Intake 880 ml   Output 1075 ml   Net -195 ml       Exam:    General appearance: Well, no apparent distress, appears stated age and cooperative. HEENT Normal cephalic, atraumatic without obvious deformity. Pupils equal, round, and reactive to light. Extra ocular muscles intact. Conjunctivae/corneas clear. Neck: Supple, No jugular venous distention/bruits. Trachea midline without thyromegaly or adenopathy with full range of motion. Lungs: Clear to ascultation, bilaterally without Rales/Wheezes/Rhonchi with good respiratory effort. Heart: Regular rate and rhythm with Normal S1/S2 without  murmurs, rubs or gallops, point of maximum impulse non-displaced  Abdomen: Soft, non-tender or non-distended without rigidity or guarding and positive bowel sounds all four quadrants. Colostomy in place  Extremities: No clubbing, cyanosis, or trace bilateral LE edema bilaterally. Full range of motion without deformity   Skin: Skin color, texture, turgor normal. No rashes or lesions.   Neurologic: Alert and oriented X 3,  neurovascularly intact with sensory/motor intact upper extremities/lower extremities, bilaterally. Cranial nerves:II-XII intact, grossly non-focal.  Mental status: Alert, oriented, thought content appropriate. : silveira in place      Data    Recent Labs     02/27/22  1700   WBC 4.5   HGB 8.9*   HCT 28.9*         Recent Labs     02/27/22  1700   *   K 4.2   CL 97*   CO2 23   BUN 24*   CREATININE 1.4*     Recent Labs     02/27/22  1700   AST 15   ALT 10   BILITOT 0.3   ALKPHOS 66     Recent Labs     02/27/22  1700   INR 1.09     No results for input(s): CKTOTAL, CKMB, CKMBINDEX, TROPONINI in the last 72 hours.     Imaging/Test Results    Na 129  CXR no acute process  Hb 8.9  Cr 1.4  UA with WBC        Consults:     IP CONSULT TO GI  IP CONSULT TO HOSPITALIST  IP CONSULT TO GI  IP CONSULT TO UROLOGY    Allergies  Sulfa antibiotics    Medications      Scheduled Meds:   [START ON 3/1/2022] magnesium citrate  296 mL Oral Once    [START ON 3/1/2022] polyethylene glycol  2,000 mL Oral Once    sodium chloride flush  5-40 mL IntraVENous 2 times per day    pantoprazole  40 mg IntraVENous Q12H    And    sodium chloride (PF)  10 mL IntraVENous Q12H    cefTRIAXone (ROCEPHIN) IV  1,000 mg IntraVENous Q24H    dilTIAZem  240 mg Oral Daily    folic acid  1 mg Oral Daily    hydroxyurea  1,000 mg Oral Daily       Infusions:   sodium chloride         PRN Meds:  sodium chloride flush, sodium chloride, ondansetron **OR** ondansetron, acetaminophen **OR** acetaminophen    ASSESSMENT AND PLAN      Active Hospital Problems    Diagnosis Date Noted    Melena [K92.1]     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 History of urinary retention s/p chronic suprapubic catheter--UA with WBC, urine cx pending, consult urology, may need catheter changed during admission, currently scheduled for next week     #. Hypertension-Cardizem     #. History of CVA-1982   -No focal deficits  -Patient is on aspirin, statin.     #.  Paroxysmal atrial fibrillation  -On Cardizem, holding AC d/t acute GI hemorrhage     #. Infrarenal abdominal aortic aneurysm measuring 4.3 x 4 cm-11/2018  -Measuring 48 x 48 mm as per CT abdomen-11/2019     #.  Psoriatic arthritis     #. History of rectal cancer status post colostomy--colonoscopy wed        #. Chronic myeloproliferative disease     #. History of COVID-19 1/2022.       Acute anemia due to GI hemorrhage--monitor h/h closely, check iron studies, transfuse for h/h less than 7      PT/OT Eval Status:deferred    DVT Prophylaxis: SCDs d/t GI bleed  Diet: ADULT DIET; Full Liquid  ADULT DIET;  Clear Liquid  Diet NPO  Code Status: Full Code      Dispo - colonoscopy wed, follow up urine cx, urology c/s to possible change suprapubic catheter during admission, likely d/c wed after colonoscopy, will have to decide about AC/restarting asa and eliquis prior to discharge    Mart Harris MD

## 2022-02-28 NOTE — ANESTHESIA PRE PROCEDURE
Department of Anesthesiology  Preprocedure Note       Name:  Madyson Edouard   Age:  80 y.o.  :  1929                                          MRN:  [id number]         Date:  2022      Surgeon: Rosa De):  Nixon Rico MD    Procedure: Procedure(s):  EGD ESOPHAGOGASTRODUODENOSCOPY    Medications prior to admission:   Prior to Admission medications    Medication Sig Start Date End Date Taking?  Authorizing Provider   dilTIAZem (DILT-XR) 240 MG extended release capsule Take 1 capsule by mouth once daily 22   Ansley Candelario MD   folic acid (FOLVITE) 1 MG tablet Take 1 tablet by mouth daily 22   Ansley Candelario MD   hydroxyurea (HYDREA) 500 MG chemo capsule Take 2 capsules by mouth once daily 21   Umu Johnson MD   apixaban (ELIQUIS) 2.5 MG TABS tablet Take 1 tablet by mouth 2 times daily 10/21/21   CHEMA Salazar - CNP   aspirin 81 MG chewable tablet Take 81 mg by mouth daily    Historical Provider, MD   apixaban (ELIQUIS) 2.5 MG TABS tablet Take 1 tablet by mouth 2 times daily 21   Ansley Candelario MD   adalimumab (HUMIRA) 40 MG/0.8ML injection Inject 40 mg into the skin once    Historical Provider, MD   Multiple Vitamins-Minerals (THERAPEUTIC MULTIVITAMIN-MINERALS) tablet Take 1 tablet by mouth daily    Historical Provider, MD   VITAMIN E PO Take 1 tablet by mouth daily    Historical Provider, MD       Current medications:    Current Facility-Administered Medications   Medication Dose Route Frequency Provider Last Rate Last Admin    sodium chloride flush 0.9 % injection 5-40 mL  5-40 mL IntraVENous 2 times per day Ivonne Joyce MD   10 mL at 22 0015    sodium chloride flush 0.9 % injection 5-40 mL  5-40 mL IntraVENous PRN Ivonne Joyce MD        0.9 % sodium chloride infusion  25 mL IntraVENous PRN Ivonne Joyce MD        ondansetron (ZOFRAN-ODT) disintegrating tablet 4 mg  4 mg Oral Q8H PRN Ivonne Joyce MD        Or   David Caicedo ondansetron (ZOFRAN) injection 4 mg  4 mg IntraVENous Q6H PRN Antonino Gaines MD        acetaminophen (TYLENOL) tablet 650 mg  650 mg Oral Q6H PRN Antonino Gaines MD        Or    acetaminophen (TYLENOL) suppository 650 mg  650 mg Rectal Q6H PRN Antonino Gaines MD        pantoprazole (PROTONIX) injection 40 mg  40 mg IntraVENous Q12H Antonino Gaines MD   40 mg at 02/28/22 0650    And    sodium chloride (PF) 0.9 % injection 10 mL  10 mL IntraVENous Q12H Antonino Gaines MD   10 mL at 02/28/22 0651    cefTRIAXone (ROCEPHIN) 1000 mg IVPB in 50 mL D5W minibag  1,000 mg IntraVENous Q24H Antonino Gaines MD        dilTIAZem (CARDIZEM CD) extended release capsule 240 mg  240 mg Oral Daily Antonino Gaines MD        folic acid (FOLVITE) tablet 1 mg  1 mg Oral Daily Antonino Gaines MD        hydroxyurea (HYDREA) chemo capsule 1,000 mg  1,000 mg Oral Daily Antonino Gaines MD        0.9 % sodium chloride infusion   IntraVENous Continuous Antonino Gaines  mL/hr at 02/28/22 0022 New Bag at 02/28/22 0022       Allergies:     Allergies   Allergen Reactions    Sulfa Antibiotics        Problem List:    Patient Active Problem List   Diagnosis Code    Hypertension I10    Benign prostatic hyperplasia N40.0    Psoriatic arthritis ([facility] L40.50    Primary malignant neoplasm of rectum ([facility] C20    Psoriasis L40.9    Chronic myeloproliferative disease ([facility] D47.1    Monocytosis (symptomatic) D38.660    Gastroesophageal reflux disease without esophagitis K21.9    Urinary retention R33.9    H/O: CVA (cerebrovascular accident) Z80.78    Irregular heart beat I49.9    Nonrheumatic aortic valve stenosis I35.0    Paroxysmal atrial fibrillation (HCC) I48.0    GI bleed K92.2    Melena K92.1       Past Medical History:        Diagnosis Date    AAA (abdominal aortic aneurysm) (HCC)     Angina, class I ([facility]     Benign prostatic hyperplasia 12/12/2011     Updating Deprecated Diagnoses    Bowel obstruction ([facility]     CAD (coronary artery disease)     Cancer (HCC)     rectal    CCC (chronic calculous cholecystitis) 2018    Gastroesophageal reflux disease without esophagitis 2020    Hx of cardiovascular stress test 2021    EF 62% Normal study    Hypertension     Primary malignant neoplasm of rectum ([facility] 2019    Psoriasis     Psoriatic arthritis ([facility] 2019    Pyelonephritis 2018    Stable angina ([facility] 2019    Unspecified cerebral artery occlusion with cerebral infarction        Past Surgical History:        Procedure Laterality Date    APPENDECTOMY      COLOSTOMY         Social History:    Social History     Tobacco Use    Smoking status: Former Smoker     Packs/day: 1.50     Years: 40.00     Pack years: 60.00     Types: Cigarettes     Quit date:      Years since quittin.1    Smokeless tobacco: Never Used   Substance Use Topics    Alcohol use: No                                Counseling given: Not Answered      Vital Signs (Current):   Vitals:    22 0200 22 0600 22 0809 22 0828   BP: 122/75 119/65 121/80 134/87   Pulse: 63 66  68   Resp: 18 15  16   Temp:  36.7 °C (98 °F) 36.6 °C (97.9 °F) 36.4 °C (97.6 °F)   TempSrc:  Oral Oral Temporal   SpO2:  98%  98%   Weight:    172 lb (78 kg)   Height:    5' 9\" (1.753 m)                                              BP Readings from Last 3 Encounters:   22 134/87   22 120/74   22 134/73       NPO Status: Time of last liquid consumption:                         Time of last solid consumption:                         Date of last liquid consumption: 22                        Date of last solid food consumption: 22    BMI:   Wt Readings from Last 3 Encounters:   22 172 lb (78 kg)   22 176 lb (79.8 kg)   22 182 lb 15.7 oz (83 kg)     Body mass index is 25.4 kg/m².     CBC:   Lab Results   Component Value Date WBC 4.5 02/27/2022    RBC 2.39 02/27/2022    RBC 5.19 08/18/2017    HGB 8.9 02/27/2022    HCT 28.9 02/27/2022    .9 02/27/2022    RDW 13.1 02/27/2022     02/27/2022       CMP:   Lab Results   Component Value Date     02/27/2022    K 4.2 02/27/2022    CL 97 02/27/2022    CO2 23 02/27/2022    BUN 24 02/27/2022    CREATININE 1.4 02/27/2022    GFRAA 57 02/27/2022    LABGLOM 47 02/27/2022    GLUCOSE 115 02/27/2022    PROT 5.9 02/27/2022    PROT 6.2 12/11/2011    CALCIUM 8.7 02/27/2022    BILITOT 0.3 02/27/2022    ALKPHOS 66 02/27/2022    AST 15 02/27/2022    ALT 10 02/27/2022       POC Tests: No results for input(s): POCGLU, POCNA, POCK, POCCL, POCBUN, POCHEMO, POCHCT in the last 72 hours. Coags:   Lab Results   Component Value Date    PROTIME 14.1 02/27/2022    PROTIME 10.4 12/11/2011    INR 1.09 02/27/2022    APTT 30.9 02/27/2022       HCG (If Applicable): No results found for: PREGTESTUR, PREGSERUM, HCG, HCGQUANT     ABGs: No results found for: PHART, PO2ART, KGK1AAM, RNQ5WYO, BEART, T9QJOIUZ     Type & Screen (If Applicable):  No results found for: LABABO, LABRH    Drug/Infectious Status (If Applicable):  No results found for: HIV, HEPCAB    COVID-19 Screening (If Applicable):   Lab Results   Component Value Date    COVID19 NOT DETECTED 02/28/2022           Anesthesia Evaluation  Patient summary reviewed  Airway:         Dental:          Pulmonary:                              Cardiovascular:    (+) hypertension:, valvular problems/murmurs: AS, angina:, CAD:, dysrhythmias: atrial fibrillation,       ECG reviewed      Echocardiogram reviewed  Stress test reviewed             ROS comment:  Summary   Left ventricular function and size is normal, EF is estimated at 55-60%. Normal diastolic filling pattern for age. No regional wall motion abnormalities were detected. Bi atrial enlargement noted.    Mildly sclerotic aortic valve with mild aortic stenosis mean gradient is   13mmHg, NADEEM 1.6 cm2.   Mild mitral , tricuspid and moderate aortic regurgitation is present. RVSP is 29 mmHg. No evidence of pericardial effusion. Signature      ------------------------------------------------------------------   Electronically signed by Napoleon Vences MD (Interpreting   physician) on 08/24/2021 at 06:26 PM   Summary   Normal study.   Normal perfusion study with normal distribution in all coronal, short, and   horizontal axis.   The observed defect is consistent with diaphragmatic attenuation.   Normal LV function. LVEF is 62 %.   Supervising physician Dr. Cj Heredia .      Recommendation   Recommendation: Routine follow-up.      Signatures      ------------------------------------------------------------------   Electronically signed by Maria L Amezquita MD (Interpreting   cardiologist) on 08/12/2021 at 15:28   ------------------------------------------------------------------           Neuro/Psych:   (+) CVA:,             GI/Hepatic/Renal:   (+) GERD:,           Endo/Other:    (+) blood dyscrasia: anticoagulation therapy, arthritis:., malignancy/cancer. Abdominal:             Vascular:   + PVD, aortic or cerebral, . ROS comment: aaa. Other Findings:           Anesthesia Plan      MAC     ASA 3     (Chart review only 2/28/22  covid -)  Induction: intravenous.                         CHEMA Bales CRNA   2/28/2022

## 2022-03-01 NOTE — PROGRESS NOTES
Hospitalist Progress Note      Name:  Miriam Hess /Age/Sex: 1929  (80 y.o. male)   MRN & CSN:  [id number] & [id number] Admission Date/Time: 2022  4:42 PM   Location:  4258/9346-D PCP: Mykel Montenegro MD         Hospital Day: 3      Assessment and Plan:   Patient is a 80-year-old male past medical history of rectal CA status post chronic colostomy, anticoagulated with Eliquis for A. fib, who was admitted for GI bleed. Patient underwent EGD with ablation of AVM. GI following on colonoscopy. #.  Melena: Likely due to bleeding AVM status post ablation  Status post EGD this admission with ablation of AVMs  Continue to hold aspirin and Eliquis for A. fib  Continue to monitor H&H closely and transfuse for hemoglobin 7  Continue PPIs  GI following and plans for colonoscopy noted    #. Paroxysmal atrial fibrillation  Continue Cardizem  Continue to hold Eliquis due to acute GI bleed    #. Primary hypertension: Controlled  Continue current medications    #. Chronic urinary retention  Status post Weaver exchange 3/1/2022 by urology  Urology has cleared patient for discharge  Outpatient follow-up with urology. #.  Infrarenal abdominal aortic aneurysm  Patient will need to follow up outpatient with PCP for surveillance imaging    #. Other medical fungal cirrhotic arthritis, history of rectal pain status post colostomy      DVT prophylaxis: SCDs  CODE STATUS total support        Subjective. :     Patient was seen and examined today. No acute events overnight. Patient remains afebrile still vital signs. GI following and colonoscopy. Objective:   Vitals:   Vitals:    22 0745   BP: 138/87   Pulse: 72   Resp: 13   Temp: 98.2 °F (36.8 °C)   SpO2:          Physical Exam:   GEN: Awake, alert, in no apparent distress awake male, sitting upright in bed in no apparent distress. Appears given age.   HEENT: Atraumatic, normocephalic, PERRLA, EOMI  NECK: Supple, no apparent thyromegaly or masses. RESP: Clear lungs to auscultation  CARDIO/VASC: Regular rate and rhythm, normal S1, S2, no murmurs  GI: Abdomen is soft, nontender, no significant organomegaly noted, bowel sounds present  MSK: No gross joint deformities.   Skin: No significant rashes, skin lesions noted  Neuro: AOx3, cranial nerves grossly intact, no focal motor or sensory deficits   PSYCH: Affect appropriate    Medications:   Medications:    magnesium citrate  296 mL Oral Once    polyethylene glycol  2,000 mL Oral Once    sodium chloride flush  5-40 mL IntraVENous 2 times per day    pantoprazole  40 mg IntraVENous Q12H    And    sodium chloride (PF)  10 mL IntraVENous Q12H    cefTRIAXone (ROCEPHIN) IV  1,000 mg IntraVENous Q24H    dilTIAZem  240 mg Oral Daily    folic acid  1 mg Oral Daily    hydroxyurea  1,000 mg Oral Daily      Infusions:    sodium chloride       PRN Meds: sodium chloride flush, 5-40 mL, PRN  sodium chloride, 25 mL, PRN  ondansetron, 4 mg, Q8H PRN   Or  ondansetron, 4 mg, Q6H PRN  acetaminophen, 650 mg, Q6H PRN   Or  acetaminophen, 650 mg, Q6H PRN          Electronically signed by Mellissa High MD on 3/1/2022 at 11:31 AM

## 2022-03-01 NOTE — ANESTHESIA PRE PROCEDURE
Department of Anesthesiology  Preprocedure Note       Name:  Rosemary Flores   Age:  80 y.o.  :  1929                                          MRN:  [id number]         Date:  3/1/2022      Surgeon: Jose Luis Edward):  Sarwat Vicente MD    Procedure: Procedure(s):  COLONOSCOPY DIAGNOSTIC    Medications prior to admission:   Prior to Admission medications    Medication Sig Start Date End Date Taking?  Authorizing Provider   dilTIAZem (DILT-XR) 240 MG extended release capsule Take 1 capsule by mouth once daily 22   Larry Darden MD   folic acid (FOLVITE) 1 MG tablet Take 1 tablet by mouth daily 22   Larry Darden MD   hydroxyurea (HYDREA) 500 MG chemo capsule Take 2 capsules by mouth once daily 21   Sree Olson MD   apixaban (ELIQUIS) 2.5 MG TABS tablet Take 1 tablet by mouth 2 times daily 10/21/21   CHEMA Bang - CNP   aspirin 81 MG chewable tablet Take 81 mg by mouth daily    Historical Provider, MD   apixaban (ELIQUIS) 2.5 MG TABS tablet Take 1 tablet by mouth 2 times daily 21   Larry Darden MD   adalimumab (HUMIRA) 40 MG/0.8ML injection Inject 40 mg into the skin once    Historical Provider, MD   Multiple Vitamins-Minerals (THERAPEUTIC MULTIVITAMIN-MINERALS) tablet Take 1 tablet by mouth daily    Historical Provider, MD   VITAMIN E PO Take 1 tablet by mouth daily    Historical Provider, MD       Current medications:    Current Facility-Administered Medications   Medication Dose Route Frequency Provider Last Rate Last Admin    magnesium citrate solution 296 mL  296 mL Oral Once Sarwat Vicente MD        polyethylene glycol (GoLYTELY) solution 2,000 mL  2,000 mL Oral Once Sarwat Vicente MD        sodium chloride flush 0.9 % injection 5-40 mL  5-40 mL IntraVENous 2 times per day Sarwat Vicente MD   10 mL at 22    sodium chloride flush 0.9 % injection 5-40 mL  5-40 mL IntraVENous PRN Sarwat Vicente MD        0.9 % sodium chloride infusion  25 mL IntraVENous PRN Alfie Moss MD        ondansetron (ZOFRAN-ODT) disintegrating tablet 4 mg  4 mg Oral Q8H PRN Alfie Moss MD        Or    ondansetron [facility]) injection 4 mg  4 mg IntraVENous Q6H PRN Alfie Moss MD        acetaminophen (TYLENOL) tablet 650 mg  650 mg Oral Q6H PRN Alfie Moss MD        Or   Ilsa Pro acetaminophen (TYLENOL) suppository 650 mg  650 mg Rectal Q6H PRN Alfie Moss MD        pantoprazole (PROTONIX) injection 40 mg  40 mg IntraVENous Q12H Alfie Moss MD   40 mg at 03/01/22 0600    And    sodium chloride (PF) 0.9 % injection 10 mL  10 mL IntraVENous Q12H Alfie Moss MD   10 mL at 03/01/22 0600    cefTRIAXone (ROCEPHIN) 1000 mg IVPB in 50 mL D5W minibag  1,000 mg IntraVENous Q24H Alfie Moss MD   Stopped at 02/28/22 1957    dilTIAZem (CARDIZEM CD) extended release capsule 240 mg  240 mg Oral Daily Alfie Moss MD   240 mg at 93/39/00 7306    folic acid (FOLVITE) tablet 1 mg  1 mg Oral Daily Alfie Moss MD   1 mg at 02/28/22 1018    hydroxyurea (HYDREA) chemo capsule 1,000 mg  1,000 mg Oral Daily lAfie Moss MD   1,000 mg at 02/28/22 1018       Allergies:     Allergies   Allergen Reactions    Sulfa Antibiotics        Problem List:    Patient Active Problem List   Diagnosis Code    Hypertension I10    Benign prostatic hyperplasia N40.0    Psoriatic arthritis ([facility] [address] L40.50    Primary malignant neoplasm of rectum ([facility] [facility] C20    Psoriasis L40.9    Chronic myeloproliferative disease ([facility] D47.1    Monocytosis (symptomatic) Z41.839    Gastroesophageal reflux disease without esophagitis K21.9    Urinary retention R33.9    H/O: CVA (cerebrovascular accident) Z80.78    Irregular heart beat I49.9    Nonrheumatic aortic valve stenosis I35.0    Paroxysmal atrial fibrillation (HCC) I48.0    GI bleed K92.2    Melena K92.1    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       Past Medical History:        Diagnosis Date    AAA (abdominal aortic aneurysm) ([facility]     Angina, class I ([facility]     Benign prostatic hyperplasia 2011     Updating Deprecated Diagnoses    Bowel obstruction ([facility]     CAD (coronary artery disease)     Cancer ([facility]     rectal    CCC (chronic calculous cholecystitis) 2018    Gastroesophageal reflux disease without esophagitis 2020    Hx of cardiovascular stress test 2021    EF 62% Normal study    Hypertension     Primary malignant neoplasm of rectum ([facility] [facility] 2019    Psoriasis     Psoriatic arthritis ([facility] [facility] 2019    Pyelonephritis 2018    [facility] 2019    Unspecified cerebral artery occlusion with cerebral infarction        Past Surgical History:        Procedure Laterality Date    APPENDECTOMY      COLOSTOMY      UPPER GASTROINTESTINAL ENDOSCOPY N/A 2022    ENTEROSCOPY PUSH CONTROL HEMORRHAGE WITH APC ABLATION OF AVM performed by Ashanti Gentile MD at [facility] ENDOSCOPY       Social History:    Social History     Tobacco Use    Smoking status: Former Smoker     Packs/day: 1.50     Years: 40.00     Pack years: 60.00     Types: Cigarettes     Quit date:      Years since quittin.1    Smokeless tobacco: Never Used   Substance Use Topics    Alcohol use:  No                                Counseling given: Not Answered      Vital Signs (Current):   Vitals:    22 1459 22 2100 22 0209 22 0400   BP: 100/72 (!) 142/71 122/84 122/71   Pulse: 63 84 71 74   Resp: 18 20 (!) 56 16   Temp: 36.7 °C (98 °F) 36.8 °C (98.3 °F) 36.4 °C (97.5 °F) 36.9 °C (98.4 °F)   TempSrc: Oral Oral Oral Oral   SpO2: 100% 97% 94% 94%   Weight:       Height:                                                  BP Readings from Last 3 Encounters:   22 122/71   22 131/65   22 120/74       NPO Status: Time of last liquid consumption:                         Time of last solid consumption:                         Date of last liquid consumption: 02/27/22                        Date of last solid food consumption: 02/27/22    BMI:   Wt Readings from Last 3 Encounters:   02/28/22 172 lb (78 kg)   01/31/22 176 lb (79.8 kg)   01/25/22 182 lb 15.7 oz (83 kg)     Body mass index is 25.4 kg/m². CBC:   Lab Results   Component Value Date    WBC 6.5 02/28/2022    RBC 2.56 02/28/2022    RBC 5.19 08/18/2017    HGB 9.7 02/28/2022    HCT 30.5 02/28/2022    .1 02/28/2022    RDW 13.0 02/28/2022     02/28/2022       CMP:   Lab Results   Component Value Date     02/28/2022    K 4.2 02/28/2022     02/28/2022    CO2 20 02/28/2022    BUN 20 02/28/2022    CREATININE 1.1 02/28/2022    GFRAA >60 02/28/2022    LABGLOM >60 02/28/2022    GLUCOSE 87 02/28/2022    PROT 5.9 02/27/2022    PROT 6.2 12/11/2011    CALCIUM 8.7 02/28/2022    BILITOT 0.3 02/27/2022    ALKPHOS 66 02/27/2022    AST 15 02/27/2022    ALT 10 02/27/2022       POC Tests: No results for input(s): POCGLU, POCNA, POCK, POCCL, POCBUN, POCHEMO, POCHCT in the last 72 hours.     Coags:   Lab Results   Component Value Date    PROTIME 14.1 02/27/2022    PROTIME 10.4 12/11/2011    INR 1.09 02/27/2022    APTT 30.9 02/27/2022       HCG (If Applicable): No results found for: PREGTESTUR, PREGSERUM, HCG, HCGQUANT     ABGs: No results found for: PHART, PO2ART, NLC0DFX, EGW0KXO, BEART, E6EMJWXW     Type & Screen (If Applicable):  No results found for: LABABO, LABRH    Drug/Infectious Status (If Applicable):  No results found for: HIV, HEPCAB    COVID-19 Screening (If Applicable):   Lab Results   Component Value Date    COVID19 NOT DETECTED 02/28/2022           Anesthesia Evaluation  Patient summary reviewed  Airway: Mallampati: III  TM distance: >3 FB   Neck ROM: limited  Mouth opening: > = 3 FB Dental:    (+) edentulous      Pulmonary:normal exam                               Cardiovascular:    (+) hypertension:, angina:, CAD:, dysrhythmias: atrial fibrillation,       ECG reviewed  Rhythm: irregular  Rate: normal                 ROS comment: Summary   Left ventricular function and size is normal, EF is estimated at 55-60%. Normal diastolic filling pattern for age. No regional wall motion abnormalities were detected. Bi atrial enlargement noted. *Mildly sclerotic aortic valve with mild aortic stenosis mean gradient is 13mmHg, NADEEM 1.6 cm2. Mild mitral , tricuspid and moderate aortic regurgitation is present. RVSP is 29 mmHg. No evidence of pericardial effusion. Nonrheumatic aortic valve stenosis    Signature      ------------------------------------------------------------------   Electronically signed by Anthony Srivastava MD (Interpreting   physician) on 08/24/2021 at 06:26 PM   ------------------------------------------------------------------        Neuro/Psych:   (+) CVA:,              ROS comment: Chronic myeloproliferative disease ([address]  Unspecified cerebral artery occlusion with cerebral infarction GI/Hepatic/Renal:   (+) GERD:,          ROS comment: Primary malignant neoplasm of rectum ([address]  GI bleed  Arteriovenous malformation of jejunum. Endo/Other:    (+) electrolyte abnormalities, . ROS comment: Benign prostatic hyperplasia  Urinary retention                 Abdominal:             Vascular:           ROS comment: AAA (abdominal aortic aneurysm) ([address] Other Findings:           Anesthesia Plan      MAC     ASA 4       Induction: intravenous. Anesthetic plan and risks discussed with patient. Plan discussed with attending.               Tiffany Young   3/1/2022

## 2022-03-01 NOTE — BRIEF OP NOTE
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

## 2022-03-01 NOTE — CONSULTS
[address], [address]   Consult Note  [facility] 1 2 3 4 5    Date: 3/1/2022   Patient: Karlene Mckee   : 1929   DOA: 2022   MRN: [id number]   ROOM#: [id number]     Reason for Consult: Catheter exchange  Requesting Physician: Dr. Janice Tatum  Collaborating Urologist on Call at time of admission: Dr. Hina Pride: Fatigue and rectal bleeding    History Obtained From:  patient, electronic medical record    HISTORY OF PRESENT ILLNESS:                The patient is a 80 y.o. male with significant past medical history of CAD, PAF (on Eliquis), AAA, rectal cancer, BPH, chronic urinary retention (managed w indwelling catheter) and pyelonephritis who presented with fatigue and rectal bleeding. He had an EGD performed yesterday and is undergoing colonoscopy tomorrow for further evaluation. The pt has a chronic indwelling urethral catheter which our office exchanges monthly, last exchanged 22. Discussed with patient, decision to exchange silveira today. Procedure: Pt dressed and draped in the usual sterile fashion. His old 16fr latex catheter was removed without difficulty. A new 19I silicone catheter was sterilely inserted with minimal resistance at the prostatic urethra. Catheter balloon inflated with 10cc sterile water and draining properly. Pt tolerated well with no complications. ED Provider's HPI 22: Karlene Mckee is a 80 y.o. male who presents to the emergency department today with rectal bleeding, fatigue, dark-colored stools and associated generalized weakness. Patient states this has been progressive in nature, states that he been noticing dark color stool within his colostomy bag intermittently over the last week. He states that he was at [employer] today and could not stand and get around. He states that been having some dyspnea/weakness on exertion lately. Patient is on Eliquis. States he has history of GI bleeding. Also has history of A. fib.   He denies any other Q12H  cefTRIAXone (ROCEPHIN) 1000 mg IVPB in 50 mL D5W minibag, 1,000 mg, IntraVENous, Q24H  dilTIAZem (CARDIZEM CD) extended release capsule 240 mg, 240 mg, Oral, Daily  folic acid (FOLVITE) tablet 1 mg, 1 mg, Oral, Daily  hydroxyurea (HYDREA) chemo capsule 1,000 mg, 1,000 mg, Oral, Daily    Allergies:  Sulfa antibiotics    Social History:   TOBACCO:   reports that he quit smoking about 49 years ago. His smoking use included cigarettes. He has a 60.00 pack-year smoking history. He has never used smokeless tobacco.  ETOH:   reports no history of alcohol use. DRUGS:   reports no history of drug use. Family History:       Problem Relation Age of Onset   Ilsa Pro Cancer Mother     Cancer Father        REVIEW OF SYSTEMS:     CONSTITUTIONAL:  positive for  fatigue  RESPIRATORY:  negative  CARDIOVASCULAR:  negative  GASTROINTESTINAL:  negative  GENITOURINARY:  positive for urinary retention    PHYSICAL EXAM:      VITALS:  /71   Pulse 74   Temp 98.4 °F (36.9 °C) (Oral)   Resp 16   Ht 5' 9\" (1.753 m)   Wt 172 lb (78 kg)   SpO2 94%   BMI 25.40 kg/m²      TEMPERATURE:  Current - Temp: 98.4 °F (36.9 °C); Max - Temp  Av.9 °F (36.6 °C)  Min: 97.5 °F (36.4 °C)  Max: 98.4 °F (36.9 °C)  24HR BLOOD PRESSURE RANGE:  Systolic (01CGP), AFZ:617 , Min:100 , YQV:137   ; Diastolic (10AMY), XRW:04, Min:65, Max:102    Physical Exam:  General appearance: alert, appears stated age, cooperative, no distress and mildly obese  Head: Normocephalic, without obvious abnormality, atraumatic  Back: No CVA tenderness  Abdomen: Soft, non-tender, non-distended. Colostomy in LLQ. : 16fr silveira catheter in place, urine cloudy yellow. Uncircumcised phallus with no abnormal lesions/discharge. Mild hypospadias noted.     DATA:    WBC:    Lab Results   Component Value Date    WBC 6.5 2022     Hemoglobin/Hematocrit:    Lab Results   Component Value Date    HGB 9.7 2022    HCT 30.5 2022     BMP:    Lab Results   Component Value Date     02/28/2022    K 4.2 02/28/2022     02/28/2022    CO2 20 02/28/2022    BUN 20 02/28/2022    LABALBU 3.5 02/27/2022    CREATININE 1.1 02/28/2022    CALCIUM 8.7 02/28/2022    GFRAA >60 02/28/2022    LABGLOM >60 02/28/2022     PT/INR:    Lab Results   Component Value Date    PROTIME 14.1 02/27/2022    PROTIME 10.4 12/11/2011    INR 1.09 02/27/2022     Urine Culture: >50,000 CFU/ml Mixed pathogens Multiple organisms isolated, no predominance    Imaging:  XR CHEST PORTABLE    Result Date: 2/27/2022  EXAMINATION: ONE XRAY VIEW OF THE CHEST 2/27/2022 5:36 pm COMPARISON: 03/23/2018 HISTORY: ORDERING SYSTEM PROVIDED HISTORY: weakness TECHNOLOGIST PROVIDED HISTORY: Reason for exam:->weakness Reason for Exam: weakness Additional signs and symptoms: na Relevant Medical/Surgical History: cad, rectal cancer FINDINGS: The cardiomediastinal silhouette is unchanged in appearance. Mild cardiac silhouette enlargement. Generalized interstitial prominence again demonstrated. There is no consolidation, pneumothorax, or evidence of edema. No effusion is appreciated. The osseous structures are unchanged in appearance. Unchanged appearance of the chest without acute airspace disease identified. Assessment & Plan:      Nuris Wiggins is a 80y.o. year old male admitted 2/27/2022 for     1) Chronic Urinary Retention: present since rectal surgery. Last exchanged 2/7/22. 16fr catheter exchanged today without difficulty. Urine cx ordered, treat if possible. Pt stable from a  standpoint. Will sign off, please call with any questions. Pt to follow up in our office 4/1/22 for catheter exchange. Patient seen and examined, chart reviewed.      Electronically signed by Bj Holloway PA-C on 3/1/2022 at 7:38 AM

## 2022-03-02 NOTE — ANESTHESIA POSTPROCEDURE EVALUATION
Department of Anesthesiology  Postprocedure Note    Patient: Meaghan Voss  MRN: [id number]  YOB: 1929  Date of evaluation: 3/2/2022  Time:  7:59 AM     Procedure Summary     Date: 03/02/22 Room / Location: [address]    Anesthesia Start: 7137 Anesthesia Stop: 8806    Procedure: COLONOSCOPY DIAGNOSTIC (N/A ) Diagnosis: (GI BLEED)    Surgeons: Ashley Castillo MD Responsible Provider: Martín Moseley MD    Anesthesia Type: MAC ASA Status: 4          Anesthesia Type: MAC    Nesha Phase I:      Nesha Phase II:      Last vitals: Reviewed and per EMR flowsheets.        Anesthesia Post Evaluation    Patient location during evaluation: bedside  Patient participation: complete - patient participated  Level of consciousness: sleepy but conscious  Pain score: 0  Airway patency: patent  Nausea & Vomiting: no nausea and no vomiting  Complications: no  Cardiovascular status: blood pressure returned to baseline and hemodynamically stable  Respiratory status: acceptable  Hydration status: stable

## 2022-03-02 NOTE — BRIEF OP NOTE
BRIEF COLONOSCOPY REPORT:   Photos and full colonoscopy report available by going to \"chart review\" then \"procedures\" then  \"colonoscopy\" then \"View Report\"      IMPRESSION :   1) S/P APR  2) very difficult exam due to angulated and fixed bowel just above stoma  3) 2 cm very sessile polyp removed from the cecum by EMR: submucosa injected with Orise, polyp transected en bloc, margins of the polypectomy site coagulated with snare tip soft coag, and polypectomy site closed with 5 endoclips.   4) 3 mm polyp removed from the lower ascending colon with the cold snare  5) superficial mucosal tear noted in sigmoid 10 cm above the stoma- reinforced with 2 clips  6) otherwise normal colon    PLAN : consider follow up colonoscopy in 6-12 months - although risk of procedure ( due to angulated sigmoid and difficult exam) may outweigh any expected benefit at his advanced age

## 2022-03-02 NOTE — PROGRESS NOTES
Hospitalist Progress Note      Name:  Chauncey Murcia /Age/Sex: 1929  (80 y.o. male)   MRN & CSN:  [id number] & [id number] Admission Date/Time: 2022  4:42 PM   Location:  4569/2539-X PCP: Jesse Up MD         Hospital Day: 4      Assessment and Plan:     Patient is a 26-year-old male past medical history of rectal CA status post chronic colostomy, anticoagulated with Eliquis for A. fib, who was admitted for GI bleed. Patient underwent EGD with ablation of AVM. Status post colonoscopy today    #. Melena: Likely due to bleeding AVM status post ablation  Status post EGD this admission with ablation of AVMs  Continue to hold aspirin and Eliquis for A. fib  Continue to monitor H&H closely and transfuse for hemoglobin 7  Continue PPIs  Status post colonoscopy today: GI recommend repeat colonoscopy in 6 to 12 months. #.  Paroxysmal atrial fibrillation  Continue Cardizem  Resume Eliquis once okay with GI. #.  Primary hypertension: Controlled  Continue current medications     #. Chronic urinary retention  Status post Weaevr exchange 3/1/2022 by urology  Urology has cleared patient for discharge  Outpatient follow-up with urology. #.  Infrarenal abdominal aortic aneurysm  Patient will need to follow up outpatient with PCP for surveillance imaging     #. Other medical fungal cirrhotic arthritis, history of rectal pain status post colostomy        DVT prophylaxis: SCDs  CODE STATUS total support    Subjective. :     Patient was seen and examined today. He states colonoscopy went well. On IV Venofer. No acute events overnight. Patient was afebrile with stable vital signs. Objective:   Vitals:   Vitals:    22 1112   BP: 103/68   Pulse: 82   Resp: 16   Temp: 97.8 °F (36.6 °C)   SpO2: 100%         Physical Exam:   GEN: Awake, alert, in no apparent distress awake male, sitting upright in bed in no apparent distress. Appears given age.   HEENT: Atraumatic, normocephalic, PERRLA, EOMI  NECK: Supple, no apparent thyromegaly or masses. RESP: Clear lungs to auscultation  CARDIO/VASC: Regular rate and rhythm, normal S1, S2, no murmurs  GI: Abdomen is soft, nontender, no significant organomegaly noted, bowel sounds present  MSK: No gross joint deformities.   Skin: No significant rashes, skin lesions noted  Neuro: AOx3, cranial nerves grossly intact, no focal motor or sensory deficits   PSYCH: Affect appropriate    Medications:   Medications:    pantoprazole  40 mg Oral QAM AC    iron sucrose  200 mg IntraVENous Daily    sodium chloride flush  5-40 mL IntraVENous 2 times per day    cefTRIAXone (ROCEPHIN) IV  1,000 mg IntraVENous Q24H    dilTIAZem  240 mg Oral Daily    folic acid  1 mg Oral Daily    hydroxyurea  1,000 mg Oral Daily      Infusions:    sodium chloride       PRN Meds: sodium chloride flush, 5-40 mL, PRN  sodium chloride, 25 mL, PRN  ondansetron, 4 mg, Q8H PRN   Or  ondansetron, 4 mg, Q6H PRN  acetaminophen, 650 mg, Q6H PRN   Or  acetaminophen, 650 mg, Q6H PRN          Electronically signed by Jackelyn Cruz MD on 3/2/2022 at 11:23 AM

## 2022-03-02 NOTE — PLAN OF CARE
Problem: Falls - Risk of:  Goal: Will remain free from falls  Description: Will remain free from falls  Outcome: Met This Shift  Goal: Absence of physical injury  Description: Absence of physical injury  Outcome: Met This Shift     Problem: Discharge Planning:  Goal: Discharged to appropriate level of care  Description: Discharged to appropriate level of care  Outcome: Met This Shift     Problem:  Bowel Function - Altered:  Goal: Bowel elimination is within specified parameters  Description: Bowel elimination is within specified parameters  Outcome: Met This Shift     Problem: Fluid Volume - Imbalance:  Goal: Will show no signs and symptoms of excessive bleeding  Description: Will show no signs and symptoms of excessive bleeding  Outcome: Met This Shift  Goal: Absence of imbalanced fluid volume signs and symptoms  Description: Absence of imbalanced fluid volume signs and symptoms  Outcome: Met This Shift     Problem: Nausea/Vomiting:  Goal: Absence of nausea/vomiting  Description: Absence of nausea/vomiting  Outcome: Met This Shift  Goal: Able to drink  Description: Able to drink  Outcome: Met This Shift  Goal: Able to eat  Description: Able to eat  Outcome: Met This Shift  Goal: Ability to achieve adequate nutritional intake will improve  Description: Ability to achieve adequate nutritional intake will improve  Outcome: Met This Shift

## 2022-03-02 NOTE — PLAN OF CARE
Problem: Falls - Risk of:  Goal: Will remain free from falls  Description: Will remain free from falls  3/2/2022 1554 by Jasmin Luu RN  Outcome: Ongoing  3/2/2022 0407 by Kaitlin Alfredo RN  Outcome: Met This Shift  Goal: Absence of physical injury  Description: Absence of physical injury  3/2/2022 1554 by Jasmin Luu RN  Outcome: Ongoing  3/2/2022 0407 by Kaitlin Alfredo RN  Outcome: Met This Shift     Problem: Discharge Planning:  Goal: Discharged to appropriate level of care  Description: Discharged to appropriate level of care  3/2/2022 1554 by Jasmin Luu RN  Outcome: Ongoing  3/2/2022 0407 by Kaitlin Alfredo RN  Outcome: Met This Shift     Problem:  Bowel Function - Altered:  Goal: Bowel elimination is within specified parameters  Description: Bowel elimination is within specified parameters  3/2/2022 1554 by Jasmin Luu RN  Outcome: Ongoing  3/2/2022 0407 by Kaitlin Alfredo RN  Outcome: Met This Shift     Problem: Fluid Volume - Imbalance:  Goal: Will show no signs and symptoms of excessive bleeding  Description: Will show no signs and symptoms of excessive bleeding  3/2/2022 1554 by Jasmin Luu RN  Outcome: Ongoing  3/2/2022 0407 by Kaitlin Alfredo RN  Outcome: Met This Shift  Goal: Absence of imbalanced fluid volume signs and symptoms  Description: Absence of imbalanced fluid volume signs and symptoms  3/2/2022 1554 by Jasmin Luu RN  Outcome: Ongoing  3/2/2022 0407 by Kaitlin Alfredo RN  Outcome: Met This Shift     Problem: Nausea/Vomiting:  Goal: Absence of nausea/vomiting  Description: Absence of nausea/vomiting  3/2/2022 1554 by Jasmin Luu RN  Outcome: Ongoing  3/2/2022 0407 by Kaitlin Alfredo RN  Outcome: Met This Shift  Goal: Able to drink  Description: Able to drink  3/2/2022 1554 by Jasmin Luu RN  Outcome: Ongoing  3/2/2022 0407 by Kaitlin Alfredo RN  Outcome: Met This Shift  Goal: Able to eat  Description: Able to eat  3/2/2022 1554 by Jasmin Luu RN  Outcome: Ongoing  3/2/2022 0407 by Saul Maddox RN  Outcome: Met This Shift  Goal: Ability to achieve adequate nutritional intake will improve  Description: Ability to achieve adequate nutritional intake will improve  3/2/2022 1554 by Jeanette Castaneda RN  Outcome: Ongoing  3/2/2022 0407 by Saul Maddox RN  Outcome: Met This Shift

## 2022-03-02 NOTE — PLAN OF CARE
Problem: Falls - Risk of:  Goal: Will remain free from falls  Description: Will remain free from falls  3/2/2022 1556 by Lewis Andrade RN  Outcome: Ongoing  3/2/2022 1554 by Lewis Andrade RN  Outcome: Ongoing  3/2/2022 0407 by Massimo Bhatt RN  Outcome: Met This Shift  Goal: Absence of physical injury  Description: Absence of physical injury  3/2/2022 1556 by Lewis Andrade RN  Outcome: Ongoing  3/2/2022 1554 by Lewis Andrade RN  Outcome: Ongoing  3/2/2022 0407 by Massimo Bhatt RN  Outcome: Met This Shift     Problem: Discharge Planning:  Goal: Discharged to appropriate level of care  Description: Discharged to appropriate level of care  3/2/2022 1556 by Lewis Andrade RN  Outcome: Ongoing  3/2/2022 1554 by Lewis Andrade RN  Outcome: Ongoing  3/2/2022 0407 by Massimo Bhatt RN  Outcome: Met This Shift     Problem:  Bowel Function - Altered:  Goal: Bowel elimination is within specified parameters  Description: Bowel elimination is within specified parameters  3/2/2022 1556 by Lewis Andrade RN  Outcome: Ongoing  3/2/2022 1554 by Lewis Andrade RN  Outcome: Ongoing  3/2/2022 0407 by Massimo Bhatt RN  Outcome: Met This Shift     Problem: Fluid Volume - Imbalance:  Goal: Will show no signs and symptoms of excessive bleeding  Description: Will show no signs and symptoms of excessive bleeding  3/2/2022 1556 by Lewis Andrade RN  Outcome: Ongoing  3/2/2022 1554 by Lewis Andrade RN  Outcome: Ongoing  3/2/2022 0407 by Massimo Bhatt RN  Outcome: Met This Shift  Goal: Absence of imbalanced fluid volume signs and symptoms  Description: Absence of imbalanced fluid volume signs and symptoms  3/2/2022 1556 by Lewis Andrade RN  Outcome: Ongoing  3/2/2022 1554 by Lewis Andrade RN  Outcome: Ongoing  3/2/2022 0407 by Massimo Bhatt RN  Outcome: Met This Shift     Problem: Nausea/Vomiting:  Goal: Absence of nausea/vomiting  Description: Absence of nausea/vomiting  3/2/2022 1556 by Lewis Andrade RN  Outcome: Ongoing  3/2/2022 1554 by Rolandasalvador Wetzel RN  Outcome: Ongoing  3/2/2022 0407 by Nehemiah Ayala RN  Outcome: Met This Shift  Goal: Able to drink  Description: Able to drink  3/2/2022 1556 by Rolanda Wetzel, RN  Outcome: Ongoing  3/2/2022 1554 by Rolanda Wetzel, RN  Outcome: Ongoing  3/2/2022 0407 by Nehemiah Ayala RN  Outcome: Met This Shift  Goal: Able to eat  Description: Able to eat  3/2/2022 1556 by Rolanda Wetzel, RN  Outcome: Ongoing  3/2/2022 1554 by Rolanda Wetzel, RN  Outcome: Ongoing  3/2/2022 0407 by Nehemiah Ayala RN  Outcome: Met This Shift  Goal: Ability to achieve adequate nutritional intake will improve  Description: Ability to achieve adequate nutritional intake will improve  3/2/2022 1556 by Rolandasalvador Wetzel, RN  Outcome: Ongoing  3/2/2022 1554 by Rolandasalvador Wetzel, RN  Outcome: Ongoing  3/2/2022 0407 by Nehemiah Ayala RN  Outcome: Met This Shift

## 2022-03-02 NOTE — PROGRESS NOTES
Report called to [employer] (3N). Advised nurse that patient needed new colostomy bag ASAP. Patient stable at this time.

## 2022-03-02 NOTE — PROGRESS NOTES
Patient is awake, alert and oriented. Preop questions reviewed and verified. H&P and consent completed. Report received from [facility].

## 2022-03-03 NOTE — PROGRESS NOTES
Stable  Abdomen soft, non-tender, non-distended    OK with me to resume anticoag Sunday-- need to hold a few days at least due to EMR of large colon polyp yesterday

## 2022-03-03 NOTE — PROGRESS NOTES
Hospitalist Progress Note      Name:  Miriam Hess /Age/Sex: 1929  (80 y.o. male)   MRN & CSN:  [id number] & [id number] Admission Date/Time: 2022  4:42 PM   Location:  7969/2565-Q PCP: Mykel Montenegro MD         Hospital Day: 5      Assessment and Plan:     Patient is a 80-year-old male past medical history of rectal CA status post chronic colostomy, anticoagulated with Eliquis for A. fib, who was admitted for GI bleed. Patient underwent EGD with ablation of AVM. Status post colonoscopy today     #. Melena: Likely due to bleeding AVM status post ablation  Status post EGD this admission with ablation of AVMs  Continue to hold aspirin and Eliquis for A. fib: Resume Eliquis on  per GI  Continue to monitor H&H closely and transfuse for hemoglobin 7  Continue PPIs  Status post colonoscopy on 3/2/2022 GI recommend repeat colonoscopy in 6 to 12 months. #.  Paroxysmal atrial fibrillation  Continue Cardizem  Resume Eliquis on  per GI. #.  Primary hypertension: Controlled  Continue current medications     #. Chronic urinary retention  Status post Weaver exchange 3/1/2022 by urology  Urology has cleared patient for discharge  Outpatient follow-up with urology. #.  Infrarenal abdominal aortic aneurysm  Patient will need to follow up outpatient with PCP for surveillance imaging     #. Other medical fungal cirrhotic arthritis, history of rectal pain status post colostomy        DVT prophylaxis: SCDs  CODE STATUS total support      Subjective. :     Patient was seen and examined today. No acute events overnight. GI following and recommend resuming Eliquis on . Objective:   Vitals:   Vitals:    22 0931   BP: 115/79   Pulse: 98   Resp: 17   Temp: 97.4 °F (36.3 °C)   SpO2: 97%         Physical Exam:   GEN: Awake, alert, in no apparent distress awake male, sitting upright in bed in no apparent distress. Appears given age.   HEENT: Atraumatic, normocephalic, PERRLA, EOMI  NECK: Supple, no apparent thyromegaly or masses. RESP: Clear lungs to auscultation  CARDIO/VASC: Regular rate and rhythm, normal S1, S2, no murmurs  GI: Abdomen is soft, nontender, no significant organomegaly noted, bowel sounds present  MSK: No gross joint deformities.   Skin: No significant rashes, skin lesions noted  Neuro: AOx3, cranial nerves grossly intact, no focal motor or sensory deficits   PSYCH: Affect appropriate    Medications:   Medications:    pantoprazole  40 mg Oral QAM AC    sodium chloride flush  5-40 mL IntraVENous 2 times per day    cefTRIAXone (ROCEPHIN) IV  1,000 mg IntraVENous Q24H    dilTIAZem  240 mg Oral Daily    folic acid  1 mg Oral Daily    hydroxyurea  1,000 mg Oral Daily      Infusions:    sodium chloride       PRN Meds: sodium chloride flush, 5-40 mL, PRN  sodium chloride, 25 mL, PRN  ondansetron, 4 mg, Q8H PRN   Or  ondansetron, 4 mg, Q6H PRN  acetaminophen, 650 mg, Q6H PRN   Or  acetaminophen, 650 mg, Q6H PRN          Electronically signed by Black Rowe MD on 3/3/2022 at 11:35 AM

## 2022-03-03 NOTE — PLAN OF CARE
Problem: Falls - Risk of:  Goal: Will remain free from falls  Description: Will remain free from falls  3/3/2022 0119 by Berenice Barry RN  Outcome: Met This Shift  3/2/2022 1556 by Juancarlos Webb RN  Outcome: Ongoing  3/2/2022 1554 by Juancarlos Webb RN  Outcome: Ongoing  Goal: Absence of physical injury  Description: Absence of physical injury  3/3/2022 0119 by Berenice Barry RN  Outcome: Met This Shift  3/2/2022 1556 by Juancarlos Webb RN  Outcome: Ongoing  3/2/2022 1554 by Juancarlos Webb RN  Outcome: Ongoing     Problem: Discharge Planning:  Goal: Discharged to appropriate level of care  Description: Discharged to appropriate level of care  3/3/2022 0119 by Berenice Barry RN  Outcome: Met This Shift  3/2/2022 1556 by Juancarlos Webb RN  Outcome: Ongoing  3/2/2022 1554 by Juancarlos Webb RN  Outcome: Ongoing     Problem:  Bowel Function - Altered:  Goal: Bowel elimination is within specified parameters  Description: Bowel elimination is within specified parameters  3/3/2022 0119 by Berenice Barry RN  Outcome: Met This Shift  3/2/2022 1556 by Juancarlos Webb RN  Outcome: Ongoing  3/2/2022 1554 by Juancarlos Webb RN  Outcome: Ongoing     Problem: Fluid Volume - Imbalance:  Goal: Will show no signs and symptoms of excessive bleeding  Description: Will show no signs and symptoms of excessive bleeding  3/3/2022 0119 by Berenice Barry RN  Outcome: Met This Shift  3/2/2022 1556 by Juancarlos Webb RN  Outcome: Ongoing  3/2/2022 1554 by Juancarlos Webb RN  Outcome: Ongoing  Goal: Absence of imbalanced fluid volume signs and symptoms  Description: Absence of imbalanced fluid volume signs and symptoms  3/3/2022 0119 by Berenice Barry RN  Outcome: Met This Shift  3/2/2022 1556 by Juancarlos Webb RN  Outcome: Ongoing  3/2/2022 1554 by Juancarlos Webb RN  Outcome: Ongoing     Problem: Nausea/Vomiting:  Goal: Absence of nausea/vomiting  Description: Absence of nausea/vomiting  3/3/2022 0119 by Berenice Barry RN  Outcome: Met This Shift  3/2/2022 1556 by Yadiel Cotter RN  Outcome: Ongoing  3/2/2022 1554 by Yadiel Cotter RN  Outcome: Ongoing  Goal: Able to drink  Description: Able to drink  3/3/2022 0119 by Gely Fitzgerald RN  Outcome: Met This Shift  3/2/2022 1556 by Yadiel Cotter RN  Outcome: Ongoing  3/2/2022 1554 by Yadiel Cotter RN  Outcome: Ongoing  Goal: Able to eat  Description: Able to eat  3/3/2022 0119 by Gely Fitzgerald RN  Outcome: Met This Shift  3/2/2022 1556 by Yadiel Cotter, RN  Outcome: Ongoing  3/2/2022 1554 by Yadiel Cotter RN  Outcome: Ongoing  Goal: Ability to achieve adequate nutritional intake will improve  Description: Ability to achieve adequate nutritional intake will improve  3/3/2022 0119 by Gely Fitzgerald RN  Outcome: Met This Shift  3/2/2022 1556 by Yadiel Cotter RN  Outcome: Ongoing  3/2/2022 1554 by Yadiel Cotter RN  Outcome: Ongoing

## 2022-03-03 NOTE — PROGRESS NOTES
Patient is back to baseline. Report called to nurse Juany Aranda RN (3N). Patient transported back to room 3114. Patient stable at this time.

## 2022-03-04 NOTE — PROGRESS NOTES
Hb 7.9- appears stable  Colon polyps benign  Impression:    1) occult GI bleed    2) angiodysplasia proximal small bowel- S/P coag   3) colon polyps- benign      Plan:   1) continue iron replacement   2) monitor H/H periodically as outpatient       NOTE: I will be away from now until Wednesday March 8 at 6:00 am. If GI follow up needed before then, please call the GI doctor on call  (but they will not see unless called).  I will be available, however, for phone consultation- [phone] except during times of plane flights

## 2022-03-04 NOTE — DISCHARGE SUMMARY
Discharge Summary    Name:  Roopa Crystal /Age/Sex: 1929  (80 y.o. male)   MRN & CSN:  [id number] & [id number] Admission Date/Time: 2022  4:42 PM   Attending:  Asad Bey MD Discharging Physician: Asad Bey MD     Hospital Course:   Roopa Crystal is a 80 y.o.  male  who presents with GI bleed    Hospital Course. Patient is a 49-year-old male past medical history of rectal CA status post chronic colostomy, anticoagulated with Eliquis for A. fib who was admitted for GI bleed. Patient Eliquis was held. He was started on gentle IV fluid hydration and GI was consulted. Patient underwent EGD which was significant for AVM which were ablated. He underwent colonoscopy on 3/2/2022 with GI. Colonoscopy was significant for polyps. GI recommended that patient should resume his Eliquis on . Patient was given IV Venofer since Mateo suggestive of iron deficiency anemia. Since patient hemoglobin is stable and improving and since he has been cleared by GI for discharge, he will therefore be discharged back home in stable condition with PPIs and ferrous sulfate. He was verbally counseled to resume Eliquis on  and follow-up with his PCP and gastroenterologist outpatient for transition of care. Expressed verbal understanding for discussion. The patient expressed appropriate understanding of and agreement with the discharge recommendations, medications, and plan.      Consults this admission:  IP CONSULT TO GI  IP CONSULT TO HOSPITALIST  IP CONSULT TO GI  IP CONSULT TO UROLOGY    Discharge Instruction:   Follow up appointments:   Primary care physician:  within 2 weeks    Diet:  cardiac diet   Activity: activity as tolerated  Disposition: Discharged to:   [x]Home, []C, []SNF, []Acute Rehab, []Hospice   Condition on discharge: Stable    Discharge Medications:        Medication List      START taking these medications    ferrous sulfate 325 (65 Fe) MG tablet  Commonly known as: IRON 325  Take 1 tablet by mouth 2 times daily     pantoprazole 40 MG tablet  Commonly known as: PROTONIX  Take 1 tablet by mouth daily        CHANGE how you take these medications    * apixaban 2.5 MG Tabs tablet  Commonly known as: Eliquis  Take 1 tablet by mouth 2 times daily Please resume this medication on Sunday, march 6th  What changed: additional instructions     * apixaban 2.5 MG Tabs tablet  Commonly known as: Eliquis  Take 1 tablet by mouth 2 times daily Resume on Sunday march 6  What changed: additional instructions         * This list has 2 medication(s) that are the same as other medications prescribed for you. Read the directions carefully, and ask your doctor or other care provider to review them with you.             CONTINUE taking these medications    adalimumab 40 MG/0.8ML injection  Commonly known as: HUMIRA     aspirin 81 MG chewable tablet     dilTIAZem 240 MG extended release capsule  Commonly known as: Dilt-XR  Take 1 capsule by mouth once daily     folic acid 1 MG tablet  Commonly known as: FOLVITE  Take 1 tablet by mouth daily     hydroxyurea 500 MG chemo capsule  Commonly known as: HYDREA  Take 2 capsules by mouth once daily     therapeutic multivitamin-minerals tablet     VITAMIN E PO           Where to Get Your Medications      These medications were sent to [address] [place] [address]    Phone: [phone]   · ferrous sulfate 325 (65 Fe) MG tablet  · pantoprazole 40 MG tablet     You can get these medications from any pharmacy    Bring a paper prescription for each of these medications  · apixaban 2.5 MG Tabs tablet  · apixaban 2.5 MG Tabs tablet         Objective Findings at Discharge:   BP (!) 156/92   Pulse 87   Temp 97.9 °F (36.6 °C) (Oral)   Resp 23   Ht 5' 9\" (1.753 m)   Wt 172 lb (78 kg)   SpO2 97%   BMI 25.40 kg/m²            PHYSICAL EXAM GEN Awake, Alert, in no apparent distress  HEENT Atraumatic, nomocephalic, PERRLA, EOMI  NECK Supple, no lymphadenopaty  RESP Clear lungs to auscultation bilaterally  CARDIO/VASC Normal S1/S2. RRRR. No mumurs. GI Abdomen is soft without significant tenderness, masses, or guarding. Bowel sounds +  MSK No gross joint deformities. SKIN Normal coloration, warm, dry. NEURO Cranial nerves appear grossly intact, normal speech, no lateralizing weakness. PSYCH Awake, alert, oriented x 3. Affect appropriate.     BMP/CBC  Recent Labs     03/02/22  0021 03/03/22  0251 03/04/22  1056    135  --    K 4.0 4.0  --     102  --    CO2 24 23  --    BUN 15 15  --    CREATININE 1.1 1.1  --    WBC 3.8* 5.1 4.0   HCT 26.1* 25.9* 25.8*    218 235       IMAGING:      Discharge Time of 35 minutes    Electronically signed by Eryn Cao MD on 3/4/2022 at 11:56 AM

## 2022-03-04 NOTE — DISCHARGE INSTR - DIET
Good nutrition is important when healing from an illness, injury, or surgery. Follow any nutrition recommendations given to you during your hospital stay. If you were given an oral nutrition supplement while in the hospital, continue to take this supplement at home. You can take it with meals, in-between meals, and/or before bedtime. These supplements can be purchased at most local grocery stores, pharmacies, and chain [employer]-stores. If you have any questions about your diet or nutrition, call the hospital and ask for the dietitian.       RESUME PRE-ADMISSION DIET AS TOLERATED

## 2022-03-07 NOTE — CARE COORDINATION
[facility] Transitions Initial Follow Up Call    Call within 2 business days of discharge: Yes    Patient: José Miguel Mittal Patient : 1929   MRN: [id number]  Reason for Admission: GIB, UTI  Discharge Date: 3/4/22 RARS: Readmission Risk Score: 15 ( )  Facility: [facility]      Last Discharge [facility]       Complaint Diagnosis Description Type Department Provider    22 Fatigue; Rectal Bleeding Gastrointestinal hemorrhage, unspecified gastrointestinal hemorrhage type . .. ED to Hosp-Admission (Discharged) (Michele Wang) Silke Javier MD; Triston Bro MD; S...         Non-face-to-face services provided:  Obtained and reviewed discharge summary and/or continuity of care documents    Care Transitions 24 Hour Call    Do you have any ongoing symptoms?: Yes  Patient-reported symptoms: Weakness  Interventions for patient-reported symptoms: Other (Comment: denied need)  Do you have a copy of your discharge instructions?: Yes  Do you have all of your prescriptions and are they filled?: Yes  Have you been contacted by a [address]?: No  Have you scheduled your follow up appointment?: No (Comment: see note)  Were you discharged with any Home Care or Post Acute Services: No  Do you feel like you have everything you need to keep you well at home?: Yes  Care Transitions Interventions  No Identified Needs   Meals on Wheels: [address]: Declined      Social Work: Declined         Future Appointments   Date Time Provider Sergio Escalante   3/10/2022  1:00 PM Aleah Alcala [address] [place] [address]   2022 10:20 AM Fredricka Mcardle, [address] [facility]   2022 10:10 AM Charles Clifton MD  [facility]   2022 10:00 AM SCHEDULE, [address] [facility] MED ONC LAB [facility] MED ONC [facility]   2022 10:15 AM Petra Power MD [address] [facility]   2022  1:00 PM Kaila Joy DO [address] [facility] [facility]     Challenges to be reviewed by the provider   Additional needs identified to be addressed with provider:       Method of communication with provider : none    Elfrieda Beat    Was this a readmission? No  Patient stated reason for admission: Blood in colostomy  Patients top risk factors for readmission: medical condition-DM, Isch CMP, CAD, CKD, Htn    Care Transition Nurse (CTN) contacted the patient by telephone to perform post hospital discharge assessment. Verified name and  with patient as identifiers. Provided introduction to self, and explanation of the CTN role. Phone Assessment: Patient reports doing well since home. Reports still \"alittle weak but getting strength back\". Advised pacing activity. Reports colostomy w/ brown stool. Denies blood in stool, black/tarry stools, abd pain/bloating. Reports s/p cath patent w/ clear yellow urine, qs. Denies dizziness, fatigue, sob, ac distress, fever, chills, malaise. Advised avoiding NSAIDS, alcohol. Discussed sx which require MD notification, verbalizes understanding. Reviewed fall safety interventions. AVS/Meds: Confirmed copy of AVS received, reviewed with Patient. Confirmed Patient obtained and is taking Iron, Protonix, Eliquis as directed. Med education provided, questions answered, verbalizes understanding. Stressed importance of med compliance. 1111F medication review completed with Patient . Advised obtaining 90 day supply of routine, prn meds. Advised contacting pharmacy/md for refill needs. Resource Need Assessment:   Living Arrangements: lives alone w/ local family support  ADLS:independent, drives   DME:walker, cane and emergency response button   Transportation: self  HHC:none, denies need   Community Assistance:none, denies need   Referrals Made per CTN with Patient/Family Approval: [phone] [facility] Follow Up Appointments: Discussed importance of 7 day hospital follow up appointment for continuity of care. PCP has retired, does not have new patient appt w/ Dr Weston Box until 22.  Dr Crow Mcdonough unable to see him until 4/29/22. Discussed benefits of WIC as bridge; agreeable for CTN to schedule appt. [id number] Education:   Educated patient about risk for severe COVID-19 due to risk factors according to CDC guidelines. Reviewed red flag symptoms with patient who verbalized understanding. Discussed COVID vaccination status: Yes- has received Pfizer x 3. Had Brandon Mooney late Dec 2020. Discussed exposure protocols and quarantine with CDC Guidelines. Patient was given an opportunity to verbalize any questions and concerns and agrees to contact CTN or health care provider for questions related to their healthcare. Reviewed, advised Covid19 preventative measures including mask covering nose/mouth, social distancing, hand washing. Advance Care Planning  Reports Advanced Directives up to date, reflecting current wishes . Encouraged to place on file w/ PCP, [facility]. Healthcare Decision Maker:    Primary Decision Maker: Karen Glass - Child - [phone]    Secondary Decision Maker: Fadi Buckley - Child - [phone]    Secondary Decision Maker: Oumou Heredia - Child - [phone]    Advised to contact PCP (once established) 24/7 re: any health concerns for early outpatient intervention in an effort to avoid hospitalization. Discussed benefits of WIC, Urgent Care. Advised 911 if noting acute distress. CTN provided contact information. Plan for follow-up call in 3-5 days based on severity of symptoms and risk factors. Plan for next call: f/u on [facility] appt, sx, resource needs     Scheduled 3/10/22 1pm appt w/ Yobany Brooks NP- WIC. Patient agreeable.      [address] [place] [address], [phone]

## 2022-03-10 NOTE — PROGRESS NOTES
3/10/2022    Parker Rivera    Chief Complaint   Patient presents with    Follow-Up from Hospital     GI bleed and hemorrhage, incidentally a growth was found       HPI  History was obtained from patient. His son is with him today, but chose to wait in the waiting room. Cooper Gibson is a 80 y.o. male who presents today for hospital follow-up. He was admitted at [facility] 2- through 3-4-2022 secondary to GI bleed. Hx of rectal cancer status post chronic colostomy, anticoagulated with Eliquis for A. fib. During his admission, GI Dr. Sha Higuera was consulted and patient underwent EGD which was significant for AVM which were ablated. He then also underwent colonoscopy which revealed polyps. Eliquis was held, but patient was told to resume it 4 days ago on 3620 22. He was started on ferrous sulfate 325 mg twice daily for iron deficiency anemia. He was also started on pantoprazole 40 mg daily. He reports compliance with those medications and has not experienced side effects. Hemoglobin was stable during admission, last recorded 6 days ago at 8.1. Patient has no complaints today. States he does still feel a bit \"winded\" with little exertion and has experienced this since his hospital stay. He was told by GI that this should improve once anemia improves. He has been monitoring his colostomy bag and has not seen bloody or melenic stool. Sees Dr. Sha Higuera next month. REVIEW OF SYMPTOMS    Constitutional:  Denies fever, chills  Eyes:  Denies vision changes, photophobia or discharge  ENT:  Denies cold-like symptoms  Cardiovascular:  Denies chest pain, palpitations or swelling  Respiratory:  Admits mild shortness of breath with exertion. See HPI. Denies cough or wheezing  GI:  Denies abdominal pain or distention. Denies bloody or melenic stool in colostomy bag.   Musculoskeletal:  Denies pain  Skin:  Denies rash or pain at stoma  Neurologic:  Denies headache, focal weakness, or sensory changes  Lymphatic:  Denies swollen glands  Psychiatric:  Denies suicidal ideation or homicidal ideation    PAST MEDICAL HISTORY  Past Medical History:   Diagnosis Date    [facility]     Angina, class I ([facility]     Benign prostatic hyperplasia 2011     Updating Deprecated Diagnoses    Bowel obstruction ([facility]     CAD (coronary artery disease)     Cancer ([facility]     rectal    CCC (chronic calculous cholecystitis) 2018    Gastroesophageal reflux disease without esophagitis 2020    Hx of cardiovascular stress test 2021    EF 62% Normal study    Hypertension     Primary malignant neoplasm of rectum ([facility] 2019    Psoriasis     Psoriatic arthritis ([facility] 2019    Pyelonephritis 2018    Stable angina ([facility] 2019    Unspecified cerebral artery occlusion with cerebral infarction        FAMILY HISTORY  Family History   Problem Relation Age of Onset    Cancer Mother     Cancer Father        SOCIAL HISTORY  Social History     Socioeconomic History    Marital status:       Spouse name: None    Number of children: None    Years of education: None    Highest education level: None   Occupational History    None   Tobacco Use    Smoking status: Former Smoker     Packs/day: 1.50     Years: 40.00     Pack years: 60.00     Types: Cigarettes     Quit date: 1973     Years since quittin.1    Smokeless tobacco: Never Used   Vaping Use    Vaping Use: Never used   Substance and Sexual Activity    Alcohol use: No    Drug use: No    Sexual activity: Never   Other Topics Concern    None   Social History Narrative    None     Social Determinants of Health     Financial Resource Strain:     Difficulty of Paying Living Expenses: Not on file   Food Insecurity:     Worried About Running Out of Food in the Last Year: Not on file    Haily of Food in the Last Year: Not on file   Transportation Needs:     Lack of Transportation (Medical): Not on file    Lack of Transportation (Non-Medical): Not on file   Physical Activity:     Days of Exercise per Week: Not on file    Minutes of Exercise per Session: Not on file   Stress:     Feeling of Stress : Not on file   Social Connections:     Frequency of Communication with Friends and Family: Not on file    Frequency of Social Gatherings with Friends and Family: Not on file    Attends Mandaeism Services: Not on file    Active Member of [address] [place] [address] or Organizations: Not on file    Attends Club or Organization Meetings: Not on file    Marital Status: Not on file   Intimate Partner Violence:     Fear of Current or Ex-Partner: Not on file    Emotionally Abused: Not on file    Physically Abused: Not on file    Sexually Abused: Not on file   Housing Stability:     Unable to Pay for Housing in the Last Year: Not on file    Number of [place] in the Last Year: Not on file    Unstable Housing in the Last Year: Not on file        SURGICAL HISTORY  Past Surgical History:   Procedure Laterality Date    APPENDECTOMY      COLONOSCOPY N/A 3/2/2022    COLONOSCOPY POLYPECTOMY REMOVAL ABLATION/STOMA with EMR and placed 7 hemoclips.  performed by Margo Munoz MD at [address] [place] [address] ENDOSCOPY N/A 2/28/2022    ENTEROSCOPY PUSH CONTROL HEMORRHAGE WITH APC ABLATION OF AVM performed by Margo Munoz MD at [address] [place] [address]  Current Outpatient Medications   Medication Sig Dispense Refill    isosorbide mononitrate (IMDUR) 30 MG extended release tablet Take 30 mg by mouth daily      apixaban (ELIQUIS) 2.5 MG TABS tablet Take 1 tablet by mouth 2 times daily Please resume this medication on Sunday, march 6th 60 tablet 5    pantoprazole (PROTONIX) 40 MG tablet Take 1 tablet by mouth daily 30 tablet 5    ferrous sulfate (IRON 325) 325 (65 Fe) MG tablet Take 1 tablet by mouth 2 times daily 60 tablet 1    dilTIAZem (DILT-XR) 240 MG extended release capsule Take 1 capsule by mouth once daily 90 capsule 1    folic acid (FOLVITE) 1 MG tablet Take 1 tablet by mouth daily 90 tablet 1    hydroxyurea (HYDREA) 500 MG chemo capsule Take 2 capsules by mouth once daily 180 capsule 1    aspirin 81 MG chewable tablet Take 81 mg by mouth daily      adalimumab (HUMIRA) 40 MG/0.8ML injection Inject 40 mg into the skin once      Multiple Vitamins-Minerals (THERAPEUTIC MULTIVITAMIN-MINERALS) tablet Take 1 tablet by mouth daily      VITAMIN E PO Take 1 tablet by mouth daily       No current facility-administered medications for this visit. ALLERGIES  Allergies   Allergen Reactions    Sulfa Antibiotics        PHYSICAL EXAM    /78   Pulse 99   Temp 97.9 °F (36.6 °C) (Oral)   Wt 181 lb (82.1 kg)   SpO2 98%   BMI 26.73 kg/m²     Constitutional:  Well developed, well nourished. Looks much younger than stated age. He is pleasant and cooperative. Talkative. HENT:  Normocephalic, atraumatic. Is wearing a life alert button around his neck. Eyes:  conjunctiva normal, no discharge, no scleral icterus  Neck:  No tenderness, supple  Lymphatic:  No lymphadenopathy noted  Cardiovascular:  Normal heart rate, normal rhythm, no murmurs, gallops or rubs  Thorax & Lungs:  Normal breath sounds, no respiratory distress, no wheezing, no rales, no rhonchi  Abdomen: Soft greenish appearing stool in colostomy bag. No blood or melena. No signs of stoma infection. Abdomen is soft. Nontender. No palpable mass organomegaly. No distention.   Skin:  Warm, dry, no erythema, no rash  Back:  No CVA tenderness  Extremities:  No edema, no tenderness, no cyanosis, no clubbing  Musculoskeletal:  Good range of motion  all major joints, no tenderness to palpation or major deformities noted  Neurologic:  Alert & oriented X 3, normal motor function, normal sensory function, no focal deficits noted  Psychiatric:  Affect normal, mood normal    ASSESSMENT & PLAN    [facility] was seen today for follow-up from hospital.    Diagnoses and all orders for this visit:    Hospital discharge follow-up    History of GI bleed  -     CBC with Auto Differential; Future    History of anemia  -     CBC with Auto Differential; Future       Recheck CBC to monitor anemia. We will call with results. Continue regular medications, including those added at recent hospital discharge, pantoprazole and ferrous sulfate  Continue to closely monitor colostomy bag for signs of GI bleed  Continue to wear your life alert button  Continue close follow-up with PCP and gastroenterology  ER for any sudden changes    Medications Discontinued During This Encounter   Medication Reason    apixaban (ELIQUIS) 2.5 MG TABS tablet DUPLICATE        No follow-ups on file. Plan of care reviewed with patient who verbalizes understanding and wishes to continue. Patient verbalizes understanding with the above plan and is in agreement. Patient will call with  worsening of symptoms, questions or concerns. Please note that this chart was generated using dragon dictation software. Although every effort was made to ensure the accuracy of this automated transcription, some errors in transcription may have occurred.     Electronically signed by Cl[facility] Memorial HospitalPHYLLIS on 3/10/2022

## 2022-03-10 NOTE — CARE COORDINATION
[facility] Transitions Follow Up Call    3/10/2022    Patient: Mayra Santoyo  Patient : 1929   MRN: [id number]  Reason for Admission: GI Hemorrhage  Discharge Date: 3/4/22 RARS: Readmission Risk Score: 15 ( )         Attempted to contact patient for transitions call. Contact information left to  requesting call back at the earliest convenience. Noted pt has HFU appt today. Will re attempt at later time.      Follow Up  Future Appointments   Date Time Provider Sergio Escalante   3/10/2022  1:00 PM Penelope Alcala [facility]   2022 10:20 AM Carolyn Powell, [address] Heart [facility]   2022 10:10 AM Kana Hernandez MD [facility] Gastro [facility]   2022 10:00 AM KRISHNA Jo MED ONC LAB [facility] MED ONC [place]   2022 10:15 AM Butch Halsted, MD [facility] CC [facility]   2022  1:00 PM Ese Ruffin DO [facility] FPS [facility]       Coni Jacobs RN

## 2022-03-15 NOTE — CARE COORDINATION
[facility] Transitions Follow Up Call    3/15/2022    Patient: Esther Pineda  Patient : 1929   MRN: [id number]  Reason for Admission: GI Hemorrhage  Discharge Date: 3/4/22 RARS: Readmission Risk Score: 15 ( )         Spoke with: Laura Art    Care Transitions Follow Up Call    Spoke to Laura Rubens who stated he is improving every day, getting his strength back. Stated he went out to eat today, going to car wash tomorrow and to girlfriend's for dinner this week. Plans to go to [employer] this . Stated \"I fell better than I have in about 9 months\". Sated he is resting b/w activity. Reviewed recent CBC from 3/11/22 with patient. Pt remains anemic, improvement since previous CBC, has f/u lab work scheduled. Discussed common symptoms of anemia, advised to call office or return to ED for severe symptoms. Pt wears Life Alert, used last time he was admitted. Pt taking pantoprazole and iron as direted, encouraged to take Vitamin C with iron for increased absorption. Stated iron makes stools black, was afraid at first that he had another bleed, discussed with PCP, will continue to monitor stools. Pt has colostomy bag. Pt given writer's number, encouraged to call with questions or concerns. Needs to be reviewed by the provider   Additional needs identified to be addressed with provider: No  none             Method of communication with provider : none      Care Transition Nurse (CTN) contacted the patient by telephone to follow up after admission on 22. Verified name and  with patient as identifiers. Addressed changes since last contact: energy improving, reviewed CBC from 3/11/22     Discussed follow-up appointments. Pt has Cardiology on 22      CTN reviewed discharge instructions, medical action plan and red flags with patient and discussed any barriers to care and/or understanding of plan of care after discharge.  Discussed appropriate site of care based on symptoms and resources available to patient including: PCP, Specialist and When to call 911. The patient agrees to contact the PCP office for questions related to their healthcare. Patients top risk factors for readmission: medical condition-HTN, Hx rectal CA, anemia, Hx CVA  Interventions to address risk factors: Obtained and reviewed discharge summary and/or continuity of care documents        CTN provided contact information for future needs. Plan for follow-up call in 5-7 days based on severity of symptoms and risk factors. Plan for next call: symptom management-anemia, fatigue        Care Transitions Subsequent and Final Call    Subsequent and Final Calls  Do you have any ongoing symptoms?: Yes  Onset of Patient-reported symptoms: Other  Patient-reported symptoms: Fatigue  Interventions for patient-reported symptoms: Other  Have your medications changed?: No  Do you have any questions related to your medications?: No  Do you currently have any active services?: No  Do you have any needs or concerns that I can assist you with?: No  Identified Barriers: None  Care Transitions Interventions   Meals on Wheels: Declined       Senior Services: Declined      Social Work: Declined    Other Interventions:            Follow Up  Future Appointments   Date Time Provider Sergio Escalante   4/4/2022 10:20 AM CHEMA Cummings - CNP [facility]   4/29/2022 10:10 AM Kana Hernandez MD Formerly named [facility] [place] G[facility]   5/5/2022 10:00 AM KRISHNA Jo MED ONC LAB [facility] MED ONC [place]   5/5/2022 10:15 AM Butch Halsted, MD [address] [place] CC [facility]   8/5/2022  1:00 PM Ese Ruffin DO [facility] [address] [place] FPS PAWAN Jacobs RN

## 2022-03-20 PROBLEM — N39.0 COMPLICATED UTI (URINARY TRACT INFECTION): Status: ACTIVE | Noted: 2022-01-01

## 2022-03-20 NOTE — H&P
V2.0  History and Physical      Name:  Darryle Rater /Age/Sex: 1929  (80 y.o. male)   MRN & CSN:  [id number] & [id number] Encounter Date/Time: 3/20/2022 5:18 PM EDT   Location:  ED28/ED-28 PCP: Porfirio Rosario MD       Hospital Day: 1    Assessment and Plan:   Darryle Rater is a 80 y.o. male with a pmh of  PAF, AVM, Polyps, Psoriatic arthritis, HTN, GERD, BPH,  who presents with  fatigue    Complicated UTI  Hx of BPH and chronic urinary catheter  -Rocephin IV  -Urine cx  -Urology consult    GI bleed  # S/P polypectomy removal ablation/stoma with EMR and placed 7 hemoclips (3/22/22)  Positive guaiac. 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 Had EGD with significant AVM which were ablated and colonoscopy which revealed polyps at the beginning of this month. Hgb 9.6  -Hold Eliquis  -Continue Ferrous sulfate  -Protonix IV  -Monitor H&H  - GI consult, pt known to Dr. Leonel Mayberry    PAF, Stable  -On Eliquis, held due to GI bleed  -Continue Cardizem    Chronic Issues  -HTN  -GERD  -CVA without residual     Disposition:   Current Living situation: Home  Expected Disposition: Home   Estimated D/C: 3/22/22    Diet Regular   DVT Prophylaxis [] Lovenox, []  Heparin, [] SCDs, [] Ambulation,  [x] Eliquis, [] Xarelto   Code Status Full code   Surrogate Decision Maker/ POA Self/ Shanelle Garcia ( POA)     History from:     Patient     History of Present Illness:     Chief Complaint: Fatigue  Darryle Rater is a 80 y.o. male with pmh of PAF, AVM, Polyps, Psoriatic arthritis, HTN, GERD, BPH,  who presents with worsening fatigue that has been going on for the past couple days. Patient had an EGD done with significant AVM which were ablated and colonoscopy which revealed polyps last month. PT has a colostomy bag and reports dark stool for couple days. He is currently on Eliquis for his A-fib and has been taking iron supplements.  Denies chest pain, shortness of breath, fever, chills, nausea, vomiting, diarrhea, or headache. Review of Systems: Need 10 Elements   Review of Systems   Constitutional: Positive for activity change and fatigue. Negative for chills and fever. HENT: Negative for congestion, dental problem, hearing loss, rhinorrhea, tinnitus and voice change. Eyes: Negative for visual disturbance. Respiratory: Negative for cough, chest tightness and shortness of breath. Cardiovascular: Negative for chest pain and palpitations. Gastrointestinal: Positive for blood in stool. Negative for diarrhea, nausea and vomiting. Endocrine: Negative for polydipsia, polyphagia and polyuria. Genitourinary: Positive for difficulty urinating. Negative for dysuria. Chronic catheter   Musculoskeletal: Negative for joint swelling and myalgias. Skin: Negative for pallor and wound. Neurological: Positive for dizziness and weakness. Negative for seizures and headaches. Psychiatric/Behavioral: Negative for confusion and suicidal ideas. Objective:   No intake or output data in the 24 hours ending 03/20/22 1718   Vitals:   Vitals:    03/20/22 1305 03/20/22 1332 03/20/22 1402   BP: (!) 136/96 117/80 104/87   Pulse: 66     Resp: 16     Temp: 97.9 °F (36.6 °C)     TempSrc: Oral     SpO2: 98% 97% 95%   Weight: 181 lb (82.1 kg)         Medications Prior to Admission     Prior to Admission medications    Medication Sig Start Date End Date Taking?  Authorizing Provider   isosorbide mononitrate (IMDUR) 30 MG extended release tablet Take 30 mg by mouth daily    Historical Provider, MD   apixaban (ELIQUIS) 2.5 MG TABS tablet Take 1 tablet by mouth 2 times daily Please resume this medication on Ziggy, march 6th 3/4/22   Conner Arceo MD   pantoprazole (PROTONIX) 40 MG tablet Take 1 tablet by mouth daily 3/4/22   Conner Arceo MD   ferrous sulfate (IRON 325) 325 (65 Fe) MG tablet Take 1 tablet by mouth 2 times daily 3/4/22   Conner Arceo MD   dilTIAZem (DILT-XR) 240 MG extended release capsule Take 1 Diagnoses    Bowel obstruction ([facility]     CAD (coronary artery disease)     Cancer (HCC)     rectal    CCC (chronic calculous cholecystitis) 2018    Gastroesophageal reflux disease without esophagitis 2020    Hx of cardiovascular stress test 2021    EF 62% Normal study    Hypertension     Primary malignant neoplasm of rectum ([facility] 2019    Psoriasis     Psoriatic arthritis ([facility] 2019    Pyelonephritis 2018    Stable angina ([facility] 2019    Unspecified cerebral artery occlusion with cerebral infarction      PSHX:  has a past surgical history that includes Appendectomy; colostomy; Upper gastrointestinal endoscopy (N/A, 2022); and Colonoscopy (N/A, 3/2/2022). Allergies: Allergies   Allergen Reactions    Sulfa Antibiotics      Fam HX:  family history includes Cancer in his father and mother. Soc HX:   Social History     Socioeconomic History    Marital status:      Spouse name: None    Number of children: None    Years of education: None    Highest education level: None   Occupational History    None   Tobacco Use    Smoking status: Former Smoker     Packs/day: 1.50     Years: 40.00     Pack years: 60.00     Types: Cigarettes     Quit date:      Years since quittin.1    Smokeless tobacco: Never Used   Vaping Use    Vaping Use: Never used   Substance and Sexual Activity    Alcohol use: No    Drug use: No    Sexual activity: Never   Other Topics Concern    None   Social History Narrative    None     Social Determinants of Health     Financial Resource Strain:     Difficulty of Paying Living Expenses: Not on file   Food Insecurity:     Worried About Running Out of Food in the Last Year: Not on file    Haily of Food in the Last Year: Not on file   Transportation Needs:     Lack of Transportation (Medical): Not on file    Lack of Transportation (Non-Medical):  Not on file   Physical Activity:     Days of Exercise per Week: Not on file    Minutes of Exercise per Session: Not on file   Stress:     Feeling of Stress : Not on file   Social Connections:     Frequency of Communication with Friends and Family: Not on file    Frequency of Social Gatherings with Friends and Family: Not on file    Attends [employer] Services: Not on file    Active Member of [address] [place] [address] or Organizations: Not on file    Attends Club or Organization Meetings: Not on file    Marital Status: Not on file   Intimate Partner Violence:     Fear of Current or Ex-Partner: Not on file    Emotionally Abused: Not on file    Physically Abused: Not on file    Sexually Abused: Not on file   Housing Stability:     Unable to Pay for Housing in the Last Year: Not on file    Number of [place] in the Last Year: Not on file    Unstable Housing in the Last Year: Not on file       Medications:   Medications:    cefTRIAXone (ROCEPHIN) IV  1,000 mg IntraVENous Once      Infusions:   PRN Meds:      Labs      CBC:   Recent Labs     03/20/22  1304   WBC 5.8   HGB 9.6*        BMP:    Recent Labs     03/20/22  1304      K 4.2      CO2 23   BUN 23   CREATININE 1.2   GLUCOSE 115*     Hepatic:   Recent Labs     03/20/22  1304   AST 14*   ALT 7*   BILITOT 0.3   ALKPHOS 64     Lipids: No results found for: CHOL, HDL, TRIG  Hemoglobin A1C: No results found for: LABA1C  TSH: No results found for: TSH  Troponin:   Lab Results   Component Value Date    TROPONINT <0.010 03/20/2022    TROPONINT <0.010 02/27/2022    TROPONINT <0.010 03/23/2018     Lactic Acid: No results for input(s): LACTA in the last 72 hours. BNP: No results for input(s): PROBNP in the last 72 hours.   UA:  Lab Results   Component Value Date    NITRU NEGATIVE 03/20/2022    COLORU YELLOW 03/20/2022    WBCUA 607 03/20/2022    RBCUA 91 03/20/2022    MUCUS RARE 03/20/2022    TRICHOMONAS NONE SEEN 03/20/2022    BACTERIA NEGATIVE 03/20/2022    CLARITYU SLIGHTLY CLOUDY 03/20/2022    SPECGRAV >1.030 03/20/2022 LEUKOCYTESUR MODERATE 03/20/2022    UROBILINOGEN 0.2 03/20/2022    BILIRUBINUR NEGATIVE 03/20/2022    BLOODU LARGE AMOUNT/NUMBER OF 03/20/2022    KETUA NEGATIVE 03/20/2022     Urine Cultures: No results found for: LABURIN  Blood Cultures: No results found for: BC  No results found for: BLOODCULT2  Organism: No results found for: ORG    Imaging/Diagnostics Last 24 Hours   CT Head WO Contrast    Result Date: 3/20/2022  EXAMINATION: CT OF THE HEAD WITHOUT CONTRAST  3/20/2022 3:36 pm TECHNIQUE: CT of the head was performed without the administration of intravenous contrast. Dose modulation, iterative reconstruction, and/or weight based adjustment of the mA/kV was utilized to reduce the radiation dose to as low as reasonably achievable. COMPARISON: None. HISTORY: ORDERING SYSTEM PROVIDED HISTORY: weakness TECHNOLOGIST PROVIDED HISTORY: Reason for exam:->weakness Has a \"code stroke\" or \"stroke alert\" been called? ->No Decision Support Exception - unselect if not a suspected or confirmed emergency medical condition->Emergency Medical Condition (MA) Reason for Exam: weakness FINDINGS: BRAIN/VENTRICLES: There is no acute intracranial hemorrhage, mass effect or midline shift. No abnormal extra-axial fluid collection. The gray-white differentiation is maintained without evidence of an acute infarct. There is prominence of the ventricles and sulci due to global parenchymal volume loss. There are nonspecific areas of hypoattenuation within the periventricular and subcortical white matter, which likely represent chronic microvascular ischemic change. ORBITS: The visualized portion of the orbits demonstrate no acute abnormality. SINUSES: The visualized paranasal sinuses and mastoid air cells demonstrate no acute abnormality. SOFT TISSUES/SKULL: No acute abnormality of the visualized skull or soft tissues. No acute intracranial abnormality.      XR CHEST PORTABLE    Result Date: 3/20/2022  EXAMINATION: ONE XRAY VIEW OF THE CHEST 3/20/2022 1:54 pm COMPARISON: Chest x-ray February 27, 2022 HISTORY: ORDERING SYSTEM PROVIDED HISTORY: weakness TECHNOLOGIST PROVIDED HISTORY: Reason for exam:->weakness Reason for Exam: weakness FINDINGS: Cardiac silhouette is enlarged but stable. No focal consolidation, pleural effusion, or pneumothorax identified. Osseous structures appear stable. 1. No acute cardiopulmonary process identified.        Personally reviewed Lab Studies, Imaging, and discussed case with Dr. Gayle Monaco    Electronically signed by CHEMA Aguilar CNP on 3/20/2022 at 5:18 PM

## 2022-03-20 NOTE — ED NOTES
Patient admitted to room [id number], report called to LESTER.       Celeste Cantor RN  03/20/22 8085

## 2022-03-20 NOTE — ED NOTES
1709 paged hospitalist     Fatoumata Navarro  03/20/22 1711  1714 Monica JEAN-BAPTISTE with [facility] returned call      Fatoumata Navarro  03/20/22 1721

## 2022-03-20 NOTE — ED TRIAGE NOTES
Patient arrives to ED via EMS for complaints of increased fatigue and weakness. Patient reports that he recently had a hemorrhage and was afraid that was what was happening. Patient reports that his stool from his colostomy bag has been darker than normal. Patient denies pain at this time.

## 2022-03-20 NOTE — ED NOTES
Stool and urine specimen collected and sent to lab at this time.       Andrez Aragon RN  03/20/22 2498

## 2022-03-20 NOTE — CARE COORDINATION
Patient identified as potential readmission. Last admission 2/27-3/4 for GI bleed. Patient here today for fatigue and irregular heart beat. CM met with patient to begin discharge planning. CM introduced self and CM role. Patient's daughter Dottie Mcgregor at bedside. Patient's daughter voiced frustration and dissatisfaction with patient care while in the emergency department. CM provided Dottie Mcgregor with the contact information for patient advocate and CM notified charge nurse Julian Reese. Patient reports [facility] [place]. Patient has a PCP and insurance which assists with medication affordability. Patient uses the following DME: cane, walker. Patient drives and is able to get to and from doctor's appointments. Patient reports he is typically independent with ADLs. Patient states he continues to attend [employer] every Sunday and spends time with his girlfriend. Patient does not currently have [facility], but is interested in [facility] upon discharge. CM provided patient with [facility] list and medicare. gov ratings. Patient is requiring readmission and there were no alternatives to admission to explore at this time. Patient plan upon discharge is to return home.

## 2022-03-20 NOTE — PROGRESS NOTES
4 Eyes Skin Assessment     NAME:  Alysia Dillard OF BIRTH:  8/23/1929  MEDICAL RECORD NUMBER:  [id number]    The patient is being assess for  Admission    I agree that 2 RN's have performed a thorough Head to Toe Skin Assessment on the patient. ALL assessment sites listed below have been assessed. Areas assessed by both nurses:    Head, Face, Ears, Shoulders, Back, Chest, Arms, Elbows, Hands, Sacrum. Buttock, Coccyx, Ischium and Legs. Feet and Heels        Does the Patient have a Wound?  No noted wound(s)       Rainer Prevention initiated:  No   Wound Care Orders initiated:  No    Pressure Injury (Stage 3,4, Unstageable, DTI, NWPT, and Complex wounds) if present place consult order under [de-identified] Yes    New and Established Ostomies if present place consult order under : Yes      Nurse 1 eSignature: Electronically signed by James Damon RN on 3/20/22 at 7:20 PM EDT    **SHARE this note so that the co-signing nurse is able to place an eSignature**    Nurse 2 eSignature: Ed Serve LPN

## 2022-03-20 NOTE — ED PROVIDER NOTES
[facility] ED Attending Note:    NAME: Nuris Wiggins 80 y.o.  CSN: [id number]  MRN: [id number]   PCP:    Angelic Wright MD      History:     Chief Complaint:    Fatigue     HPI:      The history was obtained from the patient and daughter  Maximiliano is a 80 y.o. male who presents with generalized weakness. Patient states that he was trying to get up from the bed today when he was too weak to ambulate. Porch generalized weakness denies any focal symptoms. Patient does have a history of GI bleeds. Patient is currently on Eliquis for his atrial fibrillation. Patient also takes iron supplements. Stated that he notices his stools to be black from his colostomy bag. Any chest pain, shortness of breath, headache, fevers, chills, nausea vomiting or diarrhea. 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    Gastroesophageal reflux disease without esophagitis 05/21/2020    Hx of cardiovascular stress test 08/12/2021    EF 62% Normal study    Hypertension     Primary malignant neoplasm of rectum ([address] 08/17/2019    Psoriasis     Psoriatic arthritis ([address] 08/17/2019    Pyelonephritis 03/24/2018    Stable angina ([address] 08/17/2019    Unspecified cerebral artery occlusion with cerebral infarction        PMSx:    Past Surgical History:   Procedure Laterality Date    APPENDECTOMY      COLONOSCOPY N/A 3/2/2022    COLONOSCOPY POLYPECTOMY REMOVAL ABLATION/STOMA with EMR and placed 7 hemoclips.  performed by Samantha Goodman MD at [address] ENDOSCOPY N/A 2/28/2022    ENTEROSCOPY PUSH CONTROL HEMORRHAGE WITH APC ABLATION OF AVM performed by Flavia Rico MD at [facility] ENDOSCOPY       FAM. Hx:   Family History   Problem Relation Age of Onset    Cancer Mother     Cancer Father        Jose. Hx:   Social History     Socioeconomic History    Marital status:      Spouse name: Not on file    Number of children: Not on file    Years of education: Not on file    Highest education level: Not on file   Occupational History    Not on file   Tobacco Use    Smoking status: Former Smoker     Packs/day: 1.50     Years: 40.00     Pack years: 60.00     Types: Cigarettes     Quit date: 80     Years since quittin.1    Smokeless tobacco: Never Used   Vaping Use    Vaping Use: Never used   Substance and Sexual Activity    Alcohol use: No    Drug use: No    Sexual activity: Never   Other Topics Concern    Not on file   Social History Narrative    Not on file     Social Determinants of Health     Financial Resource Strain:     Difficulty of Paying Living Expenses: Not on file   Food Insecurity:     Worried About [address] in the Last Year: Not on file    Haily of Food in the Last Year: Not on file   Transportation Needs:     Lack of Transportation (Medical): Not on file    Lack of Transportation (Non-Medical):  Not on file   Physical Activity:     Days of Exercise per Week: Not on file    Minutes of Exercise per Session: Not on file   Stress:     Feeling of Stress : Not on file   Social Connections:     Frequency of Communication with Friends and Family: Not on file    Frequency of Social Gatherings with Friends and Family: Not on file    Attends [employer] Services: Not on file    Active Member of Clubs or Organizations: Not on file    Attends Club or Organization Meetings: Not on file    Marital Status: Not on file   Intimate Partner Violence:     Fear of Current or Ex-Partner: Not on file    Emotionally Abused: Not on file    Physically Abused: Not on file    Sexually Abused: Not on file   Housing Stability:     Unable to Pay for Housing in the Last Year: Not on file    Number of Places Lived in the Last Year: Not on file    Unstable Housing in the Last Year: Not on file       MEDs:    Previous Medications    ADALIMUMAB (HUMIRA) 40 MG/0.8ML INJECTION    Inject 40 mg into the skin once    APIXABAN (ELIQUIS) 2.5 MG TABS TABLET    Take 1 tablet by mouth 2 times daily Please resume this medication on Sunday, march 6th    ASPIRIN 81 MG CHEWABLE TABLET    Take 81 mg by mouth daily    DILTIAZEM (DILT-XR) 240 MG EXTENDED RELEASE CAPSULE    Take 1 capsule by mouth once daily    FERROUS SULFATE (IRON 325) 325 (65 FE) MG TABLET    Take 1 tablet by mouth 2 times daily    FOLIC ACID (FOLVITE) 1 MG TABLET    Take 1 tablet by mouth daily    HYDROXYUREA (HYDREA) 500 MG CHEMO CAPSULE    Take 2 capsules by mouth once daily    ISOSORBIDE MONONITRATE (IMDUR) 30 MG EXTENDED RELEASE TABLET    Take 30 mg by mouth daily    MULTIPLE VITAMINS-MINERALS (THERAPEUTIC MULTIVITAMIN-MINERALS) TABLET    Take 1 tablet by mouth daily    PANTOPRAZOLE (PROTONIX) 40 MG TABLET    Take 1 tablet by mouth daily    VITAMIN E PO    Take 1 tablet by mouth daily        ALL:     Allergies   Allergen Reactions    Sulfa Antibiotics        ROS:  Review of Systems   Constitutional: Positive for activity change and fatigue. Negative for chills and fever. HENT: Negative for congestion and sore throat. Eyes: Negative for pain and redness. Respiratory: Negative for cough, chest tightness and shortness of breath. Cardiovascular: Negative for chest pain and leg swelling. Gastrointestinal: Negative for abdominal pain, diarrhea, nausea and vomiting. Genitourinary: Negative for difficulty urinating. Skin: Negative for rash. Neurological: Negative for dizziness, syncope, speech difficulty, weakness, numbness and headaches. Psychiatric/Behavioral: Negative for agitation and behavioral problems. Positives andpertinent negatives as per HPI. Hemoglobin 9.6 (*)     Hematocrit 30.3 (*)     .2 (*)     MCH 38.1 (*)     MCHC 31.7 (*)     Segs Relative 81.2 (*)     Lymphocytes % 8.5 (*)     Monocytes % 8.8 (*)     All other components within normal limits   COMPREHENSIVE METABOLIC PANEL W/ REFLEX TO MG FOR LOW K - Abnormal; Notable for the following components:    Glucose 115 (*)     Albumin 3.2 (*)     Total Protein 5.7 (*)     ALT 7 (*)     AST 14 (*)     GFR Non- 57 (*)     All other components within normal limits   PROTIME-INR - Abnormal; Notable for the following components:    Protime 15.0 (*)     All other components within normal limits   APTT - Abnormal; Notable for the following components:    aPTT 37.2 (*)     All other components within normal limits   BLOOD OCCULT STOOL DIAGNOSTIC - Abnormal; Notable for the following components:    OCCULT BLOOD FECAL POSITIVE (*)     All other components within normal limits   URINALYSIS WITH REFLEX TO CULTURE - Abnormal; Notable for the following components:    Color, UA YELLOW (*)     Clarity, UA SLIGHTLY CLOUDY (*)     Blood, Urine LARGE AMOUNT/NUMBER OF (*)     Protein,  (*)     Leukocyte Esterase, Urine MODERATE (*)     RBC, UA 91 (*)     WBC,  (*)     Mucus, UA RARE (*)     All other components within normal limits   CULTURE, URINE   TROPONIN   CBC WITH AUTO DIFFERENTIAL        Interpretation per the Radiologist below, if available at the time of this note:  CT Head WO Contrast    Result Date: 3/20/2022  EXAMINATION: CT OF THE HEAD WITHOUT CONTRAST  3/20/2022 3:36 pm TECHNIQUE: CT of the head was performed without the administration of intravenous contrast. Dose modulation, iterative reconstruction, and/or weight based adjustment of the mA/kV was utilized to reduce the radiation dose to as low as reasonably achievable. COMPARISON: None.  HISTORY: ORDERING SYSTEM PROVIDED HISTORY: weakness TECHNOLOGIST PROVIDED HISTORY: Reason for exam:->weakness Has a \"code stroke\" or \"stroke alert\" been called? ->No Decision Support Exception - unselect if not a suspected or confirmed emergency medical condition->Emergency Medical Condition (MA) Reason for Exam: weakness FINDINGS: BRAIN/VENTRICLES: There is no acute intracranial hemorrhage, mass effect or midline shift. No abnormal extra-axial fluid collection. The gray-white differentiation is maintained without evidence of an acute infarct. There is prominence of the ventricles and sulci due to global parenchymal volume loss. There are nonspecific areas of hypoattenuation within the periventricular and subcortical white matter, which likely represent chronic microvascular ischemic change. ORBITS: The visualized portion of the orbits demonstrate no acute abnormality. SINUSES: The visualized paranasal sinuses and mastoid air cells demonstrate no acute abnormality. SOFT TISSUES/SKULL: No acute abnormality of the visualized skull or soft tissues. No acute intracranial abnormality. XR CHEST PORTABLE    Result Date: 3/20/2022  EXAMINATION: ONE XRAY VIEW OF THE CHEST 3/20/2022 1:54 pm COMPARISON: Chest x-ray February 27, 2022 HISTORY: ORDERING SYSTEM PROVIDED HISTORY: weakness TECHNOLOGIST PROVIDED HISTORY: Reason for exam:->weakness Reason for Exam: weakness FINDINGS: Cardiac silhouette is enlarged but stable. No focal consolidation, pleural effusion, or pneumothorax identified. Osseous structures appear stable. 1. No acute cardiopulmonary process identified. Medical Decision Making/           Patient is a 80year old male with PMHX of GI bleeds on Eliquis for afib,presenting with increased weakness and black stools. Patient is hemoccult positive. His hemoglobin is stable at 9.6. Patient follows up with Dr. Brianna Mendoza for his previous GI bleeds. Patient is hemodynamically stable. Patient was also found to have a UTI. Antibiotics started in the ED. Discussed with the patient the need for admission for further management.  Patient agrees with plan of care. Procedure(s):     12 lead EKG per my interpretation:  Atrial fibrillation 87  Axis is   Normal  QTc is  normal  There is no specific T wave changes appreciated. There is no specific ST wave changes appreciated. No STEMI   Prior EKG to compare with was available and similar        Clinical Impression:      1. Urinary tract infection without hematuria, site unspecified    2. Gastrointestinal hemorrhage, unspecified gastrointestinal hemorrhage type          Disposition:              Patient is being admitted                                                                      Kendall Avitia M.D                                                              ED Attending Physician      (Please note that portions of this note have been completed with a voice recognition software.  Efforts were made to correct any errors, butoccasionally words are mis-transcribed.)               Kendall Avitia MD  03/21/22 [id number]

## 2022-03-21 NOTE — CARE COORDINATION
Reviewed chart and spoke with pt this AM then daughter this PM.  Pt lives alone, he is totally independent with ADL's and transportation. Pt has a PCP and insurance that covers medications. Pt takes total care of ostomies and open to [facility] if needed. Went over plan of care with daughter, surgical consult obtained and called to Dr Fara Johnson who is covering for Dr Matt Yu. CM will continue to follow for needs.

## 2022-03-21 NOTE — CONSULTS
Department of Internal Medicine  Gastroenterology Consult Note  Blankanal Belia. Neel RODRIGUEZ      Reason for Consult:  GO bleed, SBO    Primary Care Physician:  Danny Higuera MD    History Obtained From:  patient    CC: abd pain, vomiting, weakness    HISTORY OF PRESENT ILLNESS:              The patient is a 80 y.o. Male with a past history of rectal cancer (S/P colostomy in 2009) known to me. He was seen 2/27/22 with dark colostomy output and Hb of 8.9. colonoscopy then was very difficult due to angulated and apparently fixed bowel just inside the colostomy. A  2 cm adenoma was removed from the cecum by EMR and a smaller adenoma removed from the ascending colon. 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 He returns now with fatigue and dark stool per colostomy with a Hb of 9.6 (last prior Hb was 9.9 on  3/10/22). His stool was brown until 3-4 days ago. His admitting BUN was 23- normally 12-15. He is on iron but his stool was heme positive. He is on Eliquis. Since admission he developed nausea, vomiting and severe abd pain. He says that he has had multiple episodes of SBO in the past with a similar presentation. Abd xray last night was very suspicious for SBO (images reviewed and agree with SBO).  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 Social History:    TOBACCO:  No  ETOH:  No    Family History: reviewed and positives included in HPI- all other pertinent GI family history negative      REVIEW OF SYSTEMS: see HPI for positives and pertinent negatives. All other systems reviewed and are negative    PHYSICAL EXAM:    Vitals:  BP (!) 151/97   Pulse 66   Temp 97.5 °F (36.4 °C) (Oral)   Resp 16   Ht 5' 9\" (1.753 m)   Wt 171 lb 15.3 oz (78 kg)   SpO2 96%   BMI 25.39 kg/m²   CONSTITUTIONAL: alert, cooperative, no apparent distress,   EYES:  pupils equal, round and reactive to light and sclera clear  ENT:  normocepalic, without obvious abnormality  NECK:  supple, symmetrical, trachea midline  HEMATOLOGIC/LYMPHATICS:  no cervical lymphadenopathy and no supraclavicular lymphadenopathy  LUNGS:  clear to auscultation  CARDIOVASCULAR:  regular rate and rhythm and no murmur noted  ABDOMEN:  Soft, non-tender with normal bowel sounds. No organomegaly or masses  NEUROLOGIC: no focal deficit detected  SKIN:  no lesions  EXTREMITIES: no clubbing, cyanosis, or edema    IMPRESSION:  1) partial SBO- likely adhesions- R/O small bowel lesion  2) GI bleed- likely UGI or small bowel origin  3) probable some degree of chronic occult GI bleed aggravated by anticoag--- again likely UGI or small bowel origin  4) history of chronic myeloproliferative disorder      RECOMMENDATIONS:  1) CT abd this am  2) surgical consult pending  3) Protonix BID and follow H/H, BUN  4) IV iron so can stop oral iron and follow stool color        Vinayak Ramires M.D

## 2022-03-21 NOTE — PLAN OF CARE
Patient developed nausea/vomiting.  + Ostomy with dark stool. KUB showed dilated bowel loops concerning for obstruction.   Antiemetics as ordered  Abdomen is not grossly distended, hold off NG placement at this time since patient reported recent EGD/cauterization  Notified general surgery/GI  Discussed with RN

## 2022-03-21 NOTE — CONSULTS
[facility], [address]   Consult Note  [facility] 1 2 3 4 5    Date: 3/21/2022   Patient: Karlene Mckee   : 1929   DOA: 3/20/2022   MRN: [id number]   ROOM#: [id number]     Reason for Consult: Complicated UTI, chronic silveira  Requesting Physician: Radha Solorzano, [facility]  Collaborating Urologist on Call at time of admission: Dr. Rubén Lee: Weakness and fatigue    History Obtained From:  patient, electronic medical record    HISTORY OF PRESENT ILLNESS:                The patient is a 80 y.o. male with significant past medical history of CAD, A-fib on Eliquis, rectal cancer s/p colostomy in , BPH w urinary retention (managed with chronic indwelling catheter since 2019), HLD, and HTN who presented with weakness and fatigue. Pt endorses dark stool x3-4 days. Work-up in the ED revealed possible UTI for which he was admitted. He developed nausea/vomiting with severe abdominal pain last night. An abd x-ray was ordered which showed likely SBO. He had an NG tube placed with 400cc blood-tinged fluid return. Pt states he last had his catheter exchanged ~3 weeks ago. Discussed recommendation for silveira exchange today, pt is agreeable. Procedure: Pt dressed and draped in the usual sterile fashion. I removed his old catheter without difficulty and sterilely inserted a new 16fr silveira catheter with minimal resistance. I inflated the silveira balloon with 10cc sterile water and fastened catheter to leg securing device. Pt tolerated well without complications. ED Provider's HPI 3/20/22: The history was obtained from the patient and daughter  Leslie Mcneal is a 80 y.o. male who presents with generalized weakness. Patient states that he was trying to get up from the bed today when he was too weak to ambulate. Porch generalized weakness denies any focal symptoms. Patient does have a history of GI bleeds. Patient is currently on Eliquis for his atrial fibrillation.   Patient also takes iron supplements. Stated that he notices his stools to be black from his colostomy bag. Any chest pain, shortness of breath, headache, fevers, chills, nausea vomiting or diarrhea. 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    Gastroesophageal reflux disease without esophagitis 05/21/2020    Hx of cardiovascular stress test 08/12/2021    EF 62% Normal study    Hypertension     [facility] 08/17/2019    Psoriasis     Psoriatic arthritis ([facility] 08/17/2019    Pyelonephritis 03/24/2018    Stable [facility]ina [facility] 08/17/2019    Unspecified cerebral artery occlusion with cerebral infarction      Past Surgical History:        Procedure Laterality Date    APPENDECTOMY      COLONOSCOPY N/A 3/2/2022    COLONOSCOPY POLYPECTOMY REMOVAL ABLATION/STOMA with EMR and placed 7 hemoclips.  performed by Seth Loza MD at [address] ENDOSCOPY N/A 2/28/2022    ENTEROSCOPY PUSH CONTROL HEMORRHAGE WITH APC ABLATION OF AVM performed by Seth Loza MD at [facility] [place] ENDOSCOPY     Current Medications:   Current Facility-Administered Medications: ondansetron (ZOFRAN-ODT) disintegrating tablet 4 mg, 4 mg, Oral, Q8H PRN **OR** ondansetron (ZOFRAN) injection 4 mg, 4 mg, IntraVENous, Q4H PRN  morphine injection 4 mg, 4 mg, IntraVENous, Q2H PRN  pantoprazole (PROTONIX) injection 40 mg, 40 mg, IntraVENous, Daily  iron sucrose (VENOFER) 300 mg in sodium chloride 0.9 % 250 mL IVPB, 300 mg, IntraVENous, Q24H  dilTIAZem (CARDIZEM CD) extended release capsule 240 mg, 240 mg, Oral, Daily  folic acid (FOLVITE) tablet 1 mg, 1 mg, Oral, Daily  therapeutic multivitamin-minerals 1 tablet, 1 tablet, Oral, Daily  sodium chloride flush 0.9 % injection 5-40 mL, 5-40 mL, IntraVENous, 2 times per day  sodium chloride flush 0.9 % injection 5-40 mL, 5-40 mL, IntraVENous, PRN  0.9 % sodium chloride infusion, 25 mL, IntraVENous, PRN  acetaminophen (TYLENOL) tablet 650 mg, 650 mg, Oral, Q6H PRN **OR** acetaminophen (TYLENOL) suppository 650 mg, 650 mg, Rectal, Q6H PRN  polyethylene glycol (GLYCOLAX) packet 17 g, 17 g, Oral, Daily PRN  cefTRIAXone (ROCEPHIN) 1000 mg IVPB in 50 mL D5W minibag, 1,000 mg, IntraVENous, Q24H  hydroxyurea (HYDREA) chemo capsule 500 mg, 500 mg, Oral, BID  aluminum & magnesium hydroxide-simethicone (MAALOX) 200-200-20 MG/5ML suspension 30 mL, 30 mL, Oral, Q6H PRN    Allergies:  Sulfa antibiotics    Social History:   TOBACCO:   reports that he quit smoking about 49 years ago. His smoking use included cigarettes. He has a 60.00 pack-year smoking history. He has never used smokeless tobacco.  ETOH:   reports no history of alcohol use. DRUGS:   reports no history of drug use. Family History:       Problem Relation Age of Onset   [place] Cancer Mother     Cancer Father        REVIEW OF SYSTEMS:     CONSTITUTIONAL:  positive for  fatigue  RESPIRATORY:  negative  CARDIOVASCULAR:  negative  GASTROINTESTINAL:  positive for nausea, vomiting, abdominal pain and dark stool  GENITOURINARY:  positive for cloudy urine    PHYSICAL EXAM:      VITALS:  BP (!) 151/97   Pulse 66   Temp 97.5 °F (36.4 °C) (Oral)   Resp 16   Ht 5' 9\" (1.753 m)   Wt 171 lb 15.3 oz (78 kg)   SpO2 96%   BMI 25.39 kg/m²      TEMPERATURE:  Current - Temp: 97.5 °F (36.4 °C);  Max - Temp  Av.6 °F (36.4 °C)  Min: 97.5 °F (36.4 °C)  Max: 97.9 °F (36.6 °C)  24HR BLOOD PRESSURE RANGE:  Systolic (97WNU), BXS:230 , Min:104 , GDC:912   ; Diastolic (18OQD), PZC:39, Min:65, Max:97    Physical Exam:  General appearance: alert, appears stated age, cooperative and no distress  Head: Normocephalic, without obvious abnormality, atraumatic, NG tube in place  Back: No CVA tenderness  Abdomen: Soft, non-tender, non-distended   : 16fr silveira catheter in place, urine cloudy yellow with debris noted in tubing. DATA:    WBC:    Lab Results   Component Value Date    WBC 5.8 03/20/2022     Hemoglobin/Hematocrit:    Lab Results   Component Value Date    HGB 9.6 03/20/2022    HCT 30.3 03/20/2022     BMP:    Lab Results   Component Value Date     03/20/2022    K 4.2 03/20/2022     03/20/2022    CO2 23 03/20/2022    BUN 23 03/20/2022    LABALBU 3.2 03/20/2022    CREATININE 1.2 03/20/2022    CALCIUM 8.5 03/20/2022    GFRAA >60 03/20/2022    LABGLOM 57 03/20/2022     PT/INR:    Lab Results   Component Value Date    PROTIME 15.0 03/20/2022    PROTIME 10.4 12/11/2011    INR 1.16 03/20/2022     Urine Culture: pending    Imaging:  XR ABDOMEN (KUB) (SINGLE AP VIEW)    Result Date: 3/21/2022  EXAMINATION: ONE SUPINE X-RAY VIEW(S) OF THE ABDOMEN 3/20/2022 11:46 pm COMPARISON: March 22, 2018. HISTORY: ORDERING SYSTEM PROVIDED HISTORY:  N/V. R/o Obstruction TECHNOLOGIST PROVIDED HISTORY: Reason for Exam:  N/V. R/o Obstruction Reason for Exam:  N/V, R/O OBSTRUCTION FINDINGS: No lines or tubes. Gas distended loops of small bowel are identified throughout the abdomen measuring up to 4.3 cm. No obvious free air or pneumatosis. Multiple endoscopy clips project in the right lower quadrant. Surgical clips are also seen in the right upper quadrant and pelvis. No acute osseous abnormality. 1. Gas distended loops of small bowel suggestive of obstruction. CT Head WO Contrast    Result Date: 3/20/2022  EXAMINATION: CT OF THE HEAD WITHOUT CONTRAST  3/20/2022 3:36 pm TECHNIQUE: CT of the head was performed without the administration of intravenous contrast. Dose modulation, iterative reconstruction, and/or weight based adjustment of the mA/kV was utilized to reduce the radiation dose to as low as reasonably achievable. COMPARISON: None.  HISTORY: ORDERING SYSTEM PROVIDED HISTORY: weakness TECHNOLOGIST PROVIDED HISTORY: Reason for exam:->weakness Has a \"code stroke\" or \"stroke alert\" been called? ->No Decision Support Exception - unselect if not a suspected or confirmed emergency medical condition->Emergency Medical Condition (MA) Reason for Exam: weakness FINDINGS: BRAIN/VENTRICLES: There is no acute intracranial hemorrhage, mass effect or midline shift. No abnormal extra-axial fluid collection. The gray-white differentiation is maintained without evidence of an acute infarct. There is prominence of the ventricles and sulci due to global parenchymal volume loss. There are nonspecific areas of hypoattenuation within the periventricular and subcortical white matter, which likely represent chronic microvascular ischemic change. ORBITS: The visualized portion of the orbits demonstrate no acute abnormality. SINUSES: The visualized paranasal sinuses and mastoid air cells demonstrate no acute abnormality. SOFT TISSUES/SKULL: No acute abnormality of the visualized skull or soft tissues. No acute intracranial abnormality. XR CHEST PORTABLE    Result Date: 3/20/2022  EXAMINATION: ONE XRAY VIEW OF THE CHEST 3/20/2022 1:54 pm COMPARISON: Chest x-ray February 27, 2022 HISTORY: ORDERING SYSTEM PROVIDED HISTORY: weakness TECHNOLOGIST PROVIDED HISTORY: Reason for exam:->weakness Reason for Exam: weakness FINDINGS: Cardiac silhouette is enlarged but stable. No focal consolidation, pleural effusion, or pneumothorax identified. Osseous structures appear stable. 1. No acute cardiopulmonary process identified.      XR CHEST PORTABLE    Result Date: 2/27/2022  EXAMINATION: ONE XRAY VIEW OF THE CHEST 2/27/2022 5:36 pm COMPARISON: 03/23/2018 HISTORY: ORDERING SYSTEM PROVIDED HISTORY: weakness TECHNOLOGIST PROVIDED HISTORY: Reason for exam:->weakness Reason for Exam: weakness Additional signs and symptoms: na Relevant Medical/Surgical History: cad, rectal cancer FINDINGS: The cardiomediastinal silhouette is unchanged in appearance. Mild cardiac silhouette enlargement. Generalized interstitial prominence again demonstrated. There is no consolidation, pneumothorax, or evidence of edema. No effusion is appreciated. The osseous structures are unchanged in appearance. Unchanged appearance of the chest without acute airspace disease identified. XR ABDOMEN FOR NG/OG/NE TUBE PLACEMENT    Result Date: 3/21/2022  EXAMINATION: ONE SUPINE XRAY VIEW(S) OF THE ABDOMEN 3/21/2022 3:41 am COMPARISON: March 20, 2022 HISTORY: ORDERING SYSTEM PROVIDED HISTORY: NG confirmation TECHNOLOGIST PROVIDED HISTORY: Reason for exam:->NG confirmation Portable? ->Yes Reason for Exam: ng tube confirmation of placement Additional signs and symptoms: ng tube confirmation of placement FINDINGS: Nasogastric tube tip terminates in the proximal stomach. The side port is just beyond the GE junction. Redemonstration of gaseous distention of small-bowel loops. Surgical clips are seen in the right upper quadrant. 1. Nasogastric tube tip and side port terminate in the proximal stomach. 2. Redemonstration of gaseous distended loops of small bowel. Assessment & Plan:      Michael Baker is a 80y.o. year old male admitted 3/20/2022 for GI bleed and complicated UTI. Known to Dr. Yasmeen Mukherjee. 1) BPH w Urinary Retention: managed with chronic indwelling catheter since 10/2019. Last exchanged at bedside today. RN staff to manually irrigate bladder with 60cc normal saline daily. Continue monthly catheter exchanges  2) Complicated UTI: UA w mod leuks and large blood; urine cx pending   Recommend collecting a urine specimen from new catheter for culture   WBC 5.8, afebrile   On IV Rocephin; adjust as necessary per urine c&s. Pt stable from a  standpoint. Will sign off, please, call with any questions. Pt to follow up with Dr. Yasmeen Mukherjee in 3 weeks for reevaluation and catheter exchange.     Patient seen

## 2022-03-21 NOTE — PROGRESS NOTES
Patient seen and examined. Covering for Dr. Fatmata Manuel. Consultation to follow. Patient with a partial small bowel obstruction. Proceed with conservative management, NG tube, IV fluids and will follow serial abdominal examinations. Patient has no peritoneal signs.

## 2022-03-21 NOTE — PROGRESS NOTES
ATTENDING PHYSICIAN'S PROGRESS NOTES    Patient:  Madyson Edouadr      Unit/Bed:4104/4104-A    YOB: 1929    MRN: [id number]     Acct: [de-identified]     Admit date: 3/20/2022    Patient Seen, Chart, Consults notes, Labs, Radiology studies reviewed. SUBJECTIVE:   Day 1 of stay with fatigue/weakness found with UTI . He later started having abdominal pain and vomiting early in the morning and was found to have partial small bowel obstruction. GI saw patient and he had NG tube placed earlier this morning. Patient states Weaver was removed changed morning  I was later called by RN stating daughter wanted to talk to me. She asked when general surgery will be coming to see patient and also for update on labs and plan of care . Discussed labs and findings with her. All other ROS negative except noted in HPI    Past, Family, Social History unchanged from admission.     Diet:  Diet NPO Exceptions are: Ice Chips, Sips of Water with Meds, Popsicles    Medications:  Scheduled Meds:   pantoprazole  40 mg IntraVENous Daily    iron sucrose  300 mg IntraVENous Q24H    dilTIAZem  240 mg Oral Daily    folic acid  1 mg Oral Daily    therapeutic multivitamin-minerals  1 tablet Oral Daily    sodium chloride flush  5-40 mL IntraVENous 2 times per day    cefTRIAXone (ROCEPHIN) IV  1,000 mg IntraVENous Q24H    hydroxyurea  500 mg Oral BID     Continuous Infusions:   dextrose 5 % and 0.9 % NaCl 100 mL/hr at 03/21/22 1733    sodium chloride       PRN Meds:ondansetron **OR** ondansetron, morphine, sodium chloride flush, sodium chloride, acetaminophen **OR** acetaminophen, polyethylene glycol, aluminum & magnesium hydroxide-simethicone    OBJECTIVE:  CBC:   Recent Labs     03/20/22  1304 03/21/22  0900   WBC 5.8 5.5   HGB 9.6* 9.3*    340     BMP:    Recent Labs     03/20/22  1304      K 4.2      CO2 23   BUN 23   CREATININE 1.2   GLUCOSE 115*     Calcium:  Recent Labs     03/20/22  1304 CALCIUM 8.5     Ionized Calcium:No results for input(s): IONCA in the last 72 hours. Magnesium:No results for input(s): MG in the last 72 hours. Phosphorus:No results for input(s): PHOS in the last 72 hours. BNP:No results for input(s): BNP in the last 72 hours. Glucose:No results for input(s): POCGLU in the last 72 hours. HgbA1C: No results for input(s): LABA1C in the last 72 hours. INR:   Recent Labs     03/20/22  1304   INR 1.16     Hepatic:   Recent Labs     03/20/22  1304   ALKPHOS 64   ALT 7*   AST 14*   PROT 5.7*   BILITOT 0.3   LABALBU 3.2*     Amylase and Lipase:No results for input(s): LACTA, AMYLASE in the last 72 hours. Lactic Acid: No results for input(s): LACTA in the last 72 hours. Troponin:   Recent Labs     03/20/22  1304   TROPONINT <0.010     BNP: No results for input(s): BNP in the last 72 hours. Lipids: No results for input(s): CHOL, TRIG, HDL, LDL, LDLCALC in the last 72 hours. ABGs: No results found for: PH, PCO2, PO2, HCO3, O2SAT    Radiology reports as per the Radiologist  Radiology:   XR ABDOMEN (KUB) (SINGLE AP VIEW)  Result Date: 3/21/2022  EXAMINATION: ONE SUPINE X-RAY VIEW(S) OF THE ABDOMEN 3/20/2022 11:46 pm COMPARISON: March 22, 2018. HISTORY: ORDERING SYSTEM PROVIDED HISTORY:  N/V. R/o Obstruction TECHNOLOGIST PROVIDED HISTORY: Reason for Exam:  N/V. R/o Obstruction Reason for Exam:  N/V, R/O OBSTRUCTION FINDINGS: No lines or tubes. Gas distended loops of small bowel are identified throughout the abdomen measuring up to 4.3 cm. No obvious free air or pneumatosis. Multiple endoscopy clips project in the right lower quadrant. Surgical clips are also seen in the right upper quadrant and pelvis. No acute osseous abnormality. 1. Gas distended loops of small bowel suggestive of obstruction.      CT Head WO Contrast  Result Date: 3/20/2022  EXAMINATION: CT OF THE HEAD WITHOUT CONTRAST  3/20/2022 3:36 pm TECHNIQUE: CT of the head was performed without the administration of intravenous contrast. Dose modulation, iterative reconstruction, and/or weight based adjustment of the mA/kV was utilized to reduce the radiation dose to as low as reasonably achievable. COMPARISON: None. HISTORY: ORDERING SYSTEM PROVIDED HISTORY: weakness TECHNOLOGIST PROVIDED HISTORY: Reason for exam:->weakness Has a \"code stroke\" or \"stroke alert\" been called? ->No Decision Support Exception - unselect if not a suspected or confirmed emergency medical condition->Emergency Medical Condition (MA) Reason for Exam: weakness FINDINGS: BRAIN/VENTRICLES: There is no acute intracranial hemorrhage, mass effect or midline shift. No abnormal extra-axial fluid collection. The gray-white differentiation is maintained without evidence of an acute infarct. There is prominence of the ventricles and sulci due to global parenchymal volume loss. There are nonspecific areas of hypoattenuation within the periventricular and subcortical white matter, which likely represent chronic microvascular ischemic change. ORBITS: The visualized portion of the orbits demonstrate no acute abnormality. SINUSES: The visualized paranasal sinuses and mastoid air cells demonstrate no acute abnormality. SOFT TISSUES/SKULL: No acute abnormality of the visualized skull or soft tissues. No acute intracranial abnormality. CT ABDOMEN PELVIS W IV CONTRAST Additional Contrast? None  Result Date: 3/21/2022  EXAMINATION: CT OF THE ABDOMEN AND PELVIS WITH CONTRAST 3/21/2022 6:20 am TECHNIQUE: CT of the abdomen and pelvis was performed with the administration of intravenous contrast. Multiplanar reformatted images are provided for review. Dose modulation, iterative reconstruction, and/or weight based adjustment of the mA/kV was utilized to reduce the radiation dose to as low as reasonably achievable.  COMPARISON: Abdomen radiographs 03/21/2022, abdomen and pelvis CT 03/22/2018 HISTORY: ORDERING SYSTEM PROVIDED HISTORY: small bowel obstruction TECHNOLOGIST PROVIDED HISTORY: Reason for exam:->small bowel obstruction Additional Contrast?->None Reason for Exam: small bowel obstruction FINDINGS: LOWER CHEST:  Mild gravity dependent and/or passive atelectasis in the bilateral lower lobes. Additional linear opacities in each lung base consistent with scarring or atelectasis. Trace right pleural effusion. Mild cardiomegaly. Mild right ventricular hypertrophy. Mild concentric left ventricle hypertrophy. Aortic valve annular and leaflet calcifications, the latter of which can be seen with aortic valve stenosis. No pericardial nor left pleural effusions. ORGANS:  Unchanged hepatic cysts measuring up to 2.6 cm. Surgically absent gallbladder. No intrahepatic nor extrahepatic biliary dilation. Mild to moderate pancreatic atrophy. Splenic granulomatous calcifications. Unchanged 1.1 cm x 0.9 cm left adrenal adenoma. Borderline right renal atrophy. Simple appearing bilateral renal parenchymal cysts measuring up to 1.8 cm (Bosniak category 2). Suspected bilateral renal peripelvic cysts. Normal right adrenal gland. GI/BOWEL:  Tiny sliding hiatal hernia. Gastric tube terminating in the junction of the gastric fundus and body with the sideport in the cardia just below the gastroesophageal junction. Several mildly dilated small bowel loops, some partially distended with fluid. Additional small bowel loops with submucosal edema highlighting mucosal and serosal enhancement but no associated dilation. Transition point in the paramedian right lower quadrant at the transition from small bowel loops without submucosal edema to those with submucosal edema. Subtle perienteric stranding and slight vascular engorgement associated with some of the small bowel loops. No pneumatosis. No visualization of the appendix, reportedly surgically absent. A few new metallic foreign bodies in the cecum suggestive of hemostasis clips. Mild stool.  PELVIS:  Decompression of the urinary bladder by a Weaver catheter. Subtle perivesical stranding. Normal prostate size. Suspected laxity of the pelvic floor musculature. 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 Severe systemic atherosclerosis. No abdominal nor pelvic lymphadenopathy. Trace free intraperitoneal fluid. No free intraperitoneal gas. SOFT TISSUES/BONES:  Similar appearance of a left lower quadrant ostomy associated with a moderate parastomal hernia containing fat and trace fluid. Healed midline laparotomy incision. Minimal subcutaneous edema. Laxity of the anterior and lateral abdominal wall musculature. Tiny fat containing umbilical and bilateral inguinal hernias. No inguinal lymphadenopathy. Diffuse bony demineralization. No acute fractures nor suspicious bony lesions. 1. Persistent though improved dilation of several small bowel loops likely due to partial obstruction. The transition point in the paramedian right lower quadrant is seen as the small bowel transitions to loops with submucosal edema likely due to enteritis potentially of infectious, inflammatory, or ischemic etiology. Of note, there is no pneumatosis to suggest necrosis, and there are no findings of perforation. 2. Trace ascites is likely reactive. 3. The sideport of a gastric tube is located in the cardia just below the gastroesophageal junction. Consider slight advancement. 4. Possible mild cystitis. However, the appearance could be related to decompression of the urinary bladder by a Weaver catheter. 5. Increased size of a 5.5 cm fusiform aneurysm of the infrarenal abdominal aorta (4.4 cm on 03/22/2018) with no findings suggestive of rupture. Recommend vascular surgery consultation as below if not already obtained. 6. Trace right pleural effusion and minimal anasarca.  RECOMMENDATIONS: 5.5 cm infrarenal abdominal aortic aneurysm. Recommend referral to a vascular specialist. Reference: J Am Abad Radiol 6992;[id number]:[id number]. XR CHEST PORTABLE  Result Date: 3/20/2022  EXAMINATION: ONE XRAY VIEW OF THE CHEST 3/20/2022 1:54 pm COMPARISON: Chest x-ray February 27, 2022 HISTORY: ORDERING SYSTEM PROVIDED HISTORY: weakness TECHNOLOGIST PROVIDED HISTORY: Reason for exam:->weakness Reason for Exam: weakness FINDINGS: Cardiac silhouette is enlarged but stable. No focal consolidation, pleural effusion, or pneumothorax identified. Osseous structures appear stable. 1. No acute cardiopulmonary process identified. XR ABDOMEN FOR NG/OG/NE TUBE PLACEMENT  Result Date: 3/21/2022  EXAMINATION: ONE SUPINE XRAY VIEW(S) OF THE ABDOMEN 3/21/2022 3:41 am COMPARISON: March 20, 2022 HISTORY: ORDERING SYSTEM PROVIDED HISTORY: NG confirmation TECHNOLOGIST PROVIDED HISTORY: Reason for exam:->NG confirmation Portable? ->Yes Reason for Exam: ng tube confirmation of placement Additional signs and symptoms: ng tube confirmation of placement FINDINGS: Nasogastric tube tip terminates in the proximal stomach. The side port is just beyond the GE junction. Redemonstration of gaseous distention of small-bowel loops. Surgical clips are seen in the right upper quadrant. 1. Nasogastric tube tip and side port terminate in the proximal stomach. 2. Redemonstration of gaseous distended loops of small bowel. Physical Exam:  Vitals: /87   Pulse 68   Temp 97.5 °F (36.4 °C) (Oral)   Resp 16   Ht 5' 9\" (1.753 m)   Wt 171 lb 15.3 oz (78 kg)   SpO2 92%   BMI 25.39 kg/m²   24 hour intake/output:    Intake/Output Summary (Last 24 hours) at 3/21/2022 1842  Last data filed at 3/21/2022 0533  Gross per 24 hour   Intake    Output 1145 ml   Net -1145 ml     Last 3 weights:   Wt Readings from Last 3 Encounters:   03/20/22 171 lb 15.3 oz (78 kg)   03/10/22 181 lb (82.1 kg)   03/04/22 172 lb (78 kg)       General appearance - alert, well appearing, and in no distress and NG tube in place with small bloody output  HEENT: Normocephalic, Atraumatic, Conjuctiva pink, PERRL, Oral mucosa normal, Lips, teeth and gums normal, Trachea midline and Thyroid normal  Chest - clear to auscultation, no wheezes, rales or rhonchi, symmetric air entry  Cardiovascular - normal rate, regular rhythm, normal S1, S2, no murmurs, rubs, clicks or gallops  Abdomen -soft, nontender, colostomy bag in place emptied, bowel sounds present. Weaver present with clear urine. Neurological - Alert and oriented, Normal speech, No focal findings or movement disorder noted and Motor and sensory grossly normal bilaterally  Integumentary - Skin color, texture, turgor normal. No Rashes or lesions  Musculoskeletal -Full ROM times 4 extremities, No clubbing or cyanosis and No peripheral edema      DVT prophylaxis: [] Lovenox                                 [] SCDs                                 [] SQ Heparin                                 [] Encourage ambulation           [x] Already on Anticoagulation -on hold               ASSESSMENT / PLAN :    Principal Problem:    Complicated UTI (urinary tract infection)  Resolved Problems:    * No resolved hospital problems. *    Complicated UTI  Hx of BPH and chronic urinary catheter  -Continue Rocephin IV  -Follow urine cx  -Urology consulted, saw patient and changed Weaver. Recommends to collect new UA from the new Weaver and adjust antibiotics as appropriate. They have signed off and recommends for patient to follow with Dr. Alea Davis in 3 weeks for reevaluation and catheter exchange. -Repeat UA ordered     GI bleed  Stool guaiac positive  Recent admission   Had EGD and colonoscopy  S/P polypectomy ,AVM ablation/placed 7 hemoclips . Positive guaiac. 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  Had EGD with significant AVM which were ablated and colonoscopy which revealed polyps at the beginning of this month. Hgb 9.6-9.3  -Holding Eliquis  -Continue Ferrous sulfate  -Protonix IV  -Monitor H&H  - GI following    Partial small bowel obstruction  Noted on CT  NG tube placed this morning  N.p.o. 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

## 2022-03-21 NOTE — CARE COORDINATION
[facility] Transitions Follow Up Call    3/21/2022    Patient: Aruna Chanel  Patient : 1929   MRN: [id number]  Reason for Admission: GIB, UTI  Discharge Date: 3/4/22 RARS: Readmission Risk Score: 20.8 ( )    Future Appointments   Date Time Provider Sergio Escalante   2022 10:20 AM Екатерина Raya APRN - CNP [facility] Heart [facility]   2022 10:10 AM Felton Rollins MD [facility] Gastro [facility]   [facility] 10:00 [facility] ONC [place]   2022 10:15 AM Marlin Vázquez MD [facility] CC [facility]   2022  1:00 PM Kaila Joy DO [facility] FPS [facility]     On attempt at final call Patient noted to have been readmitted to [facility] 3/20/22 for UTI. Current episode closed per protocol. CT will follow if criteria met at discharge.    Fco Vazquez RN

## 2022-03-21 NOTE — PROGRESS NOTES
Pt in bed. Daughter with pt. Has colostomy since 2009 and manages independently. No issues with leakage. Pouching system (appears to be Convatec) intact. Denies any concerns. Has long term silveira-no urostomy. Requesting pt be ambulated by either nursing staff or PT. Relayed to nurse and charge nurse. Pt prefers to use home supplies. Please re consult if issues arise.

## 2022-03-21 NOTE — PROGRESS NOTES
This patient was NPO during this nurse's morning shift and morning meds were not given. Spoke with Dr. Minda Alcaraz at [phone] and he stated that patient can have sips with meds, ice chips and popsicles. Will continue to monitor.

## 2022-03-22 NOTE — PROGRESS NOTES
ATTENDING PHYSICIAN'S PROGRESS NOTES    Patient:  Rosalind Brown      Unit/Bed:4104/4104-A    YOB: 1929    MRN: [id number]     Acct: [de-identified]     Admit date: 3/20/2022    Patient Seen, Chart, Consults notes, Labs, Radiology studies reviewed. SUBJECTIVE:   Day 2 of stay with fatigue/weakness found with UTI . He later started having abdominal pain and vomiting early in the morning 3/21 and was found to have partial small bowel obstruction. GI saw patient and he had NG tube placed. Seen and evaluated this morning . No new complaints . All other ROS negative except noted in HPI    Past, Family, Social History unchanged from admission. Diet:  Diet NPO Exceptions are: Ice Chips, Sips of Water with Meds, Popsicles    Medications:  Scheduled Meds:   pantoprazole  40 mg IntraVENous Daily    iron sucrose  300 mg IntraVENous Q24H    dilTIAZem  240 mg Oral Daily    folic acid  1 mg Oral Daily    therapeutic multivitamin-minerals  1 tablet Oral Daily    sodium chloride flush  5-40 mL IntraVENous 2 times per day    cefTRIAXone (ROCEPHIN) IV  1,000 mg IntraVENous Q24H    hydroxyurea  500 mg Oral BID     Continuous Infusions:   dextrose 5 % and 0.9 % NaCl 100 mL/hr at 03/22/22 0434    sodium chloride       PRN Meds:ondansetron **OR** ondansetron, morphine, sodium chloride flush, sodium chloride, acetaminophen **OR** acetaminophen, polyethylene glycol, aluminum & magnesium hydroxide-simethicone    OBJECTIVE:  CBC:   Recent Labs     03/20/22  1304 03/20/22  1304 03/21/22  0900 03/21/22  0900 03/21/22  2037 03/22/22  0355 03/22/22  1202   WBC 5.8  --  5.5  --   --   --   --    HGB 9.6*   < > 9.3*   < > 8.7* 9.1* 9.5*     --  340  --   --   --   --     < > = values in this interval not displayed.      BMP:    Recent Labs     03/20/22  1304 03/22/22  0355    134*   K 4.2 4.0    101   CO2 23 23   BUN 23 16   CREATININE 1.2 1.1   GLUCOSE 115* 107*     Calcium:  Recent Labs 03/22/22  0355   CALCIUM 8.0*     Ionized Calcium:No results for input(s): IONCA in the last 72 hours. Magnesium:No results for input(s): MG in the last 72 hours. Phosphorus:No results for input(s): PHOS in the last 72 hours. BNP:No results for input(s): BNP in the last 72 hours. Glucose:No results for input(s): POCGLU in the last 72 hours. HgbA1C: No results for input(s): LABA1C in the last 72 hours. INR:   Recent Labs     03/20/22  1304   INR 1.16     Hepatic:   Recent Labs     03/20/22  1304   ALKPHOS 64   ALT 7*   AST 14*   PROT 5.7*   BILITOT 0.3   LABALBU 3.2*     Amylase and Lipase:No results for input(s): LACTA, AMYLASE in the last 72 hours. Lactic Acid: No results for input(s): LACTA in the last 72 hours. Troponin:   Recent Labs     03/20/22  1304   TROPONINT <0.010     BNP: No results for input(s): BNP in the last 72 hours. Lipids: No results for input(s): CHOL, TRIG, HDL, LDL, LDLCALC in the last 72 hours. ABGs: No results found for: PH, PCO2, PO2, HCO3, O2SAT    Radiology reports as per the Radiologist  Radiology:   XR ABDOMEN (KUB) (SINGLE AP VIEW)  Result Date: 3/21/2022  EXAMINATION: ONE SUPINE X-RAY VIEW(S) OF THE ABDOMEN 3/20/2022 11:46 pm COMPARISON: March 22, 2018. HISTORY: ORDERING SYSTEM PROVIDED HISTORY:  N/V. R/o Obstruction TECHNOLOGIST PROVIDED HISTORY: Reason for Exam:  N/V. R/o Obstruction Reason for Exam:  N/V, R/O OBSTRUCTION FINDINGS: No lines or tubes. Gas distended loops of small bowel are identified throughout the abdomen measuring up to 4.3 cm. No obvious free air or pneumatosis. Multiple endoscopy clips project in the right lower quadrant. Surgical clips are also seen in the right upper quadrant and pelvis. No acute osseous abnormality. 1. Gas distended loops of small bowel suggestive of obstruction.      CT Head WO Contrast  Result Date: 3/20/2022  EXAMINATION: CT OF THE HEAD WITHOUT CONTRAST  3/20/2022 3:36 pm TECHNIQUE: CT of the head was performed without the administration of intravenous contrast. Dose modulation, iterative reconstruction, and/or weight based adjustment of the mA/kV was utilized to reduce the radiation dose to as low as reasonably achievable. COMPARISON: None. HISTORY: ORDERING SYSTEM PROVIDED HISTORY: weakness TECHNOLOGIST PROVIDED HISTORY: Reason for exam:->weakness Has a \"code stroke\" or \"stroke alert\" been called? ->No Decision Support Exception - unselect if not a suspected or confirmed emergency medical condition->Emergency Medical Condition (MA) Reason for Exam: weakness FINDINGS: BRAIN/VENTRICLES: There is no acute intracranial hemorrhage, mass effect or midline shift. No abnormal extra-axial fluid collection. The gray-white differentiation is maintained without evidence of an acute infarct. There is prominence of the ventricles and sulci due to global parenchymal volume loss. There are nonspecific areas of hypoattenuation within the periventricular and subcortical white matter, which likely represent chronic microvascular ischemic change. ORBITS: The visualized portion of the orbits demonstrate no acute abnormality. SINUSES: The visualized paranasal sinuses and mastoid air cells demonstrate no acute abnormality. SOFT TISSUES/SKULL: No acute abnormality of the visualized skull or soft tissues. No acute intracranial abnormality. CT ABDOMEN PELVIS W IV CONTRAST Additional Contrast? None  Result Date: 3/21/2022  EXAMINATION: CT OF THE ABDOMEN AND PELVIS WITH CONTRAST 3/21/2022 6:20 am TECHNIQUE: CT of the abdomen and pelvis was performed with the administration of intravenous contrast. Multiplanar reformatted images are provided for review. Dose modulation, iterative reconstruction, and/or weight based adjustment of the mA/kV was utilized to reduce the radiation dose to as low as reasonably achievable.  COMPARISON: Abdomen radiographs 03/21/2022, abdomen and pelvis CT 03/22/2018 HISTORY: ORDERING SYSTEM PROVIDED HISTORY: small bowel obstruction TECHNOLOGIST PROVIDED HISTORY: Reason for exam:->small bowel obstruction Additional Contrast?->None Reason for Exam: small bowel obstruction FINDINGS: LOWER CHEST:  Mild gravity dependent and/or passive atelectasis in the bilateral lower lobes. Additional linear opacities in each lung base consistent with scarring or atelectasis. Trace right pleural effusion. Mild cardiomegaly. Mild right ventricular hypertrophy. Mild concentric left ventricle hypertrophy. Aortic valve annular and leaflet calcifications, the latter of which can be seen with aortic valve stenosis. No pericardial nor left pleural effusions. ORGANS:  Unchanged hepatic cysts measuring up to 2.6 cm. Surgically absent gallbladder. No intrahepatic nor extrahepatic biliary dilation. Mild to moderate pancreatic atrophy. Splenic granulomatous calcifications. Unchanged 1.1 cm x 0.9 cm left adrenal adenoma. Borderline right renal atrophy. Simple appearing bilateral renal parenchymal cysts measuring up to 1.8 cm (Bosniak category 2). Suspected bilateral renal peripelvic cysts. Normal right adrenal gland. GI/BOWEL:  Tiny sliding hiatal hernia. Gastric tube terminating in the junction of the gastric fundus and body with the sideport in the cardia just below the gastroesophageal junction. Several mildly dilated small bowel loops, some partially distended with fluid. Additional small bowel loops with submucosal edema highlighting mucosal and serosal enhancement but no associated dilation. Transition point in the paramedian right lower quadrant at the transition from small bowel loops without submucosal edema to those with submucosal edema. Subtle perienteric stranding and slight vascular engorgement associated with some of the small bowel loops. No pneumatosis. No visualization of the appendix, reportedly surgically absent. A few new metallic foreign bodies in the cecum suggestive of hemostasis clips. Mild stool.  PELVIS: Decompression of the urinary bladder by a Weaver catheter. Subtle perivesical stranding. Normal prostate size. Suspected laxity of the pelvic floor musculature. 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 Severe systemic atherosclerosis. No abdominal nor pelvic lymphadenopathy. Trace free intraperitoneal fluid. No free intraperitoneal gas. SOFT TISSUES/BONES:  Similar appearance of a left lower quadrant ostomy associated with a moderate parastomal hernia containing fat and trace fluid. Healed midline laparotomy incision. Minimal subcutaneous edema. Laxity of the anterior and lateral abdominal wall musculature. Tiny fat containing umbilical and bilateral inguinal hernias. No inguinal lymphadenopathy. Diffuse bony demineralization. No acute fractures nor suspicious bony lesions. 1. Persistent though improved dilation of several small bowel loops likely due to partial obstruction. The transition point in the paramedian right lower quadrant is seen as the small bowel transitions to loops with submucosal edema likely due to enteritis potentially of infectious, inflammatory, or ischemic etiology. Of note, there is no pneumatosis to suggest necrosis, and there are no findings of perforation. 2. Trace ascites is likely reactive. 3. The sideport of a gastric tube is located in the cardia just below the gastroesophageal junction. Consider slight advancement. 4. Possible mild cystitis. However, the appearance could be related to decompression of the urinary bladder by a Weaver catheter. 5. Increased size of a 5.5 cm fusiform aneurysm of the infrarenal abdominal aorta (4.4 cm on 03/22/2018) with no findings suggestive of rupture. Recommend vascular surgery consultation as below if not already obtained. 6. Trace right pleural effusion and minimal anasarca. RECOMMENDATIONS: 5.5 cm infrarenal abdominal aortic aneurysm. Recommend referral to a vascular specialist. Reference: J Am Abad Radiol 1607;13:631-312. XR CHEST PORTABLE  Result Date: 3/20/2022  EXAMINATION: ONE XRAY VIEW OF THE CHEST 3/20/2022 1:54 pm COMPARISON: Chest x-ray February 27, 2022 HISTORY: ORDERING SYSTEM PROVIDED HISTORY: weakness TECHNOLOGIST PROVIDED HISTORY: Reason for exam:->weakness Reason for Exam: weakness FINDINGS: Cardiac silhouette is enlarged but stable. No focal consolidation, pleural effusion, or pneumothorax identified. Osseous structures appear stable. 1. No acute cardiopulmonary process identified. XR ABDOMEN FOR NG/OG/NE TUBE PLACEMENT  Result Date: 3/21/2022  EXAMINATION: ONE SUPINE XRAY VIEW(S) OF THE ABDOMEN 3/21/2022 3:41 am COMPARISON: March 20, 2022 HISTORY: ORDERING SYSTEM PROVIDED HISTORY: NG confirmation TECHNOLOGIST PROVIDED HISTORY: Reason for exam:->NG confirmation Portable? ->Yes Reason for Exam: ng tube confirmation of placement Additional signs and symptoms: ng tube confirmation of placement FINDINGS: Nasogastric tube tip terminates in the proximal stomach. The side port is just beyond the GE junction. Redemonstration of gaseous distention of small-bowel loops. Surgical clips are seen in the right upper quadrant. 1. Nasogastric tube tip and side port terminate in the proximal stomach. 2. Redemonstration of gaseous distended loops of small bowel. Physical Exam:  Vitals: BP (!) 141/94   Pulse 78   Temp 97.6 °F (36.4 °C) (Oral)   Resp 16   Ht 5' 9\" (1.753 m)   Wt 171 lb 15.3 oz (78 kg)   SpO2 99%   BMI 25.39 kg/m²   24 hour intake/output:    Intake/Output Summary (Last 24 hours) at 3/22/2022 1809  Last data filed at 3/22/2022 1705  Gross per 24 hour   Intake    Output 1450 ml   Net -1450 ml     Last 3 weights:   Wt Readings from Last 3 Encounters:   03/20/22 171 lb 15.3 oz (78 kg)   03/10/22 181 lb (82.1 kg)   03/04/22 172 lb (78 kg)       General appearance - alert, well appearing, and in no distress and NG tube in place with small bloody output  HEENT: Normocephalic, Atraumatic, Conjuctiva pink, PERRL, Oral mucosa normal, Lips, teeth and gums normal, Trachea midline and Thyroid normal  Chest - clear to auscultation, no wheezes, rales or rhonchi, symmetric air entry  Cardiovascular - normal rate, regular rhythm, normal S1, S2, no murmurs, rubs, clicks or gallops  Abdomen -soft, nontender, colostomy bag in place emptied, bowel sounds present. Weaver present with clear urine. Neurological - Alert and oriented, Normal speech, No focal findings or movement disorder noted and Motor and sensory grossly normal bilaterally  Integumentary - Skin color, texture, turgor normal. No Rashes or lesions  Musculoskeletal -Full ROM times 4 extremities, No clubbing or cyanosis and No peripheral edema      DVT prophylaxis: [] Lovenox                                 [] SCDs                                 [] SQ Heparin                                 [] Encourage ambulation           [x] Already on Anticoagulation -on hold               ASSESSMENT / PLAN :    Principal Problem:    Complicated UTI (urinary tract infection)  Resolved Problems:    * No resolved hospital problems. *    Abnormal UA  Hx of BPH and chronic urinary catheter  Repeat UA from new catheter placed 3/21 significantly different from previous and does not suggest UTI. -Continue Rocephin IV for now  -Follow urine cx from new catheter  -Urology consulted, saw patient and changed Weaver 3/21. They have signed off and recommends for patient to follow with Dr. Dorita Lobato in 3 weeks for reevaluation and catheter exchange.     GI bleed  Stool guaiac positive  Recent admission   Had EGD and colonoscopy  S/P polypectomy ,AVM ablation/placed 7 hemoclips . Positive guaiac. 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  Had EGD with significant AVM which were ablated and colonoscopy which revealed polyps at the beginning of this month.  Hgb 9.6-9.3  -Holding Eliquis  -Continue Ferrous sulfate  -Protonix IV  -Monitor H&H-improving  - GI following    Partial small bowel obstruction  Noted on CT  NG tube in place  Continue conservative management per GI and general surgery recommendation  N.p.o. 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

## 2022-03-22 NOTE — PROGRESS NOTES
Patient went for SR to afib at a rate of 150, then back to SR. Physician notified via [employer].

## 2022-03-22 NOTE — PROGRESS NOTES
Patient seen and examined. No acute events overnight. Patient does report passing flatus and less abdominal pain. Minimal NG tube output 450 mls  Afebrile. VSS. Adequate urine output. H/H stable, 9.1/27.8  AXR today with the following impression:  FINDINGS:   Gas-filled dilated small bowel loops remain in the mid abdomen measuring up   to 4.5 cm, with slightly decreased quantity of small bowel loops compared to   exams yesterday.       Enteric tube is partially visualized in the left upper quadrant.           Impression   Gas-filled small bowel loops in the mid abdomen remain measuring 4.5 cm.  The   quantity of small bowel loops is slightly improved from prior exam.     abd is soft, mildly distended. No rebound or guarding. No CVA tenderness. 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 pSBO resolving. Continue NG tube, serial abdominal examinations and conservative management. Likely secondary to postoperative and radiation induced peritoneal adhesions. GI note reviewed. Complicated UTI on antibiotics. Dr. Constance Mehta will resume surgical care tomorrow.

## 2022-03-22 NOTE — PROGRESS NOTES
Feeling better-- much less pain  NG drained 450 cc yesterday  EXAM:  Vital signs: /70   Pulse 62   Temp 98.4 °F (36.9 °C) (Oral)   Resp 16   Ht 5' 9\" (1.753 m)   Wt 171 lb 15.3 oz (78 kg)   SpO2 91%   BMI 25.39 kg/m²   Alert, NAD  Lungs clear to auscultation  Cor: regular rhythm w/o murmer  Abd: soft, minimally tender, minimally distended  Extrem: no edema  CT yesterday (images reviewed)- partial obstruction of small bowel with transition point in RLQ.  Increased size of AAA noted  Xray abd today- improved but still has a dilated loop of small bowel  Impression:    1) resolving SBO    2) AAA -enlarged over 4 years         Plan:   1) maintain NG suction troday   2) CT enterography later for better definition of small bowel anatomy

## 2022-03-22 NOTE — CARE COORDINATION
Reviewed chart and discussed in IDR, pt very weak, PT/OT ordered. Plan remains home with possible HC. CM will awaiting therapy recommendations. [phone] PT came to [address] desk stating recommending ARU. Asked Mayte to review to see if ARU appropriate/a possibility. Cm will continue to follow.

## 2022-03-22 NOTE — PROGRESS NOTES
Physician Progress Note      PATIENT:               Matthew Neves  CSN #:                  [id number]  :                       1929  ADMIT DATE:       3/20/2022 12:56 PM  DISCH DATE:  RESPONDING  PROVIDER #:        Umu Madison          QUERY TEXT:    Pt admitted with UTI. Pt noted to have chronic indwelling urinary catheter. If possible, please document in the progress notes and discharge summary if   you are evaluating and/or treating any of the following:[[UTI was ruled   out::This patient does not have a UTI. The medical record reflects the following:  Risk Factors: Weaver PTA, BPH w urinary retention  Clinical Indicators: 3/21 consult note per Paul Cardoso PA-C \"Complicated   UTI: UA w mod leuks and large blood; urine cx pending WBC 5.8, afebrile\",   urine Cx on 3/20 \">50,000 CFU/ml Mixed pathogens Multiple organisms isolated,   no predominance. Culture indicates probable contamination. \"  Treatment: Weaver exchange, UA, urine Cx, IV Rochephin. Thank you,  Huma Calvillo RN, [facility]  [phone]  Options provided:  -- UTI due to chronic indwelling urinary catheter  -- UTI not due to indwelling urinary catheter  -- Other - I will add my own diagnosis  -- Disagree - Not applicable / Not valid  -- Disagree - Clinically unable to determine / Unknown  -- Refer to Clinical Documentation Reviewer    PROVIDER RESPONSE TEXT:    Provider is clinically unable to determine a response to this query.     Query created by: Blanco Miller on 3/22/2022 12:05 PM      Electronically signed by:  Umu Madison 3/22/2022 6:09 PM

## 2022-03-22 NOTE — PROGRESS NOTES
Physical Therapy  Formerly [facility] ACUTE CARE PHYSICAL THERAPY EVALUATION  Mayra Santoyo, 8/23/1929, 4104/4104-A, 3/22/2022    History  [place]:  The primary encounter diagnosis was Urinary tract infection without hematuria, site unspecified. A diagnosis of Gastrointestinal hemorrhage, unspecified gastrointestinal hemorrhage type was also pertinent to this visit. Patient  has a past medical history of AAA (abdominal aortic aneurysm) ([address] Angina, class I ([address] Benign prostatic hyperplasia, Bowel obstruction ([address] CAD (coronary artery disease), Cancer ([address] CCC (chronic calculous cholecystitis), Gastroesophageal reflux disease without esophagitis, Hx of cardiovascular stress test, Hypertension, Primary malignant neoplasm of rectum ([address] Psoriasis, Psoriatic arthritis ([address] Pyelonephritis, Stable angina ([address] and Unspecified cerebral artery occlusion with cerebral infarction. Patient  has a past surgical history that includes Appendectomy; colostomy; Upper gastrointestinal endoscopy (N/A, 2/28/2022); and Colonoscopy (N/A, 3/2/2022). Subjective:  Patient states: \"My plan is to get to that chair\"    Pain:  Denies   Communication with other providers:  RNAZEB  Restrictions: general precautions ,falls     Home Setup/Prior level of function  Social/Functional History  Lives With: Alone  Type of Home:  (condo)  Home Layout: One level  Home Access: Level entry  Bathroom Shower/Tub: Tub/Shower unit,Walk-in shower  Bathroom Toilet: Standard  Bathroom Equipment: Shower chair (in storage)  Home Equipment: [address] wheeled walker  ADL Assistance: [address]: Independent (Bebe with cane)  Transfer Assistance: Independent  Active : Yes  Leisure & Hobbies: spends time with his \"lady friend\" going to [employer] and out to eat.  Used to be a volunteer at the hospital    Examination of body systems (includes body structures/functions, activity/participation limitations):  · Observation:  Supine in bed upon arrival. Cooperative and motivated to work with therapy  · Vision: glasses  · [facility]  · Cardiopulmonary:  Stable vitals on room air     Musculoskeletal  · ROM R/L:  WFL BLEs. · Strength R/L:  Minimal weakness observed in function and endurance. Mobility/treatment:   · Rolling L/R:  NT   · Supine to sit:  Nichol for trunk boost. Inc time and effort with use of bed rail and HOB elevated ~45 deg   · Transfers:   · Sit to stand: CGA from EOB  · Stand to sit: Nichol for controlling sit to recliner   · Step pivot: CGA for safety with RW   · Sitting balance:  SBA at EOB, static without UE support   · Standing balance:  CGA at RW, static x ~1 minute   · Gait: ~8ft with RW CGA for safety. Dec pace with slightly fwd flexed posture. No major LOB noted. Limited with pt feeling generally weak  · Educated pt on POC, role of PT, progression with mobility, discharge recommendations. Cues provided for sequencing to inc safety and indep with mobility     [facility] 6 Clicks Inpatient Mobility:  AM-PAC Inpatient Mobility Raw Score : 17    Safety: patient left in chair with alarm, call light within reach,  gait belt used. Assessment:  Pt is a 80year old male admitted with weakness. Found to have a SBO. Plan is for conservative treatment at this time. Recommend ARU once medically stable. At baseline he is indep with gross mobility and ADLs. He performed below his baseline this date and would benefit from continued therapy to address his current deficits, dec potential fall risk,and restore function. Complexity: Moderate  Prognosis: Good, no significant barriers to participation at this time.    Plan Times per week: 3+/week  Discharge Recommendations: IP Rehab  Equipment: continue to assess     Goals:  Short term goals  Time Frame for Short term goals: 1 week  Short term goal 1: Pt will perform sit><supine SBA  Short term goal 2: Pt will transfer to all surfaces SBA  Short term goal 3: Pt will ambulate 100ft with LRAD SBA  Short term goal 4: Pt will perform standing light dynamic activity without UE support x 3 minutes SBA       Treatment plan:  Bed mobility, transfers, balance, gait, TA, TX, WC, stairs     Recommendations for NURSING mobility: ambulate with RW to the bathroom     Time:   Time in: 1415  Time out: 1440  Timed treatment minutes: 10  Total time: 25    Electronically signed by:    Deandre Muse [id number]  3/22/2022, 2:49 PM

## 2022-03-22 NOTE — PROGRESS NOTES
Patients NG drainage has become redder since beginning of shift. Repeat H+H to be draw today at 1230. Dr Mattie Cooley and Dr Rey Lacy notified.

## 2022-03-23 PROBLEM — K56.600 PARTIAL SMALL BOWEL OBSTRUCTION (HCC): Status: ACTIVE | Noted: 2022-01-01

## 2022-03-23 NOTE — PROGRESS NOTES
ATTENDING PHYSICIAN'S PROGRESS NOTES    Patient:  Rosalind Brown      Unit/Bed:4104/4104-A    YOB: 1929    MRN: [id number]     Acct: [de-identified]     Admit date: 3/20/2022    Patient Seen, Chart, Consults notes, Labs, Radiology studies reviewed. SUBJECTIVE:   Day 3 of stay with fatigue/weakness found with UTI . He later started having abdominal pain and vomiting early in the morning 3/21 and was found to have partial small bowel obstruction. GI saw patient and he had NG tube placed. GI and general surgery following. Seen and evaluated this morning . No new complaints . Sitting up in chair. Notified by RN that patient had a fever to 150s yesterday and spontaneously converted back to normal sinus rhythm. Again happening today with heart rate up to 130s. Worse when he did physical therapy. Denies chest pain or SOB. Has a cardiologist he follows with. All other ROS negative except noted in HPI    Past, Family, Social History unchanged from admission. Diet:  ADULT DIET;  Clear Liquid    Medications:  Scheduled Meds:   metoprolol tartrate  50 mg Oral Once    [START ON 3/24/2022] metoprolol tartrate  25 mg Oral BID    pantoprazole  40 mg IntraVENous Daily    iron sucrose  300 mg IntraVENous Q24H    dilTIAZem  240 mg Oral Daily    folic acid  1 mg Oral Daily    therapeutic multivitamin-minerals  1 tablet Oral Daily    sodium chloride flush  5-40 mL IntraVENous 2 times per day    cefTRIAXone (ROCEPHIN) IV  1,000 mg IntraVENous Q24H    hydroxyurea  500 mg Oral BID     Continuous Infusions:   dextrose 5 % and 0.9 % NaCl 100 mL/hr at 03/23/22 0813    sodium chloride       PRN Meds:ondansetron **OR** ondansetron, morphine, sodium chloride flush, sodium chloride, acetaminophen **OR** acetaminophen, polyethylene glycol, aluminum & magnesium hydroxide-simethicone    OBJECTIVE:  CBC:   Recent Labs     03/21/22  0900 03/21/22  2037 03/22/22  2209 03/23/22  0156 03/23/22  1306   WBC 5.5 --   --   --   --    HGB 9.3*   < > 9.2* 8.9* 10.5*     --   --   --   --     < > = values in this interval not displayed. BMP:    Recent Labs     03/22/22  0355 03/23/22  1306   * 132*   K 4.0 3.9    98*   CO2 23 25   BUN 16 10   CREATININE 1.1 1.0   GLUCOSE 107* 131*     Calcium:  Recent Labs     03/23/22  1306   CALCIUM 8.5     Ionized Calcium:No results for input(s): IONCA in the last 72 hours. Magnesium:No results for input(s): MG in the last 72 hours. Phosphorus:No results for input(s): PHOS in the last 72 hours. BNP:No results for input(s): BNP in the last 72 hours. Glucose:No results for input(s): POCGLU in the last 72 hours. HgbA1C: No results for input(s): LABA1C in the last 72 hours. INR:   No results for input(s): INR in the last 72 hours. Hepatic:   No results for input(s): ALKPHOS, ALT, AST, PROT, BILITOT, BILIDIR, LABALBU in the last 72 hours. Amylase and Lipase:No results for input(s): LACTA, AMYLASE in the last 72 hours. Lactic Acid: No results for input(s): LACTA in the last 72 hours. Troponin:   No results for input(s): CKTOTAL, CKMB, TROPONINT in the last 72 hours. BNP: No results for input(s): BNP in the last 72 hours. Lipids: No results for input(s): CHOL, TRIG, HDL, LDL, LDLCALC in the last 72 hours. ABGs: No results found for: PH, PCO2, PO2, HCO3, O2SAT    Radiology reports as per the Radiologist  Radiology:   XR ABDOMEN (KUB) (SINGLE AP VIEW)  Result Date: 3/21/2022  EXAMINATION: ONE SUPINE X-RAY VIEW(S) OF THE ABDOMEN 3/20/2022 11:46 pm COMPARISON: March 22, 2018. HISTORY: ORDERING SYSTEM PROVIDED HISTORY:  N/V. R/o Obstruction TECHNOLOGIST PROVIDED HISTORY: Reason for Exam:  N/V. R/o Obstruction Reason for Exam:  N/V, R/O OBSTRUCTION FINDINGS: No lines or tubes. Gas distended loops of small bowel are identified throughout the abdomen measuring up to 4.3 cm. No obvious free air or pneumatosis. Multiple endoscopy clips project in the right lower quadrant. Surgical clips are also seen in the right upper quadrant and pelvis. No acute osseous abnormality. 1. Gas distended loops of small bowel suggestive of obstruction. CT Head WO Contrast  Result Date: 3/20/2022  EXAMINATION: CT OF THE HEAD WITHOUT CONTRAST  3/20/2022 3:36 pm TECHNIQUE: CT of the head was performed without the administration of intravenous contrast. Dose modulation, iterative reconstruction, and/or weight based adjustment of the mA/kV was utilized to reduce the radiation dose to as low as reasonably achievable. COMPARISON: None. HISTORY: ORDERING SYSTEM PROVIDED HISTORY: weakness TECHNOLOGIST PROVIDED HISTORY: Reason for exam:->weakness Has a \"code stroke\" or \"stroke alert\" been called? ->No Decision Support Exception - unselect if not a suspected or confirmed emergency medical condition->Emergency Medical Condition (MA) Reason for Exam: weakness FINDINGS: BRAIN/VENTRICLES: There is no acute intracranial hemorrhage, mass effect or midline shift. No abnormal extra-axial fluid collection. The gray-white differentiation is maintained without evidence of an acute infarct. There is prominence of the ventricles and sulci due to global parenchymal volume loss. There are nonspecific areas of hypoattenuation within the periventricular and subcortical white matter, which likely represent chronic microvascular ischemic change. ORBITS: The visualized portion of the orbits demonstrate no acute abnormality. SINUSES: The visualized paranasal sinuses and mastoid air cells demonstrate no acute abnormality. SOFT TISSUES/SKULL: No acute abnormality of the visualized skull or soft tissues. No acute intracranial abnormality.      CT ABDOMEN PELVIS W IV CONTRAST Additional Contrast? None  Result Date: 3/21/2022  EXAMINATION: CT OF THE ABDOMEN AND PELVIS WITH CONTRAST 3/21/2022 6:20 am TECHNIQUE: CT of the abdomen and pelvis was performed with the administration of intravenous contrast. Multiplanar reformatted images are provided for review. Dose modulation, iterative reconstruction, and/or weight based adjustment of the mA/kV was utilized to reduce the radiation dose to as low as reasonably achievable. COMPARISON: Abdomen radiographs 03/21/2022, abdomen and pelvis CT 03/22/2018 HISTORY: ORDERING SYSTEM PROVIDED HISTORY: small bowel obstruction TECHNOLOGIST PROVIDED HISTORY: Reason for exam:->small bowel obstruction Additional Contrast?->None Reason for Exam: small bowel obstruction FINDINGS: LOWER CHEST:  Mild gravity dependent and/or passive atelectasis in the bilateral lower lobes. Additional linear opacities in each lung base consistent with scarring or atelectasis. Trace right pleural effusion. Mild cardiomegaly. Mild right ventricular hypertrophy. Mild concentric left ventricle hypertrophy. Aortic valve annular and leaflet calcifications, the latter of which can be seen with aortic valve stenosis. No pericardial nor left pleural effusions. ORGANS:  Unchanged hepatic cysts measuring up to 2.6 cm. Surgically absent gallbladder. No intrahepatic nor extrahepatic biliary dilation. Mild to moderate pancreatic atrophy. Splenic granulomatous calcifications. Unchanged 1.1 cm x 0.9 cm left adrenal adenoma. Borderline right renal atrophy. Simple appearing bilateral renal parenchymal cysts measuring up to 1.8 cm (Bosniak category 2). Suspected bilateral renal peripelvic cysts. Normal right adrenal gland. GI/BOWEL:  Tiny sliding hiatal hernia. Gastric tube terminating in the junction of the gastric fundus and body with the sideport in the cardia just below the gastroesophageal junction. Several mildly dilated small bowel loops, some partially distended with fluid. Additional small bowel loops with submucosal edema highlighting mucosal and serosal enhancement but no associated dilation.   Transition point in the paramedian right lower quadrant at the transition from small bowel loops without submucosal edema to those with submucosal edema. Subtle perienteric stranding and slight vascular engorgement associated with some of the small bowel loops. No pneumatosis. No visualization of the appendix, reportedly surgically absent. A few new metallic foreign bodies in the cecum suggestive of hemostasis clips. Mild stool. PELVIS:  Decompression of the urinary bladder by a Weaver catheter. Subtle perivesical stranding. Normal prostate size. Suspected laxity of the pelvic floor musculature. 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 Severe systemic atherosclerosis. No abdominal nor pelvic lymphadenopathy. Trace free intraperitoneal fluid. No free intraperitoneal gas. SOFT TISSUES/BONES:  Similar appearance of a left lower quadrant ostomy associated with a moderate parastomal hernia containing fat and trace fluid. Healed midline laparotomy incision. Minimal subcutaneous edema. Laxity of the anterior and lateral abdominal wall musculature. Tiny fat containing umbilical and bilateral inguinal hernias. No inguinal lymphadenopathy. Diffuse bony demineralization. No acute fractures nor suspicious bony lesions. 1. Persistent though improved dilation of several small bowel loops likely due to partial obstruction. The transition point in the paramedian right lower quadrant is seen as the small bowel transitions to loops with submucosal edema likely due to enteritis potentially of infectious, inflammatory, or ischemic etiology. Of note, there is no pneumatosis to suggest necrosis, and there are no findings of perforation. 2. Trace ascites is likely reactive. 3. The sideport of a gastric tube is located in the cardia just below the gastroesophageal junction. Consider slight advancement. 4. Possible mild cystitis.   However, the appearance could be related to decompression of the urinary bladder by a Weaver catheter. 5. Increased size of a 5.5 cm fusiform aneurysm of the infrarenal abdominal aorta (4.4 cm on 03/22/2018) with no findings suggestive of rupture. Recommend vascular surgery consultation as below if not already obtained. 6. Trace right pleural effusion and minimal anasarca. RECOMMENDATIONS: 5.5 cm infrarenal abdominal aortic aneurysm. Recommend referral to a vascular specialist. Reference: J Am Abad Radiol 1648;[id number]. XR CHEST PORTABLE  Result Date: 3/20/2022  EXAMINATION: ONE XRAY VIEW OF THE CHEST 3/20/2022 1:54 pm COMPARISON: Chest x-ray February 27, 2022 HISTORY: ORDERING SYSTEM PROVIDED HISTORY: weakness TECHNOLOGIST PROVIDED HISTORY: Reason for exam:->weakness Reason for Exam: weakness FINDINGS: Cardiac silhouette is enlarged but stable. No focal consolidation, pleural effusion, or pneumothorax identified. Osseous structures appear stable. 1. No acute cardiopulmonary process identified. XR ABDOMEN FOR NG/OG/NE TUBE PLACEMENT  Result Date: 3/21/2022  EXAMINATION: ONE SUPINE XRAY VIEW(S) OF THE ABDOMEN 3/21/2022 3:41 am COMPARISON: March 20, 2022 HISTORY: ORDERING SYSTEM PROVIDED HISTORY: NG confirmation TECHNOLOGIST PROVIDED HISTORY: Reason for exam:->NG confirmation Portable? ->Yes Reason for Exam: ng tube confirmation of placement Additional signs and symptoms: ng tube confirmation of placement FINDINGS: Nasogastric tube tip terminates in the proximal stomach. The side port is just beyond the GE junction. Redemonstration of gaseous distention of small-bowel loops. Surgical clips are seen in the right upper quadrant. 1. Nasogastric tube tip and side port terminate in the proximal stomach. 2. Redemonstration of gaseous distended loops of small bowel.       Physical Exam:  Vitals: BP (!) 140/99   Pulse 84   Temp 98.4 °F (36.9 °C) (Oral)   Resp 16   Ht 5' 9\" (1.753 m)   Wt 175 lb 11.3 oz (79.7 kg)   SpO2 97%   BMI 25.95 kg/m²   24 hour intake/output:    Intake/Output Summary (Last 24 hours) at 3/23/2022 1600  Last data filed at 3/23/2022 1354  Gross per 24 hour   Intake    Output 2875 ml   Net -2875 ml     Last 3 weights: Wt Readings from Last 3 Encounters:   03/23/22 175 lb 11.3 oz (79.7 kg)   03/10/22 181 lb (82.1 kg)   03/04/22 172 lb (78 kg)       General appearance - alert, well appearing, and in no distress. HEENT: Normocephalic, Atraumatic, Conjuctiva pink, PERRL, Oral mucosa normal, Lips, teeth and gums normal, Trachea midline and Thyroid normal  Chest - clear to auscultation, no wheezes, rales or rhonchi, symmetric air entry  Cardiovascular -  S1, S2, irregular and tachy, no murmurs, rubs, clicks or gallops  Abdomen -soft, nontender, colostomy bag in place small soft stool. Weaver present with clear urine. Neurological - Alert and oriented, Normal speech, No focal findings or movement disorder noted and Motor and sensory grossly normal bilaterally  Integumentary - Skin color, texture, turgor normal. No Rashes or lesions  Musculoskeletal -Full ROM times 4 extremities, No clubbing or cyanosis and No peripheral edema      DVT prophylaxis: [] Lovenox                                 [] SCDs                                 [] SQ Heparin                                 [] Encourage ambulation           [x] Already on Anticoagulation -on hold               ASSESSMENT / PLAN :    Principal Problem:    Complicated UTI (urinary tract infection)  Active Problems:    Partial small bowel obstruction (HCC)  Resolved Problems:    * No resolved hospital problems. *      Partial small bowel obstruction  Noted on CT  NG tube has been removed and patient placed on clear liquid diet   GI and general surgery following.   Continue iv fluids but decrease rate from 100 to 75 cc/h  Continue to monitor    Abnormal UA  Hx of BPH and chronic urinary catheter  Repeat UA from new catheter placed 3/21 significantly different from previous and does not suggest overt UTI.  -Continue Rocephin IV for now-plan on 5 days total  -Urine cx from new catheter-less than 10,000 CFU mixed karma  -Urology consulted, saw patient and changed Weaver 3/21. They have signed off and recommends for patient to follow with Dr. Tomas Coe in 3 weeks for reevaluation and catheter exchange.     GI bleed  -Stool guaiac positive  -Recent admission -Had EGD and colonoscopy  -S/P polypectomy ,AVM ablation/placed 7 hemoclips .  -Positive guaiac. 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 Had EGD with significant AVM which were ablated and colonoscopy which revealed polyps at the beginning of this month. Hgb 9.6- to lowest of 8.7 . Now slowly improving -10.5 today   -Monitor H&H.  -Holding Eliquis  -Continue IV Venofer x3 days per GI recommendation.  -Protonix IV  -GI not planning colonoscopy or EGD. Plan CT enterography.     PAF with RVR  -Asymptomatic  -On Eliquis, held due to GI bleed  -Continue Cardizem  -Add Lopressor, will give 50 p.o. x1 now and continue 25 mg twice daily.  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

## 2022-03-23 NOTE — PLAN OF CARE
Problem: Falls - Risk of:  Goal: Will remain free from falls  Description: Will remain free from falls  3/23/2022 0021 by Vanessa Plascencia RN  Outcome: Ongoing  3/22/2022 1442 by Halie Salcido RN  Outcome: Ongoing  Goal: Absence of physical injury  Description: Absence of physical injury  3/23/2022 0021 by Vanessa Plascencia RN  Outcome: Ongoing  3/22/2022 1442 by Halie Salcido RN  Outcome: Ongoing     Problem: Skin Integrity:  Goal: Will show no infection signs and symptoms  Description: Will show no infection signs and symptoms  3/23/2022 0021 by Vanessa Plascencia RN  Outcome: Ongoing  3/22/2022 1442 by Halie Salcido RN  Outcome: Ongoing  Goal: Absence of new skin breakdown  Description: Absence of new skin breakdown  3/23/2022 0021 by Vanessa Plascencia RN  Outcome: Ongoing  3/22/2022 1442 by Halie Salcido RN  Outcome: Ongoing     Problem: Pain:  Goal: Pain level will decrease  Description: Pain level will decrease  Outcome: Ongoing  Goal: Control of acute pain  Description: Control of acute pain  Outcome: Ongoing  Goal: Control of chronic pain  Description: Control of chronic pain  Outcome: Ongoing

## 2022-03-23 NOTE — PROGRESS NOTES
Had some red NG drainage yesterday but H/H and BUN did not change significantly  No complaints  Abdomen soft, non-tender, non-distended  reviewed today's xray- improved  Impression:    1) partial SBO - resolving - likely due shorty adhesions- less likely small bowel lesion   2) minor UGI bleed- had EGD 3 weeks ago- AVM (cauterized) but otherwise normal- H/H, BUN stable        Plan:   1) clamp NG- if tolerates, D/C and start clears   2) monitor H/H- repeat EGD does not seem necessary at this point   3) would get CT enterography later

## 2022-03-23 NOTE — PROGRESS NOTES
Patient seen and examined. No acute events overnight. Patient does report passing flatus into his colostomy appliance and less abdominal pain. Minimal NG tube output  Afebrile. VSS. Adequate urine output. Serial H/H's stable, 8.9/28.6  AXR today with the following impression:    Impression   1. Improved gaseous distension of small bowel loops now measuring up to 3.7   cm, previously 4.6 cm.   2. Nasogastric tube side port is in the distal esophagus.  Advancement by 8   cm would place the side port beyond GE junction.         abd is soft, mildly distended. No rebound or guarding. No CVA tenderness. 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 Air in colostomy appliance. pSBO resolving likely secondary to postoperative and radiation induced peritoneal adhesions. I will remove his NG tube and start him on a clear liquid diet. GI note reviewed. Complicated UTI on antibiotics. Dr. Gita Cabezas will resume surgical care tomorrow. I will inform him of the patient's admission.

## 2022-03-23 NOTE — PROGRESS NOTES
Occupational Therapy    [facility] ACUTE CARE OCCUPATIONAL THERAPY EVALUATION  Choco Ham, 8/23/1929, 4104/4104-A, 3/23/2022    History  [place]:  The primary encounter diagnosis was Urinary tract infection without hematuria, site unspecified. A diagnosis of Gastrointestinal hemorrhage, unspecified gastrointestinal hemorrhage type was also pertinent to this visit. Patient  has a past medical history of AAA (abdominal aortic aneurysm) ([address] Angina, class I ([address] Benign prostatic hyperplasia, Bowel obstruction ([address] CAD (coronary artery disease), Cancer ([address] CCC (chronic calculous cholecystitis), Gastroesophageal reflux disease without esophagitis, Hx of cardiovascular stress test, Hypertension, Partial small bowel obstruction ([address] Primary malignant neoplasm of rectum ([address] Psoriasis, Psoriatic arthritis ([address] Pyelonephritis, Stable angina ([address] and Unspecified cerebral artery occlusion with cerebral infarction. Patient  has a past surgical history that includes Appendectomy; colostomy; Upper gastrointestinal endoscopy (N/A, 2/28/2022); and Colonoscopy (N/A, 3/2/2022). Subjective:  Patient states:  \"I only took a few steps w/ therapy last time\". Pain:  No.    Communication with other providers:  Handoff to RN  Restrictions: General Precautions, Fall Risk    Home Setup/Prior level of function  Social/Functional History  Lives With: Alone  Type of Home:  (condo)  Home Layout: One level  Home Access: Level entry  Bathroom Shower/Tub: Tub/Shower unit,Walk-in shower  Bathroom Toilet: Standard  Bathroom Equipment: Shower chair (in storage)  Home Equipment: [address] wheeled walker  ADL Assistance: [address]: Independent (Bebe with cane)  Transfer Assistance: Independent  Active : Yes  Leisure & Hobbies: spends time with his \"lady friend\" going to [employer] and out to eat.  Used to be a volunteer at the hospital    Examination of body systems (includes body structures/functions, activity/participation limitations):  · Observation:  Supine in bed upon arrival, agreeable to therapy   · Vision:  Glasses  · Hearing:  [place] bilateral hearing aides  · Cardiopulmonary:  No 02 needs      Body Systems and functions:  · ROM R/L:  WFL. · Strength R/L:  4+/5,   · Sensation: WFL  · Tone: Normal  · Coordination: WFL  · Perception: WNL    Activities of Daily Living (ADLs):  · Feeding: Bebe  · Grooming: CGA (washing hands/face and brushing hair)  · UB bathing: Supervision  · LB bathing: CGA  · UB dressing: Supervision  · LB dressing: Nichol  · Toileting: Nichol    Cognitive and Psychosocial Functioning:  · Overall cognitive status: WNL  · Affect: Normal        Mobility:  · Supine to sit:  SBA  · Transfers: Nichol from EOB and from reclining chair up to RW  · Sitting balance:  Supervision. · Standing balance:  CGA w/ RW.  · Functional Mobility CGA w/ RW ~35 feet  · Toilet/Shower Transfers: DNT             [facility] Daily Activity Inpatient   How much help for putting on and taking off regular lower body clothing?: A Little  How much help for Bathing?: A Little  How much help for Toileting?: A Little  How much help for putting on and taking off regular upper body clothing?: None  How much help for taking care of personal grooming?: A Little  How much help for eating meals?: None  [facility] Inpatient Daily Activity Raw Score: 20  AM-[facility] Inpatient ADL T-Scale Score : 42.03  ADL Inpatient CMS 0-100% Score: 38.32  ADL Inpatient CMS G-Code Modifier : CJ    Treatment:  Self Care Training:   Cues were given for safety, sequence, UE/LE placement, visual cues, and balance. Activities performed today included grooming    Therapeutic Activity Training:   Therapeutic activity training was instructed today. Cues were given for safety, sequence, UE/LE placement, awareness, and balance.     Activities performed today included bed mobility training, sup-sit, sit-stand, functional mobility, stand to sit        Safety: patient left in chair with chair alarm, call light within reach, RN notified, gait belt used. Assessment:  Pt is a 79 yo male admitted from home for complicated UTI. Pt at baseline is Independent for ADLs Independent for high level IADLs and Independent for functional transfers/mobility w/ AD. Pt currently presents w/ deficits in ADL and high level IADL independence, functional activity tolerance, dynamic sitting and standing balance and tolerance and functional transfers, BUE strength. Pt would benefit from continued acute care OT services w/ discharge to ARU  Complexity: Moderate  Prognosis: Good, no significant barriers to participation at this time.    Plan  Times per week: 3x+  Times per day: Daily  Current Treatment Recommendations: Rulon Mas Management,Patient/Caregiver Education & Training,Functional Mobility Training,Safety Education & Training,Positioning,Self-Care / ADL,Home Management Training,ROM     Equipment: defer    Goals:  Pt goal: go home  Time Frame for STGs: discharge  Goal 1: Pt will perform UE ADLs Independent  Goal 2: Pt will perform LE ADLs Independent  Goal 3: Pt will perform toileting Independent  Goal 4: Pt will perform functional transfer w/ AD Bebe  Goal 5: Pt will perform functional mobility w/ AD Bebe  Goal 6: Pt will perform therex/theract in order to increase functional activity tolerance and dynamic standing balance    Treatment plan:  Pt will perform functional task in stand reaching in all 3 planes in order to increase dynamic standing balance and functional activity tolerance    Recommendations for NURSING activity: Up to chair for all 3 meals and up to standard commode for all toileting needs    Time:   Time in: 1002  Time out: 1030  Timed treatment minutes: 23 minutes  Total time: 28 minutes    Electronically signed by:    Sharon CEDILLO/NAGA [id number]  11:19 AM,3/23/2022

## 2022-03-24 NOTE — FLOWSHEET NOTE
Patient returned to bed from bathroom. Washed up in bathroom with assist of PCT. Stomal appliance changed per [facility]ANCO RN. Patient reported feeling tired, stated he would wait until tomorrow to shower.

## 2022-03-24 NOTE — PROGRESS NOTES
No complaints  Tolerated clears- hungry for food  Abdomen soft, non-tender, non-distended  abd xray- improved- images reviewed-scattered colonic gas  H/H stable  Impression:               1) partial SBO - resolving - likely due shorty adhesions- less likely small bowel lesion              2) minor UGI bleed- had EGD 3 weeks ago- AVM (cauterized) but otherwise normal- H/H, BUN stable        Plan:              1) advance to low fiber diet

## 2022-03-24 NOTE — FLOWSHEET NOTE
03/24/22 1036   Encounter Summary   Services provided to: Patient and family together   Referral/Consult From: [address] No   Continue Visiting Yes   Volunteer Visit No   Complexity of Encounter Low   Length of Encounter 15 minutes   Spiritual Assessment Completed Yes   Routine   Type Initial   Assessment Calm; Approachable; Hopeful;Coping;Peaceful   Intervention Sustaining presence/ Ministry of presence; Explored feelings, thoughts, concerns;Nurtured hope;Empowerment   Outcome Engaged in conversation;Expressed gratitude;Encouraged;Receptive   Spiritual/Protestant   Type Spiritual support

## 2022-03-24 NOTE — PROGRESS NOTES
time with his [employer] friend\" going to [employer] and out to eat.  Used to be a volunteer at the hospital  Short term goals  Time Frame for Short term goals: 1 week  Short term goal 1: Pt will perform sit><supine SBA  Short term goal 2: Pt will transfer to all surfaces SBA  Short term goal 3: Pt will ambulate 100ft with LRAD SBA  Short term goal 4: Pt will perform standing light dynamic activity without UE support x 3 minutes SBA       Electronically signed by:    Marquis Barrett [id number]  3/24/2022, 10:08 AM

## 2022-03-24 NOTE — PROGRESS NOTES
Hospitalist Progress Note      Name:  Madyson Edouard /Age/Sex: 1929  (80 y.o. male)   MRN & CSN:  [id number] & [id number] Admission Date/Time: 3/20/2022 12:56 PM   [facility] PCP: Ansley Candelario MD         Hospital Day: 5    Assessment and Plan:       Partial small bowel obstruction  Noted on CT  NG tube has been removed and patient placed on clear liquid diet   Abdomen soft nontender nondistended  GI and general surgery following. Abdominal x-ray improved  Continue iv fluids but decrease rate from 100 to 75 cc/h  Continue to monitor  Bowel obstruction resolving likely due to adhesion  Advance to low fiber diet     Abnormal UA  Hx of BPH and chronic urinary catheter  Repeat UA from new catheter placed 3/21 significantly different from previous and does not suggest overt UTI. -Continue Rocephin IV for now-plan on 5 days total  -Urine cx from new catheter-less than 10,000 CFU mixed karma  -Urology consulted, saw patient and changed Weaver 3/21. They have signed off and recommends for patient to follow with Dr. Irena Sethi in 3 weeks for reevaluation and catheter exchange.     GI bleed  -Stool guaiac positive  -Recent admission -Had EGD and colonoscopy  -S/P polypectomy ,AVM ablation/placed 7 hemoclips .  -Positive guaiac. 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 Had EGD with significant AVM which were ablated and colonoscopy which revealed polyps at the beginning of this month. Hgb 9.6- to lowest of 8.7 . Now slowly improving -10.5 today   -Monitor H&H.  -Holding Eliquis  -Continue IV Venofer x3 days per GI recommendation.  -Protonix IV  -GI not planning colonoscopy or EGD. Plan CT enterography.   Hemoglobin stable at 9  PT OT evaluation     PAF with RVR  -Asymptomatic  -On Eliquis, held due to GI bleed  Resume on Eliquis when okay with GI  -Continue Cardizem  -Add Lopressor, will give 50 p.o. x1 now and continue 25 mg twice daily.  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     CODE STATUS: Full    Diet ADULT DIET; Regular   DVT Prophylaxis [] Lovenox, []  Heparin, [x] SCDs, [] Ambulation   GI Prophylaxis [x] PPI,  [] H2 Blocker,  [] Carafate,  [] Diet/Tube Feeds   Code Status Full Code   Disposition Patient requires continued admission due to    MDM [] Low, [] Moderate,[]  High  Patient's risk as above due to      History of Present Illness: The patient was seen and examined at the bedside  Patient feels much better than yesterday  Abdomen is soft nontender denies abdominal pain  Patient still feel weak PT OT ordered      Objective: Intake/Output Summary (Last 24 hours) at 3/24/2022 0827  Last data filed at 3/24/2022 0158  Gross per 24 hour   Intake 120 ml   Output 3500 ml   Net -3380 ml      Vitals:   Vitals:    03/24/22 0812   BP: 127/70   Pulse: 79   Resp: 16   Temp: 97.5 °F (36.4 °C)   SpO2:      Physical Exam:   GEN Awake.  Alert , not in respiratory distress, not in pain  HEENT: PEERLA, , supple neck,   Chest: air entry equal bilaterally, no wheezing or crepitation  Heart: S1 and S2 heard, no murmur, no gallop or rub, regular rate  Abdomen: soft, ND , Nt, +BS, has silveira catheter   Extremities: no cyanosis, tenderness or erythema, peripheral pulses audible  Neurology: alert, oriented x3, able to move 4 limbs    Medications:   Medications:    metoprolol tartrate  25 mg Oral BID    pantoprazole  40 mg IntraVENous Daily    dilTIAZem  240 mg Oral Daily    folic acid  1 mg Oral Daily    therapeutic multivitamin-minerals  1 tablet Oral Daily    sodium chloride flush  5-40 mL IntraVENous 2 times per day    cefTRIAXone (ROCEPHIN) IV  1,000 mg IntraVENous Q24H    hydroxyurea  500 mg Oral BID      Infusions:    dextrose 5 % and 0.9 % NaCl Stopped (03/23/22 1615)    sodium chloride       PRN Meds: ondansetron, 4 mg, Q4H PRN   Or  ondansetron, 4 mg, Q8H PRN  morphine, 4 mg, Q2H PRN  sodium chloride flush, 5-40 mL, PRN  sodium chloride, 25 mL, PRN  acetaminophen, 650 mg, Q6H PRN   Or  acetaminophen, 650 mg, Q6H PRN  polyethylene glycol, 17 g, Daily PRN  aluminum & magnesium hydroxide-simethicone, 30 mL, Q6H PRN          Electronically signed by Fawad Calderon MD on 3/24/2022 at 8:27 AM

## 2022-03-24 NOTE — CONSULTS
IV Consult complete. Nexiva 20g 1.75\" Extra Long PIV inserted in right FA x1 attempt with ultrasound guidance. Brisk blood return patient tolerated well.

## 2022-03-24 NOTE — PROGRESS NOTES
Progress Note    Subjective:      Rossi Newsome   Colostomy functioning  Hungry  No nauseao or bleeding     Objective:     BP (!) 108/57   Pulse 66   Temp 97.7 °F (36.5 °C) (Oral)   Resp 20   Ht 5' 9\" (1.753 m)   Wt 177 lb 14.6 oz (80.7 kg)   SpO2 93%   BMI 26.27 kg/m²     In: 120 [P.O.:120]  Out: 4325 [Urine:3225]         General: well appearing  Abdomen: soft nontender stool in bag  Lungs: clear  Other: NA    Labs:   CBC:   Lab Results   Component Value Date    WBC 5.5 03/21/2022    RBC 2.47 03/21/2022    RBC 5.19 08/18/2017    HGB 9.0 03/24/2022    HCT 27.5 03/24/2022    .1 03/21/2022    MCH 37.7 03/21/2022    MCHC 31.1 03/21/2022    RDW 14.4 03/21/2022     03/21/2022    MPV 8.9 03/21/2022        Assessment:     Doing well  PSBO resolved     Plan:   Regular diet  Discharge home today if tolerating diet  Im signing off call if needed

## 2022-03-24 NOTE — CONSULTS
[address] [place] [address], [address] [place] [address]                                  CONSULTATION    PATIENT NAME: Leigh Rdz                       :        1929  MED REC NO:   [id number]                          ROOM:  ACCOUNT NO:   [de-identified]                           ADMIT DATE: 2022  PROVIDER:     Emily Boyle MD    CONSULT DATE:  2022    SURGICAL CONSULTATION    REFERRING PROVIDER:  Gissel Alejandre MD    REASON FOR CONSULTATION:  Small bowel obstruction. CHIEF COMPLAINT AND HISTORY OF PRESENT ILLNESS:  As follows. The  patient is a very pleasant 26-year-old  male, who was actually  seen by Dr. Woodrow Mosher, but he is on vacation and I am covering. I am now  seeing the patient for evidence of a small bowel obstruction. This  patient many years ago had been operated on by Dr. Woodrow Mosher and he had  performed an abdominal perineal resection for rectal cancer, prior to  this he did get neoadjuvant chemotherapy and radiation treatments. Over  the years, he has had hospital admissions for bowel obstructions, which  fortunately were able to resolve and he did not require any other  surgical intervention. In 2018, the patient then got symptomatic  cholelithiasis and my partner took his gallbladder out laparoscopically  and he had no postoperative issues. Since then, he has not needed to be  hospitalized for any reasons for abdominal pain and he recently  underwent a colonoscopy by Dr. Brianna Mendoza and he did note a 2-cm adenoma,  which was removed from the cecum by endoscopic mucosal resection and he  had a smaller adenoma removed from the ascending colon. Post  enteroscopy showed an AVM in the proximal jejunum that was coagulated. He then presented because he noticed some blood in his colostomy  appliance and a drop in his hemoglobin, which was down to 9.6. He was  known to be on iron, but his stool was heme-positive.   He is also on  anticoagulation, that being Eliquis. He then developed nausea,  vomiting, and abdominal pain and was very concerned of the small bowel  obstruction due to his previous admissions for the same problem. He  then had a nasogastric tube placed, was admitted to the hospital and his  CT of the abdomen and pelvis did reveal dilated small bowel loops with a  transition point in the right paramedian lower quadrant. He also had  some submucosal edema, but there is no evidence of pneumatosis  intestinalis, necrosis, and no evidence of any pneumoperitoneum. he had  some trace ascites, some mild cystitis, and a 5.5-cm fusiform infrarenal  abdominal aortic aneurysm. 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 He is also being treated for  a complicated urinary tract infection, which did reveal a large amount  of blood, moderate amount of leukocyte esterase. The patient is  currently afebrile, hemodynamically stable, lying comfortably in the bed  talking with one of his family members. He does have bilious contents  in the NG tube container. PAST MEDICAL HISTORY:  Abdominal aortic aneurysm, BPH, history of rectal  cancer in remission, psoriasis, essential hypertension, chronic  myeloproliferative disorder, and stable angina. PAST SURGICAL HISTORY:  Had an appendectomy, colonoscopies, EGDs, prior  APR, laparoscopic cholecystectomy in 2018. FAMILY HISTORY:  Noncontributory. SOCIAL HISTORY:  Negative for alcohol, tobacco, or IV drug abuse. CURRENT MEDICATIONS:  Please refer medication reconciliation sheet. ALLERGIES:  SULFA. REVIEW OF SYSTEMS:  10-point review of systems otherwise negative unless  stated as above in history of present illness. PHYSICAL EXAMINATION:  GENERAL:  Generally speaking, in no acute distress. Alert, awake, and  oriented x3.   VITAL SIGNS:  His temperature is going to be 97.5, heart rate 66,  respiratory rate 16, blood pressure 151/97, O2 sat is 96% on room air. HEENT:  Normocephalic and atraumatic. Pupils are equal, round, and  reactive to light. Extraocular movements intact. Trachea is midline. No lymphadenopathy. Anicteric sclerae. Moist mucous membrane. NECK:  Supple. No JVD or bruits. CHEST:  Clear to auscultation bilaterally. Air entry is bilaterally  equal.  No rales, rhonchi, wheezes, or crackles. CARDIOVASCULAR:  Positive S1, S2.  Normal sinus rhythm. No murmurs,  rubs, or gallops. Point of maximal impulse, fifth intercostal space,  midclavicular line. ABDOMEN:  His Abdomen is mildly distended. Some tenderness in the mid  abdomen with deep palpation, but no rebound or guarding. No midline  hernia. He does have apparent colostomy hernia, which is reducible. No  inguinal lymphadenopathy. No organomegaly. No fluid shifts. No CVA  tenderness. No contents in the colostomy bag. No abdominal wall masses  or rashes. EXTREMITIES:  Negative for erythema, edema, cellulitis, or cyanosis. ASSESSMENT AND PLAN:  This is a 55-year-old  male, who presents  with a partial small bowel obstruction likely secondary to postoperative  peritoneal adhesions and also radiation-induced adhesions. GI bleed,  which could be from his recent interventions from a colonoscopy or  possibly an upper GI source. He did have the AVM present within the  terminal ileum that was coagulated by Dr. Veronica Arzate. We will continue to  follow his serial H&H's, place him on some IV iron and Protonix. From a  surgical standpoint, I will proceed with conservative management. Continue with a nasogastric tube, serial abdominal examinations along  with daily abdominal x-rays. At this time, he does not have any  peritoneal signs on physical examination. My partner will be getting  back in town later this week.   I will inform of the patient's admission  and he will resume his surgical care. I would like to thank Dr. Elian Urena  for giving me the opportunity to assist in surgical care and evaluation  on this patient.         Delmis Haney MD    D: 03/23/2022 7:30:02       T: 03/23/2022 7:33:21     [email or web address]  Job#: [id number]     Doc#: [id number]    CC:

## 2022-03-24 NOTE — CARE COORDINATION
Spoke with pt and plan is home with family to help and HC. Pt would like [facility] , PS to [facility] with [facility].

## 2022-03-25 NOTE — PROGRESS NOTES
Doing well- soft brown stool in ostomy bag  Tolerating regular diet and anxious to go home  Abdomen soft, non-tender, non-distended  Impression:               1) partial SBO - resolved - likely due shorty adhesions- less likely small bowel lesion              2) minor UGI bleed- had EGD 3 weeks ago- AVM (cauterized) but otherwise normal- H/H, BUN stable        Plan:              6) OK with me to discharge   2) if recurrent sx quickly then do CT enterography

## 2022-03-25 NOTE — DISCHARGE SUMMARY
Discharge Summary    Name:  Kyler Wells /Age/Sex: 1929  (80 y.o. male)   MRN & CSN:  [id number] & [id number] Admission Date/Time: 3/20/2022 12:56 PM   Attending:  Fawad Calderon MD Discharging Physician: Fawad Calderon MD     Hospital Course:   Kyler Wells is a 80 y.o.  male  who presents with Complicated UTI (urinary tract infection)    80years old male with history of atrial fibrillation on chronic blood thinner and history of chronic Weaver's catheter was admitted because of partial small bowel obstruction noted on the CT abdomen, likely due to adhesion NG tube was placed, GI and general surgery consulted, patient symptoms improved, NG tube removed, patient tolerating regular diet well, patient also has minor upper GI bleed, patient had EGD 3 weeks ago with AVM cauterized but otherwise normal, hemoglobin stable, abdomen soft non tender, GI cleared the patient to be discharged home, if recurrent symptoms quickly then to do CT enterography. GI recommend to resume on aspirin on discharge and to resume on Eliquis on , I discussed with the patient and he voiced understanding. Also the patient has abnormal urinalysis on admission he has history of BPH and chronic urinary catheter, was placed on IV Rocephin and received 5 days of treatment empirical treatment, however urine culture negative, Urology consulted, saw patient and changed Weaver 3/21. Kenia Shepard have signed off and recommends for patient to follow with Dr. Troy Horowitz in 3 weeks for reevaluation and catheter exchange. The patient has area of erythema and tenderness on the left lower forearm because of the IV access infiltration, suspect possible early cellulitis?,  Will discharge on oral Keflex for 5 days     paroxysmal A. fib with RVR: Rate controlled on discharge to continue on Cardizem and added Lopressor 25 mg twice daily, patient need to follow-up with his primary care.   To resume Eliquis on  as per GI recommendation, continue on aspirin on discharge    The patient expressed appropriate understanding of and agreement with the discharge recommendations, medications, and plan.      Consults this admission:  IP CONSULT TO HOSPITALIST  IP CONSULT TO UROLOGY  IP CONSULT TO GI  IP CONSULT TO GENERAL SURGERY  IP CONSULT TO IV TEAM  IP CONSULT TO HOME CARE NEEDS    Discharge Instruction:   Follow up appointments: follow up with GI as scheduled   Primary care physician:  within 1  weeks    Diet:  regular diet   Activity: activity as tolerated  Disposition: Discharged to:   []Home, [x]HHC, []SNF, []Acute Rehab, []Hospice   Condition on discharge: Stable    Discharge Medications:        Medication List      START taking these medications    cephALEXin 500 MG capsule  Commonly known as: KEFLEX  Take 1 capsule by mouth 2 times daily for 5 days        CHANGE how you take these medications    apixaban 2.5 MG Tabs tablet  Commonly known as: Eliquis  Take 1 tablet by mouth 2 times daily Please resume this medication on Monday , march 28 th  What changed: additional instructions        CONTINUE taking these medications    adalimumab 40 MG/0.8ML injection  Commonly known as: HUMIRA     aspirin 81 MG chewable tablet     dilTIAZem 240 MG extended release capsule  Commonly known as: Dilt-XR  Take 1 capsule by mouth once daily     ferrous sulfate 325 (65 Fe) MG tablet  Commonly known as: IRON 325  Take 1 tablet by mouth 2 times daily     folic acid 1 MG tablet  Commonly known as: FOLVITE  Take 1 tablet by mouth daily     hydroxyurea 500 MG chemo capsule  Commonly known as: HYDREA  Take 2 capsules by mouth once daily     isosorbide mononitrate 30 MG extended release tablet  Commonly known as: IMDUR     pantoprazole 40 MG tablet  Commonly known as: PROTONIX  Take 1 tablet by mouth daily     therapeutic multivitamin-minerals tablet     VITAMIN E PO        ASK your doctor about these medications    * metoprolol tartrate 25 MG tablet  Commonly known as: LOPRESSOR  Take 1 tablet by mouth 2 times daily. Ask about: Which instructions should I use? * metoprolol tartrate 25 MG tablet  Commonly known as: LOPRESSOR  Take 1 tablet by mouth 2 times daily for 15 days  Ask about: Which instructions should I use? * This list has 2 medication(s) that are the same as other medications prescribed for you. Read the directions carefully, and ask your doctor or other care provider to review them with you. Where to Get Your Medications      You can get these medications from any pharmacy    Bring a paper prescription for each of these medications  · cephALEXin 500 MG capsule  · metoprolol tartrate 25 MG tablet  · metoprolol tartrate 25 MG tablet     Information about where to get these medications is not yet available    Ask your nurse or doctor about these medications  · apixaban 2.5 MG Tabs tablet         Objective Findings at Discharge:   BP (!) 156/92   Pulse 77   Temp 98.1 °F (36.7 °C) (Oral)   Resp 16   Ht 5' 9\" (1.753 m)   Wt 178 lb 4.8 oz (80.9 kg)   SpO2 97%   BMI 26.33 kg/m²            PHYSICAL EXAM   GEN    Awake. Alert , not in respiratory distress, not in pain  HEENT: PEERLA, , supple neck,   Chest: air entry equal bilaterally, no wheezing or crepitation  Heart: S1 and S2 heard, no murmur, no gallop or rub, regular rate  Abdomen: soft, ND , Nt, +BS, has silveira catheter   Extremities: no cyanosis, tenderness or erythema, peripheral pulses audible  Neurology: alert, oriented x3, able to move 4 limbs  BMP/CBC  Recent Labs     03/23/22  1306 03/23/22  1306 03/24/22  0353 03/24/22  1619 03/25/22  0634   *  --  133*  --  137   K 3.9  --  4.0  --  3.7   CL 98*  --  102  --  104   CO2 25 --  23 --  22   BUN 10  --  9  --  12   CREATININE 1.0  --  1.0  --  1.0   WBC  --   --   --   --  8.8   HCT 32.0*   < > 27.5* 30.5* 28.5*   PLT  --   --   --   --  266    < > = values in this interval not displayed.

## 2022-03-25 NOTE — PROGRESS NOTES
Patient discharged to home with daughter. Reviewed AVS, patient signed and verbalized understanding.

## 2022-03-25 NOTE — PROGRESS NOTES
[facility] Liaison spoke with the pt & is aware of discharge & will initiate Homer Webb.

## 2022-03-25 NOTE — PROGRESS NOTES
Outpatient Pharmacy Progress Note for Meds-to-Beds    Total number of Prescriptions Filled: 2  The following medications were dispensed to the patient during the discharge process:  Metoprolol tartrate 25mg  Cephalexin 500mg    Additional Documentation:  Patient picked-up the medication(s) in the OP Pharm[facility]      Thank you for letting us serve your patients.   [facility]    [address], [address] [facility]    Phone: [phone]    Fax: [phone]

## 2022-03-25 NOTE — PLAN OF CARE
Problem: Falls - Risk of:  Goal: Will remain free from falls  Description: Will remain free from falls  3/24/2022 2314 by Padmini Martin RN  Outcome: Ongoing  3/24/2022 1717 by Nisha Lowe RN  Outcome: Ongoing  Goal: Absence of physical injury  Description: Absence of physical injury  3/24/2022 2314 by Padmini Martin RN  Outcome: Ongoing  3/24/2022 1717 by Nisha Lowe RN  Outcome: Ongoing     Problem: Falls - Risk of:  Goal: Will remain free from falls  Description: Will remain free from falls  3/24/2022 2314 by Padmini Martin RN  Outcome: Ongoing  3/24/2022 1717 by Nisha Lowe RN  Outcome: Ongoing  Goal: Absence of physical injury  Description: Absence of physical injury  3/24/2022 2314 by Padmini Martin RN  Outcome: Ongoing  3/24/2022 1717 by Nisha Lowe RN  Outcome: Ongoing     Problem: Skin Integrity:  Goal: Will show no infection signs and symptoms  Description: Will show no infection signs and symptoms  3/24/2022 2314 by Padmini Martin RN  Outcome: Ongoing  3/24/2022 1717 by Nisha Lowe RN  Outcome: Ongoing  Goal: Absence of new skin breakdown  Description: Absence of new skin breakdown  3/24/2022 2314 by Padmini Martin RN  Outcome: Ongoing  3/24/2022 1717 by Nisha Lowe RN  Outcome: Ongoing     Problem: Pain:  Goal: Pain level will decrease  Description: Pain level will decrease  3/24/2022 2314 by Padmini Martin RN  Outcome: Ongoing  3/24/2022 1717 by Nisha Lowe RN  Outcome: Ongoing  Goal: Control of acute pain  Description: Control of acute pain  3/24/2022 2314 by Padmini Martin RN  Outcome: Ongoing  3/24/2022 1717 by Nisha Lowe RN  Outcome: Ongoing  Goal: Control of chronic pain  Description: Control of chronic pain  3/24/2022 2314 by Padmini Martin RN  Outcome: Ongoing  3/24/2022 1717 by Nisha Lowe RN  Outcome: Ongoing

## 2022-03-25 NOTE — PROGRESS NOTES
Paper prescriptions sent to [facility] Pharm[facility] per [facility] request.  Tasha Gardner has also requested we go over the AVS with him and his Daughter when she gets here. Patient nurse Karlo Larsen RN updated.

## 2022-03-25 NOTE — PROGRESS NOTES
Physician Progress Note      PATIENT:               Brooke Islas  CSN #:                  [id number]  :                       1929  ADMIT DATE:       3/20/2022 12:56 PM  [address] [place] DATE:  RESPONDING  PROVIDER #:        Marleen Guillaume MD          QUERY TEXT:    Pt admitted with SBO. Noted documentation of partial small bowel obstruction   likely secondary to postoperative peritoneal adhesions and also   radiation-induced  on 3/20/22 by ordered surgical consultant. If possible,   please document in progress notes and discharge summary:    [Partial small bowel obstruction likely secondary to postoperative peritoneal   adhesions and also radiation-induced adhesions. confirmed present on   admission::The diagnosis of partial small bowel obstruction likely secondary   to postoperative peritoneal adhesions and also radiation-induced adhesions. was confirmed as present on admission.]]    The medical record reflects the following:  Risk Factors: Radiation, prior abdominal surgery  Clinical Indicators: Dr. Carin Gill in 3/20 \"partial small bowel obstruction   likely secondary to postoperative peritoneal adhesions and also   radiation-induced adhesions. \"  Treatment: NG, surgical and GI consult, abd/pelvis CT. Thank you,  Charan Butler RN, [facility]  [phone]  Options provided:  -- Defer to surgical consultant documentation regarding partial small bowel   obstruction likely secondary to postoperative peritoneal adhesions and also   radiation-induced adhesions. -- Partial small bowel obstruction due to adhesions of unknown cause. -- Other - I will add my own diagnosis  -- Disagree - Not applicable / Not valid  -- Disagree - Clinically unable to determine / Unknown  -- Refer to Clinical Documentation Reviewer    PROVIDER RESPONSE TEXT:    Partial small bowel obstruction due to adhesions of unknown cause.     Query created by: Martinez Patrick on 3/24/2022 2:48 PM      Electronically signed by:  Marleen Guillaume MD 3/25/2022 11:15 AM

## 2022-03-28 NOTE — CARE COORDINATION
Care Transitions Outreach Attempt    Call within 2 business days of discharge: Yes   Attempted to reach patient for transitions of care follow up. Unable to reach patient. Patient: Karlene Mckee Patient : 1929 MRN: [id number]    Last Discharge [facility]       Complaint Diagnosis Description Type Department Provider    3/20/22 Fatigue; Irregular Heart Beat Urinary tract infection without hematuria, site unspecified . .. ED to Hosp-Admission (Discharged) (ADMITTED) Harish Oliveira MD; [facility]. ..        1st attempt to reach for Care Transition discharge call unsuccessful. HIPAA compliant message left requesting call back. Confirmed with [facility] that pt's [facility] PT visits started 3/26/22 & they will be continuing to see him for [address]. Was this an external facility discharge? No Discharge Facility: [place]    Noted following upcoming appointments from discharge chart review:   [facility] follow up appointment(s):   Future Appointments   Date Time Provider Sergio Escalante   2022 10:20 AM CHEMA Myers [address] [facility]   2022 10:10 AM Alfie Moss MD [address] [place] Gastro [facility]   2022 10:00 AM SCHEDULE, [facility] MED ONC LAB [facility] MED ONC [place]   2022 10:15 AM Sree Herrera MD [address] [place] CC [facility]   2022  1:00 PM Kaila Joy DO [address] [place] FPS [facility]     Noted There is not a PCP appt scheduled until 22. Informed CC Support Ghulam Sagastume to obtain sooner appt.     Non-[facility] follow up appointment(s): MADELEINE Brown RN [phone]

## 2022-03-29 NOTE — CARE COORDINATION
Request from [facility] LESTER Fuchs. Riley Maurer Please schedule patient for hospital f/u. Attempted to call patient, no answer. Left VM with this writers name and number for call back.     Wilfrid Runner, [address] [facility] Coordination Transition

## 2022-03-29 NOTE — CARE COORDINATION
2nd attempt to contact patient  LVM to contact this writer or to contact office for appt. Office had left patient a msg. for call back. See chart note.     Send chart back to [address], [address] [facility] Coordination Transition

## 2022-03-29 NOTE — CARE COORDINATION
Care Transitions Outreach Attempt    Call within 2 business days of discharge: Yes   Attempted to reach patient for transitions of care follow up. Unable to reach patient. Patient: Caesar Duncan Patient : 1929 MRN: [id number]    Last Discharge [facility]       Complaint Diagnosis Description Type Department Provider    3/20/22 Fatigue; Irregular Heart Beat Urinary tract infection without hematuria, site unspecified . .. ED to Hosp-Admission (Discharged) (ADMITTED) Lynnette Ovalle MD; [facility]. .. 2nd unsuccessful attempt to reach for Care Transition discharge call. HIPAA compliant message left requesting call back. Episode closed per protocol, no further outreach scheduled. Noted PCP office has been trying to get in touch with pt/dtr to schedule follow up appt. Was this an external facility discharge?  No Discharge Facility: [place]    Noted following upcoming appointments from discharge chart review:   [facility] follow up appointment(s):   Future Appointments   Date Time Provider Sergio Escalante   2022 10:20 AM CHEMA Lambert - CNP [facility] Heart [facility]   2022 10:10 AM Lj Krueger MD [facility] Gastro [facility]   2022 10:00 AM SCHEDULE, SR[facility] MED ONC [facility] MED ONC [place]   2022 10:15 AM Mohini Matamoros MD [facility] CC [facility]   2022  1:00 PM Kaila Joy DO [facility] FPS [facility]     [facility] follow up appointment(s): MADELEINE Anderson RN [phone]

## 2022-03-29 NOTE — TELEPHONE ENCOUNTER
[facility] [address] Transitions Initial Follow Up Call    Outreach made within 2 business days of discharge: No    Patient: Kelly Alexandre Patient : 1929   MRN: <[id number]>  Reason for Admission: There are no discharge diagnoses documented for the most recent discharge.   Discharge Date: 3/25/22       Spoke with: adilia for pt and dtr Unknown Rishabh [phone] to return call    Discharge department/facility: [facility]

## 2022-04-01 NOTE — TELEPHONE ENCOUNTER
[facility] requesting refill on pt's Hydrea. Dr. Alexis Holt reviewed pt's latest CBC from 3/25 and advised that pt continue same dose.

## 2022-04-02 PROBLEM — K56.609 SBO (SMALL BOWEL OBSTRUCTION) (HCC): Status: ACTIVE | Noted: 2022-01-01

## 2022-04-02 NOTE — H&P
History and Physical      Name:  Dipesh Palomo /Age/Sex: 1929  (80 y.o. male)   MRN & CSN:  [id number] & [id number] Admission Date/Time: 2022 11:02 AM   Location:  -A PCP: Marlin Habermann, MD       Hospital Day: 1    Assessment and Plan:   Dipesh Palomo is a 80 y.o.  male  who presents with SBO (small bowel obstruction) ([facility]    1. Small bowel obstruction, with abdominal distention and pain, NG to intermittent wall suction for decompression. Pain control. Consult general surgery  2. Prior history of small bowel obstruction with bowel resection status post colostomy  3. Acute inflammatory colitis, with leukocytosis (WBC is as high as 26.9). Start patient on IV Zosyn and Flagyl  4. Hemoccult positive, consult GI.  PPI. Hold Eliquis. 5. A. fib with RVR, on Eliquis. Will hold for now in view of Hemoccult positivity. Cardiology has seen patient. Patient received 1 dose of digoxin with heart rate still high. Plan cardiologist to start patient on amiodarone. 6. Inferior renal AAA, stable per CT  7. Bilateral pleural effusion, said to be mild. We will continue to monitor. Patient currently has no respiratory compromise  8. History of rectal cancer  9. Hx of RA, on Humira    Diet Diet NPO   DVT Prophylaxis [] Lovenox, []  Heparin, [x] SCDs, [] Ambulation   GI Prophylaxis [x] PPI,  [] H2 Blocker,  [] Carafate,  [] Diet/Tube Feeds   Code Status Full Code   Disposition Patient requires continued admission due to need for specialist evaluation   MDM [] Low, [] Moderate,[]  High       History of Present Illness:     Chief Complaint: SBO (small bowel obstruction) ([facility]  Dipesh Palomo is a 80 y.o.  male  Who has a history of essential hypertension, atrial fibrillation, prior small bowel obstruction presents with abdominal pain, nausea and vomiting associated with abdominal distention started overnight. Patient also noticed that he has had dark watery stools, not of his colostomy.   He has had reduced output since onset of illness. On initial evaluation the emergency room, patient was noted to have a heart rate in the 140s. Patient does have a history of atrial fibrillation and is on anticoagulation. He was placed on Cardizem drip with little improvement. He also received 1 dose of Cardizem which has not helped. Cardiology has seen patient here in the emergency room and added amiodarone to his regimen and his heart rate is better controlled. He states that his pain was 10 out of 10 at its worst but is now improved since he received morphine. He describes his pain as generalized and sharp, nonradiating. He denies any fever. He denies any chest pain. Ten point ROS reviewed negative, unless as noted above    Objective:   No intake or output data in the 24 hours ending 04/02/22 1951   Vitals:   Vitals:    04/02/22 1805   BP: 112/60   Pulse: 130   Resp: 18   Temp:    SpO2: 98%     Physical Exam:   GEN Awake male, sitting upright in bed in no apparent distress. Appears given age. EYES Pupils are equally round. No scleral erythema, discharge, or conjunctivitis. HENT Mucous membranes are moist. Oral pharynx without exudates, no evidence of thrush. NECK Supple, no apparent thyromegaly or masses. RESP Clear to auscultation, no wheezes, rales or rhonchi. Symmetric chest movement while on room air. CARDIO/VASC S1/S2 auscultated. Regular rate without appreciable murmurs, rubs, or gallops. No JVD or carotid bruits. Peripheral pulses equal bilaterally and palpable. No peripheral edema. GI distended, tender, colostomy draining dark material.   No costovertebral angle tenderness. Normal appearing external genitalia. Weaver catheter is not present. HEME/LYMPH No palpable cervical lymphadenopathy and no hepatosplenomegaly. No petechiae or ecchymoses. MSK No gross joint deformities. SKIN Normal coloration, warm, dry.   NEURO Cranial nerves appear grossly intact, normal speech, no lateralizing weakness. PSYCH Awake, alert, oriented x 4. Affect appropriate. Past Medical History:      Past Medical History:   Diagnosis Date    AAA [facility]abdominal aortic aneurysm) [facility]     Angina, class I ([address]     Benign prostatic hyperplasia 2011     Updating Deprecated Diagnoses    Bowel obstruction ([address]     CAD (coronary artery disease)     Cancer ([address]     rectal    CCC (chronic calculous cholecystitis) 2018    Gastroesophageal reflux disease without esophagitis 2020    Hx of cardiovascular stress test 2021    EF 62% Normal study    Hypertension     Partial small bowel obstruction ([address] 3/23/2022    Primary malignant neoplasm of rectum ([facility] 2019    Psoriasis     Psoriatic arthritis ([facility] 2019    Pyelonephritis 2018    Stable angina ([facility] 2019    Unspecified cerebral artery occlusion with cerebral infarction      PSHX:  has a past surgical history that includes Appendectomy; colostomy; Upper gastrointestinal endoscopy (N/A, 2022); and Colonoscopy (N/A, 3/2/2022). Allergies: Allergies   Allergen Reactions    Sulfa Antibiotics        FAM HX: family history includes Cancer in his father and mother. Soc HX:   Social History     Socioeconomic History    Marital status:       Spouse name: Not on file    Number of children: Not on file    Years of education: Not on file    Highest education level: Not on file   Occupational History    Not on file   Tobacco Use    Smoking status: Former Smoker     Packs/day: 1.50     Years: 40.00     Pack years: 60.00     Types: Cigarettes     Quit date: 80     Years since quittin.1    Smokeless tobacco: Never Used   Vaping Use    Vaping Use: Never used   Substance and Sexual Activity    Alcohol use: No    Drug use: No    Sexual activity: Never   Other Topics Concern    Not on file   Social History Narrative    Not on file     Social Determinants of Health     Financial Resource Strain:    [facility] Difficulty of Paying Living Expenses: Not on file   Food Insecurity:     Worried About Running Out of Food in the Last Year: Not on file    Ran Out of Food in the Last Year: Not on file   Transportation Needs:     Lack of Transportation (Medical): Not on file    Lack of Transportation (Non-Medical):  Not on file   Physical Activity:     Days of Exercise per Week: Not on file    Minutes of Exercise per Session: Not on file   Stress:     Feeling of Stress : Not on file   Social Connections:     Frequency of Communication with Friends and Family: Not on file    Frequency of Social Gatherings with Friends and Family: Not on file    Attends Yarsani Services: Not on file    Active Member of Clubs or Organizations: Not on file    Attends Club or Organization Meetings: Not on file    Marital Status: Not on file   Intimate Partner Violence:     Fear of Current or Ex-Partner: Not on file    Emotionally Abused: Not on file    Physically Abused: Not on file    Sexually Abused: Not on file   Housing Stability:     Unable to Pay for Housing in the Last Year: Not on file    Number of Places Lived in the Last Year: Not on file    Unstable Housing in the Last Year: Not on file       Medications:   Medications:    vitamin E  200 Units Oral Daily    therapeutic multivitamin-minerals  1 tablet Oral Daily    adalimumab  40 mg SubCUTAneous Once    isosorbide mononitrate  30 mg Oral Daily    apixaban  2.5 mg Oral BID    metoprolol tartrate  25 mg Oral BID    aspirin  81 mg Oral Daily    folic acid  1 mg Oral Daily    ferrous sulfate  325 mg Oral BID    [START ON 4/3/2022] pantoprazole  40 mg Oral Daily    sodium chloride flush  5-40 mL IntraVENous 2 times per day    pantoprazole  40 mg IntraVENous Once      Infusions:    dilTIAZem (CARDIZEM) 100 mg in dextrose 5% 100 mL ([facility]) 7.5 mg/hr (04/02/22 1535)    sodium chloride      amiodarone 1 mg/min (04/02/22 1930)    Followed by   Candida Allen amiodaronmiladys PRN Meds: sodium chloride flush, 5-40 mL, PRN  sodium chloride, , PRN  ondansetron, 4 mg, Q8H PRN   Or  ondansetron, 4 mg, Q6H PRN  polyethylene glycol, 17 g, Daily PRN  acetaminophen, 650 mg, Q6H PRN   Or  acetaminophen, 650 mg, Q6H PRN  morphine, 2 mg, Q4H PRN          Electronically signed by Charmayne Girt, MD on 4/2/2022 at 7:51 PM

## 2022-04-02 NOTE — CONSULTS
INPATIENT CARDIOLOGY CONSULT NOTE         Reason for consultation:  AFIB     Referring physician:  sOwaldo Hernandez MD     Primary care physician: Chang Avila MD      Dear Oswaldo Hernandez MD Thank you for the consult    Chief Complaint   Patient presents with    GI Problem     dark stool, colostomy, on eliquis       History of present illness:Denis is a 80 y. o.year old who  presents with   Chief Complaint   Patient presents with    GI Problem     dark stool, colostomy, on eliquis       Patient is a 78-year-old gentleman who follows up with Dr. Irena Levy as outpatient presents to the hospital with chief complaint of abdominal pain. Patient has been diagnosed with small bowel obstruction. Prior medical history significant for atrial fibrillation on oral anticoagulation at home. Cardiology consulted to evaluate patient for A. fib RVR    Patient denies any active cardiovascular symptoms. Telemetry shows A. fib RVR at 120 bpm           Past medical history:    has a past medical history of AAA (abdominal aortic aneurysm) ([address] Angina, class I ([address] Benign prostatic hyperplasia, Bowel obstruction ([address] CAD (coronary artery disease), Cancer ([address] CCC (chronic calculous cholecystitis), Gastroesophageal reflux disease without esophagitis, Hx of cardiovascular stress test, Hypertension, Partial small bowel obstruction ([address] Primary malignant neoplasm of rectum ([address] Psoriasis, Psoriatic arthritis ([address] Pyelonephritis, Stable angina ([address] and Unspecified cerebral artery occlusion with cerebral infarction. Past surgical history:   has a past surgical history that includes Appendectomy; colostomy; Upper gastrointestinal endoscopy (N/A, 2/28/2022); and Colonoscopy (N/A, 3/2/2022). Social History:   reports that he quit smoking about 49 years ago. His smoking use included cigarettes. He has a 60.00 pack-year smoking history.  He has never used smokeless tobacco. He reports that he does not drink alcohol and does not use drugs.   Family history:   no family history of CAD, STROKE of DM    Allergies   Allergen Reactions    Sulfa Antibiotics        dilTIAZem 100 mg in dextrose 5 % 100 mL infusion ([facility]), Continuous      Current Facility-Administered Medications   Medication Dose Route Frequency Provider Last Rate Last Admin    dilTIAZem 100 mg in dextrose 5 % 100 mL infusion ([facility])  5-15 mg/hr IntraVENous Continuous Mandy Hyde MD 7.5 mL/hr at 04/02/22 1535 7.5 mg/hr at 04/02/22 1535     Current Outpatient Medications   Medication Sig Dispense Refill    hydroxyurea (HYDREA) 500 MG chemo capsule Take 2 capsules by mouth once daily 180 capsule 1    aspirin 81 MG chewable tablet Take 1 tablet by mouth daily 30 tablet 5    dilTIAZem (DILT-XR) 240 MG extended release capsule Take 1 capsule by mouth once daily 90 capsule 1    folic acid (FOLVITE) 1 MG tablet Take 1 tablet by mouth daily 90 tablet 1    ferrous sulfate (IRON 325) 325 (65 Fe) MG tablet Take 1 tablet by mouth 2 times daily 180 tablet 1    pantoprazole (PROTONIX) 40 MG tablet Take 1 tablet by mouth daily 90 tablet 1    apixaban (ELIQUIS) 2.5 MG TABS tablet Take 1 tablet by mouth 2 times daily Please resume this medication on Monday , march 28 th 60 tablet 0    metoprolol tartrate (LOPRESSOR) 25 MG tablet Take 1 tablet by mouth 2 times daily for 15 days 30 tablet 0    isosorbide mononitrate (IMDUR) 30 MG extended release tablet Take 30 mg by mouth daily      adalimumab (HUMIRA) 40 MG/0.8ML injection Inject 40 mg into the skin once      Multiple Vitamins-Minerals (THERAPEUTIC MULTIVITAMIN-MINERALS) tablet Take 1 tablet by mouth daily      VITAMIN E PO Take 1 tablet by mouth daily           Review of Systems:     · Constitutional: No Fever or Weight Loss   · Eyes: No Decreased Vision  · ENT: No Headaches, Hearing Loss or Vertigo  · Cardiovascular:   no chest pain,  no dyspnea on exertion, + palpitations or loss of consciousness  · Respiratory: No cough or wheezing    · Gastrointestinal: No abdominal pain, appetite loss, blood in stools, constipation, diarrhea or heartburn  · Genitourinary: No dysuria, trouble voiding, or hematuria  · Musculoskeletal:  No gait disturbance, weakness or joint complaints  · Integumentary: No rash or pruritis  · Neurological: No TIA or stroke symptoms  · Psychiatric: No anxiety or depression  · Endocrine: No malaise, fatigue or temperature intolerance  · Hematologic/Lymphatic: No bleeding problems, blood clots or swollen lymph nodes  · Allergic/Immunologic: No nasal congestion or hives    All other systems were reviewed and were negative otherwise. Physical Examination:      Vitals:    04/02/22 1638   BP:    Pulse:    Resp: 20   Temp:    SpO2: 92%      Wt Readings from Last 3 Encounters:   04/02/22 172 lb (78 kg)   03/25/22 178 lb 4.8 oz (80.9 kg)   03/10/22 181 lb (82.1 kg)     Body mass index is 25.4 kg/m². General Appearance:  No distress, conversant  Constitutional:  Well developed, Well nourished  HEENT:  Normocephalic, Atraumatic, Oropharynx moist   Nose normal. Neck Supple Carotid: no carotid bruit  Eyes:  Conjunctiva normal, No discharge. Respiratory:    Normal breath sounds, No respiratory distress, No wheezing, no use of accessory muscles, diaphragm movement is normal  No chest Tenderness  Cardiovascular: S1-S2 No murmurs auscultated. No rubs, thrills or gallops. Normal  rhythm. Pedal pulses are normal. No pedal edema  GI:  + distended. Post colostomy  Musculoskeletal:   No tenderness, No cyanosis, No clubbing. Integument:  Warm, Dry, No erythema, No rash. Lymphatic:  No lymphadenopathy noted. Neurologic:  Alert & oriented x 3  No focal deficits noted.    Psychiatric:  Affect normal, Judgment normal, Mood normal.       Lab Review     Recent Labs     04/02/22  1112   WBC 26.9*   HGB 10.8*   HCT 33.4*         Recent Labs     04/02/22  1112   *   K 3.7   CL 97*   CO2 21   BUN 21   CREATININE 1.2     Recent Labs     04/02/22  1112   AST 20   ALT 13   BILITOT 0.5   ALKPHOS 121     No results for input(s): TROPONINI in the last 72 hours. Lab Results   Component Value Date    BNP 8 12/11/2011     Lab Results   Component Value Date    INR 1.39 04/02/2022    PROTIME 18.0 (H) 04/02/2022         All labs, images, EKGs were personally reviewed      Assessment: 80 y. o.year old with PMH of  has a past medical history of AAA (abdominal aortic aneurysm) ([address] Angina, class I ([address] Benign prostatic hyperplasia, Bowel obstruction ([address] CAD (coronary artery disease), Cancer ([address] CCC (chronic calculous cholecystitis), Gastroesophageal reflux disease without esophagitis, Hx of cardiovascular stress test, Hypertension, Partial small bowel obstruction ([address] Primary malignant neoplasm of rectum ([address] Psoriasis, Psoriatic arthritis ([address] Pyelonephritis, Stable angina ([address] and Unspecified cerebral artery occlusion with cerebral infarction. Medical Decision Making :       1. Paroxysmal atrial fibrillation  2. History of stroke  3. Myeloproliferative disease  4. Atrial fibrillation with rapid ventricular response    Currently on IV Cardizem drip with poor response  Order IV digoxin  Start patient on IV amiodarone drip  On oral anticoagulation at home. Continue    5. Essential hypertension: Blood pressure fairly well controlled. Continue  6. History of aortic stenosis, moderate. Routine echocardiogram as outpatient     Case discussed with patient family at bedside and hospitalist/ER physician.   Thank you for the consult    Dr. Abdelrahman Valentin  4/2/2022 5:12 PM

## 2022-04-02 NOTE — ED TRIAGE NOTES
Pt arrives via EMS from home for abd pain \"that has been ongoing off and on through the winter\". States that he has been seen multiple times for this. States that it has been worse since yesterday and that he started vomiting. Hx of colostomy x 28 years from colon CA. Pt on eliquis.  States stool has been more dark than normal.

## 2022-04-02 NOTE — ED PROVIDER NOTES
other complaints. REVIEW OF SYSTEMS:  CONSTITUTIONAL:  Denies fever, chills, weight loss or weakness  EYES:  Denies photophobia or discharge  ENT:  Denies sore throat or ear pain  CARDIOVASCULAR:  See HPI  RESPIRATORY:  Denies cough or shortness of breath  GI: See HPI  MUSCULOSKELETAL:  Denies back pain  SKIN:  No rash  NEUROLOGIC:  Denies headache, focal weakness or sensory changes  All systems negative except as marked. \"Remaining review of systems reviewed and negative. I have reviewed the nursing triage documentation and agree unless otherwise noted below. 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 SURGICAL HISTORY:   Past Surgical History:   Procedure Laterality Date    APPENDECTOMY      COLONOSCOPY N/A 3/2/2022    COLONOSCOPY POLYPECTOMY REMOVAL ABLATION/STOMA with EMR and placed 7 hemoclips.  performed by Mel Rivero MD at [address] [place] [address] ENDOSCOPY N/A 2/28/2022    ENTEROSCOPY PUSH CONTROL HEMORRHAGE WITH APC ABLATION OF AVM performed by Mel Rivero MD at [facility] ENDOSCOPY       FAMILY HISTORY:   Family History   Problem Relation Age of Onset    Cancer Mother     Cancer Father        SOCIAL HISTORY:   Social History     Socioeconomic History    Marital status:      Spouse name: Not on file    Number of children: Not on file    Years of education: Not on file    Highest education level: Not on file   Occupational History    Not on file   Tobacco Use    Smoking status: Former Smoker     Packs/day: 1.50     Years: 40.00     Pack years: 60.00     Types: Cigarettes     Quit date: 80     Years since quittin.1    Smokeless tobacco: Never Used   Vaping Use    Vaping Use: Never used   Substance and Sexual Activity    Alcohol use: No    Drug use: No    Sexual activity: Never   Other Topics Concern    Not on file   Social History Narrative    Not on file     Social Determinants of Health     Financial Resource Strain:     Difficulty of Paying Living Expenses: Not on file   Food Insecurity:     Worried About [employer] in the Last Year: Not on file    Haily of Food in the Last Year: Not on file   Transportation Needs:     Lack of Transportation (Medical): Not on file    Lack of Transportation (Non-Medical):  Not on file   Physical Activity:     Days of Exercise per Week: Not on file    Minutes of Exercise per Session: Not on file   Stress:     Feeling of Stress : Not on file   Social Connections:     Frequency of Communication with Friends and Family: Not on file    Frequency of Social Gatherings with Friends and Family: Not on file    Attends Sikh Services: Not on file    Active Member of Clubs or Organizations: Not on file    Attends Club or Organization Meetings: Not on file    Marital Status: Not on file   Intimate Partner Violence:     Fear of Current or Ex-Partner: Not on file    Emotionally Abused: Not on file    Physically Abused: Not on file    Sexually Abused: Not on file   Housing Stability:     Unable to Pay for Housing in the Last Year: Not on file    Number of [facility] in the Last Year: Not on file    Unstable Housing in the Last Year: Not on file       ALLERGIES: Sulfa antibiotics    PHYSICAL EXAM:  VITAL SIGNS:   ED Triage Vitals [04/02/22 1103]   Enc Vitals Group      /72      Pulse 94      Resp 16      Temp 98 °F (36.7 °C)      Temp Source Oral      SpO2 98 %      Weight 172 lb (78 kg)      Height 5' 9\" (1.753 m)      Head Circumference       Peak Flow       Pain Score       Pain Loc       Pain Edu? Excl. in [address]? Constitutional:  Non-toxic appearance, appears to be in pain  HENT: Normocephalic, Atraumatic, Bilateral external ears normal, Oropharynx moist, No oral exudates, Nose normal.  Eyes: PERRL, conjunctiva normal   Neck: Normal range of motion, No tenderness, Supple, No stridor,No lymphadenopathy   Cardiovascular: Tachycardic, irregular rhythm  Pulmonary/Chest:  Normal breath sounds, No respiratory distress, No wheezing  Abdomen:   Bowel sounds hyperactive, abdomen is distended and firm, there is generalized tenderness to palpation with voluntary guarding, no palpable mass  Ostomy: There is an ostomy noted in the left midabdomen, the tissue is pink and healthy-appearing, there is a small amount of dark liquid stool around the site, bedside Guiac testing done by myself, results were lightly positive and the  was positive  Extremities:  Normal range of motion, Intact distal pulses, No edema, No tenderness  Skin:  Warm, Dry, No erythema, No rash      EKG Interpretation #1 (11:23 am)  Interpreted by me  Compared to EKG from 3/20/2022  Rhythm: atrial fibrillation - rapid  Rate: tachycardia 120  Axis: normal  Ectopy: none  Conduction: irregularly irregular  ST Segments: nonspecific changes  T Waves: no acute change  Clinical Impression: atrial fibrillation with rapid ventricular rate has replaced atrial fibrillation with controlled ventricular rate seen on prior EKG      Cardiac Monitor Strip Interpretation  Interpreted by me  Monitor strip interpreted for greater than 10 seconds  Rhythm: atrial fibrillation  Rate: Variable from the 70s to the 160s  Ectopy: none  ST Segments: normal    EKG Interpretation #2 (12:00 pm)  Interpreted by me  Compared to EKG from today  Rhythm: atrial fibrillation - rapid  Rate: tachycardia 162  Axis: normal  Ectopy: none  Conduction: irregularly irregular  ST Segments: nonspecific changes  T Waves: no acute change  Clinical Impression: atrial fibrillation with rapid ventricular rate       Radiology / Procedures:       XR CHEST PORTABLE (Final result)  Result time 04/02/22 15:03:14  Final result by Latesha Foster MD (04/02/22 15:03:14)                Impression:    Trace left pleural effusion with adjacent atelectasis. Narrative:    EXAMINATION:   ONE XRAY VIEW OF THE CHEST     4/2/2022 2:52 pm     COMPARISON:   03/20/2022     HISTORY:   ORDERING SYSTEM PROVIDED HISTORY: chest pain   TECHNOLOGIST PROVIDED HISTORY:   Reason for exam:->chest pain   Reason for Exam: abdominall discomfort   Additional signs and symptoms: none   Relevant Medical/Surgical History: CAD, AAA     FINDINGS:   Trace left pleural effusion with adjacent atelectasis.  Stable   cardiomediastinal silhouette.  No other focal consolidation.  No overt   pulmonary edema.  No pneumothorax.  The osseous structures otherwise stable.                       CT ABDOMEN PELVIS W IV CONTRAST Additional Contrast? None (Preliminary result)  Result time 04/02/22 14:09:54  Preliminary result by Miriam Mcgraw MD (04/02/22 14:09:54)                Impression:    Interval development of severe circumferential wall thickening and   pericolonic inflammatory changes of the transverse colon and splenic flexure   of the colon just proximal to the left lower quadrant colostomy with   increased fluid-filled distension of the proximal colon and small bowel. Findings suggest acute infectious/inflammatory colitis with proximal partial   obstruction versus ileus.      Stable infrarenal abdominal aortic aneurysm measuring 5.5 cm in diameter. See follow-up recommendations below. Stable hepatic cysts and bilateral renal cortical cysts. Cardiomegaly with bilateral pleural effusions suggesting mild CHF. RECOMMENDATIONS:   5.5 cm infrarenal abdominal aortic aneurysm. Recommend referral to a vascular   specialist.     Reference: J Am [facility]ad Radiol 7508;[id number]. Narrative:    EXAMINATION:   CT OF THE ABDOMEN AND PELVIS WITH CONTRAST 4/2/2022 1:36 pm     TECHNIQUE:   CT of the abdomen and pelvis was performed with the administration of   intravenous contrast. Multiplanar reformatted images are provided for review. Dose modulation, iterative reconstruction, and/or weight based adjustment of   the mA/kV was utilized to reduce the radiation dose to as low as reasonably   achievable. COMPARISON:   03/21/2022     HISTORY:   ORDERING SYSTEM PROVIDED HISTORY: Abdomen pain. TECHNOLOGIST PROVIDED HISTORY:   Reason for exam:->Abd pain   Additional Contrast?->None   Decision Support Exception - unselect if not a suspected or confirmed   emergency medical condition->Emergency Medical Condition (MA)   Reason for Exam: Abdomen pain. Additional signs and symptoms: States \"ongoing abd pain that comes and goes   through winter\", 80 ml Isovue 370     FINDINGS:   Lower Chest: There are bilateral layering pleural effusions.  Cardiomegaly is   noted.  Severe coronary artery atherosclerotic calcifications. Organs: Numerous scattered hypoattenuating lesions noted throughout the liver   likely representing hepatic cysts.  The largest of the lesions demonstrate   fluid attenuation consistent with cysts measuring up to 25 mm in diameter. No evidence of enhancing hepatic lesion or biliary ductal dilatation.  Portal   vein is patent.  Status post cholecystectomy.  The spleen, pancreas and   adrenal glands demonstrate no acute abnormality.  The kidneys enhance   symmetrically without evidence of hydronephrosis. Marilou Collar is a 19 mm exophytic   lesion off of the lower pole of the left kidney with Hounsfield unit   attenuation of approximately 30.  This still likely represents a renal cyst   (Bosniak category 2).  This is been present since 2018.  Stable small   bilateral parapelvic cysts. GI/Bowel: Stomach and duodenal sweep demonstrate no acute abnormality.  There   is a left lower quadrant colostomy present.  Postsurgical changes status post   partial left colectomy. Marilou Collar is circumferential wall thickening of the   colon proximal to the level of the colostomy with pericolonic fat stranding   and inflammatory changes. Marilou Collar is dilatation of the proximal colon as well   with wall thickening inflammatory changes extending up to the hepatic   flexure.  Changes are new since prior examination suggesting acute   infectious/inflammatory colitis. There are multiple distended fluid-filled   loops of small bowel throughout the abdomen and pelvis as well new since   prior examination suggesting ileus. Pelvis: Status post prostatectomy.  Bladder is unremarkable.  Weaver catheter   is noted. Peritoneum/Retroperitoneum: No evidence of ascites or free air.  No evidence   of lymphadenopathy. Marilou Collar is a stable infrarenal abdominal aortic aneurysm   measuring approximately 5.5 cm in diameter with crescentic mural thrombus. Extensive atherosclerotic disease of the aorta and branch vessels.      Bones/Soft Tissues:  Age related degenerative changes of the visualized   osseous structures without focal destructive lesion.                 Preliminary result by Carlus Dandy, MD (04/02/22 13:58:13)                Impression:    Interval development of severe circumferential wall thickening and   pericolonic inflammatory changes of the transverse colon and splenic flexure   of the colon just proximal to the left lower quadrant colostomy with   increased fluid-filled distension of the proximal colon and small bowel. Findings suggest acute infectious/inflammatory colitis with proximal partial   obstruction versus ileus. Stable infrarenal abdominal aortic aneurysm measuring 5.5 cm in diameter. See follow-up recommendations below. Stable hepatic cysts and bilateral renal cortical cysts. Cardiomegaly with bilateral pleural effusions suggesting mild CHF. RECOMMENDATIONS:   5.5 cm infrarenal abdominal aortic aneurysm. Recommend referral to a vascular   specialist.     Reference: J Am Abad Radiol 0315;15:259-154.                  Labs Reviewed   CBC WITH AUTO DIFFERENTIAL - Abnormal; Notable for the following components:       Result Value    WBC 26.9 (*)     RBC 2.87 (*)     Hemoglobin 10.8 (*)     Hematocrit 33.4 (*)     .4 (*)     MCH 37.6 (*)     Segs Relative 91.7 (*)     Lymphocytes % 1.7 (*)     Monocytes % 4.6 (*)     Immature Neutrophil % 1.8 (*)     All other components within normal limits   COMPREHENSIVE METABOLIC PANEL - Abnormal; Notable for the following components:    Sodium 129 (*)     Chloride 97 (*)     Glucose 127 (*)     Calcium 8.2 (*)     Albumin 3.1 (*)     Total Protein 6.3 (*)     GFR Non- 57 (*)     All other components within normal limits   PROTIME/INR & PTT - Abnormal; Notable for the following components:    Protime 18.0 (*)     All other components within normal limits   LIPASE - Abnormal; Notable for the following components:    Lipase 11 (*)     All other components within normal limits   URINALYSIS WITH MICROSCOPIC - Abnormal; Notable for the following components:    Ketones, Urine SMALL (*)     Blood, Urine MODERATE (*)     Protein,  (*)     Nitrite Urine, Quantitative POSITIVE (*)     WBC, UA 11 (*)     Bacteria, UA OCCASIONAL (*)     Mucus, UA FEW (*)     All other components within normal limits   CULTURE, BLOOD 2   CULTURE, BLOOD 1   CULTURE, URINE   GI DISEASE PANEL PCR   C DIFF TOXIN/ANTIGEN   LACTIC ACID   TROPONIN   TYPE AND SCREEN       ED COURSE & MEDICAL DECISION MAKING:  Pertinent Labs & Imaging studies reviewed. (See chart for details)  On exam, the patient is afebrile. He is ill-appearing but does not appear toxic. He appears to be in pain. Heart rate is fluctuating between the 70s to the 160s. He is otherwise hemodynamically stable and neurologically intact. EKG shows atrial fibrillation with RVR. There is no ST elevation or depression. Labs are obtained and are significant for marked leukocytosis, anemia with a hemoglobin of 10.8 which is increased as compared to 9.3 on 3/25/2022 and mild hyponatremia with no other clinically significant lab abnormalities. Urinalysis is pending. Stool studies are ordered and will be completed once there is enough stool to send for analysis. Chest x-ray shows a trace left pleural effusion with adjacent atelectasis. CT abdomen pelvis shows interval development of severe circumferential wall thickening and pericolonic inflammatory changes of the transverse colon and splenic flexure of the colon just proximal to the left lower quadrant colostomy with increased fluid-filled distention of the proximal colon and small bowel. Findings suggest acute infectious versus inflammatory colitis with proximal partial obstruction versus ileus. There is a stable infrarenal abdominal aortic aneurysm measuring 5.5 cm. There are stable hepatic cysts and bilateral renal cortical cysts. There is cardiomegaly with bilateral pleural effusions suggesting mild CHF. The patient was treated with 500 mL of IV normal saline over 2 hours, morphine, and Zofran with improvement of pain. He was treated with a diltiazem bolus and drip for his A. fib with RVR with minimal improvement. He was treated empirically with vancomycin and cefepime. I spoke with his cardiologist (Dr. Pham for Dr. Tanya Meek) who recommended 0.25 mg of digoxin which was given. He was treated with Protonix.   He will see the patient in consult. The patient was given additional morphine for return pain. I spoke with the patient's surgeon (Dr. Kamila Puente for Dr. Tayler Garcia) who will see the patient in consult. The patient has multiple medical issues at this time including acute colitis with an ileus versus small bowel obstruction, marked leukocytosis with concern for sepsis and atrial fibrillation with RVR. He has Hemoccult positive stools, however hemoglobin is stable/increased and so this may just be related to the colitis versus a GI bleed. I have a low suspicion for acute appendicitis, intraabdominal abscess, perforation, bowel obstruction, AAA, dissection, ischemic bowel, or septic shock. I recommended admission to the hospital and the patient was agreeable. I discussed the case with the hospitalist who will admit the patient for further treatment and care. The patient is currently in stable condition awaiting admission. Clinical Impression:  1. Colitis    2. Ileus (HCC)    3. Leukocytosis, unspecified type    4. Sepsis without acute organ dysfunction, due to unspecified organism ([address]    5. Atrial fibrillation with RVR ([address]    6. Occult blood positive stool        Comment: Please note this report has been produced using speech recognition software and may contain errors related to that system including errors in grammar, punctuation, and spelling, as well as words and phrases that may be inappropriate. If there are any questions or concerns please feel free to contact the dictating provider for clarification.         Eber Wilson MD  04/02/22 8510

## 2022-04-02 NOTE — PROGRESS NOTES
[address] [place] [address]     Dmitriy Reed is a 80 y.o. male started on vancomycin iv. Pharmacy consulted by ED provider Elijah Dance, MD to order a dose of vancomycin in the emergency department. Other antimicrobials:     Ht Readings from Last 1 Encounters:   04/02/22 5' 9\" (1.753 m)     Wt Readings from Last 3 Encounters:   04/02/22 172 lb (78 kg)   03/25/22 178 lb 4.8 oz (80.9 kg)   03/10/22 181 lb (82.1 kg)        Pertinent Laboratory Values:   Temp Readings from Last 3 Encounters:   04/02/22 98 °F (36.7 °C) (Oral)   03/25/22 98.1 °F (36.7 °C) (Oral)   03/10/22 97.9 °F (36.6 °C) (Oral)     Recent Labs     04/02/22  1112   WBC 26.9*     Recent Labs     04/02/22  1112   BUN 21   CREATININE 1.2     Estimated Creatinine Clearance: 39 mL/min (based on SCr of 1.2 mg/dL). No intake or output data in the 24 hours ending 04/02/22 1431    Assessment/Plan:  Pharmacy has - ordered vancomycin 1250mg mg iv  (16 mg/kg) x 1 dose  Please note, pharmacy will order a one-time dose of vancomycin for the Emergency Department. The consult will need to be re-ordered if vancomycin is to continue upon admission. Thank you for the consult.   Hayder Garcia, [facility]  4/2/2022 2:31 PM

## 2022-04-03 NOTE — PROGRESS NOTES
Extubated at bedside by Dr Lm Thorne at 6121. Placed on 4L nasal cannula. O2 Sat 90%.  Rt at bedside

## 2022-04-03 NOTE — CONSULTS
Department of Internal Medicine  Gastroenterology Consult Note  Ariela Shaikh MD      Reason for Consult:  abd pain    Primary Care Physician:  Hetal Stinson MD    History Obtained From:  Patient, daughter    CC: abd pain    HISTORY OF PRESENT ILLNESS:             The patient is a 80 y.o. male with a past history of rectal cancer (S/P colostomy in 2009) known to me. He was seen 2/27/22 with dark colostomy output and Hb of 8.9. Colonoscopy then was very difficult due to angulated and apparently fixed bowel just inside the colostomy. A  2 cm adenoma was removed from the cecum by EMR and a smaller adenoma removed from the ascending colon. 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 He was just recently seen about 2 weeks ago with fatigue and dark stool per colostomy with a Hb of 9.6. Shortly thereafter he developed an SBO that did respond to conservative Rx. He was discharged and did OK until 4-5 days ago when he passed a very large amount of non-bloody liquid stool into his colostomy followed by some dark output. Then he developed severe crampy abd pain and essentially no ostomy ottput at all. He came to the ED yesterday where a CT showed wall thickening of the left colon but also dilated small and large bowel.  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    Gastroesophageal reflux disease without esophagitis 05/21/2020    Hx of cardiovascular stress test 08/12/2021    EF 62% Normal study    Hypertension     Partial small bowel obstruction ([facility] 3/23/2022    Primary malignant neoplasm of rectum ([facility] 08/17/2019    Psoriasis     Psoriatic arthritis ([facility] 08/17/2019    Pyelonephritis 03/24/2018    Stable angina ([facility] 08/17/2019    Unspecified cerebral artery occlusion with cerebral infarction        Past Surgical History:        Procedure Laterality Date    APPENDECTOMY      COLONOSCOPY N/A 3/2/2022    COLONOSCOPY POLYPECTOMY REMOVAL ABLATION/STOMA with EMR and placed 7 hemoclips. performed by Rudi Parnell MD at [address] [place] [address] ENDOSCOPY N/A 2/28/2022    ENTEROSCOPY PUSH CONTROL HEMORRHAGE WITH APC ABLATION OF AVM performed by Rudi Parnell MD at [facility] ENDOSCOPY       Medications Prior to Admission:    Prior to Admission medications    Medication Sig Start Date End Date Taking?  Authorizing Provider   hydroxyurea (HYDREA) 500 MG chemo capsule Take 2 capsules by mouth once daily 4/1/22   Giorgio Delvalle MD   aspirin 81 MG chewable tablet Take 1 tablet by mouth daily 3/30/22   Kaila R Rufino, DO   dilTIAZem (DILT-XR) 240 MG extended release capsule Take 1 capsule by mouth once daily 3/30/22   Mauricecristina R Rufino, DO   folic acid (FOLVITE) 1 MG tablet Take 1 tablet by mouth daily 3/30/22   Mauricecristina R Rufino, DO   ferrous sulfate (IRON 325) 325 (65 Fe) MG tablet Take 1 tablet by mouth 2 times daily 3/30/22   Kaila R Rufino, DO   pantoprazole (PROTONIX) 40 MG tablet Take 1 tablet by mouth daily 3/30/22   Mauricecristina R Rufino, DO   apixaban (ELIQUIS) 2.5 MG TABS tablet Take 1 tablet by mouth 2 times daily Please resume this medication on Monday , march 28 th 3/25/22 4/24/22  Kendra Rooney MD   metoprolol tartrate (LOPRESSOR) 25 MG tablet Take 1 tablet by mouth 2 times daily for 15 days 3/25/22 4/9/22  Kendra Rooney MD   isosorbide mononitrate (IMDUR) 30 MG extended release tablet Take 30 mg by mouth daily    Historical Provider, MD   adalimumab (HUMIRA) 40 MG/0.8ML injection Inject 40 mg into the skin once    Historical Provider, MD   Multiple Vitamins-Minerals (THERAPEUTIC MULTIVITAMIN-MINERALS) tablet Take 1 tablet by mouth daily    Historical Provider, MD   VITAMIN E PO Take 1 tablet by mouth daily    Historical Provider, MD       Allergies: Allergies   Allergen Reactions    Sulfa Antibiotics    . Social History:    TOBACCO:  No  ETOH:  No    Family History: reviewed and positives included in HPI- all other pertinent GI family history negative      REVIEW OF SYSTEMS: see HPI for positives and pertinent negatives. All other systems reviewed and are negative    PHYSICAL EXAM:    Vitals:  BP (!) 98/54   Pulse 97   Temp 98.5 °F (36.9 °C) (Oral)   Resp 29   Ht 5' 9\" (1.753 m)   Wt 172 lb (78 kg)   SpO2 98%   BMI 25.40 kg/m²   CONSTITUTIONAL: alert, cooperative, no apparent distress,   EYES:  pupils equal, round and reactive to light and sclera clear  ENT:  normocepalic, without obvious abnormality  NECK:  supple, symmetrical, trachea midline  HEMATOLOGIC/LYMPHATICS:  no cervical lymphadenopathy and no supraclavicular lymphadenopathy  LUNGS:  clear to auscultation  CARDIOVASCULAR:  regular rate and rhythm and no murmur noted  ABDOMEN: mild-mod distention and mild diffuse tenderness. No organomegaly or masses  NEUROLOGIC: no focal deficit detected  SKIN:  no lesions  EXTREMITIES: no clubbing, cyanosis, or edema    IMPRESSION:  1) suspect recurrent partial obstruction  2) marked leukocytosis and thickened colon on CT- likely colitis- agree with checking C diff and stool pathogen PCR      RECOMMENDATIONS:  1) agree with NG drainage  2) surgical consult pending  3) stool studies ordered already  4) check CEA          Vinayak Nowak M.D

## 2022-04-03 NOTE — CONSULTS
History and Physical    Patient Name: Ye Lara                              YOB: 1929  Exam Date: 4/2/2022    PCP:  Tawnya Melton MD           Referring Physician:  ER    Chief Complaint:  Abdominal pain    History of Present Illness: The patient is a 80 y.o. male who underwent rectal resection and abdominal-perineal resection 20 years ago for rectal carcinoma. Over the last month he has had 3 admissions. He has had acute gastro-intesinal blood loss and small bowel obstruction. He was recently released form the hospital and while at home he had a significant amount of diarrhea from his colostomy. He states on Tuesday night he filled 5 bags with stool. He said the last output was black in coloration. Since then he hasn't had any output. Yesterday he started having severe abdominal pain. He rated it as the worst pain he's ever had. He was admitted to the hospital and started on IV hydration, IV antibiotics and was treated for his atria fibrillation. He usually is on eliquis and aspirin but that has been intermittently held secondary to GI blood loss over the last month. His imaging on admission shows significant colitis of the transverse and descending colon. He has remained in pain but it is decreased with IV narcotics, his wbc count has increased since admission. I was consulted to see him. Dr. Sylvester Spurling did his initial surgery 20 years ago.     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    Gastroesophageal reflux disease without esophagitis 05/21/2020    Hx of cardiovascular stress test 08/12/2021    EF 62% Normal study    Hypertension     Partial small bowel obstruction ([address] 3/23/2022    Primary malignant neoplasm of rectum ([facility] 08/17/2019    Psoriasis     Psoriatic arthritis ([facility] 08/17/2019    Pyelonephritis 03/24/2018    Stable angina ([facility] 08/17/2019    Unspecified cerebral artery occlusion with cerebral infarction       Past Surgical History:   Procedure Laterality Date    APPENDECTOMY      COLONOSCOPY N/A 3/2/2022    COLONOSCOPY POLYPECTOMY REMOVAL ABLATION/STOMA with EMR and placed 7 hemoclips. performed by Magaly Miller MD at [address] [place] [address] ENDOSCOPY N/A 2/28/2022    ENTEROSCOPY PUSH CONTROL HEMORRHAGE WITH APC ABLATION OF AVM performed by Magaly Miller MD at [facility]      Prior to Admission medications    Medication Sig Start Date End Date Taking?  Authorizing Provider   hydroxyurea (HYDREA) 500 MG chemo capsule Take 2 capsules by mouth once daily 4/1/22   Bhavesh Black MD   aspirin 81 MG chewable tablet Take 1 tablet by mouth daily 3/30/22   Kaila Aquinopa, DO   dilTIAZem (DILT-XR) 240 MG extended release capsule Take 1 capsule by mouth once daily 3/30/22   Kaila Aquinopa, DO   folic acid (FOLVITE) 1 MG tablet Take 1 tablet by mouth daily 3/30/22   Mauricecristina R Rufino, DO   ferrous sulfate (IRON 325) 325 (65 Fe) MG tablet Take 1 tablet by mouth 2 times daily 3/30/22   Kaila Aquinopa, DO   pantoprazole (PROTONIX) 40 MG tablet Take 1 tablet by mouth daily 3/30/22   Mauricecristina Aquinopa, DO   apixaban (ELIQUIS) 2.5 MG TABS tablet Take 1 tablet by mouth 2 times daily Please resume this medication on Monday , march 28 th 3/25/22 4/24/22  Prashant Louise MD   metoprolol tartrate (LOPRESSOR) 25 MG tablet Take 1 tablet by mouth 2 times daily for 15 days 3/25/22 4/9/22  Prashant Lousie MD   isosorbide mononitrate (IMDUR) 30 MG extended release tablet Take 30 mg by mouth daily    Historical Provider, MD   adalimumab (HUMIRA) 40 MG/0.8ML injection Inject 40 mg into the skin once    Historical Provider, MD   Multiple Vitamins-Minerals (THERAPEUTIC MULTIVITAMIN-MINERALS) tablet Take 1 tablet by mouth daily    Historical Provider, MD   VITAMIN E PO Take 1 tablet by mouth daily    Historical Provider, MD     Allergies   Allergen Reactions    Sulfa Antibiotics         Social History     Tobacco Use    Smoking status: Former Smoker     Packs/day: 1.50     Years: 40.00     Pack years: 60.00     Types: Cigarettes     Quit date: 80     Years since quittin.1    Smokeless tobacco: Never Used   Substance Use Topics    Alcohol use: No      Family History   Problem Relation Age of Onset    Cancer Mother     Cancer Father         REVIEW OF SYSTEMS: (POSITIVES WILL BE UNDERLINED)  CONSTITUTIONAL:    Weight change,fatigue, fever, chills  EYES:  Diplopia, change in vision  EARS:  hearing loss, tinnitus, vertigo  NOSE:   epistaxis  MOUTH/THROAT:     hoarseness, sore throat. RESPIRATORY:  SOB,  cough, sputum, hemoptysis  CARDIOVASCULAR : chest pain,palpitations, dyspnea exertion, orthopnea, paroxysmal nocturnal dyspnea, pedal edema. GASTROINTESTINAL:  nausea, vomiting, constipation, diarrhea  GENITOURINARY:   dysuria, hematuria, . HEMATOLOGIC/LYMPHATIC:   Anemia, bleeding tendencies.   MUSCULOSKELETAL:    myalgias,  joint pain  NEUROLOGICAL:   Loss of Consciousness, paresthesias, anesthesias, focal weakness  SKIN :   History of dermatitis, rashes  PSYCHIATRIC:  depression, , anxiety past psychosis  ENDOCRINE:  History of diabetes, thyroid disease  ALL/IMM : allergies, rashes    Physical Exam:  /67   Pulse 91   Temp 98.4 °F (36.9 °C) (Oral)   Resp 21   Ht 5' 9\" (1.753 m)   Wt 172 lb (78 kg)   SpO2 96%   BMI 25.40 kg/m²   Pt is awake, alert, oriented to person, place and time, appropriate with exam, very pleasant  NG tube with feculent material  HEENT:  Has non-icteric sclerae, no JVD  CTA bilaterally, with good excursion  RRR, no murmurs appreciated  ABDOMEN:  Distended, +guarding, left lower quadrant stoma pink and viable but pale, no flatus or stool in appliance, abdomen is soft but you can see the outline of the distended bowel  Ext:  Warm    Imaging:  CT Scan Abd/Pelvis:  4/2/2022  Impression   Interval development of severe circumferential wall thickening and   pericolonic inflammatory changes of the transverse colon and splenic flexure   of the colon just proximal to the left lower quadrant colostomy with   increased fluid-filled distension of the proximal colon and small bowel. Findings suggest acute infectious/inflammatory colitis with proximal partial   obstruction versus ileus.       Stable infrarenal abdominal aortic aneurysm measuring 5.5 cm in diameter. See follow-up recommendations below.       Stable hepatic cysts and bilateral renal cortical cysts.       Cardiomegaly with bilateral pleural effusions suggesting mild CHF.       RECOMMENDATIONS:   5.5 cm infrarenal abdominal aortic aneurysm. Recommend referral to a vascular   specialist.         -I have personally reviewed the patient's imaging results/reports including reviewing the CT scan films. I discussed the pertinent findings with the patient. Labs:  CBC:    Lab Results   Component Value Date    WBC 35.1 04/03/2022    HGB 9.9 04/03/2022    HCT 30.9 04/03/2022     04/03/2022     BMP:    Lab Results   Component Value Date     04/03/2022    K 4.2 04/03/2022    CL 98 04/03/2022    CO2 20 04/03/2022    BUN 22 04/03/2022    CREATININE 1.3 04/03/2022    CALCIUM 8.0 04/03/2022     PT/INR:    Lab Results   Component Value Date    PROTIME 18.0 04/02/2022    PROTIME 10.4 12/11/2011    INR 1.39 04/02/2022     PTT:    Lab Results   Component Value Date    APTT 33.6 04/02/2022     LFT:    Lab Results   Component Value Date    LABALBU 3.1 04/02/2022    BILITOT 0.5 04/02/2022    AST 20 04/02/2022    ALT 13 04/02/2022    ALKPHOS 121 04/02/2022       -I have personally reviewed the patient's lab results and discussed pertinent findings with the patient.      Assessment and Plan:  The patient presents with signs and symptoms consistent with ischemic colitis. The patient's daughter and son are at the bedside. Given the patient's persistent pain, increasing wbc count I do believe the colon is ischemic. It may be from emboli or decreased flow (patient in a-fib and usually on blood thinners but not currently). I discussed with the patient that supportive therapy is not working, he has had IV fluid and antibiotics but his symptoms are worsening and that I recommend surgical resection. I did discuss with him that he may not survive surgery or the post-op period. I also told him, if we don't do surgery I do believe he would continue to get more sick and likely die. He without hesitation said to do surgery. He stated he understands the risks and wants to proceed. We discussed his code status and he would like to be a full code for surgery and the post-op period. I have discussed with the patient the risks and benefits of surgery including but not limited to risk of bleeding, risk of infection, risk of injury to any intra-abdominal organs or structures and the possibility of failure of treatment or death. I specifically discussed the plan to re-site his stoma and that it may be small intestine. The patient's questions were answered and He is agreeable to proceeding with surgery. Consent form was signed and surgery will be today. His daughter and son are in agreement with the plan and understand the risks. Statement of medical necessity. Surgical intervention required to alleviate patient's symptoms/disease as described above.

## 2022-04-03 NOTE — PROGRESS NOTES
CARDIOLOGY PROGRESS NOTE                                                  Name:  Elver Cordoba /Age/Sex: 1929  (80 y.o. male)   MRN & CSN:  [id number] & [id number] Admission Date/Time: 2022 11:02 AM   Location:  -A PCP: Minor Burciaga MD         Admit Date:  2022  Hospital Day: 2      SUBJECTIVE:   Seen patient as follow up as consultation for AFIB    No chest pain. + Abdominal pain   No shortness of breath  No palpations    TELEMETRY: AFIB       Intake/Output Summary (Last 24 hours) at 4/3/2022 1210  Last data filed at 4/3/2022 0802  Gross per 24 hour   Intake 0 ml   Output 220 ml   Net -220 ml       Assessment/Plan:           1. Paroxysmal atrial fibrillation  2. History of stroke  3. Myeloproliferative disease  4. Atrial fibrillation with rapid ventricular response     Continue with IV medication given small bowel obstruction and NG tube in place    Continue IV Cardizem  IV digoxin  IV amiodarone drip  On oral anticoagulation at home. Currently on hold if okay with GI, recommend anticoagulation with Lovenox 1.5 mg/kg/day     5. Essential hypertension: Blood pressure fairly well controlled. Continue  6. History of aortic stenosis, moderate. Routine echocardiogram as outpatient          Past medical history:    has a past medical history of AAA (abdominal aortic aneurysm) ([address] Angina, class I ([address] Benign prostatic hyperplasia, Bowel obstruction ([address] CAD (coronary artery disease), Cancer ([address] CCC (chronic calculous cholecystitis), Gastroesophageal reflux disease without esophagitis, Hx of cardiovascular stress test, Hypertension, Partial small bowel obstruction ([address] Primary malignant neoplasm of rectum ([address] Psoriasis, Psoriatic arthritis ([address] Pyelonephritis, Stable angina ([address] and Unspecified cerebral artery occlusion with cerebral infarction. Past surgical history:   has a past surgical history that includes Appendectomy; colostomy;  Upper gastrointestinal endoscopy (N/A, 2/28/2022); and Colonoscopy (N/A, 3/2/2022). Social History:   reports that he quit smoking about 49 years ago. His smoking use included cigarettes. He has a 60.00 pack-year smoking history. He has never used smokeless tobacco. He reports that he does not drink alcohol and does not use drugs. Family history:  family history includes Cancer in his father and mother. OBJECTIVE:     /67   Pulse 91   Temp 98.4 °F (36.9 °C) (Oral)   Resp 21   Ht 5' 9\" (1.753 m)   Wt 172 lb (78 kg)   SpO2 96%   BMI 25.40 kg/m²       Intake/Output Summary (Last 24 hours) at 4/3/2022 1210  Last data filed at 4/3/2022 0802  Gross per 24 hour   Intake 0 ml   Output 220 ml   Net -220 ml       Physical Exam:      General Appearance:  No distress, conversant  Constitutional:  Well developed, Well nourished  HEENT:  Normocephalic, Atraumatic, Oropharynx moist   Nose normal. Neck Supple Carotid: no carotid bruit  Eyes:  Conjunctiva normal, No discharge. Respiratory:    Normal breath sounds, No respiratory distress, No wheezing, no use of accessory muscles, diaphragm movement is normal  No chest Tenderness  Cardiovascular: S1-S2 No murmurs auscultated. No rubs, thrills or gallops. Normal  rhythm. Pedal pulses are normal. No pedal edema  GI:  + distended. Post colostomy  Musculoskeletal:   No tenderness, No cyanosis, No clubbing. Integument:  Warm, Dry, No erythema, No rash. Lymphatic:  No lymphadenopathy noted. Neurologic:  Alert & oriented x 3  No focal deficits noted.    Psychiatric:  Affect normal, Judgment normal, Mood normal.            MEDICATIONS:     therapeutic multivitamin-minerals  1 tablet Oral Daily    adalimumab  40 mg SubCUTAneous Once    isosorbide mononitrate  30 mg Oral Daily    [Held by provider] apixaban  2.5 mg Oral BID    [Held by provider] metoprolol tartrate  25 mg Oral BID    [Held by provider] aspirin  81 mg Oral Daily    folic acid  1 mg Oral Daily    ferrous sulfate  325 mg Oral BID    sodium chloride flush  5-40 mL IntraVENous 2 times per day    vitamin E  400 Units Oral Every Other Day    pantoprazole  40 mg IntraVENous Daily    piperacillin-tazobactam  3,375 mg IntraVENous Q8H    metroNIDAZOLE  500 mg IntraVENous Q8H      dilTIAZem (CARDIZEM) 100 mg in dextrose 5% 100 mL ([facility]) 2.5 mg/hr (04/03/22 0339)    sodium chloride      amiodarone 0.5 mg/min (04/02/22 4142)     sodium chloride flush, sodium chloride, ondansetron **OR** ondansetron, acetaminophen **OR** acetaminophen, morphine  Allergies   Allergen Reactions    Sulfa Antibiotics        Lab Data:  CBC:   Recent Labs     04/02/22  1112 04/03/22  0433   WBC 26.9* 35.1*   HGB 10.8* 9.9*   HCT 33.4* 30.9*   .4* 117.5*    370     BMP:   Recent Labs     04/02/22  1112 04/03/22  0433   * 130*   K 3.7 4.2   CL 97* 98*   CO2 21 20*   BUN 21 22   CREATININE 1.2 1.3     LIVER PROFILE:   Recent Labs     04/02/22  1112   AST 20   ALT 13   LIPASE 11*   BILITOT 0.5   ALKPHOS 121     PT/INR:   Recent Labs     04/02/22 1112   PROTIME 18.0*   INR 1.39     APTT:   Recent Labs     04/02/22 1112   APTT 33.6          Sondra Nageotte, MD, MD 4/3/2022 12:10 PM

## 2022-04-03 NOTE — PROGRESS NOTES
bleed.  Disposition: Home    Diet Diet NPO   DVT Prophylaxis [] Lovenox, []  Heparin, [x] SCDs, [] Ambulation,  [] Eliquis, [] Xarelto  [] Coumadin   Code Status Full Code   Disposition From: Home  Expected Disposition: Home  Estimated Date of Discharge: 4/7/22  Patient requires continued admission due to ischemic colitis versus infectious/inflammatory colitis   Surrogate Decision Maker/ POA -     Subjective:     Chief Complaint: GI Problem (dark stool, colostomy, on eliquis)       Natan Turk is a 80 y.o. male who presents with partial small bowel obstruction and ischemic versus inflammatory/infectious colitis. Feels okay today. Denies abdominal pain at this time. Review of Systems:    Review of Systems    10 point review of systems negative other than the above    Objective: Intake/Output Summary (Last 24 hours) at 4/3/2022 1400  Last data filed at 4/3/2022 0802  Gross per 24 hour   Intake 0 ml   Output 220 ml   Net -220 ml        Vitals:   Vitals:    04/03/22 1132   BP: 102/67   Pulse: 91   Resp: 21   Temp: 98.4 °F (36.9 °C)   SpO2: 96%       Physical Exam:   General: NAD  Eyes: EOMI  ENT: neck supple, NG tube in place draining dark green bilious fluid. Cardiovascular: Tachycardic, irregular rhythm, no murmurs gallops or rubs  Respiratory: Clear to auscultation  Gastrointestinal: Moderate distention, no guarding at this time however he just received morphine prior to my examination, colostomy noted and there is no stool or gas in the bag. Genitourinary: no suprapubic tenderness  Musculoskeletal: No edema  Skin: warm, dry  Neuro: Alert. Psych: Mood appropriate.      Medications:   Medications:    ceFAZolin  1,000 mg IntraVENous On Call to OR    metroNIDAZOLE  500 mg IntraVENous Once    adalimumab  40 mg SubCUTAneous Once    [Held by provider] apixaban  2.5 mg Oral BID    [Held by provider] metoprolol tartrate  25 mg Oral BID    [Held by provider] aspirin  81 mg Oral Daily    sodium chloride flush  5-40 mL IntraVENous 2 times per day    pantoprazole  40 mg IntraVENous Daily    piperacillin-tazobactam  3,375 mg IntraVENous Q8H    metroNIDAZOLE  500 mg IntraVENous Q8H      Infusions:    dilTIAZem (CARDIZEM) 100 mg in dextrose 5% 100 mL ([facility]) 2.5 mg/hr (04/03/22 1327)    sodium chloride      amiodarone 0.5 mg/min (04/03/22 1326)     PRN Meds: sodium chloride flush, 5-40 mL, PRN  sodium chloride, , PRN  ondansetron, 4 mg, Q8H PRN   Or  ondansetron, 4 mg, Q6H PRN  acetaminophen, 650 mg, Q6H PRN   Or  acetaminophen, 650 mg, Q6H PRN  morphine, 2 mg, Q4H PRN        Labs      Recent Results (from the past 24 hour(s))   Urinalysis with Microscopic    Collection Time: 04/02/22  2:23 PM   Result Value Ref Range    Color, UA YELLOW YELLOW    Clarity, UA CLEAR CLEAR    Glucose, Urine NEGATIVE NEGATIVE MG/DL    Bilirubin Urine NEGATIVE NEGATIVE MG/DL    Ketones, Urine SMALL (A) NEGATIVE MG/DL    Specific Gravity, UA 1.015 1.001 - 1.035    Blood, Urine MODERATE (A) NEGATIVE    pH, Urine 6.0 5.0 - 8.0    Protein,  (A) NEGATIVE MG/DL    Urobilinogen, Urine 0.2 0.2 - 1.0 MG/DL    Nitrite Urine, Quantitative POSITIVE (A) NEGATIVE    Leukocyte Esterase, Urine NEGATIVE NEGATIVE    RBC, UA 1 0 - 3 /HPF    WBC, UA 11 (H) 0 - 2 /HPF    Bacteria, UA OCCASIONAL (A) NEGATIVE /HPF    Squam Epithel, UA <1 /HPF    Mucus, UA FEW (A) NEGATIVE HPF   Culture, Urine    Collection Time: 04/02/22  2:23 PM    Specimen: Urine, clean catch   Result Value Ref Range    Specimen URINE CLEAN CATCH     Special Requests NONE     Culture Prelim Report     Culture (A)      GRAM NEGATIVE TERRELL >100,000 CFU/ml ID and sensitivity to follow   CBC with Auto Differential    Collection Time: 04/03/22  4:33 AM   Result Value Ref Range    WBC 35.1 (HH) 4.0 - 10.5 K/CU MM    RBC 2.63 (L) 4.6 - 6.2 M/CU MM    Hemoglobin 9.9 (L) 13.5 - 18.0 GM/DL    Hematocrit 30.9 (L) 42 - 52 %    .5 (H) 78 - 100 FL    MCH 37.6 (H) 27 - 31 PG    MCHC 32.0 32.0 - 36.0 %    RDW 14.3 11.7 - 14.9 %    Platelets 854 979 - 624 K/CU MM    MPV 9.4 7.5 - 11.1 FL    Differential Type AUTOMATED DIFFERENTIAL     Segs Relative 90.3 (H) 36 - 66 %    Lymphocytes % 1.0 (L) 24 - 44 %    Monocytes % 7.3 (H) 0 - 4 %    Eosinophils % 0.1 0 - 3 %    Basophils % 0.2 0 - 1 %    Segs Absolute 31.7 K/CU MM    Lymphocytes Absolute 0.4 K/CU MM    Monocytes Absolute 2.6 K/CU MM    Eosinophils Absolute 0.0 K/CU MM    Basophils Absolute 0.1 K/CU MM    Nucleated RBC % 0.0 %    Total Nucleated RBC 0.0 K/CU MM    Total Immature Neutrophil 0.38 K/CU MM    Immature Neutrophil % 1.1 (H) 0 - 0.43 %   Basic Metabolic Panel w/ Reflex to MG    Collection Time: 04/03/22  4:33 AM   Result Value Ref Range    Sodium 130 (L) 135 - 145 MMOL/L    Potassium 4.2 3.5 - 5.1 MMOL/L    Chloride 98 (L) 99 - 110 mMol/L    CO2 20 (L) 21 - 32 MMOL/L    Anion Gap 12 4 - 16    BUN 22 6 - 23 MG/DL    CREATININE 1.3 0.9 - 1.3 MG/DL    Glucose 126 (H) 70 - 99 MG/DL    Calcium 8.0 (L) 8.3 - 10.6 MG/DL    GFR Non-African American 52 (L) >60 mL/min/1.73m2    GFR African American >60 >60 mL/min/1.73m2   CEA    Collection Time: 04/03/22  4:33 AM   Result Value Ref Range    CEA 6.1 NG/ML        Imaging/Diagnostics Last 24 Hours   XR ABDOMEN (KUB) (SINGLE AP VIEW)    Result Date: 4/3/2022  EXAMINATION: ONE SUPINE XRAY VIEW(S) OF THE ABDOMEN 4/3/2022 11:28 am COMPARISON: 03/24/2022 x-ray. CT from 03/21/2022. HISTORY: ORDERING SYSTEM PROVIDED HISTORY: Bowel obstruction, S/P remote colostomy. TECHNOLOGIST PROVIDED HISTORY: Portable supine x-ray at 0500 please. Reason for exam:->bowel obstruction S/P remote colostomy Reason for Exam: Bowel obstruction S/P remote colostomy. FINDINGS: There is an enteric tube with its tip projecting over the fundus of the stomach. Proximal port is at the GE junction. Right upper quadrant cholecystectomy clips. Air-filled distended loops of small bowel in the mid abdomen and stacked configuration. There is also gas noted within the colon. Findings suggest a low-grade partial small bowel obstruction versus ileus. Atherosclerotic calcifications associated with an aortic aneurysm. Multiple air-filled distended loops of small bowel stacked in the mid abdomen suggesting a low-grade partial small bowel obstruction or focal ileus. Atherosclerotic calcification of an abdominal aortic aneurysm as seen on prior CT. NG tube in the stomach with proximal port at the level of the GE junction. CT ABDOMEN PELVIS W IV CONTRAST Additional Contrast? None    Result Date: 4/2/2022  EXAMINATION: CT OF THE ABDOMEN AND PELVIS WITH CONTRAST 4/2/2022 1:36 pm TECHNIQUE: CT of the abdomen and pelvis was performed with the administration of intravenous contrast. Multiplanar reformatted images are provided for review. Dose modulation, iterative reconstruction, and/or weight based adjustment of the mA/kV was utilized to reduce the radiation dose to as low as reasonably achievable. COMPARISON: 03/21/2022 HISTORY: ORDERING SYSTEM PROVIDED HISTORY: Abdomen pain. TECHNOLOGIST PROVIDED HISTORY: Reason for exam:->Abd pain Additional Contrast?->None Decision Support Exception - unselect if not a suspected or confirmed emergency medical condition->Emergency Medical Condition (MA) Reason for Exam: Abdomen pain. Additional signs and symptoms: States \"ongoing abd pain that comes and goes through winter\", 80 ml Isovue 370 FINDINGS: Lower Chest: There are bilateral layering pleural effusions. Cardiomegaly is noted. Severe coronary artery atherosclerotic calcifications. Organs: Numerous scattered hypoattenuating lesions noted throughout the liver likely representing hepatic cysts. The largest of the lesions demonstrate fluid attenuation consistent with cysts measuring up to 25 mm in diameter. No evidence of enhancing hepatic lesion or biliary ductal dilatation. Portal vein is patent. Status post cholecystectomy.   The spleen, pancreas and adrenal glands demonstrate no acute abnormality. The kidneys enhance symmetrically without evidence of hydronephrosis. There is a 19 mm exophytic lesion off of the lower pole of the left kidney with Hounsfield unit attenuation of approximately 30. This still likely represents a renal cyst (Bosniak category 2). This is been present since 2018. Stable small bilateral parapelvic cysts. GI/Bowel: Stomach and duodenal sweep demonstrate no acute abnormality. There is a left lower quadrant colostomy present. Postsurgical changes status post partial left colectomy. There is circumferential wall thickening of the colon proximal to the level of the colostomy with pericolonic fat stranding and inflammatory changes. There is dilatation of the proximal colon as well with wall thickening inflammatory changes extending up to the hepatic flexure. Changes are new since prior examination suggesting acute infectious/inflammatory colitis. There are multiple distended fluid-filled loops of small bowel throughout the abdomen and pelvis as well new since prior examination suggesting ileus. Pelvis: Status post prostatectomy. Bladder is unremarkable. Weaver catheter is noted. Peritoneum/Retroperitoneum: No evidence of ascites or free air. No evidence of lymphadenopathy. There is a stable infrarenal abdominal aortic aneurysm measuring approximately 5.5 cm in diameter with crescentic mural thrombus. Extensive atherosclerotic disease of the aorta and branch vessels. Bones/Soft Tissues:  Age related degenerative changes of the visualized osseous structures without focal destructive lesion. Interval development of severe circumferential wall thickening and pericolonic inflammatory changes of the transverse colon and splenic flexure of the colon just proximal to the left lower quadrant colostomy with increased fluid-filled distension of the proximal colon and small bowel.  Findings suggest acute infectious/inflammatory colitis with proximal partial obstruction versus ileus. Stable infrarenal abdominal aortic aneurysm measuring 5.5 cm in diameter. See follow-up recommendations below. Stable hepatic cysts and bilateral renal cortical cysts. Cardiomegaly with bilateral pleural effusions suggesting mild CHF. RECOMMENDATIONS: 5.5 cm infrarenal abdominal aortic aneurysm. Recommend referral to a vascular specialist. Reference: J Am Abad Radiol 9845;43:[id number]. XR CHEST PORTABLE    Result Date: 4/2/2022  EXAMINATION: ONE XRAY VIEW OF THE CHEST 4/2/2022 10:37 pm COMPARISON: 04/02/2022 HISTORY: ORDERING SYSTEM PROVIDED HISTORY: ng tube placement TECHNOLOGIST PROVIDED HISTORY: Reason for exam:->ng tube placement Reason for Exam: ng tube placement Additional signs and symptoms: ng tube placement FINDINGS: Enteric tube tip is noted within the stomach with the side port likely at or just distal to the GE junction. Small left pleural effusion. Bibasilar opacities. Stable cardiac silhouette. No overt pulmonary edema. No pneumothorax. The osseous structures are stable. Enteric tube tip is noted within the stomach with the side port likely at or just distal to the GE junction. XR CHEST PORTABLE    Result Date: 4/2/2022  EXAMINATION: ONE XRAY VIEW OF THE CHEST 4/2/2022 2:52 pm COMPARISON: 03/20/2022 HISTORY: ORDERING SYSTEM PROVIDED HISTORY: chest pain TECHNOLOGIST PROVIDED HISTORY: Reason for exam:->chest pain Reason for Exam: abdominall discomfort Additional signs and symptoms: none Relevant Medical/Surgical History: CAD, AAA FINDINGS: Trace left pleural effusion with adjacent atelectasis. Stable cardiomediastinal silhouette. No other focal consolidation. No overt pulmonary edema. No pneumothorax. The osseous structures otherwise stable. Trace left pleural effusion with adjacent atelectasis.        Electronically signed by Francois Jaimes MD on 4/3/2022 at 2:00 PM

## 2022-04-03 NOTE — ANESTHESIA POSTPROCEDURE EVALUATION
Department of Anesthesiology  Postprocedure Note    Patient: Chacorta Chakraborty  MRN: [id number]  YOB: 1929  Date of evaluation: 4/3/2022  Time:  5:53 PM     Procedure Summary     Date: 04/03/22 Room / Location: [address] [place] [address]    Anesthesia Start: 0937 Anesthesia Stop: 1745    Procedure: LAPAROTOMY EXPLORATORY COLON RESECTION (N/A Abdomen) Diagnosis: (SBO)    Surgeons: Elizabeth Daley MD Responsible Provider: Saumya Ronquillo DO    Anesthesia Type: general ASA Status: 3 - Emergent          Anesthesia Type: general    Nesha Phase I:      Nesha Phase II:      Last vitals: Reviewed and per EMR flowsheets.        Anesthesia Post Evaluation    Patient location during evaluation: ICU  Patient participation: complete - patient participated  Level of consciousness: sleepy but conscious  Airway patency: patent  Nausea & Vomiting: no nausea  Complications: no  Cardiovascular status: hemodynamically stable  Respiratory status: acceptable  Hydration status: euvolemic

## 2022-04-03 NOTE — PROGRESS NOTES
Physician at bedside, consent for surgery signed by patient. All questions answered. Family at bedside, agreeable to plan. Pt is in position of comfort, denies needs, thanks staff for care. Emotional support and prayer given.

## 2022-04-03 NOTE — PROGRESS NOTES
Skin assessment done by this nurse and Ford Bowles RN Scattered bruising, redness and pannus, midline incision, LLQ surgical site,. Old scars on coccyx.

## 2022-04-03 NOTE — PLAN OF CARE
Problem: Falls - Risk of:  Goal: Will remain free from falls  Description: Will remain free from falls  4/3/2022 1157 by Yelena Soria RN  Outcome: Met This Shift  4/2/2022 2328 by Jania Rodríguez RN  Outcome: Ongoing  Goal: Absence of physical injury  Description: Absence of physical injury  4/3/2022 1157 by Yelena Soria RN  Outcome: Met This Shift  4/2/2022 2328 by Jania Rodríguez RN  Outcome: Ongoing

## 2022-04-03 NOTE — PROGRESS NOTES
Pt transported to pre surgical area with this RN, cardiac drips running and antibiotics. Family with. Report given to [facility] PRESTON, pt on low intermittent suction fro ng tube, 4LPM via Nasal cannula for oxygen need/comfort. Pt stable and without complaints. Pt on telemetry and care released to pacu RN.

## 2022-04-03 NOTE — ANESTHESIA PROCEDURE NOTES
Central Venous Line:    A central venous line was placed using ultrasound guidance, in the OR for the following indication(s): central venous access. 4/3/2022 2:26 PM4/3/2022 2:36 PM    Sterility preparation included the following: hand hygiene performed prior to procedure, maximum sterile barriers used and sterile technique used to drape from head to toe. The patient was placed in Trendelenburg position. The    The site was prepped with Chloraprep. A 7 Fr (size), 20 (length), triple lumen was placed. During the procedure, the following specific steps were taken: target vein identified, needle advanced into vein and blood aspirated and guidewire advanced into vein. Intravenous verification was obtained by ultrasound. Post insertion care included: all ports aspirated, all ports flushed easily, guidewire removed intact, Biopatch applied, line sutured in place and dressing applied. During the procedure the patient experienced: patient tolerated procedure well with no complications and EBL < 5mL.       Insertion site scrubbed per usage guidelines?: Yes  Skin prep agent dried for 3 minutes prior to procedure?:yes  Anesthesia type: general..No  Staffing  Performed: Resident/CRNA   Anesthesiologist: Elena Friday, DO  Resident/CRNA: Lilia Akins  Preanesthetic Checklist  Completed: patient identified, IV checked, site marked, risks and benefits discussed, surgical consent, monitors and equipment checked, pre-op evaluation, timeout performed, anesthesia consent given, oxygen available and patient being monitored

## 2022-04-03 NOTE — BRIEF OP NOTE
Brief Postoperative Note      Patient: Soumya Heard  YOB: 1929  MRN: [id number]    Date of Procedure: 4/3/2022    Pre-Op Diagnosis: Colitis and acute abdomen    Post-Op Diagnosis: Same       Procedure(s):  LAPAROTOMY EXPLORATORY COLON RESECTION, end ileostomy, remove colostomy, mobilization of the splenic flexure, small bowel resection  Central line by anesthesia    Surgeon(s):  Pete Salas MD    Anesthesia: General    Estimated Blood Loss (mL): 834     Complications: None    Specimens:   ID Type Source Tests Collected by Time Destination   1 : PERITONEAL FLUID Body Fluid PERITONEAL CULTURE, SURGICAL Pete Salas MD 4/3/2022 1452    A : COLON, STITCH AT DISTAL END Tissue Colon SURGICAL PATHOLOGY Pete Salas MD 4/3/2022 1541    B : PORTION OF SMALL BOWEL Tissue Tissue SURGICAL PATHOLOGY Pete Salas MD 4/3/2022 1600    C : COLOSTOMY Tissue Colostomy SURGICAL PATHOLOGY Pete Salas MD 4/3/2022 1709        Implants:  * No implants in log *      Drains:   NG/OG/NJ/NE Tube Nasogastric 16 fr Left nostril (Active)   Surrounding Skin Dry; Intact 04/03/22 0457   Securement device No 04/03/22 0457   Status Suction-low intermittent 04/03/22 0457   Placement Verified by X-Ray (Initial) 04/03/22 0457   NG/OG/NJ/NE External Measurement (cm) 57 cm 04/03/22 0457   Drainage Appearance Brown 04/03/22 0457   Output (mL) 220 ml 04/03/22 0457       Urethral Catheter (Active)   $ Urethral catheter insertion $ Not inserted for procedure 03/23/22 0755   Catheter Indications Urinary retention (acute or chronic), continuous bladder irrigation or bladder outlet obstruction 04/03/22 1152   Securement Device Date Changed 03/01/22 03/02/22 2041   Site Assessment No urethral drainage 04/03/22 1152   Urine Color Tea 04/03/22 1728   Urine Appearance Clear 04/03/22 1728   Urine Odor Malodorous 04/03/22 1152   Output (mL) 450 mL 04/03/22 1728       [REMOVED] NG/OG/NJ/NE Tube Nasoenteric decompression tube 16 fr Left nostril (Removed)   Surrounding Skin Dry; Intact 03/21/22 0426   Securement device Yes 03/21/22 0426   Status Suction-low intermittent 03/21/22 0426   Placement Verified by X-Ray (Initial) 03/21/22 0426   NG/OG/NJ/NE External Measurement (cm) 60 cm 03/21/22 0426   Drainage Appearance Brown;Pink tinged 03/21/22 0426   Output (mL) 450 ml 03/21/22 2132       [REMOVED] Colostomy LLQ (Removed)   Stomal Appliance Clean;Dry; Intact;2 piece 04/03/22 0802   Stoma  Assessment Pink;Moist 04/03/22 1152   Mucocutaneous Junction Intact 04/03/22 1152   Peristomal Assessment Clean; Intact 04/03/22 1152   Treatment Bag change;Stoma paste 03/24/22 0158   Stool Appearance Loose 04/03/22 1152   Stool Color Brown 04/03/22 1152   Stool Amount Large 04/03/22 1152   Output (mL) 0 ml 04/03/22 0802       Findings: distended/boggy colon, edematous, fibrinous debris along cecum, SB densely adherent into pelvis    Electronically signed by Becky Roa MD on 4/3/2022 at 5:29 PM     Dictated #[id number]

## 2022-04-03 NOTE — ANESTHESIA PRE PROCEDURE
Department of Anesthesiology  Preprocedure Note       Name:  Nery Mariee   Age:  80 y.o.  :  1929                                          MRN:  [id number]         Date:  4/3/2022      Surgeon: Ninoska Corbin):  May Gottron, MD    Procedure: Procedure(s):  LAPAROTOMY EXPLORATORY COLON RESECTION    Medications prior to admission:   Prior to Admission medications    Medication Sig Start Date End Date Taking?  Authorizing Provider   hydroxyurea (HYDREA) 500 MG chemo capsule Take 2 capsules by mouth once daily 22   Andres Holt MD   aspirin 81 MG chewable tablet Take 1 tablet by mouth daily 3/30/22   [facility] R Rufino, DO   dilTIAZem (DILT-XR) 240 MG extended release capsule Take 1 capsule by mouth once daily 3/30/22   [facility] Rufino, DO   folic acid (FOLVITE) 1 MG tablet Take 1 tablet by mouth daily 3/30/22   [facility] R Rufino, DO   ferrous sulfate (IRON 325) 325 (65 Fe) MG tablet Take 1 tablet by mouth 2 times daily 3/30/22   [facility] Rufino, DO   pantoprazole (PROTONIX) 40 MG tablet Take 1 tablet by mouth daily 3/30/22   [facility], DO   apixaban (ELIQUIS) 2.5 MG TABS tablet Take 1 tablet by mouth 2 times daily Please resume this medication on Monday , march 28 th 3/25/22 4/24/22  Jett Ojeda MD   metoprolol tartrate (LOPRESSOR) 25 MG tablet Take 1 tablet by mouth 2 times daily for 15 days 3/25/22 4/9/22  Jett Ojeda MD   isosorbide mononitrate (IMDUR) 30 MG extended release tablet Take 30 mg by mouth daily    Historical Provider, MD   adalimumab (HUMIRA) 40 MG/0.8ML injection Inject 40 mg into the skin once    Historical Provider, MD   Multiple Vitamins-Minerals (THERAPEUTIC MULTIVITAMIN-MINERALS) tablet Take 1 tablet by mouth daily    Historical Provider, MD   VITAMIN E PO Take 1 tablet by mouth daily    Historical Provider, MD       Current medications:    Current Facility-Administered Medications   Medication Dose Route Frequency Provider Last Rate Last Admin    dilTIAZem 100 mg in dextrose 5 % 100 mL infusion (ADD-Sutton)  5-15 mg/hr IntraVENous Continuous Julian David MD 2.5 mL/hr at 04/03/22 0339 2.5 mg/hr at 04/03/22 0339    adalimumab (HUMIRA) injection 40 mg  40 mg SubCUTAneous Once Wilmer Gonzalez MD        [Held by provider] apixaban (ELIQUIS) tablet 2.5 mg  2.5 mg Oral BID Wilmer Gonzalez MD        [Held by provider] metoprolol tartrate (LOPRESSOR) tablet 25 mg  25 mg Oral BID Wilmer Gonzalez MD        [Held by provider] aspirin chewable tablet 81 mg  81 mg Oral Daily Rupesh Vallejo MD        sodium chloride flush 0.9 % injection 5-40 mL  5-40 mL IntraVENous 2 times per day Wilmer Gonzalez MD   10 mL at 04/03/22 1138    sodium chloride flush 0.9 % injection 5-40 mL  5-40 mL IntraVENous PRN Rupesh Vallejo MD        0.9 % sodium chloride infusion   IntraVENous PRN Wilmer Gonzalez MD        ondansetron (ZOFRAN-ODT) disintegrating tablet 4 mg  4 mg Oral Q8H PRN Wilmer Gonzalez MD        Or    ondansetron (ZOFRAN) injection 4 mg  4 mg IntraVENous Q6H PRN Wilmer Gonzalez MD        acetaminophen (TYLENOL) tablet 650 mg  650 mg Oral Q6H PRN Wilmer Gonzalez MD        Or    acetaminophen (TYLENOL) suppository 650 mg  650 mg Rectal Q6H PRN Wilmer Gonzalez MD        amiodarone (CORDARONE) 450 mg in dextrose 5 % 250 mL infusion  0.5 mg/min IntraVENous Continuous Greg Person MD 16.7 mL/hr at 04/02/22 2341 0.5 mg/min at 04/02/22 2341    pantoprazole (PROTONIX) injection 40 mg  40 mg IntraVENous Daily Rupesh Vallejo MD   40 mg at 04/03/22 1127    piperacillin-tazobactam (ZOSYN) 3,375 mg in dextrose 5 % 50 mL IVPB extended infusion (mini-bag)  3,375 mg IntraVENous Q8H Rupesh Vallejo MD 12.5 mL/hr at 04/03/22 1137 3,375 mg at 04/03/22 1137    metronidazole (FLAGYL) 500 mg in NaCl 100 mL IVPB premix  500 mg IntraVENous Q8H Rupesh Vallejo  mL/hr at 04/03/22 1144 500 mg at 04/03/22 1144    morphine (PF) injection 2 mg  2 mg IntraVENous Q4H PRN Elena Navarro MD   2 mg at 04/03/22 1127       Allergies:     Allergies   Allergen Reactions    Sulfa Antibiotics        Problem List:    Patient Active Problem List   Diagnosis Code    Hypertension I10    Benign prostatic hyperplasia N40.0    Psoriatic arthritis ([address] L40.50    Primary malignant neoplasm of rectum ([address] C20    Psoriasis L40.9    Chronic myeloproliferative disease ([address] D47.1    Monocytosis (symptomatic) F80.592    Gastroesophageal reflux disease without esophagitis K21.9    Urinary retention R33.9    H/O: CVA (cerebrovascular accident) Z80.78    Irregular heart beat I49.9    Nonrheumatic aortic valve stenosis I35.0    Paroxysmal atrial fibrillation (HCC) I48.0    GI bleed K92.2    Melena K92.1    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    History of rectal cancer Z85.048    Benign neoplasm of cecum D12.0    Benign neoplasm of ascending colon B61.8    Complicated UTI (urinary tract infection) N39.0    Partial small bowel obstruction (HCC) K56.600    SBO (small bowel obstruction) ([address] K56.609       Past Medical History:        Diagnosis Date    AAA (abdominal aortic aneurysm) (HCC)     Angina, class I (HCC)     Benign prostatic hyperplasia 12/12/2011     Updating Deprecated Diagnoses    Bowel obstruction ([address]     CAD (coronary artery disease)     Cancer ([address]     rectal    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    Gastroesophageal reflux disease without esophagitis 05/21/2020    Hx of cardiovascular stress test 08/12/2021    EF 62% Normal study    Hypertension     Partial small bowel obstruction ([address] 3/23/2022    Primary malignant neoplasm of rectum ([address] 08/17/2019    Psoriasis     Psoriatic arthritis ([address] 08/17/2019    Pyelonephritis 03/24/2018    Stable angina (HCC) 08/17/2019    Unspecified cerebral artery occlusion with cerebral infarction        Past Surgical History:        Procedure Laterality Date    APPENDECTOMY      COLONOSCOPY N/A 3/2/2022    COLONOSCOPY POLYPECTOMY REMOVAL ABLATION/STOMA with EMR and placed 7 hemoclips. performed by Ming Ho MD at [address] [place] [address] ENDOSCOPY N/A 2022    ENTEROSCOPY PUSH CONTROL HEMORRHAGE WITH APC ABLATION OF AVM performed by Ming Ho MD at [facility] ENDOSCOPY       Social History:    Social History     Tobacco Use    Smoking status: Former Smoker     Packs/day: 1.50     Years: 40.00     Pack years: 60.00     Types: Cigarettes     Quit date:      Years since quittin.2    Smokeless tobacco: Never Used   Substance Use Topics    Alcohol use: No                                Counseling given: Not Answered      Vital Signs (Current):   Vitals:    22 1032 22 1102 22 1127 22 1132   BP: 114/71 (!) 106/56  102/67   Pulse:    91   Resp:    21   Temp:    98.4 °F (36.9 °C)   TempSrc:    Oral   SpO2:   97% 96%   Weight:       Height:                                                  BP Readings from Last 3 Encounters:   22 102/67   22 (!) 156/92   03/10/22 118/78       NPO Status:                                                                                 BMI:   Wt Readings from Last 3 Encounters:   22 172 lb (78 kg)   22 178 lb 4.8 oz (80.9 kg)   03/10/22 181 lb (82.1 kg)     Body mass index is 25.4 kg/m².     CBC:   Lab Results   Component Value Date    WBC 35.1 2022    RBC 2.63 2022    RBC 5.19 2017    HGB 9.9 2022    HCT 30.9 2022    .5 2022    RDW 14.3 2022     2022       CMP:   Lab Results   Component Value Date     2022    K 4.2 2022    CL 98 2022    CO2 20 2022    BUN 22 2022    CREATININE 1.3 2022    GFRAA >60 2022    AGRATIO 1.6 03/10/2022    LABGLOM 52 2022    GLUCOSE 126 2022    PROT 6.3 2022    PROT 6.2 2011    CALCIUM 8.0 2022    BILITOT 0.5 04/02/2022    ALKPHOS 121 04/02/2022    AST 20 04/02/2022    ALT 13 04/02/2022       POC Tests: No results for input(s): POCGLU, POCNA, POCK, POCCL, POCBUN, POCHEMO, POCHCT in the last 72 hours. Coags:   Lab Results   Component Value Date    PROTIME 18.0 04/02/2022    PROTIME 10.4 12/11/2011    INR 1.39 04/02/2022    APTT 33.6 04/02/2022       HCG (If Applicable): No results found for: PREGTESTUR, PREGSERUM, HCG, HCGQUANT     ABGs: No results found for: PHART, PO2ART, KLP3FKH, XYU8DZY, BEART, G5HKZKRZ     Type & Screen (If Applicable):  No results found for: LABABO, LABRH    Drug/Infectious Status (If Applicable):  No results found for: HIV, HEPCAB    COVID-19 Screening (If Applicable):   Lab Results   Component Value Date    COVID19 NOT DETECTED 02/28/2022           Anesthesia Evaluation  Patient summary reviewed and Nursing notes reviewed no history of anesthetic complications:   Airway: Mallampati: II  TM distance: >3 FB   Neck ROM: full  Mouth opening: > = 3 FB Dental:    (+) upper dentures and lower dentures      Pulmonary:                             ROS comment: Former smoker   Cardiovascular:  Exercise tolerance: poor (<4 METS),   (+) hypertension:, angina:, CAD:, dysrhythmias: atrial fibrillation,               Stress test reviewed       Beta Blocker:  No for medical reason      ROS comment: Summary   Left ventricular function and size is normal, EF is estimated at 55-60%. Normal diastolic filling pattern for age. No regional wall motion abnormalities were detected. Bi atrial enlargement noted. Mildly sclerotic aortic valve with mild aortic stenosis mean gradient is   13mmHg, NADEEM 1.6 cm2. Mild mitral , tricuspid and moderate aortic regurgitation is present. RVSP is 29 mmHg. No evidence of pericardial effusion.       Signature      ------------------------------------------------------------------   Electronically signed by Nery Martinez MD (Interpreting   physician) on 08/24/2021 at 06:26 PM   ------------------------------------------------------------------        Neuro/Psych:   (+) CVA:,             GI/Hepatic/Renal:   (+) GERD:, renal disease: CRI,           Endo/Other:    (+) blood dyscrasia: anemia:., electrolyte abnormalities, malignancy/cancer (rectal). Abdominal:             Vascular: negative vascular ROS. Other Findings:           Anesthesia Plan      general     ASA 3 - emergent       Induction: intravenous. central line  MIPS: Postoperative opioids intended and Prophylactic antiemetics administered. Anesthetic plan and risks discussed with patient. Plan discussed with CRNA.                   Carlie Hillman DO   4/3/2022

## 2022-04-03 NOTE — PROGRESS NOTES
To pacu for preop checklist, npo verified, Dr Frederick Peña at bedside, awaiting anesthesia and or team  1330- family taken to waiting room to wait after good byes  1335 Dr Frederick Peña requests icu post op, supervisor and or team notified, family updated

## 2022-04-03 NOTE — PROGRESS NOTES
Pt is alert and oriented. Cooperative with care. Pt has pain, medicated see MAR. Pt able to rest with eyes closed after medication. Pt has family, daughter, at bedside, she is agreeable to plan. Pt agreeable as well. NG tube to intermittent suction, no new output. No output in colostomy bag. Pt has all needs met, denies complaints, thanks staff for care.

## 2022-04-04 NOTE — PROGRESS NOTES
[address] [facility]  consulted by Dr. Mirna Boggs for monitoring and adjustment. Indication for treatment: Pneumonia (HAP)  Goal trough: [] 10-15 mcg/mL or [x] 15-20 mcg/ml  AUC/SUJATA: [] <500 or [x] 400-600    Pertinent Laboratory Values:   Temp Readings from Last 3 Encounters:   04/04/22 98.2 °F (36.8 °C) (Oral)   04/03/22 97.4 °F (36.3 °C)   03/25/22 98.1 °F (36.7 °C) (Oral)     Recent Labs     04/02/22  1112 04/02/22  1400 04/03/22  0433 04/03/22  1830   WBC 26.9*  --  35.1* 33.2*   LACTATE  --  1.2  --   --      Recent Labs     04/02/22  1112 04/03/22  0433 04/03/22  1830   BUN 21 22 27*   CREATININE 1.2 1.3 1.5*     Estimated Creatinine Clearance: 31 mL/min (A) (based on SCr of 1.5 mg/dL (H)). Intake/Output Summary (Last 24 hours) at 4/4/2022 0209  Last data filed at 4/4/2022 0815  Gross per 24 hour   Intake 2140 ml   Output 1020 ml   Net 1120 ml       Pertinent Cultures:  Date    Source    Results  4/02   Urine    Pending  4/03   Surgical Cx   Pending  4/03   Blood x 2   Ordered    Vancomycin level:   TROUGH:  No results for input(s): VANCOTROUGH in the last 72 hours. RANDOM:  No results for input(s): VANCORANDOM in the last 72 hours. Assessment:  SCr, BUN, and urine output: MARIELOS  Day(s) of therapy: 1  Vancomycin concentration: Pending    Plan:  Vancomycin 1000 mg x 1  Intermittent dosing based on levels due to MARIELOS  Pharmacy will continue to monitor patient and adjust therapy as indicated    VANCOMYCIN CONCENTRATION SCHEDULED FOR 04/05/22 @0600    Thank you for the consult.   Joanna Mendez, [facility]  4/4/2022 2:09 AM

## 2022-04-04 NOTE — PROGRESS NOTES
Pt seen and examined full note to follow  Sp surgery with ostomy remove and new end ileostomy and now icu with bicarb gtt and pressor with arf from atn in above setting with sbo. Now with some pain. Has ngt. On iv abx.  Agree with current care and aware risk worse arf in above setting and dw pt as well    Will fu with full note

## 2022-04-04 NOTE — PROGRESS NOTES
Critical Care Note    Patient's Name/Date of Birth: Ulysses Masters 1929    Date: 2022     Hospital Day: 3  Post-Operative Day: 1  Procedure:  Exploratory laparotomy, subtotal colectomy, end ileostomy    PrincipalProblem and Hospital Course:  Sepsis, colitis    Pressors: levophed 3mcg/min    Subjective: Patient sitting up in a chair, sleeping but easily awakens, C/O abdominal pain, no nausea, states post-op pain is better than pre-op pain    Objective:  /69   Pulse 90   Temp 97.9 °F (36.6 °C) (Oral)   Resp 25   Ht 5' 9\" (1.753 m)   Wt 172 lb (78 kg)   SpO2 99%   BMI 25.40 kg/m²   Temp (24hrs), Av.3 °F (36.8 °C), Min:94.2 °F (34.6 °C), Max:99.3 °F (37.4 °C)      CBC  Recent Labs     22  0433 04/03/22  18322  0420   WBC 35.1* 33.2* 28.4*   HGB 9.9* 10.5* 9.6*   HCT 30.9* 32.2* 29.7*    421 374     BMP  Recent Labs     22  0433 22  18322  0420   * 129* 131*   K 4.2 4.4 4.4   CL 98* 99 99   CO2 20* 17* 19*   BUN 22 27* 31*   CREATININE 1.3 1.5* 1.8*   CALCIUM 8.0* 7.1* 7.6*     Liver Function  Recent Labs     22  11122  0420   LIPASE 11* 28   BILITOT 0.5 0.6   BILIDIR  --  0.4*   AST 20 12*   ALT 13 13   ALKPHOS 121 64   PROT 6.3* 4.0*   LABALBU 3.1* 2.2*     Recent Labs     22  1400 22  0420   LACTATE 1.2 1.5     Recent Labs     22  11122  0445   INR 1.39 1.51       Intake/Output:   I/O last 3 completed shifts:   In: 2140 [I.V.:1800; Blood:340]  Out: 12 [Urine:670; Emesis/NG output:220; Blood:200]  UOP: 450/220  NGT: scant    Physical Exam    General appearance: anasarca, pale   Neuro:  Awake, alert, interactive  HEENT: Has non-icteric sclerae, no JVD  Resp: CTA bilaterally, with good excursion   CVS: RRR no murmurs appreciated   Abdomen:  Soft, tender, midline dressing c/d/i, serous drainage on LLQ dressing, stoma is pink and viable  :  Marginal uop  Ext:  edematous  Skin: cool to touch    Assessment/Plan:       SEDATION/ANALGESIA:  PRN morphine    CARDIOVASCULAR:  PROBLEMS:  1. A-fib  2. Hypotensive, holding beta blocker due to hypotension, on levophed due to hypotension  PLAN:  1. Rate controlled with diltiazem gtt  2. Levophed gtt    PULMONARY:  PROBLEMS:  1. Possible pneumonia noted on CXR  PLAN:  1. On antibiotics    RENAL/FLUID/ELECTROLYTE:  PROBLEMS:  1. Acute renal failure  PLAN:  1. Bicarb gtt, nephrology consult (Dr. Lupe Denise)    GI/NUTRITION:  PROBLEMS:  1. NPO currently, protein malnutrition  2. Continue to monitor electrolytes  PLAN:  1. Start TPN tonight, patient with albumin 2.2    ID:  PROBLEMS:  1. Colitis  2. UTI  3. Pneumonia  4. Antibiotics  PLAN:  1. S/p colon resection, intra-operative cultures pending, if NGTD will stop flagyl  2. Pseudomonas >100,000 present on admission, sensitive to zosyn  3. CXR noted, on abx  4. Zosyn d#3, flagyl d#3, Vancomycin d#2    HEMATOLOGIC:  PROBLEMS:  1. Anemia  PLAN:  1. Continue to monitor, did receive 1 unit pRBC's intra-operatively    ENDOCRINE:  PROBLEMS:  1. hyperglycemia  PLAN:  1. monitor blood glucose  2. Insulin therapy -  Continue sliding scale    Bowel regimen: none  Glucose protocol: sliding scale with 4 x day accucheck  Weaver: in place d#3  CVC sites:  Right IJ  Ancillary consults:  Nephrology, cardiology, hospitalist      PROPHYLAXIS:   Stress ulcer: Protonix qday   VTE: heparin starting today, has SCD's    DISPOSITION:   Continue ICU      Critical care provided for this patient of which 40 min were spend on critical care reviewing labs/films, examining patient, collaborating with other physicians, decisionmaking. There was imminent failure of an organ system which required critical intervention to prevent clinically significant progression of life threatening deterioration of the patient's condition to the point of disability or death.     Tabatha Saldaña MD  4/4/2022. 1:05 PM

## 2022-04-04 NOTE — PROGRESS NOTES
V2.0  [facility] [place]      Name:  Cele Pritchett /Age/Sex: 1929  (80 y.o. male)   MRN & CSN:  [id number] & [id number] Encounter Date/Time: 2022 2:00 PM EDT    Location:  -A PCP: Cherylene Collet, MD       Hospital Day: 3    Assessment and Plan:   Cele Pritchett is a 80 y.o. male with pmh of rectal cancer status post abdominoperineal resection and permanent colostomy followed by chemoradiation,  recent admission for SBO, A. fib not on anticoagulation secondary to GI bleed, history of AAA, BPH, CAD, GERD, hypertension, psoriasis, history of CVA who presents with SBO (small bowel obstruction) ([facility] [address]    Plan:  Concern for ischemic colitis:  Signs and symptoms are consistent with ischemic colitis per general surgery. Lactate 1.2, WBC 35k on admission. s/p ex lap, subtotal colectomy and end ileostomy on 4/3/22. Doing well post op. Currently in the ICU on low dose levophed. WBC on a downward trend. Continue post op management per gen surg. S/p 1U pRBC intra-op on 4/3/22. Possible infectious vs inflammatory colitis with recurrent SBO: CT abdomen on admission showed thickening of the colon proximal to the colostomy with pericolonic fat stranding and inflammatory changes and dilatation of the proximal colon. S/p colon resection as above so may have achieved source control. WBC trending down. Will defer to general surgery re: antibiotic management as requested. Recurrent SBO: AXR  shows gaseous dilatation of multiple small bowel loops. Continue NG tube. Gen surg following as above. Plan to start TPN tonight per surgery. Shock: Infectious/Distributive vs Hypovolemic. Continue pressor support, antibiotics. Lactate is 1.5. MARIELOS: possibly due to hypovolemia, the combined effects of vancomycin and zosyn and infection. Avoid nephrotoxins as much as possible. Daily BMP. Nephrology has been consulted. A. Fib with RVR: RVR has resolved. s/p Cardizem gtt, IV digoxin, IV amiodarone gtt.  He is off drips and is rate controlled in the 80s. Remains NPO with NG tube in place. He is not on anticoagulation due to positive stool Hemoccult. If okay with surgery, cardiology recommends anticoagulation with Lovenox 1.5 mg/kg/day. GNR UTI: Continue antibiotics. Follow up ID and sensitivities. ADDENDUM. Pseudomonas UTI: continue zosyn as above. Possible healthcare associated pneumonia: CXR 4/3 with bilateral small areas of pneumonia. Will trend PCT, CRP. Vanc, zosyn as above. Hyperglycemia: continue insulin SS. Hypertension: not hypertensive. He is currently requiring low dose pressors. History of aortic stenosis: He will follow up with cardiology as outpatient for routine echocardiogram.  History of essential thrombocythemia-previously on hydroxyurea which he is not on currently. Will resume follow up with hematology on discharge. DVT prophylaxis: SCD initially, surgery ok to start heparin ppx  Disposition: Home vs short-term SNF once medically stable. Diet Diet NPO Exceptions are: Ice Chips   DVT Prophylaxis [] Lovenox, []  Heparin, [x] SCDs, [] Ambulation,  [] Eliquis, [] Xarelto  [] Coumadin   Code Status Full Code   Disposition From: Home  Expected Disposition: Home  Estimated Date of Discharge: 4/7/22  Patient requires continued admission due to ischemic colitis versus infectious/inflammatory colitis   Surrogate Decision Maker/ POA -     Subjective:     Chief Complaint: GI Problem (dark stool, colostomy, on eliquis)     Connie Schneider is a 80 y.o. male who presents with partial small bowel obstruction and ischemic versus inflammatory/infectious colitis. Has some soreness in his abdomen. Otherwise feels pretty well. Review of Systems:    Review of Systems    10 point review of systems negative other than the above    Objective:        Intake/Output Summary (Last 24 hours) at 4/4/2022 0750  Last data filed at 4/4/2022 0629  Gross per 24 hour   Intake 2140 ml   Output 870 ml   Net 1270 ml Vitals:   Vitals:    04/04/22 0715   BP:    Pulse: 75   Resp:    Temp:    SpO2:        Physical Exam:   General: NAD  Eyes: EOMI  ENT: neck supple, NG tube in place   Cardiovascular:regular rate and rhythm, no murmurs gallops or rubs  Respiratory: Clear to auscultation  Gastrointestinal: Soft, BS present, mild tenderness to palpation, no guarding, ileostomy noted, no output in bag. Genitourinary: no suprapubic tenderness, Weaver in place  Musculoskeletal: No edema  Skin: warm, dry  Neuro: Alert. Psych: Mood appropriate.      Medications:   Medications:    vancomycin (VANCOCIN) intermittent dosing (placeholder)   Other RX Placeholder    piperacillin-tazobactam  2,250 mg IntraVENous Q8H    insulin lispro  0-6 Units SubCUTAneous TID WC    insulin lispro  0-3 Units SubCUTAneous Nightly    adalimumab  40 mg SubCUTAneous Once    [Held by provider] apixaban  2.5 mg Oral BID    [Held by provider] metoprolol tartrate  25 mg Oral BID    [Held by provider] aspirin  81 mg Oral Daily    sodium chloride flush  5-40 mL IntraVENous 2 times per day    pantoprazole  40 mg IntraVENous Daily    metroNIDAZOLE  500 mg IntraVENous Q8H      Infusions:    norepinephrine      IV infusion builder 125 mL/hr at 04/04/22 0106    dextrose      dilTIAZem (CARDIZEM) 100 mg in dextrose 5% 100 mL ([facility]-[place]) Stopped (04/03/22 1350)     PRN Meds: morphine, 1 mg, Q2H PRN  morphine, 2 mg, Q2H PRN  acetaminophen, 650 mg, Q4H PRN  glucose, 15 g, PRN  glucagon (rDNA), 1 mg, PRN  dextrose, 100 mL/hr, PRN  dextrose bolus (hypoglycemia), 125 mL, PRN   Or  dextrose bolus (hypoglycemia), 250 mL, PRN  sodium chloride flush, 5-40 mL, PRN  ondansetron, 4 mg, Q8H PRN   Or  ondansetron, 4 mg, Q6H PRN        Labs      Recent Results (from the past 24 hour(s))   CBC with Auto Differential    Collection Time: 04/03/22  6:30 PM   Result Value Ref Range    WBC 33.2 (HH) 4.0 - 10.5 K/CU MM    RBC 2.90 (L) 4.6 - 6.2 M/CU MM    Hemoglobin 10.5 (L) 13.5 - 18.0 GM/DL    Hematocrit 32.2 (L) 42 - 52 %    .0 (H) 78 - 100 FL    MCH 36.2 (H) 27 - 31 PG    MCHC 32.6 32.0 - 36.0 %    RDW 20.5 (H) 11.7 - 14.9 %    Platelets 288 690 - 961 K/CU MM    MPV 9.3 7.5 - 11.1 FL    Bands Relative 6 5 - 11 %    Segs Relative 91.0 (H) 36 - 66 %    Lymphocytes % 2.0 (L) 24 - 44 %    Monocytes % 1.0 0 - 4 %    Bands Absolute 1.99 K/CU MM    Segs Absolute 30.2 K/CU MM    Lymphocytes Absolute 0.7 K/CU MM    Monocytes Absolute 0.3 K/CU MM    Differential Type MANUAL DIFFERENTIAL     Anisocytosis 1+    Basic Metabolic Panel    Collection Time: 04/03/22  6:30 PM   Result Value Ref Range    Sodium 129 (L) 135 - 145 MMOL/L    Potassium 4.4 3.5 - 5.1 MMOL/L    Chloride 99 99 - 110 mMol/L    CO2 17 (L) 21 - 32 MMOL/L    Anion Gap 13 4 - 16    BUN 27 (H) 6 - 23 MG/DL    CREATININE 1.5 (H) 0.9 - 1.3 MG/DL    Glucose 230 (H) 70 - 99 MG/DL    Calcium 7.1 (L) 8.3 - 10.6 MG/DL    GFR Non- 44 (L) >60 mL/min/1.73m2    GFR  53 (L) >60 mL/min/1.73m2   Magnesium    Collection Time: 04/03/22  6:30 PM   Result Value Ref Range    Magnesium 1.8 1.8 - 2.4 mg/dl   POCT Glucose    Collection Time: 04/03/22  9:59 PM   Result Value Ref Range    POC Glucose 245 (H) 70 - 99 MG/DL   CBC with Auto Differential    Collection Time: 04/04/22  4:20 AM   Result Value Ref Range    WBC 28.4 (H) 4.0 - 10.5 K/CU MM    RBC 2.72 (L) 4.6 - 6.2 M/CU MM    Hemoglobin 9.6 (L) 13.5 - 18.0 GM/DL    Hematocrit 29.7 (L) 42 - 52 %    .2 (H) 78 - 100 FL    MCH 35.3 (H) 27 - 31 PG    MCHC 32.3 32.0 - 36.0 %    RDW 20.8 (H) 11.7 - 14.9 %    Platelets 013 560 - 358 K/CU MM    MPV 9.6 7.5 - 11.1 FL    Differential Type AUTOMATED DIFFERENTIAL     Segs Relative 90.4 (H) 36 - 66 %    Lymphocytes % 1.0 (L) 24 - 44 %    Monocytes % 6.1 (H) 0 - 4 %    Eosinophils % 0.1 0 - 3 %    Basophils % 0.2 0 - 1 %    Segs Absolute 25.7 K/CU MM    Lymphocytes Absolute 0.3 K/CU MM    Monocytes Absolute 1.7 K/CU MM Eosinophils Absolute 0.0 K/CU MM    Basophils Absolute 0.1 K/CU MM    Nucleated RBC % 0.0 %    Total Nucleated RBC 0.0 K/CU MM    Total Immature Neutrophil 0.62 K/CU MM    Immature Neutrophil % 2.2 (H) 0 - 0.43 %   Basic Metabolic Panel    Collection Time: 04/04/22  4:20 AM   Result Value Ref Range    Sodium 131 (L) 135 - 145 MMOL/L    Potassium 4.4 3.5 - 5.1 MMOL/L    Chloride 99 99 - 110 mMol/L    CO2 19 (L) 21 - 32 MMOL/L    Anion Gap 13 4 - 16    BUN 31 (H) 6 - 23 MG/DL    CREATININE 1.8 (H) 0.9 - 1.3 MG/DL    Glucose 229 (H) 70 - 99 MG/DL    Calcium 7.6 (L) 8.3 - 10.6 MG/DL    GFR Non-African American 35 (L) >60 mL/min/1.73m2    GFR  43 (L) >60 mL/min/1.73m2   Magnesium    Collection Time: 04/04/22  4:20 AM   Result Value Ref Range    Magnesium 2.3 1.8 - 2.4 mg/dl   Phosphorus    Collection Time: 04/04/22  4:20 AM   Result Value Ref Range    Phosphorus 4.4 2.5 - 4.9 MG/DL   Lactic Acid    Collection Time: 04/04/22  4:20 AM   Result Value Ref Range    Lactate 1.5 0.4 - 2.0 mMOL/L   Hepatic Function Panel    Collection Time: 04/04/22  4:20 AM   Result Value Ref Range    Albumin 2.2 (L) 3.4 - 5.0 GM/DL    Total Bilirubin 0.6 0.0 - 1.0 MG/DL    Bilirubin, Direct 0.4 (H) 0.0 - 0.3 MG/DL    Bilirubin, Indirect 0.2 0 - 0.7 MG/DL    Alkaline Phosphatase 64 40 - 129 IU/L    AST 12 (L) 15 - 37 IU/L    ALT 13 10 - 40 U/L    Total Protein 4.0 (L) 6.4 - 8.2 GM/DL   Lipase    Collection Time: 04/04/22  4:20 AM   Result Value Ref Range    Lipase 28 13 - 60 IU/L   SPECIMEN REJECTION    Collection Time: 04/04/22  4:20 AM   Result Value Ref Range    Rejected Test PTPTT     Reason for Rejection UNABLE TO PERFORM TESTING:    Protime/INR & PTT    Collection Time: 04/04/22  4:45 AM   Result Value Ref Range    Protime 19.6 (H) 11.7 - 14.5 SECONDS    INR 1.51 INDEX    aPTT 29.9 25.1 - 37.1 SECONDS   POCT Glucose    Collection Time: 04/04/22  5:12 AM   Result Value Ref Range    POC Glucose 216 (H) 70 - 99 MG/DL Imaging/Diagnostics Last 24 Hours   XR ABDOMEN (KUB) (SINGLE AP VIEW)    Result Date: 4/3/2022  EXAMINATION: ONE SUPINE XRAY VIEW(S) OF THE ABDOMEN 4/3/2022 11:28 am COMPARISON: 03/24/2022 x-ray. CT from 03/21/2022. HISTORY: ORDERING SYSTEM PROVIDED HISTORY: Bowel obstruction, S/P remote colostomy. TECHNOLOGIST PROVIDED HISTORY: Portable supine x-ray at 0500 please. Reason for exam:->bowel obstruction S/P remote colostomy Reason for Exam: Bowel obstruction S/P remote colostomy. FINDINGS: There is an enteric tube with its tip projecting over the fundus of the stomach. Proximal port is at the GE junction. Right upper quadrant cholecystectomy clips. Air-filled distended loops of small bowel in the mid abdomen and stacked configuration. There is also gas noted within the colon. Findings suggest a low-grade partial small bowel obstruction versus ileus. Atherosclerotic calcifications associated with an aortic aneurysm. Multiple air-filled distended loops of small bowel stacked in the mid abdomen suggesting a low-grade partial small bowel obstruction or focal ileus. Atherosclerotic calcification of an abdominal aortic aneurysm as seen on prior CT. NG tube in the stomach with proximal port at the level of the GE junction. CT ABDOMEN PELVIS W IV CONTRAST Additional Contrast? None    Result Date: 4/2/2022  EXAMINATION: CT OF THE ABDOMEN AND PELVIS WITH CONTRAST 4/2/2022 1:36 pm TECHNIQUE: CT of the abdomen and pelvis was performed with the administration of intravenous contrast. Multiplanar reformatted images are provided for review. Dose modulation, iterative reconstruction, and/or weight based adjustment of the mA/kV was utilized to reduce the radiation dose to as low as reasonably achievable. COMPARISON: 03/21/2022 HISTORY: ORDERING SYSTEM PROVIDED HISTORY: Abdomen pain.  TECHNOLOGIST PROVIDED HISTORY: Reason for exam:->Abd pain Additional Contrast?->None Decision Support Exception - unselect if not a suspected or confirmed emergency medical condition->Emergency Medical Condition (MA) Reason for Exam: Abdomen pain. Additional signs and symptoms: States \"ongoing abd pain that comes and goes through winter\", 80 ml Isovue 370 FINDINGS: Lower Chest: There are bilateral layering pleural effusions. Cardiomegaly is noted. Severe coronary artery atherosclerotic calcifications. Organs: Numerous scattered hypoattenuating lesions noted throughout the liver likely representing hepatic cysts. The largest of the lesions demonstrate fluid attenuation consistent with cysts measuring up to 25 mm in diameter. No evidence of enhancing hepatic lesion or biliary ductal dilatation. Portal vein is patent. Status post cholecystectomy. The spleen, pancreas and adrenal glands demonstrate no acute abnormality. The kidneys enhance symmetrically without evidence of hydronephrosis. There is a 19 mm exophytic lesion off of the lower pole of the left kidney with Hounsfield unit attenuation of approximately 30. This still likely represents a renal cyst (Bosniak category 2). This is been present since 2018. Stable small bilateral parapelvic cysts. GI/Bowel: Stomach and duodenal sweep demonstrate no acute abnormality. There is a left lower quadrant colostomy present. Postsurgical changes status post partial left colectomy. There is circumferential wall thickening of the colon proximal to the level of the colostomy with pericolonic fat stranding and inflammatory changes. There is dilatation of the proximal colon as well with wall thickening inflammatory changes extending up to the hepatic flexure. Changes are new since prior examination suggesting acute infectious/inflammatory colitis. There are multiple distended fluid-filled loops of small bowel throughout the abdomen and pelvis as well new since prior examination suggesting ileus. Pelvis: Status post prostatectomy. Bladder is unremarkable. Weaver catheter is noted. Peritoneum/Retroperitoneum: No evidence of ascites or free air. No evidence of lymphadenopathy. There is a stable infrarenal abdominal aortic aneurysm measuring approximately 5.5 cm in diameter with crescentic mural thrombus. Extensive atherosclerotic disease of the aorta and branch vessels. Bones/Soft Tissues:  Age related degenerative changes of the visualized osseous structures without focal destructive lesion. Interval development of severe circumferential wall thickening and pericolonic inflammatory changes of the transverse colon and splenic flexure of the colon just proximal to the left lower quadrant colostomy with increased fluid-filled distension of the proximal colon and small bowel. Findings suggest acute infectious/inflammatory colitis with proximal partial obstruction versus ileus. Stable infrarenal abdominal aortic aneurysm measuring 5.5 cm in diameter. See follow-up recommendations below. Stable hepatic cysts and bilateral renal cortical cysts. Cardiomegaly with bilateral pleural effusions suggesting mild CHF. RECOMMENDATIONS: 5.5 cm infrarenal abdominal aortic aneurysm. Recommend referral to a vascular specialist. Reference: J Am Abad Radiol 6588;[id number]. XR CHEST PORTABLE    Result Date: 4/2/2022  EXAMINATION: ONE XRAY VIEW OF THE CHEST 4/2/2022 10:37 pm COMPARISON: 04/02/2022 HISTORY: ORDERING SYSTEM PROVIDED HISTORY: ng tube placement TECHNOLOGIST PROVIDED HISTORY: Reason for exam:->ng tube placement Reason for Exam: ng tube placement Additional signs and symptoms: ng tube placement FINDINGS: Enteric tube tip is noted within the stomach with the side port likely at or just distal to the GE junction. Small left pleural effusion. Bibasilar opacities. Stable cardiac silhouette. No overt pulmonary edema. No pneumothorax. The osseous structures are stable. Enteric tube tip is noted within the stomach with the side port likely at or just distal to the GE junction.      XR CHEST PORTABLE    Result Date: 4/2/2022  EXAMINATION: ONE XRAY VIEW OF THE CHEST 4/2/2022 2:52 pm COMPARISON: 03/20/2022 HISTORY: ORDERING SYSTEM PROVIDED HISTORY: chest pain TECHNOLOGIST PROVIDED HISTORY: Reason for exam:->chest pain Reason for Exam: abdominall discomfort Additional signs and symptoms: none Relevant Medical/Surgical History: CAD, AAA FINDINGS: Trace left pleural effusion with adjacent atelectasis. Stable cardiomediastinal silhouette. No other focal consolidation. No overt pulmonary edema. No pneumothorax. The osseous structures otherwise stable. Trace left pleural effusion with adjacent atelectasis.        Electronically signed by Logan Franco MD on 4/4/2022 at 7:50 AM

## 2022-04-04 NOTE — CONSULTS
Nephrology Service Consultation    Patient:  Tarah Cunha  MRN: [id number]  Consulting physician:  Yariel Morales MD  Reason for Consult:  acute renal failure    History Obtained From:  patient, electronic medical record  PCP: Delmy Newsome MD    HISTORY OF PRESENT ILLNESS:   The patient is a 80 y.o. male who presents with  weakness abdominal pain. Patient history of hypertension atrial fibrillation prior bowel obstruction. Has underlying colostomy as well. Noted to have dark stools recently. On anticoagulation as well. Treated for tachycardia in the emergency room but noted evidence of possible recurrent bowel obstruction. Taken to the OR yesterday tolerated surgery well has a new ileostomy in place.   The above setting started on IV antibiotics for possible sepsis was already on anticoagulation for A. fib rapid rate now noted to have acute renal failure and above setting and was having history of rectal cancer in the past as well as rheumatoid arthritis in the past.  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    Gastroesophageal reflux disease without esophagitis 05/21/2020    Hx of cardiovascular stress test 08/12/2021    EF 62% Normal study    Hypertension     Partial small bowel obstruction ([address] 3/23/2022    Primary malignant neoplasm of rectum ([address] 08/17/2019    Psoriasis     Psoriatic arthritis ([address] 08/17/2019    Pyelonephritis 03/24/2018    Stable angina ([address] 08/17/2019    Unspecified cerebral artery occlusion with cerebral infarction        Past Surgical History:        Procedure Laterality Date    APPENDECTOMY      COLONOSCOPY N/A 3/2/2022 COLONOSCOPY POLYPECTOMY REMOVAL ABLATION/STOMA with EMR and placed 7 hemoclips. performed by Aby Thorne MD at [address] [facility] N/A 4/3/2022    LAPAROTOMY EXPLORATORY COLON RESECTION performed by Becky Roa MD at [address] N/A 2/28/2022    ENTEROSCOPY PUSH CONTROL HEMORRHAGE WITH APC ABLATION OF AVM performed by Aby Thorne MD at [facility] ENDOSCOPY       Medications:   Scheduled Meds:   vancomycin (VANCOCIN) intermittent dosing (placeholder)   Other RX Placeholder    piperacillin-tazobactam  2,250 mg IntraVENous Q8H    heparin (porcine)  5,000 Units SubCUTAneous 3 times per day    insulin lispro  0-6 Units SubCUTAneous TID WC    insulin lispro  0-3 Units SubCUTAneous Nightly    adalimumab  40 mg SubCUTAneous Once    [Held by provider] apixaban  2.5 mg Oral BID    [Held by provider] metoprolol tartrate  25 mg Oral BID    [Held by provider] aspirin  81 mg Oral Daily    sodium chloride flush  5-40 mL IntraVENous 2 times per day    pantoprazole  40 mg IntraVENous Daily    metroNIDAZOLE  500 mg IntraVENous Q8H     Continuous Infusions:   norepinephrine 2 mcg/min (04/04/22 1443)    PN-Adult Premix  4.25/10 - Standard Electrolytes - Peripheral Line      IV infusion builder 125 mL/hr at 04/04/22 0752    dextrose      dilTIAZem (CARDIZEM) 100 mg in dextrose 5% 100 mL ([facility]) Stopped (04/03/22 1350)     PRN Meds:.morphine, morphine, acetaminophen, glucose, glucagon (rDNA), dextrose, dextrose bolus (hypoglycemia) **OR** dextrose bolus (hypoglycemia), sodium chloride flush, ondansetron **OR** ondansetron    Allergies:  Sulfa antibiotics    Social History:   TOBACCO:   reports that he quit smoking about 49 years ago. His smoking use included cigarettes. He has a 60.00 pack-year smoking history. He has never used smokeless tobacco.  ETOH:   reports no history of alcohol use.   OCCUPATION:      Family History:       Problem Relation Age of Onset   Alexys Bird Cancer Mother     Cancer Father        REVIEW OF SYSTEMS:  Negative except for  we could all more pain status post surgery. Physical Exam:    I/O: 04/03 0701 - 04/04 0700  In: 2140 [I.V.:1800]  Out: 870 [Urine:670]    Vitals: /63   Pulse 85   Temp 98.5 °F (36.9 °C) (Oral)   Resp 16   Ht 5' 9\" (1.753 m)   Wt 172 lb (78 kg)   SpO2 99%   BMI 25.40 kg/m²   General appearance: awake weak  HEENT: Head: Normal, normocephalic, atraumatic. Neck: supple, symmetrical, trachea midline  Lungs: diminished breath sounds bilaterally  Heart: S1, S2 normal  Abdomen: abnormal findings:   status post abdominal surgery decreased bowel sounds  Extremities: edema trace  Neurologic: Mental status: alertness: alert      CBC:   Recent Labs     04/03/22  0433 04/03/22  1830 04/04/22  0420   WBC 35.1* 33.2* 28.4*   HGB 9.9* 10.5* 9.6*    421 374     BMP:    Recent Labs     04/03/22  0433 04/03/22  1830 04/04/22  0420   * 129* 131*   K 4.2 4.4 4.4   CL 98* 99 99   CO2 20* 17* 19*   BUN 22 27* 31*   CREATININE 1.3 1.5* 1.8*   GLUCOSE 126* 230* 229*     Hepatic:   Recent Labs     04/02/22  1112 04/04/22  0420   AST 20 12*   ALT 13 13   BILITOT 0.5 0.6   ALKPHOS 121 64     Troponin: No results for input(s): TROPONINI in the last 72 hours. BNP: No results for input(s): BNP in the last 72 hours. Lipids: No results for input(s): CHOL, HDL in the last 72 hours.     Invalid input(s): LDLCALCU  ABGs: No results found for: PHART, PO2ART, BVS8YFC  INR:   Recent Labs     04/02/22  1112 04/04/22  0445   INR 1.39 1.51     Renal Labs  Albumin:    Lab Results   Component Value Date    LABALBU 2.2 04/04/2022     Calcium:    Lab Results   Component Value Date    CALCIUM 7.6 04/04/2022     Phosphorus:    Lab Results   Component Value Date    PHOS 4.4 04/04/2022     U/A:    Lab Results   Component Value Date    NITRU POSITIVE 04/02/2022    COLORU YELLOW 04/02/2022    WBCUA 11 04/02/2022    RBCUA 1 04/02/2022 MUCUS FEW 04/02/2022    TRICHOMONAS NONE SEEN 03/20/2022    BACTERIA OCCASIONAL 04/02/2022    CLARITYU CLEAR 04/02/2022    SPECGRAV 1.015 04/02/2022    UROBILINOGEN 0.2 04/02/2022    BILIRUBINUR NEGATIVE 04/02/2022    BLOODU MODERATE 04/02/2022    KETUA SMALL 04/02/2022     ABG:  No results found for: PHART, CTY2UWO, PO2ART, HBU9TPB, BEART, THGBART, WAS2GYY, M1IQDUET  HgBA1c:  No results found for: LABA1C  Microalbumen/Creatinine ratio:  No components found for: RUCREAT  TSH:  No results found for: TSH  IRON:    Lab Results   Component Value Date    IRON 35 03/01/2022     Iron Saturation:  No components found for: PERCENTFE  TIBC:    Lab Results   Component Value Date    TIBC 280 03/01/2022     FERRITIN:    Lab Results   Component Value Date    FERRITIN 210 03/01/2022     RPR:  No results found for: RPR  SAQIB:  No results found for: ANATITER, SAQIB  24 Hour Urine for Creatinine Clearance:  No components found for: CREAT4, UHRS10, UTV10  -----------------------------------------------------------------      Assessment and Recommendations     Patient Active Problem List   Diagnosis Code    Hypertension I10    Benign prostatic hyperplasia N40.0    P[facility] arthritis [facility] L40.50    Primary malignant neoplasm of rectum ([facility] C20    Psoriasis L40.9    Chronic myeloproliferative disease ([facility] D47.1    Monocytosis (symptomatic) X56.221    Gastroesophageal reflux disease without esophagitis K21.9    Urinary retention R33.9    H/O: CVA (cerebrovascular accident) Z80.78    Irregular heart beat I49.9    Nonrheumatic aortic valve stenosis I35.0    Paroxysmal atrial fibrillation (HCC) I48.0    GI bleed K92.2    Melena K92.1    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 fib rapid rate   #6 history of rectal cancer     plan   #1 postop day #1 from surgery monitor with surgical recommendations and possible need for TPN or nutrition options   #2 renal function monitor post surgery maintain on antibiotics and gentle hydration   #3 maintain antibiotic therapy monitor dosing in the setting of renal failure   #4 monitor hemoglobin and above setting   #5 follow-up cardiology recs recommend holding anticoagulation for now   #6 monitor prior cancer in the setting   renal to follow monitor closely no acute dialysis needs for now  Electronically signed by Misha Mendes MD on 4/4/2022 at 4:26 PM

## 2022-04-04 NOTE — PROGRESS NOTES
Physician Progress Note      PATIENT:               Natalie Jarvis  CSN #:                  [id number]  :                       1929  ADMIT DATE:       2022 11:02 AM  DISCH DATE:  RESPONDING  PROVIDER #:        Fredy Johnson          QUERY TEXT:    Pt admitted with abdominal pain . Noted documentation of sepsis In ED   Provider Note. If possible, please document in progress notes and discharge   summary:    The medical record reflects the following:  Risk Factors: Colitis, ischemic bowel, UTI, Hx of myeloproliferative disease,   poss PNA. Clinical Indicators: ED Provider Note \"marked leukocytosis with concern for   sepsis\", WBC 26.9 - 35.1, T 98.8 - 97.8, P - 149 (afib) - 71, R 18 - 39, urine   Cx on  \"GRAM NEGATIVE TERRELL >100,000 CFU/ml\", ARF w ATN per Dr. Reji Santana on    progress note. Espiridion Balsam in  progress note \"Overnight patient   with low blood pressure, levophed gtt started. IVF changed to add bicarb,   nephrology consult this am. Patient with possible pneumonia noted on CXR. \"  Treatment: IV Zosyn, IV Vanc, IV Levophed, IV Flagyl, CBC, 0.9%  Ml   bolus in ED. Thank you,  José Luis Fernandez RN, [facility]  [phone]  Options provided:  -- Sepsis confirmed present on admission  -- Sepsis confirmed not present on admission  -- Sepsis ruled out  -- Other - I will add my own diagnosis  -- Disagree - Not applicable / Not valid  -- Disagree - Clinically unable to determine / Unknown  -- Refer to Clinical Documentation Reviewer    PROVIDER RESPONSE TEXT:    The diagnosis of sepsis was confirmed as present on admission.     Query created by: Harris Huggins on 2022 9:09 AM      Electronically signed by:  Fredy Johnson 2022 4:26 PM

## 2022-04-04 NOTE — OP NOTE
[address], [address] [facility]                                OPERATIVE REPORT    PATIENT NAME: Elio Diego                       :        1929  MED REC NO:   [id number]                          ROOM:       2104  ACCOUNT NO:   [de-identified]                           ADMIT DATE: 2022  PROVIDER:     Ailin Ellison MD    DATE OF PROCEDURE:  2022    ATTENDING ON RECORD:  Ailin Ellison MD    PRE-PROCEDURE DIAGNOSES:  Colitis and acute abdomen. POSTOPERATIVE DIAGNOSES:  Colitis and acute abdomen. PROCEDURES PERFORMED:  1. Exploratory laparotomy. 2.  Subtotal colon resection. 3.  End ileostomy. 4.  Removal of colostomy. 5.  Small bowel resection  6. Mobilization of the splenic flexure  7. Central line placement by anesthesia providers. ANESTHESIA:  General endotracheal anesthesia. DRAINS/LINES:  Included a right internal jugular triple-lumen catheter. The patient came to the operating room with a Weaver catheter in place as  well as a nasogastric tube in place. ESTIMATED BLOOD LOSS:  200 mL. COMPLICATIONS:  None. FINDINGS:  The patient had a very distended and thick, boggy colon that  was very edematous and very firm. There was fibrinous debris and  exudate along the mesenteric border of the cecum, and there was small  bowel that was densely adherent into the pelvis. PERTINENT HISTORY:  This is a 70-year-old gentleman who had a rectal and  colon resection approximately 20 years ago with an abdominoperineal  resection for rectal carcinoma. Over the last month, he has had three  admissions. He was admitted with acute gastrointestinal blood loss as  well as small bowel obstruction. These resolved and he was discharged  home and then had an episode of severe significant diarrhea where he  stated his colostomy bag filled five times in one evening.   He states  then the output started to become black in coloration. He was getting  very weak and then started having severe abdominal pain and presented to  the emergency department. Once in the hospital, his imaging showed  severe circumferential wall thickening and pericolonic inflammatory  changes of the transverse colon and splenic flexure of the colon, just  proximal to the left lower quadrant colostomy with increased fluid  filled distention of the proximal colon and small bowel. He had a white  blood cell count of 26.9. He was admitted to the hospital and treated  with IV hydration, IV antibiotics, and nasogastric decompression. On  hospital day #2, his white blood cell count increased to 35,000. He is  complaining of more abdominal pain. When I examined him, he did have  findings of an acute abdomen as he screamed out when I touched his  abdomen. You could see the bowel outlined on his abdominal wall. I  discussed with him the options of surgery versus comfort care, and he  did want to proceed with surgery. Discussed with him the risks of  surgery including but not limited to risk of bleeding, risk of  infection, risk of scarring, risk of seroma or fluid collection, risk of  failure of treatment, specifically talked with him about the risk of  death or heart attack. He stated understanding to this and was  agreeable to proceeding with surgery. We did discuss removing his  current colostomy and creating a stoma on the other side of his abdomen. I discussed with him this may be colon or this may be small intestine. He stated understanding and wanted to proceed. DESCRIPTION OF PROCEDURE:  The patient was brought to the operating  suite and laid supine on the operating table. Bilateral lower extremity  compression boots were placed and general endotracheal anesthesia was  provided per Anesthesiology. The patient already had a nasogastric tube  in place and already had a Weaver catheter in place.   His left lower  quadrant sutures and the right colic vessels were  doubly ligated with 2-0 silk sutures. I did contemplate trying to save  a portion of the proximal colon to make a colostomy; however, the cecum  itself was also moderately dilated, was very edematous and had the  fibrinous exudative material on it, so I continued to completely  mobilize the right colon down to the terminal ileum. I transected the  bowel using a ETHAN stapling device with a blue tissue load to transect  the terminal ileum, and I passed off the colon as specimen marking the  most distal end with a silk suture. I then copiously irrigated the  abdomen with warm saline solution and siphoned the fluid free. As I  went to mobilize the terminal ileum, the bowel just proximal to this was  densely adherent into the pelvis, did take some time to completely  mobilize this bowel as this was very hard and went all the way down into  the recess where the rectum used to be. Once I mobilized this, there  was a tear in the bowel and so I did do a small bowel resection using  the ETHAN stapling device with blue tissue load, then I ran the entire  small bowel from distal to proximal and there did not appear to be any  additional adhesions or obstruction. The bowel was pink and viable,  mildly distended but very soft, so I again irrigated the abdomen with  warm saline solution, siphoned the fluid free. Next, I identified an  area in the right lower quadrant for the end ileostomy. I made a  circular incision in the skin and a cruciate incision in the fascia and  delivered the small bowel through this. I then secured the small bowel  to the skin with 4-0 Vicryl suture x2. Then, with the intention to the  left lower quadrant colostomy, I reduced the parastomal hernia again  stapling off the colon at the peritoneum.   Once I reduced the parastomal  hernia and passed that portion of colon off the field as specimen, I  then closed the posterior sheath and peritoneum with figure-of-eight  stitches of 0 Vicryl suture. At this time, we all changed our gloves,  again irrigated the abdomen, siphoned the fluid free and inspected the  mesentery and this all appeared to be hemostatic. Then, I palpated the  nasogastric tube within the stomach. After obtaining correct sponge,  instrument, and needle count, I reapproximated the midline with a #1  Prolene suture in a running stitch x2, placed a 3-0 Vicryl suture in  subcutaneous tissue and closed the midline with staples, placed a dry  sterile dressing on the midline. I then removed the left lower quadrant  colostomy at the skin level using Bovie electrocautery and going  circumferentially around the colostomy and carrying this to the  subcutaneous tissue and completely removing this area and passing this  off the field as specimen. The wound was irrigated. I then closed the  anterior sheath with a #1 Prolene suture in a running stitch. I then  loosely reapproximated the skin edge and placed quarter-inch iodoform  packing within the staple line. Next, in the right lower quadrant, I  matured the end ileostomy in [place] fashion with 4-0 Vicryl sutures. There was good height to the stoma, it was pink and viable, and the  ostomy appliance was applied. The patient was then transferred to the  intensive care unit and was intubated but upon arrival in the intensive  care unit, he was breathing spontaneously and so he was extubated  without difficulty. I did speak with the patient's family  postoperatively, talked with them about the intraoperative findings and  the hospital plan moving forward and expectations. All their questions  were answered. Velna Koyanagi, MD    D: 04/04/2022 13:36:44       T: 04/04/2022 13:43:56     [email or web address]  Job#: [id number]     Doc#: [id number]    CC:   MD Dalia Lopez MD Lesa Flow, MD

## 2022-04-04 NOTE — PROGRESS NOTES
Overnight patient with low blood pressure, levophed gtt started. IVF changed to add bicarb, nephrology consult this am. Patient with possible pneumonia noted on CXR, UTI with >100,000 gram negative sahra and admitted with severe colitis. Currently on vancomycin (pharmacy dosing), zosyn (dose decreased this am with increasing acute renal failure), and flagyl. Will adjust antibiotics as cultures show sensitivities.

## 2022-04-04 NOTE — PROGRESS NOTES
Today's plan: Continue IV Levophed, continue IV bicarbonate drip, patient is requesting  pain control medications for his abdominal pain, patient althea n.p.o. at this time will agree with Lovenox for anticoagulation for his A. fib if okay with surgery because patient received 1 unit of packed RBCs intraoperatively for his subtotal colectomy and end ileostomy surgery    Admit Date:  4/2/2022    Subjective: Abdominal pain    Chief complaints on admission  Chief Complaint   Patient presents with    GI Problem     dark stool, colostomy, on eliquis         History of present illness:Denis is a 80 y. o.year old who  presents with had concerns including GI Problem (dark stool, colostomy, on eliquis). Past medical history:    has a past medical history of AAA (abdominal aortic aneurysm) ([address] Angina, class I ([address] Benign prostatic hyperplasia, Bowel obstruction ([address] CAD (coronary artery disease), Cancer ([address] CCC (chronic calculous cholecystitis), Gastroesophageal reflux disease without esophagitis, Hx of cardiovascular stress test, Hypertension, Partial small bowel obstruction ([address] Primary malignant neoplasm of rectum ([address] Psoriasis, Psoriatic arthritis ([address] Pyelonephritis, Stable angina ([address] and Unspecified cerebral artery occlusion with cerebral infarction. Past surgical history:   has a past surgical history that includes Appendectomy; colostomy; Upper gastrointestinal endoscopy (N/A, 2/28/2022); Colonoscopy (N/A, 3/2/2022); and laparotomy (N/A, 4/3/2022). Social History:   reports that he quit smoking about 49 years ago. His smoking use included cigarettes. He has a 60.00 pack-year smoking history. He has never used smokeless tobacco. He reports that he does not drink alcohol and does not use drugs. Family history:  family history includes Cancer in his father and mother.     Allergies   Allergen Reactions    Sulfa Antibiotics          Objective:   /69   Pulse 90   Temp 97.9 °F (36.6 °C) (Oral)   Resp 25   Ht 5' 9\" (1.753 m)   Wt 172 lb (78 kg)   SpO2 99%   BMI 25.40 kg/m²       Intake/Output Summary (Last 24 hours) at 4/4/2022 1404  Last data filed at 4/4/2022 1145  Gross per 24 hour   Intake 2140 ml   Output 995 ml   Net 1145 ml       TELEMETRY: A. fib    Physical Exam:  Constitutional:  Well developed, Well nourished, No acute distress, Non-toxic appearance. HENT:  Normocephalic, Atraumatic, Bilateral external ears normal, Oropharynx moist, No oral exudates, Nose normal. Neck- Normal range of motion, No tenderness, Supple, No stridor. Eyes:  PERRL, EOMI, Conjunctiva normal, No discharge. Respiratory:  Normal breath sounds, No respiratory distress, No wheezing, No chest tenderness. ,no use of accessory muscles, diaphragm movement is normal  Cardiovascular: (PMI) apex non displaced,no lifts no thrills, no s3,no s4, Normal heart rate, Normal rhythm, No murmurs, No rubs, No gallops. Carotid arteries pulse and amplitude are normal no bruit, no abdominal bruit noted ( normal abdominal aorta ausculation), femoral arteries pulse and amplitude are normal no bruit, pedal pulses are normal  GI:  Bowel sounds normal, Soft, No tenderness, No masses, No pulsatile masses. : External genitalia appear normal, No masses or lesions. No discharge. No CVA tenderness. Musculoskeletal:  Intact distal pulses, No edema, No tenderness, No cyanosis, No clubbing. Good range of motion in all major joints. No tenderness to palpation or major deformities noted. Back- No tenderness. Integument:  Warm, Dry, No erythema, No rash. Lymphatic:  No lymphadenopathy noted. Neurologic:  Alert & oriented x 3, Normal motor function, Normal sensory function, No focal deficits noted.    Psychiatric:  Affect normal, Judgment normal, Mood normal.     Medications:    vancomycin (VANCOCIN) intermittent dosing (placeholder)   Other RX Placeholder    piperacillin-tazobactam  2,250 mg IntraVENous Q8H    heparin (porcine) 5,000 Units SubCUTAneous 3 times per day    insulin lispro  0-6 Units SubCUTAneous TID     insulin lispro  0-3 Units SubCUTAneous Nightly    adalimumab  40 mg SubCUTAneous Once    [Held by provider] apixaban  2.5 mg Oral BID    [Held by provider] metoprolol tartrate  25 mg Oral BID    [Held by provider] aspirin  81 mg Oral Daily    sodium chloride flush  5-40 mL IntraVENous 2 times per day    pantoprazole  40 mg IntraVENous Daily    metroNIDAZOLE  500 mg IntraVENous Q8H      norepinephrine 3 mcg/min (04/04/22 1038)    PN-Adult Premix  4.25/10 - Standard Electrolytes - Peripheral Line      IV infusion builder 125 mL/hr at 04/04/22 0752    dextrose      dilTIAZem (CARDIZEM) 100 mg in dextrose 5% 100 mL ([facility]) Stopped (04/03/22 1350)     morphine, morphine, acetaminophen, glucose, glucagon (rDNA), dextrose, dextrose bolus (hypoglycemia) **OR** dextrose bolus (hypoglycemia), sodium chloride flush, ondansetron **OR** ondansetron    Lab Data:  CBC:   Recent Labs     04/03/22 0433 04/03/22  1830 04/04/22  0420   WBC 35.1* 33.2* 28.4*   HGB 9.9* 10.5* 9.6*   HCT 30.9* 32.2* 29.7*   .5* 111.0* 109.2*    421 374     BMP:   Recent Labs     04/03/22  0433 04/03/22  1830 04/04/22  0420   * 129* 131*   K 4.2 4.4 4.4   CL 98* 99 99   CO2 20* 17* 19*   PHOS  --   --  4.4   BUN 22 27* 31*   CREATININE 1.3 1.5* 1.8*     LIVER PROFILE:   Recent Labs     04/02/22  1112 04/04/22  0420   AST 20 12*   ALT 13 13   LIPASE 11* 28   BILIDIR  --  0.4*   BILITOT 0.5 0.6   ALKPHOS 121 64     PT/INR:   Recent Labs     04/02/22  1112 04/04/22  0445   PROTIME 18.0* 19.6*   INR 1.39 1.51     APTT:   Recent Labs     04/02/22  1112 04/04/22  0445   APTT 33.6 29.9     BNP:  No results for input(s): BNP in the last 72 hours. TROPONIN: @TROPONINI:3@      Assessment:  80 y. o.year old who is admitted for          Plan:  A. fib patient is off IV Cardizem drip off IV amiodarone drip if needed may use IV digoxin for rate control at this time  All labs, medications and tests reviewed, continue all other medications of all above medical condition listed as is.       Inga Ballard MD, MD 4/4/2022 2:04 PM

## 2022-04-05 PROBLEM — K55.9 ISCHEMIC COLITIS (HCC): Status: ACTIVE | Noted: 2022-01-01

## 2022-04-05 PROBLEM — J18.9 PNEUMONIA: Status: ACTIVE | Noted: 2022-01-01

## 2022-04-05 PROBLEM — I48.91 ATRIAL FIBRILLATION WITH RVR (HCC): Status: ACTIVE | Noted: 2022-01-01

## 2022-04-05 PROBLEM — N17.9 AKI (ACUTE KIDNEY INJURY) (HCC): Status: ACTIVE | Noted: 2022-01-01

## 2022-04-05 PROBLEM — I25.10 CAD (CORONARY ARTERY DISEASE): Status: ACTIVE | Noted: 2022-01-01

## 2022-04-05 PROBLEM — E83.51 HYPOCALCEMIA: Status: ACTIVE | Noted: 2022-01-01

## 2022-04-05 PROBLEM — I95.9 HYPOTENSION: Status: ACTIVE | Noted: 2022-01-01

## 2022-04-05 PROBLEM — D64.9 ANEMIA: Status: ACTIVE | Noted: 2022-01-01

## 2022-04-05 NOTE — PROGRESS NOTES
Hospitalist Progress Note      PCP: Anatoliy Herring MD    Date of Admission: 4/2/2022    LOS: 3    Subjective:   Overnight Events:   Uneventful overnight  Still with abdominal discomfort  NG tube in place  WBC improved from 28 down to 18 and remains afebrile          Active Hospital Problems    Diagnosis Date Noted    Atrial fibrillation with RVR ([facility] [I48.91] 04/05/2022    Hypotension [I95.9] 04/05/2022    Ischemic colitis ([address] [K55.9] 04/05/2022    Hypocalcemia [E83.51] 04/05/2022    MARIELOS (acute kidney injury) ([address] [N17.9] 04/05/2022    Probable Bacterial Pneumonia [J18.9] 04/05/2022    CAD (coronary artery disease) [I25.10] 04/05/2022    Anemia [D64.9] 04/05/2022    SBO (small bowel obstruction) ([address] [K56.609] 42/76/6248    Complicated UTI (urinary tract infection) [N39.0] 03/20/2022    Gastroesophageal reflux disease without esophagitis [K21.9] 05/21/2020       Case Summary:   42-year-old gentleman with history of BPH, CAD, GERD, hypertension, atrial fibrillation, recurrent small bowel obstruction admitted with small bowel obstruction and concern for ischemic colitis status post exploratory laparotomy for colitis and acute abdomen with subtotal colon resection with mobilization of splenic flexure, small bowel resection and end ileostomy. Is complicated by UTI, atrial fibrillation with rapid ventricular response, shock/hypotension, acute kidney injury, hypercalcemia and probable bacterial pneumonia    Principal Problem:    SBO (small bowel obstruction) ([address]): Still with abdominal discomfort. Diminished bowel sounds and no flatus or stooling. status post exploratory laparotomy for colitis and acute abdomen with subtotal colon resection with mobilization of splenic flexure, small bowel resection and end ileostomy  -Pain management  -NG tube remains in place under suction  -General surgery following with recommendation      Atrial fibrillation with RVR ([address]): Rate improved in the lower 100s.   No chest pains or shortness of breath. Patient of Cardizem and metoprolol on hold due to hypotension.  -Continue telemetry  -As needed rate control medications blood pressure lowering  -Cardiology following with recommendations      MARIELOS (acute kidney injury) [facility]): In the setting of shock. Likely septic. Patient had brief need for pressors. Has since been taken off. Blood pressure has improved some.  -Continue IV hydration.  -Monitor renal function and electrolytes  -Nephrology following with recommendation      Complicated UTI (urinary tract infection): On antibiotics. Hypotension/shock: Patient was on Levophed now stopped. Blood pressure stable. Continues on IV fluids. Monitor BP      Probable ischemic colitis: Status post bowel resection. General surgery follow recommendation. Continues antibiotics including vancomycin, Flagyl and Zosyn      Hypocalcemia: We will replete and monitor. Probable Bacterial Pneumonia: chest x-ray noted right basal infiltrate. Continue Zosyn     Active Problems:    Gastroesophageal reflux disease without esophagitis: On Protonix      CAD (coronary artery disease): With no chest pain stable. Anemia: Noted H&H trending down. Hemoglobin 7.5 today. Will monitor and transfuse as appropriate.           Medications:  Reviewed  Infusion Medications    norepinephrine Stopped (04/04/22 1814)    PN-Adult Premix  4.25/10 - Standard Electrolytes - Peripheral Line 42 mL/hr at 04/05/22 0700    IV infusion builder 60 mL/hr at 04/04/22 1856    dextrose      dilTIAZem (CARDIZEM) 100 mg in dextrose 5% 100 mL ([facility]) Stopped (04/03/22 1350)     Scheduled Medications    calcium gluconate  2,000 mg IntraVENous Once    vancomycin (VANCOCIN) intermittent dosing (placeholder)   Other RX Placeholder    piperacillin-tazobactam  2,250 mg IntraVENous Q8H    heparin (porcine)  5,000 Units SubCUTAneous 3 times per day    insulin lispro  0-6 Units SubCUTAneous TID     insulin lispro  0-3 Units SubCUTAneous Nightly    adalimumab  40 mg SubCUTAneous Once    [Held by provider] apixaban  2.5 mg Oral BID    [Held by provider] metoprolol tartrate  25 mg Oral BID    [Held by provider] aspirin  81 mg Oral Daily    sodium chloride flush  5-40 mL IntraVENous 2 times per day    pantoprazole  40 mg IntraVENous Daily    metroNIDAZOLE  500 mg IntraVENous Q8H     PRN Meds: morphine, morphine, acetaminophen, glucose, glucagon (rDNA), dextrose, dextrose bolus (hypoglycemia) **OR** dextrose bolus (hypoglycemia), sodium chloride flush, ondansetron **OR** ondansetron      DVT Prophylaxis: Subcut enoxaparin  Diet: PN-Adult Premix  4.25/10 - Standard Electrolytes - Peripheral Line  Diet NPO Exceptions are: Ice Chips, Popsicles  Code Status: Full Code    Dispo: Anticipate discharge in the next SCD    ____________________________________________________________________________    Physical Exam:  /71   Pulse 110   Temp 98.8 °F (37.1 °C) (Oral)   Resp 20   Ht 5' 9\" (1.753 m)   Wt 172 lb (78 kg)   SpO2 96%   BMI 25.40 kg/m²   General appearance: No apparent distress, appears stated age and cooperative. HEENT: Normocephalic, atraumatic, MMM, No sclera icterus/conjuctival palor. NG tube in place  Neck: Supple, no thyromegally. No jugular venous distention. Respiratory:  Normal respiratory effort. Clear to auscultation, no Rales/Wheezes/Rhonchi. Cardiovascular: S1/S2 without murmurs, rubs or gallops. RRR  Abdomen: Soft, diffuse abdominal tenderness, non-distended, bowel sounds diminished. Musculoskeletal: No clubbing, cyanosis or edema bilaterally. Skin: Skin color, texture, turgor normal.  No rashes or lesions.   Neurologic:  Cranial nerves: II-XII intact, CED, No focal sensory/motor deficits  Psychiatric: Alert and oriented, thought content appropriate  Capillary Refill: Brisk,< 3 seconds   Peripheral Pulses: +2 palpable, equal bilaterally       Intake/Output Summary (Last 24 hours) at 4/5/2022 0934  Last data filed at 4/5/2022 0700  Gross per 24 hour   Intake 1193.64 ml   Output 655 ml   Net 538.64 ml       Labs:   Recent Labs     04/03/22  1830 04/04/22  0420 04/05/22  0405   WBC 33.2* 28.4* 18.5*   HGB 10.5* 9.6* 7.4*   HCT 32.2* 29.7* 23.6*    374 257      Recent Labs     04/02/22  1112 04/03/22  0433 04/03/22  1830 04/04/22  0420 04/05/22  0405   *   < > 129* 131* 132*   K 3.7   < > 4.4 4.4 3.8   CL 97*   < > 99 99 100   CO2 21   < > 17* 19* 22   BUN 21   < > 27* 31* 35*   CREATININE 1.2   < > 1.5* 1.8* 1.8*   CALCIUM 8.2*   < > 7.1* 7.6* 6.9*   PHOS  --   --   --  4.4 3.3   AST 20  --   --  12*  --    ALT 13  --   --  13  --    BILIDIR  --   --   --  0.4*  --    BILITOT 0.5  --   --  0.6  --    ALKPHOS 121  --   --  64  --     < > = values in this interval not displayed. No results for input(s): Leanna Pace in the last 72 hours. Urinalysis:    Lab Results   Component Value Date    NITRU POSITIVE 04/02/2022    WBCUA 11 04/02/2022    BACTERIA OCCASIONAL 04/02/2022    RBCUA 1 04/02/2022    BLOODU MODERATE 04/02/2022    SPECGRAV 1.015 04/02/2022       Radiology:  XR CHEST PORTABLE   Final Result   Low lung volumes with persistent bibasilar airspace disease. Trace left pleural effusion. XR ABDOMEN (KUB) (SINGLE AP VIEW)   Final Result   1. Nasogastric tube has been advanced with the tip over the antrum. 2.  New skin staples are present at the abdomen and pelvis. 3.  Residual gas dilatation of multiple small bowel loops. There may be a   small amount of postsurgical pneumoperitoneum. 4.  See the prior CT scan for evaluation of the infrarenal aortic aneurysm. XR CHEST PORTABLE   Final Result   Right line placement as above.  Bilateral small areas of pneumonia         XR ABDOMEN (KUB) (SINGLE AP VIEW)   Final Result   Multiple air-filled distended loops of small bowel stacked in the mid abdomen   suggesting a low-grade partial small bowel obstruction or focal ileus. Atherosclerotic calcification of an abdominal aortic aneurysm as seen on   prior CT. NG tube in the stomach with proximal port at the level of the GE junction. XR CHEST PORTABLE   Final Result   Enteric tube tip is noted within the stomach with the side port likely at or   just distal to the GE junction. XR CHEST PORTABLE   Final Result   Trace left pleural effusion with adjacent atelectasis. CT ABDOMEN PELVIS W IV CONTRAST Additional Contrast? None   Final Result   Interval development of severe circumferential wall thickening and   pericolonic inflammatory changes of the transverse colon and splenic flexure   of the colon just proximal to the left lower quadrant colostomy with   increased fluid-filled distension of the proximal colon and small bowel. Findings suggest acute infectious/inflammatory colitis with proximal partial   obstruction versus ileus. Stable infrarenal abdominal aortic aneurysm measuring 5.5 cm in diameter. See follow-up recommendations below. Stable hepatic cysts and bilateral renal cortical cysts. Cardiomegaly with bilateral pleural effusions suggesting mild CHF. RECOMMENDATIONS:   5.5 cm infrarenal abdominal aortic aneurysm. Recommend referral to a vascular   specialist.      Reference: J Am [facility]da Radiol 7136;23:[id number]. Erica Palacios MD      Please excuse brevity and/or typos. This report was transcribed using voice recognition software. Every effort was made to ensure accuracy, however, inadvertent computerized transcription errors may be present.

## 2022-04-05 NOTE — PROGRESS NOTES
Comprehensive Nutrition Assessment    Type and Reason for Visit:  Initial (new PN)    Nutrition Recommendations/Plan:   · PN Recommendation: Clinimix 5/15 @ 80 ml/hr which will provide ~1649 kcal (average lipid and non lipid days) and 96 g protein  · Please obtain measured weight  · Will closely monitor lytes, TG, glucose, nutrition status, return of bowel function, weight trends, poc    Nutrition Assessment:  Pt admitted for colitis, ileus, SBO, PMH: CVA, CAD, HTN, rectal ca, POD # 2 s/p ex lap, subtotal colectomy, end ileostomy, pt currently NPO, PN running @ 75 ml/hr, will follow at high nutrition risk    Malnutrition Assessment:  Malnutrition Status:  Insufficient data    Context:  Acute Illness       Estimated Daily Nutrient Needs:  Energy (kcal):  0536-6638 (23-26 kcal/kg); Weight Used for Energy Requirements:  Current     Protein (g):  78-94 (1.0-1.2 g/kg); Weight Used for Protein Requirements:  Current        Fluid (ml/day):  6152-2910; Method Used for Fluid Requirements:  1 ml/kcal      Nutrition Related Findings:  Na 132, POC glucose 141, 133, WBC 18.5, H/H: 7.4/23.6      Wounds:  Surgical Incision       Current Nutrition Therapies:    Current Parenteral Nutrition Orders:  · Type and Formula:  (Clinimix 4.25/10)   · Lipids: 250ml (four times weekly)  · Duration: Continuous  · Rate/Volume: 75/1800  · Current PN Order Provides: ~1206 kcal (average lipid and non lipid days) and 77 g protein    Anthropometric Measures:  · Height: 5' 9.02\" (175.3 cm)  · Current Body Weight: 171 lb 15.3 oz (78 kg)   · Admission Body Weight:  (n/a)    · Usual Body Weight: 187 lb 6.3 oz (85 kg) ((10/4/21) unknown weight source)     · Ideal Body Weight: 160 lbs; % Ideal Body Weight 107.5 %   · BMI: 25.4  · BMI Categories: Overweight (BMI 25.0-29. 9)       Nutrition Diagnosis:   · Inadequate oral intake related to altered GI structure,altered GI function as evidenced by NPO or clear liquid status due to medical condition,nutrition support - parenteral nutrition    Nutrition Interventions:   Food and/or Nutrient Delivery:  Modify Parenteral Nutrition  Nutrition Education/Counseling:  No recommendation at this time   Coordination of Nutrition Care:  Continue to monitor while inpatient    Goals:  Pt will meet greater than 75% of estimated nutrient needs via PN       Nutrition Monitoring and Evaluation:   Behavioral-Environmental Outcomes:  None Identified   Food/Nutrient Intake Outcomes:  Parenteral Nutrition Intake/Tolerance,Diet Advancement/Tolerance  Physical Signs/Symptoms Outcomes:  Biochemical Data,GI Status,Hemodynamic Status,Fluid Status or Edema,Weight,Skin     Discharge Planning:     Too soon to determine     Electronically signed by Stef Griffin, MS, RD, LD on 4/5/22 at 1:52 PM EDT    Contact: [phone]

## 2022-04-05 NOTE — PROGRESS NOTES
Today's plan: Patient is off Levophed, started on TPN , , continue IV bicarbonate drip, patient is requesting  pain control medications for his abdominal pain, patient remains n.p.o. at this time will agree with Lovenox for anticoagulation for his A. fib if okay with surgery because patient received 1 unit of packed RBCs intraoperatively for his subtotal colectomy and end ileostomy surgery    Admit Date:  4/2/2022    Subjective: Abdominal pain    Chief complaints on admission  Chief Complaint   Patient presents with    GI Problem     dark stool, colostomy, on eliquis         History of present illness:Denis is a 80 y. o.year old who  presents with had concerns including GI Problem (dark stool, colostomy, on eliquis). Past medical history:    has a past medical history of AAA (abdominal aortic aneurysm) ([address] MARIELOS (acute kidney injury) ([address] Angina, class I ([address] Benign prostatic hyperplasia, Bowel obstruction ([address] CAD (coronary artery disease), Cancer ([address] CCC (chronic calculous cholecystitis), Gastroesophageal reflux disease without esophagitis, Hx of cardiovascular stress test, Hypertension, Partial small bowel obstruction ([address] Primary malignant neoplasm of rectum ([address] Probable Bacterial Pneumonia, Psoriasis, Psoriatic arthritis ([address] Pyelonephritis, Stable angina ([address] and Unspecified cerebral artery occlusion with cerebral infarction. Past surgical history:   has a past surgical history that includes Appendectomy; colostomy; Upper gastrointestinal endoscopy (N/A, 2/28/2022); Colonoscopy (N/A, 3/2/2022); and laparotomy (N/A, 4/3/2022). Social History:   reports that he quit smoking about 49 years ago. His smoking use included cigarettes. He has a 60.00 pack-year smoking history. He has never used smokeless tobacco. He reports that he does not drink alcohol and does not use drugs. Family history:  family history includes Cancer in his father and mother.     Allergies   Allergen Reactions    Sulfa Antibiotics          Objective:   /67   Pulse 107   Temp 98.8 °F (37.1 °C) (Oral)   Resp 15   Ht 5' 9\" (1.753 m)   Wt 172 lb (78 kg)   SpO2 99%   BMI 25.40 kg/m²       Intake/Output Summary (Last 24 hours) at 4/5/2022 1215  Last data filed at 4/5/2022 0700  Gross per 24 hour   Intake 1193.64 ml   Output 530 ml   Net 663.64 ml       TELEMETRY: A. fib    Physical Exam:  Constitutional:  Well developed, Well nourished, No acute distress, Non-toxic appearance. HENT:  Normocephalic, Atraumatic, Bilateral external ears normal, Oropharynx moist, No oral exudates, Nose normal. Neck- Normal range of motion, No tenderness, Supple, No stridor. Eyes:  PERRL, EOMI, Conjunctiva normal, No discharge. Respiratory:  Normal breath sounds, No respiratory distress, No wheezing, No chest tenderness. ,no use of accessory muscles, diaphragm movement is normal  Cardiovascular: (PMI) apex non displaced,no lifts no thrills, no s3,no s4, Normal heart rate, Normal rhythm, No murmurs, No rubs, No gallops. Carotid arteries pulse and amplitude are normal no bruit, no abdominal bruit noted ( normal abdominal aorta ausculation), femoral arteries pulse and amplitude are normal no bruit, pedal pulses are normal  GI:  Bowel sounds normal, Soft, No tenderness, No masses, No pulsatile masses. : External genitalia appear normal, No masses or lesions. No discharge. No CVA tenderness. Musculoskeletal:  Intact distal pulses, No edema, No tenderness, No cyanosis, No clubbing. Good range of motion in all major joints. No tenderness to palpation or major deformities noted. Back- No tenderness. Integument:  Warm, Dry, No erythema, No rash. Lymphatic:  No lymphadenopathy noted. Neurologic:  Alert & oriented x 3, Normal motor function, Normal sensory function, No focal deficits noted.    Psychiatric:  Affect normal, Judgment normal, Mood normal.     Medications:    vancomycin (VANCOCIN) intermittent dosing (placeholder)   Other RX Placeholder    piperacillin-tazobactam  2,250 mg IntraVENous Q8H    heparin (porcine)  5,000 Units SubCUTAneous 3 times per day    insulin lispro  0-6 Units SubCUTAneous TID WC    insulin lispro  0-3 Units SubCUTAneous Nightly    adalimumab  40 mg SubCUTAneous Once    [Held by provider] apixaban  2.5 mg Oral BID    [Held by provider] metoprolol tartrate  25 mg Oral BID    [Held by provider] aspirin  81 mg Oral Daily    sodium chloride flush  5-40 mL IntraVENous 2 times per day    pantoprazole  40 mg IntraVENous Daily    metroNIDAZOLE  500 mg IntraVENous Q8H      norepinephrine Stopped (04/04/22 1814)    PN-Adult Premix  4.25/10 - Standard Electrolytes - Peripheral Line 75 mL/hr at 04/05/22 1209    dextrose      dilTIAZem (CARDIZEM) 100 mg in dextrose 5% 100 mL (ADD-[place]) Stopped (04/03/22 1350)     morphine, morphine, acetaminophen, glucose, glucagon (rDNA), dextrose, dextrose bolus (hypoglycemia) **OR** dextrose bolus (hypoglycemia), sodium chloride flush, ondansetron **OR** ondansetron    Lab Data:  CBC:   Recent Labs     04/03/22  1830 04/04/22  0420 04/05/22  0405   WBC 33.2* 28.4* 18.5*   HGB 10.5* 9.6* 7.4*   HCT 32.2* 29.7* 23.6*   .0* 109.2* 111.3*    374 257     BMP:   Recent Labs     04/03/22  1830 04/04/22  0420 04/05/22  0405   * 131* 132*   K 4.4 4.4 3.8   CL 99 99 100   CO2 17* 19* 22   PHOS  --  4.4 3.3   BUN 27* 31* 35*   CREATININE 1.5* 1.8* 1.8*     LIVER PROFILE:   Recent Labs     04/04/22  0420   AST 12*   ALT 13   LIPASE 28   BILIDIR 0.4*   BILITOT 0.6   ALKPHOS 64     PT/INR:   Recent Labs     04/04/22  0445   PROTIME 19.6*   INR 1.51     APTT:   Recent Labs     04/04/22  0445   APTT 29.9     BNP:  No results for input(s): BNP in the last 72 hours. TROPONIN: @TROPONINI:3@      Assessment:  80 y. o.year old who is admitted for          Plan:  A. fib patient is off IV Cardizem drip off IV amiodarone drip if needed may use IV digoxin for rate control at this time  All labs, medications and tests reviewed, continue all other medications of all above medical condition listed as is.       Brent Lacey MD, MD 4/5/2022 12:15 PM

## 2022-04-05 NOTE — CARE COORDINATION
Intern Katherin Chino reviewed pt chart. Pt has pcp and insurance and can afford medications. Pt lives alone in a condo. PT has two sons. Pt is active with [facility]. Pt has the following DME: walker, cane, and life alert. 1040 into pt room, pt resting with eyes closed CM will return at later time. 1330 into pt room, pt children, Rosmery Willis and Daina Stephenson at bedside. Introduced self and role of CM. Verified pt still lives in his condo alone. Pt has [facility] x2 weekly. Pt does have all DME listed above. Pt has no further needs at this time. Plan at this time is home with Homer Webb.

## 2022-04-05 NOTE — TELEPHONE ENCOUNTER
----- Message from Ubaldo Chaidez, [address] sent at 4/4/2022  4:32 PM EDT -----  Call pt to schedule f/u after hospital     Spoke with daughter regarding getting father rescheduled.  Pt still in ICU. 4/5/22

## 2022-04-05 NOTE — PLAN OF CARE
Risk for impaired skin integrity will decrease  Outcome: Ongoing     Problem: Infection - Surgical Site:  Goal: Will show no infection signs and symptoms  Description: Will show no infection signs and symptoms  4/5/2022 0644 by Lenny Santana RN  Outcome: Ongoing  4/5/2022 0643 by Lenny Santana RN  Outcome: Ongoing  4/4/2022 1908 by Álvaro Maddox RN  Outcome: Ongoing     Problem:  Bowel/Gastric:  Goal: Gastrointestinal status for postoperative course will improve  Description: Gastrointestinal status for postoperative course will improve  Outcome: Ongoing     Problem: Fluid Volume:  Goal: Ability to achieve a balanced intake and output will improve  Description: Ability to achieve a balanced intake and output will improve  Outcome: Ongoing     Problem: Nutritional:  Goal: Ability to attain and maintain optimal nutritional status will improve  Description: Ability to attain and maintain optimal nutritional status will improve  Outcome: Ongoing     Problem: Physical Regulation:  Goal: Postoperative complications will be avoided or minimized  Description: Postoperative complications will be avoided or minimized  Outcome: Ongoing     Problem: Sensory:  Goal: Pain level will decrease  Description: Pain level will decrease  4/5/2022 0644 by Lenny Santana RN  Outcome: Ongoing  4/5/2022 0643 by Lenny Santana RN  Outcome: Ongoing  4/4/2022 1908 by Álvaro Maddox RN  Outcome: Ongoing

## 2022-04-05 NOTE — PROGRESS NOTES
[id number]  consulted by Dr. Aishwarya Jordan for monitoring and adjustment. Indication for treatment: Pneumonia (HAP)  Goal trough: [] 10-15 mcg/mL or [x] 15-20 mcg/ml  AUC/SUJATA: [] <500 or [x] 400-600    Pertinent Laboratory Values:   Temp Readings from Last 3 Encounters:   04/05/22 97.6 °F (36.4 °C) (Oral)   04/03/22 97.4 °F (36.3 °C)   03/25/22 98.1 °F (36.7 °C) (Oral)     Recent Labs     04/02/22  1400 04/03/22  0433 04/03/22  1830 04/04/22  0420 04/05/22  0405   WBC  --    < > 33.2* 28.4* 18.5*   LACTATE 1.2  --   --  1.5  --     < > = values in this interval not displayed. Recent Labs     04/03/22  1830 04/04/22  0420 04/05/22  0405   BUN 27* 31* 35*   CREATININE 1.5* 1.8* 1.8*     Estimated Creatinine Clearance: 26 mL/min (A) (based on SCr of 1.8 mg/dL (H)). Intake/Output Summary (Last 24 hours) at 4/5/2022 1342  Last data filed at 4/5/2022 0700  Gross per 24 hour   Intake 1193.64 ml   Output 530 ml   Net 663.64 ml       Pertinent Cultures:  Date    Source    Results  4/02   Urine    Pseudomonas  4/02   Blood    NGTD  4/03   Surgical Cx   Pending  4/03   Blood x 2   NGTD  4/05   MRSA nasal   Ordered    Vancomycin level:   TROUGH:  No results for input(s): VANCOTROUGH in the last 72 hours.   RANDOM:    Recent Labs     04/05/22  0405   VANCORANDOM 8.5       Assessment:  · SCr, BUN, and urine output:   · MARIELOS, renal trends remain elevated but stable  · Day(s) of therapy: 2  · Vancomycin concentration:  · 4/05: 8.5, appropriate to re-dose    Plan:  · Intermittent dosing based on levels due to MARIELOS  · Based on level, re-dose with 1250 mg x1  · Repeat level tomorrow AM  · Pharmacy will continue to monitor patient and adjust therapy as indicated    VANCOMYCIN CONCENTRATION SCHEDULED FOR 04/06 @ 0600    Thank you for the consult,  Breanna Sherwood [facility] [place], PharmD  4/5/2022 1:42 PM

## 2022-04-05 NOTE — PROGRESS NOTES
Pt reports better pain relief today, is now asking about being able to have food and drink. Urine output was 400cc overnight, a-fib remained below 100bpm except for brief episodes lasting no more than 10-20 seconds. Pt has been off levophed since day shift 4/4 and has been normotensive overnight. WBC decreased from 28.4k to 18.5k but hemoglobin dropped from 9.6 to 7.4 (both values from 4/4 to 4/5 0400).

## 2022-04-05 NOTE — PROGRESS NOTES
Nephrology Progress Note  4/5/2022 10:41 AM  Subjective: Interval History: Elver Cordoba is a 80 y.o. male with weak but arousable somewhat confused        Data:   Scheduled Meds:   calcium gluconate  2,000 mg IntraVENous Once    vancomycin (VANCOCIN) intermittent dosing (placeholder)   Other RX Placeholder    piperacillin-tazobactam  2,250 mg IntraVENous Q8H    heparin (porcine)  5,000 Units SubCUTAneous 3 times per day    insulin lispro  0-6 Units SubCUTAneous TID WC    insulin lispro  0-3 Units SubCUTAneous Nightly    adalimumab  40 mg SubCUTAneous Once    [Held by provider] apixaban  2.5 mg Oral BID    [Held by provider] metoprolol tartrate  25 mg Oral BID    [Held by provider] aspirin  81 mg Oral Daily    sodium chloride flush  5-40 mL IntraVENous 2 times per day    pantoprazole  40 mg IntraVENous Daily    metroNIDAZOLE  500 mg IntraVENous Q8H     Continuous Infusions:   norepinephrine Stopped (04/04/22 1814)    PN-Adult Premix  4.25/10 - Standard Electrolytes - Peripheral Line 42 mL/hr at 04/05/22 0700    IV infusion builder 60 mL/hr at 04/05/22 1020    dextrose      dilTIAZem (CARDIZEM) 100 mg in dextrose 5% 100 mL (ADD-[place]) Stopped (04/03/22 1350)         CBC   Recent Labs     04/03/22  1830 04/04/22  0420 04/05/22  0405   WBC 33.2* 28.4* 18.5*   HGB 10.5* 9.6* 7.4*   HCT 32.2* 29.7* 23.6*    374 257      BMP   Recent Labs     04/03/22  1830 04/04/22  0420 04/05/22  0405   * 131* 132*   K 4.4 4.4 3.8   CL 99 99 100   CO2 17* 19* 22   PHOS  --  4.4 3.3   BUN 27* 31* 35*   CREATININE 1.5* 1.8* 1.8*     Hepatic:   Recent Labs     04/02/22  1112 04/04/22  0420   AST 20 12*   ALT 13 13   BILITOT 0.5 0.6   ALKPHOS 121 64     Troponin: No results for input(s): TROPONINI in the last 72 hours. BNP: No results for input(s): BNP in the last 72 hours. Lipids: No results for input(s): CHOL, HDL in the last 72 hours.     Invalid input(s): LDLCALCU  ABGs: No results found for: PHART, PO2ART, SUP1USZ  INR:   Recent Labs     04/02/22  1112 04/04/22  0445   INR 1.39 1.51     Renal Labs  Albumin:    Lab Results   Component Value Date    LABALBU 2.2 04/04/2022     Calcium:    Lab Results   Component Value Date    CALCIUM 6.9 04/05/2022     Phosphorus:    Lab Results   Component Value Date    PHOS 3.3 04/05/2022     U/A:    Lab Results   Component Value Date    NITRU POSITIVE 04/02/2022    COLORU YELLOW 04/02/2022    WBCUA 11 04/02/2022    RBCUA 1 04/02/2022    MUCUS FEW 04/02/2022    TRICHOMONAS NONE SEEN 03/20/2022    BACTERIA OCCASIONAL 04/02/2022    CLARITYU CLEAR 04/02/2022    SPECGRAV 1.015 04/02/2022    UROBILINOGEN 0.2 04/02/2022    BILIRUBINUR NEGATIVE 04/02/2022    BLOODU MODERATE 04/02/2022    KETUA SMALL 04/02/2022     ABG:  No results found for: PHART, OAR5EDE, PO2ART, QDF3TPM, BEART, THGBART, AKD7CPF, M0DDCHVV  HgBA1c:  No results found for: LABA1C  Microalbumen/Creatinine ratio:  No components found for: RUCREAT  TSH:  No results found for: TSH  IRON:    Lab Results   Component Value Date    IRON 35 03/01/2022     Iron Saturation:  No components found for: PERCENTFE  TIBC:    Lab Results   Component Value Date    TIBC 280 03/01/2022     FERRITIN:    Lab Results   Component Value Date    FERRITIN 210 03/01/2022     RPR:  No results found for: RPR  SAQIB:  No results found for: ANATITER, SAQIB  24 Hour Urine for Creatinine Clearance:  No components found for: CREAT4, UHRS10, UTV10      Objective:   I/O: 04/04 0701 - 04/05 0700  In: 1193.6 [I.V.:166.2]  Out: 655 [Urine:650]  I/O last 3 completed shifts: In: 1193.6 [I.V.:166.2; NG/GT:30; IV Piggyback:487]  Out: 605 [Urine:870; Emesis/NG output:5]  No intake/output data recorded. Vitals: BP (!) 102/59   Pulse 92   Temp 98.8 °F (37.1 °C) (Oral)   Resp 14   Ht 5' 9\" (1.753 m)   Wt 172 lb (78 kg)   SpO2 98%   BMI 25.40 kg/m²  {  General appearance: awake weak  HEENT: Head: Normal, normocephalic, atraumatic.   Neck: supple, symmetrical, trachea midline  Lungs: diminished breath sounds bilaterally  Heart: S1, S2 normal  Abdomen: abnormal findings:  soft nt  Extremities: edema trace  Neurologic: Mental status: alertness: Weak        Assessment and Plan:      IMP:   #1 small bowel obstruction status post surgery   #2 acute renal failure from ATN   #3 possible colitis with leukocytosis   #4 anemia   #5 A. fib rapid rate   #6 history of rectal cancer    Plan     #1 post surgery follow-up with general surgery about when can start nutrition oral intake as patient asking for a popsicle  #2 creatinine holding 1.8 monitor for now not uremic she will likely recover in time  #3 monitor WBC count maintain on antibiotic therapy  #4 hemoglobin low stable monitor for now  #5 heart rate improved will monitor control  #6 mild confusion monitor in the above setting also related to his age will give time to recover and supportive care for now           Clyde Borja MD, MD

## 2022-04-05 NOTE — PROGRESS NOTES
gtt  2. Improved, off levophed now    PULMONARY:  PROBLEMS:  1. Bibasilar infiltrates on cxr  PLAN:  1. pulm toilet and abx    RENAL/FLUID/ELECTROLYTE:  PROBLEMS:  1. Acute renal failure  2. hypocalcemia  PLAN:  1. Dr. Demarcus Winn following, bicarb gtt stopped this am  2. Replacement, repeat labs at 4pm today    GI/NUTRITION:  PROBLEMS:  1. Severe protein malnutrition, pre-albumin 3  PLAN:  1. TPN to goal today    ID:  PROBLEMS:  1. Colitis  2. UTI  3. pneumonia  PLAN:  1. S/p colon resection, peritoneal fluid culture  2. Pseudomonas, sensitive to zosyn  3. Zosyn d#4, Flagyl d#4, vancomycin d#3    HEMATOLOGIC:  PROBLEMS:  1. Anemia  PLAN:  1. Drop in H/H this am, continue to monitor    ENDOCRINE:  PROBLEMS:  1. hyperglycemia  PLAN:  1. monitor blood glucose  2. Insulin therapy -  Sliding scale insulin  3. accucheck 4 times a day    Bowel regimen: none, has NG tube  Glucose protocol: 4 times a day glucose check, sliding scale insulin  [place]: d#4  CVC sites:  Right IJ, d#3  Ancillary consults:  Nephrology, hospitalist, cardiology, dietitian       PROPHYLAXIS:   Stress ulcer: Protonix protonix   VTE: subq heparin tid (using secondary to renal failure)    DISPOSITION:   ICU      Critical care provided for this patient of which 40 min were spend on critical care reviewing labs/films, examining patient, collaborating with other physicians, decisionmaking. There was imminent failure of an organ system which required critical intervention to prevent clinically significant progression of life threatening deterioration of the patient's condition to the point of disability or death.     Dana May MD  4/5/2022. 8:45 AM

## 2022-04-05 NOTE — PLAN OF CARE
Nutrition Problem #1: Inadequate oral intake  Intervention: Food and/or Nutrient Delivery: Modify Parenteral Nutrition  Nutritional Goals: Pt will meet greater than 75% of estimated nutrient needs via PN

## 2022-04-06 PROBLEM — B96.5 PSEUDOMONAS URINARY TRACT INFECTION: Status: ACTIVE | Noted: 2022-01-01

## 2022-04-06 NOTE — PROGRESS NOTES
Today's plan: Patient's family is not happy patient is confused and he received morphine as per the patient's family patient has allergy with the morphine, and he is also getting blood transfusion; patient is off Levophed is off Levophed,  on TPN , , patient remains n.p.o. at this time will will leave it to surgery team for anticoagulation management because he is requiring blood transfusion again if he appears to have occult blood loss and will recommend to stop both aspirin and Eliquis, he also received received 1 unit of packed RBCs intraoperatively for his subtotal colectomy and end ileostomy surgery    Admit Date:  4/2/2022    Subjective: Abdominal pain    Chief complaints on admission  Chief Complaint   Patient presents with    GI Problem     dark stool, colostomy, on eliquis         History of present illness:Denis is a 80 y. o.year old who  presents with had concerns including GI Problem (dark stool, colostomy, on eliquis). Past medical history:    has a past medical history of AAA (abdominal aortic aneurysm) ([address] MARIELOS (acute kidney injury) ([address] Angina, class I ([address] Benign prostatic hyperplasia, Bowel obstruction ([address] CAD (coronary artery disease), Cancer ([address] CCC (chronic calculous cholecystitis), Gastroesophageal reflux disease without esophagitis, Hx of cardiovascular stress test, Hypertension, Partial small bowel obstruction ([address] Primary malignant neoplasm of rectum ([address] Probable Bacterial Pneumonia, Psoriasis, Psoriatic arthritis ([address] Pyelonephritis, Stable angina ([address] and Unspecified cerebral artery occlusion with cerebral infarction. Past surgical history:   has a past surgical history that includes Appendectomy; colostomy; Upper gastrointestinal endoscopy (N/A, 2/28/2022); Colonoscopy (N/A, 3/2/2022); and laparotomy (N/A, 4/3/2022). Social History:   reports that he quit smoking about 49 years ago. His smoking use included cigarettes. He has a 60.00 pack-year smoking history. Neurologic:  Alert & oriented x 3, Normal motor function, Normal sensory function, No focal deficits noted. Psychiatric:  Affect normal, Judgment normal, Mood normal.     Medications:    metoprolol  2.5 mg IntraVENous Q6H    enoxaparin  70 mg SubCUTAneous BID    vancomycin  1,250 mg IntraVENous Once    fat emulsion  250 mL IntraVENous Once per day on Mon Tue Thu Fri    vancomycin [facility]) intermittent dosing (placeholder)   Other RX Placeholder    piperacillin-tazobactam  2,250 mg IntraVENous Q8H    insulin lispro  0-6 Units SubCUTAneous TID     insulin lispro  0-3 Units SubCUTAneous Nightly    adalimumab  40 mg SubCUTAneous Once    [Held by provider] apixaban  2.5 mg Oral BID    [Held by provider] aspirin  81 mg Oral Daily    sodium chloride flush  5-40 mL IntraVENous 2 times per day    pantoprazole  40 mg IntraVENous Daily      sodium chloride      PN-Adult Premix 4.25/10 - Peripheral Line      PN-Adult Premix  4.25/10 - Standard Electrolytes - Peripheral Line 75 mL/hr at 04/06/22 0650    sodium chloride 50 mL/hr at 04/06/22 1448    dextrose       sodium chloride, HYDROmorphone **OR** HYDROmorphone, acetaminophen, glucose, glucagon (rDNA), dextrose, dextrose bolus (hypoglycemia) **OR** dextrose bolus (hypoglycemia), sodium chloride flush, ondansetron **OR** ondansetron    Lab Data:  CBC:   Recent Labs     04/04/22 0420 04/04/22  0420 04/05/22  0405 04/05/22  1520 04/06/22  0520   WBC 28.4*  --  18.5*  --  15.3*   HGB 9.6*   < > 7.4* 7.8* 7.1*   HCT 29.7*   < > 23.6* 24.7* 22.8*   .2*  --  111.3*  --  112.3*     --  257  --  258    < > = values in this interval not displayed.      BMP:   Recent Labs     04/04/22 0420 04/04/22  0420 04/05/22  0405 04/05/22  1520 04/06/22  0520   *   < > 132* 133* 136   K 4.4   < > 3.8 3.7 3.8   CL 99   < > 100 100 103   CO2 19*   < > 22 24 23   PHOS 4.4  --  3.3  --  3.1   BUN 31*   < > 35* 37* 29*   CREATININE 1.8*   < > 1.8* 1.6* 1.3 < > = values in this interval not displayed. LIVER PROFILE:   Recent Labs     04/04/22  0420   AST 12*   ALT 13   LIPASE 28   BILIDIR 0.4*   BILITOT 0.6   ALKPHOS 64     PT/INR:   Recent Labs     04/04/22  0445   PROTIME 19.6*   INR 1.51     APTT:   Recent Labs     04/04/22 0445   APTT 29.9     BNP:  No results for input(s): BNP in the last 72 hours. TROPONIN: @TROPONINI:3@      Assessment:  80 y. o.year old who is admitted for          Plan:  A. fib patient is off IV Cardizem drip off IV amiodarone drip if needed may use IV digoxin for rate control at this time  All labs, medications and tests reviewed, continue all other medications of all above medical condition listed as is.       Kylah Zapata MD, MD 4/6/2022 3:03 PM

## 2022-04-06 NOTE — PROGRESS NOTES
Patient in bed, opens eyes, says hello but not oriented to time, place, situation. Overnight patient with rapid a-fib, notes reviewed. Patient did receive several doses of morphine for pain (last at 0700), likely adding to confusion this am.    PE:    Vitals:    04/06/22 0400 04/06/22 0500 04/06/22 0600 04/06/22 0700   BP: 111/78 124/78 124/84 129/73   Pulse: 121 141 108 114   Resp: 14 19 15 25   Temp: 98.1 °F (36.7 °C)      TempSrc: Axillary      SpO2: 100% 90% 100% 96%   Weight:       Height:         Excellent UOP  NG is bilious  CTA B anteriorly  Tachycardic  Abd: soft, tender diffusely, dressing changed, packing removed form LLQ incision, both incisions intact, no redness, rlq stoma pink and viable, watery fluid in appliance    Labs reviewed    A/P:  -decrease NS to 50 ml/hr  -new tpn written and electrolytes adjusted  -PT/OT  -transfuse 1 unit pRBC's, patient with anemia of chronic disease and dilutional anemia, it will help with oxygen carrying capacity and intra vascular volume  -on zosyn d#5, flagyl d#5, vancomycin d#4 for UTI, pneumonia and colitis (now s/p resection), will stop flagyl.  Intra-operative cultures with NGTD  -on protonix and subq heparin, since renal function is improving, will start therapeutic lovenox today (discussed with Dr. Destin Caldera)

## 2022-04-06 NOTE — PROGRESS NOTES
Nephrology Progress Note  4/6/2022 12:04 PM  Subjective: Interval History: Jorge Alberto Anderson is a 80 y.o. male  having some increased pain today we got some pain meds last night somewhat confused today. Data:   Scheduled Meds:   metoprolol  2.5 mg IntraVENous Q6H    enoxaparin  70 mg SubCUTAneous BID    vancomycin  1,250 mg IntraVENous Once    fat emulsion  250 mL IntraVENous Once per day on Mon Tue Thu Fri    vancomycin [facility]) intermittent dosing (placeholder)   Other RX Placeholder    piperacillin-tazobactam  2,250 mg IntraVENous Q8H    insulin lispro  0-6 Units SubCUTAneous TID WC    insulin lispro  0-3 Units SubCUTAneous Nightly    adalimumab  40 mg SubCUTAneous Once    [Held by provider] apixaban  2.5 mg Oral BID    [Held by provider] aspirin  81 mg Oral Daily    sodium chloride flush  5-40 mL IntraVENous 2 times per day    pantoprazole  40 mg IntraVENous Daily     Continuous Infusions:   sodium chloride      PN-Adult Premix 4.25/10 - Peripheral Line      PN-Adult Premix  4.25/10 - Standard Electrolytes - Peripheral Line 75 mL/hr at 04/06/22 0650    sodium chloride 50 mL/hr at 04/06/22 0849    dextrose           CBC   Recent Labs     04/04/22  0420 04/04/22  0420 04/05/22  0405 04/05/22  1520 04/06/22  0520   WBC 28.4*  --  18.5*  --  15.3*   HGB 9.6*   < > 7.4* 7.8* 7.1*   HCT 29.7*   < > 23.6* 24.7* 22.8*     --  257  --  258    < > = values in this interval not displayed. BMP   Recent Labs     04/04/22  0420 04/04/22  0420 04/05/22  0405 04/05/22  1520 04/06/22  0520   *   < > 132* 133* 136   K 4.4   < > 3.8 3.7 3.8   CL 99   < > 100 100 103   CO2 19*   < > 22 24 23   PHOS 4.4  --  3.3  --  3.1   BUN 31*   < > 35* 37* 29*   CREATININE 1.8*   < > 1.8* 1.6* 1.3    < > = values in this interval not displayed.      Hepatic:   Recent Labs     04/04/22  0420   AST 12*   ALT 13   BILITOT 0.6   ALKPHOS 64     Troponin: No results for input(s): TROPONINI in the last 72 hours.  BNP: No results for input(s): BNP in the last 72 hours. Lipids: No results for input(s): CHOL, HDL in the last 72 hours. Invalid input(s): LDLCALCU  ABGs: No results found for: PHART, PO2ART, CTP2GFB  INR:   Recent Labs     04/04/22  0445   INR 1.51     Renal Labs  Albumin:    Lab Results   Component Value Date    LABALBU 2.2 04/04/2022     Calcium:    Lab Results   Component Value Date    CALCIUM 7.3 04/06/2022     Phosphorus:    Lab Results   Component Value Date    PHOS 3.1 04/06/2022     U/A:    Lab Results   Component Value Date    NITRU POSITIVE 04/02/2022    COLORU YELLOW 04/02/2022    WBCUA 11 04/02/2022    RBCUA 1 04/02/2022    MUCUS FEW 04/02/2022    TRICHOMONAS NONE SEEN 03/20/2022    BACTERIA OCCASIONAL 04/02/2022    CLARITYU CLEAR 04/02/2022    SPECGRAV 1.015 04/02/2022    UROBILINOGEN 0.2 04/02/2022    BILIRUBINUR NEGATIVE 04/02/2022    BLOODU MODERATE 04/02/2022    KETUA SMALL 04/02/2022     ABG:  No results found for: PHART, OSD4BIS, PO2ART, RVU3IYW, BEART, THGBART, AHU7UIH, G3DYJKTR  HgBA1c:  No results found for: LABA1C  Microalbumen/Creatinine ratio:  No components found for: RUCREAT  TSH:  No results found for: TSH  IRON:    Lab Results   Component Value Date    IRON 35 03/01/2022     Iron Saturation:  No components found for: PERCENTFE  TIBC:    Lab Results   Component Value Date    TIBC 280 03/01/2022     FERRITIN:    Lab Results   Component Value Date    FERRITIN 210 03/01/2022     RPR:  No results found for: RPR  SAQIB:  No results found for: ANATITER, SAQIB  24 Hour Urine for Creatinine Clearance:  No components found for: CREAT4, UHRS10, UTV10      Objective:   I/O: 04/05 0701 - 04/06 0700  In: 4205.7 [I.V.:1906]  Out: 2400 [Urine:2150]  I/O last 3 completed shifts: In: 4970.9 [I.V.:1906; IV Piggyback:707.7]  Out: 2805 [Urine:2550; Emesis/NG output:255]  No intake/output data recorded.   Vitals: /71   Pulse 121   Temp 97.7 °F (36.5 °C)   Resp 19   Ht 5' 9.02\" (1.753 m) Wt 172 lb (78 kg)   SpO2 98%   BMI 25.39 kg/m²  {  General appearance: awake weak  HEENT: Head: Normal, normocephalic, atraumatic.   Neck: supple, symmetrical, trachea midline  Lungs: diminished breath sounds bilaterally  Heart: S1, S2 normal  Abdomen: abnormal findings:  soft  tender post surgery with ostomy in place  Extremities: edema trace  Neurologic: Mental status: alertness: Weak  Generalized pain        Assessment and Plan:      IMP:   #1 small bowel obstruction status post surgery   #2 acute renal failure from ATN   #3 possible colitis with leukocytosis   #4 anemia   #5 A. fib rapid rate   #6 history of rectal cancer    Plan      #1 post surgery healing as expected affected and okay to restart on anticoagulation with full dose Lovenox  # 2 creatinine down to 1.3 holding stable monitor   #3 maintain on antibiotic therapy no fever monitor closely monitor vancomycin level   #4 agree giving 1 unit PRBC today   #5 blood pressure holding stable today monitor heart rate   #6 prior cancer we will monitor for now   currently on TPN and gentle IV fluid monitor closely for now           Elkin Gay MD, MD

## 2022-04-06 NOTE — PROGRESS NOTES
Jamil Land BSN RN clinical  for [facility] SURGICAL [facility] RN students 07-19:00 this date.

## 2022-04-06 NOTE — PROGRESS NOTES
Occupational Therapy   Occupational Therapy Initial Assessment  Date: 2022   Patient Name: Rose Ya  MRN: [id number]     : 1929    Date of Service: 2022    Discharge Recommendations:  24 hour supervision or assist,ECF with OT       Assessment   Performance deficits / Impairments: Decreased functional mobility ; Decreased coordination;Decreased ADL status; Decreased cognition;Decreased posture;Decreased strength;Decreased high-level IADLs;Decreased endurance;Decreased safe awareness;Decreased balance  Prognosis: Fair  Decision Making: High Complexity  OT Education: OT Role;Plan of Care;Transfer Training;Orientation; Family Education  Barriers to Learning: decreased cognition  REQUIRES OT FOLLOW UP: Yes  Activity Tolerance  Activity Tolerance: Patient limited by pain; Patient limited by fatigue;Treatment limited secondary to decreased cognition           Patient Diagnosis(es): The primary encounter diagnosis was Colitis. Diagnoses of Ileus ([address] Leukocytosis, unspecified type, Sepsis without acute organ dysfunction, due to unspecified organism [facility]), Atrial fibrillation with RVR ([address] and Occult blood positive stool were also pertinent to this visit. has a past medical history of AAA (abdominal aortic aneurysm) ([address] MARIELOS (acute kidney injury) ([address] Angina, class I ([address] Benign prostatic hyperplasia, Bowel obstruction ([address] CAD (coronary artery disease), Cancer ([address] CCC (chronic calculous cholecystitis), Gastroesophageal reflux disease without esophagitis, Hx of cardiovascular stress test, Hypertension, Partial small bowel obstruction ([address] Primary malignant neoplasm of rectum ([address] Probable Bacterial Pneumonia, Psoriasis, Psoriatic arthritis ([address] Pyelonephritis, Stable angina ([address] and Unspecified cerebral artery occlusion with cerebral infarction. has a past surgical history that includes Appendectomy; colostomy; Upper gastrointestinal endoscopy (N/A, 2022);  Colonoscopy (N/A, 3/2/2022); and laparotomy (N/A, 4/3/2022). Restrictions  Restrictions/Precautions  Restrictions/Precautions: General Precautions,Fall Risk, Abdominal precautions    Subjective   General  Chart Reviewed: Yes,Orders,Progress Notes,History and Physical,Imaging,Labs,Previous Admission  Patient assessed for rehabilitation services?: Yes  Family / Caregiver Present: Yes (daughter at bedside)  Pain Assessment  Pain Assessment: Faces  Pain Level: 6  Martínez-Baker Pain Rating: Hurts little more      Social/Functional History    Social/Functional History  Lives With: Alone  Type of Home:  (condo)  Home Layout: One level  Home Access: Level entry  Bathroom Shower/Tub: Tub/Shower unit,Walk-in shower  Bathroom Toilet: Standard  Bathroom Equipment: Shower chair (in storage)  Home Equipment: [employer] Button,4 wheeled walker  ADL Assistance: [address] [place] [address] Avenue: Independent  Ambulation Assistance: Independent (Bebe with cane)  Transfer Assistance: Independent  Active : Yes  Pt was recently hospitalized 3 weeks ago. Above information taken from previous evaluation         Objective   Vision: Within Functional Limits  Hearing: Within functional limits    Orientation  Overall Orientation Status: Within Functional Limits     Balance  Sitting Balance: Maximum assistance  Standing Balance: Unable to assess(comment) (pt required max assistance to maintain sitting EOB)  ADL  Feeding: Minimal assistance;Verbal cueing  Grooming: Moderate assistance;Verbal cueing  UE Bathing: Maximum assistance;Verbal cueing  LE Bathing: Maximum assistance;Verbal cueing  UE Dressing:  Moderate assistance;Verbal cueing  LE Dressing: Maximum assistance;Verbal cueing  Toileting: Maximum assistance  Additional Comments: ADL function inferred from strength, ROM, and level of cognition/alertness assessed  Tone RUE  RUE Tone: Normotonic  Tone LUE  LUE Tone: Normotonic  Coordination  Coordination and Movement description: Decreased speed     Bed mobility  Rolling to Left: Maximum assistance  Rolling to Right: Maximum assistance  Supine to Sit: Maximum assistance  Sit to Supine: Maximum assistance     Vision - Basic Assessment  Prior Vision:  (wears glasses)  Cognition  Overall Cognitive Status:  (pt presents with mixed alertness states throughout assessments)  Perception  Overall Perceptual Status: WFL     Sensation  Overall Sensation Status: WFL        LUE PROM (degrees)  LUE PROM: WNL  LUE AROM (degrees)  LUE AROM :  (pt unable to follow commands required to test)  Left Hand PROM (degrees)  Left Hand PROM: WNL  Left Hand AROM (degrees)  Left Hand AROM:  (pt unable to follow commands needed to assess)  LUE Strength  Gross LUE Strength:  (2-)  RUE Strength  Gross RUE Strength:  (unable to test, unable to follow commands)                   Plan   Plan  Times per week: 3x  Current Treatment Recommendations: Strengthening,Endurance Training,Patient/Caregiver Education & Training,Cognitive Reorientation,Self-Care / ADL,Balance Training,Cognitive/Perceptual Training,Functional Mobility Training,Safety Education & Training,Positioning      AM-PAC Score        AM-[facility] Inpatient Daily Activity Raw Score: 13 (04/06/22 1509)  AM-PAC Inpatient ADL T-Scale Score : 32.03 (04/06/22 1509)  ADL Inpatient CMS 0-100% Score: 63.03 (04/06/22 1509)  ADL Inpatient CMS G-Code Modifier : CL (04/06/22 1509)    Goals  Short term goals  Time Frame for Short term goals: Pt will perform BADL of feeding with setup A and verbal cueing  Short term goal 1: Pt will perform UE dressing with mod A and verbal cueing  Short term goal 2: Pt will perform LE dressing with mod A and verbal cueing  Short term goal 3: Pt will perform grooming with Nichol and verbal cueing  Patient Goals   Patient goals : to return home       Therapy Time   Individual Concurrent Group Co-treatment   Time In 1352         Time Out 1420         Minutes 28               Time In: 9347  Time Out: 1420  Total Treatment Minutes: 13 minutes  Total Treatment Time: 28 minutes        Carson CEDILLO/NAGA [id number]  3:16 PM,4/6/2022

## 2022-04-06 NOTE — PLAN OF CARE
Problem: Falls - Risk of:  Goal: Will remain free from falls  Description: Will remain free from falls  Outcome: Ongoing  Goal: Absence of physical injury  Description: Absence of physical injury  Outcome: Ongoing     Problem: Pain:  Goal: Pain level will decrease  Description: Pain level will decrease  Outcome: Ongoing  Goal: Control of acute pain  Description: Control of acute pain  Outcome: Ongoing  Goal: Control of chronic pain  Description: Control of chronic pain  Outcome: Ongoing     Problem: Skin Integrity:  Goal: Will show no infection signs and symptoms  Description: Will show no infection signs and symptoms  Outcome: Ongoing  Goal: Absence of new skin breakdown  Description: Absence of new skin breakdown  Outcome: Ongoing  Goal: Signs of wound healing will improve  Description: Signs of wound healing will improve  Outcome: Ongoing  Goal: Risk for impaired skin integrity will decrease  Description: Risk for impaired skin integrity will decrease  Outcome: Ongoing     Problem: Infection - Surgical Site:  Goal: Will show no infection signs and symptoms  Description: Will show no infection signs and symptoms  Outcome: Ongoing     Problem:  Bowel/Gastric:  Goal: Gastrointestinal status for postoperative course will improve  Description: Gastrointestinal status for postoperative course will improve  Outcome: Ongoing     Problem: Fluid Volume:  Goal: Ability to achieve a balanced intake and output will improve  Description: Ability to achieve a balanced intake and output will improve  Outcome: Ongoing     Problem: Nutritional:  Goal: Ability to attain and maintain optimal nutritional status will improve  Description: Ability to attain and maintain optimal nutritional status will improve  Outcome: Ongoing     Problem: Physical Regulation:  Goal: Postoperative complications will be avoided or minimized  Description: Postoperative complications will be avoided or minimized  Outcome: Ongoing     Problem: Sensory:  Goal: Pain level will decrease  Description: Pain level will decrease  Outcome: Ongoing     Problem: Nutrition  Goal: Optimal nutrition therapy  Outcome: Ongoing

## 2022-04-06 NOTE — PROGRESS NOTES
Hospitalist Progress Note      PCP: Cintia De Los Santos MD    Date of Admission: 4/2/2022    LOS: 4    Subjective:   Overnight Events:   Overnight events noted with increased pain and analgesic use  Somnolent this morning though arousable  Still expressing abdominal discomfort          Active Hospital Problems    Diagnosis Date Noted    Atrial fibrillation with RVR ([facility] [I48.91] 04/05/2022    Hypotension [I95.9] 04/05/2022    Ischemic colitis ([address] [K55.9] 04/05/2022    Hypocalcemia [E83.51] 04/05/2022    MARIELOS (acute kidney injury) ([address] [N17.9] 04/05/2022    Probable Bacterial Pneumonia [J18.9] 04/05/2022    CAD (coronary artery disease) [I25.10] 04/05/2022    Anemia [D64.9] 04/05/2022    SBO (small bowel obstruction) ([address] [K56.609] 04/02/2022    Pseudomonas urinary tract infection [N39.0, B96.5] 03/20/2022    Gastroesophageal reflux disease without esophagitis [K21.9] 05/21/2020       Case Summary:   19-year-old gentleman with a history of CAD, GERD, hypertension, atrial fibrillation, BPH, recurrent small bowel obstruction admitted with small bowel obstruction and concern for ischemic colitis status post exploratory laparotomy for colitis and acute abdomen with subtotal colon resection and mobilization of hepatic flexure, small bowel resection and end ileostomy. Is complicated by UTI, atrial fibrillation with RVR, shock due to sepsis and acute kidney injury with hypercalcemia and probable bacterial pneumonia    Principal Problem:    SBO (small bowel obstruction) ([address]): Continued pain with several bouts of IV  narcotic analgesic relief. -General surgery following with recommendations  -Antibiotic de-escalation with discontinuation of metronidazole and continuation of Zosyn with vancomycin for UTI and pneumonia  -Serial of abdominal x-ray to monitor ileus      Atrial fibrillation with RVR ([address]): Noted RVR overnight. No chest pains or shortness of breath.   Metoprolol and Cardizem have been on hold.  -Continue metoprolol IV 2.5 mg and if rate still not controlled and BP elevated, to consider increasing to 5 mg  -Therapeutic anticoagulation as allowed by general surgery      MARIELOS (acute kidney injury) [facility]): Renal function improved. Creatinine at 1.3 this morning  -Monitor renal function and electrolytes  -Decrease IV fluids  -Nephrology following with recommendation      Anemia: Chronic with probable dilutional effect. Slightly trended down. Noted I agree with 1 unit PRBC to improve oxygenation      Probable Ischemic colitis [facility]): Status post bowel resection. General surgery following with recommendation. Continue vancomycin and Zosyn    Active Problems:    Gastroesophageal reflux disease without esophagitis: On Protonix    Pseudomonas urinary tract infection: On Zosyn    Hypocalcemia: Replete    Probable Bacterial Pneumonia: On Zosyn and Protonix. To repeat chest x-ray in 1 to 2 days    CAD (coronary artery disease): No chest pains or shortness of breath.   Stable      Resolved Problems:    Shock/Hypotension          Medications:  Reviewed  Infusion Medications    sodium chloride      PN-Adult Premix 4.25/10 - Peripheral Line      PN-Adult Premix  4.25/10 - Standard Electrolytes - Peripheral Line 75 mL/hr at 04/06/22 0650    sodium chloride 50 mL/hr at 04/06/22 0849    dextrose       Scheduled Medications    metoprolol  2.5 mg IntraVENous Q6H    enoxaparin  70 mg SubCUTAneous BID    vancomycin  1,250 mg IntraVENous Once    fat emulsion  250 mL IntraVENous Once per day on Mon Tue Thu Fri    vancomycin [facility]) intermittent dosing (placeholder)   Other RX Placeholder    piperacillin-tazobactam  2,250 mg IntraVENous Q8H    insulin lispro  0-6 Units SubCUTAneous TID WC    insulin lispro  0-3 Units SubCUTAneous Nightly    adalimumab  40 mg SubCUTAneous Once    [Held by provider] apixaban  2.5 mg Oral BID    [Held by provider] aspirin  81 mg Oral Daily    sodium chloride flush  5-40 mL IntraVENous 2 times per day    pantoprazole  40 mg IntraVENous Daily     PRN Meds: sodium chloride, HYDROmorphone **OR** HYDROmorphone, acetaminophen, glucose, glucagon (rDNA), dextrose, dextrose bolus (hypoglycemia) **OR** dextrose bolus (hypoglycemia), sodium chloride flush, ondansetron **OR** ondansetron      DVT Prophylaxis: Therapeutic enoxaparin  Diet: Diet NPO Exceptions are: Ice Chips, Popsicles  PN-Adult Premix  4.25/10 - Standard Electrolytes - Peripheral Line  PN-Adult Premix 4.25/10 - Peripheral Line  Code Status: Full Code    Dispo: Pending clinical improvement    ____________________________________________________________________________    Physical Exam:  /71   Pulse 121   Temp 97.7 °F (36.5 °C)   Resp 19   Ht 5' 9.02\" (1.753 m)   Wt 172 lb (78 kg)   SpO2 98%   BMI 25.39 kg/m²   General appearance: No apparent distress, appears stated age and cooperative. Somnolent but arousable  HEENT: Normocephalic, atraumatic, MMM, No sclera icterus/conjuctival palor  Neck: Supple, no thyromegally. No jugular venous distention. Respiratory:  Normal respiratory effort. Clear to auscultation, no Rales/Wheezes/Rhonchi. Cardiovascular: S1/S2 without murmurs, rubs or gallops. RRR  Abdomen: Soft, non-tender, non-distended, bowel sounds present. Musculoskeletal: No clubbing, cyanosis or edema bilaterally. Skin: Skin color, texture, turgor normal.  No rashes or lesions.   Neurologic:  Cranial nerves: II-XII intact, CED, No focal sensory/motor deficits  Capillary Refill: Brisk,< 3 seconds   Peripheral Pulses: +2 palpable, equal bilaterally       Intake/Output Summary (Last 24 hours) at 4/6/2022 1132  Last data filed at 4/6/2022 0650  Gross per 24 hour   Intake 4205.65 ml   Output 2400 ml   Net 1805.65 ml       Labs:   Recent Labs     04/04/22  0420 04/04/22  0420 04/05/22  0405 04/05/22  1520 04/06/22  0520   WBC 28.4*  --  18.5*  --  15.3*   HGB 9.6*   < > 7.4* 7.8* 7.1*   HCT 29.7*   < > 23.6* 24.7* 22.8*     --  257  --  258    < > = values in this interval not displayed. Recent Labs     04/04/22  0420 04/04/22  0420 04/05/22  0405 04/05/22  1520 04/06/22  0520   *   < > 132* 133* 136   K 4.4   < > 3.8 3.7 3.8   CL 99   < > 100 100 103   CO2 19*   < > 22 24 23   BUN 31*   < > 35* 37* 29*   CREATININE 1.8*   < > 1.8* 1.6* 1.3   CALCIUM 7.6*   < > 6.9* 7.3* 7.3*   PHOS 4.4  --  3.3  --  3.1   AST 12*  --   --   --   --    ALT 13  --   --   --   --    BILIDIR 0.4*  --   --   --   --    BILITOT 0.6  --   --   --   --    ALKPHOS 64  --   --   --   --     < > = values in this interval not displayed. No results for input(s): Nakul Seed in the last 72 hours. Urinalysis:    Lab Results   Component Value Date    NITRU POSITIVE 04/02/2022    WBCUA 11 04/02/2022    BACTERIA OCCASIONAL 04/02/2022    RBCUA 1 04/02/2022    BLOODU MODERATE 04/02/2022    SPECGRAV 1.015 04/02/2022       Radiology:  XR CHEST PORTABLE   Final Result   Low lung volumes with persistent bibasilar airspace disease. Trace left pleural effusion. XR ABDOMEN (KUB) (SINGLE AP VIEW)   Final Result   1. Nasogastric tube has been advanced with the tip over the antrum. 2.  New skin staples are present at the abdomen and pelvis. 3.  Residual gas dilatation of multiple small bowel loops. There may be a   small amount of postsurgical pneumoperitoneum. 4.  See the prior CT scan for evaluation of the infrarenal aortic aneurysm. XR CHEST PORTABLE   Final Result   Right line placement as above. Bilateral small areas of pneumonia         XR ABDOMEN (KUB) (SINGLE AP VIEW)   Final Result   Multiple air-filled distended loops of small bowel stacked in the mid abdomen   suggesting a low-grade partial small bowel obstruction or focal ileus. Atherosclerotic calcification of an abdominal aortic aneurysm as seen on   prior CT.       NG tube in the stomach with proximal port at the level of the GE junction. XR CHEST PORTABLE   Final Result   Enteric tube tip is noted within the stomach with the side port likely at or   just distal to the GE junction. XR CHEST PORTABLE   Final Result   Trace left pleural effusion with adjacent atelectasis. CT ABDOMEN PELVIS W IV CONTRAST Additional Contrast? None   Final Result   Interval development of severe circumferential wall thickening and   pericolonic inflammatory changes of the transverse colon and splenic flexure   of the colon just proximal to the left lower quadrant colostomy with   increased fluid-filled distension of the proximal colon and small bowel. Findings suggest acute infectious/inflammatory colitis with proximal partial   obstruction versus ileus. Stable infrarenal abdominal aortic aneurysm measuring 5.5 cm in diameter. See follow-up recommendations below. Stable hepatic cysts and bilateral renal cortical cysts. Cardiomegaly with bilateral pleural effusions suggesting mild CHF. RECOMMENDATIONS:   5.5 cm infrarenal abdominal aortic aneurysm. Recommend referral to a vascular   specialist.      Reference: J Am [facility]ad Radiol [id number];77:[id number]. Erica Damon MD      Please excuse brevity and/or typos. This report was transcribed using voice recognition software. Every effort was made to ensure accuracy, however, inadvertent computerized transcription errors may be present.

## 2022-04-06 NOTE — PROGRESS NOTES
[address] [facility]  consulted by Dr. Irasema Josue for monitoring and adjustment. Indication for treatment: Pneumonia (HAP)  Goal trough: [] 10-15 mcg/mL or [x] 15-20 mcg/ml  AUC/SUJATA: [] <500 or [x] 400-600    Pertinent Laboratory Values:   Temp Readings from Last 3 Encounters:   04/06/22 98 °F (36.7 °C) (Oral)   04/03/22 97.4 °F (36.3 °C)   03/25/22 98.1 °F (36.7 °C) (Oral)     Recent Labs     04/04/22  0420 04/05/22  0405 04/06/22  0520   WBC 28.4* 18.5* 15.3*   LACTATE 1.5  --   --      Recent Labs     04/05/22  0405 04/05/22  1520 04/06/22  0520   BUN 35* 37* 29*   CREATININE 1.8* 1.6* 1.3     Estimated Creatinine Clearance: 36 mL/min (based on SCr of 1.3 mg/dL). Intake/Output Summary (Last 24 hours) at 4/6/2022 1109  Last data filed at 4/6/2022 0650  Gross per 24 hour   Intake 4205.65 ml   Output 2400 ml   Net 1805.65 ml       Pertinent Cultures:  Date    Source    Results  4/02   Urine   Pseudomonas  4/02   Blood    NGTD  4/03   Surgical Cx   Pending  4/03   Blood x 2   NGTD  4/05   MRSA nasal   Ordered    Vancomycin level:   TROUGH:  No results for input(s): VANCOTROUGH in the last 72 hours.   RANDOM:    Recent Labs     04/05/22  0405 04/06/22  0520   VANCORANDOM 8.5 12.7       Assessment:  · SCr, BUN, and urine output:   · MARIELOS, renal trends improving  · Day(s) of therapy: 3  · Vancomycin concentration:  · 4/05: 8.5, appropriate to re-dose  · 4/6:   12.7, 13 hours post-dose    Plan:  · Intermittent dosing based on levels due to MARIELOS  · Random level therapeutic @ 12.7  · Vancomycin 1250mg IV x 1 dose today  · Repeat level tomorrow AM  · Pharmacy will continue to monitor patient and adjust therapy as indicated    VANCOMYCIN CONCENTRATION SCHEDULED FOR 04/07 @ 0600    Thank you for the consult,  Margo Sebastian, [facility]  4/6/2022 11:09 AM

## 2022-04-06 NOTE — CONSULTS
[address] [facility], 8/23/1929, 2104/2104-A, 4/6/2022    History  [place]:  The primary encounter diagnosis was Colitis. Diagnoses of Ileus ([address] Leukocytosis, unspecified type, Sepsis without acute organ dysfunction, due to unspecified organism [facility]), Atrial fibrillation with RVR ([address] and Occult blood positive stool were also pertinent to this visit. Patient  has a past medical history of AAA (abdominal aortic aneurysm) ([address] MARIELOS (acute kidney injury) ([address] Angina, class I ([address] Benign prostatic hyperplasia, Bowel obstruction ([address] CAD (coronary artery disease), Cancer ([address] CCC (chronic calculous cholecystitis), Gastroesophageal reflux disease without esophagitis, Hx of cardiovascular stress test, Hypertension, Partial small bowel obstruction ([address] Primary malignant neoplasm of rectum ([address] Probable Bacterial Pneumonia, Psoriasis, Psoriatic arthritis ([address] Pyelonephritis, Stable angina ([address] and Unspecified cerebral artery occlusion with cerebral infarction. Patient  has a past surgical history that includes Appendectomy; colostomy; Upper gastrointestinal endoscopy (N/A, 2/28/2022); Colonoscopy (N/A, 3/2/2022); and laparotomy (N/A, 4/3/2022). Subjective:  Patient states: \"Go slow! \"    Pain:  Does not numerically rate pain but pt verbalized abdominal pain with movement.     Communication with other providers:  Handoff to RN, OT  Restrictions: general precautions, fall risk, ostomy, abdominal precautions    Home Setup/Prior level of function    Lives With: Alone  Type of Home:  (condo)  Home Layout: One level  Home Access: Level entry  Bathroom Shower/Tub: Tub/Shower unit,Walk-in shower  Bathroom Toilet: Standard  Bathroom Equipment: Shower chair (in storage)  Home Equipment: [address] wheeled walker  ADL Assistance: [address]: Independent ([place] with cane)  Transfer Assistance: Independent  Active : Yes  Leisure & Hobbies: spends time with his [place] [employer] friend\" going to [employer] and out to eat. Used to be a volunteer at the hospital  **taken from prior eval and confirmed with pt's daughter this date    Examination of body systems (includes body structures/functions, activity/participation limitations):  · Observation:  Pt supine in bed with daughter in room upon arrival and agreeable to therapy  · Vision:  Wears glasses  · Hearing:  [place], bilateral hearing aides  · Cardiopulmonary: 3L O2  · Cognition: impaired this date, see OT/SLP note for further evaluation. Musculoskeletal  · ROM R/L:  WFL. · Strength R/L:  3+/5, moderate impairment in function and endurance. · Neuro:  Unable to formally test as pt only intermittently answering questions/follwoing commands      Mobility:  · Rolling L/R:  Max A, performed bilaterally x2 during positioning   · Supine to sit:  Mod A x2 with cues for sequencing  · Sit to supine: max A x2 with cues for sequencing  · Transfers: NT due to increased pain and safety concerns  · Sitting balance:  Poor, pt sat EOB mod A progressing to max A with retropulsion as fatigues. Cues provided for sitting posture throughout. · Standing balance:  NT.    · Gait: NT    [facility] 6 Clicks Inpatient Mobility:  AM-PAC Inpatient Mobility Raw Score : 8    Safety: patient left supine in bed with alarm on, RN in room, call light within reach, RN notified, gait belt used. Assessment:  Pt is a 80 y.o. male admitted to the hospital for SBO. Pt underwent an exploratory laparotomy, subtotal colon resection, end ileostomy, removal of colostomy, small bowel resection, and mobilization of the splenic flexure on 4/4/2022. Pt is typically mod I with all ambulation and transfers with a cane. Pt is currently performing bed mobility max A x2, sitting balance max A, unable to transfer, and unable to ambulate.  Pt is presenting with decreased endurance, impaired balance, impaired gait, impaired bed mobility, impaired transfers, impaired strength, and impaired cognition. Pt is functioning well below baseline and would benefit from continued acute care PT as well as SNF placement upon discharge to continue to address impairments. Complexity: high    Prognosis: Good, no significant barriers to participation at this time.      Plan Times per week: 3-4+/week     Equipment: TBD at next level of care    Goals:  Short term goals  Time Frame for Short term goals: 1 week  Short term goal 1: pt to complete all bed mobility mod A x1  Short term goal 2: Pt to complete all STS transfers to/from bed, commode, and chair max A  Short term goal 3: Pt to complete stand pivot transfer with LRAD max A       Treatment plan:  Bed mobility, transfers, balance, gait, TA, TX    Recommendations for NURSING mobility: KOFI    Time:   Time in: 1352  Time out: 1420  Timed treatment minutes: 15  Total time: 28    Electronically signed by:    Epifanio Jonas PT  4/6/2022, 3:04 PM

## 2022-04-07 PROBLEM — R41.0 DELIRIUM: Status: ACTIVE | Noted: 2022-01-01

## 2022-04-07 NOTE — CARE COORDINATION
Cm in to see pt. Pt is drowsy, slurring his speech. Cm spoke with pt briefly but he will likely not remember. Cm contacted pt's dgt, Daina Stephenson, to discuss discharge planning. As phone call was beginning pt's dgt, Huy Jonas arrived to pt room. CM discussed with Iva PT/OT recs for SNF at discharge for short term rehab. Hetal Mir states that she is aware that pt will need more care upon discharge than what family can manage at this point. Hetal Mir did state that she had promised pt no NH. Cm stressed that the pts who go to SNF for short term rehab are in a separate area than the pts who live in the facility long term. Cm stressed that SNFs are very used to short term rehab focused care with pt plans for discharge to home after rehab. Hetal Mir states that she is dealing with a fib and is having some difficulty breathing while walking but does hope to come see pt today. Cm advised that Cm will update Huy Sumi since she has arrived. Huy Jonas questioned CM as to the call to Hetal Mir since she is ill. Cm advised that CM had no way of knowing Hetal Mir was ill and since she was here visiting the other day when CM student visited the pt that is why she is the one contacted today. Cm advised that CM can contact Huy Sumi 1st moving forward if preferred. Cm spoke with Graciela Toney; PT/OT recommendations for SNF for short term rehab at discharge and Hetal Mir advising that she thought pt would need more care than what the family could provide initially upon discharge. Pt has been very independent and functional PTA  Huy Jonas provided with list of SNFs and encouraged to discuss with family and select 2 preferences and then Cm will check bed availability and coordinate arrangements. Cm to follow.

## 2022-04-07 NOTE — PROGRESS NOTES
[address] [facility]  consulted by Dr. Giselle Arenas for monitoring and adjustment. Indication for treatment: Pneumonia (HAP)  Goal trough: [] 10-15 mcg/mL or [x] 15-20 mcg/ml  AUC/SUJATA: [] <500 or [x] 400-600    Pertinent Laboratory Values:   Temp Readings from Last 3 Encounters:   04/07/22 97.5 °F (36.4 °C) (Oral)   04/03/22 97.4 °F (36.3 °C)   03/25/22 98.1 °F (36.7 °C) (Oral)     Recent Labs     04/05/22  0405 04/06/22 0520 04/07/22  0343   WBC 18.5* 15.3* 13.7*     Recent Labs     04/05/22  1520 04/06/22 0520 04/07/22  0343   BUN 37* 29* 28*   CREATININE 1.6* 1.3 1.2     Estimated Creatinine Clearance: 39 mL/min (based on SCr of 1.2 mg/dL). Intake/Output Summary (Last 24 hours) at 4/7/2022 1317  Last data filed at 4/7/2022 0846  Gross per 24 hour   Intake 3043.16 ml   Output 2570 ml   Net 473.16 ml       Pertinent Cultures:  Date    Source    Results  4/02   Urine    Pseudomonas  4/02   Blood    NGTD  4/03   Surgical Cx   NGTD  4/03   Blood x 2   NGTD  4/05   MRSA nasal   Ordered    Vancomycin level:   TROUGH:  No results for input(s): VANCOTROUGH in the last 72 hours.   RANDOM:    Recent Labs     04/05/22  0405 04/06/22 0520 04/07/22  0343   VANCORANDOM 8.5 12.7 15.1       Assessment:  · SCr, BUN, and urine output:   · MARIELOS, renal trends improving  · Day(s) of therapy: 6  · Vancomycin concentration:  · 4/05: 8.5, appropriate to re-dose  · 4/06: 12.7, 13h post-dose  · 4/07: 15.1, 13h post-dose, appropriate to re-dose    Plan:  · Vancomycin 1250 mg IVPB q24h  · Predicted trough: 17,   · Repeat next level in 48h  · Consider de-escalation of vancomycin on day #7 of therapy (tomorrow) if appropriate per clinical course  · Pharmacy will continue to monitor patient and adjust therapy as indicated    VANCOMYCIN CONCENTRATION SCHEDULED FOR 04/09 @ 0600    Thank you for the consult,  Earlene Melendez [facility], PharmD  4/7/2022 1:17 PM

## 2022-04-07 NOTE — PLAN OF CARE
Problem: Falls - Risk of:  Goal: Will remain free from falls  Description: Will remain free from falls  4/7/2022 0303 by Sera Salazar RN  Outcome: Ongoing  4/6/2022 1656 by Marcos Li  Outcome: Ongoing  Goal: Absence of physical injury  Description: Absence of physical injury  4/7/2022 0303 by Sera Salazar RN  Outcome: Ongoing  4/6/2022 1656 by Marcos Li  Outcome: Ongoing     Problem: Pain:  Goal: Pain level will decrease  Description: Pain level will decrease  4/7/2022 0303 by Sera Salazar RN  Outcome: Ongoing  4/6/2022 1656 by Marcos Li  Outcome: Ongoing  Goal: Control of acute pain  Description: Control of acute pain  4/7/2022 0303 by Sera Salazar RN  Outcome: Ongoing  4/6/2022 1656 by Marcos Li  Outcome: Ongoing  Goal: Control of chronic pain  Description: Control of chronic pain  4/7/2022 0303 by Sera Salazar RN  Outcome: Ongoing  4/6/2022 1656 by Marcos Li  Outcome: Ongoing     Problem: Skin Integrity:  Goal: Will show no infection signs and symptoms  Description: Will show no infection signs and symptoms  4/7/2022 0303 by Sera Salazar RN  Outcome: Ongoing  4/6/2022 1656 by Marcos Li  Outcome: Ongoing  Goal: Absence of new skin breakdown  Description: Absence of new skin breakdown  4/7/2022 0303 by Sera Salazar RN  Outcome: Ongoing  4/6/2022 1656 by Marcos Li  Outcome: Ongoing  Goal: Signs of wound healing will improve  Description: Signs of wound healing will improve  4/7/2022 0303 by Sera Salazar RN  Outcome: Ongoing  4/6/2022 1656 by Marcos Li  Outcome: Ongoing  Goal: Risk for impaired skin integrity will decrease  Description: Risk for impaired skin integrity will decrease  4/7/2022 0303 by Sera Salazar RN  Outcome: Ongoing  4/6/2022 1656 by Marcos Li  Outcome: Ongoing     Problem: Infection - Surgical Site:  Goal: Will show no infection signs and symptoms  Description: Will show no infection signs and symptoms  4/7/2022 0303 by Sera Salazar RN  Outcome: Ongoing  4/6/2022 1656 by Jaylin Gill  Outcome: Ongoing     Problem:  Bowel/Gastric:  Goal: Gastrointestinal status for postoperative course will improve  Description: Gastrointestinal status for postoperative course will improve  4/7/2022 0303 by Mari Ji RN  Outcome: Ongoing  4/6/2022 1656 by Jaylin Gill  Outcome: Ongoing     Problem: Fluid Volume:  Goal: Ability to achieve a balanced intake and output will improve  Description: Ability to achieve a balanced intake and output will improve  4/7/2022 0303 by Mari Ji RN  Outcome: Ongoing  4/6/2022 1656 by Jaylin Gill  Outcome: Ongoing     Problem: Nutritional:  Goal: Ability to attain and maintain optimal nutritional status will improve  Description: Ability to attain and maintain optimal nutritional status will improve  4/7/2022 0303 by Mari Ji RN  Outcome: Ongoing  4/6/2022 1656 by Jaylin Gill  Outcome: Ongoing     Problem: Physical Regulation:  Goal: Postoperative complications will be avoided or minimized  Description: Postoperative complications will be avoided or minimized  4/7/2022 0303 by Mari Ji RN  Outcome: Ongoing  4/6/2022 1656 by Jaylin Gill  Outcome: Ongoing     Problem: Sensory:  Goal: Pain level will decrease  Description: Pain level will decrease  4/7/2022 0303 by Mari Ji RN  Outcome: Ongoing  4/6/2022 1656 by Jaylin Gill  Outcome: Ongoing     Problem: Nutrition  Goal: Optimal nutrition therapy  4/7/2022 0303 by Mari Ji RN  Outcome: Ongoing  4/6/2022 1656 by Jaylin Gill  Outcome: Ongoing

## 2022-04-07 NOTE — PROGRESS NOTES
Nephrology Progress Note  4/7/2022 10:07 AM  Subjective: Interval History: Irene Rollins is a 80 y.o. male  . Appears more awake and interactive today    Data:   Scheduled Meds:   metoprolol  2.5 mg IntraVENous Q6H    enoxaparin  70 mg SubCUTAneous BID    fat emulsion  250 mL IntraVENous Once per day on Mon Tue Thu Fri    vancomycin [facility]) intermittent dosing (placeholder)   Other RX Placeholder    piperacillin-tazobactam  2,250 mg IntraVENous Q8H    insulin lispro  0-6 Units SubCUTAneous TID WC    insulin lispro  0-3 Units SubCUTAneous Nightly    adalimumab  40 mg SubCUTAneous Once    [Held by provider] apixaban  2.5 mg Oral BID    [Held by provider] aspirin  81 mg Oral Daily    sodium chloride flush  5-40 mL IntraVENous 2 times per day    pantoprazole  40 mg IntraVENous Daily     Continuous Infusions:   PN-Adult Premix 4.25/10 - Peripheral Line      sodium chloride      PN-Adult Premix 4.25/10 - Peripheral Line 75 mL/hr at 04/07/22 0616    sodium chloride Stopped (04/07/22 0512)    dextrose           CBC   Recent Labs     04/05/22  0405 04/05/22  1520 04/06/22  0520 04/06/22  1545 04/07/22  0343   WBC 18.5*  --  15.3*  --  13.7*   HGB 7.4*   < > 7.1* 8.2* 8.2*   HCT 23.6*   < > 22.8* 26.7* 27.0*     --  258  --  246    < > = values in this interval not displayed. BMP   Recent Labs     04/05/22  0405 04/05/22  0405 04/05/22  1520 04/06/22  0520 04/07/22  0343   *   < > 133* 136 135   K 3.8   < > 3.7 3.8 4.0      < > 100 103 104   CO2 22   < > 24 23 24   PHOS 3.3  --   --  3.1 2.8   BUN 35*   < > 37* 29* 28*   CREATININE 1.8*   < > 1.6* 1.3 1.2    < > = values in this interval not displayed. Hepatic:   Recent Labs     04/07/22  0343   AST 11*   ALT 9*   BILITOT 0.4   ALKPHOS 64     Troponin: No results for input(s): TROPONINI in the last 72 hours. BNP: No results for input(s): BNP in the last 72 hours.   Lipids: No results for input(s): CHOL, HDL in the last 72 hours.    Invalid input(s): LDLCALCU  ABGs: No results found for: PHART, PO2ART, SYI2YQP  INR:   Recent Labs     04/07/22  0343   INR 1.36     Renal Labs  Albumin:    Lab Results   Component Value Date    LABALBU 2.2 04/07/2022     Calcium:    Lab Results   Component Value Date    CALCIUM 7.4 04/07/2022     Phosphorus:    Lab Results   Component Value Date    PHOS 2.8 04/07/2022     U/A:    Lab Results   Component Value Date    NITRU POSITIVE 04/02/2022    COLORU YELLOW 04/02/2022    WBCUA 11 04/02/2022    RBCUA 1 04/02/2022    MUCUS FEW 04/02/2022    TRICHOMONAS NONE SEEN 03/20/2022    BACTERIA OCCASIONAL 04/02/2022    CLARITYU CLEAR 04/02/2022    SPECGRAV 1.015 04/02/2022    UROBILINOGEN 0.2 04/02/2022    BILIRUBINUR NEGATIVE 04/02/2022    BLOODU MODERATE 04/02/2022    KETUA SMALL 04/02/2022     ABG:  No results found for: PHART, WQB2AOO, PO2ART, ASD0YWO, BEART, THGBART, JVF4CJY, K1UEKCVF  HgBA1c:  No results found for: LABA1C  Microalbumen/Creatinine ratio:  No components found for: RUCREAT  TSH:  No results found for: TSH  IRON:    Lab Results   Component Value Date    IRON 35 03/01/2022     Iron Saturation:  No components found for: PERCENTFE  TIBC:    Lab Results   Component Value Date    TIBC 280 03/01/2022     FERRITIN:    Lab Results   Component Value Date    FERRITIN 210 03/01/2022     RPR:  No results found for: RPR  SAQIB:  No results found for: ANATITER, SAQIB  24 Hour Urine for Creatinine Clearance:  No components found for: CREAT4, UHRS10, UTV10      Objective:   I/O: 04/06 0701 - 04/07 0700  In: 3043.2 [I.V.:411.2]  Out: 2500 [Urine:1950]  I/O last 3 completed shifts: In: 0771 [I.V.:2317.2;  Blood:368.8; IV Piggyback:722]  Out: 1103 [Urine:3600; Emesis/NG output:550]  I/O this shift:  In: -   Out: 300 [Urine:300]  Vitals: /84   Pulse 104   Temp 97.5 °F (36.4 °C) (Oral)   Resp 15   Ht 5' 9.02\" (1.753 m)   Wt 172 lb (78 kg)   SpO2 100%   BMI 25.39 kg/m²  {  General appearance: awake weak  HEENT: Head: Normal, normocephalic, atraumatic.   Neck: supple, symmetrical, trachea midline  Lungs: diminished breath sounds bilaterally  Heart: S1, S2 normal  Abdomen: abnormal findings:  soft  tender post surgery   Extremities: edema trace  Neurologic: Mental status: alertness: Weak   more awake      Assessment and Plan:      IMP:   #1 small bowel obstruction status post surgery   #2 acute renal failure from ATN   #3 possible colitis with leukocytosis   #4 anemia   #5 A. fib rapid rate   #6 history of rectal cancer    Plan      #1 post surgery doing better ambling on oral nutrition plan   #2 creatinine holding 1.2 stable   #3 maintain on broad-spectrum antibiotics monitor vancomycin level   #4 monitor hemoglobin   #5 heart rate somewhat increased but also related to pain will monitor   #6 affect slightly better will follow           Mi Cano MD, MD

## 2022-04-07 NOTE — PROGRESS NOTES
Patient seen and examined, family at bedside (son and daughter).  Patient sleeping but awake,s complains of abdominal pain    PE:  Vitals:    04/07/22 2000 04/07/22 2015 04/07/22 2030 04/07/22 2045   BP: 111/71 106/75 107/73 95/63   Pulse: 100 97 109 106   Resp: 17 18 19 17   Temp: 98.5 °F (36.9 °C)      TempSrc: Oral      SpO2: 94% 95% 95% 95%   Weight:       Height:         NG bilious output  CTA bilaterally  Abd: soft, min distention, dressings c/d/i, stoma pink and viable, large volume bilious output in appliance    Labs reviewed    abd xrays reviewed    A/P:  -TPN, electrolytes adjusted  -Zosyn d# 6, vancomycin d#5  -dilaudid for pain control  -PT/OT  -protonix and therapeutic lovenox  -patient diuresing well on his own  -discussed progress and plan with patient's family at bedside

## 2022-04-07 NOTE — DISCHARGE INSTR - COC
Continuity of Care Form    Patient Name: Willem Patiño   :  1929  MRN:  [id number]    Admit date:  2022  Discharge date:  2022    Code Status Order: Full Code   Advance Directives:      Admitting Physician:  Elena Navarro MD  PCP: Eric Dalton MD    Discharging Nurse: Edson Cuba 23 Unit/Room#: 6754/3821-T  Discharging Unit Phone Number: [phone]    Emergency Contact:   Extended Emergency Contact Information  Primary Emergency Contact: Alexia Gallardo [address] Phone: [phone]  Work Phone: [phone]  Mobile Phone: [phone]  Relation: Child  Secondary Emergency Contact: [address] [facility] Phone: [phone]  Work Phone: [phone]  Mobile Phone: [phone]  Relation: Child    Past Surgical History:  Past Surgical History:   Procedure Laterality Date    APPENDECTOMY      COLONOSCOPY N/A 3/2/2022    COLONOSCOPY POLYPECTOMY REMOVAL ABLATION/STOMA with EMR and placed 7 hemoclips.  performed by Rudi Parnell MD at [address] [place] [address] N/A 4/3/2022    LAPAROTOMY EXPLORATORY COLON RESECTION performed by Becky Garcia MD at [address] [place] [address] N/A 2022    ENTEROSCOPY PUSH CONTROL HEMORRHAGE WITH APC ABLATION OF AVM performed by Rudi Parnell MD at [facility] ENDOSCOPY       Immunization History:   Immunization History   Administered Date(s) Administered    COVID-19, Pfizer Purple top, DILUTE for use, 12+ yrs, 30mcg/0.3mL dose 2021, 2021, 10/08/2021    Influenza Virus Vaccine 2017    Influenza, High Dose (Fluzone 65 yrs and older) 10/26/2018, 2019    Influenza, High-dose, Quadv, 65 yrs +, IM (Fluzone) 10/03/2020, 10/26/2021    Pneumococcal Conjugate 13-valent (Rqgieus39) 2015    Pneumococcal Polysaccharide (Ivpqlaeuj74) 2013    Tdap (Boostrix, Adacel) 2015       Active Problems:  Patient Active Problem List   Diagnosis Code    Hypertension I10 Benign prostatic hyperplasia N40.0    Psoriatic arthritis (HCC) L40.50    Primary malignant neoplasm of rectum (HCC) C20    Psoriasis L40.9    Chronic myeloproliferative disease (HCC) D47.1    Monocytosis (symptomatic) D72.821    Gastroesophageal reflux disease without esophagitis K21.9    Urinary retention R33.9    H/O: CVA (cerebrovascular accident) Z86.73    Irregular heart beat I49.9    Nonrheumatic aortic valve stenosis I35.0    Paroxysmal atrial fibrillation (HCC) I48.0    GI bleed K92.2    Melena K92.1    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    History of rectal cancer Z85.048    Benign neoplasm of cecum D12.0    Benign neoplasm of ascending colon D12.2    Pseudomonas urinary tract infection N39.0, B96.5    Partial small bowel obstruction (HCC) K56.600    SBO (small bowel obstruction) ([facility]) K56.609    Atrial fibrillation with RVR (HCC) I48.91    Hypotension I95.9    Ischemic colitis (HCC) K55.9    Hypocalcemia E83.51    MARIELOS (acute kidney injury) ([facility] [address] N17.9    Probable Bacterial Pneumonia J18.9    CAD (coronary artery disease) I25.10    Anemia D64.9    Delirium R41.0       Isolation/Infection:   Isolation            No Isolation          Patient Infection Status       Infection Onset Added Last Indicated Last Indicated By Review Planned Expiration Resolved Resolved By    None active    Resolved    C-diff Rule Out 04/02/22 04/02/22 04/02/22 Clostridium Difficile Toxin/Antigen (Ordered)   04/04/22 Christine Dyson RN    Order cancelled            Nurse Assessment:  Last Vital Signs: /84   Pulse 106   Temp 97.5 °F (36.4 °C) (Oral)   Resp 14   Ht 5' 9.02\" (1.753 m)   Wt 172 lb (78 kg)   SpO2 98%   BMI 25.39 kg/m²     Last documented pain score (0-10 scale): Pain Level: 10  Last Weight:   Wt Readings from Last 1 Encounters:   04/02/22 172 lb (78 kg)     Mental Status:  oriented and alert    IV Access:  - None    Nursing Mobility/ADLs:  Walking   Assisted  Transfer  Assisted  Bathing Assisted  Dressing  Assisted  Toileting  Assisted  Feeding  Independent  Med Admin  Assisted  Med Delivery   whole    Wound Care Documentation and Therapy:        Elimination:  Continence: Bowel: No  Bladder: No  Urinary Catheter: Last Change Date 04/08/2022; pt will need appointment with Dr. Tray Carroll to get replaced    Colostomy/Ileostomy/Ileal Conduit: Yes  [REMOVED] Colostomy LLQ-Stomal Appliance: Clean,Dry,Intact,2 piece  Colostomy RLQ-Stomal Appliance: 2 piece  [REMOVED] Colostomy LLQ-Stoma  Assessment: Pink,Moist  Colostomy RLQ-Stoma  Assessment: Pink,Red  [REMOVED] Colostomy LLQ-Mucocutaneous Junction: Intact  Colostomy RLQ-Mucocutaneous Junction: Intact  [REMOVED] Colostomy LLQ-Peristomal Assessment: Clean,Intact  Colostomy RLQ-Peristomal Assessment: Intact  [REMOVED] Colostomy LLQ-Stool Appearance: Loose  Colostomy RLQ-Stool Appearance: Watery  [REMOVED] Colostomy LLQ-Stool Color: Brown  Colostomy RLQ-Stool Color: Brown (dark brown)  [REMOVED] Colostomy LLQ-Stool Amount: Large  Colostomy RLQ-Stool Amount: Smear  [REMOVED] Colostomy LLQ-Output (mL): 0 ml    Date of Last BM: 04/21/2022    Intake/Output Summary (Last 24 hours) at 4/7/2022 1203  Last data filed at 4/7/2022 0846  Gross per 24 hour   Intake 3043.16 ml   Output 2800 ml   Net 243.16 ml     I/O last 3 completed shifts: In: 7720 [I.V.:2317.2; Blood:368.8; IV Piggyback:722]  Out: 6201 [Urine:3600; Emesis/NG output:550]    Safety Concerns:      At Risk for Falls and some confusion    Impairments/Disabilities:      None      Patient's personal belongings (please select all that are sent with patient):  Glasses, Hearing Aides bilateral, Dentures upper and lower (hearing aids at home)    RN SIGNATURE:  Electronically signed by Ambrosio Butler RN on 4/21/22 at 12:43 PM EDT    CASE MANAGEMENT/SOCIAL WORK SECTION    Inpatient Status Date: ***    Readmission Risk Assessment Score:  Readmission Risk              Risk of Unplanned Readmission:  30 Discharging to Facility/ Agency   Name:   Address:  Phone:  Fax:    Dialysis Facility (if applicable)   Name:  Address:  Dialysis Schedule:  Phone:  Fax:    / signature: {Esignature:[id number]}    PHYSICIAN SECTION    Nutrition Therapy:  Current Nutrition Therapy:   - Oral Diet:  Carb Control 5 carbs/meal (2000kcals/day)    Routes of Feeding: Oral  Liquids: No Restrictions  Daily Fluid Restriction: no  Last Modified Barium Swallow with Video (Video Swallowing Test): not done    Treatments at the Time of Hospital Discharge:   Respiratory Treatments: none  Oxygen Therapy:  is not on home oxygen therapy. Ventilator:    - No ventilator support    Rehab Therapies: Physical Therapy and Occupational Therapy  Weight Bearing Status/Restrictions: No weight bearing restrictions  Other Medical Equipment (for information only, NOT a DME order):  walker  Other Treatments: none    Prognosis: Guarded    Condition at Discharge: Stable    Rehab Potential (if transferring to Rehab): Fair    Recommended Labs or Other Treatments After Discharge: None    Physician Certification: I certify the above information and transfer of Sher Baltazar  is necessary for the continuing treatment of the diagnosis listed and that he requires [facility] for less 30 days.      Update Admission H&P: No change in H&P    PHYSICIAN SIGNATURE:  Electronically signed by Jimbo Dangelo MD on 4/21/22 at 12:02 PM EDT m

## 2022-04-07 NOTE — PROGRESS NOTES
Hospitalist Progress Note      PCP: Elpidio Bruner MD    Date of Admission: 4/2/2022    LOS: 5    Subjective:   Overnight Events:   Still with significant abdominal discomfort  Remains with fluctuating level of consciousness            Active Hospital Problems    Diagnosis Date Noted    Delirium [R41.0] 04/07/2022    Atrial fibrillation with RVR ([facility] [I48.91] 04/05/2022    Hypotension [I95.9] 04/05/2022    Ischemic colitis ([facility] [address] [K55.9] 04/05/2022    Hypocalcemia [E83.51] 04/05/2022    MARIELOS (acute kidney injury) ([facility] [address] [N17.9] 04/05/2022    Probable Bacterial Pneumonia [J18.9] 04/05/2022    CAD (coronary artery disease) [I25.10] 04/05/2022    Anemia [D64.9] 04/05/2022    SBO (small bowel obstruction) ([facility] [address] [K56.609] 04/02/2022    Pseudomonas urinary tract infection [N39.0, B96.5] 03/20/2022    Gastroesophageal reflux disease without esophagitis [K21.9] 05/21/2020       Case Summary:   70-year-old gentleman with history of GERD, CAD, hypertension, atrial fibrillation, BPH, recurrent small bowel obstruction admitted with small bowel obstruction and concern for ischemic colitis status post exploratory laparotomy for colitis and acute abdomen with subtotal colon resection and mobilization of hepatic flexure, small bowel resection and end ileostomy. Admission course complicated by UTI, atrial fibrillation with RVR, shock due to sepsis and acute kidney injury with hyperglycemia and probable bacterial pneumonia. Principal Problem:    SBO (small bowel obstruction) ([facility] [address]): Status post bowel resection and ileostomy. Continues to express abdominal pain. No significant stoma output. Diminished bowel sounds persist.  -We will get  KUB to assess interval change  -Continue pain management  -Remains on empiric antibiotic coverage      Pseudomonas urinary tract infection: On Zosyn. Will consider discontinuation of vancomycin.        Atrial fibrillation with RVR [facility]): Relatively improved rate control on metoprolol and therapeutic enoxaparin. Continue telemetry      Anemia: Chronic with probable dilutional effect. Status post 1 unit PRBC. Hb at 8.2 this morning. Will monitor      Probable Ischemic colitis: Status post bowel resection and ileostomy. General surgery following with recommendation. Remains on Zosyn      Probable Bacterial Pneumonia: Repeat chest x-ray noted with persistent disease. Continue on Zosyn and vancomycin      Delirium: Multifactorial in the setting of poor pain control, acute illness and hospitalization  -Orientate daily  -Control pain  -Minimize noise at night, open blinds during the day  -Minimize tetherings    Active Problems:    Gastroesophageal reflux disease without esophagitis: On Protonix    Hypocalcemia: We will replete and monitor    CAD (coronary artery disease): With no chest pains. Asymptomatic.   Continue metoprolol        Resolved Problems:    MARIELOS (acute kidney injury) ([facility] [address]    Shock/hypotension        Medications:  Reviewed  Infusion Medications    sodium chloride      PN-Adult Premix 4.25/10 - Peripheral Line 75 mL/hr at 04/07/22 0616    sodium chloride Stopped (04/07/22 0512)    dextrose       Scheduled Medications    metoprolol  2.5 mg IntraVENous Q6H    enoxaparin  70 mg SubCUTAneous BID    fat emulsion  250 mL IntraVENous Once per day on Mon Tue Thu Fri    vancomycin [facility]) intermittent dosing (placeholder)   Other RX Placeholder    piperacillin-tazobactam  2,250 mg IntraVENous Q8H    insulin lispro  0-6 Units SubCUTAneous TID WC    insulin lispro  0-3 Units SubCUTAneous Nightly    adalimumab  40 mg SubCUTAneous Once    [Held by provider] apixaban  2.5 mg Oral BID    [Held by provider] aspirin  81 mg Oral Daily    sodium chloride flush  5-40 mL IntraVENous 2 times per day    pantoprazole  40 mg IntraVENous Daily     PRN Meds: sodium chloride, HYDROmorphone **OR** HYDROmorphone, acetaminophen, glucose, glucagon (rDNA), dextrose, dextrose bolus (hypoglycemia) **OR** dextrose bolus (hypoglycemia), sodium chloride flush, ondansetron **OR** ondansetron      DVT Prophylaxis: Subcut enoxaparin  Diet: Diet NPO Exceptions are: Ice Chips, Popsicles  PN-Adult Premix 4.25/10 - Peripheral Line  Code Status: Full Code    Dispo: Awaiting clinical improvement    ____________________________________________________________________________    Physical Exam:  BP (!) 141/105   Pulse 109   Temp 97.7 °F (36.5 °C)   Resp 17   Ht 5' 9.02\" (1.753 m)   Wt 172 lb (78 kg)   SpO2 100%   BMI 25.39 kg/m²   General appearance: No apparent distress, appears stated age and cooperative. HEENT: Normocephalic, atraumatic, MMM, No sclera icterus/conjuctival palor  Neck: Supple, no thyromegally. No jugular venous distention. Respiratory:  Normal respiratory effort. Clear to auscultation, no Rales/Wheezes/Rhonchi. Cardiovascular: S1/S2 without murmurs, rubs or gallops. RRR  Abdomen: Soft, diffuse abdominal tenderness with mild distention and diminished bowel sounds  Musculoskeletal: No clubbing, cyanosis or edema bilaterally. Skin: Skin color, texture, turgor normal.  No rashes or lesions. Neurologic:  Cranial nerves: II-XII intact, CED, No focal sensory/motor deficits  Psychiatric: Alert and oriented, thought content appropriate  Capillary Refill: Brisk,< 3 seconds   Peripheral Pulses: +2 palpable, equal bilaterally       Intake/Output Summary (Last 24 hours) at 4/7/2022 0831  Last data filed at 4/7/2022 0616  Gross per 24 hour   Intake 3043.16 ml   Output 2500 ml   Net 543.16 ml       Labs:   Recent Labs     04/05/22  0405 04/05/22  1520 04/06/22  0520 04/06/22  1545 04/07/22  0343   WBC 18.5*  --  15.3*  --  13.7*   HGB 7.4*   < > 7.1* 8.2* 8.2*   HCT 23.6*   < > 22.8* 26.7* 27.0*     --  258  --  246    < > = values in this interval not displayed.       Recent Labs     04/05/22  0405 04/05/22  0405 04/05/22  1520 04/06/22  0520 04/07/22  0343   *   < > 133* 136 135   K 3.8   < > 3.7 3.8 4.0      < > 100 103 104   CO2 22   < > 24 23 24   BUN 35*   < > 37* 29* 28*   CREATININE 1.8*   < > 1.6* 1.3 1.2   CALCIUM 6.9*   < > 7.3* 7.3* 7.4*   PHOS 3.3  --   --  3.1 2.8   AST  --   --   --   --  11*   ALT  --   --   --   --  9*   BILIDIR  --   --   --   --  0.3   BILITOT  --   --   --   --  0.4   ALKPHOS  --   --   --   --  64    < > = values in this interval not displayed. No results for input(s): Aaron Flores in the last 72 hours. Urinalysis:    Lab Results   Component Value Date    NITRU POSITIVE 04/02/2022    WBCUA 11 04/02/2022    BACTERIA OCCASIONAL 04/02/2022    RBCUA 1 04/02/2022    BLOODU MODERATE 04/02/2022    SPECGRAV 1.015 04/02/2022       Radiology:  XR CHEST PORTABLE   Final Result   Low lung volumes with persistent bibasilar airspace disease. Trace left pleural effusion. XR ABDOMEN (KUB) (SINGLE AP VIEW)   Final Result   1. Nasogastric tube has been advanced with the tip over the antrum. 2.  New skin staples are present at the abdomen and pelvis. 3.  Residual gas dilatation of multiple small bowel loops. There may be a   small amount of postsurgical pneumoperitoneum. 4.  See the prior CT scan for evaluation of the infrarenal aortic aneurysm. XR CHEST PORTABLE   Final Result   Right line placement as above. Bilateral small areas of pneumonia         XR ABDOMEN (KUB) (SINGLE AP VIEW)   Final Result   Multiple air-filled distended loops of small bowel stacked in the mid abdomen   suggesting a low-grade partial small bowel obstruction or focal ileus. Atherosclerotic calcification of an abdominal aortic aneurysm as seen on   prior CT. NG tube in the stomach with proximal port at the level of the GE junction. XR CHEST PORTABLE   Final Result   Enteric tube tip is noted within the stomach with the side port likely at or   just distal to the GE junction.          XR CHEST PORTABLE   Final Result Trace left pleural effusion with adjacent atelectasis. CT ABDOMEN PELVIS W IV CONTRAST Additional Contrast? None   Final Result   Interval development of severe circumferential wall thickening and   pericolonic inflammatory changes of the transverse colon and splenic flexure   of the colon just proximal to the left lower quadrant colostomy with   increased fluid-filled distension of the proximal colon and small bowel. Findings suggest acute infectious/inflammatory colitis with proximal partial   obstruction versus ileus. Stable infrarenal abdominal aortic aneurysm measuring 5.5 cm in diameter. See follow-up recommendations below. Stable hepatic cysts and bilateral renal cortical cysts. Cardiomegaly with bilateral pleural effusions suggesting mild CHF. RECOMMENDATIONS:   5.5 cm infrarenal abdominal aortic aneurysm. Recommend referral to a vascular   specialist.      Reference: J Am Abad Radiol 3569;32:[id number]. XR ABDOMEN (KUB) (SINGLE AP VIEW)    (Results Pending)           Erica Henderson MD      Please excuse brevity and/or typos. This report was transcribed using voice recognition software. Every effort was made to ensure accuracy, however, inadvertent computerized transcription errors may be present.

## 2022-04-07 NOTE — PLAN OF CARE
Problem: Falls - Risk of:  Goal: Will remain free from falls  Description: Will remain free from falls  4/7/2022 0901 by Shala Fredreick  Outcome: Ongoing  4/7/2022 0303 by Virgilio Kessler RN  Outcome: Ongoing  Goal: Absence of physical injury  Description: Absence of physical injury  4/7/2022 0901 by Shala Frederick  Outcome: Ongoing  4/7/2022 0303 by Virgilio Kessler RN  Outcome: Ongoing     Problem: Pain:  Goal: Pain level will decrease  Description: Pain level will decrease  4/7/2022 0901 by Shala Frederick  Outcome: Ongoing  4/7/2022 0303 by Virgilio Kessler RN  Outcome: Ongoing  Goal: Control of acute pain  Description: Control of acute pain  4/7/2022 0901 by Shala Frederick  Outcome: Ongoing  4/7/2022 0303 by Virgilio Kessler RN  Outcome: Ongoing  Goal: Control of chronic pain  Description: Control of chronic pain  4/7/2022 0901 by Shala Frederick  Outcome: Ongoing  4/7/2022 0303 by Virgilio Kessler RN  Outcome: Ongoing     Problem: Skin Integrity:  Goal: Will show no infection signs and symptoms  Description: Will show no infection signs and symptoms  4/7/2022 0901 by Shala Frederick  Outcome: Ongoing  4/7/2022 0303 by Virgilio Kessler RN  Outcome: Ongoing  Goal: Absence of new skin breakdown  Description: Absence of new skin breakdown  4/7/2022 0901 by Shala Frederick  Outcome: Ongoing  4/7/2022 0303 by Virgilio Kessler RN  Outcome: Ongoing  Goal: Signs of wound healing will improve  Description: Signs of wound healing will improve  4/7/2022 0901 by Shala Frederick  Outcome: Ongoing  4/7/2022 0303 by Virgilio Kessler RN  Outcome: Ongoing  Goal: Risk for impaired skin integrity will decrease  Description: Risk for impaired skin integrity will decrease  4/7/2022 0901 by Shala Frederick  Outcome: Ongoing  4/7/2022 0303 by iVrgilio Kessler RN  Outcome: Ongoing     Problem: Infection - Surgical Site:  Goal: Will show no infection signs and symptoms  Description: Will show no infection signs and symptoms  4/7/2022 0901 by Shala Frederick  Outcome: Ongoing  4/7/2022 0303 by Kristi James LESTER Phillips  Outcome: Ongoing     Problem:  Bowel/Gastric:  Goal: Gastrointestinal status for postoperative course will improve  Description: Gastrointestinal status for postoperative course will improve  4/7/2022 0901 by Gayla Rapp  Outcome: Ongoing  4/7/2022 0303 by Bismark Brooks RN  Outcome: Ongoing     Problem: Fluid Volume:  Goal: Ability to achieve a balanced intake and output will improve  Description: Ability to achieve a balanced intake and output will improve  4/7/2022 0901 by Gayla Rapp  Outcome: Ongoing  4/7/2022 0303 by Bismark Brooks RN  Outcome: Ongoing     Problem: Nutritional:  Goal: Ability to attain and maintain optimal nutritional status will improve  Description: Ability to attain and maintain optimal nutritional status will improve  4/7/2022 0901 by Gayla Rapp  Outcome: Ongoing  4/7/2022 0303 by Bismark Brooks RN  Outcome: Ongoing     Problem: Physical Regulation:  Goal: Postoperative complications will be avoided or minimized  Description: Postoperative complications will be avoided or minimized  4/7/2022 0901 by Gayla Rapp  Outcome: Ongoing  4/7/2022 0303 by Bismark Brooks RN  Outcome: Ongoing     Problem: Sensory:  Goal: Pain level will decrease  Description: Pain level will decrease  4/7/2022 0901 by Gayla Rapp  Outcome: Ongoing  4/7/2022 0303 by Bismark Brooks RN  Outcome: Ongoing     Problem: Nutrition  Goal: Optimal nutrition therapy  4/7/2022 0901 by Gayla Rapp  Outcome: Ongoing  4/7/2022 0303 by Bismark Brooks RN  Outcome: Ongoing

## 2022-04-07 NOTE — PROGRESS NOTES
Today's plan: Patient appears more stable today however his heart rate fluctuates from 108 240, will add one-time 0.5 mg IV digoxin as he continues to be n.p.o. and NG tube is in, continue TPN, off Levophed for the last few days  Patient is not getting Eliquis ,he is getting Lovenox however in [facility] both medications are and, will recommend to make sure once his NG tube is out he only gets you the Eliquis on Lovenox not both  Admit Date:  4/2/2022    Subjective: Abdominal pain    Chief complaints on admission  Chief Complaint   Patient presents with    GI Problem     dark stool, colostomy, on eliquis         History of present illness:Denis is a 80 y. o.year old who  presents with had concerns including GI Problem (dark stool, colostomy, on eliquis). Past medical history:    has a past medical history of AAA (abdominal aortic aneurysm) ([address] MARIELOS (acute kidney injury) ([address] Angina, class I ([address] Benign prostatic hyperplasia, Bowel obstruction ([address] CAD (coronary artery disease), Cancer ([address] CCC (chronic calculous cholecystitis), Delirium, Gastroesophageal reflux disease without esophagitis, Hx of cardiovascular stress test, Hypertension, Partial small bowel obstruction ([address] Primary malignant neoplasm of rectum ([address] Probable Bacterial Pneumonia, Psoriasis, Psoriatic arthritis ([address] Pyelonephritis, Stable angina ([address] and Unspecified cerebral artery occlusion with cerebral infarction. Past surgical history:   has a past surgical history that includes Appendectomy; colostomy; Upper gastrointestinal endoscopy (N/A, 2/28/2022); Colonoscopy (N/A, 3/2/2022); and laparotomy (N/A, 4/3/2022). Social History:   reports that he quit smoking about 49 years ago. His smoking use included cigarettes. He has a 60.00 pack-year smoking history. He has never used smokeless tobacco. He reports that he does not drink alcohol and does not use drugs.   Family history:  family history includes Cancer in his father and mother. Allergies   Allergen Reactions    Morphine Hallucinations    Sulfa Antibiotics          Objective:   /69   Pulse 110   Temp 97.5 °F (36.4 °C) (Oral)   Resp 13   Ht 5' 9.02\" (1.753 m)   Wt 172 lb (78 kg)   SpO2 97%   BMI 25.39 kg/m²       Intake/Output Summary (Last 24 hours) at 4/7/2022 1328  Last data filed at 4/7/2022 0846  Gross per 24 hour   Intake 3043.16 ml   Output 2570 ml   Net 473.16 ml       TELEMETRY: A. fib    Physical Exam:  Constitutional:  Well developed, Well nourished, No acute distress, Non-toxic appearance. HENT:  Normocephalic, Atraumatic, Bilateral external ears normal, Oropharynx moist, No oral exudates, Nose normal. Neck- Normal range of motion, No tenderness, Supple, No stridor. Eyes:  PERRL, EOMI, Conjunctiva normal, No discharge. Respiratory:  Normal breath sounds, No respiratory distress, No wheezing, No chest tenderness. ,no use of accessory muscles, diaphragm movement is normal  Cardiovascular: (PMI) apex non displaced,no lifts no thrills, no s3,no s4, Normal heart rate, Normal rhythm, No murmurs, No rubs, No gallops. Carotid arteries pulse and amplitude are normal no bruit, no abdominal bruit noted ( normal abdominal aorta ausculation), femoral arteries pulse and amplitude are normal no bruit, pedal pulses are normal  GI:  Bowel sounds normal, Soft, No tenderness, No masses, No pulsatile masses. : External genitalia appear normal, No masses or lesions. No discharge. No CVA tenderness. Musculoskeletal:  Intact distal pulses, No edema, No tenderness, No cyanosis, No clubbing. Good range of motion in all major joints. No tenderness to palpation or major deformities noted. Back- No tenderness. Integument:  Warm, Dry, No erythema, No rash. Lymphatic:  No lymphadenopathy noted. Neurologic:  Alert & oriented x 3, Normal motor function, Normal sensory function, No focal deficits noted.    Psychiatric:  Affect normal, Judgment normal, Mood normal. Medications:    vancomycin  1,250 mg IntraVENous Q24H    digoxin  125 mcg IntraVENous Once    metoprolol  2.5 mg IntraVENous Q6H    enoxaparin  70 mg SubCUTAneous BID    fat emulsion  250 mL IntraVENous Once per day on Mon Tue Thu Fri    piperacillin-tazobactam  2,250 mg IntraVENous Q8H    insulin lispro  0-6 Units SubCUTAneous TID WC    insulin lispro  0-3 Units SubCUTAneous Nightly    adalimumab  40 mg SubCUTAneous Once    [Held by provider] apixaban  2.5 mg Oral BID    [Held by provider] aspirin  81 mg Oral Daily    sodium chloride flush  5-40 mL IntraVENous 2 times per day    pantoprazole  40 mg IntraVENous Daily      PN-Adult Premix 4.25/10 - Peripheral Line      sodium chloride      PN-Adult Premix 4.25/10 - Peripheral Line 75 mL/hr at 04/07/22 0616    sodium chloride Stopped (04/07/22 0512)    dextrose       sodium chloride, HYDROmorphone **OR** HYDROmorphone, acetaminophen, glucose, glucagon (rDNA), dextrose, dextrose bolus (hypoglycemia) **OR** dextrose bolus (hypoglycemia), sodium chloride flush, ondansetron **OR** ondansetron    Lab Data:  CBC:   Recent Labs     04/05/22  0405 04/05/22  1520 04/06/22  0520 04/06/22  1545 04/07/22  0343   WBC 18.5*  --  15.3*  --  13.7*   HGB 7.4*   < > 7.1* 8.2* 8.2*   HCT 23.6*   < > 22.8* 26.7* 27.0*   .3*  --  112.3*  --  111.6*     --  258  --  246    < > = values in this interval not displayed. BMP:   Recent Labs     04/05/22  0405 04/05/22  0405 04/05/22  1520 04/06/22  0520 04/07/22  0343   *   < > 133* 136 135   K 3.8   < > 3.7 3.8 4.0      < > 100 103 104   CO2 22   < > 24 23 24   PHOS 3.3  --   --  3.1 2.8   BUN 35*   < > 37* 29* 28*   CREATININE 1.8*   < > 1.6* 1.3 1.2    < > = values in this interval not displayed.      LIVER PROFILE:   Recent Labs     04/07/22  0343   AST 11*   ALT 9*   BILIDIR 0.3   BILITOT 0.4   ALKPHOS 64     PT/INR:   Recent Labs     04/07/22  0343   PROTIME 17.6*   INR 1.36     APTT: Recent Labs     04/07/22  0343   APTT 35.0     BNP:  No results for input(s): BNP in the last 72 hours. TROPONIN: @TROPONINI:3@      Assessment:  80 y. o.year old who is admitted for          Plan:  A. fib patient is off IV Cardizem drip off IV amiodarone drip if needed may use IV digoxin for rate control at this time  History of expiratory laparotomy and ileostomy, received 2 units of transfusion so far since in the hospital  Patient has reaction with morphine therefore will recommend not use morphine again  All labs, medications and tests reviewed, continue all other medications of all above medical condition listed as is.       Isaiah Ordonez MD, MD 4/7/2022 1:28 PM

## 2022-04-08 NOTE — PROGRESS NOTES
Physical Therapy Treatment Note  Name: Jorge Alberto Anderson MRN: [id number] :   1929   Date:  2022   Admission Date: 2022 Room:  [address] [place], [place] [address]     Restrictions/Precautions:  Restrictions/Precautions  Restrictions/Precautions: General Precautions,Fall Risk         Communication with other providers:  RN, OT    Subjective:  Patient states: \"How did I get here from the hospital?\"  Pain:   Location, Type, Intensity (0/10 to 10/10): Does not rate pain but pain evident with movement    Objective:    Observation:  Pt supine in bed upon entry and agreeable to session    Treatment, including education/measures:    Bed mobiltiy: pt completed supine to sitting EOB mod A x2 with cues for sequencing and assist at bilateral LEs, hips, and trunk    Transfers: Pt completed STS transfer from bed mod A with cues for sequencing.  Pt completed stand pivot from bed to chair max A with cues for sequencing and hand placement    Balance: Pt sat EOB 10 minutes max A progressing to min A with initial retropulsion which improved with cues and time    Assessment / Impression:    Pt up in chair at end of session with needs in reach and alarm on    Patient's tolerance of treatment:  fair   Adverse Reaction: n/a  Significant change in status and impact:  n/a  Barriers to improvement:  Decreased endurance, impaired balance, impaired cognition    Plan for Next Session:    Continue to address bed mobility, balance, and transfer training in future sessions    Time in:  1412  Time out:  1444  Timed treatment minutes:  32  Total treatment time:  32    Previously filed items:     Short term goals  Time Frame for Short term goals: 1 week  Short term goal 1: pt to complete all bed mobility mod A x1  Short term goal 2: Pt to complete all STS transfers to/from bed, commode, and chair max A  Short term goal 3: Pt to complete stand pivot transfer with LRAD max A       Electronically signed by:    Adilene Genao, PT  2022, 3:35 PM

## 2022-04-08 NOTE — PROGRESS NOTES
Today's plan: X-ray ordered Lasix 20 mg IV one-time order DC Cardizem drip at 30 mg NG Cardizem every 12 hours for now    Admit Date:  4/2/2022    Subjective: Abdominal pain    Chief complaints on admission  Chief Complaint   Patient presents with    GI Problem     dark stool, colostomy, on eliquis         History of present illness:Denis is a 80 y. o.year old who  presents with had concerns including GI Problem (dark stool, colostomy, on eliquis). Past medical history:    has a past medical history of AAA (abdominal aortic aneurysm) ([address] MARIELOS (acute kidney injury) ([address] Angina, class I ([address] Benign prostatic hyperplasia, Bowel obstruction ([address] CAD (coronary artery disease), Cancer ([address] CCC (chronic calculous cholecystitis), Delirium, Gastroesophageal reflux disease without esophagitis, Hx of cardiovascular stress test, Hypertension, Partial small bowel obstruction ([address] Primary malignant neoplasm of rectum ([address] Probable Bacterial Pneumonia, Psoriasis, Psoriatic arthritis ([address] Pyelonephritis, Stable angina ([address] and Unspecified cerebral artery occlusion with cerebral infarction. Past surgical history:   has a past surgical history that includes Appendectomy; colostomy; Upper gastrointestinal endoscopy (N/A, 2/28/2022); Colonoscopy (N/A, 3/2/2022); and laparotomy (N/A, 4/3/2022). Social History:   reports that he quit smoking about 49 years ago. His smoking use included cigarettes. He has a 60.00 pack-year smoking history. He has never used smokeless tobacco. He reports that he does not drink alcohol and does not use drugs. Family history:  family history includes Cancer in his father and mother.     Allergies   Allergen Reactions    Morphine Hallucinations    Sulfa Antibiotics          Objective:   /71   Pulse 96   Temp 98.2 °F (36.8 °C) (Oral)   Resp 16   Ht 5' 9.02\" (1.753 m)   Wt 207 lb 0.2 oz (93.9 kg)   SpO2 99%   BMI 30.56 kg/m²       Intake/Output Summary (Last 24 hours) at 4/8/2022 1011  Last data filed at 4/8/2022 0600  Gross per 24 hour   Intake 2599.73 ml   Output 2050 ml   Net 549.73 ml       TELEMETRY: A. fib    Physical Exam:  Constitutional:  Well developed, Well nourished, No acute distress, Non-toxic appearance. HENT:  Normocephalic, Atraumatic, Bilateral external ears normal, Oropharynx moist, No oral exudates, Nose normal. Neck- Normal range of motion, No tenderness, Supple, No stridor. Eyes:  PERRL, EOMI, Conjunctiva normal, No discharge. Respiratory:  Normal breath sounds, No respiratory distress, No wheezing, No chest tenderness. ,no use of accessory muscles, diaphragm movement is normal  Cardiovascular: (PMI) apex non displaced,no lifts no thrills, no s3,no s4, Normal heart rate, Normal rhythm, No murmurs, No rubs, No gallops. Carotid arteries pulse and amplitude are normal no bruit, no abdominal bruit noted ( normal abdominal aorta ausculation), femoral arteries pulse and amplitude are normal no bruit, pedal pulses are normal  GI:  Bowel sounds normal, Soft, No tenderness, No masses, No pulsatile masses. : External genitalia appear normal, No masses or lesions. No discharge. No CVA tenderness. Musculoskeletal:  Intact distal pulses, + edema, No tenderness, No cyanosis, No clubbing. Good range of motion in all major joints. No tenderness to palpation or major deformities noted. Back- No tenderness. Integument:  Warm, Dry, No erythema, No rash. Lymphatic:  No lymphadenopathy noted. Neurologic:  Alert & oriented x 3, Normal motor function, Normal sensory function, No focal deficits noted.    Psychiatric:  Affect normal, Judgment normal, Mood normal.     Medications:    metroNIDAZOLE  500 mg IntraVENous Q8H    calcium chloride IVPB  1,000 mg IntraVENous Once    vancomycin  1,250 mg IntraVENous Q24H    metoprolol  2.5 mg IntraVENous Q6H    enoxaparin  70 mg SubCUTAneous BID    fat emulsion  250 mL IntraVENous Once per day on Mon Tue Thu Fri    piperacillin-tazobactam  2,250 mg IntraVENous Q8H    insulin lispro  0-6 Units SubCUTAneous TID     insulin lispro  0-3 Units SubCUTAneous Nightly    [Held by provider] apixaban  2.5 mg Oral BID    [Held by provider] aspirin  81 mg Oral Daily    sodium chloride flush  5-40 mL IntraVENous 2 times per day    pantoprazole  40 mg IntraVENous Daily      PN-Adult Premix 4.25/10 - Peripheral Line      PN-Adult Premix 4.25/10 - Peripheral Line 75 mL/hr at 04/07/22 1831    dilTIAZem 5 mg/hr (04/07/22 1831)    sodium chloride 30 mL/hr at 04/07/22 1831    dextrose       oxyCODONE-acetaminophen, HYDROmorphone **OR** HYDROmorphone, acetaminophen, glucose, glucagon (rDNA), dextrose, dextrose bolus (hypoglycemia) **OR** dextrose bolus (hypoglycemia), sodium chloride flush, ondansetron **OR** ondansetron    Lab Data:  CBC:   Recent Labs     04/06/22  0520 04/06/22  0520 04/06/22  1545 04/07/22 0343 04/08/22  0511   WBC 15.3*  --   --  13.7* 16.0*   HGB 7.1*   < > 8.2* 8.2* 8.2*   HCT 22.8*   < > 26.7* 27.0* 26.9*   .3*  --   --  111.6* 112.6*     --   --  246 270    < > = values in this interval not displayed. BMP:   Recent Labs     04/06/22  0520 04/07/22 0343 04/08/22  0511    135 136   K 3.8 4.0 4.1    104 105   CO2 23 24 22   PHOS 3.1 2.8  --    BUN 29* 28* 29*   CREATININE 1.3 1.2 1.1     LIVER PROFILE:   Recent Labs     04/07/22 0343   AST 11*   ALT 9*   BILIDIR 0.3   BILITOT 0.4   ALKPHOS 64     PT/INR:   Recent Labs     04/07/22 0343   PROTIME 17.6*   INR 1.36     APTT:   Recent Labs     04/07/22 0343   APTT 35.0     BNP:  No results for input(s): BNP in the last 72 hours. TROPONIN: @TROPONINI:3@      Assessment:  80 y. o.year old who is admitted for          Plan:  A. fib patient is controlled DC Cardizem drip, continue Lovenox full dose for anticoagulation, his aspirin and his Eliquis is on hold  History of expiratory laparotomy and ileostomy, received 2 units of transfusion so far since in the hospital  Patient has reaction with morphine therefore will recommend not use morphine again  TPN to continue patient has NG tube which may come out today  All labs, medications and tests reviewed, continue all other medications of all above medical condition listed as is.       Sarah Richard MD, MD 4/8/2022 10:11 AM

## 2022-04-08 NOTE — PROGRESS NOTES
Comprehensive Nutrition Assessment    Type and Reason for Visit:  Reassess    Nutrition Recommendations/Plan:   · Please change PN to 5/15 at 84 mL/hr    Nutrition Assessment:  Pt remains on PN at his goal with suction through the NG. Please continue PN as ordered until the pt is able to consume food through PO intake    Malnutrition Assessment:  Malnutrition Status:  Insufficient data    Context:  Acute Illness       Estimated Daily Nutrient Needs:  Energy (kcal):  5056-4104 (23-26 kcal/kg); Weight Used for Energy Requirements:  Current     Protein (g):  78-94 (1.0-1.2 g/kg); Weight Used for Protein Requirements:  Current        Fluid (ml/day):  5330-0971; Method Used for Fluid Requirements:  1 ml/kcal      Wounds:  Surgical Incision       Current Nutrition Therapies:    Current Parenteral Nutrition Orders:  · Type and Formula:  (Clinimix 4.25/10)   · Lipids: 250ml (four times weekly)  · Duration: Continuous  · Rate/Volume: 84    Anthropometric Measures:  · Height: 5' 9.02\" (175.3 cm)  · Current Body Weight: 207 lb (93.9 kg)   · Admission Body Weight: 171 lb (77.6 kg)    · Usual Body Weight: 187 lb (84.8 kg)     · Ideal Body Weight: 160 lbs; % Ideal Body Weight 107.5 %   · BMI: 30.6  · BMI Categories: Obese Class 1 (BMI 30.0-34. 9)       Nutrition Diagnosis:   · Inadequate oral intake related to altered GI structure,altered GI function as evidenced by NPO or clear liquid status due to medical condition,nutrition support - parenteral nutrition      Nutrition Interventions:   Food and/or Nutrient Delivery:  Continue Current Parenteral Nutrition  Nutrition Education/Counseling:  No recommendation at this time   Coordination of Nutrition Care:  Continue to monitor while inpatient    Goals:  Pt will meet greater than 75% of estimated nutrient needs via PN       Nutrition Monitoring and Evaluation:   Behavioral-Environmental Outcomes:  None Identified   Food/Nutrient Intake Outcomes:  Parenteral Nutrition Intake/Tolerance,Diet Advancement/Tolerance  Physical Signs/Symptoms Outcomes:  Biochemical Data,GI Status,Hemodynamic Status,Fluid Status or Edema,Weight,Skin     Discharge Planning:     Too soon to determine     Electronically signed by Mykel Tam RD, LD on 2/9/89 at 4:52 PM EDT    Contact: [phone]

## 2022-04-08 NOTE — PROGRESS NOTES
Patient seen and examined. Patient is awake, slightly confused. Complains of abdominal pain. NG bilious is canister, watery/light brown in tubing    PE:  Vitals:    04/08/22 0745 04/08/22 0800 04/08/22 0815 04/08/22 0830   BP: 114/62 111/77 114/75 111/71   Pulse: 88 92 100 96   Resp: 24 23 26 16   Temp:  98.2 °F (36.8 °C)     TempSrc:  Oral     SpO2:       Weight:       Height:         Abd:  Soft, minimal distention, diffuse tenderness, stoma pink and viable, large volume bilious output in appliance, incisions C/D/I, scant serous drainage on bandages, no redness at incisions or drainage expressed    Labs reviewed    abd x-rays from yesterday reviewed    A/P:  -TPN for nutrition  -was restarted on cardizem gtt last night for rapid a-fib  -on therapeutic lovenox  -anemia of chronic disease, stable  -on protonix  -on zosyn d#7, Vancomycin d#6, will re-start flagyl. Patient's pathology consistent with pseudomembranous colitis, entire colon has been removed but will resume flagyl (WBC increased after stopping) rare but can have SB enteritis as well  -will repeat blood cultures, repeat urine culture (will remove and place new silveira to do so)  -persistent abdominal pain, will start percocet oral to help with pain control. Will clamp NG after PO dosing. IV narcotics have been decreasing BP and causing confusion.

## 2022-04-08 NOTE — PROGRESS NOTES
Hospitalist Progress Note      PCP: Krystyna Armendariz MD    Date of Admission: 4/2/2022    LOS: 6    Subjective:   Overnight Events:   Noted tachycardia and patient restarted on Cardizem drip for rate control  Still with abdominal discomfort  Abdominal x-ray yesterday noted for probable ileus  Noted WBC bump    Active Hospital Problems    Diagnosis Date Noted    Delirium [R41.0] 04/07/2022    Atrial fibrillation with RVR ([address] [I48.91] 04/05/2022    Hypotension [I95.9] 04/05/2022    Ischemic colitis ([address] [K55.9] 04/05/2022    Hypocalcemia [E83.51] 04/05/2022    MARIELOS (acute kidney injury) ([address] [N17.9] 04/05/2022    Probable Bacterial Pneumonia [J18.9] 04/05/2022    CAD (coronary artery disease) [I25.10] 04/05/2022    Anemia [D64.9] 04/05/2022    SBO (small bowel obstruction) ([facility] [K56.609] 04/02/2022    Pseudomonas urinary tract infection [N39.0, B96.5] 03/20/2022    Gastroesophageal reflux disease without esophagitis [K21.9] 05/21/2020       Case Summary:   80year-old gentleman history of GERD, CAD, hypertension, atrial fibrillation, BPH, recurrent small bowel obstruction admitted with small bowel obstruction and concern for ischemic colitis status post exploratory laparotomy for colitis and acute abdomen with subtotal colon resection and mobilization of hepatic flexure, small bowel resection and end ileostomy. Admission course complicated by atrial fibrillation with RVR, shock due to sepsis, UTI, acute kidney injury with hyperglycemia and probable bacterial pneumonia. Principal Problem:    SBO (small bowel obstruction) ([address]): Status post bowel resection and ileostomy. Continues to have abdominal pain. No significant stoma output. Diminished bowel sounds. KUB done yesterday noted for ileus  -Continue empiric antibiotics  -Pain management   -General surgery following with recommendation     Pseudomonas UTI: On Zosyn. Leukocytosis: Noted WBC bumped after discontinuing Flagyl.   Patient had remained on Zosyn with vancomycin. Flagyl reintroduced this morning. There is concern for pseudomembranous colitis. -Get ID consult  -Continue current antibiotics    Nutrition: On TPN at goal rate    Atrial fibrillation with RVR: Restarted on Cardizem drip with improved rate control. Continue with q. 6 hourly metoprolol IV. -Cardiology following with recommendations  -Continue rate control  -On therapeutic Lovenox    Probable ischemic colitis: Status post bowel resection and ileostomy. General surgery following with recommendation. Remains on Zosyn, vancomycin and Flagyl. Delirium: Patient with fluctuating level of consciousness. Multifactorial in setting of poor pain control, acute illness and hospitalization.  -Oriente daily  -Pain control  -Minimize tetherings    Active Problems:    Gastroesophageal reflux disease without esophagitis: On Protonix    Hypocalcemia: We will replete and monitor    Probable Bacterial Pneumonia: On vancomycin and Zosyn. CAD (coronary artery disease): No chest pains or shortness of breath. Anemia: Status post PRBC to help improve perfusion. Stable.     Resolved Problems:    Hypotension    MARIELOS (acute kidney injury) ([facility]        Medications:  Reviewed  Infusion Medications    PN-Adult Premix 4.25/10 - Peripheral Line      PN-Adult Premix 4.25/10 - Peripheral Line 75 mL/hr at 04/07/22 1831    dilTIAZem 5 mg/hr (04/07/22 1831)    sodium chloride 30 mL/hr at 04/07/22 1831    dextrose       Scheduled Medications    metroNIDAZOLE  500 mg IntraVENous Q8H    vancomycin  1,250 mg IntraVENous Q24H    metoprolol  2.5 mg IntraVENous Q6H    enoxaparin  70 mg SubCUTAneous BID    fat emulsion  250 mL IntraVENous Once per day on Mon Tue Thu Fri    piperacillin-tazobactam  2,250 mg IntraVENous Q8H    insulin lispro  0-6 Units SubCUTAneous TID WC    insulin lispro  0-3 Units SubCUTAneous Nightly    [Held by provider] apixaban  2.5 mg Oral BID    [Held by provider] aspirin 81 mg Oral Daily    sodium chloride flush  5-40 mL IntraVENous 2 times per day    pantoprazole  40 mg IntraVENous Daily     PRN Meds: oxyCODONE-acetaminophen, HYDROmorphone **OR** HYDROmorphone, acetaminophen, glucose, glucagon (rDNA), dextrose, dextrose bolus (hypoglycemia) **OR** dextrose bolus (hypoglycemia), sodium chloride flush, ondansetron **OR** ondansetron      DVT Prophylaxis: Enoxaparin  Diet: Diet NPO Exceptions are: Ice Chips, Popsicles  PN-Adult Premix 4.25/10 - Peripheral Line  PN-Adult Premix 4.25/10 - Peripheral Line  Code Status: Full Code    Dispo: Awaiting clinical improvement    ____________________________________________________________________________    Physical Exam:  /71   Pulse 96   Temp 98.2 °F (36.8 °C) (Oral)   Resp 16   Ht 5' 9.02\" (1.753 m)   Wt 207 lb 0.2 oz (93.9 kg)   SpO2 99%   BMI 30.56 kg/m²   General appearance: No apparent distress, appears stated age and cooperative. Sleeping this morning at the time of evaluation  HEENT: Normocephalic, atraumatic, MMM, No sclera icterus/conjuctival palor  Neck: Supple, no thyromegally. No jugular venous distention. Respiratory:  Normal respiratory effort. Clear to auscultation, no Rales/Wheezes/Rhonchi. Cardiovascular: S1/S2 without murmurs, rubs or gallops. RRR  Abdomen: Soft, tenderness, non-distended, bowel sounds diminished. Musculoskeletal: No clubbing, cyanosis or edema bilaterally. Skin: Skin color, texture, turgor normal.  No rashes or lesions.   Neurologic:  Cranial nerves: II-XII intact, CED, No focal sensory/motor deficits        Intake/Output Summary (Last 24 hours) at 4/8/2022 0915  Last data filed at 4/8/2022 0600  Gross per 24 hour   Intake 2599.73 ml   Output 2050 ml   Net 549.73 ml       Labs:   Recent Labs     04/06/22  0520 04/06/22  0520 04/06/22  1545 04/07/22  0343 04/08/22  0511   WBC 15.3*  --   --  13.7* 16.0*   HGB 7.1*   < > 8.2* 8.2* 8.2*   HCT 22.8*   < > 26.7* 27.0* 26.9*     -- --  246 270    < > = values in this interval not displayed. Recent Labs     04/06/22  0520 04/07/22  0343 04/08/22  0511    135 136   K 3.8 4.0 4.1    104 105   CO2 23 24 22   BUN 29* 28* 29*   CREATININE 1.3 1.2 1.1   CALCIUM 7.3* 7.4* 7.7*   PHOS 3.1 2.8  --    AST  --  11*  --    ALT  --  9*  --    BILIDIR  --  0.3  --    BILITOT  --  0.4  --    ALKPHOS  --  64  --      No results for input(s): Aamir Pak in the last 72 hours. Urinalysis:    Lab Results   Component Value Date    NITRU POSITIVE 04/02/2022    WBCUA 11 04/02/2022    BACTERIA OCCASIONAL 04/02/2022    RBCUA 1 04/02/2022    BLOODU MODERATE 04/02/2022    SPECGRAV 1.015 04/02/2022       Radiology:  XR ABDOMEN FOR NG/OG/NE TUBE PLACEMENT   Final Result   Nasogastric tube terminates in the stomach         XR ABDOMEN FOR NG/OG/NE TUBE PLACEMENT   Preliminary Result   An NG tube tip is present with the tip coiled within the fundus of the   stomach. The proximal port is in the fundus of the stomach. Consider   advancement of the tube. XR ABDOMEN (KUB) (SINGLE AP VIEW)   Preliminary Result   The patient is status post surgery. There is decreased but continued mild   distension of the small bowel could represent partial obstruction or ileus. Calcification of the abdominal aorta consistent with a known aneurysm. The NG tube tip is in the fundus of the stomach. The proximal port is in the   distal esophagus near the GE junction. Consider advancement of the tube. XR CHEST PORTABLE   Final Result   Low lung volumes with persistent bibasilar airspace disease. Trace left pleural effusion. XR ABDOMEN (KUB) (SINGLE AP VIEW)   Final Result   1. Nasogastric tube has been advanced with the tip over the antrum. 2.  New skin staples are present at the abdomen and pelvis. 3.  Residual gas dilatation of multiple small bowel loops. There may be a   small amount of postsurgical pneumoperitoneum. 4.  See the prior CT scan for evaluation of the infrarenal aortic aneurysm. XR CHEST PORTABLE   Final Result   Right line placement as above. Bilateral small areas of pneumonia         XR ABDOMEN (KUB) (SINGLE AP VIEW)   Final Result   Multiple air-filled distended loops of small bowel stacked in the mid abdomen   suggesting a low-grade partial small bowel obstruction or focal ileus. Atherosclerotic calcification of an abdominal aortic aneurysm as seen on   prior CT. NG tube in the stomach with proximal port at the level of the GE junction. XR CHEST PORTABLE   Final Result   Enteric tube tip is noted within the stomach with the side port likely at or   just distal to the GE junction. XR CHEST PORTABLE   Final Result   Trace left pleural effusion with adjacent atelectasis. CT ABDOMEN PELVIS W IV CONTRAST Additional Contrast? None   Final Result   Interval development of severe circumferential wall thickening and   pericolonic inflammatory changes of the transverse colon and splenic flexure   of the colon just proximal to the left lower quadrant colostomy with   increased fluid-filled distension of the proximal colon and small bowel. Findings suggest acute infectious/inflammatory colitis with proximal partial   obstruction versus ileus. Stable infrarenal abdominal aortic aneurysm measuring 5.5 cm in diameter. See follow-up recommendations below. Stable hepatic cysts and bilateral renal cortical cysts. Cardiomegaly with bilateral pleural effusions suggesting mild CHF. RECOMMENDATIONS:   5.5 cm infrarenal abdominal aortic aneurysm. Recommend referral to a vascular   specialist.      Reference: [facility]iol 9943;[id number]. Erica Sylvester MD      Please excuse brevity and/or typos. This report was transcribed using voice recognition software.  Every effort was made to ensure accuracy, however, inadvertent computerized transcription errors may be present.

## 2022-04-08 NOTE — CONSULTS
he felt very tired and fatigued with dyspnea with light exertion. States he felt similar to this about a month ago. He reported having black stool in his colostomy bag in the last week also. The HPI is obtained from the EMR as the patient is confused. ? Infectious diseases service was consulted to evaluate the pt, and recommend further investigative and therapeutic measures. ROS: Other systems reviewed Including eyes, ENT, respiratory, cardiovascular, GI, , dermatologic, neurologic, psych, hem/lymphatic, musculoskeletal and endocrine were negative other than what is mentioned above.      Patient Active Problem List    Diagnosis Date Noted    Delirium 04/07/2022    Atrial fibrillation with RVR ([address] 04/05/2022    Hypotension 04/05/2022    Ischemic colitis ([address] 04/05/2022    Hypocalcemia 04/05/2022    MARIELOS (acute kidney injury) ([address] 04/05/2022    Probable Bacterial Pneumonia 04/05/2022    CAD (coronary artery disease) 04/05/2022    Anemia 04/05/2022    SBO (small bowel obstruction) ([address] 04/02/2022    Partial small bowel obstruction ([address] 03/23/2022    Pseudomonas urinary tract infection 03/20/2022    History of rectal cancer     Benign neoplasm of cecum     Benign neoplasm of ascending colon     Melena     Arteriovenous malformation of jejunum     GI bleed 02/27/2022    Paroxysmal atrial fibrillation ([address] 01/31/2022    Nonrheumatic aortic valve stenosis 10/04/2021    H/O: CVA (cerebrovascular accident) 08/02/2021    Irregular heart beat 08/02/2021    Gastroesophageal reflux disease without esophagitis 05/21/2020    Urinary retention 05/21/2020    Chronic myeloproliferative disease ([address] 04/30/2020    Monocytosis (symptomatic) 04/30/2020    Psoriatic arthritis ([address] 08/17/2019    Primary malignant neoplasm of rectum ([address] 08/17/2019    Psoriasis 08/17/2019    Hypertension 12/12/2011    Benign prostatic hyperplasia 12/12/2011     Past Medical History:   Diagnosis Date    AAA (abdominal aortic aneurysm) ([address]     MARIELOS (acute kidney injury) ([address] 2022    Angina, class I (HCC)     Benign prostatic hyperplasia 2011     Updating Deprecated Diagnoses    Bowel obstruction ([address]     CAD (coronary artery disease)     Cancer ([address]     rectal    CCC (chronic calculous cholecystitis) 2018    Delirium 2022    Gastroesophageal reflux disease without esophagitis 2020    Hx of cardiovascular stress test 2021    EF 62% Normal study    Hypertension     Partial small bowel obstruction ([address] 3/23/2022    Primary malignant neoplasm of rectum ([address] 2019    Probable Bacterial Pneumonia 2022    Psoriasis     Psoriatic arthritis ([address] 2019    Pyelonephritis 2018    Stable angina ([address] 2019    Unspecified cerebral artery occlusion with cerebral infarction       Past Surgical History:   Procedure Laterality Date    APPENDECTOMY      COLONOSCOPY N/A 3/2/2022    COLONOSCOPY POLYPECTOMY REMOVAL ABLATION/STOMA with EMR and placed 7 hemoclips. performed by Cristina Meza MD at [facility] N/A 4/3/2022    LAPAROTOMY EXPLORATORY COLON RESECTION performed by Wayne Easley MD at [address] N/A 2022    ENTEROSCOPY PUSH CONTROL HEMORRHAGE WITH APC ABLATION OF AVM performed by Cristina Meza MD at [facility] ENDOSCOPY      Family History   Problem Relation Age of Onset    Cancer Mother     Cancer Father       Infectious disease related family history - not contibutory. SOCIAL HISTORY  Social History     Tobacco Use    Smoking status: Former Smoker     Packs/day: 1.50     Years: 40.00     Pack years: 60.00     Types: Cigarettes     Quit date:      Years since quittin.1    Smokeless tobacco: Never Used   Substance Use Topics    Alcohol use: No       Born:   Lived   Occupation:   No recent travel of significance.  No recent unusual exposures.    NO pets ? ALLERGIES  Allergies   Allergen Reactions    Morphine Hallucinations    Sulfa Antibiotics       MEDICATIONS  Reviewed and are per the chart/EMR. ? Antibiotics:   Present:  Eraxis 4/8-  Meropenem 4/8-      Past:  Cefepime 4/2? Metronidazole 4/2-8  Zosyn 4/4-8  Vancomycin 4/2-8    -------------------------------------------------------------------------------------------------------------------    Vital Signs:  Vitals:    04/08/22 0830   BP: 111/71   Pulse: 96   Resp: 16   Temp:    SpO2:          Exam:    VS: noted; wt 207 lb (93.9 kg) Height 5'9\"  Gen: pleasantly confused, no distress  Skin: no stigmata of endocarditis  Wounds: C/D/I midabdominal wound with staples intact, well approximated, no drainage or erythema. HEMT: AT/NC Oropharynx pink, moist, and without lesions or exudates; dentition in good state of repair  Eyes: PERRLA, EOMI, conjunctiva pink, sclera anicteric. Neck: Supple. Trachea midline. No LAD. Chest: no distress and CTA. Good air movement. Room air. Heart: Afib and no MRG. Abd: soft, non-distended, generalized tenderness to light palpation, no hepatomegaly. Normoactive bowel sounds. Ext: no clubbing, cyanosis, or edema  Catheter Site: without erythema or tenderness draining clear yellow urine. Ileostomy intact: RLQ draining brown liquid stool   Neuro:  CN 2-12 intact and no focal sensory or motor deficits    ? Diagnostic Studies: reviewed  4/2/22 CT Abdomen Pelvis W IV Contrast:  Impression   Interval development of severe circumferential wall thickening and   pericolonic inflammatory changes of the transverse colon and splenic flexure   of the colon just proximal to the left lower quadrant colostomy with   increased fluid-filled distension of the proximal colon and small bowel. Findings suggest acute infectious/inflammatory colitis with proximal partial   obstruction versus ileus.       Stable infrarenal abdominal aortic aneurysm measuring 5.5 cm in diameter.    See follow-up recommendations below.       Stable hepatic cysts and bilateral renal cortical cysts.       Cardiomegaly with bilateral pleural effusions suggesting mild CHF.       RECOMMENDATIONS:   5.5 cm infrarenal abdominal aortic aneurysm. Recommend referral to a vascular   specialist.       Reference: [facility] 0329;[id number].     4/2/22 XR Chest Portable:  Impression   Trace left pleural effusion with adjacent atelectasis. 4/2/22 XR Chest Portable:  Impression   Enteric tube tip is noted within the stomach with the side port likely at or   just distal to the GE junction.         4/3/22 XR Abdomen:  Impression   Multiple air-filled distended loops of small bowel stacked in the mid abdomen   suggesting a low-grade partial small bowel obstruction or focal ileus.       Atherosclerotic calcification of an abdominal aortic aneurysm as seen on   prior CT.       NG tube in the stomach with proximal port at the level of the GE junction. 4/3/22 XR Chest Portable:  Impression   Right line placement as above. Bilateral small areas of pneumonia     4/4/22 XR Abdomen:  Impression   1.  Nasogastric tube has been advanced with the tip over the antrum.       2.  New skin staples are present at the abdomen and pelvis.       3.  Residual gas dilatation of multiple small bowel loops.  There may be a   small amount of postsurgical pneumoperitoneum.       4.  See the prior CT scan for evaluation of the infrarenal aortic aneurysm. 4/5/22 XR Chest Portable:  Impression   Low lung volumes with persistent bibasilar airspace disease.       Trace left pleural effusion.      4/7/22 XR Abdomen:  Impression   The patient is status post surgery.  There is decreased but continued mild   distension of the small bowel could represent partial obstruction or ileus.       Calcification of the abdominal aorta consistent with a known aneurysm.       The NG tube tip is in the fundus of the stomach.  The proximal port is in the   distal esophagus near the GE junction.  Consider advancement of the tube. ??  I have examined this patient and available medical records on this date and have made the above observations, conclusions and recommendations. Electronically signed by: Electronically signed by Sanjuana Davis.  CHEMA Camara CNP on 4/8/2022 at 11:13 AM

## 2022-04-08 NOTE — PROGRESS NOTES
Nephrology Progress Note  4/8/2022 4:29 PM  Subjective: Interval History: Manish Oro is a 80 y.o. male  . Weak tired resting in bed    Data:   Scheduled Meds:   metroNIDAZOLE  500 mg IntraVENous Q8H    dilTIAZem  30 mg Oral 2 times per day    vancomycin  1,250 mg IntraVENous Q24H    metoprolol  2.5 mg IntraVENous Q6H    enoxaparin  70 mg SubCUTAneous BID    fat emulsion  250 mL IntraVENous Once per day on Mon Tue Thu Fri    piperacillin-tazobactam  2,250 mg IntraVENous Q8H    insulin lispro  0-6 Units SubCUTAneous TID WC    insulin lispro  0-3 Units SubCUTAneous Nightly    [Held by provider] apixaban  2.5 mg Oral BID    [Held by provider] aspirin  81 mg Oral Daily    sodium chloride flush  5-40 mL IntraVENous 2 times per day    pantoprazole  40 mg IntraVENous Daily     Continuous Infusions:   PN-Adult Premix 4.25/10 - Peripheral Line      PN-Adult Premix 4.25/10 - Peripheral Line 75 mL/hr at 04/07/22 1831    sodium chloride 30 mL/hr at 04/07/22 1831    dextrose           CBC   Recent Labs     04/06/22  0520 04/06/22  0520 04/06/22  1545 04/07/22  0343 04/08/22  0511   WBC 15.3*  --   --  13.7* 16.0*   HGB 7.1*   < > 8.2* 8.2* 8.2*   HCT 22.8*   < > 26.7* 27.0* 26.9*     --   --  246 270    < > = values in this interval not displayed. BMP   Recent Labs     04/06/22  0520 04/07/22  0343 04/08/22  0511    135 136   K 3.8 4.0 4.1    104 105   CO2 23 24 22   PHOS 3.1 2.8  --    BUN 29* 28* 29*   CREATININE 1.3 1.2 1.1     Hepatic:   Recent Labs     04/07/22  0343   AST 11*   ALT 9*   BILITOT 0.4   ALKPHOS 64     Troponin: No results for input(s): TROPONINI in the last 72 hours. BNP: No results for input(s): BNP in the last 72 hours. Lipids: No results for input(s): CHOL, HDL in the last 72 hours.     Invalid input(s): LDLCALCU  ABGs: No results found for: PHART, PO2ART, HUO5NCJ  INR:   Recent Labs     04/07/22  0343   INR 1.36     Renal Labs  Albumin:    Lab Results abnormal findings:  soft  tender post surgery   Extremities: edema trace  Neurologic: Mental status: alertness: Weak arousable      Assessment and Plan:      IMP:   #1 small bowel obstruction status post surgery   #2 acute renal failure from ATN   #3 possible colitis with leukocytosis   #4 anemia   #5 A. fib rapid rate   #6 history of rectal cancer    Plan     #1 post surgery follow-up general surgery recommendations and plan follow-up nutrition plan  #2 creatinine holding 1.1 stable  #3 maintain antibiotics no fever at this time  #4 hemoglobin stable at 8.2  #5 heart rate still in the 120s monitor try to adjust medications follow-up cardio recs    Follow above setting try to manage pain control           Alexander Guallpa MD, MD

## 2022-04-08 NOTE — FLOWSHEET NOTE
04/08/22 1109   Encounter Summary   Services provided to: Patient not available   Referral/Consult From: Beth   Continue Visiting   (Attempted visit)   Routine   Assessment Sleeping

## 2022-04-08 NOTE — PROGRESS NOTES
Pt at [address] began to have crushing chest/abd pain that radiated towards back. 10/10 pain described by pt. NP hospitalist was notified. EKG was done to make sure it was no MI. EKG read afib RVR. Dilaudid was given and effective.

## 2022-04-08 NOTE — PROGRESS NOTES
[address]  consulted by Dr. Giselle Arenas for monitoring and adjustment. Indication for treatment: Pneumonia (HAP)  Goal trough: [] 10-15 mcg/mL or [x] 15-20 mcg/ml  AUC/SUJATA: [] <500 or [x] 400-600    Pertinent Laboratory Values:   Temp Readings from Last 3 Encounters:   04/08/22 97.4 °F (36.3 °C) (Oral)   04/03/22 97.4 °F (36.3 °C)   03/25/22 98.1 °F (36.7 °C) (Oral)     Recent Labs     04/06/22  0520 04/07/22 0343 04/08/22  0511   WBC 15.3* 13.7* 16.0*     Recent Labs     04/06/22  0520 04/07/22  0343 04/08/22  0511   BUN 29* 28* 29*   CREATININE 1.3 1.2 1.1     Estimated Creatinine Clearance: 48 mL/min (based on SCr of 1.1 mg/dL). Intake/Output Summary (Last 24 hours) at 4/8/2022 1456  Last data filed at 4/8/2022 1330  Gross per 24 hour   Intake 2599.73 ml   Output 3020 ml   Net -420.27 ml       Pertinent Cultures:  Date    Source    Results  4/02   Urine   Pseudomonas  4/02   Blood    NGTD  4/03   Surgical Cx   NGTD  4/03   Blood x 2   NGTD  4/05   MRSA nasal   Ordered  4/08   Blood    Ordered  4/08   Urine    Ordered    Vancomycin level:   TROUGH:  No results for input(s): VANCOTROUGH in the last 72 hours. RANDOM:    Recent Labs     04/06/22 0520 04/07/22  0343   VANCORANDOM 12.7 15.1       Assessment:  · SCr, BUN, and urine output:   · MARIELOS, renal trends continue improving  · Monitor closely with advanced age  · Day(s) of therapy: 7  · Vancomycin concentration:  · 4/05: 8.5, appropriate to re-dose  · 4/06: 12.7, 13h post-dose  · 4/07: 15.1, 13h post-dose, appropriate to re-dose    Plan:  · Vancomycin 1250 mg IVPB q24h  · Predicted trough: 17,   · Repeat next level in 24h  · WBC with increase in trends. Flagyl restarted.  Cultures (blood, urine) being repeated today, antibiotics to continue pending results  · Pharmacy will continue to monitor patient and adjust therapy as indicated    VANCOMYCIN CONCENTRATION SCHEDULED FOR 04/09 @ 0600    Thank you for the consult,  [facility]orri [facility] [place], PharmD  4/8/2022 2:56 PM

## 2022-04-09 PROBLEM — E43 SEVERE PROTEIN-CALORIE MALNUTRITION (HCC): Status: ACTIVE | Noted: 2022-01-01

## 2022-04-09 PROBLEM — Z78.9 ON TOTAL PARENTERAL NUTRITION: Status: ACTIVE | Noted: 2022-01-01

## 2022-04-09 NOTE — PROGRESS NOTES
Nephrology Progress Note  4/9/2022 9:13 AM  Subjective: Interval History: Dante Sullivan is a 80 y.o. male  . In a chair no acute distress somewhat anxious  Data:   Scheduled Meds:   dilTIAZem  30 mg Oral 2 times per day    meropenem  1,000 mg IntraVENous Q8H    anidulafungin  100 mg IntraVENous Q24H    metoprolol  2.5 mg IntraVENous Q6H    enoxaparin  70 mg SubCUTAneous BID    fat emulsion  250 mL IntraVENous Once per day on Mon Tue Thu Fri    insulin lispro  0-6 Units SubCUTAneous TID WC    insulin lispro  0-3 Units SubCUTAneous Nightly    [Held by provider] apixaban  2.5 mg Oral BID    [Held by provider] aspirin  81 mg Oral Daily    sodium chloride flush  5-40 mL IntraVENous 2 times per day    pantoprazole  40 mg IntraVENous Daily     Continuous Infusions:   PN-Adult Premix 4.25/10 - Peripheral Line 84 mL/hr at 04/09/22 0600    sodium chloride 30 mL/hr at 04/09/22 0600    dextrose           CBC   Recent Labs     04/07/22  0343 04/08/22  0511 04/09/22  0555   WBC 13.7* 16.0* 16.6*   HGB 8.2* 8.2* 9.4*   HCT 27.0* 26.9* 29.7*    270 391      BMP   Recent Labs     04/07/22  0343 04/08/22  0511 04/09/22  0555    136 137   K 4.0 4.1 4.2    105 104   CO2 24 22 20*   PHOS 2.8  --  2.2*   BUN 28* 29* 30*   CREATININE 1.2 1.1 1.1     Hepatic:   Recent Labs     04/07/22  0343   AST 11*   ALT 9*   BILITOT 0.4   ALKPHOS 64     Troponin: No results for input(s): TROPONINI in the last 72 hours. BNP: No results for input(s): BNP in the last 72 hours. Lipids: No results for input(s): CHOL, HDL in the last 72 hours.     Invalid input(s): LDLCALCU  ABGs: No results found for: PHART, PO2ART, MSF2ELZ  INR:   Recent Labs     04/07/22 0343   INR 1.36     Renal Labs  Albumin:    Lab Results   Component Value Date    LABALBU 2.2 04/07/2022     Calcium:    Lab Results   Component Value Date    CALCIUM 8.3 04/09/2022     Phosphorus:    Lab Results   Component Value Date    PHOS 2.2 04/09/2022 U/A:    Lab Results   Component Value Date    NITRU POSITIVE 04/02/2022    COLORU YELLOW 04/02/2022    WBCUA 11 04/02/2022    RBCUA 1 04/02/2022    MUCUS FEW 04/02/2022    TRICHOMONAS NONE SEEN 03/20/2022    BACTERIA OCCASIONAL 04/02/2022    CLARITYU CLEAR 04/02/2022    SPECGRAV 1.015 04/02/2022    UROBILINOGEN 0.2 04/02/2022    BILIRUBINUR NEGATIVE 04/02/2022    BLOODU MODERATE 04/02/2022    KETUA SMALL 04/02/2022     ABG:  No results found for: PHART, BIG3UAV, PO2ART, UOE6KYL, BEART, THGBART, TZU3WIC, A1AKMSPI  HgBA1c:  No results found for: LABA1C  Microalbumen/Creatinine ratio:  No components found for: RUCREAT  TSH:  No results found for: TSH  IRON:    Lab Results   Component Value Date    IRON 35 03/01/2022     Iron Saturation:  No components found for: PERCENTFE  TIBC:    Lab Results   Component Value Date    TIBC 280 03/01/2022     FERRITIN:    Lab Results   Component Value Date    FERRITIN 210 03/01/2022     RPR:  No results found for: RPR  SAQIB:  No results found for: ANATITER, SAQIB  24 Hour Urine for Creatinine Clearance:  No components found for: CREAT4, UHRS10, UTV10      Objective:   I/O: 04/08 0701 - 04/09 0700  In: 3539.3 [P.O.:190; I.V.:294]  Out: 3570 [Urine:2185]  I/O last 3 completed shifts: In: 4896.4 [P.O.:190; I.V.:553.1; IV Piggyback:948.5]  Out: 4400 [Urine:2745; Emesis/NG output:280; CMIXW:9535]  No intake/output data recorded. Vitals: BP (!) 126/103   Pulse 115   Temp 98 °F (36.7 °C)   Resp 24   Ht 5' 9.02\" (1.753 m)   Wt 188 lb 7.9 oz (85.5 kg)   SpO2 97%   BMI 27.82 kg/m²  {  General appearance: awake weak  HEENT: Head: Normal, normocephalic, atraumatic.   Neck: supple, symmetrical, trachea midline  Lungs: diminished breath sounds bilaterally  Heart: S1, S2 normal  Abdomen: abnormal findings:  soft  tender post surgery   Extremities: edema trace  Neurologic: Mental status: alertness: Arousable weak      Assessment and Plan:      IMP:   #1 small bowel obstruction status post surgery   #2 acute renal failure from ATN   #3 possible colitis with leukocytosis   #4 anemia   #5 A. fib rapid rate   #6 history of rectal cancer    Plan     #1 post surgery will monitor slowly recovering  #2 creatinine 1.1 stable #3 maintain antibiotic therapy white count monitor currently 16.6  #3 hemoglobin is stable 9.4  4 heart rate still variable currently 115 follow cardiology recs   #5 maintain on TPN nutrition for now replete phosphorus we will follow             Seven Pickett MD, MD

## 2022-04-09 NOTE — PLAN OF CARE
Problem: Falls - Risk of:  Goal: Will remain free from falls  Description: Will remain free from falls  Outcome: Ongoing  Goal: Absence of physical injury  Description: Absence of physical injury  Outcome: Ongoing     Problem: Pain:  Description: Pain management should include both nonpharmacologic and pharmacologic interventions. Goal: Pain level will decrease  Description: Pain level will decrease  Outcome: Ongoing  Goal: Control of acute pain  Description: Control of acute pain  Outcome: Ongoing  Goal: Control of chronic pain  Description: Control of chronic pain  Outcome: Ongoing     Problem: Skin Integrity:  Goal: Will show no infection signs and symptoms  Description: Will show no infection signs and symptoms  Outcome: Ongoing  Goal: Absence of new skin breakdown  Description: Absence of new skin breakdown  Outcome: Ongoing  Goal: Signs of wound healing will improve  Description: Signs of wound healing will improve  Outcome: Ongoing  Goal: Risk for impaired skin integrity will decrease  Description: Risk for impaired skin integrity will decrease  Outcome: Ongoing     Problem: Infection - Surgical Site:  Goal: Will show no infection signs and symptoms  Description: Will show no infection signs and symptoms  Outcome: Ongoing     Problem:  Bowel/Gastric:  Goal: Gastrointestinal status for postoperative course will improve  Description: Gastrointestinal status for postoperative course will improve  Outcome: Ongoing     Problem: Fluid Volume:  Goal: Ability to achieve a balanced intake and output will improve  Description: Ability to achieve a balanced intake and output will improve  Outcome: Ongoing     Problem: Nutritional:  Goal: Ability to attain and maintain optimal nutritional status will improve  Description: Ability to attain and maintain optimal nutritional status will improve  Outcome: Ongoing     Problem: Physical Regulation:  Goal: Postoperative complications will be avoided or minimized  Description: Postoperative complications will be avoided or minimized  Outcome: Ongoing     Problem: Sensory:  Goal: Pain level will decrease  Description: Pain level will decrease  Outcome: Ongoing     Problem: Nutrition  Goal: Optimal nutrition therapy  Outcome: Ongoing

## 2022-04-09 NOTE — PROGRESS NOTES
Hospitalist Progress Note      PCP: Karan Huitron MD    Date of Admission: 4/2/2022    LOS: 7    Subjective:   Patient still with NG Tube. He is off cardizem drip. He ambulated with PT/OT, he did have discomfort in his abdomen as per daughter. Active Hospital Problems    Diagnosis Date Noted    Severe protein-calorie malnutrition ([facility] [address] [E43] 04/09/2022    On total parenteral nutrition [Z78.9] 04/09/2022    Delirium [R41.0] 04/07/2022    Atrial fibrillation with RVR (HCC) [I48.91] 04/05/2022    Hypotension [I95.9] 04/05/2022    Ischemic colitis ([facility] [address] [K55.9] 04/05/2022    Hypocalcemia [E83.51] 04/05/2022    MARIELOS (acute kidney injury) ([facility] [address] [N17.9] 04/05/2022    Probable Bacterial Pneumonia [J18.9] 04/05/2022    CAD (coronary artery disease) [I25.10] 04/05/2022    Anemia [D64.9] 04/05/2022    SBO (small bowel obstruction) ([facility] [address] [K56.609] 04/02/2022    Pseudomonas urinary tract infection [N39.0, B96.5] 03/20/2022    Gastroesophageal reflux disease without esophagitis [K21.9] 05/21/2020       Case Summary:   80year-old gentleman history of GERD, CAD, hypertension, atrial fibrillation, BPH, recurrent small bowel obstruction admitted with small bowel obstruction and concern for ischemic colitis status post exploratory laparotomy for colitis and acute abdomen with subtotal colon resection and mobilization of hepatic flexure, small bowel resection and end ileostomy. Admission course complicated by atrial fibrillation with RVR, shock due to sepsis, UTI, acute kidney injury with hyperglycemia and probable bacterial pneumonia. Principal Problem:    SBO (small bowel obstruction) ([facility] [address]): Status post bowel resection and ileostomy. Continues to have abdominal pain. KUB done noted for ileus  -Continue empiric antibiotics  -Pain management   -General surgery following with recommendation   -appears to be having output in ostomy. Pseudomonas UTI: On Zosyn.     Leukocytosis: Noted WBC bumped after discontinuing Flagyl.   -Surgical pathology showed pseudomembranous colitis. ID stopped vancomycin and Flagyl and Zosyn and placed patient on meropenem and Eraxis. . Procalcitonin 1.38. BCx 4/3: No growth. Nutrition: On TPN at goal rate    Atrial fibrillation with RVR:    Continue with q. 6 hourly metoprolol IV. -Cardiology following with recommendations  -Continue rate control  -On therapeutic Lovenox  -off Cardizem drip. On oral Cardizem. Probable ischemic colitis: Status post bowel resection and ileostomy. General surgery following with recommendation.    -On meropenem and Eraxis as per ID. Delirium: Patient with fluctuating level of consciousness. Multifactorial in setting of poor pain control, acute illness and hospitalization.  -Oriente daily  -Pain control  -Minimize tetherings  -As per daughter, mental status appears to be improving. Active Problems:    Gastroesophageal reflux disease without esophagitis: On Protonix    Hypocalcemia: We will replete and monitor    Probable Bacterial Pneumonia: On antibiotics    CAD (coronary artery disease): No chest pains or shortness of breath. Anemia: Status post PRBC to help improve perfusion. Stable. Hemoglobin 9.4. Resolved Problems:    Hypotension    MARIELOS (acute kidney injury) ([facility] [address]      DVT Prophylaxis: Enoxaparin  Diet: Diet NPO Exceptions are: Ice Chips, Popsicles  PN-Adult Premix 4.25/10 - Peripheral Line  Code Status: Full Code    Reason for continued admission due to awaiting for postop improvement and infection improvement.     Dispo: Awaiting clinical improvement    ____________________________________________________________________________    Physical Exam:  BP (!) 137/94   Pulse 98   Temp 98.4 °F (36.9 °C) (Oral)   Resp 27   Ht 5' 9.02\" (1.753 m)   Wt 188 lb 7.9 oz (85.5 kg)   SpO2 95%   BMI 27.82 kg/m²   General appearance: No apparent distress, sleeping, sitting in chair  HEENT: Normocephalic, atraumatic, has NG tube  Respiratory:  Normal respiratory effort. Clear to auscultation, no Rales/Wheezes/Rhonchi. Cardiovascular: S1/S2 without murmurs, rubs or gallops.  RRR  Abdomen: has dressing on midline abdomen and ostomy on the right side  Musculoskeletal: No pitting edema in LE  Skin: no gross lesions  Neurologic:  sleeping    Meds:    dilTIAZem  30 mg Oral 2 times per day    meropenem  1,000 mg IntraVENous Q8H    anidulafungin  100 mg IntraVENous Q24H    metoprolol  2.5 mg IntraVENous Q6H    enoxaparin  70 mg SubCUTAneous BID    fat emulsion  250 mL IntraVENous Once per day on Mon Tue Thu Fri    insulin lispro  0-6 Units SubCUTAneous TID WC    insulin lispro  0-3 Units SubCUTAneous Nightly    [Held by provider] apixaban  2.5 mg Oral BID    [Held by provider] aspirin  81 mg Oral Daily    sodium chloride flush  5-40 mL IntraVENous 2 times per day    pantoprazole  40 mg IntraVENous Daily      Infusions:    PN-Adult Premix 4.25/10 - Peripheral Line      sodium chloride 30 mL/hr at 04/09/22 0600    dextrose       PRN Meds: Acetaminophen, 650 mg, Q6H PRN  oxyCODONE-acetaminophen, 1 tablet, Q6H PRN  HYDROmorphone, 0.5 mg, Q2H PRN   Or  HYDROmorphone, 1 mg, Q2H PRN  glucose, 15 g, PRN  glucagon (rDNA), 1 mg, PRN  dextrose, 100 mL/hr, PRN  dextrose bolus (hypoglycemia), 125 mL, PRN   Or  dextrose bolus (hypoglycemia), 250 mL, PRN  sodium chloride flush, 5-40 mL, PRN  ondansetron, 4 mg, Q8H PRN   Or  ondansetron, 4 mg, Q6H PRN          Intake/Output Summary (Last 24 hours) at 4/9/2022 1540  Last data filed at 4/9/2022 1342  Gross per 24 hour   Intake 3539.34 ml   Output 3220 ml   Net 319.34 ml       Labs:   Recent Labs     04/07/22  0343 04/08/22  0511 04/09/22  0555   WBC 13.7* 16.0* 16.6*   HGB 8.2* 8.2* 9.4*   HCT 27.0* 26.9* 29.7*    270 391      Recent Labs     04/07/22  0343 04/08/22  0511 04/09/22  0555    136 137   K 4.0 4.1 4.2    105 104   CO2 24 22 20*   BUN 28* 29* 30*   CREATININE 1.2 1.1 1. 1   CALCIUM 7.4* 7.7* 8.3   PHOS 2.8  --  2.2*   AST 11*  --   --    ALT 9*  --   --    BILIDIR 0.3  --   --    BILITOT 0.4  --   --    ALKPHOS 64  --   --      No results for input(s): CKTOTAL, TROPONINI in the last 72 hours. Urinalysis:    Lab Results   Component Value Date    NITRU POSITIVE 04/02/2022    WBCUA 11 04/02/2022    BACTERIA OCCASIONAL 04/02/2022    RBCUA 1 04/02/2022    BLOODU MODERATE 04/02/2022    SPECGRAV 1.015 04/02/2022           Milla Reich MD      Please excuse brevity and/or typos. This report was transcribed using voice recognition software. Every effort was made to ensure accuracy, however, inadvertent computerized transcription errors may be present.

## 2022-04-09 NOTE — PROGRESS NOTES
Intensive Care Unit  Surgical Intensive Care Unit  Attending Progress Note      Patient was seen and evaluated this am.  No acute events overnight. He was resting comfortably in bed he is able to answer all of my questions. Still reporting abdominal pain, but his abdomen is soft and not distended. He does have bilious output in the ileostomy bag. NG tube output is minimal.  Afebrile and hemodynamically stable. Rate controlled atrial fibrillation. Good UOP. Ileostomy    Team members present on rounds:  Nursing    ICU guidelines/prophylaxis active for the following:    head above bed above 30 degrees, insulin guidelines, DVT prophylaxis, ulcer prophylaxis and analgesia/sedation guidelines    Patient Examined?   yes    Additions/differences/highlights to the resident's/fellow's exam:  VITALS:  BP (!) 126/103   Pulse 111   Temp 97.9 °F (36.6 °C) (Oral)   Resp 24   Ht 5' 9.02\" (1.753 m)   Wt 188 lb 7.9 oz (85.5 kg)   SpO2 97%   BMI 27.82 kg/m²   CURRENT TEMPERATURE:  Temp: 97.9 °F (36.6 °C)  MAXIMUM TEMPERATURE OVER 24HRS:  Temp (24hrs), Av °F (36.7 °C), Min:97.4 °F (36.3 °C), Max:98.5 °F (36.9 °C)    TEMPERATURE RANGE OVER 24HRS:   Temp  Av °F (36.7 °C)  Min: 97.4 °F (36.3 °C)  Max: 98.5 °F (36.9 °C)  24HR RESPIRATORY RATE RANGE:  Resp  Av.7  Min: 15  Max: 35  24HR PULSE RANGE: Pulse  Av.7  Min: 80  Max: 131  24HR BLOOD PRESSURE RANGE:  Systolic (95JJP), QVJ:272 , Min:82 , ZKY:058   ; Diastolic (93GUC), ZQR:24, Min:61, Max:103    24HR PULSE OXIMETRY RANGE:  SpO2  Av.8 %  Min: 90 %  Max: 98 %  24HR INTAKE/OUTPUT:    Intake/Output Summary (Last 24 hours) at 2022 0852  Last data filed at 2022 0600  Gross per 24 hour   Intake 3539.34 ml   Output 3570 ml   Net -30.66 ml     CONSTITUTIONAL:  awake, alert, cooperative, no apparent distress, and appears stated age  EYES:  Lids and lashes normal, pupils equal, round and reactive to light, extra ocular muscles intact, sclera clear, conjunctiva normal  HEENT:  Normocephalic, without obvious abnormality, atramatic, sinuses nontender on palpation, external ears without lesions, oral pharynx with moist mucus membranes, tonsils without erythema or exudates, gums normal and good dentition. NECK:  Supple, symmetrical, trachea midline, no adenopathy, thyroid symmetric, not enlarged and no tenderness  LUNGS:  No increased work of breathing, good air exchange, clear to auscultation bilaterally, no crackles or wheezing  CARDIOVASCULAR: Irregularly irregular  ABDOMEN: Midline incision is clean dry and intact. Dressing dry. Takedown colostomy site in left lower quadrant is healing. Right lower quadrant end ileostomy with bilious contents in appliance. Stoma is pink and slightly edematous. Mid abdominal pain. Not distended. No rebound or guarding. GENITAL/URINARY:  Penis is without lesions or discharge, testes are without nodules or enlargement bilaterally, no hernias detected  MUSCULOSKELETAL:  There is no redness, warmth, or swelling of the joints. Full range of motion noted. Motor strength is 5 out of 5 all extremities bilaterally. Tone is normal.  NEUROLOGIC:  Awake, alert, oriented to name, place and time. Cranial nerves II-XII are grossly intact. Motor is 5 out of 5 bilaterally. Cerebellar finger to nose, heel to shin intact. Sensory is intact. Babinski down going, Romberg negative, and gait is normal.  SKIN:  No bruising or bleeding. Normal skin color, texture, and turgor. No redness, warmth, or swelling. No rashes, lesions, or abnormal moles.   DATA:  CBC:   Lab Results   Component Value Date    WBC 16.6 04/09/2022    RBC 2.72 04/09/2022    RBC 5.19 08/18/2017    HGB 9.4 04/09/2022    HCT 29.7 04/09/2022    .2 04/09/2022    RDW 19.1 04/09/2022     04/09/2022     CMP:    Lab Results   Component Value Date     04/09/2022    K 4.2 04/09/2022     04/09/2022    CO2 20 04/09/2022    BUN 30 04/09/2022 CREATININE 1.1 04/09/2022    GFRAA >60 04/09/2022    AGRATIO 1.6 03/10/2022    LABGLOM >60 04/09/2022    GLUCOSE 140 04/09/2022    PROT 3.8 04/07/2022    PROT 6.2 12/11/2011    CALCIUM 8.3 04/09/2022    BILITOT 0.4 04/07/2022    ALKPHOS 64 04/07/2022    AST 11 04/07/2022    ALT 9 04/07/2022     BMP:    Lab Results   Component Value Date     04/09/2022    K 4.2 04/09/2022     04/09/2022    CO2 20 04/09/2022    BUN 30 04/09/2022    CREATININE 1.1 04/09/2022    CALCIUM 8.3 04/09/2022    GFRAA >60 04/09/2022    LABGLOM >60 04/09/2022    GLUCOSE 140 04/09/2022     Calcium:    Lab Results   Component Value Date    CALCIUM 8.3 04/09/2022     Ionized Calcium:  No results found for: IONCA  Magnesium:    Lab Results   Component Value Date    MG 1.8 04/09/2022     Phosphorus:    Lab Results   Component Value Date    PHOS 2.2 04/09/2022     Radiology Review:      Narrative   EXAMINATION:   ONE XRAY VIEW OF THE CHEST       4/8/2022 11:47 am       COMPARISON:   04/05/2022       HISTORY:   ORDERING SYSTEM PROVIDED HISTORY: CHF   TECHNOLOGIST PROVIDED HISTORY:   Reason for exam:->CHF   Reason for Exam: CHF       FINDINGS:   The right internal jugular catheter tip is in the superior vena cava. The   enteric tube is below the diaphragm.  Unchanged moderate left pleural   effusion and bibasilar atelectasis.  Cardiac silhouette is unchanged.           Impression   Unchanged moderate left pleural effusion bibasilar atelectasis.  No evidence   of pulmonary edema. KEY ISSUES/FINDINGS/ASSESSMENT AND PLAN:    POD 6 exploratory laparotomy with colon resection, small bowel resection with end ileostomy. Gastroesophageal reflux disease without esophagitis-continue with Protonix. Pseudomonas urinary tract infection-patient on Zosyn. Repeat urine culture is in process.   Leukocytosis-WBC is slightly increased today at 16.6 from 16.0  Atrial fibrillation with RVR-he is now off the Cardizem drip and on oral meds and is rate

## 2022-04-09 NOTE — PROGRESS NOTES
Physical Therapy    Physical Therapy Treatment Note  Name: Isidro Colvin MRN: [id number] :   1929   Date:  2022   Admission Date: 2022 Room:  [address] [place] [address]   Restrictions/Precautions:  Restrictions/Precautions  Restrictions/Precautions: General Precautions,Fall Risk, abdominal prec       Communication with other providers:  Nurse states pt ok to treat  Subjective:  Patient states:  Agreeable to PT  Pain:   Location, Type, Intensity (0/10 to 10/10):  Claims 10/10 pain abdominal incision  Objective:    Observation:  Supine in bed upon entry  Treatment, including education/measures:  Transfers with line management of Tele and IV and catheter  Rolling: mod  Supine to sit :modA  Scooting :modA  Sit to stand :modA  Stand to sit :min/modA  SPT:modA  vc's for hand placement and sequencing during stand-pivot transfer. Stand>sit descent is fast and edu provided for safety techs. Sitting EOB x~5min F-/P+. vc's for trunk position and posture    Safety  Patient left safely in the recliner, with call light/phone in reach with alarm applied. Gait belt used for transfers and gai    Assessment / Impression:  Improves transfer independence on this date.      Patient's tolerance of treatment:  Good   Adverse Reaction: na  Significant change in status and impact:  na  Barriers to improvement:  weakness  Plan for Next Session:    Continue to focus on sitting balance and transfers   Time in:  1356  Time out:  1428  Timed treatment minutes:  32  Total treatment time:  32    Previously filed items:     Short term goals  Time Frame for Short term goals: 1 week  Short term goal 1: pt to complete all bed mobility mod A x1  Short term goal 2: Pt to complete all STS transfers to/from bed, commode, and chair max A  Short term goal 3: Pt to complete stand pivot transfer with LRAD max A       Electronically signed by:    Abdullahi Van PTA  2022, 2:34 PM

## 2022-04-10 NOTE — PROGRESS NOTES
Nephrology Progress Note  4/10/2022 9:56 AM  Subjective: Interval History: Pj Geller is a 80 y.o. male  . Having increased abdominal pain today very point tenderness  Data:   Scheduled Meds:   calcium gluconate  2,000 mg IntraVENous Once    dilTIAZem  30 mg Oral 2 times per day    meropenem  1,000 mg IntraVENous Q8H    anidulafungin  100 mg IntraVENous Q24H    metoprolol  2.5 mg IntraVENous Q6H    enoxaparin  70 mg SubCUTAneous BID    fat emulsion  250 mL IntraVENous Once per day on Mon Tue Thu Fri    insulin lispro  0-6 Units SubCUTAneous TID WC    insulin lispro  0-3 Units SubCUTAneous Nightly    [Held by provider] apixaban  2.5 mg Oral BID    [Held by provider] aspirin  81 mg Oral Daily    sodium chloride flush  5-40 mL IntraVENous 2 times per day    pantoprazole  40 mg IntraVENous Daily     Continuous Infusions:   PN-Adult Premix 4.25/10 - Peripheral Line      PN-Adult Premix 4.25/10 - Peripheral Line 84 mL/hr at 04/10/22 0530    sodium chloride 30 mL/hr at 04/10/22 0530    dextrose           CBC   Recent Labs     04/08/22  0511 04/09/22  0555 04/10/22  0535   WBC 16.0* 16.6* 13.4*   HGB 8.2* 9.4* 9.0*   HCT 26.9* 29.7* 29.2*    391 387      BMP   Recent Labs     04/08/22  0511 04/09/22  0555 04/10/22  0535    137 133*   K 4.1 4.2 4.1    104 103   CO2 22 20* 21   PHOS  --  2.2* 2.5   BUN 29* 30* 30*   CREATININE 1.1 1.1 1.1     Hepatic:   No results for input(s): AST, ALT, ALB, BILITOT, ALKPHOS in the last 72 hours. Troponin: No results for input(s): TROPONINI in the last 72 hours. BNP: No results for input(s): BNP in the last 72 hours. Lipids: No results for input(s): CHOL, HDL in the last 72 hours. Invalid input(s): LDLCALCU  ABGs: No results found for: PHART, PO2ART, OHB8NUM  INR:   No results for input(s): INR in the last 72 hours.   Renal Labs  Albumin:    Lab Results   Component Value Date    LABALBU 2.2 04/07/2022     Calcium:    Lab Results   Component tenderness  Extremities: edema trace  Neurologic: Mental status: alertness: Arousable weak      Assessment and Plan:      IMP:   #1 small bowel obstruction status post surgery   #2 acute renal failure from ATN   #3 possible colitis with leukocytosis   #4 anemia   #5 A. fib rapid rate   #6 history of rectal cancer    Plan     #1 post surgery agree with getting CAT scan of the abdomen today to see for any recurrent infection or source of etiology for pain versus ileus  #2 creatinine holding 1.1 will monitor  #3 WBC count holding stable will monitor for now for any new infection  #4 hemoglobin low stable  #5 try to maintain control heart rate  Monitor supportive care for now monitor closely  Hold next dose of Lovenox until after CAT scan is done and reviewed  Check LFTs today's concern for more jaundiced             Alexander Guallpa MD, MD

## 2022-04-10 NOTE — PROGRESS NOTES
Intensive Care Unit  Surgical Intensive Care Unit  Attending Progress Note      Patient was seen and evaluated. He was up sitting in his chair. He is reporting some increased abdominal pain and he has some slight distention today. Decreased ileostomy output. Still with minimal drainage from the NG tube. No acute events overnight. His atrial fibrillation is still rate controlled. Afebrile and hemodynamically stable. Discussed with his nurse this AM.    Team members present on rounds:  Nursing    ICU guidelines/prophylaxis active for the following:    head above bed above 30 degrees, insulin guidelines, DVT prophylaxis, ulcer prophylaxis and analgesia/sedation guidelines    Patient Examined?   yes    Additions/differences/highlights to the resident's/fellow's exam:  VITALS:  BP (!) 148/92   Pulse 127   Temp 97.5 °F (36.4 °C) (Oral)   Resp 30   Ht 5' 9.02\" (1.753 m)   Wt 193 lb 12.6 oz (87.9 kg)   SpO2 99%   BMI 28.60 kg/m²   CURRENT TEMPERATURE:  Temp: 97.5 °F (36.4 °C)  MAXIMUM TEMPERATURE OVER 24HRS:  Temp (24hrs), Av °F (36.7 °C), Min:97.5 °F (36.4 °C), Max:98.5 °F (36.9 °C)    TEMPERATURE RANGE OVER 24HRS:   Temp  Av °F (36.7 °C)  Min: 97.5 °F (36.4 °C)  Max: 98.5 °F (36.9 °C)  24HR RESPIRATORY RATE RANGE:  Resp  Av.9  Min: 17  Max: 34  24HR PULSE RANGE: Pulse  Av  Min: 85  Max: 127  24HR BLOOD PRESSURE RANGE:  Systolic (60RHB), NQB:020 , Min:112 , VJM:622   ; Diastolic (24NTL), XWI:37, Min:74, Max:135    24HR PULSE OXIMETRY RANGE:  SpO2  Av.3 %  Min: 82 %  Max: 100 %  24HR INTAKE/OUTPUT:    Intake/Output Summary (Last 24 hours) at 4/10/2022 0914  Last data filed at 4/10/2022 0800  Gross per 24 hour   Intake 3880.42 ml   Output 3435 ml   Net 445.42 ml     CONSTITUTIONAL:  awake, alert, cooperative, no apparent distress, and appears stated age  EYES:  Lids and lashes normal, pupils equal, round and reactive to light, extra ocular muscles intact, sclera clear, conjunctiva normal  HEENT:  Normocephalic, without obvious abnormality, atramatic, sinuses nontender on palpation, external ears without lesions, oral pharynx with moist mucus membranes, tonsils without erythema or exudates, gums normal and good dentition. NECK:  Supple, symmetrical, trachea midline, no adenopathy, thyroid symmetric, not enlarged and no tenderness  LUNGS:  No increased work of breathing, good air exchange, clear to auscultation bilaterally, no crackles or wheezing  CARDIOVASCULAR: Irregularly irregular  ABDOMEN: Midline incision is clean dry and intact. Dressing dry. Takedown colostomy site in left lower quadrant is healing. Right lower quadrant end ileostomy with bilious contents in appliance. Stoma is pink and slightly edematous. Mid abdominal pain. Mildly distended and slightly firm. No rebound or guarding. GENITAL/URINARY:  Penis is without lesions or discharge, testes are without nodules or enlargement bilaterally, no hernias detected  MUSCULOSKELETAL:  There is no redness, warmth, or swelling of the joints. Full range of motion noted. Motor strength is 5 out of 5 all extremities bilaterally. Tone is normal.  NEUROLOGIC:  Awake, alert, oriented to name, place and time. Cranial nerves II-XII are grossly intact. Motor is 5 out of 5 bilaterally. Cerebellar finger to nose, heel to shin intact. Sensory is intact. Babinski down going, Romberg negative, and gait is normal.  SKIN:  No bruising or bleeding. Normal skin color, texture, and turgor. No redness, warmth, or swelling. No rashes, lesions, or abnormal moles.   DATA:  CBC:   Lab Results   Component Value Date    WBC 13.4 04/10/2022    RBC 2.69 04/10/2022    RBC 5.19 08/18/2017    HGB 9.0 04/10/2022    HCT 29.2 04/10/2022    .6 04/10/2022    RDW 18.9 04/10/2022     04/10/2022     BMP:    Lab Results   Component Value Date     04/10/2022    K 4.1 04/10/2022     04/10/2022    CO2 21 04/10/2022    BUN 30 04/10/2022 CREATININE 1.1 04/10/2022    CALCIUM 7.4 04/10/2022    GFRAA >60 04/10/2022    LABGLOM >60 04/10/2022    GLUCOSE 126 04/10/2022       KEY ISSUES/FINDINGS/ASSESSMENT AND PLAN:    POD 7 exploratory laparotomy with colon resection, small bowel resection with end ileostomy.     Gastroesophageal reflux disease without esophagitis-continue with Protonix. Pseudomonas urinary tract infection-patient on Zosyn. Repeat urine culture shows NGTD. Leukocytosis-WBC  has decreased to 13. 4. CRP has also decreased and is now 63. Atrial fibrillation with RVR-he is now off the Cardizem drip and on oral meds and is rate controlled. Patient is on therapeutic Lovenox for anticoagulation. We will continue to hold the aspirin and the Eliquis. Cardiology note reviewed  MARIELOS (acute kidney injury) -this has resolved. Creatinine is now stable at 1.1. Continue to monitor. Hypocalcemia-we will give calcium gluconate, 7.4 today. Hypophosphatemia- corrected and is now 2.5  Anemia of chronic disease -hemoglobin is stable at 9.4  Severe protein malnutrition, prealbumin level was 3-TPN is at goal rate. Pathology revealed acute pseudomembranous colitis-ID was consulted yesterday and they and is now remove the vancomycin, Flagyl and Zosyn and started meropenem and Eraxis.      I am going to order a CT of the abdomen pelvis with IV contrast due to the patient's increased abdominal pain and his change in physical exam.     I spent 30 minutes of critical care time evaluating this patient's labs clinical history, imaging studies and consultant notes.   Plan communicated to primary care physician/hospitalists:  yes

## 2022-04-10 NOTE — PROGRESS NOTES
Dr. Elva Lozano at bedside, updated on patient status. Pt has had increased abdominal pain and discomfort. Per patient it is there constantly, doesn't come and go. Dr. Elva Lozano will place orders.

## 2022-04-10 NOTE — PROGRESS NOTES
Surgery add addendum:    The CT of the abdomen pelvis with IV contrast revealed the following impression:    Impression   No mechanical obstructive process however inflammatory findings of small   bowel with mucosal hyperenhancement and wall thickening throughout the   distended small bowel loops to the right lower quadrant ileostomy with small   volume abdominopelvic ascites most consistent with enteritis.  No obvious   abscess formation.  Moderate bilateral pleural effusions along with body wall   edema. I did talk with the patient's daughter just now and made her aware of the CT findings and there is nothing acute to deal with surgically. Patient is having some confusion again we will follow this clinically. He also pulled out his NG tube and I will not replace at this time. Discussed with nursing.

## 2022-04-10 NOTE — PROGRESS NOTES
Hospitalist Progress Note    Patient:  Nery Mariee  Unit/Bed:2104/2104-A   YOB: 1929       MRN: [id number] Acct: [de-identified]  PCP: Hobert Runner, MD    Date of Admission: 4/2/2022  --------------------------    Chief Complaint:     Abdominal pain, nausea vomiting, diarrhea    Hospital Course:     Nery Mariee is a 80 y.o. male hospitalized on 4/2/2022   abdominal pain, nausea vomiting, patient has prior history of small bowel obstructions, colon cancer status post resection there was concern for ischemic colitis patient evaluated by surgery service, underwent exploratory laparotomy, subtotal colon resection, end ileostomy, small bowel resection, mobilization of the splenic flexure for colitis with acute abdomen. His hospitalization course complicated by circulatory shock, sepsis, UTI, atrial fibrillation with rapid ventricular response, MARIELOS probable pneumonia. Assessment/plan:     Severe colitis with sepsis status post exploratory laparotomy, colon resection, small bowel resection with end ileostomy    -Biopsy results showed  pseudomembranous colitis ? C. difficile  -Broad-spectrum antibiotics per ID team.  Currently on Eraxis, meropenem.  -Increased pain noted, increased WBC noted. -Repeated CT scan of the abdomen showed diffuse enteritis no leak. -Patient accidentally removed his NG tube, will defer placement to surgery team.  -Continue TPN.   -Repeat culture data, lactic acid.  -Add oral vancomycin      Acute kidney injury      UTI versus bacteriuria  Culture data showed Pseudomonas, antibiotics per ID team.    Atrial fibrillation with rapid ventricular response  -Heart rate better controlled, orotic tachycardia secondary to pain  -Currently off Cardizem, continue metoprolol IV every 6 hours  Continue therapeutic Lovenox    Acute metabolic encephalopathy superimposing probable undiagnosed cognitive impairment.  -Continue frequent orientation  -Limit mind altering medication  -Dilaudid for severe pain only    Mild hyponatremia  Monitoring    Macrocytic anemia  Chronic, ? Underlying MDS, recent folate, B12 within normal limit. Comorbid condition (AAA, BPH, history of rectal cancer 2019 status post colostomy, psoriatic arthritis, prior CVA)    Code Status: Full Code         DVT prophylaxis: Therapeutic Lovenox     Disposition: Awaiting improvement of abdominal pain, return of bowel function.  -We will consult palliative care team in the light of the prolonged hospitalization, advanced age. Patient currently full code. Discussed with bedside nurse          Discussed with RN     ----------------      Subjective:     Patient seen and examined  Overnight events noted  RN and ancillary staff note reviewed    Alert but confused  Ill-appearing  Apparently he removed his NG tube  -CT scan of the abdomen obtained by surgery team secondary to worsening abdominal pain      Diet: Diet NPO Exceptions are: Ice Chips, Popsicles  PN-Adult Premix 4.25/10 - Peripheral Line  PN-Adult Premix 4.25/10 - Peripheral Line    OBJECTIVE     Exam:  /74   Pulse 106   Temp 97.5 °F (36.4 °C) (Oral)   Resp 28   Ht 5' 9.02\" (1.753 m)   Wt 193 lb 12.6 oz (87.9 kg)   SpO2 99%   BMI 28.60 kg/m²           Gen: Not in distress. Alert but confused, ill-appearing  Head: Normocephalic. Atraumatic. Eyes: Conjunctivae/corneas clear. ENT: Oral mucosa moist  Neck: No JVD. No obvious thyromegaly. CVS: Nml S1S2, , RRR  Pulmomary: Clear bilaterally. No crackles. No wheezes. Gastrointestinal: Soft, tenderness throughout noted, no guarding, midline incision, colostomy bag noted with greenish material, no formed stool  Musculoskeletal: No edema. Warm  Neuro: Confused no focal deficit. Moves extremity spontaneously.   Psychiatry: Confused        Medications:  Reviewed    Infusion Medications    PN-Adult Premix 4.25/10 - Peripheral Line      PN-Adult Premix 4.25/10 - Peripheral Line 84 mL/hr at 04/10/22 0530    sodium chloride 30 mL/hr at 04/10/22 0530    dextrose       Scheduled Medications    calcium gluconate  2,000 mg IntraVENous Once    dilTIAZem  30 mg Oral 2 times per day    meropenem  1,000 mg IntraVENous Q8H    anidulafungin  100 mg IntraVENous Q24H    metoprolol  2.5 mg IntraVENous Q6H    enoxaparin  70 mg SubCUTAneous BID    fat emulsion  250 mL IntraVENous Once per day on Mon Tue Thu Fri    insulin lispro  0-6 Units SubCUTAneous TID WC    insulin lispro  0-3 Units SubCUTAneous Nightly    [Held by provider] apixaban  2.5 mg Oral BID    [Held by provider] aspirin  81 mg Oral Daily    sodium chloride flush  5-40 mL IntraVENous 2 times per day    pantoprazole  40 mg IntraVENous Daily     PRN Meds: sodium chloride flush, Acetaminophen, HYDROmorphone **OR** HYDROmorphone, glucose, glucagon (rDNA), dextrose, dextrose bolus (hypoglycemia) **OR** dextrose bolus (hypoglycemia), sodium chloride flush, ondansetron **OR** ondansetron      Intake/Output Summary (Last 24 hours) at 4/10/2022 1126  Last data filed at 4/10/2022 0800  Gross per 24 hour   Intake 3880.42 ml   Output 3435 ml   Net 445.42 ml             Labs:   Recent Labs     04/08/22  0511 04/09/22  0555 04/10/22  0535   WBC 16.0* 16.6* 13.4*   HGB 8.2* 9.4* 9.0*   HCT 26.9* 29.7* 29.2*    391 387     Recent Labs     04/08/22  0511 04/09/22  0555 04/10/22  0535    137 133*   K 4.1 4.2 4.1    104 103   CO2 22 20* 21   BUN 29* 30* 30*   CREATININE 1.1 1.1 1.1   CALCIUM 7.7* 8.3 7.4*   PHOS  --  2.2* 2.5     No results for input(s): AST, ALT, BILIDIR, BILITOT, ALKPHOS in the last 72 hours. No results for input(s): INR in the last 72 hours. No results for input(s): Trang Bebe in the last 72 hours.     Urinalysis:      Lab Results   Component Value Date    NITRU POSITIVE 04/02/2022    WBCUA 11 04/02/2022    BACTERIA OCCASIONAL 04/02/2022    RBCUA 1 04/02/2022    BLOODU MODERATE 04/02/2022    SPECGRAV 1.015 04/02/2022       Radiology:  CT ABDOMEN PELVIS W IV CONTRAST Additional Contrast? None   Preliminary Result   No mechanical obstructive process however inflammatory findings of small   bowel with mucosal hyperenhancement and wall thickening throughout the   distended small bowel loops to the right lower quadrant ileostomy with small   volume abdominopelvic ascites most consistent with enteritis. No obvious   abscess formation. Moderate bilateral pleural effusions along with body wall   edema. XR CHEST PORTABLE   Preliminary Result   Unchanged moderate left pleural effusion bibasilar atelectasis. No evidence   of pulmonary edema. XR ABDOMEN FOR NG/OG/NE TUBE PLACEMENT   Final Result   Nasogastric tube terminates in the stomach         XR ABDOMEN FOR NG/OG/NE TUBE PLACEMENT   Final Result   An NG tube tip is present with the tip coiled within the fundus of the   stomach. The proximal port is in the fundus of the stomach. Consider   advancement of the tube. XR ABDOMEN (KUB) (SINGLE AP VIEW)   Final Result   The patient is status post surgery. There is decreased but continued mild   distension of the small bowel could represent partial obstruction or ileus. Calcification of the abdominal aorta consistent with a known aneurysm. The NG tube tip is in the fundus of the stomach. The proximal port is in the   distal esophagus near the GE junction. Consider advancement of the tube. XR CHEST PORTABLE   Final Result   Low lung volumes with persistent bibasilar airspace disease. Trace left pleural effusion. XR ABDOMEN (KUB) (SINGLE AP VIEW)   Final Result   1. Nasogastric tube has been advanced with the tip over the antrum. 2.  New skin staples are present at the abdomen and pelvis. 3.  Residual gas dilatation of multiple small bowel loops. There may be a   small amount of postsurgical pneumoperitoneum.       4.  See the prior CT scan for evaluation of the infrarenal aortic aneurysm. XR CHEST PORTABLE   Final Result   Right line placement as above. Bilateral small areas of pneumonia         XR ABDOMEN (KUB) (SINGLE AP VIEW)   Final Result   Multiple air-filled distended loops of small bowel stacked in the mid abdomen   suggesting a low-grade partial small bowel obstruction or focal ileus. Atherosclerotic calcification of an abdominal aortic aneurysm as seen on   prior CT. NG tube in the stomach with proximal port at the level of the GE junction. XR CHEST PORTABLE   Final Result   Enteric tube tip is noted within the stomach with the side port likely at or   just distal to the GE junction. XR CHEST PORTABLE   Final Result   Trace left pleural effusion with adjacent atelectasis. CT ABDOMEN PELVIS W IV CONTRAST Additional Contrast? None   Final Result   Interval development of severe circumferential wall thickening and   pericolonic inflammatory changes of the transverse colon and splenic flexure   of the colon just proximal to the left lower quadrant colostomy with   increased fluid-filled distension of the proximal colon and small bowel. Findings suggest acute infectious/inflammatory colitis with proximal partial   obstruction versus ileus. Stable infrarenal abdominal aortic aneurysm measuring 5.5 cm in diameter. See follow-up recommendations below. Stable hepatic cysts and bilateral renal cortical cysts. Cardiomegaly with bilateral pleural effusions suggesting mild CHF. RECOMMENDATIONS:   5.5 cm infrarenal abdominal aortic aneurysm. Recommend referral to a vascular   specialist.      Reference: J Am Abad Radiol 4314;[id number].                         Electronically signed by Ayanna Monaco MD on 4/10/2022 at 11:26 AM

## 2022-04-10 NOTE — PROGRESS NOTES
Dr. Isaacs Gamma notified, for now no need to re-insert NG tube, order to discontinue all pain narcotics. He will review CT and call back. Pt's bed alarm on.

## 2022-04-10 NOTE — PROGRESS NOTES
Walked into patients room, pt pulled NG out, uncovered himself and is now confused, disoriented to self, place, situation and time. This is a change from this mornings assessment. Will notify Dr. Feliciano Shaw and Dr. Ml Rust

## 2022-04-10 NOTE — PROGRESS NOTES
Dr. Freddy Liu speaking with pt's daughter Nicho Mendez via phone with updates. Pt continues to complain of abdominal pain, and some hallucinations.

## 2022-04-10 NOTE — PROGRESS NOTES
Pt placed back in bed with 3 person assist. Pt very weak and unsteady. Call light in reach. Awaiting transport to take patient to CT.

## 2022-04-10 NOTE — PROGRESS NOTES
CARDIOLOGY  NOTE        Name:  Manish Oro /Age/Sex: 1929  (80 y.o. male)   MRN & CSN:  [id number] & [id number] Admission Date/Time: 2022 11:02 AM   Location:  -A PCP: Seema Richardson, [address] [facility] Bill Morgan Day: 9        PLAN FROM CARDIOLOGY FOR TODAY:   Continue to monitor the heart rate      - cardiology consult is for: Atrial fibrillation    -  Interval history: Having abdominal pain    · ASSESSMENT/ PLAN:  1. Atrial fibrillation heart rate is moderately controlled A. fib the heart rate is increased we can give additional digoxin on Cardizem and Eliquis at the present time  2. Last echo had mild aortic stenosis that was last year  3. Normal Cardiolite last year  4. Being evaluated by surgery for possible relook given that the patient is having more abdominal pain              Subjective: Todays complain: Patient confused    HPI:  New Gonzalez is a 80 y. o.year old who and presents with had concerns including GI Problem (dark stool, colostomy, on eliquis). Chief Complaint   Patient presents with    GI Problem     dark stool, colostomy, on eliquis           Objective: Temperature:  Current - Temp: 97.5 °F (36.4 °C);  Max - Temp  Av °F (36.7 °C)  Min: 97.5 °F (36.4 °C)  Max: 98.5 °F (36.9 °C)    Respiratory Rate : Resp  Av.1  Min: 17  Max: 34    Pulse Range: Pulse  Av  Min: 85  Max: 130    Blood Presuure Range:  Systolic (11DLH), ZPF:818 , Min:125 , TZV:949   ; Diastolic (18NMF), [place]:21, Min:74, Max:135      Pulse ox Range: SpO2  Av.4 %  Min: 82 %  Max: 100 %    24hr I & O:      Intake/Output Summary (Last 24 hours) at 4/10/2022 1050  Last data filed at 4/10/2022 0800  Gross per 24 hour   Intake 3880.42 ml   Output 3435 ml   Net 445.42 ml         BP (!) 135/93   Pulse 100   Temp 97.5 °F (36.4 °C) (Oral)   Resp 25   Ht 5' 9.02\" (1.753 m)   Wt 193 lb 12.6 oz (87.9 kg)   SpO2 99%   BMI 28.60 kg/m² Review of Systems:    Abdominal pain    TELEMETRY: Atrial fibrillation    has a past medical history of AAA (abdominal aortic aneurysm) ([facility] [address] MARIELOS (acute kidney injury) ([address] Angina, class I ([address] Benign prostatic hyperplasia, Bowel obstruction ([address] CAD (coronary artery disease), Cancer ([address] CCC (chronic calculous cholecystitis), Delirium, Gastroesophageal reflux disease without esophagitis, Hx of cardiovascular stress test, Hypertension, On total parenteral nutrition, Partial small bowel obstruction ([address] Primary malignant neoplasm of rectum ([address] Probable Bacterial Pneumonia, Psoriasis, Psoriatic arthritis ([address] Pyelonephritis, Severe protein-calorie malnutrition ([facility] [address] Stable angina ([address] and Unspecified cerebral artery occlusion with cerebral infarction. has a past surgical history that includes Appendectomy; colostomy; Upper gastrointestinal endoscopy (N/A, 2/28/2022); Colonoscopy (N/A, 3/2/2022); and laparotomy (N/A, 4/3/2022). Physical Exam:  General: Confused  Head:normal  Eye: Pupils equal and round  Neck:  No JVD, no carotid bruit noted   Chest:  Clear to auscultation, no signs of respiratory distress  Cardiovascular:  Normal rate and rhythm. S1 and S2 noted.  No murmurs rubs or gallops  Abdomen:   nontender  Extremities:  tr edema  Pulses; palpable  Neuro: grossly normal    Medications:    calcium gluconate  2,000 mg IntraVENous Once    dilTIAZem  30 mg Oral 2 times per day    meropenem  1,000 mg IntraVENous Q8H    anidulafungin  100 mg IntraVENous Q24H    metoprolol  2.5 mg IntraVENous Q6H    enoxaparin  70 mg SubCUTAneous BID    fat emulsion  250 mL IntraVENous Once per day on Mon Tue Thu Fri    insulin lispro  0-6 Units SubCUTAneous TID WC    insulin lispro  0-3 Units SubCUTAneous Nightly    [Held by provider] apixaban  2.5 mg Oral BID    [Held by provider] aspirin  81 mg Oral Daily    sodium chloride flush  5-40 mL IntraVENous 2 times per day    pantoprazole  40 mg IntraVENous Daily      PN-Adult Premix 4.25/10 - Peripheral Line      PN-Adult Premix 4.25/10 - Peripheral Line 84 mL/hr at 04/10/22 0530    sodium chloride 30 mL/hr at 04/10/22 0530    dextrose       sodium chloride flush, Acetaminophen, HYDROmorphone **OR** HYDROmorphone, glucose, glucagon (rDNA), dextrose, dextrose bolus (hypoglycemia) **OR** dextrose bolus (hypoglycemia), sodium chloride flush, ondansetron **OR** ondansetron    Lab Data:  CBC:   Recent Labs     04/08/22  0511 04/09/22  0555 04/10/22  0535   WBC 16.0* 16.6* 13.4*   HGB 8.2* 9.4* 9.0*   HCT 26.9* 29.7* 29.2*   .6* 109.2* 108.6*    391 387     BMP:   Recent Labs     04/08/22  0511 04/09/22  0555 04/10/22  0535    137 133*   K 4.1 4.2 4.1    104 103   CO2 22 20* 21   PHOS  --  2.2* 2.5   BUN 29* 30* 30*   CREATININE 1.1 1.1 1.1     LIVER PROFILE: No results for input(s): AST, ALT, LIPASE, BILIDIR, BILITOT, ALKPHOS in the last 72 hours. Invalid input(s): AMYLASE,  ALB  PT/INR: No results for input(s): PROTIME, INR in the last 72 hours. APTT: No results for input(s): APTT in the last 72 hours. BNP:  No results for input(s): BNP in the last 72 hours. TROPONIN: No results for input(s): TROPONINI in the last 72 hours. No results for input(s): TROPONINT in the last 72 hours. Labs, consult, tests reviewed                    Jazmyne Denise MD, PA-C 4/10/2022 10:50 AM     Please note this report has been partially produced using speech recognition software and may contain errors related to that system including errors in grammar, punctuation, and spelling, as well as words and phrases that may be inappropriate. If there are any questions or concerns please feel free to contact the dictating provider for clarification.

## 2022-04-11 NOTE — PROGRESS NOTES
Hospitalist Progress Note    Patient:  Sven Ellis  Unit/Bed:2104/2104-A   YOB: 1929       MRN: [id number] Acct: [de-identified]  PCP: Josr Ruvalcaba MD    Date of Admission: 4/2/2022  --------------------------    Chief Complaint:     Abdominal pain, nausea vomiting, diarrhea    Hospital Course/interval history:     Sven Ellis is a 80 y.o. male hospitalized on 4/2/2022   abdominal pain, nausea vomiting, patient has prior history of small bowel obstructions, colon cancer status post resection there was concern for ischemic colitis patient evaluated by surgery service, underwent exploratory laparotomy, subtotal colon resection, end ileostomy, small bowel resection, mobilization of the splenic flexure for colitis with acute abdomen. His hospitalization course complicated by circulatory shock, sepsis, UTI, atrial fibrillation with rapid ventricular response, MARIELOS probable pneumonia. Assessment/plan:     Severe colitis with sepsis status post exploratory laparotomy, colon resection, small bowel resection with end ileostomy    · Biopsy results showed  pseudomembranous colitis ? C. Difficile  · Given the slow return of bowel function, increased abdominal pain repeated CT scan of the abdomen showed diffuse enteritis no leak. · -Broad-spectrum antibiotics per ID team.  Currently on meropenem, Eraxis de-escalation per ID team.,  · Continue high-dose oral vancomycin  · Patient started on clear liquid diet. Continue to monitor return of bowel function  · Patient pain medication adjusted on the following (reduced hydromorphone to 0.25 mg every 4 hours for severe pain only, oral Norco (liquid) as needed for moderate pain. Plan to de-escalate pain medication as soon as pain is under control.   ·        Acute metabolic encephalopathy superimposing probable undiagnosed cognitive impairment.  -Patient easily arousable, communicative, without focal neurological deficit  -Reduce Dilaudid dose for severe pain only, use Norco for moderate pain. Hold for sedation  -Delirium pathophysiology discussed with the patient's daughter and girlfriend at bedside in detail.  -Frequent orientation, typical day protocol        Acute kidney injury  Multifactorial, resolved    UTI versus bacteriuria  Culture data showed Pseudomonas, antibiotic management per ID team.        Atrial fibrillation with rapid ventricular response  - rate controlled now,  -Currently off Cardizem, continue metoprolol IV every 6 hours  Continue therapeutic Lovenox while holding DOAC        Mild hyponatremia  Mild, sodium today 132. Continue monitoring. Macrocytic anemia  Chronic, ? Underlying MDS, recent folate, B12 within normal limit. Oral thrush  Currently receiving Eraxis, likely to be discontinued soon by ID. Start topical nystatin    Comorbid condition (AAA, BPH, history of rectal cancer 2019 status post colostomy, psoriatic arthritis, immunocompromise prior CVA)    Code Status: Full Code         DVT prophylaxis: Therapeutic Lovenox     Disposition:   - continued hospitalization secondary to lack of return of bowel function, persistent enteritis, encephalopathy       Plan of care discussed with the patient's daughter at bedside  Plan of care discussed with ID    Discussed with nursing staff, palliative care nurse    ----------------      Subjective:     Patient seen and examined  Overnight events noted  RN and ancillary staff note reviewed    Patient sleepy but easily arousable  Reported significant pain this morning improved with IV Dilaudid    Daughter bedside expressed many concerns including lack of oral care, she stated that she stayed in the patient room for more than an hour and a half and nobody rounded on the patient. She also complained that he was not getting assistance with incentive spirometry. She was also concerned that the infection doctor discontinue the IV vancomycin.   Explained to her the suspicion for systemic MRSA infection is less likely. I discussed with the daughter the possibility of C. difficile infection    Hypoactive delirium discussed with the patient's daughter, stated that patient previously did better Norco for pain control. Diet: PN-Adult Premix 4.25/10 - Peripheral Line  ADULT DIET; Clear Liquid  PN-Adult Premix 4.25/10 - Peripheral Line    OBJECTIVE     Exam:  BP 90/68   Pulse 98   Temp 97.9 °F (36.6 °C) (Oral)   Resp 24   Ht 5' 9.02\" (1.753 m)   Wt 196 lb 10.4 oz (89.2 kg)   SpO2 93%   BMI 29.03 kg/m²       Seen multiple time    Gen: Not in distress. Sleepy but easily arousable,. Currently sitting on the chair. In no acute distress  Head: Normocephalic. Atraumatic. Eyes: Conjunctivae/corneas clear. ENT: Oral mucosa dry, minimal white patches on the tongue. No oral ulceration  Neck: No JVD. No obvious thyromegaly. CVS: Nml S1S2, no murmur   RRR  Pulmomary: Fair air entry in both lungs, on room air. Gastrointestinal: Soft, tenderness with deep palpation, dressing noted in the mid abdomen, colostomy with dark green material, no stool. Musculoskeletal: No edema. Warm  Neuro: Easily arousable no focal deficit. Moves extremity spontaneously.   Psychiatry: Unable to assess      Medications:  Reviewed    Infusion Medications    PN-Adult Premix 4.25/10 - Peripheral Line      PN-Adult Premix 4.25/10 - Peripheral Line 84 mL/hr at 04/10/22 1824    sodium chloride 50 mL/hr at 04/11/22 1025    dextrose       Scheduled Medications    lactobacillus  1 capsule Oral Daily    nystatin  5 mL Oral 4x Daily    vancomycin  500 mg Oral 4 times per day    dilTIAZem  30 mg Oral 2 times per day    meropenem  1,000 mg IntraVENous Q8H    anidulafungin  100 mg IntraVENous Q24H    metoprolol  2.5 mg IntraVENous Q6H    enoxaparin  70 mg SubCUTAneous BID    fat emulsion  250 mL IntraVENous Once per day on Mon Tue Thu Fri    insulin lispro  0-6 Units SubCUTAneous TID WC    insulin lispro  0-3 Units SubCUTAneous Nightly    [Held by provider] apixaban  2.5 mg Oral BID    [Held by provider] aspirin  81 mg Oral Daily    sodium chloride flush  5-40 mL IntraVENous 2 times per day    pantoprazole  40 mg IntraVENous Daily     PRN Meds: HYDROmorphone, HYDROcodone-acetaminophen, sodium chloride flush, Acetaminophen, glucose, glucagon (rDNA), dextrose, dextrose bolus (hypoglycemia) **OR** dextrose bolus (hypoglycemia), sodium chloride flush, ondansetron **OR** ondansetron      Intake/Output Summary (Last 24 hours) at 4/11/2022 1447  Last data filed at 4/11/2022 0800  Gross per 24 hour   Intake 200 ml   Output 1290 ml   Net -1090 ml             Labs:   Recent Labs     04/09/22  0555 04/10/22  0535 04/11/22  0645   WBC 16.6* 13.4* 18.0*   HGB 9.4* 9.0* 8.9*   HCT 29.7* 29.2* 29.5*    387 466*     Recent Labs     04/09/22  0555 04/10/22  0535 04/11/22  0645    133* 132*   K 4.2 4.1 4.4    103 103   CO2 20* 21 21   BUN 30* 30* 33*   CREATININE 1.1 1.1 1.1   CALCIUM 8.3 7.4* 8.1*   PHOS 2.2* 2.5 3.3     Recent Labs     04/10/22  0535 04/11/22  0645   AST 14* 13*   ALT 9* 10   BILIDIR 0.2 0.2   BILITOT 0.3 0.3   ALKPHOS 86 106     No results for input(s): INR in the last 72 hours. No results for input(s): Christine Yadira in the last 72 hours. Urinalysis:      Lab Results   Component Value Date    NITRU POSITIVE 04/02/2022    WBCUA 11 04/02/2022    BACTERIA OCCASIONAL 04/02/2022    RBCUA 1 04/02/2022    BLOODU MODERATE 04/02/2022    SPECGRAV 1.015 04/02/2022       Radiology:  CT ABDOMEN PELVIS W IV CONTRAST Additional Contrast? None   Final Result   No mechanical obstructive process however inflammatory findings of small   bowel with mucosal hyperenhancement and wall thickening throughout the   distended small bowel loops to the right lower quadrant ileostomy with small   volume abdominopelvic ascites most consistent with enteritis. No obvious   abscess formation.   Moderate bilateral pleural effusions along with body wall   edema. XR CHEST PORTABLE   Final Result   Unchanged moderate left pleural effusion bibasilar atelectasis. No evidence   of pulmonary edema. XR ABDOMEN FOR NG/OG/NE TUBE PLACEMENT   Final Result   Nasogastric tube terminates in the stomach         XR ABDOMEN FOR NG/OG/NE TUBE PLACEMENT   Final Result   An NG tube tip is present with the tip coiled within the fundus of the   stomach. The proximal port is in the fundus of the stomach. Consider   advancement of the tube. XR ABDOMEN (KUB) (SINGLE AP VIEW)   Final Result   The patient is status post surgery. There is decreased but continued mild   distension of the small bowel could represent partial obstruction or ileus. Calcification of the abdominal aorta consistent with a known aneurysm. The NG tube tip is in the fundus of the stomach. The proximal port is in the   distal esophagus near the GE junction. Consider advancement of the tube. XR CHEST PORTABLE   Final Result   Low lung volumes with persistent bibasilar airspace disease. Trace left pleural effusion. XR ABDOMEN (KUB) (SINGLE AP VIEW)   Final Result   1. Nasogastric tube has been advanced with the tip over the antrum. 2.  New skin staples are present at the abdomen and pelvis. 3.  Residual gas dilatation of multiple small bowel loops. There may be a   small amount of postsurgical pneumoperitoneum. 4.  See the prior CT scan for evaluation of the infrarenal aortic aneurysm. XR CHEST PORTABLE   Final Result   Right line placement as above. Bilateral small areas of pneumonia         XR ABDOMEN (KUB) (SINGLE AP VIEW)   Final Result   Multiple air-filled distended loops of small bowel stacked in the mid abdomen   suggesting a low-grade partial small bowel obstruction or focal ileus. Atherosclerotic calcification of an abdominal aortic aneurysm as seen on   prior CT.       NG tube in the stomach with proximal port at the level of the GE junction. XR CHEST PORTABLE   Final Result   Enteric tube tip is noted within the stomach with the side port likely at or   just distal to the GE junction. XR CHEST PORTABLE   Final Result   Trace left pleural effusion with adjacent atelectasis. CT ABDOMEN PELVIS W IV CONTRAST Additional Contrast? None   Final Result   Interval development of severe circumferential wall thickening and   pericolonic inflammatory changes of the transverse colon and splenic flexure   of the colon just proximal to the left lower quadrant colostomy with   increased fluid-filled distension of the proximal colon and small bowel. Findings suggest acute infectious/inflammatory colitis with proximal partial   obstruction versus ileus. Stable infrarenal abdominal aortic aneurysm measuring 5.5 cm in diameter. See follow-up recommendations below. Stable hepatic cysts and bilateral renal cortical cysts. Cardiomegaly with bilateral pleural effusions suggesting mild CHF. RECOMMENDATIONS:   5.5 cm infrarenal abdominal aortic aneurysm. Recommend referral to a vascular   specialist.      Reference: J [facility] Radiol 5009;[id number].                         Electronically signed by Marlin Mosqueda MD on 4/11/2022 at 2:47 PM

## 2022-04-11 NOTE — PROGRESS NOTES
Physical Therapy  Name: Isidro Colvin MRN: [id number] :   1929   Date:  2022   Admission Date: 2022 Room:  [address] [place] [address]   Restrictions/Precautions:  Restrictions/Precautions  Restrictions/Precautions: General Precautions,Fall Risk     abd precautions  Communication with other providers:  Tomasa RN states pt is ok to see for therapy  Subjective:  Patient states:  He does not want to move, family in support of pt moving, pt agreed to get up in the chair  Pain:   Location, Type, Intensity (0/10 to 10/10): Unable to rate  Objective:    Observation:   Pt was in bed visiting with his family  Treatment, including education/measures:  Supine Exercises: Ankle pumps x 10 with assist  Pt refused further ex's  Therapeutic Exercise:  Therapeutic exercises were instructed today. Cues were given for technique, safety, recruitment, and rationale. Cues were verbal and/or tactile. Transfers with line management of tele, IV  Rolling: pt resisted  Supine to sit :mod to max A   Scooting :mod A of 1, pt's HR was 100 at rest, 125 sitting at the EOB, 144 when trans to the chair and 114 when I exited the room with nursing in the room  Sit to stand :mod a of 1  Stand to sit :mod A of 1  SPT:mod a of 1  Pt's family in and asked appropriate questions to help pt's recovery  Safety  Patient left safely in the chair, with call light/phone in reach with alarm applied. Gait belt and abd support were used for transfers.   Assessment / Impression:     Patient's tolerance of treatment:  Fair d/t pain and increased HR   Adverse Reaction: increased HR  Significant change in status and impact:  none  Barriers to improvement:  Limited mobility d/t pain  Plan for Next Session:    Will cont to work towards pt's goals per his tolerance  Time in:  1105  Time out:  1136  Timed treatment minutes:  31  Total treatment time:  31  Previously filed items:   Short term goals  Time Frame for Short term goals: 1 week  Short term goal 1: pt to complete all bed mobility mod A x1  Short term goal 2: Pt to complete all STS transfers to/from bed, commode, and chair max A  Short term goal 3: Pt to complete stand pivot transfer with LRAD max A  Electronically signed by:     Isaiah Frederick PTA  4/11/2022, 11:27 AM

## 2022-04-11 NOTE — PROGRESS NOTES
Hospitalist and Dr Moisés Cuevas updated on patient. Pt having appears to be having more abd pain. Dilaudid appears to make him extremely drowsy. Family requesting norco, states he does well with it. Updated on pts Afib with intermittent RVR Hr into 160s. Increases with pain. Last,  patients tongue is coated. hospitalist at bedside to speak with family.

## 2022-04-11 NOTE — PROGRESS NOTES
CARDIOLOGY  NOTE        Name:  Sher Baltazar /Age/Sex: 1929  (80 y.o. male)   MRN & CSN:  [id number] & [id number] Admission Date/Time: 2022 11:02 AM   Location:  - PCP: Rachael Monique MD       Hospital Day: 10        PLAN FROM CARDIOLOGY FOR TODAY:   We will give IV dig 0.125 IV daily for heart rate control might need to be loaded with dig if the heart rate is not controlled at this dose      - cardiology consult is for: Atrial fibrillation    -  Interval history: Having RVR with A. fib    · ASSESSMENT/ PLAN:  1. Atrial fibrillation heart rate is not controlled   2. Last echo had mild aortic stenosis that was last year  3. Normal Cardiolite last year  4. Abdominal pain patient is feeling better              Subjective: Todays complain: Patient better oriented at this time    HPI:  Kendra Vicente is a 80 y. o.year old who and presents with had concerns including GI Problem (dark stool, colostomy, on eliquis). Chief Complaint   Patient presents with    GI Problem     dark stool, colostomy, on eliquis           Objective: Temperature:  Current - Temp: 98.1 °F (36.7 °C);  Max - Temp  Av.3 °F (36.8 °C)  Min: 98.1 °F (36.7 °C)  Max: 98.5 °F (36.9 °C)    Respiratory Rate : Resp  Av.7  Min: 13  Max: 33    Pulse Range: Pulse  Av.5  Min: 97  Max: 136    Blood Presuure Range:  Systolic (94ZDE), BDP:423 , Min:110 , CQQ:014   ; Diastolic (78VUO), OZM:61, Min:73, Max:122      Pulse ox Range: SpO2  Av.1 %  Min: 92 %  Max: 98 %    24hr I & O:      Intake/Output Summary (Last 24 hours) at 2022 0835  Last data filed at 2022 0654  Gross per 24 hour   Intake 175 ml   Output 1570 ml   Net -1395 ml         BP (!) 138/111   Pulse 108   Temp 98.1 °F (36.7 °C) (Oral)   Resp 22   Ht 5' 9.02\" (1.753 m)   Wt 196 lb 10.4 oz (89.2 kg)   SpO2 94%   BMI 29.03 kg/m²           Review of Systems:    Abdominal pain    TELEMETRY: Atrial fibrillation    has a past medical history of AAA (abdominal aortic aneurysm) ([facility] [address] MARIELOS (acute kidney injury) ([facility] [address] Angina, class I ([address] Benign prostatic hyperplasia, Bowel obstruction ([address] CAD (coronary artery disease), Cancer ([address] CCC (chronic calculous cholecystitis), Delirium, Gastroesophageal reflux disease without esophagitis, Hx of cardiovascular stress test, Hypertension, On total parenteral nutrition, Partial small bowel obstruction ([address] Primary malignant neoplasm of rectum ([address] Probable Bacterial Pneumonia, Psoriasis, Psoriatic arthritis ([facility] [address] Pyelonephritis, Severe protein-calorie malnutrition ([facility] [address] Stable angina ([facility] [address] and Unspecified cerebral artery occlusion with cerebral infarction. has a past surgical history that includes Appendectomy; colostomy; Upper gastrointestinal endoscopy (N/A, 2/28/2022); Colonoscopy (N/A, 3/2/2022); and laparotomy (N/A, 4/3/2022). Physical Exam:  General: Alert  Head:normal  Eye: Pupils equal and round  Neck:  No JVD, no carotid bruit noted   Chest:  Clear to auscultation, no signs of respiratory distress  Cardiovascular:  Normal rate and rhythm. S1 and S2 noted.  No murmurs rubs or gallops  Abdomen:   nontender  Extremities:  tr edema  Pulses; palpable  Neuro: grossly normal    Medications:    lactobacillus  1 capsule Oral Daily    vancomycin  500 mg Oral 4 times per day    dilTIAZem  30 mg Oral 2 times per day    meropenem  1,000 mg IntraVENous Q8H    anidulafungin  100 mg IntraVENous Q24H    metoprolol  2.5 mg IntraVENous Q6H    enoxaparin  70 mg SubCUTAneous BID    fat emulsion  250 mL IntraVENous Once per day on Mon Tue Thu Fri    insulin lispro  0-6 Units SubCUTAneous TID WC    insulin lispro  0-3 Units SubCUTAneous Nightly    [Held by provider] apixaban  2.5 mg Oral BID    [Held by provider] aspirin  81 mg Oral Daily    sodium chloride flush  5-40 mL IntraVENous 2 times per day    pantoprazole  40 mg IntraVENous Daily      PN-Adult Premix 4.25/10 - Peripheral Line 84 mL/hr at 04/10/22 1824    sodium chloride 30 mL/hr at 04/10/22 1838    dextrose       HYDROmorphone, sodium chloride flush, HYDROmorphone, Acetaminophen, glucose, glucagon (rDNA), dextrose, dextrose bolus (hypoglycemia) **OR** dextrose bolus (hypoglycemia), sodium chloride flush, ondansetron **OR** ondansetron    Lab Data:  CBC:   Recent Labs     04/09/22  0555 04/10/22  0535 04/11/22  0645   WBC 16.6* 13.4* 18.0*   HGB 9.4* 9.0* 8.9*   HCT 29.7* 29.2* 29.5*   .2* 108.6* 109.7*    387 466*     BMP:   Recent Labs     04/09/22  0555 04/10/22  0535 04/11/22  0645    133* 132*   K 4.2 4.1 4.4    103 103   CO2 20* 21 21   PHOS 2.2* 2.5 3.3   BUN 30* 30* 33*   CREATININE 1.1 1.1 1.1     LIVER PROFILE:   Recent Labs     04/10/22  0535 04/11/22  0645   AST 14* 13*   ALT 9* 10   BILIDIR 0.2 0.2   BILITOT 0.3 0.3   ALKPHOS 86 106     PT/INR: No results for input(s): PROTIME, INR in the last 72 hours. APTT: No results for input(s): APTT in the last 72 hours. BNP:  No results for input(s): BNP in the last 72 hours. TROPONIN: No results for input(s): TROPONINI in the last 72 hours. No results for input(s): TROPONINT in the last 72 hours. Labs, consult, tests reviewed                    Scott Gunderson MD, PA-C 4/11/2022 8:35 AM     Please note this report has been partially produced using speech recognition software and may contain errors related to that system including errors in grammar, punctuation, and spelling, as well as words and phrases that may be inappropriate. If there are any questions or concerns please feel free to contact the dictating provider for clarification.

## 2022-04-11 NOTE — CARE COORDINATION
Cm contacted Francisco Mayfield, re; SNF choices for pt continuing care at discharge. Dgt states that family has talked and prefers [facility] or [facility] in that order.  Cm attempted to contact [facility] with voice mail message left requesting return call re; referral.

## 2022-04-11 NOTE — ACP (ADVANCE CARE PLANNING)
Palliative Care consult complete ful Brody Rangel as patient is confused. He has a living will and POA documents she will bring them in tomorrow.  was at bedside and updated Brody Rangel and patient's Shanna Comp on patient's current condition. Pain medications adjusted.

## 2022-04-11 NOTE — PROGRESS NOTES
Nephrology Progress Note  4/11/2022 2:46 PM  Subjective: Interval History: Kandy Sanchez is a 80 y.o. male  . abdominal pain slightly better doing okay today  Data:   Scheduled Meds:   lactobacillus  1 capsule Oral Daily    vancomycin  500 mg Oral 4 times per day    dilTIAZem  30 mg Oral 2 times per day    meropenem  1,000 mg IntraVENous Q8H    anidulafungin  100 mg IntraVENous Q24H    metoprolol  2.5 mg IntraVENous Q6H    enoxaparin  70 mg SubCUTAneous BID    fat emulsion  250 mL IntraVENous Once per day on Mon Tue Thu Fri    insulin lispro  0-6 Units SubCUTAneous TID WC    insulin lispro  0-3 Units SubCUTAneous Nightly    [Held by provider] apixaban  2.5 mg Oral BID    [Held by provider] aspirin  81 mg Oral Daily    sodium chloride flush  5-40 mL IntraVENous 2 times per day    pantoprazole  40 mg IntraVENous Daily     Continuous Infusions:   PN-Adult Premix 4.25/10 - Peripheral Line      PN-Adult Premix 4.25/10 - Peripheral Line 84 mL/hr at 04/10/22 1824    sodium chloride 50 mL/hr at 04/11/22 1025    dextrose           CBC   Recent Labs     04/09/22  0555 04/10/22  0535 04/11/22  0645   WBC 16.6* 13.4* 18.0*   HGB 9.4* 9.0* 8.9*   HCT 29.7* 29.2* 29.5*    387 466*      BMP   Recent Labs     04/09/22  0555 04/10/22  0535 04/11/22  0645    133* 132*   K 4.2 4.1 4.4    103 103   CO2 20* 21 21   PHOS 2.2* 2.5 3.3   BUN 30* 30* 33*   CREATININE 1.1 1.1 1.1     Hepatic:   Recent Labs     04/10/22  0535 04/11/22  0645   AST 14* 13*   ALT 9* 10   BILITOT 0.3 0.3   ALKPHOS 86 106     Troponin: No results for input(s): TROPONINI in the last 72 hours. BNP: No results for input(s): BNP in the last 72 hours. Lipids: No results for input(s): CHOL, HDL in the last 72 hours. Invalid input(s): LDLCALCU  ABGs: No results found for: PHART, PO2ART, NTW4SAS  INR:   No results for input(s): INR in the last 72 hours.   Renal Labs  Albumin:    Lab Results   Component Value Date    LABALBU 2.2 04/11/2022     Calcium:    Lab Results   Component Value Date    CALCIUM 8.1 04/11/2022     Phosphorus:    Lab Results   Component Value Date    PHOS 3.3 04/11/2022     U/A:    Lab Results   Component Value Date    NITRU POSITIVE 04/02/2022    COLORU YELLOW 04/02/2022    WBCUA 11 04/02/2022    RBCUA 1 04/02/2022    MUCUS FEW 04/02/2022    TRICHOMONAS NONE SEEN 03/20/2022    BACTERIA OCCASIONAL 04/02/2022    CLARITYU CLEAR 04/02/2022    SPECGRAV 1.015 04/02/2022    UROBILINOGEN 0.2 04/02/2022    BILIRUBINUR NEGATIVE 04/02/2022    BLOODU MODERATE 04/02/2022    KETUA SMALL 04/02/2022     ABG:  No results found for: PHART, NWN5QKW, PO2ART, TZZ5MSN, BEART, THGBART, UFQ7VTK, F0JIHLUH  HgBA1c:  No results found for: LABA1C  Microalbumen/Creatinine ratio:  No components found for: RUCREAT  TSH:  No results found for: TSH  IRON:    Lab Results   Component Value Date    IRON 35 03/01/2022     Iron Saturation:  No components found for: PERCENTFE  TIBC:    Lab Results   Component Value Date    TIBC 280 03/01/2022     FERRITIN:    Lab Results   Component Value Date    FERRITIN 210 03/01/2022     RPR:  No results found for: RPR  SAQIB:  No results found for: ANATITER, SAQIB  24 Hour Urine for Creatinine Clearance:  No components found for: CREAT4, UHRS10, UTV10      Objective:   I/O: 04/10 0701 - 04/11 0700  In: 215 [P.O.:140]  Out: 1720 [IIDVE:3681]  I/O last 3 completed shifts: In: 4055.4 [P.O.:260; I.V.:514.4; NG/GT:75; IV Piggyback:424.3]  Out: 6484 [Urine:2105; Stool:1200]  I/O this shift:  In: 61 [P.O.:60]  Out: -   Vitals: BP 90/68   Pulse 98   Temp 97.9 °F (36.6 °C) (Oral)   Resp 24   Ht 5' 9.02\" (1.753 m)   Wt 196 lb 10.4 oz (89.2 kg)   SpO2 93%   BMI 29.03 kg/m²  {  General appearance: awake weak  HEENT: Head: Normal, normocephalic, atraumatic.   Neck: supple, symmetrical, trachea midline  Lungs: diminished breath sounds bilaterally  Heart: S1, S2 normal  Abdomen: abnormal findings:  soft  tender post surgery point tenderness  Extremities: edema trace  Neurologic: Mental status: alertness: Arousable weak      Assessment and Plan:      IMP:   #1 small bowel obstruction status post surgery   #2 acute renal failure from ATN   #3 possible colitis with leukocytosis   #4 anemia   #5 A. fib rapid rate   #6  possible enteritis    Plan      #1 post surgery tolerating well NG tube is follow-up oral intake   #2 creatinine holding 1.1 monitor    #3 follow-up plans based on CAT scan finding with surgery- agree try IV fluids for now   #4 hemoglobin is stable monitor   #5 heart rate overall stable   renal monitor and above           Nico Callejas MD, MD

## 2022-04-11 NOTE — PROGRESS NOTES
Infectious Disease Progress Note  2022   Patient Name: Obi Payton : 1929   Impression  · Acute Abdomen with Colitis:      ? Complicated Pseudomonas aeruginosa UTI:     ? Moderate Bilateral Pleural Effusions:     § Afebrile  § Leukocytosis trended upward  § -Urine culture: Pseudomonas aeruginosa >100,000  § -UA WBC 11, RBC 1  § -BC 0/2-NGTD  § -BC 0/2-NGTD  § -Urine Culture: NGTD  § -Fungitell: negative  § -CT A&P W IV Contrast: see full report below. Findings suggest actue infectious/inflammatory colitis with proximal partial obstruction vs ileus. § 4/3-S/p per Dr. Fauzia Rosas: Exploratory lap, subtotal colon resection, End ileostomy, removal of colostomy, small bowel resection, mobilization of splenic flexure, central line placement. DX:  Colitis and acute abdomen. Perineal Cultures: Wendy Nix, GI, onboard  § 4/10-CT A&P W IV Contrast: no mechanical obstructive process however inflammatory findings of SB with mucosal hyper-enhancement and wall thickening throughout the distended SB loops to the RLQ ileostomy with small volume abdominopelvic ascites most consistent with enteritis. No obvious abscess formation. Moderate bilateral pleural effusions along with body wall edema. § Dr. Fauzia Rosas -added culturelle due to patient without colon but has enteritis of SB     · AF with RVR, on AC:  § Dr. Tony Melton onboard     ? MARIELOS from ATN:  § Dr. Geni Eng onboard     ? Delirium     ? AAA     ? BPH     ? Chronic Supra pubic Catheter     ? CAD     ? Rectal Cancer , Colostomy:     ? Psoriatic Arthritis     ? CVA 2019     ? Allergy to sulfa     · Multi-morbidity: per PMHx: AAA, BPH, CAD, rectal cancer 2019, GERD, HTN, psoriatic arthritis, pyelonephritis, CVA 2019     Plan:  ? Continue IV meropenem 1 gm q8h  ? Continue IV Eraxis 100 mg q24h  ? Continue po lactobacillus per Dr. Fauzia Rosas  ? Hospitalist started po vancomycin 4/10 due to CT A&P impression of enteritis  ?  Check Cdif, patient having liquid brown stools from ileostomy, CT showing enteritis, leukocytosis trending up, and was started on oral vancomycin by hospitalist provider on 4/10, need to determine if Cdif is present. ? Reviewed fungitell 4/8-negative  ? Trend CRP and Pct, pending am labs  ? Recommend IR evaluation of moderate bilateral pleural effusions for possible thoracentesis      Ongoing Antimicrobial Therapy  Eraxis 4/8-  Meropenem 4/8-? Po vancomycin 4/10-  Completed Antimicrobial Therapy  Cefepime 4/2? Metronidazole 4/2-8  Zosyn 4/4-8  Vancomycin 4/2-8  ? History:? Interval history noted. Chief complaint: Acute abdomen with colitis, complicated Pseudomonas aeruginosa UTI, persistent leukocytosis. Denies n/v/d/f or untoward effects of antibiotics. Physical Exam:  Vital Signs: BP (!) 138/111   Pulse 108   Temp 98.1 °F (36.7 °C) (Oral)   Resp 22   Ht 5' 9.02\" (1.753 m)   Wt 196 lb 10.4 oz (89.2 kg)   SpO2 94%   BMI 29.03 kg/m²     Gen: pleasantly confused, no distress  Skin: no stigmata of endocarditis  Wounds: C/D/I midabdominal wound with staples intact, well approximated, no drainage or erythema. HEMT: AT/NC Oropharynx pink, moist, and without lesions or exudates; dentition in good state of repair  Eyes: PERRLA, EOMI, conjunctiva pink, sclera anicteric. Neck: Supple. Trachea midline. No LAD. Chest: no distress and CTA. Good air movement. Room air. Heart: Afib and no MRG. Abd: soft, non-distended, generalized tenderness to light palpation, no hepatomegaly. Normoactive bowel sounds. Ext: no clubbing, cyanosis, or edema  Catheter Site: without erythema or tenderness draining clear yellow urine.   Ileostomy intact: RLQ draining brown liquid stool   Neuro:  CN 2-12 intact and no focal sensory or motor deficits        Radiologic / Imaging / TESTING  4/2/22 CT Abdomen Pelvis W IV Contrast:  Impression   Interval development of severe circumferential wall thickening and   pericolonic inflammatory changes of the transverse colon and splenic flexure   of the colon just proximal to the left lower quadrant colostomy with   increased fluid-filled distension of the proximal colon and small bowel. Findings suggest acute infectious/inflammatory colitis with proximal partial   obstruction versus ileus.       Stable infrarenal abdominal aortic aneurysm measuring 5.5 cm in diameter. See follow-up recommendations below.       Stable hepatic cysts and bilateral renal cortical cysts.       Cardiomegaly with bilateral pleural effusions suggesting mild CHF.       RECOMMENDATIONS:   5.5 cm infrarenal abdominal aortic aneurysm. Recommend referral to a vascular   specialist.       Reference: Doren Barrier Radiol 2331;[id number].      4/2/22 XR Chest Portable:  Impression   Trace left pleural effusion with adjacent atelectasis.      4/2/22 XR Chest Portable:  Impression   Enteric tube tip is noted within the stomach with the side port likely at or   just distal to the GE junction.          4/3/22 XR Abdomen:  Impression   Multiple air-filled distended loops of small bowel stacked in the mid abdomen   suggesting a low-grade partial small bowel obstruction or focal ileus.       Atherosclerotic calcification of an abdominal aortic aneurysm as seen on   prior CT.       NG tube in the stomach with proximal port at the level of the GE junction.      4/3/22 XR Chest Portable:  Impression   Right line placement as above.  Bilateral small areas of pneumonia      4/4/22 XR Abdomen:  Impression   1.  Nasogastric tube has been advanced with the tip over the antrum.       2.  New skin staples are present at the abdomen and pelvis.       3.  Residual gas dilatation of multiple small bowel loops.  There may be a   small amount of postsurgical pneumoperitoneum.       4.  See the prior CT scan for evaluation of the infrarenal aortic aneurysm.      4/5/22 XR Chest Portable:  Impression   Low lung volumes with persistent bibasilar airspace disease.     Trace left pleural effusion.      4/7/22 XR Abdomen:  Impression   The patient is status post surgery.  There is decreased but continued mild   distension of the small bowel could represent partial obstruction or ileus.       Calcification of the abdominal aorta consistent with a known aneurysm.       The NG tube tip is in the fundus of the stomach.  The proximal port is in the   distal esophagus near the GE junction.  Consider advancement of the tube. 4/8/22 XR Chest Portable:  Impression   Unchanged moderate left pleural effusion bibasilar atelectasis.  No evidence   of pulmonary edema. 4/10/22 CT Abdomen Pelvis W IV Contrast:  Impression   No mechanical obstructive process however inflammatory findings of small   bowel with mucosal hyperenhancement and wall thickening throughout the   distended small bowel loops to the right lower quadrant ileostomy with small   volume abdominopelvic ascites most consistent with enteritis.  No obvious   abscess formation.  Moderate bilateral pleural effusions along with body wall   edema.           Labs:    Recent Results (from the past 24 hour(s))   POCT Glucose    Collection Time: 04/10/22  5:20 PM   Result Value Ref Range    POC Glucose 147 (H) 70 - 99 MG/DL   Lactate, Sepsis    Collection Time: 04/10/22  5:39 PM   Result Value Ref Range    Lactic Acid, Sepsis 1.0 0.5 - 1.9 mMOL/L   POCT Glucose    Collection Time: 04/10/22  9:55 PM   Result Value Ref Range    POC Glucose 122 (H) 70 - 99 MG/DL   CBC with Auto Differential    Collection Time: 04/11/22  6:45 AM   Result Value Ref Range    WBC 18.0 (H) 4.0 - 10.5 K/CU MM    RBC 2.69 (L) 4.6 - 6.2 M/CU MM    Hemoglobin 8.9 (L) 13.5 - 18.0 GM/DL    Hematocrit 29.5 (L) 42 - 52 %    .7 (H) 78 - 100 FL    MCH 33.1 (H) 27 - 31 PG    MCHC 30.2 (L) 32.0 - 36.0 %    RDW 18.8 (H) 11.7 - 14.9 %    Platelets 530 (H) 861 - 440 K/CU MM    MPV 10.1 7.5 - 11.1 FL    Myelocyte Percent 2 (H) 0.0 %    Metamyelocytes Relative 2 (H) 0.0 %    Segs Relative 78.0 (H) 36 - 66 %    Lymphocytes % 5.0 (L) 24 - 44 %    Monocytes % 13.0 (H) 0 - 4 %    Myelocytes Absolute 0.36 K/CU MM    Metamyelocytes Absolute 0.36 K/CU MM    Segs Absolute 14.1 K/CU MM    Lymphocytes Absolute 0.9 K/CU MM    Monocytes Absolute 2.3 K/CU MM    Differential Type MANUAL DIFFERENTIAL     Anisocytosis 1+     Macrocytes 1+    Basic Metabolic Panel    Collection Time: 04/11/22  6:45 AM   Result Value Ref Range    Sodium 132 (L) 135 - 145 MMOL/L    Potassium 4.4 3.5 - 5.1 MMOL/L    Chloride 103 99 - 110 mMol/L    CO2 21 21 - 32 MMOL/L    Anion Gap 8 4 - 16    BUN 33 (H) 6 - 23 MG/DL    CREATININE 1.1 0.9 - 1.3 MG/DL    Glucose 127 (H) 70 - 99 MG/DL    Calcium 8.1 (L) 8.3 - 10.6 MG/DL    GFR Non-African American >60 >60 mL/min/1.73m2    GFR African American >60 >60 mL/min/1.73m2   Magnesium    Collection Time: 04/11/22  6:45 AM   Result Value Ref Range    Magnesium 1.8 1.8 - 2.4 mg/dl   Phosphorus    Collection Time: 04/11/22  6:45 AM   Result Value Ref Range    Phosphorus 3.3 2.5 - 4.9 MG/DL   C-Reactive Protein    Collection Time: 04/11/22  6:45 AM   Result Value Ref Range    CRP, High Sensitivity 87.1 mg/L   Prealbumin    Collection Time: 04/11/22  6:45 AM   Result Value Ref Range    Prealbumin 10 (L) 20 - 40 mg/dL   Hepatic Function Panel    Collection Time: 04/11/22  6:45 AM   Result Value Ref Range    Albumin 2.2 (L) 3.4 - 5.0 GM/DL    Total Bilirubin 0.3 0.0 - 1.0 MG/DL    Bilirubin, Direct 0.2 0.0 - 0.3 MG/DL    Bilirubin, Indirect 0.1 0 - 0.7 MG/DL    Alkaline Phosphatase 106 40 - 129 IU/L    AST 13 (L) 15 - 37 IU/L    ALT 10 10 - 40 U/L    Total Protein 4.2 (L) 6.4 - 8.2 GM/DL   POCT Glucose    Collection Time: 04/11/22  7:47 AM   Result Value Ref Range    POC Glucose 122 (H) 70 - 99 MG/DL     CULTURE results: Invalid input(s): BLOOD CULTURE,  URINE CULTURE, SURGICAL CULTURE    Diagnosis:  Patient Active Problem List   Diagnosis    Hypertension    Benign prostatic hyperplasia    Psoriatic arthritis ([facility] [address]    Primary malignant neoplasm of rectum (HCC)    Psoriasis    Chronic myeloproliferative disease (HCC)    Monocytosis (symptomatic)    Gastroesophageal reflux disease without esophagitis    Urinary retention    H/O: CVA (cerebrovascular accident)    Irregular heart beat    Nonrheumatic aortic valve stenosis    Paroxysmal atrial fibrillation (HCC)    GI bleed    Melena    Arteriovenous malformation of jejunum    History of rectal cancer    Benign neoplasm of cecum    Benign neoplasm of ascending colon    Pseudomonas urinary tract infection    Partial small bowel obstruction (HCC)    SBO (small bowel obstruction) (HCC)    Atrial fibrillation with RVR (HCC)    Hypotension    Ischemic colitis (HCC)    Hypocalcemia    MARIELOS (acute kidney injury) ([facility] [address]    Probable Bacterial Pneumonia    CAD (coronary artery disease)    Anemia    Delirium    Severe protein-calorie malnutrition (HCC)    On total parenteral nutrition       Active Problems  Principal Problem:    SBO (small bowel obstruction) (HCC)  Active Problems:    Gastroesophageal reflux disease without esophagitis    Pseudomonas urinary tract infection    Atrial fibrillation with RVR (HCC)    Hypotension    Ischemic colitis (HCC)    Hypocalcemia    MARIELOS (acute kidney injury) ([facility] [address]    Probable Bacterial Pneumonia    CAD (coronary artery disease)    Anemia    Delirium    Severe protein-calorie malnutrition (HCC)    On total parenteral nutrition  Resolved Problems:    * No resolved hospital problems. *    Electronically signed by: Electronically signed by Sally Martin.  CHEMA Camara CNP on 4/11/2022 at 10:13 AM

## 2022-04-11 NOTE — PROGRESS NOTES
Patient awake, alert, states he's feeling good. He did get some pain medication overnight and woke up comfortable this am. He states he's hungry.     PE:  Vitals:    04/11/22 0320 04/11/22 0600 04/11/22 0641 04/11/22 0654   BP:  119/84     Pulse: 122 117  123   Resp: 26 24     Temp: 98.1 °F (36.7 °C)      TempSrc: Oral      SpO2: 94% 94%     Weight:   196 lb 10.4 oz (89.2 kg)    Height:           NG out, no nausea    Abd: soft, min distended, dressings c/d/i, active BS to auscultation, mild tenderness, stoma pink and viable, bilious fluid in appliance    CT scan report and films reviewed    Labs pending this am    A/P:  -continue TPN for nutrition  -try clear liquid diet  -continue PT/OT  -antibiotics per ID team  -add culturelle, patient without colon but has enteritis of SB    Addendum:  Labs reviewed  TPN written, electrolytes adjusted  Increase MIVF to 50ml/hr, patient BUN increasing, when taking PO well can decrease

## 2022-04-12 NOTE — PROGRESS NOTES
Patient seen and examined. He is groggy but awakens and answers my questions. He denies nausea, when I asked if he ate jell-o, he states its the only thing he's eaten.  He states his abdominal pain is better    PE:    Vitals:    04/12/22 0400 04/12/22 0500 04/12/22 0600 04/12/22 0700   BP: 137/85 (!) 149/94 (!) 154/87 (!) 156/93   Pulse: 97 99 102 122   Resp: 21 20 22 21   Temp: 98.2 °F (36.8 °C)      TempSrc: Oral      SpO2: 96% 95% 97% 95%   Weight:       Height:         Abd: soft, distended, quiet to auscultation, incision c/d/i-no redness or signs of infection, stoma pink and viable, moderate amount of green liquid and air in appliance    Labs reviewed    A/P:  -continue TPN, prealbumin up to 10  -patient on meropenem, eraxis and vanco PO per ID  -PT/OT  -patient is on therapeutic lovenox, if continues to tolerate PO, can switch to eliquis and aspirin in 1-2 days  -GI: patient getting culturelle, and protonix will add cholestyramine  -ID:  Patient had new silveira placed 4/8 and culture is NGTD, blood cultures 4/8 NGTD

## 2022-04-12 NOTE — PROGRESS NOTES
Nephrology Progress Note  4/12/2022 10:37 AM  Subjective: Interval History: Sarwat Garcia is a 80 y.o. male  . Appears doing about the same somewhat depressed today  Data:   Scheduled Meds:   cholestyramine light  1 packet Oral BID    lactobacillus  1 capsule Oral Daily    nystatin  5 mL Oral 4x Daily    vancomycin  500 mg Oral 4 times per day    dilTIAZem  30 mg Oral 2 times per day    meropenem  1,000 mg IntraVENous Q8H    anidulafungin  100 mg IntraVENous Q24H    metoprolol  2.5 mg IntraVENous Q6H    [Held by provider] enoxaparin  70 mg SubCUTAneous BID    fat emulsion  250 mL IntraVENous Once per day on Mon Tue Thu Fri    insulin lispro  0-6 Units SubCUTAneous TID WC    insulin lispro  0-3 Units SubCUTAneous Nightly    [Held by provider] apixaban  2.5 mg Oral BID    [Held by provider] aspirin  81 mg Oral Daily    sodium chloride flush  5-40 mL IntraVENous 2 times per day    pantoprazole  40 mg IntraVENous Daily     Continuous Infusions:   PN-Adult Premix 4.25/10 - Peripheral Line      PN-Adult Premix 4.25/10 - Peripheral Line 84 mL/hr at 04/12/22 0658    sodium chloride 50 mL/hr at 04/12/22 0658    dextrose           CBC   Recent Labs     04/10/22  0535 04/11/22  0645 04/12/22  0530   WBC 13.4* 18.0* 19.6*   HGB 9.0* 8.9* 8.8*   HCT 29.2* 29.5* 28.1*    466* 434      BMP   Recent Labs     04/10/22  0535 04/11/22  0645 04/12/22  0530   * 132* 134*   K 4.1 4.4 4.6    103 105   CO2 21 21 19*   PHOS 2.5 3.3 2.6   BUN 30* 33* 34*   CREATININE 1.1 1.1 1.1     Hepatic:   Recent Labs     04/10/22  0535 04/11/22  0645   AST 14* 13*   ALT 9* 10   BILITOT 0.3 0.3   ALKPHOS 86 106     Troponin: No results for input(s): TROPONINI in the last 72 hours. BNP: No results for input(s): BNP in the last 72 hours. Lipids: No results for input(s): CHOL, HDL in the last 72 hours.     Invalid input(s): LDLCALCU  ABGs: No results found for: PHART, PO2ART, TGC9PFU  INR:   No results for input(s): INR in the last 72 hours. Renal Labs  Albumin:    Lab Results   Component Value Date    LABALBU 2.2 04/11/2022     Calcium:    Lab Results   Component Value Date    CALCIUM 7.7 04/12/2022     Phosphorus:    Lab Results   Component Value Date    PHOS 2.6 04/12/2022     U/A:    Lab Results   Component Value Date    NITRU POSITIVE 04/02/2022    COLORU YELLOW 04/02/2022    WBCUA 11 04/02/2022    RBCUA 1 04/02/2022    MUCUS FEW 04/02/2022    TRICHOMONAS NONE SEEN 03/20/2022    BACTERIA OCCASIONAL 04/02/2022    CLARITYU CLEAR 04/02/2022    SPECGRAV 1.015 04/02/2022    UROBILINOGEN 0.2 04/02/2022    BILIRUBINUR NEGATIVE 04/02/2022    BLOODU MODERATE 04/02/2022    KETUA SMALL 04/02/2022     ABG:  No results found for: PHART, JOY0WEL, PO2ART, MYX3OKL, BEART, THGBART, IHG7DSP, O7TTZDSU  HgBA1c:  No results found for: LABA1C  Microalbumen/Creatinine ratio:  No components found for: RUCREAT  TSH:  No results found for: TSH  IRON:    Lab Results   Component Value Date    IRON 35 03/01/2022     Iron Saturation:  No components found for: PERCENTFE  TIBC:    Lab Results   Component Value Date    TIBC 280 03/01/2022     FERRITIN:    Lab Results   Component Value Date    FERRITIN 210 03/01/2022     RPR:  No results found for: RPR  SAQIB:  No results found for: ANATITER, SAQIB  24 Hour Urine for Creatinine Clearance:  No components found for: CREAT4, UHRS10, UTV10      Objective:   I/O: 04/11 0701 - 04/12 0700  In: 5821.3 [P.O.:60; I.V.:1689.6]  Out: 3067 [PDAZE:5200]  I/O last 3 completed shifts: In: 5961.3 [P.O.:200; I.V.:1689.6; IV Piggyback:807.9]  Out: 2485 [Urine:1735; Stool:750]  I/O this shift:  In: -   Out: 150 [Stool:150]  Vitals: /79   Pulse 94   Temp 98.2 °F (36.8 °C) (Oral)   Resp 22   Ht 5' 9.02\" (1.753 m)   Wt 196 lb 10.4 oz (89.2 kg)   SpO2 95%   BMI 29.03 kg/m²  {  General appearance: awake weak  HEENT: Head: Normal, normocephalic, atraumatic.   Neck: supple, symmetrical, trachea midline  Lungs: diminished breath sounds bilaterally  Heart: S1, S2 normal  Abdomen: abnormal findings:  soft mildly tender  Extremities: edema trace  Neurologic: Mental status: alertness: Arousable weak      Assessment and Plan:      IMP:   #1 small bowel obstruction status post surgery   #2 acute renal failure from ATN   #3 possible colitis with leukocytosis   #4 anemia   #5 A. fib rapid rate   #6  possible enteritis    Plan     #1 post surgery doing okay try and follow oral intake  #2 creatinine holding 1.1 stable  #3 no fever monitor for now  #4 hemoglobin low stable 8.8  #5 heart rate stable  #6 supportive care will monitor closely           Elicia Gómez MD, MD

## 2022-04-12 NOTE — PROGRESS NOTES
Physical Therapy  Name: Karin Rodriguez MRN: [id number] :   1929   Date:  2022   Admission Date: 2022 Room:  [address] [place] [address]   Restrictions/Precautions:  Restrictions/Precautions  Restrictions/Precautions: General Precautions,Fall Risk     abd precautions  Communication with other providers:  Tomasa RN states pt is ok to see for therapy  Subjective:  Patient states:  I feel better than yesterday  Pain:   Location, Type, Intensity (0/10 to 10/10): did not quantify  Objective:    Observation:  Pt was resting in the bed with nursing in the room  Treatment, including education/measures:  Supine Exercises:pt agreeable to ex today,   Ankle pumps x 10  Quad sets x 4  Glute sets x 2  Heel slides x 7  Hip abd x 5  Therapeutic Exercise:  Therapeutic exercises were instructed today. Cues were given for technique, safety, recruitment, and rationale. Cues were verbal and/or tactile. Transfers with line management of IV, tele and silveira. Co-treat with OT for trans  Rolling: mod a of 1 and min A of 1  Supine to sit :mod A of 2  Scooting :SBA to EOB  Sit to stand :min A of 2 to come to the RW  Pt used the RW with min A of 2 to take 6 steps to the chair  Stand to sit :mod A of 1  Gait Comments: with VC's' and TC's for B LE placement, walker placement and sequence throughout ambulation; with VC's and TC's to maintain upright posture in order to avoid COM shifting outside of SHAY; with VC's for PLB throughout ambulation  Sitting Exercises:  LAQ's x 10 with rest for fatigue  Safety  Patient left safely in the chair, with call light/phone in reach with OT in the room. Gait belt was used for transfers and gait.   Assessment / Impression:     Patient's tolerance of treatment:  Good, more mobile and reactive today, starting to initiate movements   Adverse Reaction: HR increased to 140 at highest, he was consistently 123  Significant change in status and impact:  improved  Barriers to improvement:  Weakness, increased HR  Plan for Next Session:    Will cont to work towards pt's goals per his tolerance  Time in:  0845  Time out:  0925  Timed treatment minutes:  40  Total treatment time:  40  Previously filed items:   Short term goals  Time Frame for Short term goals: 1 week  Short term goal 1: pt to complete all bed mobility mod A x1  Short term goal 2: Pt to complete all STS transfers to/from bed, commode, and chair max A  Short term goal 3: Pt to complete stand pivot transfer with LRAD max A     Electronically signed by:     Rivka Dickens PTA  4/12/2022, 9:29 AM

## 2022-04-12 NOTE — CARE COORDINATION
Contacted Curt Aase at [facility] admissions. [facility] has not reviewed the case as of this morning but will look at case today and let CM know if the expect to be able to accept pt at discharge.

## 2022-04-12 NOTE — PROGRESS NOTES
Occupational Therapy  . Occupational Therapy Treatment Note      Name: Licha Medina MRN: [id number] :   1929   Date:  2022   Admission Date: 2022 Room:  -A     Primary Problem:      Restrictions/Precautions:  Restrictions/Precautions  Restrictions/Precautions: General Precautions,Fall Risk     ABD precautions    Communication with other providers:  Partial cotx with PTA Murali Alvarez for safety and tolerance. Nurse barry in to assist with bathing and linen change. Subjective:  Patient states: It hurt  Pain: 9/10 abdomen. (location, type, intensity)    Objective:    Observation:  Patein in high fowlers with BLE hanging off bed. PTA in room. Nurse asking if OWENS can assist with bathing. Objective Measures:  Tele, RA, drain    Treatment, including education:    ADL activity training was instructed today. Cues were given for safety, sequence, UE/LE placement, visual cues, and balance. Activities performed today included dressing, toileting, hand hygiene, and grooming. LB dressing- DEP for socks  UB bathing- Mod A. Patient demo washing/drying under armpits and BUEs with cues to initiate and complete task. LB bathing-Max A - patient demo drying 25% of BLE  Alize care- DEP in stand  Facial hygiene- SBA    All bating completed in upright on recliner. Therapeutic activity training was instructed today. Cues were given for safety, sequence, UE/LE placement, awareness, and balance. Activities performed today included bed mobility training, sup-sit, sit-stand, SPT. High fowlers to EOB- via log roll-  Mod x2 for trunk plus cues  Scooting hips forward- Mod A  Sitting balance- CGA/Min A  Stand to FWW from EOB- Min x2 plus cues for safety  patient demo taking 5-6 steps Min x2 to recliner. Max cues for sequencing and safety. Sit to recliner x2 trials- Mod A plus max cues and HHA to place hands on arm rest for safe and slow descent.    Leaning forward while seated- Max A plus cues  Stand to FWW- Max x2 plus max cues for hand placement. Standing balance-Min-Mod A during raghav care. Patient educated on role of OT , benefits of OT and rationale for therapeutic intervention. All therapeutic intervention performed c emphasis on dynamic balance / standing tolerance to increase strength, endurance and activity tolerance for increased Premium c ADL tasks and func transfers / mobility. Patient left safely in bedside chair at end of session, with call light in reach, alarm on and nursing aware. Gait belt was used for func transfers / mobility.         Assessment / Impression:    Patient's tolerance of treatment: fair  Adverse Reaction: none  Significant change in status and impact:  none  Barriers to improvement: pain, weakness      Plan for Next Session:    Continue with OT POC      Time in:  910  Time out:  940  Timed treatment minutes:  30  Total treatment time:  30      Electronically signed by:    SCARLETT Esquivel COTA/L [id number]    4/12/2022, 9:51 AM

## 2022-04-12 NOTE — PROGRESS NOTES
Pt received Lovenox this am. Bilateral thoracentesis will need to be performed tomorrow after Lovenox is held for 24 hour. Notified Tomasa BATISTA in ICU.

## 2022-04-12 NOTE — PROGRESS NOTES
Hospitalist Progress Note    Patient:  Licha Medina  Unit/Bed:2104/2104-A   YOB: 1929       MRN: [id number] Acct: [de-identified]  PCP: Bryson Wei MD    Date of Admission: 4/2/2022  --------------------------    Chief Complaint:     Abdominal pain, nausea vomiting, diarrhea    Hospital Course/interval history:     Licha Medina is a 80 y.o. male hospitalized on 4/2/2022 who presented with abdominal pain, nausea, vomiting, patient has prior history of small bowel obstructions, remote h/o colon cancer status post resection and colostomy. There was concern for ischemic colitis - patient evaluated by surgery service, underwent exploratory laparotomy, subtotal colon resection, end ileostomy, small bowel resection, mobilization of the splenic flexure for colitis with acute abdomen. His hospitalization course complicated by circulatory shock, sepsis, UTI, atrial fibrillation with rapid ventricular response, MARIELOS and probable pneumonia. Assessment/plan:     Severe colitis with sepsis status post exploratory laparotomy, colon resection, small bowel resection with end ileostomy    · Biopsy results showed  pseudomembranous colitis possibly due to C. Difficile  · CT Abdo done 4/10 for worsening abdo pain and distension - findings consistent with small bowel enteritis. No e/o abscess. Moderate bilateral pleural effusions. · -Broad-spectrum antibiotics per ID team.  Currently on meropenem, Eraxis de-escalation per ID team. CRP, WBC are trending back up. Urine culture 4/8 - no growth. Blood culture 4/8 no growth x 4 days. Fungitell 4/8 negative. Will consult IR for possible thoracentesis in view of moderate bilateral pleural effusions as recommended by ID. · Continue high-dose oral vancomycin (started 4/10) - empiric treatment for C Diff. C Diff toxin currently in process. · Patient started on clear liquid diet. Continue to monitor return of bowel function. Tolerated a small amount of jellow. · Patient pain medication adjusted on the following (reduced hydromorphone to 0.25 mg every 4 hours for severe pain only, oral Norco (liquid) as needed for moderate pain. Plan to de-escalate pain medication as soon as pain is under control. Acute metabolic encephalopathy superimposed on probable undiagnosed cognitive impairment.  -Reduce Dilaudid dose for severe pain only, use Norco for moderate pain. Hold for sedation.  -Frequent re-orientation. Acute kidney injury  Multifactorial, resolved    Pseudomonas UTI   S/p zosyn. Atrial fibrillation with rapid ventricular response  - rate controlled, continue po cardizem, IV metoprolol Cardiology is following.   -Continue therapeutic Lovenox while holding DOAC    Mild hyponatremia  Mild, sodium today 132. Continue monitoring. Macrocytic anemia  Chronic, ? Underlying MDS, recent folate, B12 within normal limit. Outpatient workup. Oral thrush  Continue nystatin swish and swallow. Nutrition  Continue parenteral nutrition. Clear diet as tolerated. Comorbid condition (AAA, BPH, history of rectal cancer 2019 status post colostomy, psoriatic arthritis, immunocompromise prior CVA)    Code Status: Full Code     DVT prophylaxis: Therapeutic Lovenox     Disposition:   - SNF once medically ready for discharge.      ----------------    Subjective:     Says his abdominal pain went away. Says he doesn't feel great today. Has not had breakfast and is not hungry. Denies any other complaints. Diet: ADULT DIET; Clear Liquid  PN-Adult Premix 4.25/10 - Peripheral Line    OBJECTIVE     Exam:  BP (!) 156/93   Pulse 122   Temp 98.2 °F (36.8 °C) (Oral)   Resp 21   Ht 5' 9.02\" (1.753 m)   Wt 196 lb 10.4 oz (89.2 kg)   SpO2 95%   BMI 29.03 kg/m²       Seen multiple time    Gen: Not in distress. Sleepy but easily arousable. Not confused. Answers all questions appropriately  Head: Normocephalic. Atraumatic. Eyes: Conjunctivae/corneas clear.   ENT: Oral mucosa dry, minimal white patches on the tongue. No oral ulceration  Neck: No JVD. No obvious thyromegaly. , Right IJ CVC in place. CVS: S1S2 irregular rhythm, regular rate, no murmur,  Pulmomary: Decreased breath sounds, on room air. Gastrointestinal: Soft, tenderness with minimal palpation, dressing noted in the mid abdomen, ileostomy with dark green liquid output, no stool. Musculoskeletal: No edema. Warm  Neuro: Easily arousable no focal deficit. Moves extremity spontaneously. Psychiatry: Mood appropriate.     Medications:  Reviewed    Infusion Medications    PN-Adult Premix 4.25/10 - Peripheral Line 84 mL/hr at 04/12/22 0658    sodium chloride 50 mL/hr at 04/12/22 0658    dextrose       Scheduled Medications    lactobacillus  1 capsule Oral Daily    nystatin  5 mL Oral 4x Daily    vancomycin  500 mg Oral 4 times per day    dilTIAZem  30 mg Oral 2 times per day    meropenem  1,000 mg IntraVENous Q8H    anidulafungin  100 mg IntraVENous Q24H    metoprolol  2.5 mg IntraVENous Q6H    enoxaparin  70 mg SubCUTAneous BID    fat emulsion  250 mL IntraVENous Once per day on Mon Tue Thu Fri    insulin lispro  0-6 Units SubCUTAneous TID WC    insulin lispro  0-3 Units SubCUTAneous Nightly    [Held by provider] apixaban  2.5 mg Oral BID    [Held by provider] aspirin  81 mg Oral Daily    sodium chloride flush  5-40 mL IntraVENous 2 times per day    pantoprazole  40 mg IntraVENous Daily     PRN Meds: HYDROmorphone, HYDROcodone-acetaminophen, sodium chloride flush, Acetaminophen, glucose, glucagon (rDNA), dextrose, dextrose bolus (hypoglycemia) **OR** dextrose bolus (hypoglycemia), sodium chloride flush, ondansetron **OR** ondansetron      Intake/Output Summary (Last 24 hours) at 4/12/2022 0758  Last data filed at 4/12/2022 0658  Gross per 24 hour   Intake 5821.29 ml   Output 1670 ml   Net 4151.29 ml             Labs:   Recent Labs     04/10/22  0535 04/11/22  0645 04/12/22  0530   WBC 13.4* 18.0* 19.6* HGB 9.0* 8.9* 8.8*   HCT 29.2* 29.5* 28.1*    466* 434     Recent Labs     04/10/22  0535 04/11/22  0645 04/12/22  0530   * 132* 134*   K 4.1 4.4 4.6    103 105   CO2 21 21 19*   BUN 30* 33* 34*   CREATININE 1.1 1.1 1.1   CALCIUM 7.4* 8.1* 7.7*   PHOS 2.5 3.3 2.6     Recent Labs     04/10/22  0535 04/11/22  0645   AST 14* 13*   ALT 9* 10   BILIDIR 0.2 0.2   BILITOT 0.3 0.3   ALKPHOS 86 106     No results for input(s): INR in the last 72 hours. No results for input(s): Jadene Moh in the last 72 hours. Urinalysis:      Lab Results   Component Value Date    NITRU POSITIVE 04/02/2022    WBCUA 11 04/02/2022    BACTERIA OCCASIONAL 04/02/2022    RBCUA 1 04/02/2022    BLOODU MODERATE 04/02/2022    SPECGRAV 1.015 04/02/2022       Radiology:  CT ABDOMEN PELVIS W IV CONTRAST Additional Contrast? None   Final Result   No mechanical obstructive process however inflammatory findings of small   bowel with mucosal hyperenhancement and wall thickening throughout the   distended small bowel loops to the right lower quadrant ileostomy with small   volume abdominopelvic ascites most consistent with enteritis. No obvious   abscess formation. Moderate bilateral pleural effusions along with body wall   edema. XR CHEST PORTABLE   Final Result   Unchanged moderate left pleural effusion bibasilar atelectasis. No evidence   of pulmonary edema. XR ABDOMEN FOR NG/OG/NE TUBE PLACEMENT   Final Result   Nasogastric tube terminates in the stomach         XR ABDOMEN FOR NG/OG/NE TUBE PLACEMENT   Final Result   An NG tube tip is present with the tip coiled within the fundus of the   stomach. The proximal port is in the fundus of the stomach. Consider   advancement of the tube. XR ABDOMEN (KUB) (SINGLE AP VIEW)   Final Result   The patient is status post surgery. There is decreased but continued mild   distension of the small bowel could represent partial obstruction or ileus. Calcification of the abdominal aorta consistent with a known aneurysm. The NG tube tip is in the fundus of the stomach. The proximal port is in the   distal esophagus near the GE junction. Consider advancement of the tube. XR CHEST PORTABLE   Final Result   Low lung volumes with persistent bibasilar airspace disease. Trace left pleural effusion. XR ABDOMEN (KUB) (SINGLE AP VIEW)   Final Result   1. Nasogastric tube has been advanced with the tip over the antrum. 2.  New skin staples are present at the abdomen and pelvis. 3.  Residual gas dilatation of multiple small bowel loops. There may be a   small amount of postsurgical pneumoperitoneum. 4.  See the prior CT scan for evaluation of the infrarenal aortic aneurysm. XR CHEST PORTABLE   Final Result   Right line placement as above. Bilateral small areas of pneumonia         XR ABDOMEN (KUB) (SINGLE AP VIEW)   Final Result   Multiple air-filled distended loops of small bowel stacked in the mid abdomen   suggesting a low-grade partial small bowel obstruction or focal ileus. Atherosclerotic calcification of an abdominal aortic aneurysm as seen on   prior CT. NG tube in the stomach with proximal port at the level of the GE junction. XR CHEST PORTABLE   Final Result   Enteric tube tip is noted within the stomach with the side port likely at or   just distal to the GE junction. XR CHEST PORTABLE   Final Result   Trace left pleural effusion with adjacent atelectasis. CT ABDOMEN PELVIS W IV CONTRAST Additional Contrast? None   Final Result   Interval development of severe circumferential wall thickening and   pericolonic inflammatory changes of the transverse colon and splenic flexure   of the colon just proximal to the left lower quadrant colostomy with   increased fluid-filled distension of the proximal colon and small bowel.    Findings suggest acute infectious/inflammatory colitis with proximal partial   obstruction versus ileus. Stable infrarenal abdominal aortic aneurysm measuring 5.5 cm in diameter. See follow-up recommendations below. Stable hepatic cysts and bilateral renal cortical cysts. Cardiomegaly with bilateral pleural effusions suggesting mild CHF. RECOMMENDATIONS:   5.5 cm infrarenal abdominal aortic aneurysm. Recommend referral to a vascular   specialist.      Reference: J Am Abad Radiol [id number].                 Electronically signed by Bessie Martin MD on 4/12/2022 at 7:58 AM

## 2022-04-12 NOTE — PROGRESS NOTES
4/8-negative  ? Trend CRP and Pct, CRP elevated, Pct on DWT, repeat in am  ? Recommend IR evaluation of moderate bilateral pleural effusions for possible thoracentesis      Ongoing Antimicrobial Therapy  Zyvox 4/12-  Completed Antimicrobial Therapy  Cefepime 4/2? Metronidazole 4/2-8  Zosyn 4/4-8  Vancomycin 4/2-8  Eraxis 4/8-12  Meropenem 4/8-12? Po vancomycin 4/10-12  ? History:? Interval history noted. Chief complaint: Acute abdomen with colitis, complicated Pseudomonas aeruginosa UTI, persistent leukocytosis. Denies n/v/d/f or untoward effects of antibiotics. Sitting up in the chair, intermittently confused. Family at bedside. Physical Exam:  Vital Signs: /87   Pulse 99   Temp 98.2 °F (36.8 °C) (Oral)   Resp 25   Ht 5' 9.02\" (1.753 m)   Wt 196 lb 10.4 oz (89.2 kg)   SpO2 95%   BMI 29.03 kg/m²     Gen: sitting upright in chair, no distress, confused. Wounds: C/D/I midabdominal wound with staples intact, well approximated, no drainage or erythema. Chest: no distress and CTA. Good air movement. Room air. Heart: Afib and no MRG. Abd: soft, non-distended, generalized tenderness to light palpation, no hepatomegaly. Normoactive bowel sounds. Ext: no clubbing, cyanosis, or edema  Catheter Site: without erythema or tenderness draining clear yellow urine. Ileostomy intact: RLQ draining brown liquid stool   Neuro:  CN 2-12 intact and no focal sensory or motor deficits        Radiologic / Imaging / TESTING  4/2/22 CT Abdomen Pelvis W IV Contrast:  Impression   Interval development of severe circumferential wall thickening and   pericolonic inflammatory changes of the transverse colon and splenic flexure   of the colon just proximal to the left lower quadrant colostomy with   increased fluid-filled distension of the proximal colon and small bowel.    Findings suggest acute infectious/inflammatory colitis with proximal partial   obstruction versus ileus.       Stable infrarenal abdominal aortic aneurysm measuring 5.5 cm in diameter. See follow-up recommendations below.       Stable hepatic cysts and bilateral renal cortical cysts.       Cardiomegaly with bilateral pleural effusions suggesting mild CHF.       RECOMMENDATIONS:   5.5 cm infrarenal abdominal aortic aneurysm. Recommend referral to a vascular   specialist.       Reference: [facility] Expose Radiol [id number].      4/2/22 XR Chest Portable:  Impression   Trace left pleural effusion with adjacent atelectasis.      4/2/22 XR Chest Portable:  Impression   Enteric tube tip is noted within the stomach with the side port likely at or   just distal to the GE junction.          4/3/22 XR Abdomen:  Impression   Multiple air-filled distended loops of small bowel stacked in the mid abdomen   suggesting a low-grade partial small bowel obstruction or focal ileus.       Atherosclerotic calcification of an abdominal aortic aneurysm as seen on   prior CT.       NG tube in the stomach with proximal port at the level of the GE junction.      4/3/22 XR Chest Portable:  Impression   Right line placement as above.  Bilateral small areas of pneumonia      4/4/22 XR Abdomen:  Impression   1.  Nasogastric tube has been advanced with the tip over the antrum.       2.  New skin staples are present at the abdomen and pelvis.       3.  Residual gas dilatation of multiple small bowel loops.  There may be a   small amount of postsurgical pneumoperitoneum.       4.  See the prior CT scan for evaluation of the infrarenal aortic aneurysm.      4/5/22 XR Chest Portable:  Impression   Low lung volumes with persistent bibasilar airspace disease.       Trace left pleural effusion.      4/7/22 XR Abdomen:  Impression   The patient is status post surgery.  There is decreased but continued mild   distension of the small bowel could represent partial obstruction or ileus.       Calcification of the abdominal aorta consistent with a known aneurysm.       The NG tube tip is in the fundus of the stomach.  The proximal port is in the   distal esophagus near the GE junction.  Consider advancement of the tube. 4/8/22 XR Chest Portable:  Impression   Unchanged moderate left pleural effusion bibasilar atelectasis.  No evidence   of pulmonary edema. 4/10/22 CT Abdomen Pelvis W IV Contrast:  Impression   No mechanical obstructive process however inflammatory findings of small   bowel with mucosal hyperenhancement and wall thickening throughout the   distended small bowel loops to the right lower quadrant ileostomy with small   volume abdominopelvic ascites most consistent with enteritis.  No obvious   abscess formation.  Moderate bilateral pleural effusions along with body wall   edema. Labs:    Recent Results (from the past 24 hour(s))   POCT Glucose    Collection Time: 04/11/22  6:19 PM   Result Value Ref Range    POC Glucose 154 (H) 70 - 99 MG/DL   Clostridium Difficile Toxin/Antigen    Collection Time: 04/11/22  9:03 PM    Specimen: Stool   Result Value Ref Range    Source STOOL     C DIFF AG + TOXIN  NEGATIVE: NO C. difficile antigen and toxin detected. NEGATIVE: NO C. difficile antigen and toxin detected.    POCT Glucose    Collection Time: 04/11/22  9:38 PM   Result Value Ref Range    POC Glucose 124 (H) 70 - 99 MG/DL   CBC with Auto Differential    Collection Time: 04/12/22  5:30 AM   Result Value Ref Range    WBC 19.6 (H) 4.0 - 10.5 K/CU MM    RBC 2.59 (L) 4.6 - 6.2 M/CU MM    Hemoglobin 8.8 (L) 13.5 - 18.0 GM/DL    Hematocrit 28.1 (L) 42 - 52 %    .5 (H) 78 - 100 FL    MCH 34.0 (H) 27 - 31 PG    MCHC 31.3 (L) 32.0 - 36.0 %    RDW 18.8 (H) 11.7 - 14.9 %    Platelets 786 403 - 721 K/CU MM    MPV 10.0 7.5 - 11.1 FL    UNDIFFERENTIATED CELL 1.0 (HH) 0.0 %    Myelocyte Percent 1 (H) 0.0 %    Metamyelocytes Relative 1 (H) 0.0 %    Bands Relative 9 5 - 11 %    Segs Relative 77.0 (H) 36 - 66 %    Lymphocytes % 6.0 (L) 24 - 44 %    Monocytes % 5.0 (H) 0 - 4 %    Myelocytes Absolute 0.20 K/CU MM    Metamyelocytes Absolute 0.20 K/CU MM    Bands Absolute 1.76 K/CU MM    Segs Absolute 15.1 K/CU MM    Lymphocytes Absolute 1.2 K/CU MM    Monocytes Absolute 1.0 K/CU MM    Differential Type MANUAL DIFFERENTIAL     Toxic Granulation PRESENT     Dohle Bodies PRESENT     WBC Morphology OCCASIONAL     Other Cell Morphology 1 LARGE ABNORMAL  ATYPICAL CELL FORM NOTED    Basic Metabolic Panel    Collection Time: 04/12/22  5:30 AM   Result Value Ref Range    Sodium 134 (L) 135 - 145 MMOL/L    Potassium 4.6 3.5 - 5.1 MMOL/L    Chloride 105 99 - 110 mMol/L    CO2 19 (L) 21 - 32 MMOL/L    Anion Gap 10 4 - 16    BUN 34 (H) 6 - 23 MG/DL    CREATININE 1.1 0.9 - 1.3 MG/DL    Glucose 114 (H) 70 - 99 MG/DL    Calcium 7.7 (L) 8.3 - 10.6 MG/DL    GFR Non-African American >60 >60 mL/min/1.73m2    GFR African American >60 >60 mL/min/1.73m2   Magnesium    Collection Time: 04/12/22  5:30 AM   Result Value Ref Range    Magnesium 1.9 1.8 - 2.4 mg/dl   Phosphorus    Collection Time: 04/12/22  5:30 AM   Result Value Ref Range    Phosphorus 2.6 2.5 - 4.9 MG/DL   C-Reactive Protein    Collection Time: 04/12/22  5:30 AM   Result Value Ref Range    CRP, High Sensitivity 104.0 mg/L   POCT Glucose    Collection Time: 04/12/22  9:26 AM   Result Value Ref Range    POC Glucose 120 (H) 70 - 99 MG/DL   POCT Glucose    Collection Time: 04/12/22 11:24 AM   Result Value Ref Range    POC Glucose 128 (H) 70 - 99 MG/DL     CULTURE results: Invalid input(s): BLOOD CULTURE,  URINE CULTURE, SURGICAL CULTURE    Diagnosis:  Patient Active Problem List   Diagnosis    Hypertension    Benign prostatic hyperplasia    Psoriatic arthritis ([address]    Primary malignant neoplasm of rectum (HCC)    Psoriasis    Chronic myeloproliferative disease (HCC)    Monocytosis (symptomatic)    Gastroesophageal reflux disease without esophagitis    Urinary retention    H/O: CVA (cerebrovascular accident)    Irregular heart beat    Nonrheumatic aortic valve stenosis    Paroxysmal atrial fibrillation (HCC)    GI bleed    Melena    Arteriovenous malformation of jejunum    History of rectal cancer    Benign neoplasm of cecum    Benign neoplasm of ascending colon    Pseudomonas urinary tract infection    Partial small bowel obstruction (HCC)    SBO (small bowel obstruction) (HCC)    Atrial fibrillation with RVR (HCC)    Hypotension    Ischemic colitis (HCC)    Hypocalcemia    MARIELOS [facility]acute [facility] injury) [facility] [address]    Probable Bacterial Pneumonia    CAD (coronary artery disease)    Anemia    Delirium    Severe protein-calorie malnutrition (HCC)    On total parenteral nutrition       Active Problems  Principal Problem:    SBO (small bowel obstruction) (HCC)  Active Problems:    Gastroesophageal reflux disease without esophagitis    Pseudomonas urinary tract infection    Atrial fibrillation with RVR (HCC)    Hypotension    Ischemic colitis (HCC)    Hypocalcemia    MARIELOS (acute kidney injury) ([facility] [address]    Probable Bacterial Pneumonia    CAD (coronary artery disease)    Anemia    Delirium    Severe protein-calorie malnutrition (HCC)    On total parenteral nutrition  Resolved Problems:    * No resolved hospital problems. *    Electronically signed by: Electronically signed by Bindu Srivastava.  CHEMA Camara CNP on 4/12/2022 at 1:13 PM

## 2022-04-12 NOTE — PROGRESS NOTES
Palliative Care follow up visit. Patient is alert resting in bed on room air with saturations stable. He states he has very little pain and is comfortable. Last received Norco at [phone]. He is oriented to person, person, and month. Disoriented to situation and year stating its 2044. He is quick to answer questions today and does not appear to be drowsy. Called patient's daughter Joanne Arambula and updated her. Questions answered to her satisfaction. She will be back up soon to see patient. Today he looks better with reports of eating and drinking some per Tomasa. Will continue to follow up.

## 2022-04-12 NOTE — PROGRESS NOTES
CARDIOLOGY  NOTE        Name:  Willem Patiño /Age/Sex: 1929  (80 y.o. male)   MRN & CSN:  [id number] & [id number] Admission Date/Time: 2022 11:02 AM   Location:  -A PCP: Eric Dalton, [address] Day: 11        PLAN FROM CARDIOLOGY FOR TODAY:   We will give IV dig 0.125 IV daily for heart rate control might need to be loaded with dig if the heart rate is not controlled at this dose      - cardiology consult is for: Atrial fibrillation    -  Interval history: Having RVR with A. fib    · ASSESSMENT/ PLAN:  1. Atrial fibrillation heart rate is not controlled   2. Last echo had mild aortic stenosis that was last year  3. Normal Cardiolite last year  4. Abdominal pain patient is feeling better              Subjective: Todays complain: Patient better oriented at this time    HPI:  Delfino Chen is a 80 y. o.year old who and presents with had concerns including GI Problem (dark stool, colostomy, on eliquis). Chief Complaint   Patient presents with    GI Problem     dark stool, colostomy, on eliquis           Objective: Temperature:  Current - Temp: 98.2 °F (36.8 °C);  Max - Temp  Av.9 °F (36.6 °C)  Min: 97.5 °F (36.4 °C)  Max: 98.2 °F (36.8 °C)    Respiratory Rate : Resp  Av.1  Min: 20  Max: 33    Pulse Range: Pulse  Av.7  Min: 90  Max: 122    Blood Presuure Range:  Systolic (47VUF), QPI:647 , Min:90 , HJK:279   ; Diastolic (65CCT), ILQ:67, Min:65, Max:103      Pulse ox Range: SpO2  Av.3 %  Min: 94 %  Max: 100 %    24hr I & O:      Intake/Output Summary (Last 24 hours) at 2022 0816  Last data filed at 2022 0658  Gross per 24 hour   Intake 5761.29 ml   Output 1670 ml   Net 4091.29 ml         BP (!) 156/93   Pulse 122   Temp 98.2 °F (36.8 °C) (Oral)   Resp 21   Ht 5' 9.02\" (1.753 m)   Wt 196 lb 10.4 oz (89.2 kg)   SpO2 95%   BMI 29.03 kg/m²           Review of Systems:    Abdominal pain    TELEMETRY: Atrial fibrillation    has a past medical history of AAA (abdominal aortic aneurysm) ([facility] [address] MARIELOS (acute kidney injury) ([address] Angina, class I ([address] Benign prostatic hyperplasia, Bowel obstruction ([address] CAD (coronary artery disease), Cancer ([address] CCC (chronic calculous cholecystitis), Delirium, Gastroesophageal reflux disease without esophagitis, Hx of cardiovascular stress test, Hypertension, On total parenteral nutrition, Partial small bowel obstruction ([address] Primary malignant neoplasm of rectum ([address] Probable Bacterial Pneumonia, Psoriasis, Psoriatic arthritis ([facility] [address] Pyelonephritis, Severe protein-calorie malnutrition ([facility] [address] Stable angina ([address] and Unspecified cerebral artery occlusion with cerebral infarction. has a past surgical history that includes Appendectomy; colostomy; Upper gastrointestinal endoscopy (N/A, 2/28/2022); Colonoscopy (N/A, 3/2/2022); and laparotomy (N/A, 4/3/2022). Physical Exam:  General: Alert  Head:normal  Eye: Pupils equal and round  Neck:  No JVD, no carotid bruit noted   Chest:  Clear to auscultation, no signs of respiratory distress  Cardiovascular:  Normal rate and rhythm. S1 and S2 noted.  No murmurs rubs or gallops  Abdomen:   nontender  Extremities:  tr edema  Pulses; palpable  Neuro: grossly normal    Medications:    lactobacillus  1 capsule Oral Daily    nystatin  5 mL Oral 4x Daily    vancomycin  500 mg Oral 4 times per day    dilTIAZem  30 mg Oral 2 times per day    meropenem  1,000 mg IntraVENous Q8H    anidulafungin  100 mg IntraVENous Q24H    metoprolol  2.5 mg IntraVENous Q6H    enoxaparin  70 mg SubCUTAneous BID    fat emulsion  250 mL IntraVENous Once per day on Mon Tue Thu Fri    insulin lispro  0-6 Units SubCUTAneous TID WC    insulin lispro  0-3 Units SubCUTAneous Nightly    [Held by provider] apixaban  2.5 mg Oral BID    [Held by provider] aspirin  81 mg Oral Daily    sodium chloride flush  5-40 mL IntraVENous 2 times per day  pantoprazole  40 mg IntraVENous Daily      PN-Adult Premix 4.25/10 - Peripheral Line 84 mL/hr at 04/12/22 0658    sodium chloride 50 mL/hr at 04/12/22 0658    dextrose       HYDROmorphone, HYDROcodone-acetaminophen, sodium chloride flush, Acetaminophen, glucose, glucagon (rDNA), dextrose, dextrose bolus (hypoglycemia) **OR** dextrose bolus (hypoglycemia), sodium chloride flush, ondansetron **OR** ondansetron    Lab Data:  CBC:   Recent Labs     04/10/22  0535 04/11/22  0645 04/12/22  0530   WBC 13.4* 18.0* 19.6*   HGB 9.0* 8.9* 8.8*   HCT 29.2* 29.5* 28.1*   .6* 109.7* 108.5*    466* 434     BMP:   Recent Labs     04/10/22  0535 04/11/22 0645 04/12/22  0530   * 132* 134*   K 4.1 4.4 4.6    103 105   CO2 21 21 19*   PHOS 2.5 3.3 2.6   BUN 30* 33* 34*   CREATININE 1.1 1.1 1.1     LIVER PROFILE:   Recent Labs     04/10/22  0535 04/11/22  0645   AST 14* 13*   ALT 9* 10   BILIDIR 0.2 0.2   BILITOT 0.3 0.3   ALKPHOS 86 106     PT/INR: No results for input(s): PROTIME, INR in the last 72 hours. APTT: No results for input(s): APTT in the last 72 hours. BNP:  No results for input(s): BNP in the last 72 hours. TROPONIN: No results for input(s): TROPONINI in the last 72 hours. No results for input(s): TROPONINT in the last 72 hours. Labs, consult, tests reviewed                    Sunny Kearney MD, PA-C 4/12/2022 8:16 AM     Please note this report has been partially produced using speech recognition software and may contain errors related to that system including errors in grammar, punctuation, and spelling, as well as words and phrases that may be inappropriate. If there are any questions or concerns please feel free to contact the dictating provider for clarification.

## 2022-04-12 NOTE — CONSULTS
Late Entry    Palliative Care consult for goals of care and code status discussion    80 Y. O. male with PMH of HTN,CAD, Afib on AC, AAA, angina class I,psoriatic arthritis, RA on Humira,rectal CA s/p colostomy, chronic myeloproliferative disorder,GERD, cerebral infarction,and COVID 19 1/2022. Prior admission 2/27/22 for management of GI bleed, AVM ablated, Stabilized and d/c home 3/4/22. Patient was recently admitted 3/20/22 for management of UTI and partial bowel obstruction which was resolved and d/c home 3/25/22. Patient presented to the ED 4/2/22 with complaints of abdominal pain, distention,nausea , loss of appetite, and dark watery stools via colostomy onset 3 days prior to ED visit. EKG obtained and shoed Afib RVR however patient was asymptomatic. \"Chest x-ray shows a trace left pleural effusion with adjacent atelectasis. CT abdomen pelvis shows interval development of severe circumferential wall thickening and pericolonic inflammatory changes of the transverse colon and splenic flexure of the colon just proximal to the left lower quadrant colostomy with increased fluid-filled distention of the proximal colon and small bowel. Findings suggest acute infectious versus inflammatory colitis with proximal partial obstruction versus ileus. There is a stable infrarenal abdominal aortic aneurysm measuring 5.5 cm. There are stable hepatic cysts and bilateral renal cortical cysts. There is cardiomegaly with bilateral pleural effusions suggesting mild CHF\" per 's note regarding imaging results. Hemoccult positive stool with stable hemoglobin. Patient was admitted management of colitis ,Ileus/possible bowel obstruction, sepsis,and Afib RVR. Cardiologist Dr. Lenin Nielson consulted and seen patient 4/2. Gastroenterologist Gold Weiner consulted and saw patient 4/3. General surgery 's consulted since [facility] is out and she saw patient 4/3/22.  She noted patient had a rectal resection and abdominal -perineal resection 20 years prior with Dr. Daniela Day d/t rectal CA.'s was concerned for ischemic colitis. D/t worsening symptoms despite medical management she offered surgical resection. Risk and benefits were discussed and patient was agreeable to surgery. Code status was changed from [facility] to FULL code for surgery. Exploratory Laparotomy colon resection,end ileostomy,colostomy removal,mobilization of the splenic flexure, and small bowel resection was performed that afternoon by . I unit PRBC given during surgery. Patient was extubated in the ICU at 26.56 CXR showed bilat small area's of pneumonia. Overnight patient was started on levophed for hypotension. Nephrology  was consulted and saw patient 4/4 for renal failure. 4/2 urine culture showed psudomonas aeruginosa. Blood culture negative. ID was consulted and Denise Sierra NP saw patient 4/8 for persistent eucocytosis s/p sx. Patient's antibiotics have been adjusted. Repeat BC from 4/8 negative. 4/10-CT abdomen/pelvis showed no mechanical obstructive process however inflammatory findings of SB with mucosal hyper-enhancement and wall thickening throughout the distended SB loops to the RLQ ileostomy with small volume abdominopelvic ascites most consistent with enteritis. No obvious abscess formation. Moderate bilateral pleural effusions along with body wall edema. NG has been removed. Patient is on TPN and started on liquid diet with poor oral intake. Palliative Care was consulted 4/10 by . PT/OT on board and has been working with patient. Possible D/C skilled facility when medically stable. ASSESSMENT/INITIAL VISIT:  Patient is sitting up in chair with his eyes closed and is drowsy with vital signs stable and no signs of distress. His daughter Gladis Wynn and girlfriend Brandee Og are at bedside. Patient has been unable to eat clear liquid today. He received 1 mg prn IV dilaudid this am for severe pain.  Pain medication discussed with bedside RN Mary Carter and patient's daughter. Patient was on percocet previously but it has been d/c. Tomasa had asked for IV Toradol however renal labs were elevated on admission and BUN is still slightly elevated so  declined she offered percocet to be re-ordered. Juancarlos Byrneemann stated that patient responded to percocet the same way as the dilaudid and requested Porter. came to bedside as requested per  to evaluate patient since she is in surgery. Holger Eisenberg has spoken with patient's family and addressed their concerns with pain medication, labs, antibiotics,and delierium. Complaints of previous staff noted and charge RN notified. Tomasa and I asked  to assess patient's mouth for thrush. We performed good oral care and cleansed his dentures but noted a yellowish coating to his tongue that was unable to be cleaned. Patient is not telling us if his tongue is sore or not.  will order oral nystatin. Patient does follow simple commands and respiratory status is not compromised so no intervention to reverse opioid. He was unable to do incentive spirometer during my visit which is a concern of Ayde's that he has not been doing it. Will encourage IS once he is more alert. He appears comfortable and Tomasa RN reports that patient is transferring well from bed to chair and vise versa. Patient has 5 children. Taj Carroll is the youngest and is primary POA, she stated that her sister Elodia Hamm , brother Millie Morgan is 2nd alt POA. Patient has Zaki Pilon and Tania Monroe whom are not listed but are local. Taj Carroll will bring POA document's tomorrow. I did inquire about previous DNRCCA status and made sure she was aware patient is still FULL code since the surgery. Patient is unable to discuss code status today and Taj Carroll does not want to make adjustments to status until she speaks with him. She thinks he will want to go back to [facility] with no intubation and we will discuss this again once she brings POA document's. Patient is normally an independent person whom drives and is very social. His girl friend Maria L Miller confirmed that he acts much younger than his age. Will continue to follow and assist with any needs.

## 2022-04-13 NOTE — ACP (ADVANCE CARE PLANNING)
Palliative Care follow up. Patient is S/P thoracentesis 520 CC removed from right side and 800 cc removed from left side. pleural fluid samples sent to the Lab. Patient was having pain after returning from  and received prn Norco. He easily arouses and is now denying pain. Vital signs are stable and he is on room air. POA documents are in the soft chart. Dtr Jefe Keating is primary POA,dtr  Ricardo Lu is 2nd alt POA and son Marce Garcia is 3rd alt POA. Documents are in soft chart. Jefe Keating is at bedside and no concerns noted. She will discuss code status with her dad today to see if he wants to go back to [facility] status. She knows for sure he would not want intubated but wants to verify the compressions with him.

## 2022-04-13 NOTE — PROGRESS NOTES
Infectious Disease Progress Note  2022   Patient Name: Jessica Salamanca : 1929   Impression  · Acute Abdomen with Colitis:      ? Complicated Pseudomonas aeruginosa UTI:     ? Moderate Bilateral Pleural Effusions:     § Afebrile  § Leukocytosis trending down  § -Urine culture: Pseudomonas aeruginosa >100,000  § -UA WBC 11, RBC 1  § -BC 0/2-NGTD  § -BC 0/2-NGTD  § -Cdif negative  § -Urine Culture: NGTD  § -Fungitell: negative  § -CT A&P W IV Contrast: see full report below. Findings suggest actue infectious/inflammatory colitis with proximal partial obstruction vs ileus. § 4/3-S/p per Dr. Celia Miles: Exploratory lap, subtotal colon resection, End ileostomy, removal of colostomy, small bowel resection, mobilization of splenic flexure, central line placement. DX:  Colitis and acute abdomen. Perineal Cultures: Laine Givens, GI, onboard  § 4/10-CT A&P W IV Contrast: no mechanical obstructive process however inflammatory findings of SB with mucosal hyper-enhancement and wall thickening throughout the distended SB loops to the RLQ ileostomy with small volume abdominopelvic ascites most consistent with enteritis. No obvious abscess formation. Moderate bilateral pleural effusions along with body wall edema. § Dr. Celia Miles -added culturelle due to patient without colon but has enteritis of SB  § -S/p per IR Dr. Amaral Brain, Bilateral Thoracentesis: Right 520 cc clear dashawn pleural fluid removed, Left 800 cc clear yellow pleural fluid removed. Culture: Pending     · AF with RVR, on AC:  § Dr. Ignacia Perez onboard     ? MARIELOS from [facility]:  § Dr. Jewel Corley onboard     ? Delirium     ? AAA     ? BPH     ?  Chronic Supra pubic Catheter     ? CAD     ? Rectal Cancer 2019, Colostomy:     ? Psoriatic Arthritis     ? CVA 2019     ? Allergy to sulfa     · Multi-morbidity: per PMHx: AAA, BPH, CAD, rectal cancer 2019, GERD, HTN, psoriatic arthritis, pyelonephritis, CVA 2019     Plan:  ? Continue IV Zyvox 600 mg bid (may not treat past 14 days with Zyvox, no longer than 4/26/22)  ? Trend CRP and Pct, trending down, repeat in am  ? Await pleural fluid cultures from 4/13  ? Following, afebrile, leukocytosis, CRP and Pct on downward trend. Patient appears clinically improving. Will continue empiric ABX therapy. Ongoing Antimicrobial Therapy  Zyvox 4/12-  Completed Antimicrobial Therapy  Cefepime 4/2? Metronidazole 4/2-8  Zosyn 4/4-8  Vancomycin 4/2-8  Eraxis 4/8-12  Meropenem 4/8-12? Po vancomycin 4/10-12  ? History:? Interval history noted. Chief complaint: Acute abdomen with colitis, complicated Pseudomonas aeruginosa UTI, persistent leukocytosis. Denies n/v/d/f or untoward effects of antibiotics. Resting in bed, intermittently confused, able to answer some questions. Complaints of slight generalized abdominal tenderness to light palpation. Physical Exam:  Vital Signs: /80   Pulse 99   Temp 98.7 °F (37.1 °C) (Oral)   Resp 27   Ht 5' 9.02\" (1.753 m)   Wt 196 lb 10.4 oz (89.2 kg)   SpO2 95%   BMI 29.03 kg/m²     Gen: Drowsy, in bed. Remains confused. No distress. Wounds: C/D/I midabdominal wound with staples intact, well approximated, no drainage or erythema. Chest: no distress and CTA. Good air movement. Room air. Heart: Afib and no MRG. Abd: soft, non-distended, generalized tenderness to light palpation, no hepatomegaly. Normoactive bowel sounds. Ext: no clubbing, cyanosis, or edema  Catheter Site: without erythema or tenderness draining clear yellow urine.   Ileostomy intact: RLQ draining brown liquid stool   Neuro:  CN 2-12 intact and no focal sensory or motor deficits        Radiologic / Imaging / TESTING  4/2/22 CT Abdomen Pelvis W IV Contrast:  Impression   Interval development of severe circumferential wall thickening and   pericolonic inflammatory changes of the transverse colon and splenic flexure   of the colon just proximal to the left lower quadrant colostomy with increased fluid-filled distension of the proximal colon and small bowel. Findings suggest acute infectious/inflammatory colitis with proximal partial   obstruction versus ileus.       Stable infrarenal abdominal aortic aneurysm measuring 5.5 cm in diameter. See follow-up recommendations below.       Stable hepatic cysts and bilateral renal cortical cysts.       Cardiomegaly with bilateral pleural effusions suggesting mild CHF.       RECOMMENDATIONS:   5.5 cm infrarenal abdominal aortic aneurysm. Recommend referral to a vascular   specialist.       Reference: Autumn Gin Radiol [id number].      4/2/22 XR Chest Portable:  Impression   Trace left pleural effusion with adjacent atelectasis.      4/2/22 XR Chest Portable:  Impression   Enteric tube tip is noted within the stomach with the side port likely at or   just distal to the GE junction.          4/3/22 XR Abdomen:  Impression   Multiple air-filled distended loops of small bowel stacked in the mid abdomen   suggesting a low-grade partial small bowel obstruction or focal ileus.       Atherosclerotic calcification of an abdominal aortic aneurysm as seen on   prior CT.       NG tube in the stomach with proximal port at the level of the GE junction.      4/3/22 XR Chest Portable:  Impression   Right line placement as above.  Bilateral small areas of pneumonia      4/4/22 XR Abdomen:  Impression   1.  Nasogastric tube has been advanced with the tip over the antrum.       2.  New skin staples are present at the abdomen and pelvis.       3.  Residual gas dilatation of multiple small bowel loops.  There may be a   small amount of postsurgical pneumoperitoneum.       4.  See the prior CT scan for evaluation of the infrarenal aortic aneurysm.      4/5/22 XR Chest Portable:  Impression   Low lung volumes with persistent bibasilar airspace disease.       Trace left pleural effusion.      4/7/22 XR Abdomen:  Impression   The patient is status post surgery. Carline Worrell is decreased but continued mild   distension of the small bowel could represent partial obstruction or ileus.       Calcification of the abdominal aorta consistent with a known aneurysm.       The NG tube tip is in the fundus of the stomach.  The proximal port is in the   distal esophagus near the GE junction.  Consider advancement of the tube. 4/8/22 XR Chest Portable:  Impression   Unchanged moderate left pleural effusion bibasilar atelectasis.  No evidence   of pulmonary edema. 4/10/22 CT Abdomen Pelvis W IV Contrast:  Impression   No mechanical obstructive process however inflammatory findings of small   bowel with mucosal hyperenhancement and wall thickening throughout the   distended small bowel loops to the right lower quadrant ileostomy with small   volume abdominopelvic ascites most consistent with enteritis.  No obvious   abscess formation.  Moderate bilateral pleural effusions along with body wall   edema. 4/13/22 XR Chest Portable:  Impression   Interval decrease in size of bilateral pleural effusion status post   thoracentesis, with improved aeration at the lung bases.  No visualized   pneumothorax.           Labs:    Recent Results (from the past 24 hour(s))   POCT Glucose    Collection Time: 04/12/22  4:37 PM   Result Value Ref Range    POC Glucose 137 (H) 70 - 99 MG/DL   POCT Glucose    Collection Time: 04/12/22  8:47 PM   Result Value Ref Range    POC Glucose 105 (H) 70 - 99 MG/DL   POCT Glucose    Collection Time: 04/13/22  2:15 AM   Result Value Ref Range    POC Glucose 108 (H) 70 - 99 MG/DL   CBC with Auto Differential    Collection Time: 04/13/22  4:15 AM   Result Value Ref Range    WBC 16.2 (H) 4.0 - 10.5 K/CU MM    RBC 2.43 (L) 4.6 - 6.2 M/CU MM    Hemoglobin 8.2 (L) 13.5 - 18.0 GM/DL    Hematocrit 26.2 (L) 42 - 52 %    .8 (H) 78 - 100 FL    MCH 33.7 (H) 27 - 31 PG    MCHC 31.3 (L) 32.0 - 36.0 %    RDW 18.5 (H) 11.7 - 14.9 %    Platelets 800 651 - 878 K/CU MM    MPV 10.3 7.5 - 11.1 FL    Metamyelocytes Relative 1 (H) 0.0 %    Bands Relative 6 5 - 11 %    Segs Relative 78.0 (H) 36 - 66 %    Lymphocytes % 5.0 (L) 24 - 44 %    Monocytes % 10.0 (H) 0 - 4 %    Metamyelocytes Absolute 0.16 K/CU MM    Bands Absolute 0.97 K/CU MM    Segs Absolute 12.7 K/CU MM    Lymphocytes Absolute 0.8 K/CU MM    Monocytes Absolute 1.6 K/CU MM    Differential Type MANUAL DIFFERENTIAL     Anisocytosis 1+     Toxic Granulation PRESENT     Dohle Bodies PRESENT    Basic Metabolic Panel    Collection Time: 04/13/22  4:15 AM   Result Value Ref Range    Sodium 131 (L) 135 - 145 MMOL/L    Potassium 4.4 3.5 - 5.1 MMOL/L    Chloride 103 99 - 110 mMol/L    CO2 18 (L) 21 - 32 MMOL/L    Anion Gap 10 4 - 16    BUN 34 (H) 6 - 23 MG/DL    CREATININE 1.0 0.9 - 1.3 MG/DL    Glucose 160 (H) 70 - 99 MG/DL    Calcium 7.6 (L) 8.3 - 10.6 MG/DL    GFR Non-African American >60 >60 mL/min/1.73m2    GFR African American >60 >60 mL/min/1.73m2   Magnesium    Collection Time: 04/13/22  4:15 AM   Result Value Ref Range    Magnesium 1.9 1.8 - 2.4 mg/dl   Phosphorus    Collection Time: 04/13/22  4:15 AM   Result Value Ref Range    Phosphorus 2.7 2.5 - 4.9 MG/DL   Procalcitonin    Collection Time: 04/13/22  4:15 AM   Result Value Ref Range    Procalcitonin 0.258    C-Reactive Protein    Collection Time: 04/13/22  4:15 AM   Result Value Ref Range    CRP, High Sensitivity 98.7 mg/L   Triglyceride    Collection Time: 04/13/22  4:15 AM   Result Value Ref Range    Triglycerides 136 <150 MG/DL   Lactate Dehydrogenase, Body Fluid    Collection Time: 04/13/22  6:00 AM   Result Value Ref Range    LD, Fluid 90 IU/L   Protein, Body Fluid    Collection Time: 04/13/22  6:00 AM   Result Value Ref Range    Protein, Fluid 1.6 GM/DL   Lactate Dehydrogenase, Body Fluid    Collection Time: 04/13/22  6:00 AM   Result Value Ref Range    LD, Fluid 94 IU/L   Protein, Body Fluid    Collection Time: 04/13/22  6:00 AM   Result Value Ref Range    Protein, Fluid 1.6 GM/DL POCT Glucose    Collection Time: 04/13/22  6:35 AM   Result Value Ref Range    POC Glucose 115 (H) 70 - 99 MG/DL   POCT Glucose    Collection Time: 04/13/22 11:30 AM   Result Value Ref Range    POC Glucose 107 (H) 70 - 99 MG/DL     CULTURE results: Invalid input(s): BLOOD CULTURE,  URINE CULTURE, SURGICAL CULTURE    Diagnosis:  Patient Active Problem List   Diagnosis    Hypertension    Benign prostatic hyperplasia    [facility]iatic arthritis ([facility] [address]    Primary malignant neoplasm of rectum (HCC)    Psoriasis    Chronic myeloproliferative disease ([facility] [address]    Monocytosis (symptomatic)    Gastroesophageal reflux disease without esophagitis    Urinary retention    H/O: CVA (cerebrovascular accident)    Irregular heart beat    Nonrheumatic aortic valve stenosis    Paroxysmal atrial fibrillation (HCC)    GI bleed    Melena    Arteriovenous malformation of jejunum    History of rectal cancer    Benign neoplasm of cecum    Benign neoplasm of ascending colon    Pseudomonas urinary tract infection    Partial small bowel obstruction (HCC)    SBO (small bowel obstruction) (HCC)    Atrial fibrillation with RVR (HCC)    Hypotension    Ischemic colitis (HCC)    Hypocalcemia    MARIELOS (acute kidney injury) (HCC)    Probable Bacterial Pneumonia    CAD (coronary artery disease)    Anemia    Delirium    Severe protein-calorie malnutrition (HCC)    On total parenteral nutrition       Active Problems  Principal Problem:    SBO (small bowel obstruction) (HCC)  Active Problems:    Gastroesophageal reflux disease without esophagitis    Pseudomonas urinary tract infection    Atrial fibrillation with RVR (HCC)    Hypotension    Ischemic colitis (HCC)    Hypocalcemia    MARIELOS (acute kidney injury) ([facility] [address]    Probable Bacterial Pneumonia    CAD (coronary artery disease)    Anemia    Delirium    Severe protein-calorie malnutrition (HCC)    On total parenteral nutrition  Resolved Problems:    * No resolved hospital problems. *    Electronically signed by: Electronically signed by CHEMA Adame CNP on 4/13/2022 at 12:18 PM

## 2022-04-13 NOTE — PROGRESS NOTES
Occupational Therapy  . Occupational Therapy Treatment Note      Name: Carolina Mckeon MRN: [id number] :   [id number]   Date:  2022   Admission Date: 2022 Room:  -A     Primary Problem:      Restrictions/Precautions:  Restrictions/Precautions  Restrictions/Precautions: General Precautions,Fall Risk     Communication with other providers:  cotx with PTA Kary Shone for safety and assist. Updated RN and student on patients progress/participation. Subjective:  Patient states:  Ladonna Stevell do it  Pain: no numerical rating but c/o pain during movement. (location, type, intensity)    Objective:    Observation:  patient asleep and easily aroused. patient in pain as evidence by signs during activity. Patein lethargic this date and required more assist levels than previous date. Objective Measures:  Tele, catheter    Treatment, including education:    ADL activity training was instructed today. Cues were given for safety, sequence, UE/LE placement, visual cues, and balance. Activities performed today included  and grooming. Found bottom dentures in bed upon movement. patient refusing to take out top dentures for denture care, DEP for cleansing/soaking bottom dentures. Therapeutic activity training was instructed today. Cues were given for safety, sequence, UE/LE placement, awareness, and balance. Activities performed today included bed mobility training, sup-sit, sit-stand, SPT. Supine to EOB via log roll- max x2 this date. patient somewhat resistive to movement d/t pain. sitting balance- CGA-Min A   Stand to FWW- Max x2 plus cues. patient took x5 steps to recliner Max x2 plus max cues for sequencing. Sit to recliner- Mod x2 for safe and slow descent    patient performed some BLE ex. with PTA. See note    Patient educated on role of OT , benefits of OT and rationale for therapeutic intervention.      All therapeutic intervention performed c emphasis on dynamic balance / standing tolerance to increase strength, endurance and activity tolerance for increased [place] c ADL tasks and func transfers / mobility. Patient left safely in bedside chair at end of session, with call light in reach, alarm on and nursing aware. Gait belt was used for func transfers / mobility.       Assessment / Impression:    Patient's tolerance of treatment: fair  Adverse Reaction: none  Significant change in status and impact:  none  Barriers to improvement: weakness, pain      Plan for Next Session:    Continue with OT POC    Time in:  1315  Time out:  1345  Timed treatment minutes:  30  Total treatment time:  30      Electronically signed by:    SCARLETT Choudhury COTA/L [id number]    4/13/2022, 1:49 PM

## 2022-04-13 NOTE — PROGRESS NOTES
Hospitalist Progress Note    Patient:  Cele Pritchett  Unit/Bed:2104/2104-A   YOB: 1929       MRN: [id number] Acct: [de-identified]  PCP: Cherylene Collet, MD    Date of Admission: 4/2/2022  --------------------------    Chief Complaint:     Abdominal pain, nausea vomiting, diarrhea    Hospital Course/interval history:     Cele Pritchett is a 80 y.o. male hospitalized on 4/2/2022 who presented with abdominal pain, nausea, vomiting, patient has prior history of small bowel obstructions, remote h/o colon cancer status post resection and colostomy. There was concern for ischemic colitis - patient evaluated by surgery service, underwent exploratory laparotomy, subtotal colon resection, end ileostomy, small bowel resection, mobilization of the splenic flexure for colitis with acute abdomen. His hospitalization course complicated by circulatory shock, sepsis, UTI, atrial fibrillation with rapid ventricular response, MARIELOS and probable pneumonia. Assessment/plan:     Severe colitis with sepsis status post exploratory laparotomy, colon resection, small bowel resection with end ileostomy    · Biopsy results showed  pseudomembranous colitis possibly due to C. Difficile  · CT Abdo done 4/10 for worsening abdo pain and distension - findings consistent with small bowel enteritis. No e/o abscess. Moderate bilateral pleural effusions. · -Broad-spectrum antibiotics per ID team.  Meropenem and eraxis have been stopped. He is now on linezolid. CRP, PCT, WBC are on a downward trend today. Urine culture 4/8 - no growth. Blood culture 4/8 no growth x 4 days. Fungitell 4/8 negative. C Diff toxin negative. IR consulted on 4/12 for possible thoracentesis as recommended by ID. Anticoagulation held x 24h. · Currently on clear liquid diet and TPN. Abdomen more distended today. AXR ordered. · Pain control as needed. Lowest effective doses. Acute metabolic encephalopathy in the setting of infection as above. -Improving. Acute kidney injury  Multifactorial, resolved    Pseudomonas UTI   S/p zosyn. Repeat urine culture 4/8 - no growth    Atrial fibrillation with rapid ventricular response  - rate controlled, continue po cardizem, IV metoprolol. Cardiology is following.   -Continue therapeutic Lovenox while holding DOAC - temporarily held for thoracentesis. Mild hyponatremia  Mild, sodium today 132. Daily BMP. Nephrology following. H/o Essential thrombocythemia  -previously on hydroxyurea. Currently not on meds. Will continue follow up with hematology as outpatient.   -Hb 8.2. Daily CBC. Transfuse if needed to maintain Hb>7g/dL. Oral thrush  Continue nystatin swish and swallow. Nutrition  Continue parenteral nutrition. Clear diet as tolerated. Comorbid condition (AAA, BPH, history of rectal cancer 2019 status post colostomy, psoriatic arthritis, immunocompromise prior CVA)    Code Status: Full Code     DVT prophylaxis: Therapeutic Lovenox     Disposition:   - SNF once medically ready for discharge.      ----------------    Subjective:     Has some pain today in the right upper quadrant. Otherwise feels ok. Diet: ADULT DIET; Full Liquid  PN-Adult Premix 4.25/10 - Peripheral Line  ADULT ORAL NUTRITION SUPPLEMENT; Breakfast, Lunch, Dinner; Standard High Calorie/High Protein Oral Supplement    OBJECTIVE     Exam:  BP (!) 132/93   Pulse 104   Temp 97.6 °F (36.4 °C) (Oral)   Resp 24   Ht 5' 9.02\" (1.753 m)   Wt 196 lb 10.4 oz (89.2 kg)   SpO2 96%   BMI 29.03 kg/m²     Gen: Not in distress. Sleepy but easily arousable. Intermittent confusion. Answers most questions appropriately  Head: Normocephalic. Atraumatic. Eyes: Conjunctivae/corneas clear. ENT: Oral mucosa dry, minimal white patches on the tongue. No oral ulceration  Neck: No JVD. No obvious thyromegaly. , Right IJ CVC in place.    CVS: S1S2 irregular rhythm, regular rate, no murmur,  Pulmomary: Decreased breath sounds, on room air.  Gastrointestinal: moderately distended, mild tenderness to palpation, bowel sounds are present, dressing noted in the mid abdomen, ileostomy with dark green liquid output, no stool. Musculoskeletal: No edema. Warm  Neuro: Easily arousable no focal deficit. Moves extremity spontaneously. Psychiatry: Mood appropriate.     Medications:  Reviewed    Infusion Medications    PN-Adult Premix 4.25/10 - Peripheral Line 84 mL/hr at 04/12/22 1750    sodium chloride 50 mL/hr at 04/12/22 2032    dextrose       Scheduled Medications    cholestyramine light  1 packet Oral BID    linezolid  600 mg IntraVENous Q12H    lactobacillus  1 capsule Oral Daily    nystatin  5 mL Oral 4x Daily    dilTIAZem  30 mg Oral 2 times per day    metoprolol  2.5 mg IntraVENous Q6H    [Held by provider] enoxaparin  70 mg SubCUTAneous BID    fat emulsion  250 mL IntraVENous Once per day on Mon Tue Thu Fri    insulin lispro  0-6 Units SubCUTAneous TID WC    insulin lispro  0-3 Units SubCUTAneous Nightly    [Held by provider] apixaban  2.5 mg Oral BID    [Held by provider] aspirin  81 mg Oral Daily    sodium chloride flush  5-40 mL IntraVENous 2 times per day    pantoprazole  40 mg IntraVENous Daily     PRN Meds: HYDROmorphone, HYDROcodone-acetaminophen, sodium chloride flush, Acetaminophen, glucose, glucagon (rDNA), dextrose, dextrose bolus (hypoglycemia) **OR** dextrose bolus (hypoglycemia), sodium chloride flush, ondansetron **OR** ondansetron      Intake/Output Summary (Last 24 hours) at 4/13/2022 0747  Last data filed at 4/13/2022 0315  Gross per 24 hour   Intake 250 ml   Output 2200 ml   Net -1950 ml             Labs:   Recent Labs     04/11/22  0645 04/12/22  0530 04/13/22  0415   WBC 18.0* 19.6* 16.2*   HGB 8.9* 8.8* 8.2*   HCT 29.5* 28.1* 26.2*   * 434 431     Recent Labs     04/11/22  0645 04/12/22  0530 04/13/22  0415   * 134* 131*   K 4.4 4.6 4.4    105 103   CO2 21 19* 18*   BUN 33* 34* 34* CREATININE 1.1 1.1 1.0   CALCIUM 8.1* 7.7* 7.6*   PHOS 3.3 2.6 2.7     Recent Labs     04/11/22  0645   AST 13*   ALT 10   BILIDIR 0.2   BILITOT 0.3   ALKPHOS 106     No results for input(s): INR in the last 72 hours. No results for input(s): Chris Oiler in the last 72 hours. Urinalysis:      Lab Results   Component Value Date    NITRU POSITIVE 04/02/2022    WBCUA 11 04/02/2022    BACTERIA OCCASIONAL 04/02/2022    RBCUA 1 04/02/2022    BLOODU MODERATE 04/02/2022    SPECGRAV 1.015 04/02/2022       Radiology:  CT ABDOMEN PELVIS W IV CONTRAST Additional Contrast? None   Final Result   No mechanical obstructive process however inflammatory findings of small   bowel with mucosal hyperenhancement and wall thickening throughout the   distended small bowel loops to the right lower quadrant ileostomy with small   volume abdominopelvic ascites most consistent with enteritis. No obvious   abscess formation. Moderate bilateral pleural effusions along with body wall   edema. XR CHEST PORTABLE   Final Result   Unchanged moderate left pleural effusion bibasilar atelectasis. No evidence   of pulmonary edema. XR ABDOMEN FOR NG/OG/NE TUBE PLACEMENT   Final Result   Nasogastric tube terminates in the stomach         XR ABDOMEN FOR NG/OG/NE TUBE PLACEMENT   Final Result   An NG tube tip is present with the tip coiled within the fundus of the   stomach. The proximal port is in the fundus of the stomach. Consider   advancement of the tube. XR ABDOMEN (KUB) (SINGLE AP VIEW)   Final Result   The patient is status post surgery. There is decreased but continued mild   distension of the small bowel could represent partial obstruction or ileus. Calcification of the abdominal aorta consistent with a known aneurysm. The NG tube tip is in the fundus of the stomach. The proximal port is in the   distal esophagus near the GE junction. Consider advancement of the tube.          XR CHEST PORTABLE Final Result   Low lung volumes with persistent bibasilar airspace disease. Trace left pleural effusion. XR ABDOMEN (KUB) (SINGLE AP VIEW)   Final Result   1. Nasogastric tube has been advanced with the tip over the antrum. 2.  New skin staples are present at the abdomen and pelvis. 3.  Residual gas dilatation of multiple small bowel loops. There may be a   small amount of postsurgical pneumoperitoneum. 4.  See the prior CT scan for evaluation of the infrarenal aortic aneurysm. XR CHEST PORTABLE   Final Result   Right line placement as above. Bilateral small areas of pneumonia         XR ABDOMEN (KUB) (SINGLE AP VIEW)   Final Result   Multiple air-filled distended loops of small bowel stacked in the mid abdomen   suggesting a low-grade partial small bowel obstruction or focal ileus. Atherosclerotic calcification of an abdominal aortic aneurysm as seen on   prior CT. NG tube in the stomach with proximal port at the level of the GE junction. XR CHEST PORTABLE   Final Result   Enteric tube tip is noted within the stomach with the side port likely at or   just distal to the GE junction. XR CHEST PORTABLE   Final Result   Trace left pleural effusion with adjacent atelectasis. CT ABDOMEN PELVIS W IV CONTRAST Additional Contrast? None   Final Result   Interval development of severe circumferential wall thickening and   pericolonic inflammatory changes of the transverse colon and splenic flexure   of the colon just proximal to the left lower quadrant colostomy with   increased fluid-filled distension of the proximal colon and small bowel. Findings suggest acute infectious/inflammatory colitis with proximal partial   obstruction versus ileus. Stable infrarenal abdominal aortic aneurysm measuring 5.5 cm in diameter. See follow-up recommendations below. Stable hepatic cysts and bilateral renal cortical cysts.       Cardiomegaly with bilateral pleural effusions suggesting mild CHF. RECOMMENDATIONS:   5.5 cm infrarenal abdominal aortic aneurysm. Recommend referral to a vascular   specialist.      Reference: J Am Abad Radiol 5646;11:350-524.             IR GUIDED THORACENTESIS PLEURAL    (Results Pending)   XR CHEST PORTABLE    (Results Pending)   XR ABDOMEN (KUB) (SINGLE AP VIEW)    (Results Pending)       Electronically signed by Bartolo Jeff MD on 4/13/2022 at 7:47 AM

## 2022-04-13 NOTE — PROGRESS NOTES
Nephrology Progress Note  4/13/2022 8:22 AM  Subjective: Interval History: Sher Baltazar is a 80 y.o. male  . Appears pleasant weak more awake resting in bed  Data:   Scheduled Meds:   cholestyramine light  1 packet Oral BID    linezolid  600 mg IntraVENous Q12H    lactobacillus  1 capsule Oral Daily    nystatin  5 mL Oral 4x Daily    dilTIAZem  30 mg Oral 2 times per day    metoprolol  2.5 mg IntraVENous Q6H    [Held by provider] enoxaparin  70 mg SubCUTAneous BID    fat emulsion  250 mL IntraVENous Once per day on Mon Tue Thu Fri    insulin lispro  0-6 Units SubCUTAneous TID WC    insulin lispro  0-3 Units SubCUTAneous Nightly    [Held by provider] apixaban  2.5 mg Oral BID    [Held by provider] aspirin  81 mg Oral Daily    sodium chloride flush  5-40 mL IntraVENous 2 times per day    pantoprazole  40 mg IntraVENous Daily     Continuous Infusions:   PN-Adult Premix 4.25/10 - Peripheral Line 84 mL/hr at 04/12/22 1750    sodium chloride 50 mL/hr at 04/12/22 2032    dextrose           CBC   Recent Labs     04/11/22  0645 04/12/22  0530 04/13/22  0415   WBC 18.0* 19.6* 16.2*   HGB 8.9* 8.8* 8.2*   HCT 29.5* 28.1* 26.2*   * 434 431      BMP   Recent Labs     04/11/22  0645 04/12/22  0530 04/13/22  0415   * 134* 131*   K 4.4 4.6 4.4    105 103   CO2 21 19* 18*   PHOS 3.3 2.6 2.7   BUN 33* 34* 34*   CREATININE 1.1 1.1 1.0     Hepatic:   Recent Labs     04/11/22  0645   AST 13*   ALT 10   BILITOT 0.3   ALKPHOS 106     Troponin: No results for input(s): TROPONINI in the last 72 hours. BNP: No results for input(s): BNP in the last 72 hours. Lipids: No results for input(s): CHOL, HDL in the last 72 hours. Invalid input(s): LDLCALCU  ABGs: No results found for: PHART, PO2ART, OXJ0HYF  INR:   No results for input(s): INR in the last 72 hours.   Renal Labs  Albumin:    Lab Results   Component Value Date    LABALBU 2.2 04/11/2022     Calcium:    Lab Results   Component Value Date CALCIUM 7.6 04/13/2022     Phosphorus:    Lab Results   Component Value Date    PHOS 2.7 04/13/2022     U/A:    Lab Results   Component Value Date    NITRU POSITIVE 04/02/2022    COLORU YELLOW 04/02/2022    WBCUA 11 04/02/2022    RBCUA 1 04/02/2022    MUCUS FEW 04/02/2022    TRICHOMONAS NONE SEEN 03/20/2022    BACTERIA OCCASIONAL 04/02/2022    CLARITYU CLEAR 04/02/2022    SPECGRAV 1.015 04/02/2022    UROBILINOGEN 0.2 04/02/2022    BILIRUBINUR NEGATIVE 04/02/2022    BLOODU MODERATE 04/02/2022    KETUA SMALL 04/02/2022     ABG:  No results found for: PHART, NEP5HOD, PO2ART, NIH2PQO, BEART, THGBART, EAX0VFC, H8WMPTVD  HgBA1c:  No results found for: LABA1C  Microalbumen/Creatinine ratio:  No components found for: RUCREAT  TSH:  No results found for: TSH  IRON:    Lab Results   Component Value Date    IRON 35 03/01/2022     Iron Saturation:  No components found for: PERCENTFE  TIBC:    Lab Results   Component Value Date    TIBC 280 03/01/2022     FERRITIN:    Lab Results   Component Value Date    FERRITIN 210 03/01/2022     RPR:  No results found for: RPR  SAQIB:  No results found for: ANATITER, SAQIB  24 Hour Urine for Creatinine Clearance:  No components found for: CREAT4, UHRS10, UTV10      Objective:   I/O: 04/12 0701 - 04/13 0700  In: 250 [P.O.:250]  Out: 2200 [VQURY:1499]  I/O last 3 completed shifts: In: 6011.3 [P.O.:250; I.V.:1689.6; IV Piggyback:807.9]  Out: 3545 [Urine:2345; Stool:1200]  No intake/output data recorded. Vitals: BP (!) 132/93   Pulse 104   Temp 97.6 °F (36.4 °C) (Oral)   Resp 24   Ht 5' 9.02\" (1.753 m)   Wt 196 lb 10.4 oz (89.2 kg)   SpO2 96%   BMI 29.03 kg/m²  {  General appearance: awake weak  HEENT: Head: Normal, normocephalic, atraumatic.   Neck: supple, symmetrical, trachea midline  Lungs: diminished breath sounds bilaterally  Heart: S1, S2 normal  Abdomen: abnormal findings:  soft mildly tender  Extremities: edema trace  Neurologic: Mental status: alertness: Arousable weak      Assessment and Plan:      IMP:   #1 small bowel obstruction status post surgery   #2 acute renal failure from ATN   #3 possible colitis with leukocytosis   #4 anemia   #5 A. fib rapid rate   #6 mild shortness of breath with pleural effusion    Plan     #1 post surgery overall healing follow-up oral intake  #2 renal function monitor creatinine 1.0  #3 maintain antibiotics per infectious disease no fever  #4 monitor hemoglobin low stable  #5 heart rate slightly increased but stable  #6 repeat chest x-ray follow-up pleural effusion see if it worth doing thoracentesis           Elkin Gay MD, MD

## 2022-04-13 NOTE — PROGRESS NOTES
Patient off the floor for ultrasound, thoracentesis evaluation. Spoke with his daughter at bedside. Patient had a better day yesterday, more comfortable and did eat some. PE:  Vitals:    04/13/22 0000 04/13/22 0400 04/13/22 0802 04/13/22 0811   BP: (!) 145/87 (!) 132/93  (!) 149/101   Pulse: 98 104 88 94   Resp: 23 24 19 20   Temp:  97.6 °F (36.4 °C)  98.7 °F (37.1 °C)   TempSrc:  Oral  Oral   SpO2:  96%  98%   Weight:       Height:         UOP: 850/675  Ileostomy output: 500/175    Labs reviewed    A/P:  -antibiotics changed to zyvox per ID  -nutrition:  Continue TPN at goal, patient on full liquid diet with crackers, will advance as tolerated today  -PT/OT  -on cholestyramine and probiotic  -patient on protonix and on therapeutic lovenox.  Can transition to eliquis and aspirin today if not having any interventional procedure  -Dr. Chand Po will be covering for me starting this pm  -plan discussed with the patient's daughter at bedside

## 2022-04-13 NOTE — PROGRESS NOTES
Physical Therapy  1115 Attempted to see pt, pt was just given pain pills and had a Bilat thoracentesis this am. Nursing asked us to return this pm. Monisha Art. Tillie Frankel PTA    Returned at [phone]  Name: Obi Payton MRN: [id number] :   1929   Date:  2022   Admission Date: 2022 Room:  [address] [place] [address]   Restrictions/Precautions:  Restrictions/Precautions  Restrictions/Precautions: General Precautions,Fall Risk     abd precautions  Communication with other providers:  Mickie Wilson RN states pt is ok to see for therapy, Co-treat with OT. Patient is S/P thoracentesis 520 CC removed from right side and 800 cc removed from left side this am  Subjective:  Patient states:  He doesn't feel like getting out of bed, pt given encouragement and agreed to get in the chair  Pain:   Location, Type, Intensity (0/10 to 10/10): did not quantify  Objective:    Observation:  Pt was in the bed  Treatment, including education/measures:  Supine Exercises:pt refused  Transfers with line management of IV, tele, silveira, colostomy  Rolling: max A of 2  Supine to sit :max A of 2  Scooting :max A of 2  Sit to stand :mod A of 2  Stand to sit :mod a of 2  Gait:  Pt amb with RW for 6 small steps with mod a of 2  Gait Comments: with VC's' and TC's for B LE placement, walker placement and sequence throughout ambulation; with VC's and TC's to maintain upright posture in order to avoid COM shifting outside of SHAY; with VC's for PLB throughout ambulation  Sitting Exercises: Ankle pumps x 10 AAROM  LAQ's x 10 AAROM  Therapeutic Exercise:  Therapeutic exercises were instructed today. Cues were given for technique, safety, recruitment, and rationale. Cues were verbal and/or tactile. Safety  Patient left safely in the chair, with call light/phone in reach with alarm applied. Gait belt was used for transfers and gait.   Assessment / Impression:     Patient's tolerance of treatment:  Far , pt resisted movements d/t pain, pt was slightly confused today. Adverse Reaction: none  Significant change in status and impact:  none  Barriers to improvement:  HR fluctuated from  today  Plan for Next Session:    Will cont to work towards pt's goals per his  tolerance  Time in:  1315  Time out:  1345  Timed treatment minutes:  30  Total treatment time:  30  Previously filed items:   Short term goals  Time Frame for Short term goals: 1 week  Short term goal 1: pt to complete all bed mobility mod A x1  Short term goal 2: Pt to complete all STS transfers to/from bed, commode, and chair max A  Short term goal 3: Pt to complete stand pivot transfer with LRAD max A     Electronically signed by:     Isaiah Frederick PTA  4/13/2022, 1:47 PM

## 2022-04-13 NOTE — CARE COORDINATION
Follow up call to [facility] Admissions. They do not have a bed available for pt at this time. They recommend checking with pt's alternate choice. Cm requested that [facility] keep pt on list and CM will check regularly as it is pt's 1st choice.  Sean contacted [facility] and spoke with [facility] in admissions re; referral.

## 2022-04-13 NOTE — PROGRESS NOTES
PROCEDURE PERFORMED: bilateral thoracentesis     PRIMARY INDICATION FOR PROCEDURE: bilateral pleural effusions    INFORMED CONSENT:  Obtained prior to procedure. Consent placed in chart. PT TRANSPORTED FROM:  2104                                  TO THE IR ROOM:    Small                     ARRIVED TO ROOM: 0845    ASSESSMENT: Pt confused @ baseline on RA. Consent received from family    STAFF PRESENT: Dr. Demarcus Chavez RN, Joshua Hogan RN, [address] Floors:               Pt positioned on left side (for right thoracentesis) and right side (for left thoracentesis) for comfort. Pt on Cardiac Monitor. Pt prepped and draped in a sterile fashion with chlorhexadine. TIME OUT:  0915    PAIN/LOCAL ANESTHESIA/SEDATION MANAGEMENT:           Local: Lidocaine 1% given by Dr. Lucho Blanco:           ACCESS TIME: 3959          US/FLUORO: US 9 images          CATHETER USED: OneStep x2        STERILE DRESSINGS: guaze & tegaderm x2    SPECIMENS: 520cc clear dashawn pleural fluid removed in right side, 120cc sent to lab. 800cc clear yellow pleural fluid removed from the left, 700cc sent to lab.      EBL: <1cc         FOLLOW- UP X-RAY: Stat ordered    COMPLICATIONS/ OUTCOME: Pt tolerated well    LEFT THE ROOM: 0998    REPORT CALLED TO: Teresita Huggins

## 2022-04-13 NOTE — PROGRESS NOTES
Comprehensive Nutrition Assessment    Type and Reason for Visit:  Reassess    Nutrition Recommendations/Plan:   · Continue current diet  · Start standard supplements  · Continue current PN    Nutrition Assessment:  Pt diet has been advanced to a full liquid diet. He is not consuming anything through PO intake. Please consider starting an appetite stimulant to help the pt feel hungry once more after surgery. Please continue PN    Malnutrition Assessment:  Malnutrition Status:  Insufficient data    Context:  Acute Illness       Estimated Daily Nutrient Needs:  Energy (kcal):  9559-8951 (23-26 kcal/kg); Weight Used for Energy Requirements:  Current     Protein (g):  78-94 (1.0-1.2 g/kg); Weight Used for Protein Requirements:  Current        Fluid (ml/day):  1730-9870; Method Used for Fluid Requirements:  1 ml/kcal      Wounds:  Surgical Incision       Current Nutrition Therapies:    ADULT DIET; Full Liquid  PN-Adult Premix 4.25/10 - Peripheral Line  Current Parenteral Nutrition Orders:  · Type and Formula:  (Clinimix 4.25/10)   · Lipids: 250ml (four times weekly)  · Duration: Continuous  · Rate/Volume: 84  · Current PN Order Provides: ~1206 kcal (average lipid and non lipid days) and 77 g protein    Anthropometric Measures:  · Height: 5' 9.02\" (175.3 cm)  · Current Body Weight: 207 lb (93.9 kg)   · Admission Body Weight: 171 lb (77.6 kg)    · Usual Body Weight: 187 lb (84.8 kg)     · Ideal Body Weight: 160 lbs; % Ideal Body Weight 107.5 %   · BMI: 30.6  · BMI Categories: Obese Class 1 (BMI 30.0-34. 9)       Nutrition Diagnosis:   · Inadequate oral intake related to altered GI structure,altered GI function as evidenced by NPO or clear liquid status due to medical condition,nutrition support - parenteral nutrition      Nutrition Interventions:   Food and/or Nutrient Delivery:  Continue Current Diet,Start Oral Nutrition Supplement,Continue Current Parenteral Nutrition  Nutrition Education/Counseling:  No recommendation at this time   Coordination of Nutrition Care:  Continue to monitor while inpatient    Goals:  Pt will meet greater than 75% of estimated nutrient needs via PN       Nutrition Monitoring and Evaluation:   Behavioral-Environmental Outcomes:  None Identified   Food/Nutrient Intake Outcomes:  Parenteral Nutrition Intake/Tolerance,Diet Advancement/Tolerance  Physical Signs/Symptoms Outcomes:  Biochemical Data,GI Status,Hemodynamic Status,Fluid Status or Edema,Weight,Skin     Discharge Planning:     Too soon to determine     Electronically signed by Bridgette Mora RD, LD on 8/90/41 at 10:42 PM EDT    Contact: [phone]

## 2022-04-13 NOTE — PROGRESS NOTES
Speech Language Pathology  Facility/Department: [facility] ICU   CLINICAL BEDSIDE SWALLOW EVALUATION    NAME: Karin Rodriguez  : 1929  MRN: [id number]    ADMISSION DATE: 2022  ADMITTING DIAGNOSIS: has Hypertension; Benign prostatic hyperplasia; Psoriatic arthritis ([address] Primary malignant neoplasm of rectum ([address] Psoriasis; Chronic myeloproliferative disease ([address] Monocytosis (symptomatic); Gastroesophageal reflux disease without esophagitis; Urinary retention; H/O: CVA (cerebrovascular accident); Irregular heart beat; Nonrheumatic aortic valve stenosis; Paroxysmal atrial fibrillation ([address] GI bleed; Melena; Arteriovenous malformation of jejunum; History of rectal cancer; Benign neoplasm of cecum; Benign neoplasm of ascending colon; Pseudomonas urinary tract infection; Partial small bowel obstruction (HCC); SBO (small bowel obstruction) ([address] Atrial fibrillation with RVR ([address] Hypotension; Ischemic colitis ([address] Hypocalcemia; MARIELOS (acute kidney injury) ([address] Probable Bacterial Pneumonia; CAD (coronary artery disease); Anemia; Delirium; Severe protein-calorie malnutrition ([address] and On total parenteral nutrition on their problem list.  ONSET DATE: date of admission    Impression  Dysphagia Diagnosis: Mild to moderate oral stage dysphagia; Suspected needs further assessment;Mild to moderate pharyngeal stage dysphagia  Dysphagia Outcome Severity Scale: Level 4: Mild moderate dysphagia- Intermittent supervision/cueing. One - two diet consistencies restricted    Karin Rodriguez was seen for bedside swallow evaluation following admission to [address] [place] [address] for SBO. PMH significant for GERD. Per RN, pt NPO x1 week. Reported pt previously held food in mouth and had no response to straw in mouth. No prior SLP/dysphagia history noted. Pt was seen for examination seated upright in chair, awake, confused, agitated.  Pt unable to follow commands for oral mechanism exam, but reduced lingual ROM and generalized weakness observed. Pt has natural teeth on top, wears dentures on bottom. PO trials of thin liquid via cup/straw, nectar thick liquid via straw, puree, soft solid administered. Oral phase judged mild-moderately impaired characterized by adequate labial seal, reduced bolus formation, slow mastication, oral holding, and reduced AP transit. Pharyngeal impairment suspected based on reduced laryngeal elevation, suspect delayed swallow initiation, immediate cough following thin liquid via straw trial. No overt s/s aspiration observed with nectar thick liquids, puree, or soft solid. Recommend puree diet w/ nectar thick liquids. Pt is an assist feed. Pt should remain upright during all PO intake, small bites/sips, slow rate of feeding. Results and recommendations discussed w/ RN, who voiced agreement. SLP to follow 1-2x/ week for LOS for diet tolerance monitoring, diet advancement. Pt may complete MBSS if clinically indicated.     Recent Chest Xray/CT of Chest: see chart    Date of Eval: 4/13/2022  Evaluating Therapist: Davy Trujillo    Current Diet level:  Current Diet : NPO  Current Liquid Diet : NPO      Pain:  Pain Assessment  Pain Assessment: 0-10  Pain Level: 8  Martínez-Ceja Pain Rating: Hurts a little bit  Patient's Stated Pain Goal: 2  Pain Type: Acute pain  Pain Location: Abdomen,Chest  Pain Orientation: Right,Left  Pain Radiating Towards: n/a  Pain Descriptors: Aching  Pain Frequency: Continuous  Pain Onset: On-going  Clinical Progression: Not changed  Functional Pain Assessment: Prevents or interferes some active activities and ADLs  Non-Pharmaceutical Pain Intervention(s): Emotional support,Environmental changes,Relaxation techniques  Response to Pain Intervention: Asleep with RR greater than 10  Multiple Pain Sites: No  RASS Score: Alert and calm  POSS Score (Patient Ctrl Analgesia): 1    Reason for Referral  Obi Payton was referred for a bedside swallow evaluation to assess the efficiency of his swallow function, identify signs and symptoms of aspiration and make recommendations regarding safe dietary consistencies, effective compensatory strategies, and safe eating environment. Treatment Plan  Requires SLP Intervention: Yes  Duration/Frequency of Treatment: 1-2x/week for LOS  D/C Recommendations: To be determined       Recommended Diet and Intervention  Diet Solids Recommendation: Dysphagia Pureed (Dysphagia I)  Liquid Consistency Recommendation: Mildly Thick (Nectar)  Recommended Form of Meds: Crushed in puree as able          Compensatory Swallowing Strategies  Compensatory Swallowing Strategies: Alternate solids and liquids;Eat/Feed slowly; Assist feed;Upright as possible for all oral intake;Small bites/sips    Treatment/Goals  Short-term Goals  Timeframe for Short-term Goals: LOS  Goal 1: Pt will tolerate puree diet w/ nectar thick liquids without clinical s/s aspiration 100% with mod assist  Goal 2: Pt will participate in diet advancement trials as clinically indicated  Goal 3: Pt will participate in MBSS as clinically indicated  Goal 4: Pt/caregivers will demonstrate understanding of POC and safe swallowing strategies    General  Chart Reviewed: Yes  Behavior/Cognition: Confused; Agitated  Respiratory Status: Room air  O2 Device: None (Room air)  Dentition: Dentures bottom (own teeth on top)  Patient Positioning: Upright in chair  Baseline Vocal Quality: Weak  Volitional Cough: Weak  Consistencies Administered: Thin - cup; Thin - straw;Nectar - straw;Nectar - cup;Dysphagia Pureed (Dysphagia I); Dysphagia Soft and Bite-Sized (Dysphagia III)           Vision/Hearing  Vision  Vision: Within Functional Limits  Hearing  Hearing: Within functional limits    Oral Motor Deficits  Oral/Motor  Oral Motor: Exceptions to [facility]    Oral Phase Dysfunction  Oral Phase  Oral Phase: Exceptions     Indicators of Pharyngeal Phase Dysfunction   Pharyngeal Phase  Pharyngeal Phase: Exceptions    Prognosis  Prognosis  Prognosis for safe diet advancement: fair  Barriers to reach goals: cognitive deficits;fatigue  Individuals consulted  Consulted and agree with results and recommendations: Patient;RN    Education  Patient Education: Results, recommendations  Patient Education Response: No evidence of learning  Safety Devices in place: Yes  Type of devices: Left in chair       Therapy Time  SLP Individual Minutes  Time In: 4769  Time Out: [phone]  Minutes: [address] [place]  Student Clinician  4/13/2022 2:56 PM

## 2022-04-14 NOTE — PROGRESS NOTES
Granddaughter at bedside- updates given, questions answered.  Pt remains up in chair- chair alarm on and call light within reach

## 2022-04-14 NOTE — PROGRESS NOTES
Behavioral Health Note  CHIEF COMPLAINT  Obstructive uropathy, acute renal failure, and urinary retention    REASON FOR ADMISSION  Obstructive uropathy, acute renal failure, and urinary retention    SOCIAL HISTORY  Naa Marquez lives at home with his wife and is Pt Hr still in Afib, HR between  BPM occasionally into the 110s but is not sustained for longer than ten seconds. Pt is still resting comfortably with his eyes closed. He responds to voice and touch but is still confused. believes [facility] is in [facility] [place]  Perceptions: denies  Insight: poor  Judgment: poor  SUICIDAL RISK ASSESSMENT  No SI/HI  ASSESSMENT  The patient has a dx delirium, alcohol withdrawal, alcohol dependence, r/o DTs, r/o mood disorder.

## 2022-04-14 NOTE — PROGRESS NOTES
Called nutrition for another dinner tray- pt did not want to eat the one brought earlier. Family stated he was hungry now - tech informed them a new tray would be coming up soon.

## 2022-04-14 NOTE — PLAN OF CARE
Problem: Falls - Risk of:  Goal: Will remain free from falls  Description: Will remain free from falls  4/13/2022 2152 by Aidan Bello RN  Outcome: Ongoing  4/13/2022 1358 by Vince Espana  Outcome: Ongoing  Goal: Absence of physical injury  Description: Absence of physical injury  4/13/2022 2152 by Aidan Bello RN  Outcome: Ongoing  4/13/2022 1358 by Vince Espana  Outcome: Ongoing     Problem: Pain:  Goal: Pain level will decrease  Description: Pain level will decrease  4/13/2022 2152 by Aidan Bello RN  Outcome: Ongoing  4/13/2022 1358 by Vince Espana  Outcome: Ongoing  Goal: Control of acute pain  Description: Control of acute pain  4/13/2022 2152 by Aidan Bello RN  Outcome: Ongoing  4/13/2022 1358 by Vince Espana  Outcome: Ongoing  Goal: Control of chronic pain  Description: Control of chronic pain  4/13/2022 2152 by Aidan Bello RN  Outcome: Ongoing  4/13/2022 1358 by Vince Espana  Outcome: Ongoing     Problem: Skin Integrity:  Goal: Will show no infection signs and symptoms  Description: Will show no infection signs and symptoms  4/13/2022 2152 by Aidan Bello RN  Outcome: Ongoing  4/13/2022 1358 by Vince Espana  Outcome: Ongoing  Goal: Absence of new skin breakdown  Description: Absence of new skin breakdown  4/13/2022 2152 by Aidan Bello RN  Outcome: Ongoing  4/13/2022 1358 by Vince Espana  Outcome: Ongoing  Goal: Signs of wound healing will improve  Description: Signs of wound healing will improve  4/13/2022 2152 by Aidan Bello RN  Outcome: Ongoing  4/13/2022 1358 by Vince Espana  Outcome: Ongoing  Goal: Risk for impaired skin integrity will decrease  Description: Risk for impaired skin integrity will decrease  4/13/2022 2152 by Aidan Bello RN  Outcome: Ongoing  4/13/2022 1358 by Vince Espana  Outcome: Ongoing     Problem: Infection - Surgical Site:  Goal: Will show no infection signs and symptoms  Description: Will show no infection signs and symptoms  4/13/2022 2152 by Al Ferraro RN  Outcome: Ongoing  4/13/2022 1358 by Derik Main  Outcome: Ongoing     Problem:  Bowel/Gastric:  Goal: Gastrointestinal status for postoperative course will improve  Description: Gastrointestinal status for postoperative course will improve  4/13/2022 2152 by Al Ferraro RN  Outcome: Ongoing  4/13/2022 1358 by Derik Main  Outcome: Ongoing     Problem: Fluid Volume:  Goal: Ability to achieve a balanced intake and output will improve  Description: Ability to achieve a balanced intake and output will improve  4/13/2022 2152 by Al Ferraro RN  Outcome: Ongoing  4/13/2022 1358 by Derik Main  Outcome: Ongoing     Problem: Nutritional:  Goal: Ability to attain and maintain optimal nutritional status will improve  Description: Ability to attain and maintain optimal nutritional status will improve  4/13/2022 2152 by Al Ferraro RN  Outcome: Ongoing  4/13/2022 1358 by Derik Main  Outcome: Ongoing     Problem: Physical Regulation:  Goal: Postoperative complications will be avoided or minimized  Description: Postoperative complications will be avoided or minimized  4/13/2022 2152 by Al Ferraro RN  Outcome: Ongoing  4/13/2022 1358 by Derik Main  Outcome: Ongoing     Problem: Sensory:  Goal: Pain level will decrease  Description: Pain level will decrease  4/13/2022 2152 by Al Ferraro RN  Outcome: Ongoing  4/13/2022 1358 by Derik Main  Outcome: Ongoing     Problem: Nutrition  Goal: Optimal nutrition therapy  4/13/2022 2152 by Al Ferraro RN  Outcome: Ongoing  4/13/2022 1358 by Derik Main  Outcome: Ongoing

## 2022-04-14 NOTE — PROGRESS NOTES
Physical Therapy  Name: Kelly Alexandre MRN: [id number] :   1929   Date:  2022   Admission Date: 2022 Room:  [address] [place] [address]   Restrictions/Precautions:  Restrictions/Precautions  Restrictions/Precautions: General Precautions,Fall Risk     abd surgery  Communication with other providers:  Adore BATISTA states pt is ok to see for therapy. Co-treat with OT  Subjective:  Patient states: We are moving him too fast  Pain:   Location, Type, Intensity (0/10 to 10/10):  Did not quantify  Objective:    Observation:  Pt was in bed resting  Treatment, including education/measures:  Supine Exercises: Ankle pumps x 10  Heel slides x 5  Therapeutic Exercise:  Therapeutic exercises were instructed today. Cues were given for technique, safety, recruitment, and rationale. Cues were verbal and/or tactile. Transfers with line management of IV, tele, silveira  Rolling: max a of 2  Supine to sit :max A of 2  Scooting :max A of 2  Pt sat EOB with max A of 1 for 12 min  Sit to stand :max a of 2  Stand to sit :max a of 2  Gait:  Pt amb with RW for 4 ft with max A of 2 resting every 3 small steps  Pt needed VC's for the need for moving  Gait Comments: with VC's' and TC's for B LE placement, walker placement and sequence throughout ambulation; with VC's and TC's to maintain upright posture in order to avoid COM shifting outside of SHAY; with VC's for PLB throughout ambulation  Safety  Patient left safely in the chair, with call light/phone in reach with alarm applied. Gait belt was used for transfers and gait.   Assessment / Impression:     Patient's tolerance of treatment:  Fair, pt was sleepy but cooperative   Adverse Reaction: none  Significant change in status and impact:  none  Barriers to improvement:  A-fib,   Plan for Next Session:    Will cont to work towards pt's goals per his tolerance  Time in:  0835  Time out:  0900  Timed treatment minutes:  25  Total treatment time:  25  Previously filed items:     Short term goals  Time Frame for Short term goals: 1 week  Short term goal 1: pt to complete all bed mobility mod A x1  Short term goal 2: Pt to complete all STS transfers to/from bed, commode, and chair max A  Short term goal 3: Pt to complete stand pivot transfer with LRAD max A     Electronically signed by:     Kurt Reyes PTA  4/14/2022, 9:06 AM

## 2022-04-14 NOTE — PROGRESS NOTES
Nephrology Progress Note  4/14/2022 12:24 PM  Subjective: Interval History: Ye Lara is a 80 y.o. male  . overall doing better more awake sitting in a chair  Data:   Scheduled Meds:   dilTIAZem  60 mg Oral 2 times per day    digoxin  250 mcg IntraVENous Daily    Fidaxomicin  200 mg Oral BID    metoprolol  5 mg IntraVENous Q4H    furosemide  20 mg IntraVENous Daily    cholestyramine light  1 packet Oral BID    lactobacillus  1 capsule Oral Daily    nystatin  5 mL Oral 4x Daily    fat emulsion  250 mL IntraVENous Once per day on Mon Tue Thu Fri    insulin lispro  0-6 Units SubCUTAneous TID WC    insulin lispro  0-3 Units SubCUTAneous Nightly    apixaban  2.5 mg Oral BID    aspirin  81 mg Oral Daily    sodium chloride flush  5-40 mL IntraVENous 2 times per day    pantoprazole  40 mg IntraVENous Daily     Continuous Infusions:   PN-Adult Premix 4.25/10 - Peripheral Line      PN-Adult Premix 4.25/10 - Peripheral Line 75 mL/hr at 04/13/22 1809    sodium chloride 30 mL/hr at 04/14/22 0316    dextrose           CBC   Recent Labs     04/12/22  0530 04/13/22  0415 04/14/22  0426   WBC 19.6* 16.2* 12.7*   HGB 8.8* 8.2* 8.2*   HCT 28.1* 26.2* 26.5*    431 368      BMP   Recent Labs     04/12/22  0530 04/13/22  0415 04/14/22  0426   * 131* 130*   K 4.6 4.4 3.9    103 100   CO2 19* 18* 19*   PHOS 2.6 2.7 3.2   BUN 34* 34* 33*   CREATININE 1.1 1.0 1.1     Hepatic:   No results for input(s): AST, ALT, ALB, BILITOT, ALKPHOS in the last 72 hours. Troponin: No results for input(s): TROPONINI in the last 72 hours. BNP: No results for input(s): BNP in the last 72 hours. Lipids: No results for input(s): CHOL, HDL in the last 72 hours. Invalid input(s): LDLCALCU  ABGs: No results found for: PHART, PO2ART, RNP3LWU  INR:   No results for input(s): INR in the last 72 hours.   Renal Labs  Albumin:    Lab Results   Component Value Date    LABALBU 2.2 04/11/2022     Calcium:    Lab Results Component Value Date    CALCIUM 7.4 04/14/2022     Phosphorus:    Lab Results   Component Value Date    PHOS 3.2 04/14/2022     U/A:    Lab Results   Component Value Date    NITRU POSITIVE 04/02/2022    COLORU YELLOW 04/02/2022    WBCUA 11 04/02/2022    RBCUA 1 04/02/2022    MUCUS FEW 04/02/2022    TRICHOMONAS NONE SEEN 03/20/2022    BACTERIA OCCASIONAL 04/02/2022    CLARITYU CLEAR 04/02/2022    SPECGRAV 1.015 04/02/2022    UROBILINOGEN 0.2 04/02/2022    BILIRUBINUR NEGATIVE 04/02/2022    BLOODU MODERATE 04/02/2022    KETUA SMALL 04/02/2022     ABG:  No results found for: PHART, AJN8HMG, PO2ART, QWC4AJV, BEART, THGBART, RWZ3NMG, L7SFGTGJ  HgBA1c:  No results found for: LABA1C  Microalbumen/Creatinine ratio:  No components found for: RUCREAT  TSH:  No results found for: TSH  IRON:    Lab Results   Component Value Date    IRON 35 03/01/2022     Iron Saturation:  No components found for: PERCENTFE  TIBC:    Lab Results   Component Value Date    TIBC 280 03/01/2022     FERRITIN:    Lab Results   Component Value Date    FERRITIN 210 03/01/2022     RPR:  No results found for: RPR  SAQIB:  No results found for: ANATITER, SAQIB  24 Hour Urine for Creatinine Clearance:  No components found for: CREAT4, UHRS10, UTV10      Objective:   I/O: 04/13 0701 - 04/14 0700  In: -   Out: 2825 [Urine:2325]  I/O last 3 completed shifts:  In: -   Out: 5586 [Urine:3000; Stool:675]  I/O this shift: In: 500 [P.O.:500]  Out: -   Vitals: BP (!) 114/96   Pulse 104   Temp 98 °F (36.7 °C) (Oral)   Resp 23   Ht 5' 9.02\" (1.753 m)   Wt 196 lb 10.4 oz (89.2 kg)   SpO2 96%   BMI 29.03 kg/m²  {  General appearance: awake weak  HEENT: Head: Normal, normocephalic, atraumatic.   Neck: supple, symmetrical, trachea midline  Lungs: diminished breath sounds bilaterally  Heart: S1, S2 normal  Abdomen: abnormal findings:  soft mildly tender  Extremities: edema trace  Neurologic: Mental status: alertness: Arousable weak      Assessment and Plan:      IMP: #1 small bowel obstruction status post surgery   #2 acute renal failure from ATN   #3 possible colitis with leukocytosis   #4 anemia   #5 A. fib rapid rate   #6 mild shortness of breath with pleural effusion    Plan      #1 post surgery more awake try to work on oral diet   #2 creatinine 1.1 holding stable   #3 maintain course of antibiotics  #. 4 hemoglobin was stable at 8.2   #5 monitor cardiac status stable   #6 continue her current therapy and rehab maintain diuresis and follow supportive care for now         Kylee Coats MD, MD

## 2022-04-14 NOTE — PROGRESS NOTES
Hospitalist Progress Note    Patient:  Dipesh Palomo  Unit/Bed:2104/2104-A   YOB: 1929       MRN: [id number] Acct: [de-identified]  PCP: Marlin Habermann, MD    Date of Admission: 4/2/2022  --------------------------    Chief Complaint:     Abdominal pain, nausea vomiting, diarrhea    Hospital Course/interval history:     Dipesh Palomo is a 80 y.o. male hospitalized on 4/2/2022 who presented with abdominal pain, nausea, vomiting, patient has prior history of small bowel obstructions, remote h/o colon cancer status post resection and colostomy. There was concern for ischemic colitis - patient evaluated by surgery service, underwent exploratory laparotomy, subtotal colon resection, end ileostomy, small bowel resection, mobilization of the splenic flexure for colitis with acute abdomen. His hospitalization course complicated by circulatory shock, sepsis, UTI, atrial fibrillation with rapid ventricular response, MARIELOS and probable pneumonia. Assessment/plan:     Severe colitis with sepsis status post exploratory laparotomy, colon resection, small bowel resection with end ileostomy  · Biopsy results showed pseudomembranous colitis. · Sepsis has resolved. · CT Abdo done 4/10 for worsening abdo pain and distension - findings consistent with small bowel enteritis. No e/o abscess. Moderate bilateral pleural effusions. AXR done 4/13/22 with slightly increased small bowel dilatation most consistent with ileus. · -ID have stopped linezolid and started fidaxomicin 4/14 due to strong suspicion for C. Diff infection. PCT, WBC are on a downward trend. CRP increased slightly. Urine culture 4/8 - no growth. Blood cultures 4/11 no growth x 48h. Fungitell 4/8 negative. C Diff toxin negative however this sample was sent from the ileostomy post colon resection. · Currently on TPN and started full liquid diet 4/14. · Pain control as needed. Lowest effective doses.      Bilateral pleural effusions  -S/p b/l thoracenteses on 4/13. Fluid cultures in process    Possible fluid overload  Urine output 2325ml over the last 24h. Intake not recorded. General surgery started lasix 20 IV daily x 3 days. Acute metabolic encephalopathy in the setting of infection as above. -Improving. Acute kidney injury in the setting of sepsis as above. Multifactorial, resolved    Pseudomonas UTI   S/p zosyn. Repeat urine culture 4/8 - no growth    Atrial fibrillation with rapid ventricular response  -RVR overnight 4/13 and received two doses IV dig. Spoke with cardiology. Diltiazem increased to 60q12 and added digoxin 250mcg IV daily.   -Eliquis restarted    Mild hyponatremia  Mild. Daily BMP. Nephrology following. H/o Essential thrombocythemia  -previously on hydroxyurea. Currently not on meds. Will continue follow up with hematology as outpatient.   -Hb 8.2. Daily CBC. Transfuse if needed to maintain Hb>7g/dL. Oral thrush  Continue nystatin swish and swallow. Comorbid conditions (AAA, BPH, history of rectal cancer 2019 status post colostomy, psoriatic arthritis, immunocompromise prior CVA)    Code Status: Full Code     DVT prophylaxis: Apixaban      Disposition:   - SNF once medically ready for discharge.      ----------------    Subjective:     Denies abdominal pain or any other complaints today. Diet: PN-Adult Premix 4.25/10 - Peripheral Line  Diet NPO Exceptions are: Ice Chips, Popsicles, Sips of Water with Meds    OBJECTIVE     Exam:  BP (!) 154/97   Pulse 104   Temp 98.4 °F (36.9 °C) (Oral)   Resp 20   Ht 5' 9.02\" (1.753 m)   Wt 196 lb 10.4 oz (89.2 kg)   SpO2 99%   BMI 29.03 kg/m²     Gen: Not in distress. Sleepy but easily arousable. Answers questions appropriately  Head: Normocephalic. Atraumatic. Eyes: Conjunctivae/corneas clear. ENT:  No e/o thrush  Neck: No JVD. No obvious thyromegaly. , Right IJ CVC in place.    CVS: S1S2 irregular rhythm, tachycardic, no murmur,  Pulmomary: Breath sounds improved post thoracentesis. Gastrointestinal: mildly distended, soft, non-tender to palpation, bowel sounds are present, staples noted in the mid abdomen, ileostomy with dark green liquid output, no stool. Musculoskeletal: No edema. Warm  Neuro: Easily arousable no focal deficit. Moves extremity spontaneously. Psychiatry: Mood appropriate.     Medications:  Reviewed    Infusion Medications    PN-Adult Premix 4.25/10 - Peripheral Line 75 mL/hr at 04/13/22 1809    sodium chloride 30 mL/hr at 04/14/22 0316    dextrose       Scheduled Medications    dilTIAZem  60 mg Oral 2 times per day    digoxin  250 mcg IntraVENous Daily    furosemide  20 mg IntraVENous Daily    cholestyramine light  1 packet Oral BID    linezolid  600 mg IntraVENous Q12H    lactobacillus  1 capsule Oral Daily    nystatin  5 mL Oral 4x Daily    metoprolol  2.5 mg IntraVENous Q6H    enoxaparin  70 mg SubCUTAneous BID    fat emulsion  250 mL IntraVENous Once per day on Mon Tue Thu Fri    insulin lispro  0-6 Units SubCUTAneous TID WC    insulin lispro  0-3 Units SubCUTAneous Nightly    [Held by provider] apixaban  2.5 mg Oral BID    [Held by provider] aspirin  81 mg Oral Daily    sodium chloride flush  5-40 mL IntraVENous 2 times per day    pantoprazole  40 mg IntraVENous Daily     PRN Meds: HYDROmorphone, HYDROcodone-acetaminophen, sodium chloride flush, Acetaminophen, glucose, glucagon (rDNA), dextrose, dextrose bolus (hypoglycemia) **OR** dextrose bolus (hypoglycemia), sodium chloride flush, ondansetron **OR** ondansetron      Intake/Output Summary (Last 24 hours) at 4/14/2022 0738  Last data filed at 4/14/2022 0500  Gross per 24 hour   Intake --   Output 2825 ml   Net -2825 ml             Labs:   Recent Labs     04/12/22  0530 04/13/22  0415 04/14/22  0426   WBC 19.6* 16.2* 12.7*   HGB 8.8* 8.2* 8.2*   HCT 28.1* 26.2* 26.5*    431 368     Recent Labs     04/12/22  0530 04/13/22  0415 04/14/22  0426   * 131* 130*   K 4.6 4.4 3.9    103 100   CO2 19* 18* 19*   BUN 34* 34* 33*   CREATININE 1.1 1.0 1.1   CALCIUM 7.7* 7.6* 7.4*   PHOS 2.6 2.7 3.2     No results for input(s): AST, ALT, BILIDIR, BILITOT, ALKPHOS in the last 72 hours. No results for input(s): INR in the last 72 hours. No results for input(s): Kristi Belts in the last 72 hours. Urinalysis:      Lab Results   Component Value Date    NITRU POSITIVE 04/02/2022    WBCUA 11 04/02/2022    BACTERIA OCCASIONAL 04/02/2022    RBCUA 1 04/02/2022    BLOODU MODERATE 04/02/2022    SPECGRAV 1.015 04/02/2022       Radiology:  XR ABDOMEN (KUB) (SINGLE AP VIEW)   Final Result   Increased small bowel dilatation to suggest small bowel obstruction, ileus or   enteritis. XR CHEST PORTABLE   Final Result   Interval decrease in size of bilateral pleural effusion status post   thoracentesis, with improved aeration at the lung bases. No visualized   pneumothorax. CT ABDOMEN PELVIS W IV CONTRAST Additional Contrast? None   Final Result   No mechanical obstructive process however inflammatory findings of small   bowel with mucosal hyperenhancement and wall thickening throughout the   distended small bowel loops to the right lower quadrant ileostomy with small   volume abdominopelvic ascites most consistent with enteritis. No obvious   abscess formation. Moderate bilateral pleural effusions along with body wall   edema. XR CHEST PORTABLE   Final Result   Unchanged moderate left pleural effusion bibasilar atelectasis. No evidence   of pulmonary edema. XR ABDOMEN FOR NG/OG/NE TUBE PLACEMENT   Final Result   Nasogastric tube terminates in the stomach         XR ABDOMEN FOR NG/OG/NE TUBE PLACEMENT   Final Result   An NG tube tip is present with the tip coiled within the fundus of the   stomach. The proximal port is in the fundus of the stomach. Consider   advancement of the tube.          XR ABDOMEN (KUB) (SINGLE AP VIEW)   Final Result   The patient is status post surgery. There is decreased but continued mild   distension of the small bowel could represent partial obstruction or ileus. Calcification of the abdominal aorta consistent with a known aneurysm. The NG tube tip is in the fundus of the stomach. The proximal port is in the   distal esophagus near the GE junction. Consider advancement of the tube. XR CHEST PORTABLE   Final Result   Low lung volumes with persistent bibasilar airspace disease. Trace left pleural effusion. XR ABDOMEN (KUB) (SINGLE AP VIEW)   Final Result   1. Nasogastric tube has been advanced with the tip over the antrum. 2.  New skin staples are present at the abdomen and pelvis. 3.  Residual gas dilatation of multiple small bowel loops. There may be a   small amount of postsurgical pneumoperitoneum. 4.  See the prior CT scan for evaluation of the infrarenal aortic aneurysm. XR CHEST PORTABLE   Final Result   Right line placement as above. Bilateral small areas of pneumonia         XR ABDOMEN (KUB) (SINGLE AP VIEW)   Final Result   Multiple air-filled distended loops of small bowel stacked in the mid abdomen   suggesting a low-grade partial small bowel obstruction or focal ileus. Atherosclerotic calcification of an abdominal aortic aneurysm as seen on   prior CT. NG tube in the stomach with proximal port at the level of the GE junction. XR CHEST PORTABLE   Final Result   Enteric tube tip is noted within the stomach with the side port likely at or   just distal to the GE junction. XR CHEST PORTABLE   Final Result   Trace left pleural effusion with adjacent atelectasis.          CT ABDOMEN PELVIS W IV CONTRAST Additional Contrast? None   Final Result   Interval development of severe circumferential wall thickening and   pericolonic inflammatory changes of the transverse colon and splenic flexure   of the colon just proximal to the left lower quadrant colostomy with   increased fluid-filled distension of the proximal colon and small bowel. Findings suggest acute infectious/inflammatory colitis with proximal partial   obstruction versus ileus. Stable infrarenal abdominal aortic aneurysm measuring 5.5 cm in diameter. See follow-up recommendations below. Stable hepatic cysts and bilateral renal cortical cysts. Cardiomegaly with bilateral pleural effusions suggesting mild CHF. RECOMMENDATIONS:   5.5 cm infrarenal abdominal aortic aneurysm. Recommend referral to a vascular   specialist.      Reference: J Am Abad Radiol [id number];08:[id number].             IR GUIDED THORACENTESIS PLEURAL    (Results Pending)       Electronically signed by Shi Dickey MD on 4/14/2022 at 7:38 AM

## 2022-04-14 NOTE — PROGRESS NOTES
Pulse ox d/c'd- pt has been on RA with good oxygen levels- will spot check- Pt has a transfer order to stepdown so is considered a stepdown pt.

## 2022-04-14 NOTE — PROGRESS NOTES
Interval development of severe circumferential wall thickening and   pericolonic inflammatory changes of the transverse colon and splenic flexure   of the colon just proximal to the left lower quadrant colostomy with   increased fluid-filled distension of the proximal colon and small bowel. Findings suggest acute infectious/inflammatory colitis with proximal partial   obstruction versus ileus.       Stable infrarenal abdominal aortic aneurysm measuring 5.5 cm in diameter. See follow-up recommendations below.       Stable hepatic cysts and bilateral renal cortical cysts.       Cardiomegaly with bilateral pleural effusions suggesting mild CHF.       RECOMMENDATIONS:   5.5 cm infrarenal abdominal aortic aneurysm. Recommend referral to a vascular   specialist.       Reference: Betzaida Hazel Radiol 1940;26:[id number].      4/2/22 XR Chest Portable:  Impression   Trace left pleural effusion with adjacent atelectasis.      4/2/22 XR Chest Portable:  Impression   Enteric tube tip is noted within the stomach with the side port likely at or   just distal to the GE junction.          4/3/22 XR Abdomen:  Impression   Multiple air-filled distended loops of small bowel stacked in the mid abdomen   suggesting a low-grade partial small bowel obstruction or focal ileus.       Atherosclerotic calcification of an abdominal aortic aneurysm as seen on   prior CT.       NG tube in the stomach with proximal port at the level of the GE junction.      4/3/22 XR Chest Portable:  Impression   Right line placement as above. Bilateral small areas of pneumonia      4/4/22 XR Abdomen:  Impression   1.  Nasogastric tube has been advanced with the tip over the antrum.       2.  New skin staples are present at the abdomen and pelvis.       3.  Residual gas dilatation of multiple small bowel loops.  There may be a   small amount of postsurgical pneumoperitoneum.       4.  See the prior CT scan for evaluation of the infrarenal aortic aneurysm.    4/5/22 XR Chest Portable:  Impression   Low lung volumes with persistent bibasilar airspace disease.       Trace left pleural effusion.      4/7/22 XR Abdomen:  Impression   The patient is status post surgery.  There is decreased but continued mild   distension of the small bowel could represent partial obstruction or ileus.       Calcification of the abdominal aorta consistent with a known aneurysm.       The NG tube tip is in the fundus of the stomach.  The proximal port is in the   distal esophagus near the GE junction.  Consider advancement of the tube. 4/8/22 XR Chest Portable:  Impression   Unchanged moderate left pleural effusion bibasilar atelectasis.  No evidence   of pulmonary edema. 4/10/22 CT Abdomen Pelvis W IV Contrast:  Impression   No mechanical obstructive process however inflammatory findings of small   bowel with mucosal hyperenhancement and wall thickening throughout the   distended small bowel loops to the right lower quadrant ileostomy with small   volume abdominopelvic ascites most consistent with enteritis.  No obvious   abscess formation.  Moderate bilateral pleural effusions along with body wall   edema. 4/13/22 XR Chest Portable:  Impression   Interval decrease in size of bilateral pleural effusion status post   thoracentesis, with improved aeration at the lung bases.  No visualized   pneumothorax. 4/13/22 XR Abdomen:  Impression   Increased small bowel dilatation to suggest small bowel obstruction, ileus or   enteritis.           Labs:    Recent Results (from the past 24 hour(s))   POCT Glucose    Collection Time: 04/13/22 11:30 AM   Result Value Ref Range    POC Glucose 107 (H) 70 - 99 MG/DL   POCT Glucose    Collection Time: 04/13/22  3:41 PM   Result Value Ref Range    POC Glucose 131 (H) 70 - 99 MG/DL   POCT Glucose    Collection Time: 04/13/22  9:23 PM   Result Value Ref Range    POC Glucose 95 70 - 99 MG/DL   POCT Glucose    Collection Time: 04/14/22 12:13 AM Result Value Ref Range    POC Glucose 87 70 - 99 MG/DL   POCT Glucose    Collection Time: 04/14/22  4:25 AM   Result Value Ref Range    POC Glucose 137 (H) 70 - 99 MG/DL   CBC with Auto Differential    Collection Time: 04/14/22  4:26 AM   Result Value Ref Range    WBC 12.7 (H) 4.0 - 10.5 K/CU MM    RBC 2.44 (L) 4.6 - 6.2 M/CU MM    Hemoglobin 8.2 (L) 13.5 - 18.0 GM/DL    Hematocrit 26.5 (L) 42 - 52 %    .6 (H) 78 - 100 FL    MCH 33.6 (H) 27 - 31 PG    MCHC 30.9 (L) 32.0 - 36.0 %    RDW 18.3 (H) 11.7 - 14.9 %    Platelets 605 018 - 986 K/CU MM    MPV 10.3 7.5 - 11.1 FL    Differential Type AUTOMATED DIFFERENTIAL     Segs Relative 82.8 (H) 36 - 66 %    Lymphocytes % 4.6 (L) 24 - 44 %    Monocytes % 9.0 (H) 0 - 4 %    Eosinophils % 0.6 0 - 3 %    Basophils % 0.2 0 - 1 %    Segs Absolute 10.5 K/CU MM    Lymphocytes Absolute 0.6 K/CU MM    Monocytes Absolute 1.1 K/CU MM    Eosinophils Absolute 0.1 K/CU MM    Basophils Absolute 0.0 K/CU MM    Nucleated RBC % 0.0 %    Total Nucleated RBC 0.0 K/CU MM    Total Immature Neutrophil 0.36 K/CU MM    Immature Neutrophil % 2.8 (H) 0 - 0.43 %   Basic Metabolic Panel    Collection Time: 04/14/22  4:26 AM   Result Value Ref Range    Sodium 130 (L) 135 - 145 MMOL/L    Potassium 3.9 3.5 - 5.1 MMOL/L    Chloride 100 99 - 110 mMol/L    CO2 19 (L) 21 - 32 MMOL/L    Anion Gap 11 4 - 16    BUN 33 (H) 6 - 23 MG/DL    CREATININE 1.1 0.9 - 1.3 MG/DL    Glucose 136 (H) 70 - 99 MG/DL    Calcium 7.4 (L) 8.3 - 10.6 MG/DL    GFR Non-African American >60 >60 mL/min/1.73m2    GFR African American >60 >60 mL/min/1.73m2   Magnesium    Collection Time: 04/14/22  4:26 AM   Result Value Ref Range    Magnesium 1.9 1.8 - 2.4 mg/dl   Phosphorus    Collection Time: 04/14/22  4:26 AM   Result Value Ref Range    Phosphorus 3.2 2.5 - 4.9 MG/DL   C-Reactive Protein    Collection Time: 04/14/22  4:26 AM   Result Value Ref Range    CRP, High Sensitivity 106.4 mg/L   Procalcitonin    Collection Time: 04/14/22  4:26 AM   Result Value Ref Range    Procalcitonin 0.253      CULTURE results: Invalid input(s): BLOOD CULTURE,  URINE CULTURE, SURGICAL CULTURE    Diagnosis:  Patient Active Problem List   Diagnosis    Hypertension    Benign prostatic hyperplasia    Psoriatic arthritis ([address]    Primary malignant neoplasm of rectum (HCC)    Psoriasis    Chronic myeloproliferative disease ([address]    Monocytosis (symptomatic)    Gastroesophageal reflux disease without esophagitis    Urinary retention    H/O: CVA (cerebrovascular accident)    Irregular heart beat    Nonrheumatic aortic valve stenosis    Paroxysmal atrial fibrillation (HCC)    GI bleed    Melena    Arteriovenous malformation of jejunum    History of rectal cancer    Benign neoplasm of cecum    Benign neoplasm of ascending colon    Pseudomonas urinary tract infection    Partial small bowel obstruction (HCC)    SBO (small bowel obstruction) (HCC)    Atrial fibrillation with RVR (HCC)    Hypotension    Ischemic colitis (HCC)    Hypocalcemia    MARIELOS (acute kidney injury) ([address]    Probable Bacterial Pneumonia    CAD (coronary artery disease)    Anemia    Delirium    Severe protein-calorie malnutrition (HCC)    On total parenteral nutrition       Active Problems  Principal Problem:    SBO (small bowel obstruction) (HCC)  Active Problems:    Gastroesophageal reflux disease without esophagitis    Pseudomonas urinary tract infection    Atrial fibrillation with RVR (HCC)    Hypotension    Ischemic colitis (HCC)    Hypocalcemia    MARIELOS (acute kidney injury) ([address]    Probable Bacterial Pneumonia    CAD (coronary artery disease)    Anemia    Delirium    Severe protein-calorie malnutrition (HCC)    On total parenteral nutrition  Resolved Problems:    * No resolved hospital problems. *    Electronically signed by: Electronically signed by CHEMA Dangelo CNP on 4/14/2022 at 8:19 AM

## 2022-04-14 NOTE — PROGRESS NOTES
[id number] [place] OF SPEECH/LANGUAGE PATHOLOGY  DAILY PROGRESS NOTE  Dennis Read  4/14/2022  [id number]  Colitis [K52.9]  Ileus ([address] [K56.7]  SBO (small bowel obstruction) ([facility] [K56.609]  Occult blood positive stool [R19.5]  Atrial fibrillation with RVR (HCC) [I48.91]  Leukocytosis, unspecified type [D72.829]  Sepsis without acute organ dysfunction, due to unspecified organism ([facility] [A41.9]  Allergies   Allergen Reactions    Sulfa Antibiotics          Pt was seen this date for dysphagia treatment. IMPRESSION AND RECOMMENDATIONS: Dennis Read was seen for dysphagia follow-up/reassessment for safe diet advancement. He was seen by SLP for initial evaluation yesterday 4/13 and recommended pureed diet/nectar thick liquids at that time. Diet has since been altered to full/thin liquids per medical team. RN and pt's wife deny concerns for tolerance of thin liquids. Pt seen for reassessment seated upright in bed, awake, fatigued, cooperative. He was presented with PO trials of thin liquids via cup/straw and puree. He required total feeding d/t weakness. Oral stage appears grossly [facility] for given consistencies with intact labial seal, slow bolus formation/AP transit, intact clearance. Suspect oral deficits may be present for solid consistencies, however these were not tested d/t current diet restriction. Pharyngeal stage appears improved/grossly WFL with slow swallow initiation, adequate laryngeal elevation, no overt s/s aspiration across all trials. Reviewed safe feeding strategies with wife. Recommend continued current diet/full liquids. Follow general aspiration precautions and assist/feed. SLP will follow-up 1-2x when cleared for diet advancement.       GOALS (current status in bold):  Short-term Goals  Timeframe for Short-term Goals: LOS  Goal 1: Pt will tolerate puree diet w/ nectar thick liquids without clinical s/s aspiration 100% with mod assist Discontinue, diet advanced  Goal 2: Pt will participate in diet advancement trials as clinically indicated Meeting, tolerating trials of thin liquids; pt on full/thin liquid diet per medical team  Goal 3: Pt will participate in MBSS as clinically indicated DNT, not indicated at this time  Goal 4: Pt/caregivers will demonstrate understanding of POC and safe swallowing strategies Meeting, d/w wife          EDUCATION: recommendations/plan    PAIN RATING (0-10 Scale): does not rate, reports fatigue  Time in/Time out: SLP Individual Minutes  Time In: Ba Collins  Time Out: Nehal 72  Minutes: 20    Visit number: [id number] [place] [address] [facility]  4/14/2022  4:56 PM

## 2022-04-14 NOTE — CARE COORDINATION
CM followed up with pt. Pt up in chair. Granddaughter at bedside. Daughter arrived shortly after. CM confirmed plan for SNF. Updated daughter that [facility] has no beds available at present but referred to [facility]. 14:49  Contacted [facility]. They will accept pt if pt not on TPN.

## 2022-04-14 NOTE — PROGRESS NOTES
Progress Note    Subjective:      Obi Payton   Patient sitting up in chair today. Feeling better more awake and alert and has been started on a full liquid diet. I have spoken to infectious disease and we have stopped the Zyvox and started Dificid for presumed clostridial enteritis. He also received Lasix with an excellent response. He remains on TPN.   Objective:     /76   Pulse 118   Temp 98 °F (36.7 °C) (Oral)   Resp 17   Ht 5' 9.02\" (1.753 m)   Wt 196 lb 10.4 oz (89.2 kg)   SpO2 97%   BMI 29.03 kg/m²     In: 200 [P.O.:200]  Out: 2825 [Urine:2325]         General: Awake and alert  Abdomen: Soft less distended ileostomy functioning  Lungs: Clear  Other: NA    Labs:   CBC:   Lab Results   Component Value Date    WBC 12.7 04/14/2022    RBC 2.44 04/14/2022    RBC 5.19 08/18/2017    HGB 8.2 04/14/2022    HCT 26.5 04/14/2022    .6 04/14/2022    MCH 33.6 04/14/2022    MCHC 30.9 04/14/2022    RDW 18.3 04/14/2022     04/14/2022    MPV 10.3 04/14/2022        Assessment:     Clinically improved     Plan:   Continue TPN  We will advance diet as tolerated  Continue present antibiotic regimen  Atrial fibrillation controlled  We will continue daily Lasix to diurese the patient

## 2022-04-14 NOTE — PLAN OF CARE
symptoms  4/14/2022 1936 by Neela Marley RN  Outcome: Ongoing  4/14/2022 0856 by Fartun Pham  Outcome: Ongoing     Problem:  Bowel/Gastric:  Goal: Gastrointestinal status for postoperative course will improve  Description: Gastrointestinal status for postoperative course will improve  4/14/2022 1936 by Neela Marley RN  Outcome: Ongoing  4/14/2022 0856 by Fartun Pham  Outcome: Ongoing     Problem: Infection - Surgical Site:  Goal: Will show no infection signs and symptoms  Description: Will show no infection signs and symptoms  4/14/2022 1936 by Neela Marley RN  Outcome: Ongoing  4/14/2022 0856 by Fartun Pham  Outcome: Ongoing     Problem: Fluid Volume:  Goal: Ability to achieve a balanced intake and output will improve  Description: Ability to achieve a balanced intake and output will improve  4/14/2022 1936 by Neela Marley RN  Outcome: Ongoing  4/14/2022 0856 by Fartun Pham  Outcome: Ongoing     Problem: Nutritional:  Goal: Ability to attain and maintain optimal nutritional status will improve  Description: Ability to attain and maintain optimal nutritional status will improve  4/14/2022 1936 by Neela Marley RN  Outcome: Ongoing  4/14/2022 0856 by Fartun Pham  Outcome: Ongoing     Problem: Physical Regulation:  Goal: Postoperative complications will be avoided or minimized  Description: Postoperative complications will be avoided or minimized  4/14/2022 1936 by Neela Marley RN  Outcome: Ongoing  4/14/2022 0856 by Fartun Pham  Outcome: Ongoing     Problem: Sensory:  Goal: Pain level will decrease  Description: Pain level will decrease  4/14/2022 1936 by Neela Marley RN  Outcome: Ongoing  4/14/2022 0856 by Fartun Pham  Outcome: Ongoing     Problem: Nutrition  Goal: Optimal nutrition therapy  4/14/2022 1936 by Neela Marley RN  Outcome: Ongoing  4/14/2022 0856 by Fartun Pham  Outcome: Ongoing

## 2022-04-14 NOTE — PLAN OF CARE
Problem: Falls - Risk of:  Goal: Will remain free from falls  Description: Will remain free from falls  4/14/2022 0856 by Dank Chinchilla  Outcome: Ongoing  4/13/2022 2152 by Demetrio Waller RN  Outcome: Ongoing  Goal: Absence of physical injury  Description: Absence of physical injury  4/14/2022 0856 by Dank Chinchilla  Outcome: Ongoing  4/13/2022 2152 by Demetrio Waller RN  Outcome: Ongoing     Problem: Pain:  Goal: Pain level will decrease  Description: Pain level will decrease  4/14/2022 0856 by Dank Chinchilla  Outcome: Ongoing  4/13/2022 2152 by Demetrio Waller RN  Outcome: Ongoing  Goal: Control of acute pain  Description: Control of acute pain  4/14/2022 0856 by Dank Chinchilla  Outcome: Ongoing  4/13/2022 2152 by Demetrio Waller RN  Outcome: Ongoing  Goal: Control of chronic pain  Description: Control of chronic pain  4/14/2022 0856 by Dank Chinchilla  Outcome: Ongoing  4/13/2022 2152 by Demetrio Waller RN  Outcome: Ongoing     Problem: Skin Integrity:  Goal: Will show no infection signs and symptoms  Description: Will show no infection signs and symptoms  4/14/2022 0856 by Dank Chinchilla  Outcome: Ongoing  4/13/2022 2152 by Demetrio Waller RN  Outcome: Ongoing  Goal: Absence of new skin breakdown  Description: Absence of new skin breakdown  4/14/2022 0856 by Dank Chinchilla  Outcome: Ongoing  4/13/2022 2152 by Demetrio Waller RN  Outcome: Ongoing  Goal: Signs of wound healing will improve  Description: Signs of wound healing will improve  4/14/2022 0856 by Dank Chinchilla  Outcome: Ongoing  4/13/2022 2152 by Demetrio Waller RN  Outcome: Ongoing  Goal: Risk for impaired skin integrity will decrease  Description: Risk for impaired skin integrity will decrease  4/14/2022 0856 by Dank Chinchilla  Outcome: Ongoing  4/13/2022 2152 by Demetrio Waller RN  Outcome: Ongoing     Problem: Infection - Surgical Site:  Goal: Will show no infection signs and symptoms  Description: Will show no infection signs and symptoms  4/14/2022 0856 by Andrey Shields  Outcome: Ongoing  4/13/2022 2152 by Katerina Key RN  Outcome: Ongoing     Problem:  Bowel/Gastric:  Goal: Gastrointestinal status for postoperative course will improve  Description: Gastrointestinal status for postoperative course will improve  4/14/2022 0856 by Andrey Shields  Outcome: Ongoing  4/13/2022 2152 by Katerina Key RN  Outcome: Ongoing     Problem: Fluid Volume:  Goal: Ability to achieve a balanced intake and output will improve  Description: Ability to achieve a balanced intake and output will improve  4/14/2022 0856 by Andrey Shields  Outcome: Ongoing  4/13/2022 2152 by Katerina Key RN  Outcome: Ongoing     Problem: Nutritional:  Goal: Ability to attain and maintain optimal nutritional status will improve  Description: Ability to attain and maintain optimal nutritional status will improve  4/14/2022 0856 by Andrey Shields  Outcome: Ongoing  4/13/2022 2152 by Katerina Key RN  Outcome: Ongoing     Problem: Physical Regulation:  Goal: Postoperative complications will be avoided or minimized  Description: Postoperative complications will be avoided or minimized  4/14/2022 0856 by Andrey Shields  Outcome: Ongoing  4/13/2022 2152 by Katerina Key RN  Outcome: Ongoing     Problem: Sensory:  Goal: Pain level will decrease  Description: Pain level will decrease  4/14/2022 0856 by Andrey Shields  Outcome: Ongoing  4/13/2022 2152 by Katerina Key RN  Outcome: Ongoing     Problem: Nutrition  Goal: Optimal nutrition therapy  4/14/2022 0856 by Andrey Shields  Outcome: Ongoing  4/13/2022 2152 by Katerina Key RN  Outcome: Ongoing

## 2022-04-14 NOTE — PROGRESS NOTES
Occupational Therapy  . Occupational Therapy Treatment Note      Name: Ye Lara MRN: [id number] :   1929   Date:  2022   Admission Date: 2022 Room:  -A     Primary Problem:      Restrictions/Precautions:  Restrictions/Precautions  Restrictions/Precautions: General Precautions,Fall Risk     Communication with other providers:  cotx with PTA Ronna Cid for assist and safety. Nurse ravindra states patient is ok to see. Infectious disease NP in, they will be putting paten on Contact precautions for C diff. Subjective:  Patient states:  you're  Rushing me  Pain: no numerical rating but c/o pain during activity/moveoment.  (location, type, intensity)    Objective:    Observation:  Patient asleep. easily aroused but falls asleep often and needs vcs/tcs to arouse. Objective Measures:  Tele, RA, catheter    Treatment, including education:    ADL activity training was instructed today. Cues were given for safety, sequence, UE/LE placement, visual cues, and balance. Activities performed today included  and grooming. Facial hygiene- placed wash cloth in patiens hand, max cues to initiate including tcs/vcs. Attempted HHA to put patients hand to face, patient declining stating \" youre rushing me\" DEP to wash face. Therapeutic activity training was instructed today. Cues were given for safety, sequence, UE/LE placement, awareness, and balance. Activities performed today included bed mobility training, sup-sit, sit-stand, SPT. Supine to EOB- max x2 via log roll with max cues to use BLE and BUEs. Scooting hips forwards- Max A   Sitting balance-inially Max x2 progressed to Max A once B feet on ground. Stand to FWW-Max x2- x2 trials. 1st trial- patient with Max retro and L lateral lean. Max cues for erect posture. Patient needed to sit back to EOB Max A. 2nd trial patient more erect. TCS for hand placement.    Patient demo taking x5-6 steps to recliner- Max x2 with max cues for sequencing   Sit to recliner-  Max x2 for safety. Patient educated on role of OT , benefits of OT and rationale for therapeutic intervention. All therapeutic intervention performed c emphasis on dynamic balance / standing tolerance to increase strength, endurance and activity tolerance for increased [place] c ADL tasks and func transfers / mobility. Patient left safely in bedside chair at end of session, with call light in reach, alarm on and nursing aware. Gait belt was used for func transfers / mobility. Assessment / Impression:    Patient's tolerance of treatment: fair  Adverse Reaction: fatigue  Significant change in status and impact:  none  Barriers to improvement: weakness, lethargy.        Plan for Next Session:    Continue with OT POC      Time in:  835  Time out:  900  Timed treatment minutes:  25  Total treatment time:  25      Electronically signed by:    Randolph Hammans, OTA, COTA/L [id number]    4/14/2022, 9:08 AM

## 2022-04-14 NOTE — PROGRESS NOTES
Vince Mendez BSN RN clinical  for [facility] SURGICAL [facility] RN students 07-19:00 this date.

## 2022-04-14 NOTE — PROGRESS NOTES
Progress Note    Subjective:      Reji Larsen   I am covering for Dr. Ghulam Carlson and familiar with the patient. The medical physician called me because family was concerned about the patient having fever and some increased abdominal distention. I came to the hospital to evaluate the patient who did recognize me and was sitting up in a chair. He has had no vomiting. I reviewed his abdominal x-ray and previous CAT scan last weekend which are all consistent with an ileus and not obstruction. He does have bile coming out of the ileostomy. The patient presently is on Zyvox. He had a successful thoracentesis today for reactive pleural effusions. Objective:     /78   Pulse 114   Temp 99.1 °F (37.3 °C)   Resp 25   Ht 5' 9.02\" (1.753 m)   Wt 196 lb 10.4 oz (89.2 kg)   SpO2 96%   BMI 29.03 kg/m²     In: -   Out: 1975 [Urine:1500]         General: Sitting up in a chair aware he is in a hospital and oriented to person  Abdomen: Distended soft minimally tender stoma pink bile in ileostomy bag staples intact  Lungs: Clear  Other: Weaver catheter in place    Labs:   CBC:   Lab Results   Component Value Date    WBC 16.2 04/13/2022    RBC 2.43 04/13/2022    RBC 5.19 08/18/2017    HGB 8.2 04/13/2022    HCT 26.2 04/13/2022    .8 04/13/2022    MCH 33.7 04/13/2022    MCHC 31.3 04/13/2022    RDW 18.5 04/13/2022     04/13/2022    MPV 10.3 04/13/2022        Assessment:     27-year-old male status post colectomy and ileostomy for pseudomembranous colitis  Persistent enteritis  Postoperative ileus  Protein calorie malnutrition  Atrial fibrillation     Plan:   I spent time with the family answering questions and explaining the plan of care  I also spoke to Dr. Shaunna Pickett to see the patient and render an opinion whether or not he should be treated for C. difficile despite the fact this has never been cultured.   Also wish for infectious disease to evaluate the need for vancomycin or no antibiotics at all at this time.  Patient is in positive fluid balance  We will give daily Lasix  To new TPN  Atrial fibrillation being managed by cardiology  I will be covering for Dr. Dominguez Madden for the next 10 days.

## 2022-04-14 NOTE — PROGRESS NOTES
Transferred patient from chair to bed with this RN and another RN. Pt is very weak and needs x2 assistance. Pt currently still in Afib RVR, however the rate is between 100-130. He will periodically drop into the 80's/90s. Pt is resting with no signs of distress and call light in reach. Will continue to monitor closely. Pt has family and would like to be notified about any change in status or worsening of s/s. Travis Hart [phone].

## 2022-04-14 NOTE — PROGRESS NOTES
CARDIOLOGY  NOTE        Name:  Elver Cordoba /Age/Sex: 1929  (80 y.o. male)   MRN & CSN:  [id number] & [id number] Admission Date/Time: 2022 11:02 AM   Location:  -A PCP: Minor Burciaga, 29 [facility] [place] Day: 13        PLAN FROM CARDIOLOGY FOR TODAY:   Heart rate was high last evening hence additional dose of dig were given at present the heart rate is better    - cardiology consult is for: Atrial fibrillation    -  Interval history: Heart rate is better after giving additional dose of digoxin    · ASSESSMENT/ PLAN:  1. Atrial fibrillation heart rate is not controlled , additional digoxin given  2. Last echo had mild aortic stenosis that was last year  3. Normal Cardiolite last year  4. Abdominal pain patient is feeling better              Subjective: Todays complain: Patient better oriented at this time    HPI:  Kathleen Ibarra is a 80 y. o.year old who and presents with had concerns including GI Problem (dark stool, colostomy, on eliquis). Chief Complaint   Patient presents with    GI Problem     dark stool, colostomy, on eliquis           Objective: Temperature:  Current - Temp: 98.4 °F (36.9 °C);  Max - Temp  Av.3 °F (36.8 °C)  Min: 97.8 °F (36.6 °C)  Max: 99.1 °F (37.3 °C)    Respiratory Rate : Resp  Av.4  Min: 18  Max: 36    Pulse Range: Pulse  Av.8  Min: 84  Max: 151    Blood Presuure Range:  Systolic (55ARO), XVW:282 , Min:122 , KAV:416   ; Diastolic (34ULI), JHC:62, Min:69, Max:111      Pulse ox Range: SpO2  Av.7 %  Min: 83 %  Max: 100 %    24hr I & O:      Intake/Output Summary (Last 24 hours) at 2022 0622  Last data filed at 2022 0500  Gross per 24 hour   Intake --   Output 2825 ml   Net -2825 ml         BP (!) 145/93   Pulse 103   Temp 98.4 °F (36.9 °C) (Oral)   Resp 18   Ht 5' 9.02\" (1.753 m)   Wt 196 lb 10.4 oz (89.2 kg)   SpO2 97%   BMI 29.03 kg/m²           Review of Systems: Abdominal pain    TELEMETRY: Atrial fibrillation    has a past medical history of AAA (abdominal aortic aneurysm) ([facility] [address] MARIELOS (acute kidney injury) ([facility] [address] Angina, class I ([facility] [address] Benign prostatic hyperplasia, Bowel obstruction ([address] CAD (coronary artery disease), Cancer ([address] CCC (chronic calculous cholecystitis), Delirium, Gastroesophageal reflux disease without esophagitis, Hx of cardiovascular stress test, Hypertension, On total parenteral nutrition, Partial small bowel obstruction ([address] Primary malignant neoplasm of rectum ([facility] [address] Probable Bacterial Pneumonia, Psoriasis, Psoriatic arthritis ([facility] [address] Pyelonephritis, Severe protein-calorie malnutrition ([facility] [address] Stable angina ([facility] [address] and Unspecified cerebral artery occlusion with cerebral infarction. has a past surgical history that includes Appendectomy; colostomy; Upper gastrointestinal endoscopy (N/A, 2/28/2022); Colonoscopy (N/A, 3/2/2022); and laparotomy (N/A, 4/3/2022). Physical Exam:  General: Alert  Head:normal  Eye: Pupils equal and round  Neck:  No JVD, no carotid bruit noted   Chest:  Clear to auscultation, no signs of respiratory distress  Cardiovascular:  Normal rate and rhythm. S1 and S2 noted.  No murmurs rubs or gallops  Abdomen:   nontender  Extremities:  tr edema  Pulses; palpable  Neuro: grossly normal    Medications:    furosemide  20 mg IntraVENous Daily    cholestyramine light  1 packet Oral BID    linezolid  600 mg IntraVENous Q12H    lactobacillus  1 capsule Oral Daily    nystatin  5 mL Oral 4x Daily    dilTIAZem  30 mg Oral 2 times per day    metoprolol  2.5 mg IntraVENous Q6H    enoxaparin  70 mg SubCUTAneous BID    fat emulsion  250 mL IntraVENous Once per day on Mon Tue Thu Fri    insulin lispro  0-6 Units SubCUTAneous TID WC    insulin lispro  0-3 Units SubCUTAneous Nightly    [Held by provider] apixaban  2.5 mg Oral BID    [Held by provider] aspirin  81 mg Oral Daily    sodium chloride flush  5-40 mL IntraVENous 2 times

## 2022-04-15 NOTE — PROGRESS NOTES
Nephrology Progress Note  4/15/2022 11:43 AM  Subjective: Interval History: Georgina Parker is a 80 y.o. male   seen in bed this morning somewhat more tired  Data:   Scheduled Meds:   digoxin  125 mcg IntraVENous Daily    metoprolol tartrate  25 mg Oral BID    calcium gluconate  2,000 mg IntraVENous Once    Followed by   Hall calcium gluconate-NaCl  1,000 mg IntraVENous Once    dilTIAZem  60 mg Oral 2 times per day    Fidaxomicin  200 mg Oral BID    cholestyramine light  1 packet Oral BID    lactobacillus  1 capsule Oral Daily    nystatin  5 mL Oral 4x Daily    fat emulsion  250 mL IntraVENous Once per day on Mon Tue Thu Fri    insulin lispro  0-6 Units SubCUTAneous TID WC    insulin lispro  0-3 Units SubCUTAneous Nightly    apixaban  2.5 mg Oral BID    aspirin  81 mg Oral Daily    sodium chloride flush  5-40 mL IntraVENous 2 times per day    pantoprazole  40 mg IntraVENous Daily     Continuous Infusions:   PN-Adult Premix 4.25/10 - Peripheral Line      PN-Adult Premix 4.25/10 - Peripheral Line 75 mL/hr at 04/14/22 1641    dextrose           CBC   Recent Labs     04/13/22  0415 04/14/22  0426 04/15/22  0425   WBC 16.2* 12.7* 11.9*   HGB 8.2* 8.2* 8.1*   HCT 26.2* 26.5* 26.3*    368 390      BMP   Recent Labs     04/13/22  0415 04/14/22  0426 04/15/22  0425   * 130* 130*   K 4.4 3.9 3.8    100 100   CO2 18* 19* 20*   PHOS 2.7 3.2 3.5   BUN 34* 33* 29*   CREATININE 1.0 1.1 1.1     Hepatic:   No results for input(s): AST, ALT, ALB, BILITOT, ALKPHOS in the last 72 hours. Troponin: No results for input(s): TROPONINI in the last 72 hours. BNP: No results for input(s): BNP in the last 72 hours. Lipids: No results for input(s): CHOL, HDL in the last 72 hours. Invalid input(s): LDLCALCU  ABGs: No results found for: PHART, PO2ART, YMM8HZB  INR:   No results for input(s): INR in the last 72 hours.   Renal Labs  Albumin:    Lab Results   Component Value Date    LABALBU 2.2 04/11/2022 Calcium:    Lab Results   Component Value Date    CALCIUM 7.5 04/15/2022     Phosphorus:    Lab Results   Component Value Date    PHOS 3.5 04/15/2022     U/A:    Lab Results   Component Value Date    NITRU POSITIVE 04/02/2022    COLORU YELLOW 04/02/2022    WBCUA 11 04/02/2022    RBCUA 1 04/02/2022    MUCUS FEW 04/02/2022    TRICHOMONAS NONE SEEN 03/20/2022    BACTERIA OCCASIONAL 04/02/2022    CLARITYU CLEAR 04/02/2022    SPECGRAV 1.015 04/02/2022    UROBILINOGEN 0.2 04/02/2022    BILIRUBINUR NEGATIVE 04/02/2022    BLOODU MODERATE 04/02/2022    KETUA SMALL 04/02/2022     ABG:  No results found for: PHART, KAP2TRK, PO2ART, RZF4WRZ, BEART, THGBART, LLU3RZB, R9BHSNOU  HgBA1c:  No results found for: LABA1C  Microalbumen/Creatinine ratio:  No components found for: RUCREAT  TSH:  No results found for: TSH  IRON:    Lab Results   Component Value Date    IRON 35 03/01/2022     Iron Saturation:  No components found for: PERCENTFE  TIBC:    Lab Results   Component Value Date    TIBC 280 03/01/2022     FERRITIN:    Lab Results   Component Value Date    FERRITIN 210 03/01/2022     RPR:  No results found for: RPR  SAQIB:  No results found for: ANATITER, SAQIB  24 Hour Urine for Creatinine Clearance:  No components found for: CREAT4, UHRS10, UTV10      Objective:   I/O: 04/14 0701 - 04/15 0700  In: 5798.8 [P.O.:820; I.V.:1232.3]  Out: 9092 [Urine:4700]  I/O last 3 completed shifts: In: 5798.8 [P.O.:820; I.V.:1232.3]  Out: 7025 [Urine:6200; Stool:825]  I/O this shift:  In: -   Out: 950 [Urine:900; Stool:50]  Vitals: BP (!) 145/104   Pulse 104   Temp 98.4 °F (36.9 °C) (Oral)   Resp 20   Ht 5' 9.02\" (1.753 m)   Wt 196 lb 10.4 oz (89.2 kg)   SpO2 96%   BMI 29.03 kg/m²  {  General appearance: awake weak  HEENT: Head: Normal, normocephalic, atraumatic.   Neck: supple, symmetrical, trachea midline  Lungs: diminished breath sounds bilaterally  Heart: S1, S2 normal  Abdomen: abnormal findings:  soft mildly tender  Extremities: edema trace  Neurologic: Mental status: alertness: Arousable weak      Assessment and Plan:      IMP:   #1 small bowel obstruction status post surgery   #2 acute renal failure from ATN   #3 possible colitis with leukocytosis   #4 anemia   #5 A. fib rapid rate   #6 mild shortness of breath with pleural effusion    Plan      #1 post surgery monitor for now   #2 creatinine 1.1 holding stable   #3 maintain antibiotics look for any new infection   #4 hemoglobin was stable at 8.1   #5 heart rate holding stable monitor to do physical therapy and rehab holding about 104 now   #6 monitor for recurrent effusions and fluid and had thoracentesis   supportive care give time to recover we will monitor         Michell Yan MD, MD

## 2022-04-15 NOTE — PLAN OF CARE
Problem: Falls - Risk of:  Goal: Will remain free from falls  Description: Will remain free from falls  Outcome: Met This Shift     Problem: Pain:  Goal: Pain level will decrease  Description: Pain level will decrease  Outcome: Met This Shift  Goal: Control of acute pain  Description: Control of acute pain  Outcome: Met This Shift     Problem: Skin Integrity:  Goal: Will show no infection signs and symptoms  Description: Will show no infection signs and symptoms  Outcome: Met This Shift  Goal: Absence of new skin breakdown  Description: Absence of new skin breakdown  Outcome: Met This Shift

## 2022-04-15 NOTE — PROGRESS NOTES
CARDIOLOGY  NOTE        Name:  Rose Ya /Age/Sex: 1929  (80 y.o. male)   MRN & CSN:  [id number] & [id number] Admission Date/Time: 2022 11:02 AM   Location:  -A PCP: Nawaf García MD       Hospital Day: 14        PLAN FROM CARDIOLOGY FOR TODAY:   Continue with IV beta-blocker and dig once the heart rate is controlled and patient can resume oral can switch to oral meds, present it appears that he is not absorbing the oral Cardizem which is 60 twice daily hence we have added higher dose of beta-blocker IV at more frequency level, that can be switched to oral in  to see if it works, can start with 25 mg p.o. twice daily, will switch him today patient can be monitored and please inform us if the heart rate is not controlled  I will decrease the dig      - cardiology consult is for: Atrial fibrillation    -  Interval history: Heart rate is better     · ASSESSMENT/ PLAN:  1. Atrial fibrillation heart rate is better in 80s and 90s continue with IV Lopressor to oral once oral intake starts we will decrease the dose of dig  2. Last echo had mild aortic stenosis that was last year  3. Normal Cardiolite last year  4. Abdominal pain patient is feeling better  5. We will follow up as needed              Subjective: Todays complain: Patient no pain    HPI:  Francesco Hurley is a 80 y. o.year old who and presents with had concerns including GI Problem (dark stool, colostomy, on eliquis). Chief Complaint   Patient presents with    GI Problem     dark stool, colostomy, on eliquis           Objective: Temperature:  Current - Temp: 98.1 °F (36.7 °C);  Max - Temp  Av.3 °F (36.8 °C)  Min: 97.9 °F (36.6 °C)  Max: 99 °F (37.2 °C)    Respiratory Rate : Resp  Av.6  Min: 16  Max: 31    Pulse Range: Pulse  Av.9  Min: 80  Max: 137    Blood Presuure Range:  Systolic (38TSE), QUE:599 , Min:108 , EMA:505   ; Diastolic (96PTM), XOK:61, Min:63, Max:114      Pulse ox Range: SpO2  Av.6 %  Min: 92 %  Max: 100 %    24hr I & O:      Intake/Output Summary (Last 24 hours) at 4/15/2022 8098  Last data filed at 4/15/2022 0355  Gross per 24 hour   Intake 5798.84 ml   Output 4625 ml   Net 1173.84 ml         BP (!) 131/114   Pulse 103   Temp 98.1 °F (36.7 °C) (Oral)   Resp 20   Ht 5' 9.02\" (1.753 m)   Wt 196 lb 10.4 oz (89.2 kg)   SpO2 98%   BMI 29.03 kg/m²           Review of Systems:    No pain    TELEMETRY: Atrial fibrillation    has a past medical history of AAA (abdominal aortic aneurysm) ([address] MARIELOS (acute kidney injury) ([address] Angina, class I ([address] Benign prostatic hyperplasia, Bowel obstruction ([address] CAD (coronary artery disease), Cancer ([address] CCC (chronic calculous cholecystitis), Delirium, Gastroesophageal reflux disease without esophagitis, Hx of cardiovascular stress test, Hypertension, On total parenteral nutrition, Partial small bowel obstruction ([address] Primary malignant neoplasm of rectum ([address] Probable Bacterial Pneumonia, Psoriasis, Psoriatic arthritis ([address] Pyelonephritis, Severe protein-calorie malnutrition ([address] Stable angina ([address] and Unspecified cerebral artery occlusion with cerebral infarction. has a past surgical history that includes Appendectomy; colostomy; Upper gastrointestinal endoscopy (N/A, 2022); Colonoscopy (N/A, 3/2/2022); and laparotomy (N/A, 4/3/2022). Physical Exam:  General: Alert  Head:normal  Eye: Pupils equal and round  Neck:  No JVD, no carotid bruit noted   Chest:  Clear to auscultation, no signs of respiratory distress  Cardiovascular:  Normal rate and rhythm. S1 and S2 noted.  No murmurs rubs or gallops  Abdomen:   nontender  Extremities:  tr edema  Pulses; palpable  Neuro: grossly normal    Medications:    dilTIAZem  60 mg Oral 2 times per day    digoxin  250 mcg IntraVENous Daily    Fidaxomicin  200 mg Oral BID    metoprolol  5 mg IntraVENous Q4H    furosemide  20 mg IntraVENous Daily    cholestyramine light  1 packet Oral BID    lactobacillus  1 capsule Oral Daily    nystatin  5 mL Oral 4x Daily    fat emulsion  250 mL IntraVENous Once per day on Mon Tue Thu Fri    insulin lispro  0-6 Units SubCUTAneous TID     insulin lispro  0-3 Units SubCUTAneous Nightly    apixaban  2.5 mg Oral BID    aspirin  81 mg Oral Daily    sodium chloride flush  5-40 mL IntraVENous 2 times per day    pantoprazole  40 mg IntraVENous Daily      PN-Adult Premix 4.25/10 - Peripheral Line 75 mL/hr at 04/14/22 1641    dextrose       HYDROmorphone, HYDROcodone-acetaminophen, sodium chloride flush, Acetaminophen, glucose, glucagon (rDNA), dextrose, dextrose bolus (hypoglycemia) **OR** dextrose bolus (hypoglycemia), sodium chloride flush, ondansetron **OR** ondansetron    Lab Data:  CBC:   Recent Labs     04/13/22  0415 04/14/22  0426 04/15/22  0425   WBC 16.2* 12.7* 11.9*   HGB 8.2* 8.2* 8.1*   HCT 26.2* 26.5* 26.3*   .8* 108.6* 108.2*    368 390     BMP:   Recent Labs     04/13/22  0415 04/14/22  0426 04/15/22  0425   * 130* 130*   K 4.4 3.9 3.8    100 100   CO2 18* 19* 20*   PHOS 2.7 3.2 3.5   BUN 34* 33* 29*   CREATININE 1.0 1.1 1.1     LIVER PROFILE:   No results for input(s): AST, ALT, LIPASE, BILIDIR, BILITOT, ALKPHOS in the last 72 hours. Invalid input(s): AMYLASE,  ALB  PT/INR: No results for input(s): PROTIME, INR in the last 72 hours. APTT: No results for input(s): APTT in the last 72 hours. BNP:  No results for input(s): BNP in the last 72 hours. TROPONIN: No results for input(s): TROPONINI in the last 72 hours. No results for input(s): TROPONINT in the last 72 hours.      Labs, consult, tests reviewed                    Miya Raza MD, PA-C 4/15/2022 6:42 AM     Please note this report has been partially produced using speech recognition software and may contain errors related to that system including errors in grammar, punctuation, and spelling, as well as words and phrases that may be inappropriate. If there are any questions or concerns please feel free to contact the dictating provider for clarification.

## 2022-04-15 NOTE — PROGRESS NOTES
Patient out of ICU VSS No fever  Up in chair and clinically improved  Has mild confusion  Advance to regular diet  Continue to diureses  Continue TPN  Responding to dificid

## 2022-04-15 NOTE — CARE COORDINATION
Referral received for ARU. Patient is not medically stable at this time for transfer to ARU. He is currently presenting as \"too low level\" and doubtful to be able to tolerate the extensive therapy program offered in ARU. Will follow for progress with therapy and medical stability.

## 2022-04-15 NOTE — PLAN OF CARE
Nutrition Problem #1: Inadequate oral intake  Intervention: Food and/or Nutrient Delivery: Continue Current Diet,Start Oral Nutrition Supplement,Continue Current Parenteral Nutrition  Nutritional Goals: Pt will meet greater than 75% of estimated nutrient needs via PN

## 2022-04-15 NOTE — PROGRESS NOTES
Comprehensive Nutrition Assessment    Type and Reason for Visit:  Reassess    Nutrition Recommendations/Plan:   Continue current EN regimen as ordered  Encourage po intake as tolerated  Add standard oral nutrition supplement TID  Please document all po intake  Will closely monitor po intake, weight trends, poc    Nutrition Assessment:  diet advanced to regular diet today, PN infusing @ 75 ml/hr, weight loss since admission, will continue to follow at high nutrition risk    Malnutrition Assessment:  Malnutrition Status:  Insufficient data    Context:  Acute Illness       Estimated Daily Nutrient Needs:  Energy (kcal):  7055-6454 (23-26 kcal/kg); Weight Used for Energy Requirements:  Current     Protein (g):  78-94 (1.0-1.2 g/kg); Weight Used for Protein Requirements:  Current        Fluid (ml/day):  3873-3909; Method Used for Fluid Requirements:  1 ml/kcal      Nutrition Related Findings:  Na 130, H/H: 8.1/26.3      Wounds:  Surgical Incision       Current Nutrition Therapies:    PN-Adult Premix 4.25/10 - Peripheral Line  PN-Adult Premix 4.25/10 - Peripheral Line  ADULT DIET; Regular  Current Parenteral Nutrition Orders:  · Type and Formula:  (Clinimix 4.25/10)   · Lipids: 250ml (four times weekly)  · Duration: Continuous  · Rate/Volume:   · Current PN Order Provides: ~1206 kcal (average lipid and non lipid days) and 77 g protein    Anthropometric Measures:  · Height: 5' 9.02\" (175.3 cm)  · Current Body Weight: 196 lb 10.4 oz (89.2 kg)   · Admission Body Weight: 207 lb 0.2 oz (93.9 kg) (first measured weight)    · Usual Body Weight: 187 lb (84.8 kg)     · Ideal Body Weight: 160 lbs; % Ideal Body Weight 122.9 %   · BMI: 29  · BMI Categories: Overweight (BMI 25.0-29. 9)       Nutrition Diagnosis:   · Inadequate oral intake related to altered GI structure,altered GI function as evidenced by NPO or clear liquid status due to medical condition,nutrition support - parenteral nutrition    Nutrition Interventions: Food and/or Nutrient Delivery:  Continue Current Diet,Start Oral Nutrition Supplement,Continue Current Parenteral Nutrition  Nutrition Education/Counseling:  No recommendation at this time   Coordination of Nutrition Care:  Continue to monitor while inpatient    Goals:  Pt will meet greater than 75% of estimated nutrient needs via PN       Nutrition Monitoring and Evaluation:   Behavioral-Environmental Outcomes:  None Identified   Food/Nutrient Intake Outcomes:  Parenteral Nutrition Intake/Tolerance,Food and Nutrient Intake,Supplement Intake  Physical Signs/Symptoms Outcomes:  Biochemical Data,GI Status,Hemodynamic Status,Fluid Status or Edema,Weight,Skin     Discharge Planning:     Too soon to determine     Electronically signed by Chuck Arce MS, RD, LD on 4/15/22 at 4:31 PM EDT    Contact: [phone]

## 2022-04-15 NOTE — CARE COORDINATION
CM in to see Pt to follow up on discharge planning. Pt daughter present requesting referral to ARU. Referral given to [facility]/ARU. If Pt not appropriate for ARU, discharge plan will remain [facility].       CM following for needs

## 2022-04-15 NOTE — PROGRESS NOTES
Occupational Therapy  . Occupational Therapy Treatment Note      Name: Christelle Campbell MRN: [id number] :   1929   Date:  4/15/2022   Admission Date: 2022 Room:  3124/3124-A     Primary Problem:      Restrictions/Precautions:  Restrictions/Precautions  Restrictions/Precautions: General Precautions,Fall Risk     Communication with other providers:  cotx with PTA Venkatesh Daunt for assist and safety. RN notified on patients colostomy leaking    Subjective:  Patient states:  im cold  Pain: none stated but cried out during movement.  (location, type, intensity)    Objective:    Observation:  Patient in semi fowlers. Asleep. Hard to keep aroused. patients colostomy bag was leaking. Nurse in to change bag. Objective Measures:  Tele, IV    Treatment, including education:    ADL activity training was instructed today. Cues were given for safety, sequence, UE/LE placement, visual cues, and balance. Activities performed today included dressing, toileting, hand hygiene, and grooming. toiletng- DEP for leaking colostomy bag. Robe change- DEP  UB/LB bathing- DEP    Therapeutic activity training was instructed today. Cues were given for safety, sequence, UE/LE placement, awareness, and balance. Activities performed today included bed mobility training, sup-sit, sit-stand, SPT. Rolling- DEP x2. Patient very stiff and resistive to movement. Supine to EOB- via log roll- Heavy Max x2 with max cues to participate. Sitting balance- Min-Mod A. Stand to [facility]- 1st attempt unsuccessful. 2nd trial-Max x2 with heavy L lateral lean. Max cues for correction. patient able to take 4-5 steps to  with Max x2 plus max cues for sequencing. Patient attempting to sit too soon. Max x2 for correction. Patient educated on role of OT , benefits of OT and rationale for therapeutic intervention.      All therapeutic intervention performed c emphasis on dynamic balance / standing tolerance to increase strength, endurance and activity tolerance for increased [place] c ADL tasks and func transfers / mobility. Patient left safely in bedside chair at end of session, with call light in reach, alarm on and nursing aware. Gait belt was used for func transfers / mobility.       Assessment / Impression:    Patient's tolerance of treatment: fair  Adverse Reaction: none  Significant change in status and impact:  none  Barriers to improvement: weakness      Plan for Next Session:    Continue with OT POC      Time in:  1042  Time out:  1122  Timed treatment minutes:  40  Total treatment time:  40      Electronically signed by:    Randolph Hammans, OTA, COTA/L [id number]    4/15/2022, 1:12 PM

## 2022-04-15 NOTE — PROGRESS NOTES
TraNSFER  REPORT CALLED TO  Torey Polk . Patient traNSFERED PER BED TO ROOM 3124. pATIENT ON MONITOR. rOOM AIR. AWAKE, DENIES PAIN AT THIS TIME.  Daughter  Kirk Gibson called update and informed of transfer to [phone]

## 2022-04-15 NOTE — PROGRESS NOTES
Received pt from ICU. Transferred to bed without incident. Side rails x2 to assist with mobility. Call light, low bed position and bed alarm turned on.

## 2022-04-15 NOTE — PROGRESS NOTES
Nurse was about to assist pt with breakfast but refused breakfastt.  Encouraged x2 but with no success

## 2022-04-15 NOTE — PROGRESS NOTES
Physical Therapy  Name: Natan Turk MRN: [id number] :   1929   Date:  4/15/2022   Admission Date: 2022 Room:  [address] [place] [address]   Restrictions/Precautions:  Restrictions/Precautions  Restrictions/Precautions: General Precautions,Fall Risk     abd surgery  Communication with other providers:  Luis Kumar RN states pt is ok to see for therapy. Co-treat with OT  Subjective:  Patient states:  Give me the blanket I'm cold  Pain:   Location, Type, Intensity (0/10 to 10/10):  Did not quantify  Objective:    Observation:  Pt was in bed resting with a leaking colostomy, reported this to nursing, RN states it is ok to empty ir and he will get wound care to change it  Treatment, including education/measures:  Emptied 50 cc out of colostomy bag and nursing reinforced the site of leakage  Supine Exercises: pt not very agreeable to movement today  Ankle pumps x 10  Heel slides x1  Therapeutic Exercise:  Therapeutic exercises were instructed today. Cues were given for technique, safety, recruitment, and rationale. Cues were verbal and/or tactile. Transfers with line management of IV, tele, silveira  Rolling: dependant a of 2  Supine to sit :max A of 2  Scooting :max A of 2  Pt sat EOB with max A of 1 for 7 min  Sit to stand :max a of 2  Pt stood at RW and was heavily leaning to the R. Worked on widening SHAY with little results  Stand to sit :max a of 2  Gait:  Pt amb with RW for3 ft with max A of 2 pt began to prematurely sit and needed dependant of 2 for safe pivit to chair  Pt needed VC's for the need for moving  Pt did LAQ's x 10 with mod assist, pt just wanted to close his eyes and not participate  Safety  Patient left safely in the chair, with call light/phone in reach with alarm applied. Gait belt was used for transfers and gait.   Assessment / Impression:     Patient's tolerance of treatment:  Fair, pt was sleepy but cooperative   Adverse Reaction: none  Significant change in status and impact:  none  Barriers to improvement:  weakness,   Plan for Next Session:    Will cont to work towards pt's goals per his tolerance  Time in:  1045  Time out: 1125  Timed treatment minutes:  40  Total treatment time:  40  Previously filed items:     Short term goals  Time Frame for Short term goals: 1 week  Short term goal 1: pt to complete all bed mobility mod A x1  Short term goal 2: Pt to complete all STS transfers to/from bed, commode, and chair max A  Short term goal 3: Pt to complete stand pivot transfer with LRAD max A     Electronically signed by:     Mateo Hanson PTA  4/15/2022, 11:22 AM

## 2022-04-15 NOTE — CARE COORDINATION
Cm received call from Vitor at [facility] advising that they continue to not have a bed available for pt. Will plan for placement at [facility] upon discharge.

## 2022-04-15 NOTE — PROGRESS NOTES
Hospitalist Progress Note    Patient:  Dmitriy Reed  Unit/Bed:2104/2104-A   YOB: 1929       MRN: [id number] Acct: [de-identified]  PCP: Cintia De Los Santos MD    Date of Admission: 4/2/2022  --------------------------    Chief Complaint:     Abdominal pain, nausea vomiting, diarrhea    Hospital Course/interval history:     Dmitriy Reed is a 80 y.o. male hospitalized on 4/2/2022 who presented with abdominal pain, nausea, vomiting, patient has prior history of small bowel obstructions, remote h/o colon cancer status post resection and colostomy. There was concern for ischemic colitis - patient evaluated by surgery service, underwent exploratory laparotomy, subtotal colon resection, end ileostomy, small bowel resection, mobilization of the splenic flexure for colitis with acute abdomen. His hospitalization course has been complicated by circulatory shock, sepsis, UTI, atrial fibrillation with rapid ventricular response, MARIELOS and pneumonia. Assessment/plan:     Severe colitis with sepsis status post exploratory laparotomy, colon resection, small bowel resection with end ileostomy  · Biopsy results showed pseudomembranous colitis. · Sepsis has resolved. · CT Abdo done 4/10 for worsening abdo pain and distension - findings consistent with small bowel enteritis. No e/o abscess. Moderate bilateral pleural effusions. AXR done 4/13/22 with slightly increased small bowel dilatation most consistent with ileus. General surgery following. · -ID have stopped linezolid and started fidaxomicin 4/14 due to strong suspicion for C. Diff infection. PCT, WBC are on a downward trend. CRP remains elevated. Urine culture 4/8 - no growth. Blood cultures 4/11 no growth x 72h. Fungitell 4/8 negative. C Diff toxin sent 4/11 negative however this sample was sent from the ileostomy post colon resection. · Currently on TPN and started full liquid diet 4/14. · Pain control as needed. Lowest effective doses.   · Overall he is improving. Bilateral pleural effusions  -S/p b/l thoracenteses on 4/13. Fluid cultures no bacterial growth to date    Possible fluid overload  Intake 5700ml. Urine output 4700ml over the last 24h, Ileostomy output 625ml. Continue lasix as ordered by general surgery. Acute metabolic encephalopathy in the setting of infection as above. -Improving. Acute kidney injury in the setting of sepsis as above. Multifactorial, resolved    Pseudomonas UTI   S/p zosyn. Repeat urine culture 4/8 - no growth    Atrial fibrillation with rapid ventricular response  -RVR has resolved. Continue IV digoxin, diltiazem 60q12. Started on po metoprolol 25mg q12h. Cardiology is following.   -Eliquis restarted    Mild hyponatremia  Daily BMP. Nephrology following. H/o Essential thrombocythemia  -previously on hydroxyurea. Currently not on meds. Will continue follow up with hematology as outpatient. Anemia  -Hb 8.1. Daily CBC. Transfuse if needed to maintain Hb>7g/dL. Oral thrush  Resolved. Comorbid conditions (AAA, BPH, history of rectal cancer 2019 status post colostomy, psoriatic arthritis, immunocompromise prior CVA)    Code Status: Full Code     DVT prophylaxis: Apixaban      Disposition:   - SNF once medically ready for discharge.      ----------------    Subjective:     Feels \"pretty good today\". Denies complaints. Diet: ADULT DIET; Full Liquid  PN-Adult Premix 4.25/10 - Peripheral Line    OBJECTIVE     Exam:  BP (!) 131/114   Pulse 83   Temp 98.1 °F (36.7 °C) (Oral)   Resp 21   Ht 5' 9.02\" (1.753 m)   Wt 196 lb 10.4 oz (89.2 kg)   SpO2 96%   BMI 29.03 kg/m²     Gen: Not in distress. Awake and alert. Answers questions appropriately  Head: Normocephalic. Atraumatic. Eyes: Conjunctivae/corneas clear. ENT:  No e/o thrush  Neck: No JVD. No obvious thyromegaly. , Right IJ CVC in place.    CVS: S1S2 irregular rhythm, regular rate, no murmur,  Pulmomary: Breath sounds improved post thoracentesis, no crackles or rhonchi  Gastrointestinal: mildly distended, soft, non-tender to palpation, bowel sounds are present, staples noted in the mid abdomen, ileostomy with dark green liquid output, no stool. Musculoskeletal: No edema. Warm  Neuro: Easily arousable no focal deficit. Moves extremity spontaneously. Psychiatry: Mood appropriate.     Medications:  Reviewed    Infusion Medications    PN-Adult Premix 4.25/10 - Peripheral Line 75 mL/hr at 04/14/22 1641    dextrose       Scheduled Medications    digoxin  125 mcg IntraVENous Daily    dilTIAZem  60 mg Oral 2 times per day    Fidaxomicin  200 mg Oral BID    metoprolol  5 mg IntraVENous Q4H    furosemide  20 mg IntraVENous Daily    cholestyramine light  1 packet Oral BID    lactobacillus  1 capsule Oral Daily    nystatin  5 mL Oral 4x Daily    fat emulsion  250 mL IntraVENous Once per day on Mon Tue Thu Fri    insulin lispro  0-6 Units SubCUTAneous TID WC    insulin lispro  0-3 Units SubCUTAneous Nightly    apixaban  2.5 mg Oral BID    aspirin  81 mg Oral Daily    sodium chloride flush  5-40 mL IntraVENous 2 times per day    pantoprazole  40 mg IntraVENous Daily     PRN Meds: HYDROmorphone, HYDROcodone-acetaminophen, sodium chloride flush, Acetaminophen, glucose, glucagon (rDNA), dextrose, dextrose bolus (hypoglycemia) **OR** dextrose bolus (hypoglycemia), sodium chloride flush, ondansetron **OR** ondansetron      Intake/Output Summary (Last 24 hours) at 4/15/2022 0748  Last data filed at 4/15/2022 0600  Gross per 24 hour   Intake 5798.84 ml   Output 5325 ml   Net 473.84 ml             Labs:   Recent Labs     04/13/22  0415 04/14/22  0426 04/15/22  0425   WBC 16.2* 12.7* 11.9*   HGB 8.2* 8.2* 8.1*   HCT 26.2* 26.5* 26.3*    368 390     Recent Labs     04/13/22  0415 04/14/22  0426 04/15/22  0425   * 130* 130*   K 4.4 3.9 3.8    100 100   CO2 18* 19* 20*   BUN 34* 33* 29*   CREATININE 1.0 1.1 1.1   CALCIUM 7.6* 7.4* 7.5*   PHOS 2.7 3.2 3.5     No results for input(s): AST, ALT, BILIDIR, BILITOT, ALKPHOS in the last 72 hours. No results for input(s): INR in the last 72 hours. No results for input(s): Joyice Somers in the last 72 hours. Urinalysis:      Lab Results   Component Value Date    NITRU POSITIVE 04/02/2022    WBCUA 11 04/02/2022    BACTERIA OCCASIONAL 04/02/2022    RBCUA 1 04/02/2022    BLOODU MODERATE 04/02/2022    SPECGRAV 1.015 04/02/2022       Radiology:  XR ABDOMEN (KUB) (SINGLE AP VIEW)   Final Result   Increased small bowel dilatation to suggest small bowel obstruction, ileus or   enteritis. XR CHEST PORTABLE   Final Result   Interval decrease in size of bilateral pleural effusion status post   thoracentesis, with improved aeration at the lung bases. No visualized   pneumothorax. CT ABDOMEN PELVIS W IV CONTRAST Additional Contrast? None   Final Result   No mechanical obstructive process however inflammatory findings of small   bowel with mucosal hyperenhancement and wall thickening throughout the   distended small bowel loops to the right lower quadrant ileostomy with small   volume abdominopelvic ascites most consistent with enteritis. No obvious   abscess formation. Moderate bilateral pleural effusions along with body wall   edema. XR CHEST PORTABLE   Final Result   Unchanged moderate left pleural effusion bibasilar atelectasis. No evidence   of pulmonary edema. XR ABDOMEN FOR NG/OG/NE TUBE PLACEMENT   Final Result   Nasogastric tube terminates in the stomach         XR ABDOMEN FOR NG/OG/NE TUBE PLACEMENT   Final Result   An NG tube tip is present with the tip coiled within the fundus of the   stomach. The proximal port is in the fundus of the stomach. Consider   advancement of the tube. XR ABDOMEN (KUB) (SINGLE AP VIEW)   Final Result   The patient is status post surgery.   There is decreased but continued mild   distension of the small bowel could represent partial obstruction or ileus. Calcification of the abdominal aorta consistent with a known aneurysm. The NG tube tip is in the fundus of the stomach. The proximal port is in the   distal esophagus near the GE junction. Consider advancement of the tube. XR CHEST PORTABLE   Final Result   Low lung volumes with persistent bibasilar airspace disease. Trace left pleural effusion. XR ABDOMEN (KUB) (SINGLE AP VIEW)   Final Result   1. Nasogastric tube has been advanced with the tip over the antrum. 2.  New skin staples are present at the abdomen and pelvis. 3.  Residual gas dilatation of multiple small bowel loops. There may be a   small amount of postsurgical pneumoperitoneum. 4.  See the prior CT scan for evaluation of the infrarenal aortic aneurysm. XR CHEST PORTABLE   Final Result   Right line placement as above. Bilateral small areas of pneumonia         XR ABDOMEN (KUB) (SINGLE AP VIEW)   Final Result   Multiple air-filled distended loops of small bowel stacked in the mid abdomen   suggesting a low-grade partial small bowel obstruction or focal ileus. Atherosclerotic calcification of an abdominal aortic aneurysm as seen on   prior CT. NG tube in the stomach with proximal port at the level of the GE junction. XR CHEST PORTABLE   Final Result   Enteric tube tip is noted within the stomach with the side port likely at or   just distal to the GE junction. XR CHEST PORTABLE   Final Result   Trace left pleural effusion with adjacent atelectasis. CT ABDOMEN PELVIS W IV CONTRAST Additional Contrast? None   Final Result   Interval development of severe circumferential wall thickening and   pericolonic inflammatory changes of the transverse colon and splenic flexure   of the colon just proximal to the left lower quadrant colostomy with   increased fluid-filled distension of the proximal colon and small bowel.    Findings suggest acute infectious/inflammatory colitis with proximal partial   obstruction versus ileus. Stable infrarenal abdominal aortic aneurysm measuring 5.5 cm in diameter. See follow-up recommendations below. Stable hepatic cysts and bilateral renal cortical cysts. Cardiomegaly with bilateral pleural effusions suggesting mild CHF. RECOMMENDATIONS:   5.5 cm infrarenal abdominal aortic aneurysm. Recommend referral to a vascular   specialist.      Reference: J [facility] Radiol [id number].             IR GUIDED THORACENTESIS PLEURAL    (Results Pending)       Electronically signed by Lavon Banks MD on 4/15/2022 at 7:48 AM

## 2022-04-16 NOTE — PLAN OF CARE
Problem: Falls - Risk of:  Goal: Will remain free from falls  Description: Will remain free from falls  4/16/2022 0117 by Kaitlin Alaniz RN  Outcome: Met This Shift  4/15/2022 1229 by Geo Flores RN  Outcome: Met This Shift  Goal: Absence of physical injury  Description: Absence of physical injury  4/16/2022 0117 by Kaitlin Alaniz RN  Outcome: Met This Shift  4/15/2022 1229 by Geo Flores RN  Outcome: Met This Shift     Problem: Pain:  Goal: Pain level will decrease  Description: Pain level will decrease  4/16/2022 0117 by Kaitlin Alaniz RN  Outcome: Met This Shift  4/15/2022 1229 by Geo Flores RN  Outcome: Met This Shift  Goal: Control of acute pain  Description: Control of acute pain  4/16/2022 0117 by Kaitlin Alaniz RN  Outcome: Met This Shift  4/15/2022 1229 by Geo Flores RN  Outcome: Met This Shift  Goal: Control of chronic pain  Description: Control of chronic pain  Outcome: Met This Shift     Problem: Skin Integrity:  Goal: Will show no infection signs and symptoms  Description: Will show no infection signs and symptoms  4/16/2022 0117 by Kaitlin Alaniz RN  Outcome: Met This Shift  4/15/2022 1229 by Geo Flores RN  Outcome: Met This Shift  Goal: Absence of new skin breakdown  Description: Absence of new skin breakdown  4/16/2022 0117 by Kaitlin Alaniz RN  Outcome: Met This Shift  4/15/2022 1229 by Geo Flores RN  Outcome: Met This Shift  Goal: Signs of wound healing will improve  Description: Signs of wound healing will improve  Outcome: Met This Shift  Goal: Risk for impaired skin integrity will decrease  Description: Risk for impaired skin integrity will decrease  Outcome: Met This Shift     Problem: Infection - Surgical Site:  Goal: Will show no infection signs and symptoms  Description: Will show no infection signs and symptoms  4/16/2022 0117 by Kaitlin Alaniz RN  Outcome: Met This Shift  4/15/2022 1229 by Geo Flores RN  Outcome: Met This Shift     Problem: Bowel/Gastric:  Goal: Gastrointestinal status for postoperative course will improve  Description: Gastrointestinal status for postoperative course will improve  Outcome: Met This Shift     Problem: Fluid Volume:  Goal: Ability to achieve a balanced intake and output will improve  Description: Ability to achieve a balanced intake and output will improve  Outcome: Met This Shift     Problem: Nutritional:  Goal: Ability to attain and maintain optimal nutritional status will improve  Description: Ability to attain and maintain optimal nutritional status will improve  Outcome: Met This Shift     Problem: Physical Regulation:  Goal: Postoperative complications will be avoided or minimized  Description: Postoperative complications will be avoided or minimized  Outcome: Met This Shift     Problem: Sensory:  Goal: Pain level will decrease  Description: Pain level will decrease  4/16/2022 0117 by Radha Goff RN  Outcome: Met This Shift  4/15/2022 1229 by Titus Flores RN  Outcome: Met This Shift     Problem: Nutrition  Goal: Optimal nutrition therapy  4/16/2022 0117 by Radha Goff RN  Outcome: Met This Shift  4/15/2022 1633 by Rayna Goldmann, MS, RD, LD  Outcome: Ongoing

## 2022-04-16 NOTE — CARE COORDINATION
CM reviewed chart. Not medically ready, ARU still has TPN. Full liquid diet was started 4/14 now on regular diet.

## 2022-04-16 NOTE — PROGRESS NOTES
Hospitalist Progress Note      Name:  Feliciano Sherwood /Age/Sex: 1929  (80 y.o. male)   MRN & CSN:  [id number] & [id number] Admission Date/Time: 2022 11:02 AM   [facility]  [facility] PCP: Vito Vega MD         Hospital Day: 15    Assessment and Plan:       80 y.o. male hospitalized on 2022 who presented with abdominal pain, nausea, vomiting, patient has prior history of small bowel obstructions, remote h/o colon cancer status post resection and colostomy. There was concern for ischemic colitis - patient evaluated by surgery service, underwent exploratory laparotomy, subtotal colon resection, end ileostomy, small bowel resection, mobilization of the splenic flexure for colitis with acute abdomen. His hospitalization course has been complicated by circulatory shock, sepsis, UTI, atrial fibrillation with rapid ventricular response, MARIELOS and pneumonia.     Assessment/plan:      Severe colitis with sepsis status post exploratory laparotomy, colon resection, small bowel resection with end ileostomy  · Biopsy results showed pseudomembranous colitis. · Sepsis has resolved. · CT Abdo done 4/10 for worsening abdo pain and distension - findings consistent with small bowel enteritis. No e/o abscess. Moderate bilateral pleural effusions. AXR done 22 with slightly increased small bowel dilatation most consistent with ileus. General surgery following. · -ID have stopped linezolid and started fidaxomicin  due to strong suspicion for C. Diff infection. PCT, WBC are on a downward trend. CRP remains elevated. Urine culture  - no growth. Blood cultures  no growth x 72h. Fungitell  negative. C Diff toxin sent  negative however this sample was sent from the ileostomy post colon resection. · Currently on TPN and started full liquid diet . · Pain control as needed. Lowest effective doses. · On regular diet     Bilateral pleural effusions  -S/p b/l thoracenteses on .  Fluid cultures no bacterial growth to date  Patient feels short of breath today however saturating well 94% on room air  Order chest x-ray     Possible fluid overload  Intake 5700ml. Urine output 4700ml over the last 24h, Ileostomy output 625ml. Continue lasix  Iv as ordered by general surgery. Patient has bilateral pitting edema of legs    Acute metabolic encephalopathy in the setting of infection as above.   -Alert awake today     Acute kidney injury in the setting of sepsis as above. Multifactorial, resolved     Pseudomonas UTI   S/p zosyn. Repeat urine culture 4/8 - no growth     Atrial fibrillation with rapid ventricular response  -RVR has resolved. Continue  Digoxin switch to oral, diltiazem 60q12. Started on po metoprolol 25mg q12h. Cardiology is following.   -Eliquis restarted     Mild hyponatremia  Daily BMP. Nephrology following.      H/o Essential thrombocythemia  -previously on hydroxyurea. Currently not on meds. Will continue follow up with hematology as outpatient.      Anemia  -Hb 8.2 stable. Daily CBC. Transfuse if needed to maintain Hb>7g/dL.     Oral thrush  Resolved.       Comorbid conditions (AAA, BPH, history of rectal cancer 2019 status post colostomy, psoriatic arthritis, immunocompromise prior CVA)     Code Status: Full Code      DVT prophylaxis: Apixaban      Disposition:     - SNF once medically ready for discharge. Diet PN-Adult Premix 4.25/10 - Peripheral Line  ADULT DIET; Regular  ADULT ORAL NUTRITION SUPPLEMENT; Breakfast, Lunch, Dinner; Standard High Calorie/High Protein Oral Supplement   DVT Prophylaxis [] Lovenox, []  Heparin, [] SCDs, [] Ambulation   GI Prophylaxis [x] PPI,  [] H2 Blocker,  [] Carafate,  [] Diet/Tube Feeds   Code Status Full Code   Disposition Patient requires continued admission due to    MDM [] Low, [] Moderate,[]  High  Patient's risk as above due to      History of Present Illness:    The patient was seen and examined at the bedside  Patient reports shortness of breath no chest pain currently saturating well on room air 94%  Order chest x-ray  Discussed with the surgery    Objective: Intake/Output Summary (Last 24 hours) at 4/16/2022 0835  Last data filed at 4/16/2022 0653  Gross per 24 hour   Intake 3377.44 ml   Output 2820 ml   Net 557.44 ml      Vitals:   Vitals:    04/16/22 0400   BP: 136/68   Pulse: 74   Resp: 17   Temp: 98.6 °F (37 °C)   SpO2: 95%     Physical Exam:   GEN Awake.  Alert , not in respiratory distress, not in pain  HEENT: PEERLA, , supple neck,   Chest: air entry equal bilaterally, no wheezing or crepitation  Heart: S1 and S2 heard, no murmur, no gallop or rub, regular rate  Abdomen: soft, ND , tender , laparotomy midline incision and colostomy bag , +BS  Extremities: no cyanosis, tenderness or erythema, peripheral pulses audible   +2 pitting edema of both legs   Neurology: alert, awake , able to move 4 limbs    Medications:   Medications:    loperamide  4 mg Oral BID    furosemide  20 mg IntraVENous Daily    pantoprazole  40 mg Oral QAM AC    digoxin  125 mcg Oral Daily    metoprolol tartrate  25 mg Oral BID    dilTIAZem  60 mg Oral 2 times per day    Fidaxomicin  200 mg Oral BID    lactobacillus  1 capsule Oral Daily    nystatin  5 mL Oral 4x Daily    fat emulsion  250 mL IntraVENous Once per day on Mon Tue Thu Fri    insulin lispro  0-6 Units SubCUTAneous TID WC    insulin lispro  0-3 Units SubCUTAneous Nightly    apixaban  2.5 mg Oral BID    aspirin  81 mg Oral Daily    sodium chloride flush  5-40 mL IntraVENous 2 times per day      Infusions:    PN-Adult Premix 4.25/10 - Peripheral Line 75 mL/hr at 04/16/22 0653    dextrose       PRN Meds: HYDROmorphone, 0.25 mg, Q4H PRN  HYDROcodone-acetaminophen, 5 mL, Q4H PRN  sodium chloride flush, 10 mL, PRN  Acetaminophen, 650 mg, Q6H PRN  glucose, 15 g, PRN  glucagon (rDNA), 1 mg, PRN  dextrose, 100 mL/hr, PRN  dextrose bolus (hypoglycemia), 125 mL, PRN   Or  dextrose bolus (hypoglycemia), 250 mL, PRN  sodium chloride flush, 5-40 mL, PRN  ondansetron, 4 mg, Q8H PRN   Or  ondansetron, 4 mg, Q6H PRN          Electronically signed by Pete Cruz MD on 4/16/2022 at 8:35 AM

## 2022-04-16 NOTE — PROGRESS NOTES
Infectious Disease Progress Note  2022   Patient Name: Karin Rodriguez : 1929   Impression  · Acute Abdomen with Colitis:     · Probable C.dif Enteritis:     ? Complicated Pseudomonas aeruginosa UTI:     § Afebrile  § Leukocytosis trending down  § -Urine culture: Pseudomonas aeruginosa >100,000  § -UA WBC 11, RBC 1  § -BC 0/2-NGTD  § -BC 0/2-NGTD  § -Cdif negative  § -Urine Culture: NGTD  § -Fungitell: negative  § -CT A&P W IV Contrast: see full report below. Findings suggest actue infectious/inflammatory colitis with proximal partial obstruction vs ileus. § 4/3-S/p per Dr. Meg Kendall: Exploratory lap, subtotal colon resection, End ileostomy, removal of colostomy, small bowel resection, mobilization of splenic flexure, central line placement. DX:  Colitis and acute abdomen. Perineal Cultures: [facility] Dr. Sathish Andersen, GI, onboard  § 4/10-CT A&P W IV Contrast: no mechanical obstructive process however inflammatory findings of SB with mucosal hyper-enhancement and wall thickening throughout the distended SB loops to the RLQ ileostomy with small volume abdominopelvic ascites most consistent with enteritis. No obvious abscess formation. Moderate bilateral pleural effusions along with body wall edema. § Dr. Meg Kendall -added culturelle due to patient without colon but has enteritis of SB  § -S/p per IR Dr. Gurmeet Goodwin, Bilateral Thoracentesis: Right 520 cc clear dashawn pleural fluid removed, Left 800 cc clear yellow pleural fluid removed. Culture: Pending     · AF with RVR, on AC:  § Dr. Loretta Summers onboard     ? MARIELOS from ATN:  § Dr. Darvin Tompkins onboard     ? Delirium     ? AAA     ? BPH     ?  Chronic Supra pubic Catheter     ? CAD     ? Rectal Cancer 2019, Colostomy:     ? Psoriatic Arthritis     ? CVA 2019     ? Allergy to sulfa     · Multi-morbidity: per PMHx: AAA, BPH, CAD, rectal cancer 2019, GERD, HTN, psoriatic arthritis, pyelonephritis, CVA 2019     Plan:    ?  po Dificid 200 mg po bid x 10 days (end date 4/23/22). ? Ordered C.dif PCR 4/14   ? Trend CRP and Pct, trending down, repeat in am  ? Await pleural fluid cultures from 4/13  ? Following, afebrile, leukocytosis, CRP and Pct on downward trend. Patient appears clinically improving. Will continue empiric ABX therapy. Ongoing Antimicrobial Therapy  Dificid 4/14-  Completed Antimicrobial Therapy  Cefepime 4/2? Metronidazole 4/2-8  Zosyn 4/4-8  Vancomycin 4/2-8  Eraxis 4/8-12  Meropenem 4/8-12? Po vancomycin 4/10-12  Zyvox 4/12-14  ? History:? Interval history noted. Chief complaint: Acute abdomen with colitis, complicated Pseudomonas aeruginosa UTI, persistent leukocytosis. Denies n/v/d/f or untoward effects of antibiotics. Physical Exam:  Vital Signs: BP (!) 141/88   Pulse 92   Temp 98.6 °F (37 °C) (Axillary)   Resp 24   Ht 5' 9.02\" (1.753 m)   Wt 181 lb 11.2 oz (82.4 kg)   SpO2 95%   BMI 26.82 kg/m²     Gen: Resting quietly in bed,  Wounds: C/D/I midabdominal wound with staples intact, well approximated, no drainage or erythema. Chest: no distress and CTA. Good air movement. Room air. Heart: Afib and no MRG. Abd: soft, non-distended, no hepatomegaly. Normoactive bowel sounds. Ext: no clubbing, cyanosis, or edema  Catheter Site: without erythema or tenderness draining clear yellow urine. Ileostomy intact: RLQ draining brown liquid stool   Neuro:  CN 2-12 intact and no focal sensory or motor deficits        Radiologic / Imaging / TESTING  4/2/22 CT Abdomen Pelvis W IV Contrast:  Impression   Interval development of severe circumferential wall thickening and   pericolonic inflammatory changes of the transverse colon and splenic flexure   of the colon just proximal to the left lower quadrant colostomy with   increased fluid-filled distension of the proximal colon and small bowel.    Findings suggest acute infectious/inflammatory colitis with proximal partial   obstruction versus ileus.       Stable infrarenal abdominal aortic aneurysm measuring 5.5 cm in diameter. See follow-up recommendations below.       Stable hepatic cysts and bilateral renal cortical cysts.       Cardiomegaly with bilateral pleural effusions suggesting mild CHF.       RECOMMENDATIONS:   5.5 cm infrarenal abdominal aortic aneurysm. Recommend referral to a vascular   specialist.       Reference: Vinay Argueta Radiol [id number].      4/2/22 XR Chest Portable:  Impression   Trace left pleural effusion with adjacent atelectasis.      4/2/22 XR Chest Portable:  Impression   Enteric tube tip is noted within the stomach with the side port likely at or   just distal to the GE junction.          4/3/22 XR Abdomen:  Impression   Multiple air-filled distended loops of small bowel stacked in the mid abdomen   suggesting a low-grade partial small bowel obstruction or focal ileus.       Atherosclerotic calcification of an abdominal aortic aneurysm as seen on   prior CT.       NG tube in the stomach with proximal port at the level of the GE junction.      4/3/22 XR Chest Portable:  Impression   Right line placement as above.  Bilateral small areas of pneumonia      4/4/22 XR Abdomen:  Impression   1.  Nasogastric tube has been advanced with the tip over the antrum.       2.  New skin staples are present at the abdomen and pelvis.       3.  Residual gas dilatation of multiple small bowel loops.  There may be a   small amount of postsurgical pneumoperitoneum.       4.  See the prior CT scan for evaluation of the infrarenal aortic aneurysm.      4/5/22 XR Chest Portable:  Impression   Low lung volumes with persistent bibasilar airspace disease.       Trace left pleural effusion.      4/7/22 XR Abdomen:  Impression   The patient is status post surgery.  There is decreased but continued mild   distension of the small bowel could represent partial obstruction or ileus.       Calcification of the abdominal aorta consistent with a known aneurysm.       The NG tube tip is in the fundus of the stomach.  The proximal port is in the   distal esophagus near the GE junction.  Consider advancement of the tube. 4/8/22 XR Chest Portable:  Impression   Unchanged moderate left pleural effusion bibasilar atelectasis.  No evidence   of pulmonary edema. 4/10/22 CT Abdomen Pelvis W IV Contrast:  Impression   No mechanical obstructive process however inflammatory findings of small   bowel with mucosal hyperenhancement and wall thickening throughout the   distended small bowel loops to the right lower quadrant ileostomy with small   volume abdominopelvic ascites most consistent with enteritis.  No obvious   abscess formation.  Moderate bilateral pleural effusions along with body wall   edema. 4/13/22 XR Chest Portable:  Impression   Interval decrease in size of bilateral pleural effusion status post   thoracentesis, with improved aeration at the lung bases.  No visualized   pneumothorax. 4/13/22 XR Abdomen:  Impression   Increased small bowel dilatation to suggest small bowel obstruction, ileus or   enteritis.           Labs:    Recent Results (from the past 24 hour(s))   POCT Glucose    Collection Time: 04/15/22  5:19 PM   Result Value Ref Range    POC Glucose 113 (H) 70 - 99 MG/DL   POCT Glucose    Collection Time: 04/15/22  7:39 PM   Result Value Ref Range    POC Glucose 129 (H) 70 - 99 MG/DL   POCT Glucose    Collection Time: 04/15/22  7:47 PM   Result Value Ref Range    POC Glucose 130 (H) 70 - 99 MG/DL   POCT Glucose    Collection Time: 04/16/22  1:27 AM   Result Value Ref Range    POC Glucose 114 (H) 70 - 99 MG/DL   CBC with Auto Differential    Collection Time: 04/16/22  6:25 AM   Result Value Ref Range    WBC 12.3 (H) 4.0 - 10.5 K/CU MM    RBC 2.46 (L) 4.6 - 6.2 M/CU MM    Hemoglobin 8.2 (L) 13.5 - 18.0 GM/DL    Hematocrit 26.7 (L) 42 - 52 %    .5 (H) 78 - 100 FL    MCH 33.3 (H) 27 - 31 PG    MCHC 30.7 (L) 32.0 - 36.0 %    RDW 17.9 (H) 11.7 - 14.9 %    Platelets 655 661 - 359 K/CU MM    MPV 10.3 7.5 - 11.1 FL    Differential Type AUTOMATED DIFFERENTIAL     Segs Relative 83.7 (H) 36 - 66 %    Lymphocytes % 4.5 (L) 24 - 44 %    Monocytes % 8.9 (H) 0 - 4 %    Eosinophils % 1.1 0 - 3 %    Basophils % 0.2 0 - 1 %    Segs Absolute 10.3 K/CU MM    Lymphocytes Absolute 0.6 K/CU MM    Monocytes Absolute 1.1 K/CU MM    Eosinophils Absolute 0.1 K/CU MM    Basophils Absolute 0.0 K/CU MM    Nucleated RBC % 0.0 %    Total Nucleated RBC 0.0 K/CU MM    Total Immature Neutrophil 0.20 K/CU MM    Immature Neutrophil % 1.6 (H) 0 - 0.43 %   Basic Metabolic Panel    Collection Time: 04/16/22  6:25 AM   Result Value Ref Range    Sodium 132 (L) 135 - 145 MMOL/L    Potassium 3.8 3.5 - 5.1 MMOL/L    Chloride 100 99 - 110 mMol/L    CO2 18 (L) 21 - 32 MMOL/L    Anion Gap 14 4 - 16    BUN 27 (H) 6 - 23 MG/DL    CREATININE 1.1 0.9 - 1.3 MG/DL    Glucose 107 (H) 70 - 99 MG/DL    Calcium 7.4 (L) 8.3 - 10.6 MG/DL    GFR Non-African American >60 >60 mL/min/1.73m2    GFR African American >60 >60 mL/min/1.73m2   Magnesium    Collection Time: 04/16/22  6:25 AM   Result Value Ref Range    Magnesium 2.0 1.8 - 2.4 mg/dl   Phosphorus    Collection Time: 04/16/22  6:25 AM   Result Value Ref Range    Phosphorus 3.8 2.5 - 4.9 MG/DL   Procalcitonin    Collection Time: 04/16/22  6:25 AM   Result Value Ref Range    Procalcitonin 0.163    C-Reactive Protein    Collection Time: 04/16/22  6:25 AM   Result Value Ref Range    CRP, High Sensitivity 119.5 mg/L   POCT Glucose    Collection Time: 04/16/22 12:04 PM   Result Value Ref Range    POC Glucose 129 (H) 70 - 99 MG/DL     CULTURE results: Invalid input(s): BLOOD CULTURE,  URINE CULTURE, SURGICAL CULTURE    Diagnosis:  Patient Active Problem List   Diagnosis    Hypertension    Benign prostatic hyperplasia    Psoriatic arthritis ([address]    Primary malignant neoplasm of rectum (HCC)    Psoriasis    Chronic myeloproliferative disease (HCC)    Monocytosis (symptomatic)    Gastroesophageal reflux disease without esophagitis    Urinary retention    H/O: CVA (cerebrovascular accident)    Irregular heart beat    Nonrheumatic aortic valve stenosis    Paroxysmal atrial fibrillation (HCC)    GI bleed    Melena    Arteriovenous malformation of jejunum    History of rectal cancer    Benign neoplasm of cecum    Benign neoplasm of ascending colon    Pseudomonas urinary tract infection    Partial small bowel obstruction (HCC)    SBO (small bowel obstruction) (HCC)    Atrial fibrillation with RVR (HCC)    Hypotension    Ischemic colitis (HCC)    Hypocalcemia    MARIELOS (acute kidney injury) ([address]    Probable Bacterial Pneumonia    CAD (coronary artery disease)    Anemia    Delirium    Severe protein-calorie malnutrition (HCC)    On total parenteral nutrition    Colitis       Active Problems  Principal Problem:    SBO (small bowel obstruction) (HCC)  Active Problems:    Gastroesophageal reflux disease without esophagitis    Pseudomonas urinary tract infection    Atrial fibrillation with RVR (HCC)    Hypotension    Ischemic colitis (HCC)    Hypocalcemia    MARIELOS (acute kidney injury) ([address]    Probable Bacterial Pneumonia    CAD (coronary artery disease)    Anemia    Delirium    Severe protein-calorie malnutrition (HCC)    On total parenteral nutrition    Colitis  Resolved Problems:    * No resolved hospital problems.  *    Electronically signed by: Electronically signed by Melene Olszewski, MD on 4/16/2022 at 3:43 PM

## 2022-04-16 NOTE — PLAN OF CARE
Problem: Skin Integrity:  Goal: Absence of new skin breakdown  Description: Absence of new skin breakdown  4/16/2022 1339 by Dex Pineda RN  Outcome: Met This Shift  4/16/2022 0117 by Willy Timmons RN  Outcome: Met This Shift   Abd incisions no s/s of infection or drainage staples intact

## 2022-04-16 NOTE — PROGRESS NOTES
Nephrology Progress Note  4/16/2022 9:17 AM  Subjective: Interval History: Christelle Campbell is a 80 y.o. male overall doing okay resting in bed wanting to take a bath today  Data:   Scheduled Meds:   loperamide  4 mg Oral BID    furosemide  20 mg IntraVENous Daily    pantoprazole  40 mg Oral QAM AC    digoxin  125 mcg Oral Daily    metoprolol tartrate  25 mg Oral BID    dilTIAZem  60 mg Oral 2 times per day    Fidaxomicin  200 mg Oral BID    lactobacillus  1 capsule Oral Daily    nystatin  5 mL Oral 4x Daily    fat emulsion  250 mL IntraVENous Once per day on Mon Tue Thu Fri    insulin lispro  0-6 Units SubCUTAneous TID WC    insulin lispro  0-3 Units SubCUTAneous Nightly    apixaban  2.5 mg Oral BID    aspirin  81 mg Oral Daily    sodium chloride flush  5-40 mL IntraVENous 2 times per day     Continuous Infusions:   PN-Adult Premix 4.25/10 - Peripheral Line 75 mL/hr at 04/16/22 0653    dextrose           CBC   Recent Labs     04/14/22  0426 04/15/22  0425 04/16/22  0625   WBC 12.7* 11.9* 12.3*   HGB 8.2* 8.1* 8.2*   HCT 26.5* 26.3* 26.7*    390 404      BMP   Recent Labs     04/14/22  0426 04/15/22  0425   * 130*   K 3.9 3.8    100   CO2 19* 20*   PHOS 3.2 3.5   BUN 33* 29*   CREATININE 1.1 1.1     Hepatic:   No results for input(s): AST, ALT, ALB, BILITOT, ALKPHOS in the last 72 hours. Troponin: No results for input(s): TROPONINI in the last 72 hours. BNP: No results for input(s): BNP in the last 72 hours. Lipids: No results for input(s): CHOL, HDL in the last 72 hours. Invalid input(s): LDLCALCU  ABGs: No results found for: PHART, PO2ART, UMP0XMG  INR:   No results for input(s): INR in the last 72 hours.   Renal Labs  Albumin:    Lab Results   Component Value Date    LABALBU 2.2 04/11/2022     Calcium:    Lab Results   Component Value Date    CALCIUM 7.5 04/15/2022     Phosphorus:    Lab Results   Component Value Date    PHOS 3.5 04/15/2022     U/A:    Lab Results Component Value Date    NITRU POSITIVE 04/02/2022    COLORU YELLOW 04/02/2022    WBCUA 11 04/02/2022    RBCUA 1 04/02/2022    MUCUS FEW 04/02/2022    TRICHOMONAS NONE SEEN 03/20/2022    BACTERIA OCCASIONAL 04/02/2022    CLARITYU CLEAR 04/02/2022    SPECGRAV 1.015 04/02/2022    UROBILINOGEN 0.2 04/02/2022    BILIRUBINUR NEGATIVE 04/02/2022    BLOODU MODERATE 04/02/2022    KETUA SMALL 04/02/2022     ABG:  No results found for: PHART, GQX9XUN, PO2ART, MVK5RVG, BEART, THGBART, UQQ9TKY, V5YTZFCT  HgBA1c:  No results found for: LABA1C  Microalbumen/Creatinine ratio:  No components found for: RUCREAT  TSH:  No results found for: TSH  IRON:    Lab Results   Component Value Date    IRON 35 03/01/2022     Iron Saturation:  No components found for: PERCENTFE  TIBC:    Lab Results   Component Value Date    TIBC 280 03/01/2022     FERRITIN:    Lab Results   Component Value Date    FERRITIN 210 03/01/2022     RPR:  No results found for: RPR  SAQIB:  No results found for: ANATITER, SAQIB  24 Hour Urine for Creatinine Clearance:  No components found for: CREAT4, UHRS10, UTV10      Objective:   I/O: 04/15 0701 - 04/16 0700  In: 3377.4 [P.O.:80; I.V.:20]  Out: 2820 [Urine:2450]  I/O last 3 completed shifts: In: 3377.4 [P.O.:80; I.V.:20; IV Piggyback:125.8]  Out: 5601 [YIZJO:9585; Stool:670]  No intake/output data recorded. Vitals: /68   Pulse 74   Temp 98.6 °F (37 °C) (Axillary)   Resp 17   Ht 5' 9.02\" (1.753 m)   Wt 181 lb 11.2 oz (82.4 kg)   SpO2 95%   BMI 26.82 kg/m²  {  General appearance: awake weak  HEENT: Head: Normal, normocephalic, atraumatic.   Neck: supple, symmetrical, trachea midline  Lungs: diminished breath sounds bilaterally  Heart: S1, S2 normal  Abdomen: abnormal findings:  soft mildly tender  Extremities: edema trace  Neurologic: Mental status: alertness: Arousable weak      Assessment and Plan:      IMP:   #1 small bowel obstruction status post surgery   #2 acute renal failure from ATN   #3 possible

## 2022-04-16 NOTE — PROGRESS NOTES
Progress Note    Subjective:      Elver Cordoba   Patient was sleeping when I came into the room but when I woke him he complained of not doing well having difficulty breathing and soreness all over his body. His room air oxygen saturation is 94% he is not tachypneic or having any shortness of breath. He continues to respond with diuretics. His ileostomy has been functioning. Objective:     /68   Pulse 74   Temp 98.6 °F (37 °C) (Axillary)   Resp 17   Ht 5' 9.02\" (1.753 m)   Wt 181 lb 11.2 oz (82.4 kg)   SpO2 95%   BMI 26.82 kg/m²     In: 3377.4 [P.O.:80; I.V.:20]  Out: 2820 [Urine:2450]         General: Awake oriented  Abdomen: Slightly distended soft incisions well-healed staples have been ordered to be removed today.   Ileostomy functioning  Lungs: Decreased respiratory effort but otherwise clear  Other: N/A    Labs:   CBC:   Lab Results   Component Value Date    WBC 12.3 04/16/2022    RBC 2.46 04/16/2022    RBC 5.19 08/18/2017    HGB 8.2 04/16/2022    HCT 26.7 04/16/2022    .5 04/16/2022    MCH 33.3 04/16/2022    MCHC 30.7 04/16/2022    RDW 17.9 04/16/2022     04/16/2022    MPV 10.3 04/16/2022        Assessment:     80year-old with prolonged recovery following colectomy for pseudomembranous colitis     Plan:   Check chest x-ray  Diet advance to regular diet  Continue daily Lasix for diuresis  Will requires TPN for nutritional support  Remains on empiric treatment for pseudomembranous colitis and C. difficile despite negative cultures  Overall prognosis is fair  We will eventually need extended care facility placement  I informed family yesterday of patient's condition which was seem to be better yesterday than today  No obvious acute change in patient's overall status

## 2022-04-17 NOTE — PLAN OF CARE
Problem: Falls - Risk of:  Goal: Will remain free from falls  Description: Will remain free from falls  4/17/2022 0055 by Abdoulaye Hewitt RN  Outcome: Met This Shift  4/16/2022 1339 by Marlon Bertrand RN  Outcome: Met This Shift  Goal: Absence of physical injury  Description: Absence of physical injury  4/17/2022 0055 by Abdoulaye Hewitt RN  Outcome: Met This Shift  4/16/2022 1339 by Marlon Bertrand RN  Outcome: Met This Shift     Problem: Pain:  Goal: Pain level will decrease  Description: Pain level will decrease  4/17/2022 0055 by Abdoulaye Hewitt RN  Outcome: Met This Shift  4/16/2022 1339 by Marlon Bertrand RN  Outcome: Met This Shift  Goal: Control of acute pain  Description: Control of acute pain  Outcome: Met This Shift  Goal: Control of chronic pain  Description: Control of chronic pain  Outcome: Met This Shift     Problem: Skin Integrity:  Goal: Will show no infection signs and symptoms  Description: Will show no infection signs and symptoms  4/17/2022 0055 by Abdoulaye Hewitt RN  Outcome: Met This Shift  4/16/2022 1339 by Marlon Bertrand RN  Outcome: Met This Shift  Goal: Absence of new skin breakdown  Description: Absence of new skin breakdown  4/17/2022 0055 by Abdoulaye Hewitt RN  Outcome: Met This Shift  4/16/2022 1339 by Marlon Bertrand RN  Outcome: Met This Shift  Goal: Signs of wound healing will improve  Description: Signs of wound healing will improve  4/17/2022 0055 by Abdoulaye Hewitt RN  Outcome: Met This Shift  4/16/2022 1339 by Marlon Bertrand RN  Outcome: Met This Shift  Goal: Risk for impaired skin integrity will decrease  Description: Risk for impaired skin integrity will decrease  4/17/2022 0055 by Abdoulaye Hewitt RN  Outcome: Met This Shift  4/16/2022 1339 by Marlon Bertrand RN  Outcome: Met This Shift     Problem: Infection - Surgical Site:  Goal: Will show no infection signs and symptoms  Description: Will show no infection signs and symptoms  4/17/2022 0055 by Abdoulaye Hewitt RN  Outcome: Met This Shift  4/16/2022 1339 by Jermaine Valencia, LESTER  Outcome: Met This Shift     Problem:  Bowel/Gastric:  Goal: Gastrointestinal status for postoperative course will improve  Description: Gastrointestinal status for postoperative course will improve  Outcome: Met This Shift     Problem: Fluid Volume:  Goal: Ability to achieve a balanced intake and output will improve  Description: Ability to achieve a balanced intake and output will improve  Outcome: Met This Shift     Problem: Nutritional:  Goal: Ability to attain and maintain optimal nutritional status will improve  Description: Ability to attain and maintain optimal nutritional status will improve  Outcome: Met This Shift     Problem: Physical Regulation:  Goal: Postoperative complications will be avoided or minimized  Description: Postoperative complications will be avoided or minimized  Outcome: Met This Shift     Problem: Sensory:  Goal: Pain level will decrease  Description: Pain level will decrease  4/17/2022 0055 by Jose Schultz RN  Outcome: Met This Shift  4/16/2022 1339 by Dahlia Romero RN  Outcome: Met This Shift     Problem: Nutrition  Goal: Optimal nutrition therapy  Outcome: Met This Shift     Problem: Coping:  Goal: Ability to remain calm will improve  Description: Ability to remain calm will improve  Outcome: Met This Shift     Problem: Safety:  Goal: Ability to remain free from injury will improve  Description: Ability to remain free from injury will improve  Outcome: Met This Shift     Problem: Self-Care:  Goal: Ability to participate in self-care as condition permits will improve  Description: Ability to participate in self-care as condition permits will improve  Outcome: Met This Shift

## 2022-04-17 NOTE — PROGRESS NOTES
Nephrology Progress Note  4/17/2022 8:48 AM  Subjective: Interval History: Elver Cordoba is a 80 y.o. male appear doing okay no acute distress today  Data:   Scheduled Meds:   loperamide  4 mg Oral BID    furosemide  20 mg IntraVENous Daily    pantoprazole  40 mg Oral QAM AC    digoxin  125 mcg Oral Daily    metoprolol tartrate  25 mg Oral BID    dilTIAZem  60 mg Oral 2 times per day    Fidaxomicin  200 mg Oral BID    lactobacillus  1 capsule Oral Daily    nystatin  5 mL Oral 4x Daily    fat emulsion  250 mL IntraVENous Once per day on Mon Tue Thu Fri    insulin lispro  0-6 Units SubCUTAneous TID WC    insulin lispro  0-3 Units SubCUTAneous Nightly    apixaban  2.5 mg Oral BID    aspirin  81 mg Oral Daily    sodium chloride flush  5-40 mL IntraVENous 2 times per day     Continuous Infusions:   PN-Adult Premix 4.25/10 - Peripheral Line 75 mL/hr at 04/17/22 0614    dextrose           CBC   Recent Labs     04/15/22  0425 04/16/22  0625 04/17/22  0605   WBC 11.9* 12.3* 13.0*   HGB 8.1* 8.2* 8.1*   HCT 26.3* 26.7* 27.3*    404 429      BMP   Recent Labs     04/15/22  0425 04/16/22  0625 04/17/22  0605   * 132* 134*   K 3.8 3.8 4.1    100 100   CO2 20* 18* 22   PHOS 3.5 3.8 3.7   BUN 29* 27* 28*   CREATININE 1.1 1.1 1.2     Hepatic:   Recent Labs     04/17/22  0605   AST 39*   ALT 38   BILITOT 0.4   ALKPHOS 308*     Troponin: No results for input(s): TROPONINI in the last 72 hours. BNP: No results for input(s): BNP in the last 72 hours. Lipids: No results for input(s): CHOL, HDL in the last 72 hours. Invalid input(s): LDLCALCU  ABGs: No results found for: PHART, PO2ART, [id number]  INR:   No results for input(s): INR in the last 72 hours.   Renal Labs  Albumin:    Lab Results   Component Value Date    LABALBU 2.3 04/17/2022     Calcium:    Lab Results   Component Value Date    CALCIUM 7.1 04/17/2022     Phosphorus:    Lab Results   Component Value Date    PHOS 3.7 04/17/2022 failure from [facility]   #3 possible colitis with leukocytosis   #4 anemia   #5 A. fib rapid rate   #6 mild shortness of breath with pleural effusion    Plan     #1 monitor GI status  #2 renal holding stable  #3 no fever holding stable  #4 hemoglobin low stable  #5 heart rate controlled  #6 oxygenation monitor  Supportive care renal to follow         Ky Tejeda MD, MD

## 2022-04-17 NOTE — PROGRESS NOTES
Pt up to chair this afternoon. Max assist x2 gait belt and walker. Pt needs much queuing to stand tall . Difficulty noted in getting to the edge of the bed . [phone] Returned to bed from chair. Again 2 person max assist. First attempt not successful. Second attempt with step by step instruction pt was able to pull self forward in chair use his arms to push up and stand get hold of walker. Took small side steps to the bed and sat down. Was getting tired by the time his legs touched the edge of the bed. Once in bed. This nurse had pt to bend knees up fold arms across chest lift head and on the count of three pt did very well assisting to push himself up in the bed. Ate good evening meal that family provided. No pain medications required today.

## 2022-04-17 NOTE — PROGRESS NOTES
Progress Note    Subjective:      Sarwat Garcia   Patient was sleeping this morning. I had a discussion with the patient's daughter yesterday. They are considering palliative care but the patient at this point in time per the daughter's report changed his mind and wishes to continue to try and recover from this difficult and prolonged illness. He remains afebrile his white count is 13,000. He is diuresing well with daily Lasix. Many medications were switched to oral pills yesterday. As long as his heart rate remains stable he will remain on oral digoxin.   Objective:     BP (!) 143/74   Pulse 110   Temp 98.7 °F (37.1 °C) (Oral)   Resp 17   Ht 5' 9.02\" (1.753 m)   Wt 181 lb 11.2 oz (82.4 kg)   SpO2 97%   BMI 26.82 kg/m²     In: 2138.8 [P.O.:440; I.V.:10]  Out: 4025 [Urine:3275]         General: Resting in bed with no distress  Abdomen: Ileostomy functioning incisions well-healed  Lungs: Clear  Other: NA    Labs:   CBC:   Lab Results   Component Value Date    WBC 13.0 04/17/2022    RBC 2.49 04/17/2022    RBC 5.19 08/18/2017    HGB 8.1 04/17/2022    HCT 27.3 04/17/2022    .6 04/17/2022    MCH 32.5 04/17/2022    MCHC 29.7 04/17/2022    RDW 18.3 04/17/2022     04/17/2022    MPV 10.1 04/17/2022        Assessment:     Clinically stable     Plan:   Slowly decrease TPN as his oral intake increases  Patient is on a regular diet  We will add nutritional oral supplement  Continue Dificid for another 5 days  Physical therapy  Continue daily Lasix for diuresis patient has put out over 4 L of urine in 48 hours

## 2022-04-17 NOTE — PROGRESS NOTES
Hospitalist Progress Note      Name:  Soumya Heard /Age/Sex: 1929  (80 y.o. male)   MRN & CSN:  [id number] & [id number] Admission Date/Time: 2022 11:02 AM   Location:  [address] [place] [address]- PCP: Kay Ching MD         Hospital Day: 16    Assessment and Plan:       80 y.o. male hospitalized on 2022 who presented with abdominal pain, nausea, vomiting, patient has prior history of small bowel obstructions, remote h/o colon cancer status post resection and colostomy. There was concern for ischemic colitis - patient evaluated by surgery service, underwent exploratory laparotomy, subtotal colon resection, end ileostomy, small bowel resection, mobilization of the splenic flexure for colitis with acute abdomen. His hospitalization course has been complicated by circulatory shock, sepsis, UTI, atrial fibrillation with rapid ventricular response, MARIELOS and pneumonia.     Assessment/plan:      Severe colitis with sepsis status post exploratory laparotomy, colon resection, small bowel resection with end ileostomy  · Biopsy results showed pseudomembranous colitis. · Sepsis has resolved. · CT Abdo done 4/10 for worsening abdo pain and distension - findings consistent with small bowel enteritis. No e/o abscess. Moderate bilateral pleural effusions. AXR done 22 with slightly increased small bowel dilatation most consistent with ileus. General surgery following. · -ID have stopped linezolid and started fidaxomicin  due to strong suspicion for C. Diff infection. PCT, WBC are on a downward trend. CRP remains elevated. Urine culture  - no growth. Blood cultures  no growth x 72h. Fungitell  negative. C Diff toxin sent  negative however this sample was sent from the ileostomy post colon resection. · Currently on TPN and started full liquid diet . · Pain control as needed. Lowest effective doses.   · On regular diet  · Still on TPN      Bilateral pleural effusions  -S/p b/l thoracenteses on 4/13. Fluid cultures no bacterial growth to date  Patient feels short of breath today however saturating well 94% on room air  Order chest x-ray show possible atelectasis     Possible fluid overload  Intake 5700ml. Urine output 4700ml over the last 24h, Ileostomy output 625ml. Continue lasix  Iv as ordered by general surgery. Patient has bilateral pitting edema of legs    Acute metabolic encephalopathy in the setting of infection as above.   -Alert awake today     Acute kidney injury in the setting of sepsis as above. Multifactorial,   Serum creatinine today 1.2   Monitor renal function      Pseudomonas UTI   S/p zosyn. Repeat urine culture 4/8 - no growth     Atrial fibrillation with rapid ventricular response  -RVR has resolved. Continue  Digoxin switch to oral, diltiazem 60q12. Started on po metoprolol 25mg q12h. Cardiology is following.   -Eliquis restarted     Mild hyponatremia  Daily BMP. Nephrology following.      H/o Essential thrombocythemia  -previously on hydroxyurea. Currently not on meds. Will continue follow up with hematology as outpatient.      Anemia  -Hb 8.2 stable. Daily CBC. Transfuse if needed to maintain Hb>7g/dL.     Oral thrush  Resolved.       Comorbid conditions (AAA, BPH, history of rectal cancer 2019 status post colostomy, psoriatic arthritis, immunocompromise prior CVA)     Code Status: Full Code      DVT prophylaxis: Apixaban      Disposition:     - ARU  once medically ready for discharge. Diet ADULT DIET;  Regular  ADULT ORAL NUTRITION SUPPLEMENT; Breakfast, Lunch, Dinner; Standard High Calorie/High Protein Oral Supplement  PN-Adult Premix 4.25/10 - Peripheral Line   DVT Prophylaxis [] Lovenox, []  Heparin, [] SCDs, [] Ambulation   GI Prophylaxis [x] PPI,  [] H2 Blocker,  [] Carafate,  [] Diet/Tube Feeds   Code Status Full Code   Disposition Patient requires continued admission due to    MDM [] Low, [] Moderate,[]  High  Patient's risk as above due to History of Present Illness: The patient was seen and examined at the bedside  Patient reports shortness of breath is better today   no chest pain currently saturating well on room air 94%  Order chest x-ray show atelectasis   Plan for ARU     Objective: Intake/Output Summary (Last 24 hours) at 4/17/2022 0823  Last data filed at 4/17/2022 9756  Gross per 24 hour   Intake 2138.8 ml   Output 4025 ml   Net -1886.2 ml      Vitals:   Vitals:    04/17/22 0809   BP:    Pulse: 92   Resp:    Temp:    SpO2:      Physical Exam:   GEN Awake.  Alert , not in respiratory distress, not in pain  HEENT: PEERLA, , supple neck,   Chest: air entry equal bilaterally, no wheezing or crepitation  Heart: S1 and S2 heard, no murmur, no gallop or rub, regular rate  Abdomen: soft, ND , tender , laparotomy midline incision and colostomy bag , +BS  Extremities: no cyanosis, tenderness or erythema, peripheral pulses audible   +2 pitting edema of both legs   Neurology: alert, awake , able to move 4 limbs    Medications:   Medications:    loperamide  4 mg Oral BID    furosemide  20 mg IntraVENous Daily    pantoprazole  40 mg Oral QAM AC    digoxin  125 mcg Oral Daily    metoprolol tartrate  25 mg Oral BID    dilTIAZem  60 mg Oral 2 times per day    Fidaxomicin  200 mg Oral BID    lactobacillus  1 capsule Oral Daily    nystatin  5 mL Oral 4x Daily    fat emulsion  250 mL IntraVENous Once per day on Mon Tue Thu Fri    insulin lispro  0-6 Units SubCUTAneous TID WC    insulin lispro  0-3 Units SubCUTAneous Nightly    apixaban  2.5 mg Oral BID    aspirin  81 mg Oral Daily    sodium chloride flush  5-40 mL IntraVENous 2 times per day      Infusions:    PN-Adult Premix 4.25/10 - Peripheral Line 75 mL/hr at 04/17/22 0614    dextrose       PRN Meds: HYDROmorphone, 0.25 mg, Q4H PRN  HYDROcodone-acetaminophen, 5 mL, Q4H PRN  sodium chloride flush, 10 mL, PRN  Acetaminophen, 650 mg, Q6H PRN  glucose, 15 g, PRN  glucagon (rDNA), 1 mg, PRN  dextrose, 100 mL/hr, PRN  dextrose bolus (hypoglycemia), 125 mL, PRN   Or  dextrose bolus (hypoglycemia), 250 mL, PRN  sodium chloride flush, 5-40 mL, PRN  ondansetron, 4 mg, Q8H PRN   Or  ondansetron, 4 mg, Q6H PRN          Electronically signed by Pete Cruz MD on 4/17/2022 at 8:23 AM

## 2022-04-18 NOTE — CARE COORDINATION
CM in to see Pt to follow up on discharge planning. Plan remains ARU, second choice [facility]Bettie Sims. [facility]/ARU following for updated therapy notes. Per Dr. Janessa Light, plan to have Pt off TPN tomorrow. Pt denies any needs at this time.    CM following

## 2022-04-18 NOTE — PROGRESS NOTES
Physical Therapy  Name: Georgina Parker MRN: [id number] :   1929   Date:  2022   Admission Date: 2022 Room:  [address] [place] [address]   Restrictions/Precautions:  Restrictions/Precautions  Restrictions/Precautions: General Precautions,Fall Risk     abd surgery  Communication with other providers:  RN states patient is appropriate for rehab today. Co-treat with Sara Ramirez for patient safety and tolerance  Subjective:  Patient states:  Patient is agreeable for therapy   Pain:   Location, Type, Intensity (0/10 to 10/10):  Did not quantify  Objective:    Observation: patient is supine in bed  Treatment, including education/measures:    Seated exercises  Ankle pumps x 10  LAQ x10 with AAROM on RLE  Therapeutic Exercise:  Therapeutic exercises were instructed today. Cues were given for technique, safety, recruitment, and rationale. Cues were verbal and/or tactile. Transfers with line management of IV, tele, silviera  Rolling: Mod-Max A x2  Supine to sit : Max A x2  Scooting : Mod A x2 to EOB  Seated balance: Fair-, BUE support required into EOB, at Max A x2 progressing to Min A x1 with positioning cues  Sit to stand : Max A x2, progresses to Mod A x2 with posture correction cues  Stand to sit :Mod A x2 for safe descent to transfer surface    Gait:  Pt amb with RW for 5 ft and 3 ft at [address] A x2, with chair follow. Patient demo's decrease step length, height and decrease TKE in stance phase. Patient needs cues for energy conservation to avoid sitting suddenly and     Safety  Patient left safely in the chair, with call light/phone in reach with alarm applied. Gait belt was used for transfers and gait.   Assessment / Impression:     Patient's tolerance of treatment:  Fair   Adverse Reaction: none  Significant change in status and impact:  none  Barriers to improvement:  weakness   Plan for Next Session:    Will cont to work towards pt's goals per his tolerance  Time in:  1430  Time out: 1500  Timed treatment minutes: 30  Total treatment time:  30  Previously filed items:     Short term goals  Time Frame for Short term goals: 1 week  Short term goal 1: pt to complete all bed mobility mod A x1  Short term goal 2: Pt to complete all STS transfers to/from bed, commode, and chair max A  Short term goal 3: Pt to complete stand pivot transfer with LRAD max A     Electronically signed by:     Asif Carlson PTA  4/18/2022, 3:46 PM

## 2022-04-18 NOTE — PROGRESS NOTES
Occupational Therapy  Occupational Therapy Treatment Note      Attempted at 1340 hrs c pt c/o of discomfort at Weaver catheter site and nurse providing patient assessment and nurse techs arriving to bathe patient. Plan is to re-attempt later this date. Name: Kehinde Roach MRN: [id number] :   1929   Date:  2022   Admission Date: 2022 Room:  [address] [place] [address]A     Primary Problem:  The primary encounter diagnosis was Colitis. Diagnoses of Ileus ([facility] [address] Leukocytosis, unspecified type, Sepsis without acute organ dysfunction, due to unspecified organism [facility]), Atrial fibrillation with RVR ([facility] [address] and Occult blood positive stool were also pertinent to this visit. Restrictions/Precautions:  Restrictions/Precautions  Restrictions/Precautions: General Precautions,Fall Risk     Communication with other providers:  Per chart review, patient is appropriate for therapeutic intervention. Co-Tx c EFREN Reeves for endurance / safety. Subjective:  Patient states:  Pt agreeable to Tx session. \"I'll try. \"   Pain: 0/10,  (location, type, intensity)    Objective:    Observation:  Pt A&O to self / situation, received in semi-fowlers. Objective Measures:  Colostomy    Treatment, including education:  Therapeutic Activity Training:   Therapeutic activity training was instructed today. Cues were given for safety, sequence, UE/LE placement, awareness, and balance. Activities performed today included bed mobility training, sup-sit, sit-stand, SPT. Rolling: Mod A / Max A x2  Supine to sit: Max A x2 + cues, increased time  Scooting: Mod A x2 to EOB, cues for weight shifting / safety  Sitting Balance / Tolerance: Max A x2 initially c postural cues and for self-support c BUE, progressed to Min A x1, tolerated x3 minutes EOB     Sit to stands:  Max A x2 c cues for safe body positioning / sequencing c RW and for verbal / physical cues for trunk extension, progressed to Mod A x2 c continued need for physical cues erect posture. Stand to sit: Mod A x2 c cues for controlled descent and safe body positioning    Pt performed two stands, Min A x2 c RW. Functional Mobility: Min A x2 c chair follow, tolerated 5 ft and 3 ft c extended seated rest break to manage activity tolerance.        Assessment / Impression:    Patient's tolerance of treatment: Fair  Adverse Reaction: None  Significant change in status and impact:  None  Barriers to improvement: Endurance / strength      Plan for Next Session:    Continue per OT POC per pt's tolerance    Time in:  1430  Time out:  1500  Timed treatment minutes:  30  Total treatment time:  30      Electronically signed by:    GEOFFREY Recio  4/18/2022, 1:25 PM    Goals  Short term goals  Time Frame for Short term goals: Pt will perform BADL of feeding with setup A and verbal cueing  Short term goal 1: Pt will perform UE dressing with mod A and verbal cueing  Short term goal 2: Pt will perform LE dressing with mod A and verbal cueing  Short term goal 3: Pt will perform grooming with Nichol and verbal cueing  Patient Goals   Patient goals : to return home

## 2022-04-18 NOTE — PROGRESS NOTES
Hospitalist Progress Note      Name:  Reji Larsen /Age/Sex: 1929  (80 y.o. male)   MRN & CSN:  [id number] & [id number] Admission Date/Time: 2022 11:02 AM   Location:  312/3124- PCP: Anatoliy Herring MD         Hospital Day: 17    Assessment and Plan:       80 y.o. male hospitalized on 2022 who presented with abdominal pain, nausea, vomiting, patient has prior history of small bowel obstructions, remote h/o colon cancer status post resection and colostomy. There was concern for ischemic colitis - patient evaluated by surgery service, underwent exploratory laparotomy, subtotal colon resection, end ileostomy, small bowel resection, mobilization of the splenic flexure for colitis with acute abdomen. His hospitalization course has been complicated by circulatory shock, sepsis, UTI, atrial fibrillation with rapid ventricular response, MARIELOS and pneumonia.     Assessment/plan:      Severe colitis with sepsis status post exploratory laparotomy, colon resection, small bowel resection with end ileostomy  · Biopsy results showed pseudomembranous colitis. · Sepsis has resolved. · CT Abdo done 4/10 for worsening abdo pain and distension - findings consistent with small bowel enteritis. No e/o abscess. Moderate bilateral pleural effusions. AXR done 22 with slightly increased small bowel dilatation most consistent with ileus. General surgery following. · -ID have stopped linezolid and started fidaxomicin  due to strong suspicion for C. Diff infection. PCT, WBC are on a downward trend. CRP remains elevated. Urine culture  - no growth. Blood cultures  no growth x 72h. Fungitell  negative. C Diff toxin sent  negative however this sample was sent from the ileostomy post colon resection. · Currently on TPN and started full liquid diet . · Pain control as needed. Lowest effective doses.   · On regular diet  · Still on TPN , plan to stop TPN tomorrow   · Stop daily labs Bilateral pleural effusions  -S/p b/l thoracenteses on 4/13. Fluid cultures no bacterial growth to date  Patient feels short of breath today however saturating well 94% on room air  Order chest x-ray show possible atelectasis     Possible fluid overload  Intake 5700ml. Urine output 4700ml over the last 24h, Ileostomy output 625ml. Continue lasix  Iv as ordered by general surgery. Patient has bilateral pitting edema of legs    Acute metabolic encephalopathy in the setting of infection as above.   -Alert awake today     Acute kidney injury in the setting of sepsis as above. Multifactorial,   Serum creatinine today 1.1  Monitor renal function      Pseudomonas UTI   S/p zosyn. Repeat urine culture 4/8 - no growth     Atrial fibrillation with rapid ventricular response  -RVR has resolved. Continue  Digoxin switch to oral, diltiazem 60q12. Started on po metoprolol 25mg q12h. Cardiology is following.   -Eliquis restarted     Mild hyponatremia  Daily BMP. Nephrology following.      H/o Essential thrombocythemia  -previously on hydroxyurea. Currently not on meds. Will continue follow up with hematology as outpatient.      Anemia  -Hb 8.0 stable. Daily CBC. Transfuse if needed to maintain Hb>7g/dL.     Oral thrush  Resolved.       Comorbid conditions (AAA, BPH, history of rectal cancer 2019 status post colostomy, psoriatic arthritis, immunocompromise prior CVA)     Code Status: Full Code      DVT prophylaxis: Apixaban      Disposition:     - ARU  once medically ready for discharge. Diet ADULT DIET;  Regular  ADULT ORAL NUTRITION SUPPLEMENT; Breakfast, Lunch, Dinner; Standard High Calorie/High Protein Oral Supplement  ADULT ORAL NUTRITION SUPPLEMENT; Lunch; Standard High Calorie/High Protein Oral Supplement  PN-Adult Premix 4.25/10 - Peripheral Line   DVT Prophylaxis [] Lovenox, []  Heparin, [] SCDs, [] Ambulation   GI Prophylaxis [x] PPI,  [] H2 Blocker,  [] Carafate,  [] Diet/Tube Feeds   Code Status Full Code Disposition Patient requires continued admission due to    MDM [] Low, [] Moderate,[]  High  Patient's risk as above due to      History of Present Illness: The patient was seen and examined at the bedside  Patient reports no SOB   no chest pain currently saturating well on room air 94%  Order chest x-ray show atelectasis   Plan to stop TPN tomorrow     Objective: Intake/Output Summary (Last 24 hours) at 4/18/2022 0832  Last data filed at 4/17/2022 2041  Gross per 24 hour   Intake --   Output 700 ml   Net -700 ml      Vitals:   Vitals:    04/18/22 0800   BP: (!) 142/80   Pulse: 98   Resp: 20   Temp: 98.2 °F (36.8 °C)   SpO2:      Physical Exam:   GEN Awake.  Alert , not in respiratory distress, not in pain  HEENT: PEERLA, , supple neck,   Chest: air entry equal bilaterally, no wheezing or crepitation  Heart: S1 and S2 heard, no murmur, no gallop or rub, regular rate  Abdomen: soft, ND , tender , laparotomy midline incision and colostomy bag , +BS  Extremities: no cyanosis, tenderness or erythema, peripheral pulses audible   +2 pitting edema of both legs   Neurology: alert, awake , able to move 4 limbs    Medications:   Medications:    loperamide  4 mg Oral BID    furosemide  20 mg IntraVENous Daily    pantoprazole  40 mg Oral QAM AC    digoxin  125 mcg Oral Daily    metoprolol tartrate  25 mg Oral BID    dilTIAZem  60 mg Oral 2 times per day    Fidaxomicin  200 mg Oral BID    lactobacillus  1 capsule Oral Daily    nystatin  5 mL Oral 4x Daily    insulin lispro  0-6 Units SubCUTAneous TID WC    insulin lispro  0-3 Units SubCUTAneous Nightly    apixaban  2.5 mg Oral BID    aspirin  81 mg Oral Daily    sodium chloride flush  5-40 mL IntraVENous 2 times per day      Infusions:    PN-Adult Premix 4.25/10 - Peripheral Line      dextrose       PRN Meds: HYDROcodone-acetaminophen, 5 mL, Q4H PRN  sodium chloride flush, 10 mL, PRN  Acetaminophen, 650 mg, Q6H PRN  glucose, 15 g, PRN  glucagon (rDNA), 1 mg, PRN  dextrose, 100 mL/hr, PRN  dextrose bolus (hypoglycemia), 125 mL, PRN   Or  dextrose bolus (hypoglycemia), 250 mL, PRN  sodium chloride flush, 5-40 mL, PRN  ondansetron, 4 mg, Q8H PRN   Or  ondansetron, 4 mg, Q6H PRN          Electronically signed by Roro Metcalf MD on 4/18/2022 at 8:32 AM

## 2022-04-18 NOTE — PROGRESS NOTES
Nephrology Progress Note  4/18/2022 10:13 AM  Subjective: Interval History: Connie Schneider is a 80 y.o. male weak tired resting in bed  Data:   Scheduled Meds:   loperamide  4 mg Oral BID    furosemide  20 mg IntraVENous Daily    pantoprazole  40 mg Oral QAM AC    digoxin  125 mcg Oral Daily    metoprolol tartrate  25 mg Oral BID    dilTIAZem  60 mg Oral 2 times per day    Fidaxomicin  200 mg Oral BID    lactobacillus  1 capsule Oral Daily    nystatin  5 mL Oral 4x Daily    insulin lispro  0-6 Units SubCUTAneous TID WC    insulin lispro  0-3 Units SubCUTAneous Nightly    apixaban  2.5 mg Oral BID    aspirin  81 mg Oral Daily    sodium chloride flush  5-40 mL IntraVENous 2 times per day     Continuous Infusions:   PN-Adult Premix 4.25/10 - Peripheral Line      dextrose           CBC   Recent Labs     04/16/22  0625 04/17/22  0605 04/18/22  0610   WBC 12.3* 13.0* 13.8*   HGB 8.2* 8.1* 8.0*   HCT 26.7* 27.3* 25.8*    429 400      BMP   Recent Labs     04/16/22  0625 04/17/22  0605 04/18/22  0610   * 134* 132*   K 3.8 4.1 3.9    100 99   CO2 18* 22 21   PHOS 3.8 3.7 3.2   BUN 27* 28* 29*   CREATININE 1.1 1.2 1.1     Hepatic:   Recent Labs     04/17/22  0605 04/18/22  0610   AST 39* 34   ALT 38 46*   BILITOT 0.4 0.6   ALKPHOS 308* 346*     Troponin: No results for input(s): TROPONINI in the last 72 hours. BNP: No results for input(s): BNP in the last 72 hours. Lipids: No results for input(s): CHOL, HDL in the last 72 hours. Invalid input(s): LDLCALCU  ABGs: No results found for: PHART, PO2ART, UTY9VVH  INR:   No results for input(s): INR in the last 72 hours.   Renal Labs  Albumin:    Lab Results   Component Value Date    LABALBU 2.3 04/18/2022     Calcium:    Lab Results   Component Value Date    CALCIUM 7.5 04/18/2022     Phosphorus:    Lab Results   Component Value Date    PHOS 3.2 04/18/2022     U/A:    Lab Results   Component Value Date    NITRU POSITIVE 04/02/2022 COLORU YELLOW 04/02/2022    WBCUA 11 04/02/2022    RBCUA 1 04/02/2022    MUCUS FEW 04/02/2022    TRICHOMONAS NONE SEEN 03/20/2022    BACTERIA OCCASIONAL 04/02/2022    CLARITYU CLEAR 04/02/2022    SPECGRAV 1.015 04/02/2022    UROBILINOGEN 0.2 04/02/2022    BILIRUBINUR NEGATIVE 04/02/2022    BLOODU MODERATE 04/02/2022    KETUA SMALL 04/02/2022     ABG:  No results found for: PHART, QTV3QQH, PO2ART, ENN2ZKO, BEART, THGBART, EVU5RGM, K8DWXHOR  HgBA1c:  No results found for: LABA1C  Microalbumen/Creatinine ratio:  No components found for: RUCREAT  TSH:  No results found for: TSH  IRON:    Lab Results   Component Value Date    IRON 35 03/01/2022     Iron Saturation:  No components found for: PERCENTFE  TIBC:    Lab Results   Component Value Date    TIBC 280 03/01/2022     FERRITIN:    Lab Results   Component Value Date    FERRITIN 210 03/01/2022     RPR:  No results found for: RPR  SAQIB:  No results found for: ANATITER, SAQIB  24 Hour Urine for Creatinine Clearance:  No components found for: CREAT4, UHRS10, UTV10      Objective:   I/O: 04/17 0701 - 04/18 0700  In: -   Out: 700 [Urine:250]  I/O last 3 completed shifts: In: 1898.8 [P.O.:200; I.V.:10]  Out: 2200 [Urine:1300; Stool:900]  No intake/output data recorded. Vitals: BP (!) 142/80   Pulse 98   Temp 98.2 °F (36.8 °C) (Oral)   Resp 20   Ht 5' 9.02\" (1.753 m)   Wt 181 lb 11.2 oz (82.4 kg)   SpO2 96%   BMI 26.82 kg/m²  {  General appearance: awake weak  HEENT: Head: Normal, normocephalic, atraumatic.   Neck: supple, symmetrical, trachea midline  Lungs: diminished breath sounds bilaterally  Heart: S1, S2 normal  Abdomen: abnormal findings:  soft mildly tender  Extremities: edema trace  Neurologic: Mental status: alertness: Arousable weak      Assessment and Plan:      IMP:   #1 small bowel obstruction status post surgery   #2 acute renal failure from ATN   #3 possible colitis with leukocytosis   #4 anemia   #5 A. fib rapid rate   #6 mild shortness of breath with pleural effusion    Plan     #1 surgical status monitor  #2 creatinine holding 1.1 stable  #3 no new fever will follow  #4 hemoglobin is stable  #5 heart rate controlled  #6 monitor oxygenation  Supportive care         Lenore Luque MD, MD

## 2022-04-18 NOTE — PROGRESS NOTES
Comprehensive Nutrition Assessment    Type and Reason for Visit:  Reassess    Nutrition Recommendations/Plan:   · Continue weaning PN  · Continue current diet and oral nutrition supplement, between meals    Nutrition Assessment:  On regular diet, still on TPN , plan to stop TPN tomorrow. PN running at 42 ml/hr. Fair po intake on Regular Diet, consuming about half of meals standard high kristi supplements ordered. Wt loss noted. Will continue to follow as high nutrition risk. Malnutrition Assessment:  Malnutrition Status:  Insufficient data    Context:  Acute Illness       Estimated Daily Nutrient Needs:  Energy (kcal):  1192-6863 (23-26 kcal/kg); Weight Used for Energy Requirements:  Current     Protein (g):  78-94 (1.0-1.2 g/kg); Weight Used for Protein Requirements:  Current        Fluid (ml/day):  9752-9675; Method Used for Fluid Requirements:  1 ml/kcal      Nutrition Related Findings:  Na 130, H/H: 8.1/26.3      Wounds:  Surgical Incision       Current Nutrition Therapies:    ADULT DIET; Regular  ADULT ORAL NUTRITION SUPPLEMENT; Breakfast, Lunch, Dinner; Standard High Calorie/High Protein Oral Supplement  ADULT ORAL NUTRITION SUPPLEMENT; Lunch; Standard High Calorie/High Protein Oral Supplement  PN-Adult Premix 4.25/10 - Peripheral Line  Current Parenteral Nutrition Orders:  · Type and Formula:  (Clinimix 4.25/10)   · Lipids: 250ml (four times weekly)  · Duration: Continuous  · Rate/Volume:   · Current PN Order Provides: 799 kcal and 43 gm protein  · Goal PN Orders Provides: 799 kcal and 43 gm protein    Anthropometric Measures:  · Height: 5' 9.02\" (175.3 cm)  · Current Body Weight: 181 lb 11.2 oz (82.4 kg)   · Admission Body Weight: 207 lb 0.2 oz (93.9 kg) (first measured weight)    · Usual Body Weight: 187 lb (84.8 kg)     · Ideal Body Weight: 160 lbs; % Ideal Body Weight 122.9 %   · BMI: 26.8  · BMI Categories: Overweight (BMI 25.0-29. 9)       Nutrition Diagnosis:   · Inadequate oral intake related to altered GI structure,altered GI function as evidenced by nutrition support - parenteral nutrition,intake 26-50%    Nutrition Interventions:   Food and/or Nutrient Delivery:  Continue Current Diet,Modify Oral Nutrition Supplement,Continue Current Parenteral Nutrition  Nutrition Education/Counseling:  No recommendation at this time   Coordination of Nutrition Care:  Continue to monitor while inpatient    Goals:  Pt will meet greater than 75% of estimated nutrient needs via PN       Nutrition Monitoring and Evaluation:   Behavioral-Environmental Outcomes:  None Identified   Food/Nutrient Intake Outcomes:  Parenteral Nutrition Intake/Tolerance,Food and Nutrient Intake,Supplement Intake  Physical Signs/Symptoms Outcomes:  Biochemical Data,GI Status,Hemodynamic Status,Fluid Status or Edema,Weight,Skin     Discharge Planning:    Continue Oral Nutrition Supplement     Electronically signed by Delmi Rodriguez RD, LD on 4/18/22 at 3:20 PM EDT    Contact: [phone]

## 2022-04-18 NOTE — PLAN OF CARE
Nutrition Problem #1: Inadequate oral intake  Intervention: Food and/or Nutrient Delivery: Continue Current Diet,Modify Oral Nutrition Supplement,Continue Current Parenteral Nutrition  Nutritional Goals: Pt will meet greater than 75% of estimated nutrient needs via PN

## 2022-04-18 NOTE — PROGRESS NOTES
Progress Note    Subjective:      Jorge Alberto Anderson  Patient is awake and alert. He did not sleep well last night but claims that he wants to live and is motivated to get better. He remains afebrile. Continues to diurese well with Lasix and taking better oral intake. Staples have still not been removed I discussed with the nurse my order to remove staples today.   Objective:     /79   Pulse 96   Temp 98.7 °F (37.1 °C) (Oral)   Resp 19   Ht 5' 9.02\" (1.753 m)   Wt 181 lb 11.2 oz (82.4 kg)   SpO2 96%   BMI 26.82 kg/m²     In: -   Out: 700 [Urine:250]         General: Awake and alert and appears appropriate  Abdomen: Soft incisions well-healed stool within ileostomy bag some of the stool was slightly formed  Lungs: Clear  Other: N/A    Labs:   CBC:   Lab Results   Component Value Date    WBC 13.8 04/18/2022    RBC 2.42 04/18/2022    RBC 5.19 08/18/2017    HGB 8.0 04/18/2022    HCT 25.8 04/18/2022    .6 04/18/2022    MCH 33.1 04/18/2022    MCHC 31.0 04/18/2022    RDW 18.2 04/18/2022     04/18/2022    MPV 9.7 04/18/2022        Assessment:     Patient slowly improving     Plan:   Stop daily labs  Decrease hyperalimentation and discontinue TPN tomorrow  Physical therapy Occupational Therapy  Stop continuous oximeter O2 sats have been in the mid 90s  We will continue Dificid but discontinue by the end of the week if repeat C. difficile cultures are negative  Will need rehab or extended care facility most likely next week

## 2022-04-19 NOTE — PROGRESS NOTES
Infectious Disease Progress Note  2022   Patient Name: Dmitriy Reed : 1929   Impression  · Acute Abdomen with Colitis:     · Possible C.dif Enteritis:     ? Complicated Pseudomonas aeruginosa UTI:    ? Elevated alkaline phosphatase:     § Afebrile, procalcitonin is now within normal limits, however CRP remains elevated largely unchanged. Signifying some sort of inflammation going on perhaps from enteritis or postsurgical.  § Elevated alkaline phosphatase: Gallbladder is surgically absent, however previous CT of the abdomen and pelvis showed hepatic cysts. § Leukocytosis trending down  § -Urine culture: Pseudomonas aeruginosa >100,000  § -UA WBC 11, RBC 1  § -BC 0/2-NGTD  § -BC 0/2-NGTD  § -Cdif negative  § -Urine Culture: NGTD  § -Fungitell: negative  § -CT A&P W IV Contrast: see full report below. Findings suggest actue infectious/inflammatory colitis with proximal partial obstruction vs ileus. § 4/3-S/p per Dr. Dominguez Madden: Exploratory lap, subtotal colon resection, End ileostomy, removal of colostomy, small bowel resection, mobilization of splenic flexure, central line placement. DX:  Colitis and acute abdomen. Perineal Cultures: Missael Garcia, GI, onboard  § 4/10-CT A&P W IV Contrast: no mechanical obstructive process however inflammatory findings of SB with mucosal hyper-enhancement and wall thickening throughout the distended SB loops to the RLQ ileostomy with small volume abdominopelvic ascites most consistent with enteritis. No obvious abscess formation. Moderate bilateral pleural effusions along with body wall edema. § Dr. Dominguez Madden -added culturelle due to patient without colon but has enteritis of SB  § -S/p per IR Dr. Ivette Lee, Bilateral Thoracentesis: Right 520 cc clear dashawn pleural fluid removed, Left 800 cc clear yellow pleural fluid removed. Culture: NGTD     · AF with RVR, on AC:  § Dr. Lew Leyva onboard     ?  MARIELOS from ATN:  § Dr. Denise Villarreal onboard     ? Delirium     ? AAA     ? BPH     ? Chronic Supra pubic Catheter     ? CAD     ? Rectal Cancer 2019, Colostomy:     ? Psoriatic Arthritis     ? CVA 2019     ? Allergy to sulfa     · Multi-morbidity: per PMHx: AAA, BPH, CAD, rectal cancer 2019, GERD, HTN, psoriatic arthritis, pyelonephritis, CVA 2019     Plan:  ?  Continue po Dificid 200 mg po bid x 10 days (end date 4/23/22), if C.dif PCR returns negative, DC Dificid  ? Reviewed Liver ultrasound, non-acute findings  ? Trend CRP and Pct, trending down, repeat in am      Ongoing Antimicrobial Therapy  Dificid 4/14-  Completed Antimicrobial Therapy  Cefepime 4/2? Metronidazole 4/2-8  Zosyn 4/4-8  Vancomycin 4/2-8  Eraxis 4/8-12  Meropenem 4/8-12? Po vancomycin 4/10-12  Zyvox 4/12-14  ? History:? Interval history noted. Chief complaint: Acute abdomen with colitis, complicated Pseudomonas aeruginosa UTI, persistent leukocytosis. Denies n/v/d/f or untoward effects of antibiotics. Resting in bed, states he just feels weak all over, otherwise feels abdominal tenderness has improved. Physical Exam:  Vital Signs: /73   Pulse 71   Temp 98.3 °F (36.8 °C) (Oral)   Resp 18   Ht 5' 9.02\" (1.753 m)   Wt 184 lb 3.2 oz (83.6 kg)   SpO2 98%   BMI 27.19 kg/m²     Gen: Resting quietly in bed, no distress. A&Ox4. Wounds: C/D/I midabdominal wound, staples have been removed, well approximated, no drainage or erythema. Chest: no distress and CTA. Good air movement. Room air. Heart: Afib and no MRG. Abd: soft, non-distended, no hepatomegaly. Normoactive bowel sounds. Slight generalized tenderness to palpation. Ext: no clubbing, cyanosis, or edema  Catheter Site: without erythema or tenderness draining clear yellow urine.   Ileostomy intact: RLQ draining brown liquid stool   Neuro:  CN 2-12 intact and no focal sensory or motor deficits        Radiologic / Imaging / TESTING  4/2/22 CT Abdomen Pelvis W IV Contrast:  Impression   Interval development of severe circumferential wall thickening and   pericolonic inflammatory changes of the transverse colon and splenic flexure   of the colon just proximal to the left lower quadrant colostomy with   increased fluid-filled distension of the proximal colon and small bowel. Findings suggest acute infectious/inflammatory colitis with proximal partial   obstruction versus ileus.       Stable infrarenal abdominal aortic aneurysm measuring 5.5 cm in diameter. See follow-up recommendations below.       Stable hepatic cysts and bilateral renal cortical cysts.       Cardiomegaly with bilateral pleural effusions suggesting mild CHF.       RECOMMENDATIONS:   5.5 cm infrarenal abdominal aortic aneurysm. Recommend referral to a vascular   specialist.       Reference: Mady Aleman Radiol [id number];13:[id number].      4/2/22 XR Chest Portable:  Impression   Trace left pleural effusion with adjacent atelectasis.      4/2/22 XR Chest Portable:  Impression   Enteric tube tip is noted within the stomach with the side port likely at or   just distal to the GE junction.          4/3/22 XR Abdomen:  Impression   Multiple air-filled distended loops of small bowel stacked in the mid abdomen   suggesting a low-grade partial small bowel obstruction or focal ileus.       Atherosclerotic calcification of an abdominal aortic aneurysm as seen on   prior CT.       NG tube in the stomach with proximal port at the level of the GE junction.      4/3/22 XR Chest Portable:  Impression   Right line placement as above.  Bilateral small areas of pneumonia      4/4/22 XR Abdomen:  Impression   1.  Nasogastric tube has been advanced with the tip over the antrum.       2.  New skin staples are present at the abdomen and pelvis.       3.  Residual gas dilatation of multiple small bowel loops.  There may be a   small amount of postsurgical pneumoperitoneum.       4.  See the prior CT scan for evaluation of the infrarenal aortic aneurysm.      4/5/22 XR Chest 04/19/22  7:43 AM   Result Value Ref Range    POC Glucose 129 (H) 70 - 99 MG/DL   POCT Glucose    Collection Time: 04/19/22 11:34 AM   Result Value Ref Range    POC Glucose 127 (H) 70 - 99 MG/DL     CULTURE results: Invalid input(s): BLOOD CULTURE,  URINE CULTURE, SURGICAL CULTURE    Diagnosis:  Patient Active Problem List   Diagnosis    Hypertension    Benign prostatic hyperplasia    P[facility]atic arthritis ([facility]    Primary malignant neoplasm of rectum (HCC)    Psoriasis    Chronic myeloproliferative disease ([facility]    Monocytosis (symptomatic)    Gastroesophageal reflux disease without esophagitis    Urinary retention    H/O: CVA (cerebrovascular accident)    Irregular heart beat    Nonrheumatic aortic valve stenosis    Paroxysmal atrial fibrillation (HCC)    GI bleed    Melena    Arteriovenous malformation of jejunum    History of rectal cancer    Benign neoplasm of cecum    Benign neoplasm of ascending colon    Pseudomonas urinary tract infection    Partial small bowel obstruction (HCC)    SBO (small bowel obstruction) (HCC)    Atrial fibrillation with RVR (HCC)    Hypotension    Ischemic colitis (HCC)    Hypocalcemia    MARIELOS (acute kidney injury) ([facility]    Probable Bacterial Pneumonia    CAD (coronary artery disease)    Anemia    Delirium    Severe protein-calorie malnutrition (HCC)    On total parenteral nutrition    Colitis       Active Problems  Principal Problem:    SBO (small bowel obstruction) (HCC)  Active Problems:    Gastroesophageal reflux disease without esophagitis    Pseudomonas urinary tract infection    Atrial fibrillation with RVR (HCC)    Hypotension    Ischemic colitis (HCC)    Hypocalcemia    MARIELOS (acute kidney injury) ([facility]    Probable Bacterial Pneumonia    CAD (coronary artery disease)    Anemia    Delirium    Severe protein-calorie malnutrition (HCC)    On total parenteral nutrition    Colitis  Resolved Problems:    * No resolved hospital problems. *    Electronically signed by: Electronically signed by CHEMA Mireles CNP on 4/19/2022 at 12:47 PM

## 2022-04-19 NOTE — PROGRESS NOTES
Hospitalist Progress Note      Name:  Tarah Cunha /Age/Sex: 1929  (80 y.o. male)   MRN & CSN:  [id number] & [id number] Admission Date/Time: 2022 11:02 AM   Location:  [address] [place] [address]- PCP: Delmy Newsome MD         Hospital Day: 18    Assessment and Plan:       80 y.o. male hospitalized on 2022 who presented with abdominal pain, nausea, vomiting, patient has prior history of small bowel obstructions, remote h/o colon cancer status post resection and colostomy. There was concern for ischemic colitis - patient evaluated by surgery service, underwent exploratory laparotomy, subtotal colon resection, end ileostomy, small bowel resection, mobilization of the splenic flexure for colitis with acute abdomen. His hospitalization course has been complicated by circulatory shock, sepsis, UTI, atrial fibrillation with rapid ventricular response, MARIELOS and pneumonia.     Assessment/plan:      Severe colitis with sepsis status post exploratory laparotomy, colon resection, small bowel resection with end ileostomy  · Biopsy results showed pseudomembranous colitis. · Sepsis has resolved. · CT Abdo done 4/10 for worsening abdo pain and distension - findings consistent with small bowel enteritis. No e/o abscess. Moderate bilateral pleural effusions. AXR done 22 with slightly increased small bowel dilatation most consistent with ileus. General surgery following. · -ID have stopped linezolid and started fidaxomicin   200 mg bid x 10 days end date 22, due to strong suspicion for C. Diff infection. PCT, WBC are on a downward trend. CRP remains elevated. Urine culture  - no growth. Blood cultures  no growth x 72h. Fungitell  negative. C Diff toxin sent  negative however this sample was sent from the ileostomy post colon resection. · Currently on TPN and started full liquid diet . · Pain control as needed. Lowest effective doses.   · On regular diet  · Still on TPN , plan to stop TPN today 4/19  · Stop daily labs   · Liver US ordered 4/18 still pending      Bilateral pleural effusions  -S/p b/l thoracenteses on 4/13. Fluid cultures no bacterial growth to date  Patient feels short of breath today however saturating well 94% on room air  Order chest x-ray show possible atelectasis     Possible fluid overload  Intake 5700ml. Urine output 4700ml over the last 24h, Ileostomy output 625ml. Continue lasix  Iv as ordered by general surgery. Patient has bilateral pitting edema of legs    Acute metabolic encephalopathy in the setting of infection as above.   -Alert awake today     Acute kidney injury in the setting of sepsis as above. Multifactorial,   Serum creatinine today 1.1  Monitor renal function      Pseudomonas UTI   S/p zosyn. Repeat urine culture 4/8 - no growth     Atrial fibrillation with rapid ventricular response  -RVR has resolved. Continue  Digoxin switch to oral, diltiazem 60q12. Started on po metoprolol 25mg q12h. Cardiology is following.   -Eliquis restarted     Mild hyponatremia  Daily BMP. Nephrology following.      H/o Essential thrombocythemia  -previously on hydroxyurea. Currently not on meds. Will continue follow up with hematology as outpatient.      Anemia  -Hb 8.0 stable. Daily CBC. Transfuse if needed to maintain Hb>7g/dL.     Oral thrush  Resolved.       Comorbid conditions (AAA, BPH, history of rectal cancer 2019 status post colostomy, psoriatic arthritis, immunocompromise prior CVA)     Code Status: Full Code      DVT prophylaxis: Apixaban      Disposition:     - ARU  once medically ready for discharge. Diet ADULT DIET;  Regular  ADULT ORAL NUTRITION SUPPLEMENT; Breakfast, Lunch, Dinner; Standard High Calorie/High Protein Oral Supplement  PN-Adult Premix 4.25/10 - Peripheral Line   DVT Prophylaxis [] Lovenox, []  Heparin, [] SCDs, [] Ambulation   GI Prophylaxis [x] PPI,  [] H2 Blocker,  [] Carafate,  [] Diet/Tube Feeds   Code Status Full Code   Disposition Patient requires continued admission due to    MDM [] Low, [] Moderate,[]  High  Patient's risk as above due to      History of Present Illness: The patient was seen and examined at the bedside  Patient reports no SOB   no chest pain currently saturating well on room air 94%  Order chest x-ray show atelectasis   Plan to stop TPN today per surgery note     Objective: Intake/Output Summary (Last 24 hours) at 4/19/2022 0739  Last data filed at 4/18/2022 1837  Gross per 24 hour   Intake --   Output 1150 ml   Net -1150 ml      Vitals:   Vitals:    04/19/22 0400   BP: (!) 140/96   Pulse: 117   Resp: 19   Temp: 98.7 °F (37.1 °C)   SpO2:      Physical Exam:   GEN Awake.  Alert , not in respiratory distress, not in pain  HEENT: PEERLA, , supple neck,   Chest: air entry equal bilaterally, no wheezing or crepitation  Heart: S1 and S2 heard, no murmur, no gallop or rub, regular rate  Abdomen: soft, ND , tender , laparotomy midline incision and colostomy bag , +BS  Extremities: no cyanosis, tenderness or erythema, peripheral pulses audible   +2 pitting edema of both legs   Neurology: alert, awake , able to move 4 limbs    Medications:   Medications:    loperamide  4 mg Oral BID    furosemide  20 mg IntraVENous Daily    pantoprazole  40 mg Oral QAM AC    digoxin  125 mcg Oral Daily    metoprolol tartrate  25 mg Oral BID    dilTIAZem  60 mg Oral 2 times per day    Fidaxomicin  200 mg Oral BID    lactobacillus  1 capsule Oral Daily    nystatin  5 mL Oral 4x Daily    insulin lispro  0-6 Units SubCUTAneous TID WC    insulin lispro  0-3 Units SubCUTAneous Nightly    apixaban  2.5 mg Oral BID    aspirin  81 mg Oral Daily    sodium chloride flush  5-40 mL IntraVENous 2 times per day      Infusions:    PN-Adult Premix 4.25/10 - Peripheral Line 42 mL/hr at 04/18/22 1830    dextrose       PRN Meds: HYDROcodone-acetaminophen, 5 mL, Q4H PRN  sodium chloride flush, 10 mL, PRN  Acetaminophen, 650 mg, Q6H PRN  glucose, 15 g, PRN  glucagon (rDNA), 1 mg, PRN  dextrose, 100 mL/hr, PRN  dextrose bolus (hypoglycemia), 125 mL, PRN   Or  dextrose bolus (hypoglycemia), 250 mL, PRN  sodium chloride flush, 5-40 mL, PRN  ondansetron, 4 mg, Q8H PRN   Or  ondansetron, 4 mg, Q6H PRN          Electronically signed by Ariana Medina MD on 4/19/2022 at 7:39 AM

## 2022-04-19 NOTE — PROGRESS NOTES
Speech Language Pathology  [facility]  DEPARTMENT OF SPEECH/LANGUAGE PATHOLOGY  DAILY PROGRESS NOTE  Dmitriy Reed  4/19/2022  [id number]  Colitis [K52.9]  Ileus ([address] [K56.7]  SBO (small bowel obstruction) ([address] [K56.609]  Occult blood positive stool [R19.5]  Atrial fibrillation with RVR (HCC) [I48.91]  Leukocytosis, unspecified type [D72.829]  Sepsis without acute organ dysfunction, due to unspecified organism ([address] [A41.9]  Allergies   Allergen Reactions    Sulfa Antibiotics          Pt was seen this date for dysphagia treatment. IMPRESSION AND RECOMMENDATIONS:   Pt seen this date for diet tolerance monitoring. Pt's diet advanced to Regular by surgeon. Pt was alert, pleasant, and cooperative and reports no difficulty swallowing. He c/o foods having no flavor which he has experienced in the past following surgery during the course of his illness. Pt tolerated regular solid and thin liquid (straw) trials with normal oropharyngeal swallow. Oral phase was normal for mastication and clearance. Pharyngeal swallow appears timely with 0 s/s aspiration observed for all trials. Recommend: Continue Regular/Thins. No further needs at this time. Encourage intake.        GOALS (current status in bold):  Short-term Goals  Timeframe for Short-term Goals: LOS  Goal 1: Pt will tolerate puree diet w/ nectar thick liquids without clinical s/s aspiration 100% with mod assist Met, Discontinue  Goal 2: Pt will participate in diet advancement trials as clinically indicated Met, Discontinue  Goal 3: Pt will participate in MBSS as clinically indicated Not warranted, swallow appears WNL  Goal 4: Pt/caregivers will demonstrate understanding of POC and safe swallowing strategies Met, Discontinue    EDUCATION: swallow WNL    PAIN RATING (0-10 Scale): does not report pain  Time in/Time out: SLP Individual Minutes  Time In: 1515  Time Out: [facility]  Minutes: 15    Visit number: 2      Lex [address] [place], [place]  4/19/2022  3:32 PM

## 2022-04-19 NOTE — CARE COORDINATION
CM in to Pt to follow up on discharge planning. Pt daughter Oneil Pump present. Pt was denied ARU due to poor activity tolerance. Discharge plan now SNF, choice of [facility].     CM call to [phone] for referral.     CM following

## 2022-04-19 NOTE — PROGRESS NOTES
Nephrology Progress Note  4/19/2022 8:12 AM  Subjective: Interval History: Lawson Lozoya is a 80 y.o. male more stable resting in the room  Data:   Scheduled Meds:   loperamide  4 mg Oral BID    furosemide  20 mg IntraVENous Daily    pantoprazole  40 mg Oral QAM AC    digoxin  125 mcg Oral Daily    metoprolol tartrate  25 mg Oral BID    dilTIAZem  60 mg Oral 2 times per day    Fidaxomicin  200 mg Oral BID    lactobacillus  1 capsule Oral Daily    nystatin  5 mL Oral 4x Daily    insulin lispro  0-6 Units SubCUTAneous TID WC    insulin lispro  0-3 Units SubCUTAneous Nightly    apixaban  2.5 mg Oral BID    aspirin  81 mg Oral Daily    sodium chloride flush  5-40 mL IntraVENous 2 times per day     Continuous Infusions:   PN-Adult Premix 4.25/10 - Peripheral Line 42 mL/hr at 04/18/22 1830    dextrose           CBC   Recent Labs     04/17/22  0605 04/18/22  0610   WBC 13.0* 13.8*   HGB 8.1* 8.0*   HCT 27.3* 25.8*    400      BMP   Recent Labs     04/17/22  0605 04/18/22  0610   * 132*   K 4.1 3.9    99   CO2 22 21   PHOS 3.7 3.2   BUN 28* 29*   CREATININE 1.2 1.1     Hepatic:   Recent Labs     04/17/22  0605 04/18/22  0610   AST 39* 34   ALT 38 46*   BILITOT 0.4 0.6   ALKPHOS 308* 346*     Troponin: No results for input(s): TROPONINI in the last 72 hours. BNP: No results for input(s): BNP in the last 72 hours. Lipids: No results for input(s): CHOL, HDL in the last 72 hours. Invalid input(s): LDLCALCU  ABGs: No results found for: PHART, PO2ART, UKI1KIZ  INR:   No results for input(s): INR in the last 72 hours.   Renal Labs  Albumin:    Lab Results   Component Value Date    LABALBU 2.3 04/18/2022     Calcium:    Lab Results   Component Value Date    CALCIUM 7.5 04/18/2022     Phosphorus:    Lab Results   Component Value Date    PHOS 3.2 04/18/2022     U/A:    Lab Results   Component Value Date    NITRU POSITIVE 04/02/2022    COLORU YELLOW 04/02/2022    WBCUA 11 04/02/2022    RBCUA 1 04/02/2022    MUCUS FEW 04/02/2022    TRICHOMONAS NONE SEEN 03/20/2022    BACTERIA OCCASIONAL 04/02/2022    CLARITYU CLEAR 04/02/2022    SPECGRAV 1.015 04/02/2022    UROBILINOGEN 0.2 04/02/2022    BILIRUBINUR NEGATIVE 04/02/2022    BLOODU MODERATE 04/02/2022    KETUA SMALL 04/02/2022     ABG:  No results found for: PHART, MNV3WVY, PO2ART, LET8COA, BEART, THGBART, UEW9HPB, M8KTDUYN  HgBA1c:  No results found for: LABA1C  Microalbumen/Creatinine ratio:  No components found for: RUCREAT  TSH:  No results found for: TSH  IRON:    Lab Results   Component Value Date    IRON 35 03/01/2022     Iron Saturation:  No components found for: PERCENTFE  TIBC:    Lab Results   Component Value Date    TIBC 280 03/01/2022     FERRITIN:    Lab Results   Component Value Date    FERRITIN 210 03/01/2022     RPR:  No results found for: RPR  SAQIB:  No results found for: ANATITER, SAQIB  24 Hour Urine for Creatinine Clearance:  No components found for: CREAT4, UHRS10, UTV10      Objective:   I/O: 04/18 0701 - 04/19 0700  In: -   Out: 1150 [Urine:900]  I/O last 3 completed shifts:  In: -   Out: 1650 [Urine:1150; Stool:500]  No intake/output data recorded. Vitals: BP (!) 140/96   Pulse 117   Temp 98.7 °F (37.1 °C) (Oral)   Resp 19   Ht 5' 9.02\" (1.753 m)   Wt 184 lb 3.2 oz (83.6 kg)   SpO2 96%   BMI 27.19 kg/m²  {  General appearance: awake weak  HEENT: Head: Normal, normocephalic, atraumatic.   Neck: supple, symmetrical, trachea midline  Lungs: diminished breath sounds bilaterally  Heart: S1, S2 normal  Abdomen: abnormal findings:  soft mildly tender  Extremities: edema trace  Neurologic: Mental status: alertness: Arousable weak      Assessment and Plan:      IMP:   #1 small bowel obstruction status post surgery   #2 acute renal failure from ATN   #3 possible colitis with leukocytosis   #4 anemia   #5 A. fib rapid rate   #6 mild shortness of breath with pleural effusion    Plan     #1 post surgery we will monitor  #2 creatinine 1.1 will monitor  #3 follow-up C. difficile results  #4 hemoglobin was stable at 8  #5 continue work with cardiology and improving cardiac status and heart rate with A.  Fib  #6 oxygenation appears holding stable  From renal standpoint monitor with supportive care maintain diuresis         Glen Arzola MD, MD

## 2022-04-19 NOTE — CARE COORDINATION
Reviewed updated therapy notes. Patient not ARU appropriate d/t poor activity tolerance. Discussed with Flip Ang.

## 2022-04-19 NOTE — PROGRESS NOTES
Progress Note    Subjective:      Jorge Alberto Anderson   Patient sitting up in a chair appears alert and much better today oriented to place and time and person. I have stopped his TPN and he is eating better. Objective:     /73   Pulse 71   Temp 98.3 °F (36.8 °C) (Oral)   Resp 18   Ht 5' 9.02\" (1.753 m)   Wt 184 lb 3.2 oz (83.6 kg)   SpO2 98%   BMI 27.19 kg/m²     In: -   Out: 2600 [Urine:1350]         General: Well-appearing no acute distress  Abdomen: Soft less distended ileostomy function  Lungs: Clear  Other: N/A    Labs:   CBC:   Lab Results   Component Value Date    WBC 13.8 04/18/2022    RBC 2.42 04/18/2022    RBC 5.19 08/18/2017    HGB 8.0 04/18/2022    HCT 25.8 04/18/2022    .6 04/18/2022    MCH 33.1 04/18/2022    MCHC 31.0 04/18/2022    RDW 18.2 04/18/2022     04/18/2022    MPV 9.7 04/18/2022        Assessment:     Patient is clinically improving       Plan:   Stop hyperalimentation  Discontinue Dificid after 1 week  Remove Weaver catheter tomorrow  Continue therapy  Patient will be a candidate for skilled nursing or rehab by the end of the week.

## 2022-04-20 NOTE — PROGRESS NOTES
V2.0  [facility] [place], [place] Hospitalist Progress Note      Name:  Willem Patiño /Age/Sex: 1929  (80 y.o. male)   MRN & CSN:  [id number] & [id number] Encounter Date/Time: 2022 2:47 PM EDT    Location:  [id number] PCP: Eric Dalton, Brielle Mtz Day: 19    Assessment and Plan:   Willem Patiño is a 80 y.o. male with pmh of rectal cancer (s/p resection and colostomy ) who presents with abdominal pain and vomiting. 1.  Severe colitis: CT abdomen  showed severe colitis. Surgery consulted-exploratory laparotomy, small bowel resection, colon resection and end ileostomy 4/3. Biopsy is consistent with pseudomembranous colitis. On Dificid for a week-discontinued today. ID consult noted. Status post TPN. Currently on regular diet. 2.  Bilateral pleural effusion: Status post bilateral thoracentesis on . Fluid culture-no organisms seen. 3.  Fluid overload/bilateral leg edema: Status post IV Lasix. Last echo -EF 55%. Monitor chemistry. 4.  Atrial fibrillation with rapid ventricular rate: Rate control with digoxin, Cardizem and metoprolol. On Eliquis for stroke prophylaxis. 5.  Acute metabolic encephalopathy: Resolved. 6.  Acute kidney injury: Due to sepsis. Nephrology consult appreciated. Resolved. 7.  Pseudomonas UTI: Status post Zosyn. 8.  Chronic anemia: Hemoglobin is stable. Transfuse if hemoglobin is less than 7.    9.  Oral thrush: Resolved. 10.  AAA: CT revealed 5.5 cm infrarenal abdominal aneurysm (4.8 cm in ). His age may preclude elective surgical intervention. Needs vascular surgery evaluation. Patient will follow with primary care physician. Chronic problems include history of rectal cancer (resection and colostomy ), prior history of SBO, essential thrombocytopenia, BPH, psoriatic arthritis and prior CVA. DVT prophylaxis: On Eliquis  Disposition: ARU once medically stable. Diet ADULT DIET;  Regular  ADULT ORAL NUTRITION SUPPLEMENT; Breakfast, Lunch, Dinner; Standard High Calorie/High Protein Oral Supplement   DVT Prophylaxis [] Lovenox, []  Heparin, [] SCDs, [] Ambulation,  [x] Eliquis, [] Xarelto  [] Coumadin   Code Status Full Code   Disposition From: Home  Expected Disposition: ARU  Estimated Date of Discharge: Tomorrow  Patient requires continued admission due to colon surgery. Surrogate Decision Maker/ POA      Subjective:     Chief Complaint: GI Problem (dark stool, colostomy, on eliquis)       Lawson Lozoya is a 80 y.o. male who presents with abdominal pain and vomiting. Discussed with his nurse. Patient tolerates his meals. Review of Systems:    Review of Systems    Nothing significant. Objective: Intake/Output Summary (Last 24 hours) at 4/20/2022 1447  Last data filed at 4/20/2022 0300  Gross per 24 hour   Intake 240 ml   Output 1150 ml   Net -910 ml        Vitals:   Vitals:    04/20/22 1134   BP: 135/84   Pulse: 89   Resp: 23   Temp: 96 °F (35.6 °C)   SpO2:        Physical Exam:   General: No apparent respiratory distress. Generally weak. Right IJ central line noted. Eyes: EOMI. Anicteric. ENT: neck supple  Cardiovascular: Regular rate. Respiratory: Clear to auscultation  Gastrointestinal: Soft. Ileostomy bag noted. No palpable mass. Genitourinary: no suprapubic tenderness. Weavre catheter noted. Musculoskeletal: No edema  Skin: warm, dry  Neuro: Alert. Oriented x3. Generally weak  Psych: Mood appropriate.      Medications:   Medications:    insulin lispro  0-6 Units SubCUTAneous TID WC    insulin lispro  0-3 Units SubCUTAneous Nightly    loperamide  4 mg Oral BID    pantoprazole  40 mg Oral QAM AC    digoxin  125 mcg Oral Daily    metoprolol tartrate  25 mg Oral BID    dilTIAZem  60 mg Oral 2 times per day    lactobacillus  1 capsule Oral Daily    apixaban  2.5 mg Oral BID    aspirin  81 mg Oral Daily    sodium chloride flush  5-40 mL IntraVENous 2 times per day      Infusions:    dextrose       PRN Meds: HYDROcodone-acetaminophen, 1 tablet, Q6H PRN  sodium chloride flush, 10 mL, PRN  Acetaminophen, 650 mg, Q6H PRN  glucose, 15 g, PRN  glucagon (rDNA), 1 mg, PRN  dextrose, 100 mL/hr, PRN  dextrose bolus (hypoglycemia), 125 mL, PRN   Or  dextrose bolus (hypoglycemia), 250 mL, PRN  sodium chloride flush, 5-40 mL, PRN  ondansetron, 4 mg, Q8H PRN        Labs      Recent Results (from the past 24 hour(s))   POCT Glucose    Collection Time: 04/19/22  3:39 PM   Result Value Ref Range    POC Glucose 125 (H) 70 - 99 MG/DL   POCT Glucose    Collection Time: 04/19/22  8:58 PM   Result Value Ref Range    POC Glucose 123 (H) 70 - 99 MG/DL   POCT Glucose    Collection Time: 04/20/22  7:49 AM   Result Value Ref Range    POC Glucose 101 (H) 70 - 99 MG/DL   POCT Glucose    Collection Time: 04/20/22 11:53 AM   Result Value Ref Range    POC Glucose 102 (H) 70 - 99 MG/DL        Imaging/Diagnostics Last 24 Hours   US ABDOMEN LIMITED Specify organ? LIVER    Result Date: 4/19/2022  EXAMINATION: RIGHT UPPER QUADRANT ULTRASOUND 4/19/2022 5:53 am COMPARISON: None. HISTORY: ORDERING SYSTEM PROVIDED HISTORY: Elevated alkaline phosphatase TECHNOLOGIST PROVIDED HISTORY: Reason for exam:->Elevated alkaline phosphatase Specify organ?->LIVER Reason for Exam: Abd pain Additional signs and symptoms: Cholecystectomy FINDINGS: LIVER:  The liver demonstrates normal echogenicity without evidence of intrahepatic biliary ductal dilatation. BILIARY SYSTEM:  Cholecystectomy is noted. Common bile duct is within normal limits measuring 5.1 mm. RIGHT KIDNEY: The right kidney is grossly unremarkable without evidence of hydronephrosis. PANCREAS:  The pancreas is not visualized. OTHER: No evidence of right upper quadrant ascites. The examination is limited due to patient's body habitus. 1. Limited exam. 2. Cholecystectomy. 3. Common bile duct is within normal limits. No intrahepatic bile duct dilatation.        Electronically signed by Clarissa Maldonado MD on 4/20/2022 at 2:47 PM

## 2022-04-20 NOTE — PROGRESS NOTES
Infectious Disease Progress Note  2022   Patient Name: Feliciano Sherwood : 1929   Impression  · Acute Abdomen with Colitis:     · Possible C.dif Enteritis:     ? Complicated Pseudomonas aeruginosa UTI:    ? Elevated alkaline phosphatase:     § Afebrile, no leukocytosis. Pct is now within normal limits, however CRP remains elevated largely unchanged. Signifying some sort of inflammation going on perhaps from enteritis or postsurgical. Patient is clinically improving, off hyperal and Weaver cath [facility].  C.dif was cancelled per lab, unsure the reason, DC Dificid as patient states is feeling improvement, less abdominal tenderness, stools becoming more formed in ileostomy. § Elevated alkaline phosphatase: Gallbladder is surgically absent, however previous CT of the abdomen and pelvis showed hepatic cysts. § Leukocytosis trending down  § -Urine culture: Pseudomonas aeruginosa >100,000  § -UA WBC 11, RBC 1  § -BC 0/2-NGTD  § -BC 0/2-NGTD  § -Cdif negative  § -Urine Culture: NGTD  § -Fungitell: negative  § -CT A&P W IV Contrast: see full report below. Findings suggest actue infectious/inflammatory colitis with proximal partial obstruction vs ileus. § 4/3-S/p per Dr. Florentino Ronquillo: Exploratory lap, subtotal colon resection, End ileostomy, removal of colostomy, small bowel resection, mobilization of splenic flexure, central line placement. DX:  Colitis and acute abdomen. Perineal Cultures: Fred Gagnon, GI, onboard  § 4/10-CT A&P W IV Contrast: no mechanical obstructive process however inflammatory findings of SB with mucosal hyper-enhancement and wall thickening throughout the distended SB loops to the RLQ ileostomy with small volume abdominopelvic ascites most consistent with enteritis. No obvious abscess formation. Moderate bilateral pleural effusions along with body wall edema.    § Dr. Florentino Ronquillo -added culturelle due to patient without colon but has enteritis of SB  § -S/p per IR Dr. Malik Simental, Bilateral Thoracentesis: Right 520 cc clear dashawn pleural fluid removed, Left 800 cc clear yellow pleural fluid removed. Culture: NGTD     · AF with RVR, on AC:  § Dr. Daria Higuera onboard     ? MARIELOS from ATN:  § Dr. Tsering Chow onboard     ? Delirium     ? AAA     ? BPH     ? Chronic Supra pubic Catheter     ? CAD     ? Rectal Cancer 2019, Colostomy:     ? Psoriatic Arthritis     ? CVA 2019     ? Allergy to sulfa     · Multi-morbidity: per PMHx: AAA, BPH, CAD, rectal cancer 2019, GERD, HTN, psoriatic arthritis, pyelonephritis, CVA 2019     Plan:  ? OK to DC po Dificid. ? Following, C.dif was cancelled per lab, so unable to obtain results, but patient has clinically improved on Dificid, will continue the 10 day regimen. (If were to re-check C.dif it may be a false negative test at this point since patient has been receiving treatment)  ? Trend CRP and Pct, recheck in am  ? OK from ID standpoint to DC when ready, follow up with general surgery. Ongoing Antimicrobial Therapy    Completed Antimicrobial Therapy  Cefepime 4/2? Metronidazole 4/2-8  Zosyn 4/4-8  Vancomycin 4/2-8  Eraxis 4/8-12  Meropenem 4/8-12? Po vancomycin 4/10-12  Zyvox 4/12-14  Dificid 4/14-20  ? History:? Interval history noted. Chief complaint: Acute abdomen with colitis, complicated Pseudomonas aeruginosa UTI, persistent leukocytosis. Denies n/v/d/f or untoward effects of antibiotics. States is feeling better today. States stools are slightly formed. Reports abdominal tenderness has improved. Physical Exam:  Vital Signs: /61   Pulse 83   Temp 97.5 °F (36.4 °C) (Oral)   Resp 18   Ht 5' 9.02\" (1.753 m)   Wt 184 lb 3.2 oz (83.6 kg)   SpO2 95%   BMI 27.19 kg/m²     Gen: Alert, resting quietly in bed. No distress  Wounds: C/D/I midabdominal wound, staples have been removed, well approximated, no drainage or erythema. Chest: no distress and CTA. Good air movement. Room air. Heart: Afib and no MRG.    Abd: soft, non-distended, no hepatomegaly. Normoactive bowel sounds. Improved minimal  generalized tenderness to palpation. Ext: no clubbing, cyanosis, or edema  Ileostomy intact: RLQ draining brown liquid stool   Neuro:  CN 2-12 intact and no focal sensory or motor deficits        Radiologic / Imaging / TESTING  4/2/22 CT Abdomen Pelvis W IV Contrast:  Impression   Interval development of severe circumferential wall thickening and   pericolonic inflammatory changes of the transverse colon and splenic flexure   of the colon just proximal to the left lower quadrant colostomy with   increased fluid-filled distension of the proximal colon and small bowel. Findings suggest acute infectious/inflammatory colitis with proximal partial   obstruction versus ileus.       Stable infrarenal abdominal aortic aneurysm measuring 5.5 cm in diameter. See follow-up recommendations below.       Stable hepatic cysts and bilateral renal cortical cysts.       Cardiomegaly with bilateral pleural effusions suggesting mild CHF.       RECOMMENDATIONS:   5.5 cm infrarenal abdominal aortic aneurysm. Recommend referral to a vascular   specialist.       Reference: Luba Isauro Radiol [id number].      4/2/22 XR Chest Portable:  Impression   Trace left pleural effusion with adjacent atelectasis.      4/2/22 XR Chest Portable:  Impression   Enteric tube tip is noted within the stomach with the side port likely at or   just distal to the GE junction.          4/3/22 XR Abdomen:  Impression   Multiple air-filled distended loops of small bowel stacked in the mid abdomen   suggesting a low-grade partial small bowel obstruction or focal ileus.       Atherosclerotic calcification of an abdominal aortic aneurysm as seen on   prior CT.       NG tube in the stomach with proximal port at the level of the GE junction.      4/3/22 XR Chest Portable:  Impression   Right line placement as above.  Bilateral small areas of pneumonia      4/4/22 XR Abdomen:  Impression 1.  Nasogastric tube has been advanced with the tip over the antrum.       2.  New skin staples are present at the abdomen and pelvis.       3.  Residual gas dilatation of multiple small bowel loops.  There may be a   small amount of postsurgical pneumoperitoneum.       4.  See the prior CT scan for evaluation of the infrarenal aortic aneurysm.      4/5/22 XR Chest Portable:  Impression   Low lung volumes with persistent bibasilar airspace disease.       Trace left pleural effusion.      4/7/22 XR Abdomen:  Impression   The patient is status post surgery.  There is decreased but continued mild   distension of the small bowel could represent partial obstruction or ileus.       Calcification of the abdominal aorta consistent with a known aneurysm.       The NG tube tip is in the fundus of the stomach.  The proximal port is in the   distal esophagus near the GE junction.  Consider advancement of the tube. 4/8/22 XR Chest Portable:  Impression   Unchanged moderate left pleural effusion bibasilar atelectasis.  No evidence   of pulmonary edema. 4/10/22 CT Abdomen Pelvis W IV Contrast:  Impression   No mechanical obstructive process however inflammatory findings of small   bowel with mucosal hyperenhancement and wall thickening throughout the   distended small bowel loops to the right lower quadrant ileostomy with small   volume abdominopelvic ascites most consistent with enteritis.  No obvious   abscess formation.  Moderate bilateral pleural effusions along with body wall   edema. 4/13/22 XR Chest Portable:  Impression   Interval decrease in size of bilateral pleural effusion status post   thoracentesis, with improved aeration at the lung bases.  No visualized   pneumothorax. 4/13/22 XR Abdomen:  Impression   Increased small bowel dilatation to suggest small bowel obstruction, ileus or   enteritis.      4/16/22 XR Chest Portable:  Impression   Small left basilar pneumonia suspected     4/19/22 US Abdomen Limited: Liver:  Impression   1. Limited exam.   2. Cholecystectomy. 3. Common bile duct is within normal limits.  No intrahepatic bile duct   dilatation.           Labs:    Recent Results (from the past 24 hour(s))   POCT Glucose    Collection Time: 04/19/22 11:34 AM   Result Value Ref Range    POC Glucose 127 (H) 70 - 99 MG/DL   POCT Glucose    Collection Time: 04/19/22  3:39 PM   Result Value Ref Range    POC Glucose 125 (H) 70 - 99 MG/DL   POCT Glucose    Collection Time: 04/19/22  8:58 PM   Result Value Ref Range    POC Glucose 123 (H) 70 - 99 MG/DL   POCT Glucose    Collection Time: 04/20/22  7:49 AM   Result Value Ref Range    POC Glucose 101 (H) 70 - 99 MG/DL     CULTURE results: Invalid input(s): BLOOD CULTURE,  URINE CULTURE, SURGICAL CULTURE    Diagnosis:  Patient Active Problem List   Diagnosis    Hypertension    Benign prostatic hyperplasia    Psoriatic arthritis ([facility] [address]    Primary malignant neoplasm of rectum (HCC)    Psoriasis    Chronic myeloproliferative disease (HCC)    Monocytosis (symptomatic)    Gastroesophageal reflux disease without esophagitis    Urinary retention    H/O: CVA (cerebrovascular accident)    Irregular heart beat    Nonrheumatic aortic valve stenosis    Paroxysmal atrial fibrillation (HCC)    GI bleed    Melena    Arteriovenous malformation of jejunum    History of rectal cancer    Benign neoplasm of cecum    Benign neoplasm of ascending colon    Pseudomonas urinary tract infection    Partial small bowel obstruction (HCC)    SBO (small bowel obstruction) (HCC)    Atrial fibrillation with RVR (HCC)    Hypotension    Ischemic colitis (HCC)    Hypocalcemia    MARIELOS (acute kidney injury) ([facility] [address]    Probable Bacterial Pneumonia    CAD (coronary artery disease)    Anemia    Delirium    Severe protein-calorie malnutrition (HCC)    On total parenteral nutrition    Colitis       Active Problems  Principal Problem:    SBO (small bowel obstruction) (HCC)  Active Problems:    Gastroesophageal reflux disease without esophagitis    Pseudomonas urinary tract infection    Atrial fibrillation with RVR (HCC)    Hypotension    Ischemic colitis (HCC)    Hypocalcemia    MARIELOS (acute kidney injury) ([facility] [address]    Probable Bacterial Pneumonia    CAD (coronary artery disease)    Anemia    Delirium    Severe protein-calorie malnutrition (HCC)    On total parenteral nutrition    Colitis  Resolved Problems:    * No resolved hospital problems. *    Electronically signed by: Electronically signed by Sally Martin.  CHEMA Camara CNP on 4/20/2022 at 8:45 AM

## 2022-04-20 NOTE — PROGRESS NOTES
Message from Dr Anh Ivey about the central line  Remove central day of discharge to [facility]

## 2022-04-20 NOTE — PROGRESS NOTES
Nephrology Progress Note  4/20/2022 2:58 PM  Subjective: Interval History: Kelly Alexandre is a 80 y.o. male more stable resting in the room  Data:   Scheduled Meds:   insulin lispro  0-6 Units SubCUTAneous TID WC    insulin lispro  0-3 Units SubCUTAneous Nightly    loperamide  4 mg Oral BID    pantoprazole  40 mg Oral QAM AC    digoxin  125 mcg Oral Daily    metoprolol tartrate  25 mg Oral BID    dilTIAZem  60 mg Oral 2 times per day    lactobacillus  1 capsule Oral Daily    apixaban  2.5 mg Oral BID    aspirin  81 mg Oral Daily    sodium chloride flush  5-40 mL IntraVENous 2 times per day     Continuous Infusions:   dextrose           CBC   Recent Labs     04/18/22  0610   WBC 13.8*   HGB 8.0*   HCT 25.8*         BMP   Recent Labs     04/18/22  0610   *   K 3.9   CL 99   CO2 21   PHOS 3.2   BUN 29*   CREATININE 1.1     Hepatic:   Recent Labs     04/18/22  0610   AST 34   ALT 46*   BILITOT 0.6   ALKPHOS 346*     Troponin: No results for input(s): TROPONINI in the last 72 hours. BNP: No results for input(s): BNP in the last 72 hours. Lipids: No results for input(s): CHOL, HDL in the last 72 hours. Invalid input(s): LDLCALCU  ABGs: No results found for: PHART, PO2ART, VAJ7BLW  INR:   No results for input(s): INR in the last 72 hours.   Renal Labs  Albumin:    Lab Results   Component Value Date    LABALBU 2.3 04/18/2022     Calcium:    Lab Results   Component Value Date    CALCIUM 7.5 04/18/2022     Phosphorus:    Lab Results   Component Value Date    PHOS 3.2 04/18/2022     U/A:    Lab Results   Component Value Date    NITRU POSITIVE 04/02/2022    COLORU YELLOW 04/02/2022    WBCUA 11 04/02/2022    RBCUA 1 04/02/2022    MUCUS FEW 04/02/2022    TRICHOMONAS NONE SEEN 03/20/2022    BACTERIA OCCASIONAL 04/02/2022    CLARITYU CLEAR 04/02/2022    SPECGRAV 1.015 04/02/2022    UROBILINOGEN 0.2 04/02/2022    BILIRUBINUR NEGATIVE 04/02/2022    BLOODU MODERATE 04/02/2022    KETUA SMALL 04/02/2022 ABG:  No results found for: PHART, GFG5WFG, PO2ART, JLF2KHR, BEART, THGBART, WNJ6AQN, P8KLAEPP  HgBA1c:  No results found for: LABA1C  Microalbumen/Creatinine ratio:  No components found for: RUCREAT  TSH:  No results found for: TSH  IRON:    Lab Results   Component Value Date    IRON 35 03/01/2022     Iron Saturation:  No components found for: PERCENTFE  TIBC:    Lab Results   Component Value Date    TIBC 280 03/01/2022     FERRITIN:    Lab Results   Component Value Date    FERRITIN 210 03/01/2022     RPR:  No results found for: RPR  SAQIB:  No results found for: ANATITER, SAQIB  24 Hour Urine for Creatinine Clearance:  No components found for: CREAT4, UHRS10, UTV10      Objective:   I/O: 04/19 0701 - 04/20 0700  In: 240 [P.O.:240]  Out: 2600 [Urine:1150]  I/O last 3 completed shifts: In: 240 [P.O.:240]  Out: 2600 [Urine:1150; NIFOC:4022]  No intake/output data recorded. Vitals: /84   Pulse 89   Temp 96 °F (35.6 °C) (Axillary)   Resp 23   Ht 5' 9.02\" (1.753 m)   Wt 184 lb 3.2 oz (83.6 kg)   SpO2 97%   BMI 27.19 kg/m²  {  General appearance: awake weak  HEENT: Head: Normal, normocephalic, atraumatic.   Neck: supple, symmetrical, trachea midline  Lungs: diminished breath sounds bilaterally  Heart: S1, S2 normal  Abdomen: abnormal findings:  soft mildly tender  Extremities: edema trace  Neurologic: Mental status: alertness: Arousable weak      Assessment and Plan:      IMP:   #1 small bowel obstruction status post surgery   #2 acute renal failure from ATN   #3 possible colitis with leukocytosis   #4 anemia   #5 A. fib rapid rate   #6 mild shortness of breath with pleural effusion    Plan      #1 post surgery stable   #2 creatinine 1.1 holding stable   #3 monitor for any new infections   #4 hemoglobin low stable monitor   #5 heart rate controlled   #6 slowly improving work on rehab plans           Michell Yan MD, MD

## 2022-04-20 NOTE — PROGRESS NOTES
Progress Note    Subjective:      Connie Schneider   Patient is awake and alert in good spirits. He is off hyperalimentation and his Weaver catheter will be removed today. He has no complaints this morning. Order for ileostomy output 2 days ago for C. difficile was canceled for reasons unknown. Unlikely he has persistent C. difficile enteritis with ileostomy output and patient's clinical improvement. He has received a week course of Dificid. Objective:     /61   Pulse 83   Temp 97.5 °F (36.4 °C) (Oral)   Resp 18   Ht 5' 9.02\" (1.753 m)   Wt 184 lb 3.2 oz (83.6 kg)   SpO2 95%   BMI 27.19 kg/m²     In: 240 [P.O.:240]  Out: 2600 [Urine:1150]         General: Awake and alert in better spirits  Abdomen: Incisions well-healed abdomen soft ileostomy output is more formed  Lungs: Clear  Other: N/A    Labs:   CBC:   Lab Results   Component Value Date    WBC 13.8 04/18/2022    RBC 2.42 04/18/2022    RBC 5.19 08/18/2017    HGB 8.0 04/18/2022    HCT 25.8 04/18/2022    .6 04/18/2022    MCH 33.1 04/18/2022    MCHC 31.0 04/18/2022    RDW 18.2 04/18/2022     04/18/2022    MPV 9.7 04/18/2022        Assessment:     70-year-old male with complex and prolonged hospital course following total colectomy for pseudomembranous colitis  Significant clinical improvement     Plan:   Remove Weaver catheter  Stop Dificid  Stop telemetry  Patient may be discharged to an extended care facility or rehab this week.   Outpatient follow-up with Dr. Iqbal Holding central line prior to discharge

## 2022-04-21 NOTE — FLOWSHEET NOTE
Occupational Therapy Treatment Note  Name: Elver Cordoba MRN: [id number] :   1929   Date:  2022   Admission Date: 2022 Room:  [address] [place] [address]-A     Primary Problem:  SBO    Restrictions/Precautions:  Restrictions/Precautions  Restrictions/Precautions: General Precautions,Fall Risk     Communication with other providers:  RN approved session and cotx with EFREN Gtz for safety and activity tolerance. Subjective:  Patient states:  \"I've been up all night\". Pain: denies pain (location, type, intensity)    Objective:    Observation:  Pt seated in chair with PCA upon arrival.  Objective Measures:  Alert and oriented to self and place. Treatment, including education:  Therapeutic Activity Training:   Therapeutic activity training was instructed today. Cues were given for safety, sequence, UE/LE placement, awareness, and balance. Activities performed today included bed mobility training, sup-sit, sit-stand, SPT. Pt seated in chair up arrival and completed sit to stand from armed chair with gait belt donned and use of 2WW with set up and MOD A x2 with moderate verbal cues for hand placement. Pt completed a few steps  With [employer] and chair follow with MOD A x1. Pt completed seated rest break  Pt completed second sit to stand with MIN A x2 and completed approx less than 5ft and required seated rest break. Pt seated and returned in room. Self Care Training:   Cues were given for safety, sequence, UE/LE placement, visual cues, and balance. Activities performed today included dressing, toileting, hand hygiene, and grooming. Pt seated in chair and noted soiled gown. Pt doffed and donned gown with set up and stand by assist. Pt seated and completed oral hygiene with MAX A as patient noted fatigue. Pt completed face washing with set up supervision. Pt educated on completing self care skills with more independence to increased activity tolerance.     Assessment / Impression:    Patient's tolerance of treatment: fair-  Adverse Reaction: none  Significant change in status and impact:  none  Barriers to improvement: activity tolerance and fatigue    Plan for Next Session:    Continue OT plan of care and progress seated and standing self care skills.   Time in:  0901  Time out:  0930  Timed treatment minutes:  29  Total treatment time:  29    Previously filed items:     Patient Goals   Patient goals : to return home  Short Term Goals  Time Frame for Short term goals: Pt will perform BADL of feeding with setup A and verbal cueing  Short Term Goal 1: Pt will perform UE dressing with mod A and verbal cueing- addressed  Short Term Goal 2: Pt will perform LE dressing with mod A and verbal cueing  Short Term Goal 3: Pt will perform grooming with Nichol and verbal cueing- addressed     Electronically signed by:    SCARLETT Ruiz,   4/21/2022, 11:21 AM

## 2022-04-21 NOTE — PROGRESS NOTES
Physical Therapy  Name: Isidro Colvin MRN: [id number] :   1929   Date:  2022   Admission Date: 2022 Room:  [address] [place] [address]   Restrictions/Precautions:  Restrictions/Precautions  Restrictions/Precautions: General Precautions,Fall Risk     abd surgery  Communication with other providers:  Co-treat with Agnieszka Cody for patient safety and tolerance  Subjective:  Patient states:  Patient is agreeable for therapy \"I didn't do well today\"  Pain:   Location, Type, Intensity (0/10 to 10/10):  Did not quantify  Objective:    Observation: patient is supine in bed  Treatment, including education/measures:  Therapeutic Exercise:  Therapeutic exercises were instructed today. Cues were given for technique, safety, recruitment, and rationale. Cues were verbal and/or tactile. Transfers with line management of IV, tele, silveira  Sit to stand : Mod A x2 from recliner, progresses to [address] A x2 with posture correction cues  Standing balance: Fair+ with BUE support at walker. Tolerates ~2min of standing before requesting to sit x2 sets  Stand to sit :Mod A x2 for safe descent to transfer surface  Sitting balance: Fair-, Mod A for patient to not rely on back support of recliner. Cues for forward trunk flexion partially and temporally improves. Gait:  Pt amb with RW for 3ft and 6ft at Mod A x1, with chair follow. Patient demo's decrease step length, height and decrease TKE in stance phase. Patient needs cues for walker proximity. Mod A with manual walker assistance. Safety  Patient left safely in the chair, with call light/phone in reach with alarm applied. Gait belt was used for transfers and gait.   Assessment / Impression:     Patient's tolerance of treatment:  Fair   Adverse Reaction: none  Significant change in status and impact:  none  Barriers to improvement:  weakness   Plan for Next Session:    Will cont to work towards pt's goals per his tolerance  Time in:  901  Time out: 930  Timed treatment minutes: 29  Total treatment time:  Jeni  Previously filed items:     Short Term Goals  Time Frame for Short term goals: 1 week  Short term goal 1: pt to complete all bed mobility mod A x1  Short term goal 2: Pt to complete all STS transfers to/from bed, commode, and chair max A  Short term goal 3: Pt to complete stand pivot transfer with LRAD max A     Electronically signed by:     Sebas Mast PTA  4/21/2022, 10:25 AM

## 2022-04-21 NOTE — CARE COORDINATION
CM contacted by [facility], they can accept Pt today if medically ready. PS to Dr. Dina Steward to update, rapid covid requested. 11:58 AM   CM set stretcher transportation with [facility] for 1500. Alison/FG updated  Pt RN Sven Jones updated  Pt daughter Lillian Asp updated    HENS complete.

## 2022-04-21 NOTE — PROGRESS NOTES
Infectious Disease Progress Note  2022   Patient Name: Abe Trammell : 1929   Impression  · Acute Abdomen with Colitis, Possible C.diff:      ? Complicated Pseudomonas aeruginosa UTI:    ? Elevated alkaline phosphatase:     § Afebrile, no leukocytosis. Pct is now within normal limits, however CRP remains elevated largely unchanged. Signifying some sort of inflammation going on perhaps from enteritis or postsurgical. Patient is clinically improving, off hyperal and Weaver cath [facility].  C.dif was cancelled per lab, unsure the reason, DC Dificid as patient states is feeling improvement, less abdominal tenderness, stools becoming more formed in ileostomy. § Elevated alkaline phosphatase: Gallbladder is surgically absent, however previous CT of the abdomen and pelvis showed hepatic cysts. § Leukocytosis trending down  § -Urine culture: Pseudomonas aeruginosa >100,000  § -UA WBC 11, RBC 1  § -BC 0/2-NGTD  § -BC 0/2-NGTD  § -Cdif negative  § -Urine Culture: NGTD  § -Fungitell: negative  § -CT A&P W IV Contrast: see full report below. Findings suggest actue infectious/inflammatory colitis with proximal partial obstruction vs ileus. § 4/3-S/p per Dr. Dustin Forbes: Exploratory lap, subtotal colon resection, End ileostomy, removal of colostomy, small bowel resection, mobilization of splenic flexure, central line placement. DX:  Colitis and acute abdomen. Perineal Cultures: Sean Vazquez, GI, onboard  § 4/10-CT A&P W IV Contrast: no mechanical obstructive process however inflammatory findings of SB with mucosal hyper-enhancement and wall thickening throughout the distended SB loops to the RLQ ileostomy with small volume abdominopelvic ascites most consistent with enteritis. No obvious abscess formation. Moderate bilateral pleural effusions along with body wall edema.    § Dr. Dustin Forbes -added culturelle due to patient without colon but has enteritis of SB  § -S/p per IR  Etienne, Bilateral Thoracentesis: Right 520 cc clear dashawn pleural fluid removed, Left 800 cc clear yellow pleural fluid removed. Culture: NGTD     · AF with RVR, on AC:  § Dr. Jones Sit onboard     ? MARIELOS from ATN:  § Dr. Doris Whitaker onboard     ? Delirium     ? AAA     ? BPH     ? Chronic Supra pubic Catheter     ? CAD     ? Rectal Cancer 2019, Colostomy:     ? Psoriatic Arthritis     ? CVA 2019     ? Allergy to sulfa     · Multi-morbidity: per PMHx: AAA, BPH, CAD, rectal cancer 2019, GERD, HTN, psoriatic arthritis, pyelonephritis, CVA 2019     Plan:  ? ABX therapy DCd 4/20  ? Trend CRP and Pct, CRP remains elevated, may be due to inflammation of colitis. ? OK from ID standpoint to DC when ready, follow up with general surgery. ? Will sign off and not follow the patient actively, please reconsult as needed. Ongoing Antimicrobial Therapy    Completed Antimicrobial Therapy  Cefepime 4/2? Metronidazole 4/2-8  Zosyn 4/4-8  Vancomycin 4/2-8  Eraxis 4/8-12  Meropenem 4/8-12? Po vancomycin 4/10-12  Zyvox 4/12-14  Dificid 4/14-20  ? History:? Interval history noted. Chief complaint: Acute abdomen with colitis, complicated Pseudomonas aeruginosa UTI, persistent leukocytosis. Denies n/v/d/f or untoward effects of antibiotics. States is feeling better today. States stools are slightly formed. Reports abdominal tenderness has improved. Physical Exam:  Vital Signs: /73   Pulse 76   Temp 95.7 °F (35.4 °C) (Oral)   Resp 21   Ht 5' 9.02\" (1.753 m)   Wt 166 lb 4 oz (75.4 kg)   SpO2 98%   BMI 24.54 kg/m²     Gen: Alert, resting quietly in bed. No distress  Wounds: C/D/I midabdominal wound, staples have been removed, well approximated, no drainage or erythema. Chest: no distress and CTA. Good air movement. Room air. Heart: Afib and no MRG. Abd: soft, non-distended, no hepatomegaly. Normoactive bowel sounds. Improved minimal  generalized tenderness to palpation.    Ext: no clubbing, cyanosis, or edema  Ileostomy intact: RLQ draining brown liquid stool   Neuro:  CN 2-12 intact and no focal sensory or motor deficits        Radiologic / Imaging / TESTING  4/2/22 CT Abdomen Pelvis W IV Contrast:  Impression   Interval development of severe circumferential wall thickening and   pericolonic inflammatory changes of the transverse colon and splenic flexure   of the colon just proximal to the left lower quadrant colostomy with   increased fluid-filled distension of the proximal colon and small bowel. Findings suggest acute infectious/inflammatory colitis with proximal partial   obstruction versus ileus.       Stable infrarenal abdominal aortic aneurysm measuring 5.5 cm in diameter. See follow-up recommendations below.       Stable hepatic cysts and bilateral renal cortical cysts.       Cardiomegaly with bilateral pleural effusions suggesting mild CHF.       RECOMMENDATIONS:   5.5 cm infrarenal abdominal aortic aneurysm. Recommend referral to a vascular   specialist.       Reference: Concepcion Noelel Radiol [id number].      4/2/22 XR Chest Portable:  Impression   Trace left pleural effusion with adjacent atelectasis.      4/2/22 XR Chest Portable:  Impression   Enteric tube tip is noted within the stomach with the side port likely at or   just distal to the GE junction.          4/3/22 XR Abdomen:  Impression   Multiple air-filled distended loops of small bowel stacked in the mid abdomen   suggesting a low-grade partial small bowel obstruction or focal ileus.       Atherosclerotic calcification of an abdominal aortic aneurysm as seen on   prior CT.       NG tube in the stomach with proximal port at the level of the GE junction.      4/3/22 XR Chest Portable:  Impression   Right line placement as above. Bilateral small areas of pneumonia      4/4/22 XR Abdomen:  Impression   1.  Nasogastric tube has been advanced with the tip over the antrum.       2.  New skin staples are present at the abdomen and pelvis.       3.  Residual gas dilatation of multiple small bowel loops.  There may be a   small amount of postsurgical pneumoperitoneum.       4.  See the prior CT scan for evaluation of the infrarenal aortic aneurysm.      4/5/22 XR Chest Portable:  Impression   Low lung volumes with persistent bibasilar airspace disease.       Trace left pleural effusion.      4/7/22 XR Abdomen:  Impression   The patient is status post surgery.  There is decreased but continued mild   distension of the small bowel could represent partial obstruction or ileus.       Calcification of the abdominal aorta consistent with a known aneurysm.       The NG tube tip is in the fundus of the stomach.  The proximal port is in the   distal esophagus near the GE junction.  Consider advancement of the tube. 4/8/22 XR Chest Portable:  Impression   Unchanged moderate left pleural effusion bibasilar atelectasis.  No evidence   of pulmonary edema. 4/10/22 CT Abdomen Pelvis W IV Contrast:  Impression   No mechanical obstructive process however inflammatory findings of small   bowel with mucosal hyperenhancement and wall thickening throughout the   distended small bowel loops to the right lower quadrant ileostomy with small   volume abdominopelvic ascites most consistent with enteritis.  No obvious   abscess formation.  Moderate bilateral pleural effusions along with body wall   edema. 4/13/22 XR Chest Portable:  Impression   Interval decrease in size of bilateral pleural effusion status post   thoracentesis, with improved aeration at the lung bases.  No visualized   pneumothorax. 4/13/22 XR Abdomen:  Impression   Increased small bowel dilatation to suggest small bowel obstruction, ileus or   enteritis. 4/16/22 XR Chest Portable:  Impression   Small left basilar pneumonia suspected     4/19/22 US Abdomen Limited: Liver:  Impression   1. Limited exam.   2. Cholecystectomy.    3. Common bile duct is within normal limits.  No intrahepatic bile duct dilatation.           Labs:    Recent Results (from the past 24 hour(s))   POCT Glucose    Collection Time: 04/20/22  4:30 PM   Result Value Ref Range    POC Glucose 98 70 - 99 MG/DL   POCT Glucose    Collection Time: 04/20/22 10:06 PM   Result Value Ref Range    POC Glucose 117 (H) 70 - 99 MG/DL   C-Reactive Protein    Collection Time: 04/21/22  6:00 AM   Result Value Ref Range    CRP, High Sensitivity 146.8 mg/L   Procalcitonin    Collection Time: 04/21/22  6:00 AM   Result Value Ref Range    Procalcitonin 0.119    COVID-19, Rapid    Collection Time: 04/21/22 12:05 PM    Specimen: Nasopharyngeal   Result Value Ref Range    Source UNKNOWN     SARS-CoV-2, NAAT NOT DETECTED NOT DETECTED     CULTURE results: Invalid input(s): BLOOD CULTURE,  URINE CULTURE, SURGICAL CULTURE    Diagnosis:  Patient Active Problem List   Diagnosis    Hypertension    Benign prostatic hyperplasia    P[facility]iatic arthritis ([facility]    Primary malignant neoplasm of rectum (HCC)    Psoriasis    Chronic myeloproliferative disease ([facility] [address] [facility]    Monocytosis (symptomatic)    Gastroesophageal reflux disease without esophagitis    Urinary retention    H/O: CVA (cerebrovascular accident)    Irregular heart beat    Nonrheumatic aortic valve stenosis    Paroxysmal atrial fibrillation (HCC)    GI bleed    Melena    Arteriovenous malformation of jejunum    History of rectal cancer    Benign neoplasm of cecum    Benign neoplasm of ascending colon    Pseudomonas urinary tract infection    Partial small bowel obstruction (HCC)    SBO (small bowel obstruction) (HCC)    Atrial fibrillation with RVR (HCC)    Hypotension    Ischemic colitis (HCC)    Hypocalcemia    MARIELOS (acute kidney injury) (HCC)    Probable Bacterial Pneumonia    CAD (coronary artery disease)    Anemia    Delirium    Severe protein-calorie malnutrition (HCC)    On total parenteral nutrition    Colitis       Active Problems  Principal Problem:    SBO (small bowel obstruction) ([facility] [address]  Active Problems:    Gastroesophageal reflux disease without esophagitis    Pseudomonas urinary tract infection    Atrial fibrillation with RVR (HCC)    Hypotension    Ischemic colitis (HCC)    Hypocalcemia    MARIELOS (acute kidney injury) ([facility] [address]    Probable Bacterial Pneumonia    CAD (coronary artery disease)    Anemia    Delirium    Severe protein-calorie malnutrition (HCC)    On total parenteral nutrition    Colitis  Resolved Problems:    * No resolved hospital problems. *    Electronically signed by: Electronically signed by CHEMA Hinton CNP on 4/21/2022 at 1:13 PM

## 2022-04-21 NOTE — PLAN OF CARE
Problem: Falls - Risk of:  Goal: Will remain free from falls  Description: Will remain free from falls  Outcome: Completed  Goal: Absence of physical injury  Description: Absence of physical injury  Outcome: Completed     Problem: Pain:  Description: Pain management should include both nonpharmacologic and pharmacologic interventions. Goal: Pain level will decrease  Description: Pain level will decrease  Outcome: Completed  Goal: Control of acute pain  Description: Control of acute pain  Outcome: Completed  Goal: Control of chronic pain  Description: Control of chronic pain  Outcome: Completed     Problem: Skin Integrity:  Goal: Will show no infection signs and symptoms  Description: Will show no infection signs and symptoms  Outcome: Completed  Goal: Absence of new skin breakdown  Description: Absence of new skin breakdown  Outcome: Completed  Goal: Signs of wound healing will improve  Description: Signs of wound healing will improve  Outcome: Completed  Goal: Risk for impaired skin integrity will decrease  Description: Risk for impaired skin integrity will decrease  Outcome: Completed     Problem: Infection - Surgical Site:  Goal: Will show no infection signs and symptoms  Description: Will show no infection signs and symptoms  Outcome: Completed     Problem:  Bowel/Gastric:  Goal: Gastrointestinal status for postoperative course will improve  Description: Gastrointestinal status for postoperative course will improve  Outcome: Completed     Problem: Fluid Volume:  Goal: Ability to achieve a balanced intake and output will improve  Description: Ability to achieve a balanced intake and output will improve  Outcome: Completed     Problem: Nutritional:  Goal: Ability to attain and maintain optimal nutritional status will improve  Description: Ability to attain and maintain optimal nutritional status will improve  Outcome: Completed     Problem: Physical Regulation:  Goal: Postoperative complications will be avoided or minimized  Description: Postoperative complications will be avoided or minimized  Outcome: Completed     Problem: Sensory:  Goal: Pain level will decrease  Description: Pain level will decrease  Outcome: Completed     Problem: Nutrition  Goal: Optimal nutrition therapy  Outcome: Completed     Problem: Coping:  Goal: Ability to remain calm will improve  Description: Ability to remain calm will improve  Outcome: Completed     Problem: Safety:  Goal: Ability to remain free from injury will improve  Description: Ability to remain free from injury will improve  Outcome: Completed     Problem: Self-Care:  Goal: Ability to participate in self-care as condition permits will improve  Description: Ability to participate in self-care as condition permits will improve  Outcome: Completed     Problem: Chronic Conditions and Co-morbidities  Goal: Patient's chronic conditions and co-morbidity symptoms are monitored and maintained or improved  Outcome: Completed     Problem: Discharge Planning  Goal: Discharge to home or other facility with appropriate resources  Outcome: Completed

## 2022-04-21 NOTE — PROGRESS NOTES
Nephrology Progress Note  4/21/2022 12:42 PM  Subjective: Interval History: Reji Larsen is a 80 y.o. male  weak tired no acute distress appears somewhat confused today  Data:   Scheduled Meds:   loperamide  4 mg Oral BID    pantoprazole  40 mg Oral QAM AC    digoxin  125 mcg Oral Daily    metoprolol tartrate  25 mg Oral BID    dilTIAZem  60 mg Oral 2 times per day    lactobacillus  1 capsule Oral Daily    apixaban  2.5 mg Oral BID    aspirin  81 mg Oral Daily    sodium chloride flush  5-40 mL IntraVENous 2 times per day     Continuous Infusions:   dextrose           CBC   No results for input(s): WBC, HGB, HCT, PLT in the last 72 hours. BMP   No results for input(s): NA, K, CL, CO2, PHOS, BUN, CREATININE, CA in the last 72 hours. Hepatic:   No results for input(s): AST, ALT, ALB, BILITOT, ALKPHOS in the last 72 hours. Troponin: No results for input(s): TROPONINI in the last 72 hours. BNP: No results for input(s): BNP in the last 72 hours. Lipids: No results for input(s): CHOL, HDL in the last 72 hours. Invalid input(s): LDLCALCU  ABGs: No results found for: PHART, PO2ART, GOI3DCB  INR:   No results for input(s): INR in the last 72 hours.   Renal Labs  Albumin:    Lab Results   Component Value Date    LABALBU 2.3 04/18/2022     Calcium:    Lab Results   Component Value Date    CALCIUM 7.5 04/18/2022     Phosphorus:    Lab Results   Component Value Date    PHOS 3.2 04/18/2022     U/A:    Lab Results   Component Value Date    NITRU POSITIVE 04/02/2022    COLORU YELLOW 04/02/2022    WBCUA 11 04/02/2022    RBCUA 1 04/02/2022    MUCUS FEW 04/02/2022    TRICHOMONAS NONE SEEN 03/20/2022    BACTERIA OCCASIONAL 04/02/2022    CLARITYU CLEAR 04/02/2022    SPECGRAV 1.015 04/02/2022    UROBILINOGEN 0.2 04/02/2022    BILIRUBINUR NEGATIVE 04/02/2022    BLOODU MODERATE 04/02/2022    KETUA SMALL 04/02/2022     ABG:  No results found for: PHART, XSK8RTX, PO2ART, RGD7RWW, BEART, THGBART, PGV6TKY, M6QAGXVN  HgBA1c:  No results found for: LABA1C  Microalbumen/Creatinine ratio:  No components found for: RUCREAT  TSH:  No results found for: TSH  IRON:    Lab Results   Component Value Date    IRON 35 03/01/2022     Iron Saturation:  No components found for: PERCENTFE  TIBC:    Lab Results   Component Value Date    TIBC 280 03/01/2022     FERRITIN:    Lab Results   Component Value Date    FERRITIN 210 03/01/2022     RPR:  No results found for: RPR  SAQIB:  No results found for: ANATITER, SAQIB  24 Hour Urine for Creatinine Clearance:  No components found for: CREAT4, UHRS10, UTV10      Objective:   I/O: 04/20 0701 - 04/21 0700  In: 200 [P.O.:200]  Out: 1475 [Urine:1050]  I/O last 3 completed shifts: In: 440 [P.O.:440]  Out: 2625 [Urine:1750; Stool:875]  No intake/output data recorded. Vitals: /73   Pulse 76   Temp 95.7 °F (35.4 °C) (Oral)   Resp 21   Ht 5' 9.02\" (1.753 m)   Wt 166 lb 4 oz (75.4 kg)   SpO2 98%   BMI 24.54 kg/m²  {  General appearance: awake weak  HEENT: Head: Normal, normocephalic, atraumatic.   Neck: supple, symmetrical, trachea midline  Lungs: diminished breath sounds bilaterally  Heart: S1, S2 normal  Abdomen: abnormal findings:  soft mildly tender  Extremities: edema trace  Neurologic: Mental status: alertness: Arousable weak      Assessment and Plan:      IMP:   #1 small bowel obstruction status post surgery   #2 acute renal failure from ATN   #3 possible colitis with leukocytosis   #4 anemia   #5 A. fib rapid rate   #6 mild shortness of breath with pleural effusion    Plan      #1 monitor oral intake and diet   #2 renal function has been holding stable can check labs in the morning   #3 no recurrent infection we will monitor   #4 check hemoglobin in the morning   #5 cardiac status monitor   #6 monitor oxygenation with above           Rudy High MD, MD

## 2022-04-21 NOTE — DISCHARGE SUMMARY
V2.0  Discharge Summary    Name:  Dennis Read /Age/Sex: 1929 (46 y.o. male)   Admit Date: 2022  Discharge Date: 22    MRN & CSN:  [id number] & [id number] Encounter Date and Time 22 1:42 PM EDT    Attending:  Babak Johnson MD Discharging Provider: Babak Johnson MD       Hospital Course:     Brief HPI: Dennis Read is a 80 y.o. male who presented with abdominal pain and vomiting    Brief Problem Based Course:  Dennis Read is a 80 y.o.  male  Who has a history of essential hypertension, atrial fibrillation, prior small bowel obstruction presents with abdominal pain, nausea and vomiting associated with abdominal distention started overnight. Patient also noticed that he has had dark watery stools, not of his colostomy. He has had reduced output since onset of illness. On initial evaluation the emergency room, patient was noted to have a heart rate in the 140s. Patient does have a history of atrial fibrillation and is on anticoagulation. He was placed on Cardizem drip with little improvement. He also received 1 dose of Cardizem which has not helped. Cardiology has seen patient here in the emergency room and added amiodarone to his regimen and his heart rate is better controlled. He states that his pain was 10 out of 10 at its worst but is now improved since he received morphine. He describes his pain as generalized and sharp, nonradiating. He denies any fever. He denies any chest pain. Admitted as:  Dennis Read is a 80 y.o. male with pmh of rectal cancer (s/p resection and colostomy ) who presents with abdominal pain and vomiting.     1. Severe colitis: CT abdomen  showed severe colitis. Surgery consulted-exploratory laparotomy, small bowel resection, colon resection and end ileostomy 4/3. Biopsy is consistent with pseudomembranous colitis. On Dificid for a week-discontinued today. ID consult noted. Status post TPN. Currently on regular diet.   We will follow-up in surgery clinic with Dr. Josselin Walker.     2.  Bilateral pleural effusion: Status post bilateral thoracentesis on 4/13. Fluid culture-no organisms seen.     3. Fluid overload/bilateral leg edema: Status post IV Lasix. Last echo 8/21-EF 55%. Monitor chemistry.     4.  Atrial fibrillation with rapid ventricular rate: Rate control with digoxin, Cardizem 120 mg CD and metoprolol. On Eliquis for stroke prophylaxis.     5. Acute metabolic encephalopathy: Resolved.     6.  Acute kidney injury: Due to sepsis. Nephrology consult appreciated. Resolved. According to the patient, he wears indwelling Weaver catheter.     7.  Pseudomonas UTI: Status post Zosyn.     8. Chronic anemia: Hemoglobin is stable. Transfuse if hemoglobin is less than 7.     9. Oral thrush: Resolved.     10. AAA: CT revealed 5.5 cm infrarenal abdominal aneurysm (4.8 cm in 11/19). His age may preclude elective surgical intervention. Needs vascular surgery evaluation. Patient will follow with primary care physician. 11.  Essential thrombocytosis: He used to be on hydroxyurea. Recommending a follow-up with his hematologist.     Chronic problems include history of rectal cancer (resection and colostomy 2019), prior history of SBO, essential thrombocytopenia, BPH, psoriatic arthritis and prior CVA. The patient expressed appropriate understanding of, and agreement with the discharge recommendations, medications, and plan.      Consults this admission:  IP CONSULT TO GENERAL SURGERY  IP CONSULT TO CARDIOLOGY  IP CONSULT TO HOSPITALIST  IP CONSULT TO GI  PHARMACY TO DOSE VANCOMYCIN  IP CONSULT TO NEPHROLOGY  IP CONSULT TO PHARMACY  IP CONSULT TO INFECTIOUS DISEASES  IP CONSULT TO PALLIATIVE CARE  IP CONSULT TO INTERVENTIONAL RADIOLOGY    Discharge Diagnosis:   SBO (small bowel obstruction) (HCC)    Severe colitis  Bilateral pleural effusion  Bilateral leg edema  Atrial fibrillation with rapid ventricular rate  Acute metabolic encephalopathy  Acute kidney injury  Pseudomonas UTI  Oral thrush  AAA    Discharge Instruction:   Follow up appointments: PCP and surgery  Primary care physician: Hetal Stinson MD within 2 weeks  Diet: regular diet   Activity: activity as tolerated  Disposition: Discharged to:   []Home, []C, [x]SNF, []Acute Rehab, []Hospice   Condition on discharge: Stable  Labs and Tests to be Followed up as an outpatient by PCP or Specialist: None    Discharge Medications:        Medication List      START taking these medications    digoxin 125 MCG tablet  Commonly known as: LANOXIN  Take 1 tablet by mouth daily  Start taking on: April 22, 2022     dilTIAZem 120 MG extended release capsule  Commonly known as: Cardizem CD  Take 1 capsule by mouth daily        CONTINUE taking these medications    adalimumab 40 MG/0.8ML injection  Commonly known as: HUMIRA     apixaban 2.5 MG Tabs tablet  Commonly known as: Eliquis  Take 1 tablet by mouth 2 times daily Please resume this medication on Monday , march 28 th     aspirin 81 MG chewable tablet  Take 1 tablet by mouth daily     ferrous sulfate 325 (65 Fe) MG tablet  Commonly known as: IRON 325  Take 1 tablet by mouth 2 times daily     folic acid 1 MG tablet  Commonly known as: FOLVITE  Take 1 tablet by mouth daily     metoprolol tartrate 25 MG tablet  Commonly known as: LOPRESSOR  Take 1 tablet by mouth 2 times daily for 15 days     pantoprazole 40 MG tablet  Commonly known as: PROTONIX  Take 1 tablet by mouth daily     therapeutic multivitamin-minerals tablet     VITAMIN E PO        STOP taking these medications    dilTIAZem 240 MG extended release capsule  Commonly known as: Dilt-XR     hydroxyurea 500 MG chemo capsule  Commonly known as: HYDREA     isosorbide mononitrate 30 MG extended release tablet  Commonly known as: IMDUR           Where to Get Your Medications      These medications were sent to [address] [place] [address], [address] [place] [address] [address], [place]    Phone: [phone]   · digoxin 125 MCG tablet  · dilTIAZem 120 MG extended release capsule        Objective Findings at Discharge:   /73   Pulse 76   Temp 95.7 °F (35.4 °C) (Oral)   Resp 21   Ht 5' 9.02\" (1.753 m)   Wt 166 lb 4 oz (75.4 kg)   SpO2 98%   BMI 24.54 kg/m²      He tolerates his regular diet. No vomiting or abdominal pain. He has no complaint. Physical Exam:   General: No apparent respiratory distress. Generally weak. Eyes: EOMI. Anicteric. ENT: neck supple  Cardiovascular: Regular rate. Respiratory: Clear to auscultation  Gastrointestinal: Soft. Ileostomy bag noted. No palpable mass. Genitourinary: no suprapubic tenderness. Weaver catheter noted. Musculoskeletal: No edema  Skin: warm, dry  Neuro: Alert. Oriented x3. Generally weak  Psych: Mood appropriate. Labs and Imaging   XR CHEST PORTABLE    Result Date: 4/16/2022  EXAMINATION: ONE XRAY VIEW OF THE CHEST 4/16/2022 12:50 pm COMPARISON: None. HISTORY: ORDERING SYSTEM PROVIDED HISTORY: SOB TECHNOLOGIST PROVIDED HISTORY: Reason for exam:->SOB Reason for Exam: SOB Additional signs and symptoms: SOB Relevant Medical/Surgical History: SOB FINDINGS: There is a small left basilar infiltrate and probable effusion which appears new compared to the previous exam 04/30/2022. Mild pulmonary venous congestion. Cardiac silhouette and osseous structures unchanged. Right IJ line terminates in the SVC/atrium     Small left basilar pneumonia suspected     US ABDOMEN LIMITED Specify organ? LIVER    Result Date: 4/19/2022  EXAMINATION: RIGHT UPPER QUADRANT ULTRASOUND 4/19/2022 5:53 am COMPARISON: None.  HISTORY: ORDERING SYSTEM PROVIDED HISTORY: Elevated alkaline phosphatase TECHNOLOGIST PROVIDED HISTORY: Reason for exam:->Elevated alkaline phosphatase Specify organ?->LIVER Reason for Exam: Abd pain Additional signs and symptoms: Cholecystectomy FINDINGS: LIVER:  The liver demonstrates normal echogenicity without evidence of intrahepatic biliary ductal dilatation. BILIARY SYSTEM:  Cholecystectomy is noted. Common bile duct is within normal limits measuring 5.1 mm. RIGHT KIDNEY: The right kidney is grossly unremarkable without evidence of hydronephrosis. PANCREAS:  The pancreas is not visualized. OTHER: No evidence of right upper quadrant ascites. The examination is limited due to patient's body habitus. 1. Limited exam. 2. Cholecystectomy. 3. Common bile duct is within normal limits. No intrahepatic bile duct dilatation. CBC: No results for input(s): WBC, HGB, PLT in the last 72 hours. BMP:  No results for input(s): NA, K, CL, CO2, BUN, CREATININE, GLUCOSE in the last 72 hours. Hepatic: No results for input(s): AST, ALT, ALB, BILITOT, ALKPHOS in the last 72 hours. Lipids:   Lab Results   Component Value Date    TRIG 136 04/13/2022     Hemoglobin A1C: No results found for: LABA1C  TSH: No results found for: TSH  Troponin:   Lab Results   Component Value Date    TROPONINT <0.010 04/02/2022    TROPONINT <0.010 03/20/2022    TROPONINT <0.010 02/27/2022     Lactic Acid: No results for input(s): LACTA in the last 72 hours. BNP: No results for input(s): PROBNP in the last 72 hours.   UA:  Lab Results   Component Value Date    NITRU POSITIVE 04/02/2022    COLORU YELLOW 04/02/2022    WBCUA 11 04/02/2022    RBCUA 1 04/02/2022    MUCUS FEW 04/02/2022    TRICHOMONAS NONE SEEN 03/20/2022    BACTERIA OCCASIONAL 04/02/2022    CLARITYU CLEAR 04/02/2022    SPECGRAV 1.015 04/02/2022    LEUKOCYTESUR NEGATIVE 04/02/2022    UROBILINOGEN 0.2 04/02/2022    BILIRUBINUR NEGATIVE 04/02/2022    BLOODU MODERATE 04/02/2022    KETUA SMALL 04/02/2022     Urine Cultures: No results found for: LABURIN  Blood Cultures: No results found for: BC  No results found for: BLOODCULT2  Organism: No results found for: ORG    Time Spent Discharging patient 40 minutes    Electronically signed by Jimbo Dangelo MD on 4/21/2022 at 1:42 PM

## 2022-04-21 NOTE — PLAN OF CARE
Problem: Falls - Risk of:  Goal: Will remain free from falls  Description: Will remain free from falls  Outcome: Progressing  Goal: Absence of physical injury  Description: Absence of physical injury  Outcome: Progressing     Problem: Pain:  Goal: Pain level will decrease  Description: Pain level will decrease  Outcome: Progressing  Goal: Control of acute pain  Description: Control of acute pain  Outcome: Progressing  Goal: Control of chronic pain  Description: Control of chronic pain  Outcome: Progressing     Problem: Skin Integrity:  Goal: Will show no infection signs and symptoms  Description: Will show no infection signs and symptoms  Outcome: Progressing  Goal: Absence of new skin breakdown  Description: Absence of new skin breakdown  Outcome: Progressing  Goal: Signs of wound healing will improve  Description: Signs of wound healing will improve  Outcome: Progressing  Goal: Risk for impaired skin integrity will decrease  Description: Risk for impaired skin integrity will decrease  Outcome: Progressing     Problem: Infection - Surgical Site:  Goal: Will show no infection signs and symptoms  Description: Will show no infection signs and symptoms  Outcome: Progressing     Problem:  Bowel/Gastric:  Goal: Gastrointestinal status for postoperative course will improve  Description: Gastrointestinal status for postoperative course will improve  Outcome: Progressing     Problem: Fluid Volume:  Goal: Ability to achieve a balanced intake and output will improve  Description: Ability to achieve a balanced intake and output will improve  Outcome: Progressing     Problem: Nutritional:  Goal: Ability to attain and maintain optimal nutritional status will improve  Description: Ability to attain and maintain optimal nutritional status will improve  Outcome: Progressing     Problem: Physical Regulation:  Goal: Postoperative complications will be avoided or minimized  Description: Postoperative complications will be avoided or minimized  Outcome: Progressing     Problem: Sensory:  Goal: Pain level will decrease  Description: Pain level will decrease  Outcome: Progressing     Problem: Nutrition  Goal: Optimal nutrition therapy  Outcome: Progressing     Problem: Coping:  Goal: Ability to remain calm will improve  Description: Ability to remain calm will improve  Outcome: Progressing     Problem: Safety:  Goal: Ability to remain free from injury will improve  Description: Ability to remain free from injury will improve  Outcome: Progressing     Problem: Self-Care:  Goal: Ability to participate in self-care as condition permits will improve  Description: Ability to participate in self-care as condition permits will improve  Outcome: Progressing     Problem: Chronic Conditions and Co-morbidities  Goal: Patient's chronic conditions and co-morbidity symptoms are monitored and maintained or improved  Outcome: Progressing     Problem: Discharge Planning  Goal: Discharge to home or other facility with appropriate resources  Outcome: Progressing

## 2022-04-22 PROBLEM — T81.43XA POSTOPERATIVE INTRA-ABDOMINAL ABSCESS: Status: ACTIVE | Noted: 2022-01-01

## 2022-04-22 NOTE — H&P
Hospital Medicine History and Physical    4/22/2022    Date of Admission: 4/22/2022    Date of Service: Pt seen/examined on 4/22/2022 and admitted to inpatient. Assessment/plan:  1. Postoperative intra-abdominal abscess (vs loculated ascites). Patient with recent severe colitis status post exploratory laparotomy, small bowel resection, colon resection and end ileostomy on 4/3/2022, biopsy findings consistent with pseudomembranous colitis that was treated with Dificid, completed treatment on 4/21/2022. Blood cultures have been ordered in the emergency room. Patient received a dose of Zosyn in the emergency room. Will continue Zosyn, add Zyvox. Start lactobacillus. Consult interventional radiology to assist with possible aspiration of intra-abdominal abscess. General surgery has been consulted from the emergency room. Infectious disease consult initiated. Low threshold for discontinuation of antibiotics suspicion for infectious process determined to be low - patient has leukocytosis (which could be reactive); however, clinically stable, afebrile, has no abdominal pain. Palliative care consult initiated, as family is not interested in extensive surgery surgery; may be appropriate to explore goals of care at this time. 2. Bleeding around colostomy insertion site. This has been stabilized with silver nitrate in the emergency room. Will hold Eliquis and aspirin for now. General surgery to reevaluate later this morning. 3. History of paroxysmal atrial fibrillation. Continue beta-blocker and calcium channel blocker with hold parameters. Continue digoxin, check level this morning. Currently holding Eliquis due to #2.  4. Other comorbidities: history of macrocytic anemia, CKD stage III, infrarenal abdominal aortic aneurysm, BPH, CAD, history of rectal cancer, GERD, essential hypertension, history of small bowel obstruction, severe protein calorie malnutrition, history of CVA.     Activities: Up with assist  Prophylaxis: SCDs  Code status: Full code. When asked about code status, patient said \"I want you to try if you can get my heart going. \"     ==========================================================  Chief complaint:  Chief Complaint   Patient presents with    Other     bleeding at new ostomy site       History of Presenting Illness: This is a pleasant 80 y.o. male with history of macrocytic anemia, CKD stage III, infrarenal abdominal aortic aneurysm, BPH, CAD, history of rectal cancer, GERD, essential hypertension, history of small bowel obstruction, severe protein calorie malnutrition, history of CVA, paroxysmal atrial fibrillation (on chronic anticoagulation with Eliquis), who was recently discharged on 4/21/2022 following a nearly 3-week hospital stay for management of severe colitis, status post exploratory laparotomy, small bowel resection, colon resection and end ileostomy on 4/3/2022 (biopsy consistent with pseudomembranous colitis, treated with Dificid). He presents to the emergency room today with complaints of bleeding around ostomy, which was treated with silver nitrate in the emergency room. He does not have abdominal pain or fever or chills. CT-abdomen/pelvis obtained in the emergency room today reveals findings consistent with intra-abdominal abscess. Patient was started on Zosyn in the emergency room. Family indicated that they do not want patient to undergo any extensive surgery, agreeable to possible minimally invasive approach. Patient is being admitted for further management.     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    Delirium 4/7/2022    Gastroesophageal reflux disease without esophagitis 05/21/2020    Hx of cardiovascular stress test 08/12/2021    EF 62% Normal study    Hypertension     On total parenteral nutrition 4/9/2022    Partial small bowel obstruction ([facility] 3/23/2022    Primary malignant neoplasm of rectum ([facility] 08/17/2019    Probable Bacterial Pneumonia 4/5/2022    Psoriasis     Psoriatic arthritis ([facility] 08/17/2019    Pyelonephritis 03/24/2018    Severe protein-calorie malnutrition ([address] 4/9/2022    Stable angina ([facility] 08/17/2019    Unspecified cerebral artery occlusion with cerebral infarction        Past Surgical History:      Procedure Laterality Date    APPENDECTOMY      COLONOSCOPY N/A 3/2/2022    COLONOSCOPY POLYPECTOMY REMOVAL ABLATION/STOMA with EMR and placed 7 hemoclips. performed by Ming Ho MD at [facility] N/A 4/3/2022    LAPAROTOMY EXPLORATORY COLON RESECTION performed by Laury White MD at [place] [address] N/A 2/28/2022    ENTEROSCOPY PUSH CONTROL HEMORRHAGE WITH APC ABLATION OF AVM performed by Ming Ho MD at [facility] ENDOSCOPY       Medications (prior to admission):  Prior to Admission medications    Medication Sig Start Date End Date Taking?  Authorizing Provider   dilTIAZem (CARDIZEM CD) 120 MG extended release capsule Take 1 capsule by mouth daily 4/21/22   Hue Rolle MD   digoxin (LANOXIN) 125 MCG tablet Take 1 tablet by mouth daily 4/22/22   Hue Rolle MD   aspirin 81 MG chewable tablet Take 1 tablet by mouth daily 3/30/22   Kaila Joy, DO   folic acid (FOLVITE) 1 MG tablet Take 1 tablet by mouth daily 3/30/22   Kaila Aquinopa, DO   ferrous sulfate (IRON 325) 325 (65 Fe) MG tablet Take 1 tablet by mouth 2 times daily 3/30/22   Kaila Aquinopa, DO   pantoprazole (PROTONIX) 40 MG tablet Take 1 tablet by mouth daily 3/30/22   Kaila Joy, DO   apixaban (ELIQUIS) 2.5 MG TABS tablet Take 1 tablet by mouth 2 times 24   BUN 19   CREATININE 1.4*   GLUCOSE 108*     Hepatic:   Recent Labs     04/22/22  0148   AST 19   ALT 29   BILITOT 0.6   ALKPHOS 325*       CT-abdomen/pelvis with IV contrast:  1. Large rim enhancing fluid collection is seen within the abdomen and pelvis   which extends from the presacral region to the level of the pancreas,   concerning for an abscess. 2. Small amount of free fluid is seen within the abdomen. 3. Infrarenal abdominal aortic aneurysm measuring 5.6 cm.  Please see   recommendations below. 4. Small bilateral pleural effusions with bibasilar atelectasis. 5. Focally dilated small bowel loops with air-fluid levels are seen within   the left upper quadrant which could be related to ileus. VL DUP LOWER EXTREMITY VENOUS RIGHT  Result Date: 4/22/2022  EXAMINATION: DUPLEX VENOUS ULTRASOUND OF THE RIGHT LOWER EXTREMITY 4/22/2022 3:00 am TECHNIQUE: Duplex ultrasound using B-mode/gray scaled imaging and Doppler spectral analysis and color flow was obtained of the deep venous structures of the right extremity. COMPARISON: None. HISTORY: ORDERING SYSTEM PROVIDED HISTORY: swelling TECHNOLOGIST PROVIDED HISTORY: Reason for exam:->swelling Reason for Exam: swelling FINDINGS: The visualized veins of the right lower extremity are patent and free of echogenic thrombus. The veins demonstrate good compressibility with normal color flow study and spectral analysis. No evidence of DVT in the right lower extremity. Discussed with ER physician.       Thank you Maria Ines Martinez MD for the opportunity to be involved in this patient's care.    -----------------------------  Vijaya Maldonado MD  [facility] Hospitalist

## 2022-04-22 NOTE — CONSULTS
Infectious Disease Consult Note  2022   Patient Name: Kelly Alexandre : 1929   Impression   Intra-Abdominal Abscess s/p 4/3/22 Subtotal Colon Resection with End Ileostomy:    · Afebrile  · Leukocytosis 15.4  · -Blood cultures pending  · -CT A&P W IV Contrast: Large rim enhancing fluid collection is seen within the abdomen and pelvis which extends from the presacral region to the level of the pancreas, concerning for an abscess. Full report below. · -per IR, Dr. Carline Stephenson, Intra-abdominal Abscess drain placement, 30 cc of viera fluid removed. Body Fluid culture pending  · Dr. Tayler Garcia, general surgery, onboard    · MARIELOS on CKD3    · Macrocytic Anemia:    · PAF    · AAA    · BPH    · CAD    · History of Rectal Cancer s/p Colostomy 2019    · Psoriatic Arthritis    · S/p CVA 2019    · Allergy to sulfa    · Multi-morbidity: per PMHx: AAA, BPH, CAD, rectal cancer , GERD, HTN, psoriatic arthritis, pyelonephritis, CVA     Plan:   Continue IV linezolid 600 mg q12h   Continue IV Zosyn 3,375 mg extended infusion, q8h  · Trend CRP and Pct, ordered  · Await body fluid cultures from IR drain placement from   · Await for [facility] from     Thank you for allowing me to consult in the care of this patient.  ------------------------  REASON FOR CONSULT: Infective syndrome     Requested by: Dr. Tayler Mendoza. Jael Mora is a 80 y.o.  male with a history of AF on AC, HTN, AAA, psoriatic arthritis, rectal cancer s/p colostomy, chronic myeloproliferative disorder, COVID-19 2022 who was admitted 2022 for further evaluation and management of bleeding from ostomy site with increased abdominal pain. He was just hospitalized from 22 to 22 with an acute abdomen, s/p 4/3 per Dr. Kamila Puente, exp lap, subtotal colon resection, end ileostomy, removal of colostomy and small bowel resection.   His hospital course was complicated by AF with RVR, MARIELOS, and was found to have a complicated UTI with Pseudomonas aeruginosa. He was treated with multiple ABX therapy, and DCd to a SNF off ABX. He underwent a CT of A&P which showed an impression of probable intra-abdominal abscess. He was taken to IR and an intra-abdominal drain was placed, cultures obtained. He has been started on empiric IV ABX therapy of linezolid and Zosyn. ? Infectious diseases service was consulted to evaluate the pt, and recommend further investigative and therapeutic measures. ROS: Other systems reviewed Including eyes, ENT, respiratory, cardiovascular, GI, , dermatologic, neurologic, psych, hem/lymphatic, musculoskeletal and endocrine were negative other than what is mentioned above.      Patient Active Problem List    Diagnosis Date Noted    Postoperative intra-abdominal abscess 04/22/2022    Colitis     Severe protein-calorie malnutrition ([address] 04/09/2022    On total parenteral nutrition 04/09/2022    Delirium 04/07/2022    Atrial fibrillation with RVR ([address] 04/05/2022    Hypotension 04/05/2022    Ischemic colitis ([address] 04/05/2022    Hypocalcemia 04/05/2022    MARIELOS (acute kidney injury) ([address] 04/05/2022    Probable Bacterial Pneumonia 04/05/2022    CAD (coronary artery disease) 04/05/2022    Anemia 04/05/2022    SBO (small bowel obstruction) ([address] 04/02/2022    Partial small bowel obstruction ([address] 03/23/2022    Pseudomonas urinary tract infection 03/20/2022    History of rectal cancer     Benign neoplasm of cecum     Benign neoplasm of ascending colon     Melena     Arteriovenous malformation of jejunum     GI bleed 02/27/2022    Paroxysmal atrial fibrillation ([address] 01/31/2022    Nonrheumatic aortic valve stenosis 10/04/2021    H/O: CVA (cerebrovascular accident) 08/02/2021    Irregular heart beat 08/02/2021    Gastroesophageal reflux disease without esophagitis 05/21/2020    Urinary retention 05/21/2020    Chronic myeloproliferative disease ([address] 04/30/2020    Monocytosis (symptomatic) 04/30/2020    Psoriatic arthritis ([address] 08/17/2019    Primary malignant neoplasm of rectum ([address] 08/17/2019    Psoriasis 08/17/2019    Hypertension 12/12/2011    Benign prostatic hyperplasia 12/12/2011     Past Medical History:   Diagnosis Date    AAA (abdominal aortic aneurysm) (HCC)     MARIELOS (acute kidney injury) ([address] 4/5/2022    Angina, class I ([address]     Benign prostatic hyperplasia 12/12/2011     Updating Deprecated Diagnoses    Bowel obstruction ([address]     CAD (coronary artery disease)     Cancer ([address]     rectal    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    Delirium 4/7/2022    Gastroesophageal reflux disease without esophagitis 05/21/2020    Hx of cardiovascular stress test 08/12/2021    EF 62% Normal study    Hypertension     On total parenteral nutrition 4/9/2022    Partial small bowel obstruction ([address] 3/23/2022    Primary malignant neoplasm of rectum ([address] 08/17/2019    Probable Bacterial Pneumonia 4/5/2022    Psoriasis     Psoriatic arthritis ([address] 08/17/2019    Pyelonephritis 03/24/2018    Severe protein-calorie malnutrition (HCC) 4/9/2022    Stable angina ([address] 08/17/2019    Unspecified cerebral artery occlusion with cerebral infarction       Past Surgical History:   Procedure Laterality Date    APPENDECTOMY      COLONOSCOPY N/A 3/2/2022    COLONOSCOPY POLYPECTOMY REMOVAL ABLATION/STOMA with EMR and placed 7 hemoclips. performed by Cuco Tapia MD at [facility] N/A 4/3/2022    LAPAROTOMY EXPLORATORY COLON RESECTION performed by Marianne Magana MD at [place] [address] N/A 2/28/2022    ENTEROSCOPY PUSH CONTROL HEMORRHAGE WITH APC ABLATION OF AVM performed by Cuco Tapia MD at [facility] ENDOSCOPY      Family History   Problem Relation Age of Onset    Cancer Mother     Cancer Father       Infectious disease related family history - not contibutory.    SOCIAL HISTORY  Social History     Tobacco Use    Smoking status: Former Smoker     Packs/day: 1.50     Years: 40.00     Pack years: 60.00     Types: Cigarettes     Quit date:      Years since quittin.3    Smokeless tobacco: Never Used   Substance Use Topics    Alcohol use: No       Born:   Lived   Occupation:   No recent travel of significance.  No recent unusual exposures.  NO pets  ? ALLERGIES  Allergies   Allergen Reactions    Morphine Hallucinations    Sulfa Antibiotics       MEDICATIONS  Reviewed and are per the chart/EMR. ? Antibiotics:   Present:  Zosyn -  Linezolid -    Past:  Cefepime ? Metronidazole   Zosyn   Vancomycin   Eraxis   Meropenem ? Po vancomycin 4/10-12  Zyvox   Dificid   ?  -------------------------------------------------------------------------------------------------------------------    Vital Signs:  Vitals:    22 0813   BP:    Pulse:    Resp:    Temp:    SpO2: 98%         Exam:    VS: noted; wt 166 lb (75.4 kg) Height 5'9\"  Gen: alert and oriented X3, no distress  Skin: no stigmata of endocarditis  Wounds: C/D/I mid abdominal incision well approximated, no erythema or edema at site, no drainage at site. Ileostomy: intact RUQ draining brown liquid stool  ERNIE Drain: intact LUQ, draining gold fluid  HEMT: AT/NC Oropharynx pink, moist, and without lesions or exudates; dentition in good state of repair  Eyes: PERRLA, EOMI, conjunctiva pink, sclera anicteric. Neck: Supple. Trachea midline. No LAD. Chest: no distress and CTA. Good air movement. Heart: RRR and no MRG. Abd: soft, non-distended, no tenderness, no hepatomegaly. Normoactive bowel sounds. Ext: no clubbing, cyanosis, or edema  Catheter Site: without erythema or tenderness draining clear yellow urine. Neuro: Mental status intact. CN 2-12 intact and no focal sensory or motor deficits    ? Diagnostic Studies: reviewed  22 CT Abdomen Pelvis W IV Contrast:  Impression   1.  Large rim enhancing fluid collection is seen within the abdomen and pelvis   which extends from the presacral region to the level of the pancreas,   concerning for an abscess. 2. Small amount of free fluid is seen within the abdomen. 3. Infrarenal abdominal aortic aneurysm measuring 5.6 cm.  Please see   recommendations below. 4. Small bilateral pleural effusions with bibasilar atelectasis. 5. Focally dilated small bowel loops with air-fluid levels are seen within   the left upper quadrant which could be related to ileus.       RECOMMENDATIONS:   5.6 cm infrarenal abdominal aortic aneurysm. Recommend referral to a vascular   specialist.       Reference: J Am Abad Radiol [id number];[id number].     4/22/22 VL Dup Lower Extremity Venous Right:  Impression   No evidence of DVT in the right lower extremity. ??  I have examined this patient and available medical records on this date and have made the above observations, conclusions and recommendations. Electronically signed by: Electronically signed by Neal Vázquez.  CHEMA Camara CNP on 4/22/2022 at 8:39 AM

## 2022-04-22 NOTE — ED TRIAGE NOTES
Patient brought in by EMS for bleeding at his new ostomy site. Ostomy placement was approximately 2-3 weeks ago.

## 2022-04-22 NOTE — PROGRESS NOTES
I was notified by my partner Dr. Milton Anthony about the patient's readmission. He is leaving town and I will see the patient. Consult to be dictated. Plan to have interventional radiology drain the abdominal fluid collection which is likely ascitic in nature. We will rule out the possibility of an abscess.   CPM

## 2022-04-22 NOTE — PROGRESS NOTES
Jona Reed MD, Acey Gosselin                Internal Medicine Hospitalist             Daily Progress  Note   Subjective:     Chief Complaint   Patient presents with    Other     bleeding at new ostomy site     Mr. Sepulveda Complains of nil, patient resting quietly in bed his daughter by the bedside. Family unhappy that he was discharged yesterday when he should not have been. Objective:    /80   Pulse 93   Temp 97.8 °F (36.6 °C) (Oral)   Resp 19   SpO2 97%      Intake/Output Summary (Last 24 hours) at 4/22/2022 1141  Last data filed at 4/22/2022 0737  Gross per 24 hour   Intake --   Output 400 ml   Net -400 ml      Physical Exam:  Heart: Distant heart sounds  Lungs: Mostly clear to auscultation, decreased breath sounds at bases. No wheezes appreciated no crackles heard. Moderate air exchange  Abdomen: Soft, non distended. Bowel sounds appreciated. No detailed exam done as he just came back from procedure   Extremities: Non tender, no swelling noted, strength 5/5 both legs. CNS: Grossly intact.     Labs:  CBC with Differential:    Lab Results   Component Value Date    WBC 15.4 04/22/2022    RBC 2.71 04/22/2022    RBC 5.19 08/18/2017    HGB 8.9 04/22/2022    HCT 29.1 04/22/2022     04/22/2022    .4 04/22/2022    MCH 32.8 04/22/2022    MCHC 30.6 04/22/2022    RDW 18.8 04/22/2022    SEGSPCT 82.4 04/22/2022    BANDSPCT 6 04/13/2022    LYMPHOPCT 6.0 04/22/2022    LYMPHOPCT 10.8 08/18/2017    MONOPCT 8.1 04/22/2022    MYELOPCT 1 04/12/2022    EOSPCT 2.6 12/11/2011    BASOPCT 0.4 04/22/2022    MONOSABS 1.2 04/22/2022    LYMPHSABS 0.9 04/22/2022    EOSABS 0.3 04/22/2022    BASOSABS 0.1 04/22/2022    DIFFTYPE AUTOMATED DIFFERENTIAL 04/22/2022     CMP:    Lab Results   Component Value Date     04/22/2022    K 4.2 04/22/2022    CL 98 04/22/2022    CO2 24 04/22/2022    BUN 19 04/22/2022    CREATININE 1.4 04/22/2022    GFRAA 57 04/22/2022    AGRATIO 1.6 03/10/2022    LABGLOM 47 04/22/2022 GLUCOSE 108 04/22/2022    PROT 6.6 04/22/2022    PROT 6.2 12/11/2011    LABALBU 2.8 04/22/2022    CALCIUM 8.4 04/22/2022    BILITOT 0.6 04/22/2022    ALKPHOS 325 04/22/2022    AST 19 04/22/2022    ALT 29 04/22/2022     No results for input(s): TROPONINT in the last 72 hours.   No results found for: [facility]      sodium chloride      sodium chloride 75 mL/hr at 04/22/22 0505      piperacillin-tazobactam  3,375 mg IntraVENous Q8H    linezolid  600 mg IntraVENous Q12H    digoxin  125 mcg Oral Daily    dilTIAZem  120 mg Oral Daily    ferrous sulfate  325 mg Oral BID WC    folic acid  1 mg Oral Daily    metoprolol tartrate  25 mg Oral BID    therapeutic multivitamin-minerals  1 tablet Oral Daily    pantoprazole  40 mg Oral Daily    sodium chloride flush  5-40 mL IntraVENous 2 times per day    lactobacillus  1 capsule Oral TID WC         Assessment:       Patient Active Problem List    Diagnosis Date Noted    Postoperative intra-abdominal abscess 04/22/2022    Colitis     Severe protein-calorie malnutrition ([address] 04/09/2022    On total parenteral nutrition 04/09/2022    Delirium 04/07/2022    Atrial fibrillation with RVR ([address] 04/05/2022    Hypotension 04/05/2022    Ischemic colitis ([address] 04/05/2022    Hypocalcemia 04/05/2022    MARIELOS (acute kidney injury) ([address] 04/05/2022    Probable Bacterial Pneumonia 04/05/2022    CAD (coronary artery disease) 04/05/2022    Anemia 04/05/2022    SBO (small bowel obstruction) ([address] 04/02/2022    Partial small bowel obstruction ([address] 03/23/2022    Pseudomonas urinary tract infection 03/20/2022    History of rectal cancer     Benign neoplasm of cecum     Benign neoplasm of ascending colon     Melena     Arteriovenous malformation of jejunum     GI bleed 02/27/2022    Paroxysmal atrial fibrillation ([address] 01/31/2022    Nonrheumatic aortic valve stenosis 10/04/2021    H/O: CVA (cerebrovascular accident) 08/02/2021    Irregular heart beat 08/02/2021    Gastroesophageal reflux disease without esophagitis 05/21/2020    Urinary retention 05/21/2020    Chronic myeloproliferative disease [facility] 04/30/2020    Monocytosis (symptomatic) 04/30/2020    Psoriatic arthritis ([facility] 08/17/2019    Primary malignant neoplasm of rectum ([facility] 08/17/2019    Psoriasis 08/17/2019    Hypertension 12/12/2011    Benign prostatic hyperplasia 12/12/2011       Plan:     Problems being addressed this admission:   Bleeding around colostomy site  4/22/2022 bleeding has stopped site looks okay will have surgery consulted as well. Seems like colostomy is working well. Intra-abdominal abscess (postoperative)  4/22/2022 patient had 15 mL of viera fluid retrieved by IR from the abscess awaiting culture sensitivity and meanwhile continue with Zyvox and Zosyn as before. I D on the case as well. PAF  4/22/2022 supposed to be on Eliquis per last discharge summary is not on it. We will consult cardiology for that further recommendation as per them neurology continue with Lanoxin 125 mcg daily Cardizem CD1 20 mg daily Lopressor 25 mg twice a day    Consultants:  General surgery  IR  Cardiology    General Orders:  Repeat basic labs again in am.  I have explained to the patient and discussed with him/her the treatment plan. The above chart was generated partly using Dragon dictation system, it may contain dictation errors given the limitations of this technology.      Omar Calvo MD, [place]

## 2022-04-22 NOTE — CONSULTS
Consult completed. PIV placed by bedside nurse. Patient was in CT when  Arrived to place PIV. Pt has patent IV access and therapeutic needs met.

## 2022-04-22 NOTE — ED PROVIDER NOTES
Partial small bowel obstruction ([facility] 3/23/2022    Primary malignant neoplasm of rectum ([facility] 08/17/2019    Probable Bacterial Pneumonia 4/5/2022    Psoriasis     Psoriatic arthritis ([facility] 08/17/2019    Pyelonephritis 03/24/2018    Severe protein-calorie malnutrition ([facility] 4/9/2022    Stable angina ([facility] 08/17/2019    Unspecified cerebral artery occlusion with cerebral infarction      Past Surgical History:   Procedure Laterality Date    APPENDECTOMY      COLONOSCOPY N/A 3/2/2022    COLONOSCOPY POLYPECTOMY REMOVAL ABLATION/STOMA with EMR and placed 7 hemoclips.  performed by Brandi Riley MD at [facility] N/A 4/3/2022    LAPAROTOMY EXPLORATORY COLON RESECTION performed by Pete Salas MD at [place] [address] N/A 2/28/2022    ENTEROSCOPY PUSH CONTROL HEMORRHAGE WITH APC ABLATION OF AVM performed by Brandi Riley MD at [address] [place] [address]    Current Outpatient Rx   Medication Sig Dispense Refill    dilTIAZem (CARDIZEM CD) 120 MG extended release capsule Take 1 capsule by mouth daily 30 capsule 3    digoxin (LANOXIN) 125 MCG tablet Take 1 tablet by mouth daily 30 tablet 3    aspirin 81 MG chewable tablet Take 1 tablet by mouth daily 30 tablet 5    folic acid (FOLVITE) 1 MG tablet Take 1 tablet by mouth daily 90 tablet 1    ferrous sulfate (IRON 325) 325 (65 Fe) MG tablet Take 1 tablet by mouth 2 times daily 180 tablet 1    pantoprazole (PROTONIX) 40 MG tablet Take 1 tablet by mouth daily 90 tablet 1    apixaban (ELIQUIS) 2.5 MG TABS tablet Take 1 tablet by mouth 2 times daily Please resume this medication on Monday , march 28 th 60 tablet 0    metoprolol tartrate (LOPRESSOR) 25 MG tablet Take 1 tablet by mouth 2 times daily for 15 days 30 tablet 0    adalimumab (HUMIRA) 40 MG/0.8ML injection Inject 40 mg into the skin once      Multiple Vitamins-Minerals (THERAPEUTIC MULTIVITAMIN-MINERALS) tablet Take 1 tablet by mouth daily      VITAMIN E PO Take 1 tablet by mouth daily         ALLERGIES    Allergies   Allergen Reactions    Morphine Hallucinations    Sulfa Antibiotics        SOCIAL AND FAMILY HISTORY    Social History     Socioeconomic History    Marital status:      Spouse name: Not on file    Number of children: Not on file    Years of education: Not on file    Highest education level: Not on file   Occupational History    Not on file   Tobacco Use    Smoking status: Former Smoker     Packs/day: 1.50     Years: 40.00     Pack years: 60.00     Types: Cigarettes     Quit date: 80     Years since quittin.3    Smokeless tobacco: Never Used   Vaping Use    Vaping Use: Never used   Substance and Sexual Activity    Alcohol use: No    Drug use: No    Sexual activity: Never   Other Topics Concern    Not on file   Social History Narrative    Not on file     Social Determinants of Health     Financial Resource Strain:     Difficulty of Paying Living Expenses: Not on file   Food Insecurity:     Worried About [address] in the Last Year: Not on file    Haily of Food in the Last Year: Not on file   Transportation Needs:     Lack of Transportation (Medical): Not on file    Lack of Transportation (Non-Medical):  Not on file   Physical Activity:     Days of Exercise per Week: Not on file    Minutes of Exercise per Session: Not on file   Stress:     Feeling of Stress : Not on file   Social Connections:     Frequency of Communication with Friends and Family: Not on file    Frequency of Social Gatherings with Friends and Family: Not on file    Attends Mormonism Services: Not on file    Active Member of Clubs or Organizations: Not on file    Attends Club or Organization Meetings: Not on file    Marital Status: Not on file   Intimate Partner Violence:     Fear of Current or Ex-Partner: Not on file    Emotionally Abused: Not on file    Physically Abused: Not on file   [address] [place] [address] Camilo Sexually Abused: Not on file   Housing Stability:     Unable to Pay for Housing in the Last Year: Not on file    Number of Rinamojasmin in the Last Year: Not on file    Unstable Housing in the Last Year: Not on file     Family History   Problem Relation Age of Onset    Cancer Mother     Cancer Father        PHYSICAL EXAM    VITAL SIGNS: BP (!) 146/70   Pulse 112   Temp 98 °F (36.7 °C) (Oral)   Resp 16   SpO2 96%   Constitutional: Elderly male, resting in exam bed  Eyes:  Sclera nonicteric, conjunctiva moist  HENT:  Atraumatic. PERRL. Neck/Lymphatics: supple, no JVD, no swollen nodes  Respiratory:  No retractions, no accessory muscle use, normal breath sounds   Cardiovascular: Irregular rate and rhythm  GI:    Colostomy to right lower abdomen with red blood, bleeding from around colostomy site. Generalized tenderness to palpation. Midline surgical scar is well-healing, left lower abdominal surgical scar is healing. Back:  No CVA tenderness to percussion. Musculoskeletal: Mild to moderate right lower extremity edema. Compartments soft. Distal sensation and pulses intact.   Vascular: DP pulses 2+ equal bilaterally  Integument: No rash, dry skin  Neurologic:  Alert & oriented, normal speech  Psychiatric: Cooperative, pleasant affect       LABS:  Results for orders placed or performed during the hospital encounter of 04/22/22   Protime/INR & PTT   Result Value Ref Range    Protime 16.1 (H) 11.7 - 14.5 SECONDS    INR 1.25 INDEX    aPTT 26.2 25.1 - 37.1 SECONDS   CBC with Auto Differential   Result Value Ref Range    WBC 15.4 (H) 4.0 - 10.5 K/CU MM    RBC 2.71 (L) 4.6 - 6.2 M/CU MM    Hemoglobin 8.9 (L) 13.5 - 18.0 GM/DL    Hematocrit 29.1 (L) 42 - 52 %    .4 (H) 78 - 100 FL    MCH 32.8 (H) 27 - 31 PG    MCHC 30.6 (L) 32.0 - 36.0 %    RDW 18.8 (H) 11.7 - 14.9 %    Platelets 448 (H) 511 - 440 K/CU MM    MPV 10.6 7.5 - 11.1 FL    Differential Type AUTOMATED DIFFERENTIAL     Segs Relative 82.4 (H) 36 - 66 % Lymphocytes % 6.0 (L) 24 - 44 %    Monocytes % 8.1 (H) 0 - 4 %    Eosinophils % 2.0 0 - 3 %    Basophils % 0.4 0 - 1 %    Segs Absolute 12.7 K/CU MM    Lymphocytes Absolute 0.9 K/CU MM    Monocytes Absolute 1.2 K/CU MM    Eosinophils Absolute 0.3 K/CU MM    Basophils Absolute 0.1 K/CU MM    Nucleated RBC % 0.0 %    Total Nucleated RBC 0.0 K/CU MM    Total Immature Neutrophil 0.17 K/CU MM    Immature Neutrophil % 1.1 (H) 0 - 0.43 %   Lactic Acid   Result Value Ref Range    Lactate 1.1 0.4 - 2.0 mMOL/L   Comprehensive Metabolic Panel   Result Value Ref Range    Sodium 133 (L) 135 - 145 MMOL/L    Potassium 4.2 3.5 - 5.1 MMOL/L    Chloride 98 (L) 99 - 110 mMol/L    CO2 24 21 - 32 MMOL/L    BUN 19 6 - 23 MG/DL    CREATININE 1.4 (H) 0.9 - 1.3 MG/DL    Glucose 108 (H) 70 - 99 MG/DL    Calcium 8.4 8.3 - 10.6 MG/DL    Albumin 2.8 (L) 3.4 - 5.0 GM/DL    Total Protein 6.6 6.4 - 8.2 GM/DL    Total Bilirubin 0.6 0.0 - 1.0 MG/DL    ALT 29 10 - 40 U/L    AST 19 15 - 37 IU/L    Alkaline Phosphatase 325 (H) 40 - 128 IU/L    GFR Non- 47 (L) >60 mL/min/1.73m2    GFR  57 (L) >60 mL/min/1.73m2    Anion Gap 11 4 - 16   Lipase   Result Value Ref Range    Lipase 15 13 - 60 IU/L           RADIOLOGY/PROCEDURES    VL DUP LOWER EXTREMITY VENOUS RIGHT    (Results Pending)   CT ABDOMEN PELVIS W IV CONTRAST Additional Contrast? None    (Results Pending)             ED COURSE & MEDICAL DECISION MAKING      Patient presents as above. General surgery Dr. Michela Hines was consulted upon patient arrival to ED who agrees with current plan of checking lab work and imaging. Colostomy bag changed with supervising physician Dr. Alfie Hernandez at bedside, bleeding appears to be coming from anastomosis site. Dr. Alfie Hernandez reconsulted Dr. Michela Hines who agreed with plan for suturing with figure-of-eight. See Dr. Yenny Ambroseum note for procedure details. CBC shows white blood cell count of 15.4, hemoglobin 8.9 hematocrit of 29.1. Lactic acid 1.1. CMP shows creatinine 1.4. Lipase 15. Ultrasound, CT abdomen pelvis are pending at the end of my shift. See supervising physician note for final impression and plan. Clinical  IMPRESSION    1. Abdominal pain, unspecified abdominal location    2. Hemorrhage from ileostomy [facility])        See supervising physician note for final impression and plan. Comment: Please note this report has been produced using speech recognition software and may contain errors related to that system including errors in grammar, punctuation, and spelling, as well as words and phrases that may be inappropriate. If there are any questions or concerns please feel free to contact the dictating provider for clarification.       Adi Denny PA-C  04/22/22 [id number]

## 2022-04-22 NOTE — PROGRESS NOTES
Patient arrived on the unit and oriented to the room. 2 person skin check performed by this RN and PCT [facility]. Patient has recently healed midline incision from recent bowel surgery, and new colostomy on RLQ. No other skin issues at this time.

## 2022-04-22 NOTE — PROGRESS NOTES
PROCEDURE PERFORMED:  Intraabdominal abscess drain by Dr. Nishi Chow. PRIMARY INDICATION FOR PROCEDURE: Abscess    INFORMED CONSENT:  Obtained prior to procedure by Pushpa Mosqueda. Consent placed in chart. PT TRANSPORTED FROM:           1111                         TO THE IR ROOM:                    Large Room    TIME IN ROOM:1135    ASSESSMENT: Patient alert and oriented. Aware of procedure to be done. No distress noted    BARRIER PRECAUTIONS & STERILE TECHNIQUE:               Pt remains in in patient bed and on Cardiac Monitor. Pt prepped and draped in a sterile fashion with chlorhexadine. TIME OUT: 1153    PAIN/LOCAL ANESTHESIA/SEDATION MANAGEMENT:           Local: Lidocaine 1% given by Dr. Nicolas Bargert:           ACCESS TIME: 1200 with [employer] access kit          US/FLUORO: US guided with  4  Images taken          WIRE USED: Amplatz Super Stiff 0.035 x 75cm          SHEATH USED:           CATHETER USED: 10 Irish x 25cm Locking Pigtail          FINAL IMAGE TAKEN TO CONFIRM PLACEMENT OF:           CONTRAST/CC:     1210--ERNIE bulb hooked to 10 [employer] drain tube.   Drain secured in place with 2-0 silk    1219--total of 30cc of viera fluid removed    STERILE DRESSINGS: Biopatch and Tegaderm    SPECIMENS: 15cc of viera fluid sent to lab for culture    EBL:      Less than 1cc    STAFF IN ROOM:  Dr. Joleen Jose RN, Yelena RN    REPORT CALLED TO:  Hussein Chaudhary RN 1N    OUT OF ROOM AT: [id number]

## 2022-04-22 NOTE — ED PROVIDER NOTES
Emergency [address] EMERGENCY DEPARTMENT    Patient: Dmitriy Reed  MRN: [id number]  : 1929  Date of Evaluation: 2022  ED Supervising Physician: Tatyana Tadeo MD    I independently examined and evaluated Dmitriy Reed. In brief, Dmitriy Reed is a 80 y.o. male that presents to the emergency department with bleeding from his ostomy site. The patient was discharged from the hospital within the last 2 days after he been admitted for colitis and had a small bowel resection, subtotal colectomy with end ileostomy. He reportedly started bleeding from his ostomy site around 4 PM and has lost 2 pints of blood per staff at nursing facility. Patient states that he has some nausea and diffuse abdominal pain but no other acute complaints. Focused exam: Appears fatigued. No acute distress. Tachycardic. Abdomen slightly tender around the ostomy. Moderately distended. Incisional sites without erythema or active drainage. Patient has dark red blood in his ostomy bag and has some active bleeding from the site. On further examination, there is brisk oozing from the 8-11 o'clock positions of the anastomosis of the ostomy with the skin. Brief ED course/MDM: Patient presents as above. Mildly tachycardic but otherwise hemodynamically stable. Presents with bleeding from around ostomy site. The bleeding appears to be coming from the anastomosis of the ostomy with the skin around the 8 to 11 o'clock position. No obvious bleeding from ostomy itself. TXA had initially been ordered because of brisk bleeding but this was canceled after further examination done of the site. I spoke with Dr. Dipesh Mcadams who was in agreement with using lidocaine with epinephrine and placing two figure-of-eight 4-0 Vicryl stitches and using silver nitrate to stop the bleeding. I was able to achieve good hemostasis using this technique.   Patient's hemoglobin is 8.9 and stable from prior. Lactate is 1.1. Patient is anticoagulated on Eliquis. He will will require admission to assure no further bleeding from the ostomy site and recheck hemoglobin later in the morning. CT shows a large intra-abdominal abscess. He is started on Zosyn. I did contact Dr. Thersa Litten to make him aware of the findings. However, patient and family have discussed and do not desire any further significant invasive procedures on this patient. This abscess may be amenable to IR drainage. Patient admitted to the hospitalist remains hemodynamically stable here. All diagnostic, treatment, and disposition decisions were made by myself in conjunction with the [facility]. For all further details of the patient's emergency department visit, please see their documentation.     (Please note that portions of this note may have been completed with a voice recognition program. Efforts were made to edit the dictations but occasionally words are mis-transcribed.)    MD Amita Robertson MD  04/22/22 Marcela Posey MD  04/22/22 [id number]

## 2022-04-23 PROBLEM — R78.81 GRAM-NEGATIVE BACTEREMIA: Status: ACTIVE | Noted: 2022-01-01

## 2022-04-23 NOTE — PROGRESS NOTES
Infectious Disease Progress Note  2022   Patient Name: Dante Sullivan : 1929       Reason for visit: F/u  K oxytoca bacteremia likely secondary to post-operative intra-abdominal abscess  History:? Interval history noted  Denies n/v/d/f or untoward effects of antimicrobials  Physical Exam:  Vital Signs: BP (!) 144/80   Pulse 61   Temp 97.9 °F (36.6 °C) (Oral)   Resp 16   Ht 5' 9\" (1.753 m)   Wt 165 lb (74.8 kg)   SpO2 96%   BMI 24.37 kg/m²     Gen: alert and oriented X3, no distress  Skin: no stigmata of endocarditis  Wounds: C/D/I  HEMT: AT/NC Oropharynx pink, moist, and without lesions or exudates; dentition in good state of repair  Eyes: PERRLA, EOMI, conjunctiva pink, sclera anicteric. Neck: Supple. Trachea midline. No LAD. Chest: no distress and CTA. Good air movement. Heart: RRR and no MRG. Abd: Left lower quadrant drain, right lower quadrant ileostomy, midline surgical scar soft, non-distended, no tenderness, no hepatomegaly. Normoactive bowel sounds. Ext: no clubbing, cyanosis, or edema  Catheter Site: without erythema or tenderness  LDA:  Neuro: Mental status intact. CN 2-12 intact and no focal sensory or motor deficits     Radiologic / Imaging / TESTING  No results found.      Labs:    Recent Results (from the past 24 hour(s))   Comprehensive Metabolic Panel    Collection Time: 22 12:41 AM   Result Value Ref Range    Sodium 130 (L) 135 - 145 MMOL/L    Potassium 4.1 3.5 - 5.1 MMOL/L    Chloride 96 (L) 99 - 110 mMol/L    CO2 23 21 - 32 MMOL/L    BUN 14 6 - 23 MG/DL    CREATININE 1.4 (H) 0.9 - 1.3 MG/DL    Glucose 78 70 - 99 MG/DL    Calcium 7.7 (L) 8.3 - 10.6 MG/DL    Albumin 2.4 (L) 3.4 - 5.0 GM/DL    Total Protein 5.3 (L) 6.4 - 8.2 GM/DL    Total Bilirubin 0.4 0.0 - 1.0 MG/DL    ALT 21 10 - 40 U/L    AST 17 15 - 37 IU/L    Alkaline Phosphatase 273 (H) 40 - 129 IU/L    GFR Non- 47 (L) >60 mL/min/1.73m2    GFR  57 (L) >60 mL/min/1.73m2    Anion Gap 11 4 - 16   Magnesium    Collection Time: 04/23/22 12:41 AM   Result Value Ref Range    Magnesium 1.8 1.8 - 2.4 mg/dl   Phosphorus    Collection Time: 04/23/22 12:41 AM   Result Value Ref Range    Phosphorus 3.3 2.5 - 4.9 MG/DL   CBC with Auto Differential    Collection Time: 04/23/22 12:41 AM   Result Value Ref Range    WBC 9.5 4.0 - 10.5 K/CU MM    RBC 2.07 (L) 4.6 - 6.2 M/CU MM    Hemoglobin 6.7 (LL) 13.5 - 18.0 GM/DL    Hematocrit 22.1 (L) 42 - 52 %    .8 (H) 78 - 100 FL    MCH 32.4 (H) 27 - 31 PG    MCHC 30.3 (L) 32.0 - 36.0 %    RDW 18.6 (H) 11.7 - 14.9 %    Platelets 045 853 - 281 K/CU MM    MPV 10.3 7.5 - 11.1 FL    Differential Type AUTOMATED DIFFERENTIAL     Segs Relative 76.6 (H) 36 - 66 %    Lymphocytes % 8.4 (L) 24 - 44 %    Monocytes % 9.5 (H) 0 - 4 %    Eosinophils % 3.9 (H) 0 - 3 %    Basophils % 0.4 0 - 1 %    Segs Absolute 7.2 K/CU MM    Lymphocytes Absolute 0.8 K/CU MM    Monocytes Absolute 0.9 K/CU MM    Eosinophils Absolute 0.4 K/CU MM    Basophils Absolute 0.0 K/CU MM    Nucleated RBC % 0.0 %    Total Nucleated RBC 0.0 K/CU MM    Total Immature Neutrophil 0.11 K/CU MM    Immature Neutrophil % 1.2 (H) 0 - 0.43 %   Protime-INR    Collection Time: 04/23/22 12:41 AM   Result Value Ref Range    Protime 15.7 (H) 11.7 - 14.5 SECONDS    INR 1.21 INDEX   C-Reactive Protein    Collection Time: 04/23/22 12:41 AM   Result Value Ref Range    CRP, High Sensitivity 106.0 mg/L   Procalcitonin    Collection Time: 04/23/22 12:41 AM   Result Value Ref Range    Procalcitonin 0.126    Troponin    Collection Time: 04/23/22  9:40 AM   Result Value Ref Range    Troponin T 0.010 (H) <0.01 NG/ML     CULTURE results: Invalid input(s): BLOOD CULTURE,  URINE CULTURE, SURGICAL CULTURE    Diagnosis:  Past Medical History:   Diagnosis Date    AAA (abdominal aortic aneurysm) ([facility]     [facility] 4/5/2022    Angina, class I [facility]     Benign prostatic hyperplasia 12/12/2011     Updating Deprecated Diagnoses    Bowel obstruction ([address]     CAD (coronary artery disease)     Cancer ([address]     rectal    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    Delirium 4/7/2022    Gastroesophageal reflux disease without esophagitis 05/21/2020    Hx of cardiovascular stress test 08/12/2021    EF 62% Normal study    Hypertension     On total parenteral nutrition 4/9/2022    Partial small bowel obstruction ([address] 3/23/2022    Primary malignant neoplasm of rectum ([address] 08/17/2019    Probable Bacterial Pneumonia 4/5/2022    Psoriasis     Psoriatic arthritis ([address] 08/17/2019    Pyelonephritis 03/24/2018    Severe protein-calorie malnutrition ([address] 4/9/2022    Stable angina ([address] 08/17/2019    Unspecified cerebral artery occlusion with cerebral infarction      Past Surgical History:   Procedure Laterality Date    APPENDECTOMY      COLONOSCOPY N/A 3/2/2022    COLONOSCOPY POLYPECTOMY REMOVAL ABLATION/STOMA with EMR and placed 7 hemoclips. performed by Efrain Lino MD at [address] [facility] N/A 4/3/2022    LAPAROTOMY EXPLORATORY COLON RESECTION performed by Sung Nieves MD at [place] [address] N/A 2/28/2022    ENTEROSCOPY PUSH CONTROL HEMORRHAGE WITH APC ABLATION OF AVM performed by Efrain Lino MD at [facility] ENDOSCOPY     Principal Problem:    Postoperative intra-abdominal abscess  Active Problems:    Gram-negative bacteremia    Hypertension    H/O: CVA (cerebrovascular accident)  Resolved Problems:    * No resolved hospital problems. *      Impression and plan   Summary and rationale: Patient is a 80 y.o.  male medical history of rectal cancer status postresection and colostomy in 2019, recently discharged from the hospital on 4/21/2022 after a 19-day stay in the hospital.  A CT of the abdomen on 4/2/2022 showed severe colitis, he underwent an exploratory laparotomy, small bowel resection, colon resection and end ileostomy on 4/3/2022. Biopsy was positive for pseudomembranous colitis. Treated with broad-spectrum antibiotics, eventually treated with Dificid for presumed C. difficile. C. difficile was however negative. He was readmitted for bleeding from ostomy site and abdominal pain. CT of the abdomen pelvis showed increasing fluid collection, presumed to be an abscess. He underwent IR placed drain on 4/22/2023. Blood culture positive for Klebsiella oxytoca. Receiving linezolid and cefepime. He has an infrarenal aortic abdomen aneurysm mural thrombus.  Clinical status: Improving, leukocytosis has resolved.  Objective: Follow-up intra-abdominal fluid culture, treat with at least 2 weeks of antibiotics for Klebsiella oxytoca bacteremia   Therapeutic: Linezolid 4/22-, cefepime 4/23-, start metronidazole 423-   Diagnostic: Trend CRP and procalcitonin.  F/u: Fluid culture   Other: Discussed with the patient, his daughter and son. All their questions were answered. Consider vascular surgery evaluation.         Electronically signed by: Electronically signed by Huma Martinez MD on 4/23/2022 at 3:29 PM

## 2022-04-23 NOTE — CARE COORDINATION
Late entry. Pt was recently here w/ d/c on 4/21. Adis Wolfe and intern entered pt room to discuss d/c planning. Spoke w/ pt daughter who was present in the room. Daughter would like pt to go to SNF but preferably not [facility] as pt was just there. Daughter would like [facility] as 1st choice and [facility] as 2nd choice. Referral sent via fax to [facility].

## 2022-04-23 NOTE — PROGRESS NOTES
Patient is being transferred to ICU stepdown, per administration recommendations. Patient and family unhappy with care on [address].   Linh Henry MD  [address]

## 2022-04-23 NOTE — CONSULTS
CARDIOLOGY CONSULT NOTE   Reason for consultation:  afib    Referring physician:  Corrinne Plumber, MD     Primary care physician: Max Donis MD      Dear  Dr. Corrinne Plumber, MD   Thanks for the consult. Chief Complaints :  Chief Complaint   Patient presents with    Other     bleeding at new ostomy site        History of present illness:Denis is a 80 y. o.year old who presents with eating from the ostomy site he had underwent ostomy due to colitis after prolonged hospitalization in early April he got discharged actually 2 days ago returned back from nursing home due to concerns for bleeding from the ostomy site he was discharged on Eliquis due to atrial fibrillation. Patient himself is not a good historian due to intermittent confusion. Echo shows preserved EF with no wall motion abnormality stress test in August showed no ischemia  Currently appears euvolemic he is complaining that he was bleeding from all over and feels cold tired he is being treated for abdominal abscess and antibiotics surgical evaluation completed due to intra-abdominal fluid collection concern for possible abscess on antibiotics      Past medical history:    has a past medical history of AAA (abdominal aortic aneurysm) ([address] MARIELOS (acute kidney injury) ([address] Angina, class I ([address] Benign prostatic hyperplasia, Bowel obstruction ([address] CAD (coronary artery disease), Cancer ([address] CCC (chronic calculous cholecystitis), Delirium, Gastroesophageal reflux disease without esophagitis, Hx of cardiovascular stress test, Hypertension, On total parenteral nutrition, Partial small bowel obstruction ([address] Primary malignant neoplasm of rectum ([address] Probable Bacterial Pneumonia, Psoriasis, Psoriatic arthritis ([address] Pyelonephritis, Severe protein-calorie malnutrition ([address] Stable angina ([address] and Unspecified cerebral artery occlusion with cerebral infarction.   Past surgical history:   has a past surgical history that includes Appendectomy; colostomy; Upper gastrointestinal endoscopy (N/A, 2/28/2022); Colonoscopy (N/A, 3/2/2022); and laparotomy (N/A, 4/3/2022). Social History:   reports that he quit smoking about 49 years ago. His smoking use included cigarettes. He has a 60.00 pack-year smoking history. He has never used smokeless tobacco. He reports that he does not drink alcohol and does not use drugs.   Family history:   no family history of CAD, STROKE of DM at early age    Allergies   Allergen Reactions    Morphine Hallucinations    Sulfa Antibiotics        0.9 % sodium chloride infusion, PRN  linezolid (ZYVOX) IVPB 600 mg, Q12H  digoxin (LANOXIN) tablet 125 mcg, Daily  dilTIAZem (CARDIZEM CD) extended release capsule 120 mg, Daily  ferrous sulfate (IRON 325) tablet 309 mg, BID WC  folic acid (FOLVITE) tablet 1 mg, Daily  metoprolol tartrate (LOPRESSOR) tablet 25 mg, BID  therapeutic multivitamin-minerals 1 tablet, Daily  pantoprazole (PROTONIX) tablet 40 mg, Daily  sodium chloride flush 0.9 % injection 5-40 mL, 2 times per day  sodium chloride flush 0.9 % injection 10 mL, PRN  0.9 % sodium chloride infusion, PRN  ondansetron (ZOFRAN-ODT) disintegrating tablet 4 mg, Q8H PRN   Or  ondansetron (ZOFRAN) injection 4 mg, Q6H PRN  acetaminophen (TYLENOL) tablet 650 mg, Q6H PRN   Or  acetaminophen (TYLENOL) suppository 650 mg, Q6H PRN  lactobacillus (CULTURELLE) capsule 1 capsule, TID   cefepime (MAXIPIME) 2000 mg IVPB minibag, Q12H      Current Facility-Administered Medications   Medication Dose Route Frequency Provider Last Rate Last Admin    0.9 % sodium chloride infusion   IntraVENous PRN CHEMA Marroquin - CNP        linezolid (ZYVOX) IVPB 600 mg  600 mg IntraVENous Q12H Brendon Calvo MD   Stopped at 04/23/22 0744    digoxin (LANOXIN) tablet 125 mcg  125 mcg Oral Daily Brendon Calvo MD   125 mcg at 04/23/22 0854    dilTIAZem (CARDIZEM CD) extended release capsule 120 mg  120 mg Oral Daily Brendon Calvo MD 120 mg at 04/23/22 5115    ferrous sulfate (IRON 325) tablet 325 mg  325 mg Oral BID  Iliana Mccormack MD   325 mg at 00/26/17 5828    folic acid (FOLVITE) tablet 1 mg  1 mg Oral Daily Iliana Mccormack MD   1 mg at 04/23/22 0854    metoprolol tartrate (LOPRESSOR) tablet 25 mg  25 mg Oral BID Iliana Mccormack MD   25 mg at 04/23/22 6616    therapeutic multivitamin-minerals 1 tablet  1 tablet Oral Daily Iliana Mccormack MD   1 tablet at 04/23/22 0854    pantoprazole (PROTONIX) tablet 40 mg  40 mg Oral Daily Iliana Mccormack MD   40 mg at 04/23/22 7939    sodium chloride flush 0.9 % injection 5-40 mL  5-40 mL IntraVENous 2 times per day Iliana Mccormack MD   10 mL at 04/23/22 0856    sodium chloride flush 0.9 % injection 10 mL  10 mL IntraVENous PRN Iliana Mccormack MD        0.9 % sodium chloride infusion   IntraVENous PRN Iliana Mccormack  mL/hr at 04/23/22 0608 25 mL at 04/23/22 3374    ondansetron (ZOFRAN-ODT) disintegrating tablet 4 mg  4 mg Oral Q8H PRN Iliana Mccormack MD        Or    ondansetron (ZOFRAN) injection 4 mg  4 mg IntraVENous Q6H PRN Iliana Mccormack MD        acetaminophen (TYLENOL) tablet 650 mg  650 mg Oral Q6H PRN Iliana Mccormack MD        Or    acetaminophen (TYLENOL) suppository 650 mg  650 mg Rectal Q6H PRN Iliana Mccormack MD        lactobacillus (CULTURELLE) capsule 1 capsule  1 capsule Oral TID  Iliana Mccormack MD   1 capsule at 04/23/22 0854    cefepime (MAXIPIME) 2000 mg IVPB minibag  2,000 mg IntraVENous Q12H Gurmeet Serrato MD   Stopped at 04/23/22 0226     Review of Systems:   · Constitutional: No Fever or Weight Loss   · Eyes: No Decreased Vision  · ENT: No Headaches, Hearing Loss or Vertigo  · Cardiovascular: As per HPI  · Respiratory: As per HPI  · Gastrointestinal: No abdominal pain, appetite loss, blood in stools, constipation, diarrhea or heartburn  · Genitourinary: No dysuria, trouble voiding, or hematuria  · Musculoskeletal:  No gait 6.7*   HCT 22.1*         Recent Labs     04/23/22  0041   *   K 4.1   CL 96*   CO2 23   PHOS 3.3   BUN 14   CREATININE 1.4*     Recent Labs     04/23/22  0041   AST 17   ALT 21   BILITOT 0.4   ALKPHOS 273*     Recent Labs     04/23/22  0940   TROPONINT 0.010*       No results for input(s): PROBNP in the last 72 hours. Lab Results   Component Value Date    INR 1.21 04/23/2022    PROTIME 15.7 (H) 04/23/2022       EKG: (reviewed by myself)    ECHO:(reviewed by myself)    Chest Xray:(reviewed by myself)  XR ABDOMEN (KUB) (SINGLE AP VIEW)    Result Date: 4/13/2022  EXAMINATION: ONE SUPINE XRAY VIEW(S) OF THE ABDOMEN 4/13/2022 9:53 am COMPARISON: 04/07/2022 HISTORY: ORDERING SYSTEM PROVIDED HISTORY: abdominal distension TECHNOLOGIST PROVIDED HISTORY: Reason for exam:->abdominal distension Reason for Exam: abdominal distension FINDINGS: Nonspecific dilated air-filled loops of small bowel measuring up to proximally 4.7 cm in the left upper quadrant. Findings may be compatible with small-bowel obstruction. Postoperative clips overlie the ventral abdomen. Ileus could appear similar. Findings are slightly increased from prior study on April 7, 2022. evaluation for free air limited with a supine view only. Calcified abdominal aortic aneurysm partially seen along the left lower lumbar spine. See recent CT. Increased small bowel dilatation to suggest small bowel obstruction, ileus or enteritis. XR ABDOMEN (KUB) (SINGLE AP VIEW)    Result Date: 4/8/2022  EXAMINATION: ONE SUPINE XRAY VIEW(S) OF THE ABDOMEN 4/7/2022 12:19 pm COMPARISON: 04/03/2022 HISTORY: ORDERING SYSTEM PROVIDED HISTORY: Interval change SBO. still with abdomen pain and diminished bowel sounds. TECHNOLOGIST PROVIDED HISTORY: Reason for exam:->interval change SBO. still with abd pain and diminished bowel sounds Reason for Exam: Interval change SBO. still with abdomen pain and diminished bowel sounds.  Additional signs and symptoms: Interval change SBO. still with abdomen pain and diminished bowel sounds. Relevant Medical/Surgical History: Interval change SBO. still with abdomen pain and diminished bowel sounds. Follow-up FINDINGS: There has been surgery in the abdomen with midline surgical staples in place. There continues to be minimal distention of small-bowel loops in the mid abdomen which could represent partial obstruction or ileus. There is calcification of the abdominal aorta. Scratch there is calcification of the abdominal aorta in a known aneurysm. An NG tube is present with its tip barely in the fundus of the stomach. The proximal port is in the distal esophagus or near the GE junction. Consider advancement of the tube. The patient is status post surgery. There is decreased but continued mild distension of the small bowel could represent partial obstruction or ileus. Calcification of the abdominal aorta consistent with a known aneurysm. The NG tube tip is in the fundus of the stomach. The proximal port is in the distal esophagus near the GE junction. Consider advancement of the tube. XR ABDOMEN (KUB) (SINGLE AP VIEW)    Result Date: 4/4/2022  EXAMINATION: ONE SUPINE XRAY VIEW(S) OF THE ABDOMEN 4/3/2022 11:28 am COMPARISON: 03/24/2022 x-ray. CT from 03/21/2022. HISTORY: ORDERING SYSTEM PROVIDED HISTORY: Bowel obstruction, S/P remote colostomy. TECHNOLOGIST PROVIDED HISTORY: Portable supine x-ray at 0500 please. Reason for exam:->bowel obstruction S/P remote colostomy Reason for Exam: Bowel obstruction S/P remote colostomy. FINDINGS: There is an enteric tube with its tip projecting over the fundus of the stomach. Proximal port is at the GE junction. Right upper quadrant cholecystectomy clips. Air-filled distended loops of small bowel in the mid abdomen and stacked configuration. There is also gas noted within the colon. Findings suggest a low-grade partial small bowel obstruction versus ileus.  Atherosclerotic calcifications associated with an aortic aneurysm. Multiple air-filled distended loops of small bowel stacked in the mid abdomen suggesting a low-grade partial small bowel obstruction or focal ileus. Atherosclerotic calcification of an abdominal aortic aneurysm as seen on prior CT. NG tube in the stomach with proximal port at the level of the GE junction. XR ABDOMEN (KUB) (SINGLE AP VIEW)    Result Date: 4/4/2022  EXAMINATION: ONE SUPINE XRAY VIEW(S) OF THE ABDOMEN 4/4/2022 3:25 am COMPARISON: 04/03/2022 and 03/24/2022 HISTORY: ORDERING SYSTEM PROVIDED HISTORY: follow up obstruction TECHNOLOGIST PROVIDED HISTORY: Portable supine xray at 0500 please Reason for exam:->follow up obstruction Reason for Exam: follow up obstruction FINDINGS: Nasogastric tube courses into the stomach with the tip over the expected antrum. New skin staples are present over the midline abdomen. Clips over the right upper quadrant suggest prior cholecystectomy. Additional skin staples and surgical clips over the pelvis. Urinary catheter is also noted. There is residual gaseous dilatation of multiple small bowel loops. Position limits evaluation for pneumoperitoneum. However may have some postsurgical pneumoperitoneum outlining a few of the small bowel loops. Periphery calcified structure over the lower lumbar spine left margin is likely the calcified infrarenal aortic aneurysm. The bones appear stable. 1.  Nasogastric tube has been advanced with the tip over the antrum. 2.  New skin staples are present at the abdomen and pelvis. 3.  Residual gas dilatation of multiple small bowel loops. There may be a small amount of postsurgical pneumoperitoneum. 4.  See the prior CT scan for evaluation of the infrarenal aortic aneurysm.      CT ABDOMEN PELVIS W IV CONTRAST Additional Contrast? None    Result Date: 4/22/2022  EXAMINATION: CT OF THE ABDOMEN AND PELVIS WITH CONTRAST 4/22/2022 2:15 am TECHNIQUE: CT of the abdomen and pelvis was performed with the administration of intravenous contrast. Multiplanar reformatted images are provided for review. Dose modulation, iterative reconstruction, and/or weight based adjustment of the mA/kV was utilized to reduce the radiation dose to as low as reasonably achievable. COMPARISON: CT abdomen and pelvis dated April 10, 2022 HISTORY: ORDERING SYSTEM PROVIDED HISTORY: Abdominal pain, vomiting, bleeding from ostomy site TECHNOLOGIST PROVIDED HISTORY: Reason for exam:->Abdominal pain, vomiting, bleeding from ostomy site Additional Contrast?->None Decision Support Exception - unselect if not a suspected or confirmed emergency medical condition->Emergency Medical Condition ([place]) Reason for Exam: Abdominal pain, vomiting, bleeding from ostomy site FINDINGS: Lower Chest: Small bilateral pleural effusions with bibasilar atelectasis is noted. Extensive coronary artery calcifications are seen. Organs: There has been a cholecystectomy. Stable multiple hypodense cystic lesions are seen within the liver without significant change. These are likely benign and no follow-up is indicated. Calcified granulomas are seen within the spleen. The adrenal glands are unremarkable. The pancreas is unremarkable. There is no evidence of hydronephrosis. GI/Bowel: Postsurgical changes are seen to the bowel loops. There is a right lower quadrant ostomy. There is no evidence of bowel obstruction. Mildly dilated focal loops of small bowel are noted with air-fluid levels within the left upper quadrant which could be related to reactive ileus. Pelvis: A Weaver catheter is seen within the bladder. Minimal gas is seen within the lumen of the bladder, likely rib related to catheter placement. Peritoneum/Retroperitoneum: There is no evidence of free intraperitoneal air. Small amount of free fluid is seen within the right lower quadrant. There is an infrarenal abdominal aortic aneurysm with mural thrombus which measures 5.6 x 5.6 cm. There is a large rim enhancing fluid collection within the abdomen and pelvis. This collection extends from the presacral region to the level of the pancreas and is concerning for an abscess. Bones/Soft Tissues: No destructive osseous lesions are identified. 1. Large rim enhancing fluid collection is seen within the abdomen and pelvis which extends from the presacral region to the level of the pancreas, concerning for an abscess. 2. Small amount of free fluid is seen within the abdomen. 3. Infrarenal abdominal aortic aneurysm measuring 5.6 cm. Please see recommendations below. 4. Small bilateral pleural effusions with bibasilar atelectasis. 5. Focally dilated small bowel loops with air-fluid levels are seen within the left upper quadrant which could be related to ileus. RECOMMENDATIONS: 5.6 cm infrarenal abdominal aortic aneurysm. Recommend referral to a vascular specialist. Reference: J Am Abad Radiol 8264;[id number]. CT ABDOMEN PELVIS W IV CONTRAST Additional Contrast? None    Result Date: 4/10/2022  EXAMINATION: CT OF THE ABDOMEN AND PELVIS WITH CONTRAST 4/10/2022 9:12 am TECHNIQUE: CT of the abdomen and pelvis was performed with the administration of intravenous contrast. Multiplanar reformatted images are provided for review. Dose modulation, iterative reconstruction, and/or weight based adjustment of the mA/kV was utilized to reduce the radiation dose to as low as reasonably achievable. COMPARISON: None HISTORY: ORDERING SYSTEM PROVIDED HISTORY: abd pain , s/p colectomy with end ileostomy TECHNOLOGIST PROVIDED HISTORY: Reason for exam:->abd pain , s/p colectomy with end ileostomy Additional Contrast?->None Reason for Exam: abd pain , s/p colectomy with end ileostomy FINDINGS: Lower Chest: Moderate bilateral pleural effusions on partial imaging of the chest with adjacent atelectasis.  Organs: Liver contains well-defined areas of low attenuation left hemiliver and smaller areas in the right hepatic lobe most consistent with hepatic cysts, unchanged from prior in configuration or size. Gallbladder surgically absent. Pancreas and spleen unremarkable. Adrenals without nodule. Kidneys without suspicious renal lesion and no hydronephrosis. GI/Bowel: Nasogastric tube terminates within the peripyloric region. Small bowel demonstrates fluid-filled segments with areas of mucosal hyperenhancement, acute inflammation of likely enteritis extending to the right lower quadrant ileostomy without parastomal hernia or obstructive process evident. Postsurgical changes status post colectomy. Stable postsurgical changes in the presacral region at the rectum with interval development of small volume abdominopelvic ascites. Pelvis: No suspicious pelvic lesion or bulky pelvic adenopathy/free fluid. Peritoneum/Retroperitoneum: Atherosclerotic aneurysmal infrarenal abdominal aorta measuring up to maximum transaxial diameter 5.7 cm, similar to prior. No bulky retroperitoneal adenopathy. Bones/Soft Tissues: No acute osseous findings. Diffuse mild body wall edema. No mechanical obstructive process however inflammatory findings of small bowel with mucosal hyperenhancement and wall thickening throughout the distended small bowel loops to the right lower quadrant ileostomy with small volume abdominopelvic ascites most consistent with enteritis. No obvious abscess formation. Moderate bilateral pleural effusions along with body wall edema. CT ABDOMEN PELVIS W IV CONTRAST Additional Contrast? None    Result Date: 4/4/2022  EXAMINATION: CT OF THE ABDOMEN AND PELVIS WITH CONTRAST 4/2/2022 1:36 pm TECHNIQUE: CT of the abdomen and pelvis was performed with the administration of intravenous contrast. Multiplanar reformatted images are provided for review. Dose modulation, iterative reconstruction, and/or weight based adjustment of the mA/kV was utilized to reduce the radiation dose to as low as reasonably achievable.  COMPARISON: 03/21/2022 HISTORY: ORDERING SYSTEM PROVIDED HISTORY: Abdomen pain. TECHNOLOGIST PROVIDED HISTORY: Reason for exam:->Abd pain Additional Contrast?->None Decision Support Exception - unselect if not a suspected or confirmed emergency medical condition->Emergency Medical Condition ([place]) Reason for Exam: Abdomen pain. Additional signs and symptoms: States \"ongoing abd pain that comes and goes through winter\", 80 ml Isovue 370 FINDINGS: Lower Chest: There are bilateral layering pleural effusions. Cardiomegaly is noted. Severe coronary artery atherosclerotic calcifications. Organs: Numerous scattered hypoattenuating lesions noted throughout the liver likely representing hepatic cysts. The largest of the lesions demonstrate fluid attenuation consistent with cysts measuring up to 25 mm in diameter. No evidence of enhancing hepatic lesion or biliary ductal dilatation. Portal vein is patent. Status post cholecystectomy. The spleen, pancreas and adrenal glands demonstrate no acute abnormality. The kidneys enhance symmetrically without evidence of hydronephrosis. There is a 19 mm exophytic lesion off of the lower pole of the left kidney with Hounsfield unit attenuation of approximately 30. This still likely represents a renal cyst (Bosniak category 2). This is been present since 2018. Stable small bilateral parapelvic cysts. GI/Bowel: Stomach and duodenal sweep demonstrate no acute abnormality. There is a left lower quadrant colostomy present. Postsurgical changes status post partial left colectomy. There is circumferential wall thickening of the colon proximal to the level of the colostomy with pericolonic fat stranding and inflammatory changes. There is dilatation of the proximal colon as well with wall thickening inflammatory changes extending up to the hepatic flexure. Changes are new since prior examination suggesting acute infectious/inflammatory colitis.  There are multiple distended fluid-filled loops of small bowel throughout the abdomen and pelvis as well new since prior examination suggesting ileus. Pelvis: Status post prostatectomy. Bladder is unremarkable. Weaver catheter is noted. Peritoneum/Retroperitoneum: No evidence of ascites or free air. No evidence of lymphadenopathy. There is a stable infrarenal abdominal aortic aneurysm measuring approximately 5.5 cm in diameter with crescentic mural thrombus. Extensive atherosclerotic disease of the aorta and branch vessels. Bones/Soft Tissues:  Age related degenerative changes of the visualized osseous structures without focal destructive lesion. Interval development of severe circumferential wall thickening and pericolonic inflammatory changes of the transverse colon and splenic flexure of the colon just proximal to the left lower quadrant colostomy with increased fluid-filled distension of the proximal colon and small bowel. Findings suggest acute infectious/inflammatory colitis with proximal partial obstruction versus ileus. Stable infrarenal abdominal aortic aneurysm measuring 5.5 cm in diameter. See follow-up recommendations below. Stable hepatic cysts and bilateral renal cortical cysts. Cardiomegaly with bilateral pleural effusions suggesting mild CHF. RECOMMENDATIONS: 5.5 cm infrarenal abdominal aortic aneurysm. Recommend referral to a vascular specialist. Reference: J Am Abad Radiol [id number]. XR CHEST PORTABLE    Result Date: 4/23/2022  EXAMINATION: ONE XRAY VIEW OF THE CHEST 4/23/2022 7:31 am COMPARISON: 04/16/2022 HISTORY: ORDERING SYSTEM PROVIDED HISTORY: sob TECHNOLOGIST PROVIDED HISTORY: Reason for exam:->sob Reason for Exam: sob FINDINGS: Moderate left basilar airspace disease or atelectasis remains. Small bilateral pleural effusions. No pneumothorax. Minimal right basilar atelectasis. Mild stable cardiomegaly. Small bilateral pleural effusions. Moderate left basilar atelectasis and mild right basilar atelectasis.      XR CHEST PORTABLE    Result Date: 4/16/2022  EXAMINATION: ONE XRAY VIEW OF THE CHEST 4/16/2022 12:50 pm COMPARISON: None. HISTORY: ORDERING SYSTEM PROVIDED HISTORY: SOB TECHNOLOGIST PROVIDED HISTORY: Reason for exam:->SOB Reason for Exam: SOB Additional signs and symptoms: SOB Relevant Medical/Surgical History: SOB FINDINGS: There is a small left basilar infiltrate and probable effusion which appears new compared to the previous exam 04/30/2022. Mild pulmonary venous congestion. Cardiac silhouette and osseous structures unchanged. Right IJ line terminates in the SVC/atrium     Small left basilar pneumonia suspected     XR CHEST PORTABLE    Result Date: 4/13/2022  EXAMINATION: ONE XRAY VIEW OF THE CHEST 4/13/2022 9:53 am COMPARISON: Chest radiograph 04/08/2022 HISTORY: ORDERING SYSTEM PROVIDED HISTORY: bilateral thoracentesis TECHNOLOGIST PROVIDED HISTORY: Reason for exam:->bilateral thoracentesis Reason for Exam: bilateral thoracentesis FINDINGS: Interval decrease in size of bilateral pleural effusion status post thoracentesis, with associated improved aeration at the lung bases. No visualized pneumothorax. Cardiomediastinal silhouette is unchanged. Right IJ central venous catheter remains in place with tip in the right atrium. No acute osseous abnormality. Interval decrease in size of bilateral pleural effusion status post thoracentesis, with improved aeration at the lung bases. No visualized pneumothorax. XR CHEST PORTABLE    Result Date: 4/11/2022  EXAMINATION: ONE XRAY VIEW OF THE CHEST 4/8/2022 11:47 am COMPARISON: 04/05/2022 HISTORY: ORDERING SYSTEM PROVIDED HISTORY: CHF TECHNOLOGIST PROVIDED HISTORY: Reason for exam:->CHF Reason for Exam: CHF FINDINGS: The right internal jugular catheter tip is in the superior vena cava. The enteric tube is below the diaphragm. Unchanged moderate left pleural effusion and bibasilar atelectasis. Cardiac silhouette is unchanged.      Unchanged moderate left pleural effusion bibasilar atelectasis. No evidence of pulmonary edema. XR CHEST PORTABLE    Result Date: 4/5/2022  EXAMINATION: ONE XRAY VIEW OF THE CHEST 4/5/2022 6:18 am COMPARISON: 04/03/2022 HISTORY: ORDERING SYSTEM PROVIDED HISTORY: pneumonia, hypotension TECHNOLOGIST PROVIDED HISTORY: Reason for exam:->pneumonia, hypotension Reason for Exam: pneumonia, hypotension FINDINGS: The enteric tube courses below the diaphragm. The right internal jugular catheter tip projects over the right atrium, unchanged. The cardiomediastinal silhouette is unchanged with redemonstration of cardiomegaly. The lungs are underinflated, resulting in vascular crowding and subsegmental atelectasis. Persistent streaky opacities within the lung bases bilaterally and suspected trace left pleural effusion, similar to prior. No large right pleural effusion or large pneumothorax on this limited portable study. Previous free intraperitoneal air is no longer visualized. Osseous structures are grossly unchanged. Low lung volumes with persistent bibasilar airspace disease. Trace left pleural effusion. XR CHEST PORTABLE    Result Date: 4/3/2022  EXAMINATION: ONE XRAY VIEW OF THE CHEST 4/3/2022 6:26 pm COMPARISON: None. HISTORY: ORDERING SYSTEM PROVIDED HISTORY: central line placment TECHNOLOGIST PROVIDED HISTORY: Reason for exam:->central line placment Reason for Exam: extubated Additional signs and symptoms: none Relevant Medical/Surgical History: CAD, AAA FINDINGS: Right line terminates in the atrium. Nasogastric tube terminates in the stomach. There are small bibasilar infiltrates. Cardiac silhouette and osseous structures unremarkable. Right line placement as above.  Bilateral small areas of pneumonia     XR CHEST PORTABLE    Result Date: 4/2/2022  EXAMINATION: ONE XRAY VIEW OF THE CHEST 4/2/2022 10:37 pm COMPARISON: 04/02/2022 HISTORY: ORDERING SYSTEM PROVIDED HISTORY: ng tube placement TECHNOLOGIST PROVIDED HISTORY: Reason for exam:->ng tube placement Reason for Exam: ng tube placement Additional signs and symptoms: ng tube placement FINDINGS: Enteric tube tip is noted within the stomach with the side port likely at or just distal to the GE junction. Small left pleural effusion. Bibasilar opacities. Stable cardiac silhouette. No overt pulmonary edema. No pneumothorax. The osseous structures are stable. Enteric tube tip is noted within the stomach with the side port likely at or just distal to the GE junction. XR CHEST PORTABLE    Result Date: 4/2/2022  EXAMINATION: ONE XRAY VIEW OF THE CHEST 4/2/2022 2:52 pm COMPARISON: 03/20/2022 HISTORY: ORDERING SYSTEM PROVIDED HISTORY: chest pain TECHNOLOGIST PROVIDED HISTORY: Reason for exam:->chest pain Reason for Exam: abdominall discomfort Additional signs and symptoms: none Relevant Medical/Surgical History: CAD, AAA FINDINGS: Trace left pleural effusion with adjacent atelectasis. Stable cardiomediastinal silhouette. No other focal consolidation. No overt pulmonary edema. No pneumothorax. The osseous structures otherwise stable. Trace left pleural effusion with adjacent atelectasis. US ABDOMEN LIMITED Specify organ? LIVER    Result Date: 4/19/2022  EXAMINATION: RIGHT UPPER QUADRANT ULTRASOUND 4/19/2022 5:53 am COMPARISON: None. HISTORY: ORDERING SYSTEM PROVIDED HISTORY: Elevated alkaline phosphatase TECHNOLOGIST PROVIDED HISTORY: Reason for exam:->Elevated alkaline phosphatase Specify organ?->LIVER Reason for Exam: Abd pain Additional signs and symptoms: Cholecystectomy FINDINGS: LIVER:  The liver demonstrates normal echogenicity without evidence of intrahepatic biliary ductal dilatation. BILIARY SYSTEM:  Cholecystectomy is noted. Common bile duct is within normal limits measuring 5.1 mm. RIGHT KIDNEY: The right kidney is grossly unremarkable without evidence of hydronephrosis. PANCREAS:  The pancreas is not visualized.  OTHER: No evidence of right upper quadrant ascites. The examination is limited due to patient's body habitus. 1. Limited exam. 2. Cholecystectomy. 3. Common bile duct is within normal limits. No intrahepatic bile duct dilatation. XR ABDOMEN FOR NG/OG/NE TUBE PLACEMENT    Result Date: 4/8/2022  EXAMINATION: ONE SUPINE XRAY VIEW(S) OF THE ABDOMEN 4/7/2022 5:05 pm COMPARISON: 04/07/2022 at 12:05 p.m. HISTORY: ORDERING SYSTEM PROVIDED HISTORY: NG advancement TECHNOLOGIST PROVIDED HISTORY: Reason for exam:->NG advancement Portable? ->Yes Reason for Exam: NG advancement Additional signs and symptoms: na Relevant Medical/Surgical History: rectal cancer, hypertension Initial evaluation FINDINGS: Monitor wires overlie the chest and abdomen. An NG tube is present with its tip coiled within the fundus of the stomach. The proximal port is in the fundus of the stomach. There are surgical staples in the mid abdomen. There is mild distention of the bowel loops which could be related to ileus. There are clips in the right upper quadrant from previous cholecystectomy. An NG tube tip is present with the tip coiled within the fundus of the stomach. The proximal port is in the fundus of the stomach. Consider advancement of the tube. XR ABDOMEN FOR NG/OG/NE TUBE PLACEMENT    Result Date: 4/7/2022  EXAMINATION: ONE SUPINE XRAY VIEW(S) OF THE ABDOMEN 4/7/2022 9:21 pm COMPARISON: None. HISTORY: ORDERING SYSTEM PROVIDED HISTORY: Recheck placement of NG tube, advanced from 72 to 79 TECHNOLOGIST PROVIDED HISTORY: Reason for exam:->Recheck placement of NG tube, advanced from 65 to 70 Portable? ->Yes Reason for Exam: Recheck placement of NG tube, advanced from 72 to 79 Additional signs and symptoms: na Relevant Medical/Surgical History: rectal cancer FINDINGS: Nasogastric tube terminates in the stomach. There are dilated loops of small bowel the largest measuring 4 cm. Right venous line terminates in the SVC. Postsurgical changes noted in the soft tissues.   The osseous structures are unremarkable. Nasogastric tube terminates in the stomach     VL DUP LOWER EXTREMITY VENOUS RIGHT    Result Date: 4/22/2022  EXAMINATION: DUPLEX VENOUS ULTRASOUND OF THE RIGHT LOWER EXTREMITY 4/22/2022 3:00 am TECHNIQUE: Duplex ultrasound using B-mode/gray scaled imaging and Doppler spectral analysis and color flow was obtained of the deep venous structures of the right extremity. COMPARISON: None. HISTORY: ORDERING SYSTEM PROVIDED HISTORY: swelling TECHNOLOGIST PROVIDED HISTORY: Reason for exam:->swelling Reason for Exam: swelling FINDINGS: The visualized veins of the right lower extremity are patent and free of echogenic thrombus. The veins demonstrate good compressibility with normal color flow study and spectral analysis. No evidence of DVT in the right lower extremity. IR GUIDED THORACENTESIS PLEURAL    Result Date: 4/15/2022  PROCEDURE: ULTRASOUNDGUIDED bilateral THORACENTESIS 4/13/2022 HISTORY: ORDERING SYSTEM PROVIDED HISTORY: pleural effusion TECHNOLOGIST PROVIDED HISTORY: bilateral Reason for exam:->pleural effusion Which side should the procedure be performed?->Radiologist Recommendation TECHNIQUE: Maximum sterile barrier technique including hand hygiene, skin prep and sterile ultrasound technique utilized for procedure. Sterile ultrasound technique also utilized for procedure Ultrasound guidance required to confirm presence of pleural fluid, puncture site selection, real-time intra procedural guidance. Images made for patient's medical file. Following informed consent, pause a confirm/time-out and sequential right and left lower thoracic puncture site selection, skin and ultrasound probe were prepped draped in sterile fashion. 10 mL 1% lidocaine without epinephrine for local anesthesia the puncture sites. Sequential ultrasound-guided access right and left pleural spaces was achieved using 5 [place] trocar mounted catheters.   Catheter was advanced off trocar introducers and-evacuated containers. 520 mL clear dashawn fluid removed from the right pleural space with 120 mL sample sent for laboratory evaluation. 800 mL clear yellow fluid removed from left pleural space with 700 cc sample sent for laboratory evaluation. Access catheters removed. Dressing applied. Postprocedure chest radiograph ordered. Patient tolerated procedure well. [place] FINDINGS: Pre and intraprocedural images demonstrate moderate bilateral pleural effusions with underlying pulmonary consolidation. Thoracentesis catheters seen within pleural fluid collections. No complication suggested. A total of 520 mL clear dashawn fluid removed from the right pleural space with 120 mL sample sent for laboratory evaluation. 800 mL clear dashawn fluid removed from left pleural space with 700 mL sample sent for laboratory evaluation. Successful ultrasound guided bilateral thoracentesis with specimen sent for laboratory evaluation per prior order. .       All labs, medications and tests reviewed by myself including data  from outside source , patient and available family . Continue all other medications of all above medical condition listed as is. Impression:  Principal Problem:    Postoperative intra-abdominal abscess  Active Problems:    Hypertension    H/O: CVA (cerebrovascular accident)  Resolved Problems:    * No resolved hospital problems. *      Assessment: 80 y. o.year old with PMH of  has a past medical history of AAA (abdominal aortic aneurysm) ([address] MARIELOS (acute kidney injury) ([address] Angina, class I ([address] Benign prostatic hyperplasia, Bowel obstruction ([address] CAD (coronary artery disease), Cancer ([address] CCC (chronic calculous cholecystitis), Delirium, Gastroesophageal reflux disease without esophagitis, Hx of cardiovascular stress test, Hypertension, On total parenteral nutrition, Partial small bowel obstruction ([address] Primary malignant neoplasm of rectum ([address] Probable Bacterial Pneumonia, Psoriasis, Psoriatic arthritis ([facility] Pyelonephritis, Severe protein-calorie malnutrition ([facility] Stable angina ([facility] and Unspecified cerebral artery occlusion with cerebral infarction. Plan and Recommendations:    Atrial fibrillation but currently in sinus rhythm with intermittent paroxysmal A. fib episodes rate controlled for now hold anticoagulation due to concerns for bleeding hematocrit has dropped from 29-22 with hemoglobin dropped from 8.9-6.7  Acute blood loss anemia: Transfuse to keep hemoglobin above 8. Abdominal abscess work-up as per primary team and surgery  HTN: stable, continue present medications   For now continue Cardizem and metoprololDuring his previous hospitalization he was difficult to manage in terms of heart rate  DVT prophylaxis if no contraindication            Thank you  much for consult and giving us the opportunity in contributing in the care of this patient. Please feel free to call me for any questions.        Werner Shaver MD, 4/23/2022 11:29 AM
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
## 2022-04-23 NOTE — PROGRESS NOTES
Patient seen and examined. No acute events overnight. Afebrile and hemodynamically stable. Good UOP. ERNIE drain 60/140/45 mls. Stool contents in ileostomy appliance. Abd: soft, ND, mild tenderness in the mid abdomen. No rebound or guarding. Bilious contents in right lower quadrant end ileostomy appliance. Well-healed midline incision. Intra-abdominal fluid collection. S/p percutaneous drainage by IR. Appreciate their assistance. Description of the fluid was straw-colored and likely ascites. Follow-up on the surgical cultures. They are pending. Anemia of chronic disease. Hemoglobin has dropped to 6.7. Due to his cardiac history he is receiving a transfusion of 1 unit PRBC's. Continue to hold his Eliquis and aspirin. Leukocytosis has resolved and normalized with a value of 9.5. Continue with IV antibiotics and hopefully we will be able to de-escalate these. However, 2 out of 2 bottles for his blood cultures are growing out Klebsiella. ID recommendations reviewed. PAF-continue with digoxin and Cardizem and Lopressor. Cardiology consulted. I will start him on full liquid diet.

## 2022-04-23 NOTE — PROGRESS NOTES
Hang Mariee MD, [address]                Internal Medicine Hospitalist             Daily Progress  Note   Subjective:     Chief Complaint   Patient presents with    Other     bleeding at new ostomy site     Mr. Hughes Channel of going through a bad night unhappy with the nursing staff. Somewhat confused time difficult to understand what he is saying. I was called by RN earlier due to shortness of breath. He does not remember being seen by the surgeon earlier this morning. Objective:    BP (!) 135/91   Pulse 80   Temp 97.5 °F (36.4 °C) (Oral)   Resp 20   Wt 165 lb 9.1 oz (75.1 kg)   SpO2 95%   BMI 24.44 kg/m²      Intake/Output Summary (Last 24 hours) at 4/23/2022 0837  Last data filed at 4/23/2022 2398  Gross per 24 hour   Intake 350 ml   Output 1495 ml   Net -1145 ml      Physical Exam:  Heart:  IR normal S1 and S2 in all 4 auscultatory areas. No rubs  Murmurs or gallops heard. Lungs: Mostly clear to auscultation, decreased breath sounds at bases. No wheezes appreciated no crackles heard. Poor effort. Abdomen: Soft, non distended. Bowel sounds not appreciated. No obvious liver or spleen enlargement. Non tender, no rebound noted. Extremities: Non tender, no swelling noted, strength 5/5 both legs. CNS: Grossly intact.     Labs:  CBC with Differential:    Lab Results   Component Value Date    WBC 9.5 04/23/2022    RBC 2.07 04/23/2022    RBC 5.19 08/18/2017    HGB 6.7 04/23/2022    HCT 22.1 04/23/2022     04/23/2022    .8 04/23/2022    MCH 32.4 04/23/2022    MCHC 30.3 04/23/2022    RDW 18.6 04/23/2022    SEGSPCT 76.6 04/23/2022    BANDSPCT 6 04/13/2022    LYMPHOPCT 8.4 04/23/2022    LYMPHOPCT 10.8 08/18/2017    MONOPCT 9.5 04/23/2022    MYELOPCT 1 04/12/2022    EOSPCT 2.6 12/11/2011    BASOPCT 0.4 04/23/2022    MONOSABS 0.9 04/23/2022    LYMPHSABS 0.8 04/23/2022    EOSABS 0.4 04/23/2022    BASOSABS 0.0 04/23/2022    DIFFTYPE AUTOMATED DIFFERENTIAL 04/23/2022     CMP:    Lab Results Component Value Date     04/23/2022    K 4.1 04/23/2022    CL 96 04/23/2022    CO2 23 04/23/2022    BUN 14 04/23/2022    CREATININE 1.4 04/23/2022    GFRAA 57 04/23/2022    AGRATIO 1.6 03/10/2022    LABGLOM 47 04/23/2022    GLUCOSE 78 04/23/2022    PROT 5.3 04/23/2022    PROT 6.2 12/11/2011    LABALBU 2.4 04/23/2022    CALCIUM 7.7 04/23/2022    BILITOT 0.4 04/23/2022    ALKPHOS 273 04/23/2022    AST 17 04/23/2022    ALT 21 04/23/2022     No results for input(s): TROPONINT in the last 72 hours.   No results found for: [facility]      sodium chloride      sodium chloride 25 mL (04/23/22 4366)      furosemide  20 mg IntraVENous Once    linezolid  600 mg IntraVENous Q12H    digoxin  125 mcg Oral Daily    dilTIAZem  120 mg Oral Daily    ferrous sulfate  325 mg Oral BID WC    folic acid  1 mg Oral Daily    metoprolol tartrate  25 mg Oral BID    therapeutic multivitamin-minerals  1 tablet Oral Daily    pantoprazole  40 mg Oral Daily    sodium chloride flush  5-40 mL IntraVENous 2 times per day    lactobacillus  1 capsule Oral TID WC    cefepime  2,000 mg IntraVENous Q12H         Assessment:       Patient Active Problem List    Diagnosis Date Noted    Postoperative intra-abdominal abscess 04/22/2022    Colitis     Severe protein-calorie malnutrition ([address] 04/09/2022    On total parenteral nutrition 04/09/2022    Delirium 04/07/2022    Atrial fibrillation with RVR ([address] 04/05/2022    Hypotension 04/05/2022    Ischemic colitis ([address] 04/05/2022    Hypocalcemia 04/05/2022    MARIELOS (acute kidney injury) ([address] 04/05/2022    Probable Bacterial Pneumonia 04/05/2022    CAD (coronary artery disease) 04/05/2022    Anemia 04/05/2022    SBO (small bowel obstruction) ([address] 04/02/2022    Partial small bowel obstruction ([address] 03/23/2022    Pseudomonas urinary tract infection 03/20/2022    History of rectal cancer     Benign neoplasm of cecum     Benign neoplasm of ascending colon     Melena     Arteriovenous malformation of jejunum     GI bleed 02/27/2022    Paroxysmal atrial fibrillation ([facility] 01/31/2022    Nonrheumatic aortic valve stenosis 10/04/2021    H/O: CVA (cerebrovascular accident) 08/02/2021    Irregular heart beat 08/02/2021    Gastroesophageal reflux disease without esophagitis 05/21/2020    Urinary retention 05/21/2020    Chronic myeloproliferative disease ([facility] 04/30/2020    Monocytosis (symptomatic) 04/30/2020    Psoriatic arthritis ([facility] 08/17/2019    Primary malignant neoplasm of rectum ([facility] 08/17/2019    Psoriasis 08/17/2019    Hypertension 12/12/2011    Benign prostatic hyperplasia 12/12/2011       Plan:     Problems being addressed this admission:   Bleeding around ileostomy site  4/22/2022 bleeding has stopped site looks okay will have surgery consulted as well. Seems like ileostomy is working well. 4/23/2022 no more bleeding noted ileostomy seems to be working good surgery start him on a full liquid diet. Patient has had severe colitis s/p subtotal colectomy with ileostomy 4/3/22     Intra-abdominal abscess (postoperative)  / blood cx pos for Klebsiella  4/22/2022 patient had 15 mL of viera fluid retrieved by IR from the abscess awaiting culture sensitivity and meanwhile continue with Zyvox and Zosyn as before. I D on the case as well. 4/23/2022 his blood cultures 2 out of 2 bottles came back positive for Klebsiella oxytoca awaiting sensitivities meanwhile on cefepime continue as before. ID nurse saw him yesterday continue as before.     PAF  4/22/2022 supposed to be on Eliquis per last discharge summary is not on it. We will consult cardiology for that further recommendation as per them meanwhile continue with Lanoxin 125 mcg daily Cardizem CD1 20 mg daily Lopressor 25 mg twice a day  4/23/2022 awaiting cardiology consultation meanwhile his anticoagulation has been held due to anemia. He received a unit of blood. Check troponins.      Anemia r/o GIB  4/23/2022 his hemoglobin dropped to 6.7 today received a unit of blood with some chest congestion he was given IV Lasix 20 mg x 1. We will consult GI. Check H&H every 12. Check stool for occult blood. MARIELOS  4/23/2022 his BUN is 14 creatinine 1.4, will continue to monitor for any worse we will consult nephrology. Disposition  4/23/2022 I would recommend SNF upon discharge family wants her to go to [facility] or [place].      Consultants:  General surgery  IR  Cardiology  GI    General Orders:  Repeat basic labs again in am.  I have explained to the patient and discussed with him/her the treatment plan. The above chart was generated partly using Dragon dictation system, it may contain dictation errors given the limitations of this technology.      David Paredes MD, [address]

## 2022-04-23 NOTE — PROGRESS NOTES
Report given to Uriel Buitrago RN.  Patient being transferred to room 2024 per family request.  If there are any questions or updates, please contact Melita Wsaserman at [phone]

## 2022-04-23 NOTE — CONSULTS
[address], [facility]                                  CONSULTATION    PATIENT NAME: Cipriano Newell                       :        1929  MED REC NO:   [id number]                          ROOM:       2611  ACCOUNT NO:   [de-identified]                           ADMIT DATE: 2022  PROVIDER:     Mary Dye MD    CONSULT DATE:  2022    SURGICAL CONSULTATION    REASON FOR THE CONSULTATION:  Intra-abdominal fluid collection, concern  for abscess. CHIEF COMPLAINT/HISTORY OF PRESENT ILLNESS:  Is as follows: The patient  is a 80-year-old  male who has a past medical history of  hypertension and atrial fibrillation, recurrent small-bowel  obstructions, along with nausea and vomiting. In early 2022, he came  and he was admitted for severe colitis, and my partner, Dr. Laury Reynolds, who  was on-call for our group, got consulted and based on his CT findings,  clinical examination, she took him to the operating room and performed  an exploratory laparotomy with a small bowel resection, removal of his  remaining colon, and gave him an end ileostomy. His biopsy did reveal  the pathological specimen was consistent with pseudomembranous colitis  secondary to C. difficile. His other prior surgery is that he does have  a history of rectal cancer and he had an abdominoperineal resection in  the past with a proximal diverting colostomy. He then was in the  intensive care unit, was here for a prolonged course, and eventually  started to progress clinically. During that hospitalization, he also  had bilateral pleural effusions which did require bilateral  thoracenteses; he also was in fluid overload, was given diuretic  therapy, and this eventually resolved as well.   He has a history of  atrial fibrillation, also created some RVR, and he did need rate control  with digoxin, Cardizem, but eventually this was rate controlled. He was  placed back on his Eliquis, in addition to treatment of the C. difficile  colitis with deficit. He is also being treated for a Pseudomonas  urinary tract infection. During the course of the week,   he was being  followed by my partner, Dr. Susana Huerta, who eventually was able to get him  off of his TPN. His Weaver catheter had been removed. He was doing  well, tolerating his diet, and he did take him off of his deficit. The  patient was then discharged on 04/21, but then my partner, Dr. Susana Huerta,  got a call that the patient had bleeding around his ileostomy site, was  having nausea, and was feeling weak, and he came back to the emergency  room and now I am seeing him in consultation because my partner will be  leaving town early this a.m. and I am on-call. The bleeding at the  ileostomy site was able to be controlled, was able to be fulgurated with  silver nitrate in the emergency room. We are going to hold the Eliquis  and the aspirin for now for us to reevaluate this. He also had a CT  scan of the abdomen and pelvis and this did reveal a larger enhancing  fluid collection within the abdomen and pelvis, which extends down in  the presacral area concerning for an abscess. There is also a small  amount of free fluid within the abdomen. He has a known infrarenal  abdominal aortic aneurysm which is measuring 5.6 cm and it is stable. Bilateral small pleural effusions and some focally dilated small bowel  loops with air-fluid levels in the left upper quadrant, likely related  to mild ileus. The patient also had a right lower extremity venous  ultrasound which revealed no evidence of a DVT because the patient was  reporting some mild swelling. Based on the CT findings, the patient did  receive a dose of Zosyn in the ED and Interventional Radiology has  already been consulted to evaluate this with possible percutaneous  drainage and then fluid cultures.   He did not report any fever or chills  when he came in. He did have a slight leukocytosis of 15,000. He also  had anemia with a value of 8.9, but he does have anemia of chronic  disease. His blood cultures were also taken. PAST MEDICAL HISTORY:  AAA, chronic kidney disease, gastroesophageal  reflux disease, history of rectal cancer, psoriasis, atrial  fibrillation, on anticoagulation. PAST SURGICAL HISTORY:  EGDs, prior abdominoperineal resection secondary  to rectal cancer on 04/03/2022, exploratory laparotomy with colon  resection and end-ileostomy. ALLERGIES:  MORPHINE and SULFA ANTIBIOTICS. SOCIAL HISTORY:  Negative for alcohol, tobacco, or IV drug abuse. FAMILY HISTORY:  Noncontributory. REVIEW OF SYSTEMS:  A 10-point review of systems is otherwise negative  unless stated as above in the history of present illness. MEDICATIONS:  Please refer to the medication reconciliation sheet. PHYSICAL EXAMINATION:  VITAL SIGNS:  Temperature 98.3, respiratory rate is 20, pulse 91, blood  pressure is 150/88, O2 sat is 97% on room air. GENERAL:  Generally speaking, no acute distress. Alert, awake, and  oriented x3. HEAD, EYES, EARS, NOSE, AND THROAT:  Normocephalic, atraumatic. Pupils  are equal, round, and reactive to light. Extraocular movements are  intact. Trachea is midline. No lymphadenopathy. Anicteric sclerae. Moist mucous membranes. NECK:  Supple. No JVD or bruits. HEART:  Irregularly irregular. CHEST:  Clear to auscultation bilaterally. No rales, no wheezes, no  crackles. No use of accessory muscles for respiration. ABDOMEN:  His abdomen is soft, nondistended. Minimal tenderness with  some slight guarding in the left abdomen. End-ileostomy is viable, not  bleeding, and he has stool contents in the appliance. Healing midline  incision. No herniations. Pulsation is evident from his AAA. No  organomegaly. No CVA tenderness. No abdominal wall masses.   EXTREMITIES:  Negative for erythema, edema, cellulitis, or cyanosis,  except for the right lower extremity which is slightly swollen. He does  have negative Homans' signs bilaterally and 2+ palpable pulses. ASSESSMENT AND PLAN:  The patient presents with postoperative abdominal  distention with nausea and CT findings consistent with a fluid  collection. This I feel is going to be an ascitic fluid collection, but  there is a possibility this could be an intra-abdominal abscess. We  will go ahead and have Interventional Radiology drain this under  CT-guided percutaneous drainage. We will also obtain cultures and place  him on prophylactic antibiotics and follow his clinical course. The  bleeding around his colostomy site has now subsided after being  fulgurated with silver nitrate. We will just continue his clinical  course. This is likely just some granulation tissue that has already  formed at the mucocutaneous junction. It also is related to the patient  being on Eliquis and aspirin which we will go ahead and hold at this  time. We will continue his medications for his medical comorbidities. We will continue to monitor his H&H for his anemia of chronic disease. The patient can be started on a diet again and I will continue to follow  his clinical course. I would like to thank the hospitalist service for giving me the  opportunity to assist in the patient's surgical care and evaluation.         Emanuel Rosario MD    D: 04/23/2022 6:36:17       T: 04/23/2022 6:42:12     [email or web address]  Job#: [id number]     Doc#: [id number]    CC:

## 2022-04-24 NOTE — PLAN OF CARE
Problem: Pain  Goal: Verbalizes/displays adequate comfort level or baseline comfort level  4/24/2022 1338 by Gavin Stinson RN  Outcome: Progressing  4/24/2022 0125 by Brigitte Martin RN  Outcome: Progressing

## 2022-04-24 NOTE — PLAN OF CARE

## 2022-04-24 NOTE — PROGRESS NOTES
Fran Weathers MD, [address]                Internal Medicine Hospitalist             Daily Progress  Note   Subjective:     Chief Complaint   Patient presents with    Other     bleeding at new ostomy site     Mr. Rojo Garre of some abdominal soreness when asked. Has had some confusion he said he  then came back. But alert oriented now. Objective:    BP (!) 171/74   Pulse 69   Temp 97.9 °F (36.6 °C) (Oral)   Resp 15   Ht 5' 9\" (1.753 m)   Wt 165 lb (74.8 kg)   SpO2 96%   BMI 24.37 kg/m²      Intake/Output Summary (Last 24 hours) at 2022 0708  Last data filed at 2022 3649  Gross per 24 hour   Intake 10 ml   Output 1425 ml   Net -1415 ml      Physical Exam:  Heart:  Regular rate and rhythm, normal S1 and S2 in all 4 auscultatory areas. No rubs  Murmurs or gallops heard. Lungs: Mostly clear to auscultation, decreased breath sounds at bases. No wheezes appreciated no crackles heard. Poor effort. Abdomen: Soft, non distended. Bowel sounds not appreciated. No obvious liver or spleen enlargement. Some tender to palpation, no rebound noted. Extremities: Non tender, no swelling noted, strength 5/5 both legs. CNS: Grossly intact.     Labs:  CBC with Differential:    Lab Results   Component Value Date    WBC 10.0 2022    RBC 2.66 2022    RBC 5.19 2017    HGB 8.4 2022    HCT 26.4 2022     2022    MCV 99.2 2022    MCH 31.6 2022    MCHC 31.8 2022    RDW 20.7 2022    SEGSPCT 74.8 2022    BANDSPCT 6 2022    LYMPHOPCT 7.2 2022    LYMPHOPCT 10.8 2017    MONOPCT 10.1 2022    MYELOPCT 1 2022    EOSPCT 2.6 2011    BASOPCT 0.9 2022    MONOSABS 1.0 2022    LYMPHSABS 0.7 2022    EOSABS 0.5 2022    BASOSABS 0.1 2022    DIFFTYPE AUTOMATED DIFFERENTIAL 2022     CMP:    Lab Results   Component Value Date     2022    K 4.1 2022    CL 98 04/24/2022    CO2 23 04/24/2022    BUN 11 04/24/2022    CREATININE 1.4 04/24/2022    GFRAA 57 04/24/2022    AGRATIO 1.6 03/10/2022    LABGLOM 47 04/24/2022    GLUCOSE 87 04/24/2022    PROT 5.6 04/24/2022    PROT 6.2 12/11/2011    LABALBU 2.6 04/24/2022    CALCIUM 8.0 04/24/2022    BILITOT 0.5 04/24/2022    ALKPHOS 278 04/24/2022    AST 17 04/24/2022    ALT 21 04/24/2022     Recent Labs     04/23/22  0940 04/23/22  1649 04/24/22  0103   TROPONINT 0.010* <0.010 <0.010     No results found for: [facility]      sodium chloride      sodium chloride 25 mL (04/24/22 0617)      linezolid  600 mg IntraVENous Q12H    digoxin  125 mcg Oral Daily    dilTIAZem  120 mg Oral Daily    ferrous sulfate  325 mg Oral BID WC    folic acid  1 mg Oral Daily    metoprolol tartrate  25 mg Oral BID    therapeutic multivitamin-minerals  1 tablet Oral Daily    pantoprazole  40 mg Oral Daily    sodium chloride flush  5-40 mL IntraVENous 2 times per day    lactobacillus  1 capsule Oral TID WC    cefepime  2,000 mg IntraVENous Q12H         Assessment:       Patient Active Problem List    Diagnosis Date Noted    Postoperative intra-abdominal abscess 04/22/2022    Gram-negative bacteremia 04/23/2022    Colitis     Severe protein-calorie malnutrition ([facility] 04/09/2022    On total parenteral nutrition 04/09/2022    Delirium 04/07/2022    Atrial fibrillation with RVR ([facility] 04/05/2022    Hypotension 04/05/2022    Ischemic colitis ([facility] 04/05/2022    Hypocalcemia 04/05/2022    MARIELOS (acute kidney injury) ([facility] 04/05/2022    Probable Bacterial Pneumonia 04/05/2022    CAD (coronary artery disease) 04/05/2022    Anemia 04/05/2022    SBO (small bowel obstruction) ([facility] 04/02/2022    Partial small bowel obstruction ([facility] 03/23/2022    Pseudomonas urinary tract infection 03/20/2022    History of rectal cancer     Benign neoplasm of cecum     Benign neoplasm of ascending colon     Melena     Arteriovenous malformation of jejunum     Discussed with surgery we will start him on regular diet today.'  8 does not seem like he is on Flagyl at this time will defer that to ID. Now on cefepime and Zyvox.     PAF  4/22/2022 supposed to be on Eliquis per last discharge summary is not on it.  We will consult cardiology for that further recommendation as per them meanwhile continue with Lanoxin 125 mcg daily Cardizem CD1 20 mg daily Lopressor 25 mg twice a day  4/23/2022 awaiting cardiology consultation meanwhile his anticoagulation has been held due to anemia. He received a unit of blood. Check troponins. 4/24/2022 seen by cardiology yesterday wrote ' atrial fibrillation but currently in sinus rhythm with intermittent paroxysmal atrial fibrillation episodes rate controlled for now hold anticoagulation due to concerns for bleeding, keep hemoglobin above 8 for now continue Cardizem and metoprolol during his previous hospitalization he was difficult to manage in terms of heart rate'.      Anemia r/o GIB  4/23/2022 his hemoglobin dropped to 6.7 today received a unit of blood with some chest congestion he was given IV Lasix 20 mg x 1. We will consult GI. Check H&H every 12. Check stool for occult blood. 4/24/2022 hemoglobin is 8.4 today staying stable. GI saw patient yesterday wrote 'no need at this point for further endoscopic intervention had complete work-up in early March of this year by Dr. Diann Fatima.'  Continue to monitor     MARIELOS  4/23/2022 his BUN is 14 creatinine 1.4, will continue to monitor for any worse we will consult nephrology. 4/24/2022 BUN is 11 creatinine 1.4 continue to monitor his sodium is 132 improved from yesterday.     Disposition  4/23/2022 I would recommend SNF upon discharge family wants her to go to [facility] or [place].      Consultants:  General surgery  IR  Cardiology  GI  ID    General Orders:  Repeat basic labs again in am.  No DVT prophylaxis with Lovenox for now due to bleeding will have SCDs.    I have explained to the patient and discussed with him/her the treatment plan. Also discussed in detail with RN patient is now in 21    The above chart was generated partly using Dragon dictation system, it may contain dictation errors given the limitations of this technology.      Laura Johansen MD, Lex Jeff

## 2022-04-24 NOTE — PROGRESS NOTES
Patient seen and examined. No acute events overnight. Transferred to ICU stepdown per family request.  Still with some intermittent confusion. I did d/w Dr. Jenifer Borden. Afebrile and hemodynamically stable. Good UOP. ERNIE drain has lessened, 25 mls. Straw colored and classic ascites. It is not infected. Stool contents in ileostomy appliance. Abd: soft, ND, mild tenderness in the mid abdomen. No rebound or guarding. Bilious contents in right lower quadrant end ileostomy appliance. Well-healed midline incision. Intra-abdominal fluid collection. S/p percutaneous drainage by IR. Appreciate their assistance. Follow-up on the surgical cultures. They are pending. Anemia of chronic disease. Received 1 unit of PRBC's. Hgb is now 8.4. Continue to hold his Eliquis and aspirin. Leukocytosis has resolved and normalized with a value of 9.5. Continue with IV antibiotics and hopefully we will be able to de-escalate these. However, 2 out of 2 bottles for his blood cultures are growing out Klebsiella. ID recommendations reviewed. PAF-continue with digoxin and Cardizem and Lopressor. Cardiology consult note reviewed. I will ADAT. Dr. Mandi Diaz back tomorrow to resume his surgical care.

## 2022-04-24 NOTE — PROGRESS NOTES
Al[address]ina [address], [address] [facility],Third Floor  Phone: [phone]    Fax [phone]                  Danielle Olivier MD, Alberto Song MD, [address] Mel[address]za MD, MD Lorri Pederson MD Marla Slipper, MD Loreda Lacer, MD Kimberly Chapa, CHEMA Garner, CHEMA Mora, CHEMA Gloria, CHEMA Huynh, PADREW     Cardiology Progress Note     Today's Plan:hold [facility]    Admit Date:  4/22/2022    Consult reason/ Seen today for: GIB with Afib  CARDIOLOGY ATTENDING [facility] [address];  1. Atrial fibrillation but currently in sinus rhythm with intermittent paroxysmal A. fib episodes rate controlled for now hold anticoagulation due to concerns for bleeding hematocrit has dropped from 29-22 with hemoglobin dropped from 8.9-6.7  2. Cultures growing klebsella on antibiotics as per Primary team /ID  3. Acute blood loss anemia: Transfuse to keep hemoglobin above 8.  4. Abdominal abscess work-up as per primary team and surgery  5. HTN: stable, continue present medications   6. For now continue Cardizem and metoprololDuring his previous hospitalization he was difficult to manage in terms of heart rate when he was in afib but he has been sinus now   7. Restart anticoagulation once HCT has been stable and no signs of bleeding  8. May consider watchman as outpatient   9. DVT prophylaxis if no contraindication              HPI:  I have reviewed the HPI  And agree with above   Odalis Camejo is a 80 y. o.year old who and presents with had concerns including Other (bleeding at new ostomy site).   Chief Complaint   Patient presents with    Other     bleeding at new ostomy site     Please review addendum/changes made to note above   Interval history:  pleasantly confused, no active bleeding , anticoagulation on hold, cultures are growing klebsella     Physical Exam:  General:  Awake, alert, NAD  Head:normal  Eye:normal  Neck:  No JVD Chest:  Clear to auscultation, respiration easy  Cardiovascular:  S1 and S2 audible, No added heart sounds, No signs of ankle edema, or volume overload, No evidence of JVD, No crackles  Abdomen:   nontender  Extremities:  No * edema  Pulses; palpable  Neuro: grossly normal        I agree with the plan, which was planned by myself and discussed with advanced level provider. My documented MDM is a substantive portion of the supervisory note. I have seen ,spoken to  and examined this patient personally, independently of the advanced level provider. I have spent substantiate  portion of this encounter independently myself in examining patient and developing the medical management plan . I have reviewed the hospital care given to date and reviewed all pertinent labs and imaging. The plan was developed mutually at the time of the visit with the patient,  NP /PA  and myself. I have spoken with patient, nursing staff and provided written and verbal instructions . The above note has been reviewed and I agree with the assessment, diagnosis, and treatment plan with changes made by me as follows . Jenniffer Da Silva MD [facility] 04/24/22   Subjective and  Overnight Events:  Patient states that he is feeling better. Reviewed increase in hgb since receiving 1 unit PRBC's     Chest pain no  Shortness of breath no  Palpitations no  Dizziness no  Swelling no      Assessment and Plan:  1. Atrial fibrillation but currently in atrial finrillation with controlled rate. Rate controlled for now. continue Cardizem and metoprolol. hold anticoagulation due to concerns for bleeding hematocrit has dropped from 29-22 with hemoglobin dropped from 8.9-6.7. currently 8.4/26.4  2. Acute blood loss anemia: Abdominal abscess work-up as per primary team and surgery. Transfuse to keep hemoglobin above 8. He hasa only received 1 unit thus far. 3. HTN: stable  4.  During his previous hospitalization he was difficult to manage in terms of heart rate      Telemetry Reviewed:   Atrial fibrillation rate 80    ECHO :   echocardiogram      History of Presenting Illness:    Chief complain on admission : 80 y. o.year old who is admitted for  Chief Complaint   Patient presents with    Other     bleeding at new ostomy site        Past medical history:    has a past medical history of AAA (abdominal aortic aneurysm) ([address] MARIELOS (acute kidney injury) ([address] Angina, class I ([address] Benign prostatic hyperplasia, Bowel obstruction ([address] CAD (coronary artery disease), Cancer ([address] CCC (chronic calculous cholecystitis), Delirium, Gastroesophageal reflux disease without esophagitis, Hx of cardiovascular stress test, Hypertension, On total parenteral nutrition, Partial small bowel obstruction ([address] Primary malignant neoplasm of rectum ([address] Probable Bacterial Pneumonia, Psoriasis, Psoriatic arthritis ([address] Pyelonephritis, Severe protein-calorie malnutrition ([address] Stable angina ([address] and Unspecified cerebral artery occlusion with cerebral infarction. Past surgical history:   has a past surgical history that includes Appendectomy; colostomy; Upper gastrointestinal endoscopy (N/A, 2/28/2022); Colonoscopy (N/A, 3/2/2022); and laparotomy (N/A, 4/3/2022). Social History:   reports that he quit smoking about 49 years ago. His smoking use included cigarettes. He has a 60.00 pack-year smoking history. He has never used smokeless tobacco. He reports that he does not drink alcohol and does not use drugs. Family history:  family history includes Cancer in his father and mother.     Allergies   Allergen Reactions    Morphine Hallucinations    Sulfa Antibiotics        Review of Systems   All 14 systems were reviewed and are negative  Except for the positive findings  which as documented     BP (!) 171/74   Pulse 69   Temp 97.9 °F (36.6 °C) (Oral)   Resp 15   Ht 5' 9\" (1.753 m)   Wt 165 lb (74.8 kg)   SpO2 96%   BMI 24.37 kg/m²       Intake/Output Summary (Last 24 hours) at 4/24/2022 0658  Last data filed at 4/24/2022 0299  Gross per 24 hour   Intake 10 ml   Output 1425 ml   Net -1415 ml       Physical Exam  Vitals reviewed. Constitutional:       General: He is not in acute distress. Appearance: Normal appearance. He is not ill-appearing. HENT:      Head: Atraumatic. Neck:      Vascular: No carotid bruit. Cardiovascular:      Rate and Rhythm: Normal rate. Rhythm irregular. Pulses: Normal pulses. Heart sounds: Normal heart sounds. No murmur heard. Pulmonary:      Effort: Pulmonary effort is normal. No respiratory distress. Breath sounds: Normal breath sounds. Musculoskeletal:         General: No swelling or deformity. Cervical back: Neck supple. No muscular tenderness. Neurological:      Mental Status: He is alert. Medications:    linezolid  600 mg IntraVENous Q12H    digoxin  125 mcg Oral Daily    dilTIAZem  120 mg Oral Daily    ferrous sulfate  325 mg Oral BID WC    folic acid  1 mg Oral Daily    metoprolol tartrate  25 mg Oral BID    therapeutic multivitamin-minerals  1 tablet Oral Daily    pantoprazole  40 mg Oral Daily    sodium chloride flush  5-40 mL IntraVENous 2 times per day    lactobacillus  1 capsule Oral TID WC    cefepime  2,000 mg IntraVENous Q12H      sodium chloride      sodium chloride 25 mL (04/24/22 0617)     sodium chloride, sodium chloride flush, sodium chloride, ondansetron **OR** ondansetron, acetaminophen **OR** acetaminophen    Lab Data:  CBC:   Recent Labs     04/22/22  0105 04/22/22  0105 04/23/22  0041 04/23/22  2110 04/24/22  0103   WBC 15.4*  --  9.5  --  10.0   HGB 8.9*   < > 6.7* 8.2* 8.4*   HCT 29.1*   < > 22.1* 26.4* 26.4*   .4*  --  106.8*  --  99.2   *  --  408  --  448*    < > = values in this interval not displayed.      BMP:   Recent Labs     04/22/22  0148 04/23/22  0041 04/24/22  0103   * 130* 132*   K 4.2 4.1 4.1   CL 98* 96* 98*   CO2 24 23 23   PHOS  -- 3. 3 3.2   BUN 19 14 11   CREATININE 1.4* 1.4* 1.4*     PT/INR:   Recent Labs     04/22/22  0105 04/23/22  0041   PROTIME 16.1* 15.7*   INR 1.25 1.21     BNP:  No results for input(s): PROBNP in the last 72 hours. TROPONIN:   Recent Labs     04/23/22  0940 04/23/22  1649 04/24/22  0103   TROPONINT 0.010* <0.010 <0.010               All labs, medications and tests reviewed by myself , continue all other medications of all above medical condition listed as is except for changes mentioned above. Thank you very much for consult , please call with questions.     Electronically signed by CHEMA Lujan CNP on 4/24/2022 at 6:58 AM

## 2022-04-24 NOTE — PROGRESS NOTES
To Dr Vishnu Pisano: Mr Gabby Pastor is having trouble \"tasting\" his foods. Appetite is present but can't eat due to lack of taste. VS stable. No SOB or other indication of COVID. Passed swallowing test twice. Advise?

## 2022-04-25 NOTE — PROGRESS NOTES
[facility] [place]  [address], [address] [facility],Third Floor  Phone: [phone]    Fax [phone]                  Kylee Jenkins MD, Damon Nobles MD, [address], MD, Eulas Hodgkin, MD Mabel Runner, MD Jacquelyn Armor, MD Daphne Hatchet, MD Craig Pata, CHEMA Barnes, CHEMA Mccall, CHEMA Burgos, CHEMA Wallis PA-C     Cardiology Progress Note     Today's Plan:hold [facility]    Admit Date:  4/22/2022    Consult reason/ Seen today for: GIB with Afib  CARDIOLOGY ATTENDING [facility] [address] [place] [address];  1. Atrial fibrillation but currently in sinus rhythm with intermittent paroxysmal A. fib episodes rate controlled for now hold anticoagulation due to concerns for bleeding hematocrit has dropped from 29-22 with hemoglobin dropped from 8.9-6.7  2. Cultures growing klebsella on antibiotics as per Primary team /ID  3. Acute blood loss anemia: Transfuse to keep hemoglobin above 8.  4. Abdominal abscess work-up as per primary team and surgery  5. HTN: stable, continue present medications   6. For now continue Cardizem and metoprololDuring his previous hospitalization he was difficult to manage in terms of heart rate when he was in afib but he has been sinus now   7. Restart anticoagulation once HCT has been stable and no signs of bleeding, consider restarting anticoagulation if okay with surgery  8. Continue to watch hemoglobin hematocrit once a week if anticoagulation is started  9. May consider watchman as outpatient   10. DVT prophylaxis if no contraindication    Will sign off call with questions          HPI:  I have reviewed the HPI  And agree with above   Ludivina Augustine is a 80 y. o.year old who and presents with had concerns including Other (bleeding at new ostomy site).   Chief Complaint   Patient presents with    Other     bleeding at new ostomy site     Please review addendum/changes made to note above   Interval history: Hematocrit has been stable no more bleeding hemoglobin coming up to 9.5    Physical Exam:  General:  Awake, alert, NAD  Head:normal  Eye:normal  Neck:  No JVD   Chest:  Clear to auscultation, respiration easy  Cardiovascular:  S1 and S2 audible, No added heart sounds, No signs of ankle edema, or volume overload, No evidence of JVD, No crackles  Abdomen:   nontender  Extremities:  no* edema  Pulses; palpable  Neuro: grossly normal        I agree with the plan, which was planned by myself and discussed with advanced level provider. My documented MDM is a substantive portion of the supervisory note. I have seen ,spoken to  and examined this patient personally, independently of the advanced level provider. I have spent substantiate  portion of this encounter independently myself in examining patient and developing the medical management plan . I have reviewed the hospital care given to date and reviewed all pertinent labs and imaging. The plan was developed mutually at the time of the visit with the patient,  NP /PA  and myself. I have spoken with patient, nursing staff and provided written and verbal instructions . The above note has been reviewed and I agree with the assessment, diagnosis, and treatment plan with changes made by me as follows . Aamir Goldman MD [facility] 04/25/22       Subjective and  Overnight Events: Hard of hearing with no new complaints  Chest pain no  Shortness of breath no  Palpitations no  Dizziness no  Swelling no      Assessment and Plan:  1. Atrial fibrillation but currently in atrial finrillation with controlled rate.  hold anticoagulation due to concerns for bleeding hematocrit has dropped from 29-22 with hemoglobin dropped from 8.9-6.7. currently 9.5/31.   2. Acute blood loss anemia: Abdominal abscess work-up as per primary team, GI, and surgery. Transfuse to keep hemoglobin above 8. He has a only received 1 unit thus far. 3. HTN: stable  4.  During his previous hospitalization he was difficult to manage in terms of heart rate when he was in A. fib      Telemetry Reviewed:   Atrial fibrillation     ECHO :   Echocardiogram 8/2021     Summary   Left ventricular function and size is normal, EF is estimated at 55-60%. Normal diastolic filling pattern for age. No regional wall motion abnormalities were detected. Bi atrial enlargement noted. Mildly sclerotic aortic valve with mild aortic stenosis mean gradient is   13mmHg, NADEEM 1.6 cm2. Mild mitral , tricuspid and moderate aortic regurgitation is present. RVSP is 29 mmHg. No evidence of pericardial effusion. History of Presenting Illness:    Chief complain on admission : 80 y. o.year old who is admitted for  Chief Complaint   Patient presents with    Other     bleeding at new ostomy site        Past medical history:    has a past medical history of AAA (abdominal aortic aneurysm) ([address] MARIELOS (acute kidney injury) ([address] Angina, class I ([address] Benign prostatic hyperplasia, Bowel obstruction ([address] CAD (coronary artery disease), Cancer ([address] CCC (chronic calculous cholecystitis), Delirium, Gastroesophageal reflux disease without esophagitis, Hx of cardiovascular stress test, Hypertension, On total parenteral nutrition, Partial small bowel obstruction ([address] Primary malignant neoplasm of rectum ([address] Probable Bacterial Pneumonia, Psoriasis, Psoriatic arthritis ([address] Pyelonephritis, Severe protein-calorie malnutrition ([address] Stable angina ([address] and Unspecified cerebral artery occlusion with cerebral infarction. Past surgical history:   has a past surgical history that includes Appendectomy; colostomy; Upper gastrointestinal endoscopy (N/A, 2/28/2022); Colonoscopy (N/A, 3/2/2022); and laparotomy (N/A, 4/3/2022). Social History:   reports that he quit smoking about 49 years ago. His smoking use included cigarettes. He has a 60.00 pack-year smoking history.  He has never used smokeless tobacco. He reports that he does not drink alcohol and does not use drugs. Family history:  family history includes Cancer in his father and mother. Allergies   Allergen Reactions    Morphine Hallucinations    Sulfa Antibiotics        Review of Systems   All 14 systems were reviewed and are negative  Except for the positive findings  which as documented     BP (!) 157/95   Pulse 65   Temp 97.2 °F (36.2 °C) (Oral)   Resp 18   Ht 5' 9\" (1.753 m)   Wt 170 lb 3.1 oz (77.2 kg)   SpO2 95%   BMI 25.13 kg/m²       Intake/Output Summary (Last 24 hours) at 4/25/2022 0801  Last data filed at 4/25/2022 0644  Gross per 24 hour   Intake 590 ml   Output 2755 ml   Net -2165 ml       Physical Exam  Vitals reviewed. Constitutional:       General: He is not in acute distress. Appearance: Normal appearance. He is not ill-appearing. HENT:      Head: Atraumatic. Neck:      Vascular: No carotid bruit. Cardiovascular:      Rate and Rhythm: Normal rate. Rhythm irregular. Pulses: Normal pulses. Heart sounds: Murmur heard. Pulmonary:      Effort: Pulmonary effort is normal. No respiratory distress. Breath sounds: Normal breath sounds. Musculoskeletal:         General: No swelling or deformity. Cervical back: Neck supple. No muscular tenderness. Neurological:      Mental Status: He is alert.              Medications:    linezolid  600 mg IntraVENous Q12H    digoxin  125 mcg Oral Daily    dilTIAZem  120 mg Oral Daily    ferrous sulfate  325 mg Oral BID WC    folic acid  1 mg Oral Daily    metoprolol tartrate  25 mg Oral BID    therapeutic multivitamin-minerals  1 tablet Oral Daily    pantoprazole  40 mg Oral Daily    sodium chloride flush  5-40 mL IntraVENous 2 times per day    lactobacillus  1 capsule Oral TID     cefepime  2,000 mg IntraVENous Q12H       sodium chloride flush, ondansetron **OR** ondansetron, acetaminophen **OR** acetaminophen    Lab Data:  CBC:   Recent Labs     04/23/22  0041 04/23/22  2110 04/24/22  0103 WBC 9.5  --  10.0   HGB 6.7* 8.2* 8.4*   HCT 22.1* 26.4* 26.4*   .8*  --  99.2     --  448*     BMP:   Recent Labs     04/23/22  0041 04/24/22  0103   * 132*   K 4.1 4.1   CL 96* 98*   CO2 23 23   PHOS 3.3 3.2   BUN 14 11   CREATININE 1.4* 1.4*     PT/INR:   Recent Labs     04/23/22  0041   PROTIME 15.7*   INR 1.21     BNP:  No results for input(s): PROBNP in the last 72 hours. TROPONIN:   Recent Labs     04/23/22  0940 04/23/22  1649 04/24/22  0103   TROPONINT 0.010* <0.010 <0.010               All labs, medications and tests reviewed by myself , continue all other medications of all above medical condition listed as is except for changes mentioned above. Thank you very much for consult , please call with questions.     Electronically signed by CHEMA Youngblood CNP on 4/25/2022 at 8:01 AM

## 2022-04-25 NOTE — PROGRESS NOTES
Alyssa Katz MD, [address]                Internal Medicine Hospitalist             Daily Progress  Note   Subjective:     Chief Complaint   Patient presents with    Other     bleeding at new ostomy site     Mr. Destiny Schmidtache of has not been eating but tries. RN tells me patient has loss or altered taste. Objective:    BP (!) 157/95   Pulse 65   Temp 97.2 °F (36.2 °C) (Oral)   Resp 18   Ht 5' 9\" (1.753 m)   Wt 170 lb 3.1 oz (77.2 kg)   SpO2 95%   BMI 25.13 kg/m²      Intake/Output Summary (Last 24 hours) at 4/25/2022 0700  Last data filed at 4/25/2022 0644  Gross per 24 hour   Intake 590 ml   Output 2755 ml   Net -2165 ml      Physical Exam:  Heart:  Distant heart sounds. Lungs: Mostly clear to auscultation, decreased breath sounds at bases. No wheezes appreciated no crackles heard. Abdomen: Soft, non distended. Bowel sounds not appreciated. No obvious liver or spleen enlargement. Generally tender to touch. Extremities: Non tender, no swelling noted, can move all 4  CNS: Grossly intact.     Labs:  CBC with Differential:    Lab Results   Component Value Date    WBC 10.0 04/24/2022    RBC 2.66 04/24/2022    RBC 5.19 08/18/2017    HGB 8.4 04/24/2022    HCT 26.4 04/24/2022     04/24/2022    MCV 99.2 04/24/2022    MCH 31.6 04/24/2022    MCHC 31.8 04/24/2022    RDW 20.7 04/24/2022    SEGSPCT 74.8 04/24/2022    BANDSPCT 6 04/13/2022    LYMPHOPCT 7.2 04/24/2022    LYMPHOPCT 10.8 08/18/2017    MONOPCT 10.1 04/24/2022    MYELOPCT 1 04/12/2022    EOSPCT 2.6 12/11/2011    BASOPCT 0.9 04/24/2022    MONOSABS 1.0 04/24/2022    LYMPHSABS 0.7 04/24/2022    EOSABS 0.5 04/24/2022    BASOSABS 0.1 04/24/2022    DIFFTYPE AUTOMATED DIFFERENTIAL 04/24/2022     CMP:    Lab Results   Component Value Date     04/24/2022    K 4.1 04/24/2022    CL 98 04/24/2022    CO2 23 04/24/2022    BUN 11 04/24/2022    CREATININE 1.4 04/24/2022    GFRAA 57 04/24/2022    AGRATIO 1.6 03/10/2022    LABGLOM 47 04/24/2022 GLUCOSE 87 04/24/2022    PROT 5.6 04/24/2022    PROT 6.2 12/11/2011    LABALBU 2.6 04/24/2022    CALCIUM 8.0 04/24/2022    BILITOT 0.5 04/24/2022    ALKPHOS 278 04/24/2022    AST 17 04/24/2022    ALT 21 04/24/2022     Recent Labs     04/23/22  0940 04/23/22  1649 04/24/22  0103   TROPONINT 0.010* <0.010 <0.010     No results found for: [facility]        linezolid  600 mg IntraVENous Q12H    digoxin  125 mcg Oral Daily    dilTIAZem  120 mg Oral Daily    ferrous sulfate  325 mg Oral BID WC    folic acid  1 mg Oral Daily    metoprolol tartrate  25 mg Oral BID    therapeutic multivitamin-minerals  1 tablet Oral Daily    pantoprazole  40 mg Oral Daily    sodium chloride flush  5-40 mL IntraVENous 2 times per day    lactobacillus  1 capsule Oral TID WC    cefepime  2,000 mg IntraVENous Q12H         Assessment:       Patient Active Problem List    Diagnosis Date Noted    Postoperative intra-abdominal abscess 04/22/2022    Gram-negative bacteremia 04/23/2022    Colitis     Severe protein-calorie malnutrition ([address] 04/09/2022    On total parenteral nutrition 04/09/2022    Delirium 04/07/2022    Atrial fibrillation with RVR ([address] 04/05/2022    Hypotension 04/05/2022    Ischemic colitis ([address] 04/05/2022    Hypocalcemia 04/05/2022    MARIELOS (acute kidney injury) ([address] 04/05/2022    Probable Bacterial Pneumonia 04/05/2022    CAD (coronary artery disease) 04/05/2022    Anemia 04/05/2022    SBO (small bowel obstruction) ([address] 04/02/2022    Partial small bowel obstruction ([address] 03/23/2022    Pseudomonas urinary tract infection 03/20/2022    History of rectal cancer     Benign neoplasm of cecum     Benign neoplasm of ascending colon     Melena     Arteriovenous malformation of jejunum     GI bleed 02/27/2022    Paroxysmal atrial fibrillation ([address] 01/31/2022    Nonrheumatic aortic valve stenosis 10/04/2021    H/O: CVA (cerebrovascular accident) 08/02/2021    Irregular heart beat 08/02/2021    Gastroesophageal reflux disease without esophagitis 05/21/2020    Urinary retention 05/21/2020    Chronic myeloproliferative disease ([facility] 04/30/2020    Monocytosis (symptomatic) 04/30/2020    Psoriatic arthritis ([facility] 08/17/2019    Primary malignant neoplasm of rectum ([facility] 08/17/2019    Psoriasis 08/17/2019    Hypertension 12/12/2011    Benign prostatic hyperplasia 12/12/2011       Plan:     Problems being addressed this admission:   Bleeding around ileostomy site  4/22/2022 bleeding has stopped site looks okay will have surgery consulted as well.  Seems like ileostomy is working well. 4/23/2022 no more bleeding noted ileostomy seems to be working good surgery start him on a full liquid diet.  Patient has had severe colitis s/p subtotal colectomy with ileostomy 4/3/22   4/24/2022 no more active bleeding noted no DVT prophylaxis with Lovenox for now due to bleeding will continue with SCDs. 4/25/2022 his ostomy looks good seems to be working has some stool in it.     Intra-abdominal abscess (postoperative)  / blood cx pos for Klebsiella  4/22/2022 patient had 15 mL of viera fluid retrieved by IR from the abscess awaiting culture sensitivity and meanwhile continue with Zyvox and Zosyn as before. I D on the case as well.    4/23/2022 his blood cultures 2 out of 2 bottles came back positive for Klebsiella oxytoca awaiting sensitivities meanwhile on cefepime continue as before.  ID nurse saw him yesterday continue as before. 4/24/2022 white count is 10.0 has come down since admission. Has been seen by ID who wrote ' follow-up intra-abdominal abscess culture treat with at least 2 weeks of antibiotics for Klebsiella oxytoca bacteremia continue with linezolid and cefepime start metronidazole trend CRP and procalcitonin'. Intra-abdominal abscess cultures negative so far. Discussed with surgery we will start him on regular diet today.'  8 does not seem like he is on Flagyl at this time will defer that to ID. Now on cefepime and Zyvox. 4/25/2022 ID recommended at least 2 weeks of antibiotics for his Klebsiella oxytoca bacteremia continue as before. His intra-abdominal fluid culture no growth for 36 to 48 hours. Await labs from today. Abdomen looks rather from will have surgery to make recommendation. Dysgeusia  4/25/2022 probably a side effect of Zyvox can cause it in 1 to 2% of cases. We will have ID decide if needs to stop or change.     PAF  4/22/2022 supposed to be on Eliquis per last discharge summary is not on it.  We will consult cardiology for that further recommendation as per them meanwhile continue with Lanoxin 125 mcg daily Cardizem CD1 20 mg daily Lopressor 25 mg twice a day  4/23/2022 awaiting cardiology consultation meanwhile his anticoagulation has been held due to anemia.  He received a unit of blood.  Check troponins.   4/24/2022 seen by cardiology yesterday wrote ' atrial fibrillation but currently in sinus rhythm with intermittent paroxysmal atrial fibrillation episodes rate controlled for now hold anticoagulation due to concerns for bleeding, keep hemoglobin above 8 for now continue Cardizem and metoprolol during his previous hospitalization he was difficult to manage in terms of heart rate'. 4/25/2022 as above     Anemia r/o GIB  4/23/2022 his hemoglobin dropped to 6.7 today received a unit of blood with some chest congestion he was given IV Lasix 20 mg x 1.  We will consult GI.  Check H&H every 12.  Check stool for occult blood.   4/24/2022 hemoglobin is 8.4 today staying stable.   GI saw patient yesterday wrote 'no need at this point for further endoscopic intervention had complete work-up in early March of this year by Dr. Chapo Acevedo.'  Continue to monitor  4/25/2022 awaiting a.m. labs.     MARIELOS  4/23/2022 his BUN is 14 creatinine 1.4, will continue to monitor for any worse we will consult nephrology.   4/24/2022 BUN is 11 creatinine 1.4 continue to monitor his sodium is 132 improved from yesterday. 4/25/2022 awaiting a.m. labs     Disposition  4/23/2022 I would recommend SNF upon discharge family wants him to go to [place] [id number]     Consultants:  General surgery  IR  Cardiology  GI  ID    General Orders:  Repeat basic labs again in am.  I have explained to the patient and discussed with him/her the treatment plan. Also discussed with RN management plan. The above chart was generated partly using Dragon dictation system, it may contain dictation errors given the limitations of this technology.      Panchito Mcelroy MD, [address]

## 2022-04-25 NOTE — PROGRESS NOTES
Patient is awake and alert, complains of doing terrible. When I ask what is terrible he states his catheter leaked last night and he couldn't find the nurse kristi light and he ended up throwing something at the door to his room to get someone's attention. He states the catheter was then fixed (he states he's had a chronic indwelling catheter for years). Patient states food taste terrible so he is not eating. He is able to eat/drink, he drank water from a straw while I was talking to him and showed no signs of choking. He states the food has no taste or tastes terrible and he can't eat.     PE:    Vitals:    04/24/22 2135 04/24/22 2200 04/25/22 0600 04/25/22 0636   BP: (!) 162/94 (!) 156/83  (!) 157/95   Pulse: 78 70  65   Resp: 19 17  18   Temp: 97.2 °F (36.2 °C)      TempSrc: Oral      SpO2:       Weight:   170 lb 3.1 oz (77.2 kg)    Height:         Abd: soft, midline well healed, ERNIE drain in LLQ scant with serous fluid, right sided ileostomy is pink/viable, no signs of bleeding, green liquid stool in appliance    A/P:  -encourage PO, I explained to patient, after a large abdominal surgery, patient's do state food tastes different or bad but he needs to eat  -No signs of intra-abdominal infection, patient did not have an intra-abdominal abscess, it was ascites, cultures are NGTD  -patient with bacteremia, repeat cultures I was told were done, I do not see those in lab, will repeat order  -PT/OT  -patient without any acute surgical issues currently, plan for SNF at discharge

## 2022-04-25 NOTE — PROGRESS NOTES
Infectious Disease Progress Note  2022   Patient Name: Osvaldo Meyer : 1929   Impression  · K.oxytoca Bacteremia Likely Secondary to Intra-Abdominal Abscess s/p 4/3/22 Subtotal Colon Resection with End Ileostomy:     § Afebrile  § Leukocytosis on DWT  § -Blood cultures  Klebsiella oxytoca  § -BC pending  § -CT A&P W IV Contrast: Large rim enhancing fluid collection is seen within the abdomen and pelvis which extends from the presacral region to the level of the pancreas, concerning for an abscess. Full report below. § -per IR, Dr. Tj Phillips, Intra-abdominal Abscess drain placement, 30 cc of viera fluid removed. Body Fluid culture pending  § Dr. Shira Wilcox, general surgery, onboard     ? MARIELOS on CKD3     ? Macrocytic Anemia:     ? PAF     ? AAA     ? BPH     ? CAD     ? History of Rectal Cancer s/p Colostomy 2019     ? Psoriatic Arthritis     ? S/p CVA 2019     ? Allergy to sulfa     · Multi-morbidity: per PMHx: AAA, BPH, CAD, rectal cancer 2019, GERD, HTN, psoriatic arthritis, pyelonephritis, CVA 2019     Plan:  · Continue IV cefepime 2 gm q12h  · Continue IV linezolid 600 mg q12h, may change to po when tolerates  · Start IV Flagyl 500 mg q8h  · Plan for 2 weeks total duration of ABX therapy (end date 22)  ? Trend CRP and Pct, trending down, repeat in am  ? Following ERNIE drain intact    Ongoing Antimicrobial Therapy  Cefepime -  Linezolid -  Flagyl -   ?  Completed Antimicrobial Therapy  Cefepime ? Metronidazole   Zosyn ,   Vancomycin   Eraxis -  Meropenem -? Po vancomycin 4/10-12  Zyvox -  Dificid -  ? History:? Interval history noted. Chief complaint: K.oxytoca bacteremia secondary to Postop intra-abdominal abscess. Denies n/v/d/f or untoward effects of antibiotics. Resting quietly, states is feeling fine now, states the last 2 night he has felt bad, disoriented, and uncomfortable.   States he feels he will be \"going home to God\" soon. Physical Exam:  Vital Signs: BP (!) 154/78   Pulse 93   Temp 98.3 °F (36.8 °C) (Oral)   Resp 22   Ht 5' 9\" (1.753 m)   Wt 170 lb 3.1 oz (77.2 kg)   SpO2 96%   BMI 25.13 kg/m²     Gen: alert and oriented X3, no distress, resting quietly. Wounds: C/D/I mid abdominal incision well approximated, no erythema or edema at site, no drainage at site. Ileostomy: intact RUQ draining brown liquid stool  ERNIE Drain: intact LUQ, draining gold fluid  Chest: no distress and CTA. Good air movement. Heart: RRR and no MRG. Abd: soft, non-distended, no tenderness, no hepatomegaly. Normoactive bowel sounds. Ext: no clubbing, cyanosis, or edema  Catheter Site: without erythema or tenderness draining clear yellow urine. Neuro: Mental status intact. CN 2-12 intact and no focal sensory or motor deficits        Radiologic / Imaging / TESTING  4/22/22 CT Abdomen Pelvis W IV Contrast:  Impression   1. Large rim enhancing fluid collection is seen within the abdomen and pelvis   which extends from the presacral region to the level of the pancreas,   concerning for an abscess. 2. Small amount of free fluid is seen within the abdomen. 3. Infrarenal abdominal aortic aneurysm measuring 5.6 cm.  Please see   recommendations below. 4. Small bilateral pleural effusions with bibasilar atelectasis. 5. Focally dilated small bowel loops with air-fluid levels are seen within   the left upper quadrant which could be related to ileus.       RECOMMENDATIONS:   5.6 cm infrarenal abdominal aortic aneurysm. Recommend referral to a vascular   specialist.       Reference: Taz Blew Radiol [id number].      4/22/22 VL Dup Lower Extremity Venous Right:  Impression   No evidence of DVT in the right lower extremity.      4/23/22 XR Chest Portable:  Impression   Small bilateral pleural effusions.       Moderate left basilar atelectasis and mild right basilar atelectasis.      4/24/22 XR Chest Portable:  Impression   Stable trace left pleural effusion with left basilar airspace disease or   atelectasis.                Labs:    Recent Results (from the past 24 hour(s))   Comprehensive Metabolic Panel    Collection Time: 04/25/22 10:10 AM   Result Value Ref Range    Sodium 130 (L) 135 - 145 MMOL/L    Potassium 4.1 3.5 - 5.1 MMOL/L    Chloride 95 (L) 99 - 110 mMol/L    CO2 22 21 - 32 MMOL/L    BUN 11 6 - 23 MG/DL    CREATININE 1.4 (H) 0.9 - 1.3 MG/DL    Glucose 114 (H) 70 - 99 MG/DL    Calcium 8.2 (L) 8.3 - 10.6 MG/DL    Albumin 3.0 (L) 3.4 - 5.0 GM/DL    Total Protein 6.3 (L) 6.4 - 8.2 GM/DL    Total Bilirubin 0.4 0.0 - 1.0 MG/DL    ALT 24 10 - 40 U/L    AST 22 15 - 37 IU/L    Alkaline Phosphatase 299 (H) 40 - 128 IU/L    GFR Non- 47 (L) >60 mL/min/1.73m2    GFR  57 (L) >60 mL/min/1.73m2    Anion Gap 13 4 - 16   Magnesium    Collection Time: 04/25/22 10:10 AM   Result Value Ref Range    Magnesium 1.7 (L) 1.8 - 2.4 mg/dl   Phosphorus    Collection Time: 04/25/22 10:10 AM   Result Value Ref Range    Phosphorus 2.9 2.5 - 4.9 MG/DL   CBC with Auto Differential    Collection Time: 04/25/22 10:10 AM   Result Value Ref Range    WBC 11.9 (H) 4.0 - 10.5 K/CU MM    RBC 3.06 (L) 4.6 - 6.2 M/CU MM    Hemoglobin 9.5 (L) 13.5 - 18.0 GM/DL    Hematocrit 31.0 (L) 42 - 52 %    .3 (H) 78 - 100 FL    MCH 31.0 27 - 31 PG    MCHC 30.6 (L) 32.0 - 36.0 %    RDW 19.9 (H) 11.7 - 14.9 %    Platelets 863 (H) 636 - 440 K/CU MM    MPV 9.7 7.5 - 11.1 FL    Differential Type AUTOMATED DIFFERENTIAL     Segs Relative 81.3 (H) 36 - 66 %    Lymphocytes % 5.6 (L) 24 - 44 %    Monocytes % 6.9 (H) 0 - 4 %    Eosinophils % 3.2 (H) 0 - 3 %    Basophils % 0.7 0 - 1 %    Segs Absolute 9.7 K/CU MM    Lymphocytes Absolute 0.7 K/CU MM    Monocytes Absolute 0.8 K/CU MM    Eosinophils Absolute 0.4 K/CU MM    Basophils Absolute 0.1 K/CU MM    Nucleated RBC % 0.0 %    Total Nucleated RBC 0.0 K/CU MM    Total Immature Neutrophil 0.27 K/CU MM Immature Neutrophil % 2.3 (H) 0 - 0.43 %   C-Reactive Protein    Collection Time: 04/25/22 10:10 AM   Result Value Ref Range    CRP, High Sensitivity 42.5 mg/L   Procalcitonin    Collection Time: 04/25/22 10:10 AM   Result Value Ref Range    Procalcitonin 0.105      CULTURE results: Invalid input(s): BLOOD CULTURE,  URINE CULTURE, SURGICAL CULTURE    Diagnosis:  Patient Active Problem List   Diagnosis    Hypertension    Benign prostatic hyperplasia    [facility]atic arthritis ([facility] [address]    Primary malignant neoplasm of rectum (HCC)    Psoriasis    Chronic myeloproliferative disease ([facility] [address]    Monocytosis (symptomatic)    Gastroesophageal reflux disease without esophagitis    Urinary retention    H/O: CVA (cerebrovascular accident)    Irregular heart beat    Nonrheumatic aortic valve stenosis    Paroxysmal atrial fibrillation (HCC)    GI bleed    Melena    Arteriovenous malformation of jejunum    History of rectal cancer    Benign neoplasm of cecum    Benign neoplasm of ascending colon    Pseudomonas urinary tract infection    Partial small bowel obstruction (HCC)    SBO (small bowel obstruction) (HCC)    Atrial fibrillation with RVR (HCC)    Hypotension    Ischemic colitis (HCC)    Hypocalcemia    MARIELOS (acute kidney injury) ([facility] [address]    Probable Bacterial Pneumonia    CAD (coronary artery disease)    Anemia    Delirium    Severe protein-calorie malnutrition (HCC)    On total parenteral nutrition    Colitis    Postoperative intra-abdominal abscess    Gram-negative bacteremia       Active Problems  Principal Problem:    Postoperative intra-abdominal abscess  Active Problems:    Hypertension    H/O: CVA (cerebrovascular accident)    Gram-negative bacteremia  Resolved Problems:    * No resolved hospital problems. *    Electronically signed by: Electronically signed by CHEMA Saini CNP on 4/25/2022 at 3:46 PM

## 2022-04-25 NOTE — CARE COORDINATION
Spoke with patient and his daughter regarding discharge plan . They confirmed plan is to go to [facility]. Phoned [facility] to check on status of referral and spoke with Leonard Morgan. She confirmed that she has received the referral and is still reviewing . Leonard Morgan requested some additional information . CM to check on information and return call.

## 2022-04-25 NOTE — PROGRESS NOTES
Notified Dr Kenney Zendejas about patient feeling like he will pass today. Up dated on patient concerns. Up dated Dr. Kenney Zendejas about patient fear of impending doom, pt feels he will go to [facility]. Will allow family to stay the night to help ease patient anxiety. Educated patient on possible post op depression. Prayed with patient. Granddaughter feeding patient crackers tomato soup at this time.

## 2022-04-26 NOTE — CONSULTS
Palliative Care consult for goals of care and code status discussion    80 Y. O. male with PMH of  CKD 3, BPH,HTN,CAD, Afib on AC, AAA, angina class I,psoriatic arthritis, RA on Humira,rectal CA s/p colostomy, chronic myeloproliferative disorder,GERD, cerebral infarction,and COVID 19 1/2022, small bowel obstruction,anemia    PREVIOUS ADMISSION:   Admitted 4/2/22 CT abdomen 4/2 showed severe colitis.  Surgery consulted-exploratory laparotomy, small bowel resection, colon resection and end ileostomy 4/3. Garrison Bloch is consistent with pseudomembranous colitis. Also treated for Pseudomonas UTI, MARIELOS, AFIB RVR,fluid over load, pleural effusions, metabolic encephalopathy,thrush,anemia  . Discharged on late afternoon 4/21/2022 to [facility]. REASON FOR ADMISSION:    Patient returned to the ED 4/22/2022 around 1230am with bleeding from his ileostomy site and RT sided abdominal pain. Per note review it was reported from NH patient lost about 2 pints of blood. continued oozing from around the ostomy site while in the ED. Per ED physician Dr. Nica Cazares \"The bleeding appears to be coming from the anastomosis of the ostomy with the skin around the 8 to 11 o'clock position\". \"I spoke with Dr. Darshana Martins who was in agreement with using lidocaine with epinephrine and placing two figure-of-eight 4-0 Vicryl stitches and using silver nitrate to stop the bleeding. I was able to achieve good hemostasis using this technique. \"  CT of the abdomen and pelvis shows increased rim-enhancing fluid collection extending from the presacral region to the level of the pancreas. Antibiotics started for concern of intra abdominal abscess versus loculated ascites. Patient has been admitted for further management. Hemoglobin 6.7 on 4/23/22 patient received 1 unit PRBC and hemoglobin has remained stable. Patient has been expressing to physicians and staff that he wants to give up and meet Daphne Hews. He has had poor oral intake.  I saw patient on last admission for Palliative Care. CONSULTANTS:  ID consulted intra abdominal abscess  General surgery consulted intra-abdominal fluid collection- concern for abscess  Cardiology consulted PAF on [facility]  GI consulted Anemia     ASSESSMENT/ INITIAL VISIT:  Patient was alert resting in bed on room air with  ,Tilly RN and daughter Griselda Ryan at bedside.  assessed patient and inquired about his request to see Cheryleboogie Calderon. Patient stated he is ready to meet delon.  asked if God/ Delon spoke to him, patient declined.  stated we are all at God's will and in the mean time he should eat if he can. He finished his exam and allowed me to continue. Patient is oriented to person ,place, states its 2044, and is oriented to situation at times. Patient was receptive of talking and I asked him to elaborate on his feelings. He expressed that he was tired and old and thought he was dying last night. Inquired if he was scared, sad, or at peace with this thought? He stated he was at peace. Inquired if he was able to do the things he was doing before like playing cards with his Edison Klinefelter would he would he still feel like he wanted to pass? He stated he didn't think so. Asked him if he could do anything right now what would he do? 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 He denies pain, anxiety, SOB. He does not appear to be in distress. He states he feels fine but is not hungry and food does not taste good. Encouraged him that eating will help him gain strength to be able to get stronger to do some of the things he enjoys. He is not symptomatic and has improved since I last saw him. He is discouraged about his weakness and decreased mobility. PT/OT was ordered this am.I did discuss code status with patient as we do not have to escalate care any further if he does not desire. Explained current code status is FULL code and his previous code status [id number].  Patient denies wanting Compressions, intubation, shocking of his heart but would be okay with supportive IV med's for BP and any arrhythmia's if needed. Patient's daughter/poa José Hernandez arrived at bedside. Updated her on conversation. She had concerns of patient not being mobilized and Montse Gamble was made aware of concerns by José Hernandez. Anne Dahl RN is going to get patient up out of bed later as patient refused when she was in during my visit. José Hernandez is okay with starting patient on a appetite stimulant.  recommended Marinol and confirmed code status change with José Hernandez as patient is not completely oriented and code status will be changed to reflect patient's wishes.

## 2022-04-26 NOTE — PROGRESS NOTES
Pt assisted back to bed from chair, 2 person stand and pivot with 50% staff assist. Pt moves self in bed to position of comfort. Pt and family at bedside ask about silveira catheter, silveira care provided, cleaned and secured, pt states relief from previous discomfort. Pt has call light in reach, denies complaints, thanks staff.

## 2022-04-26 NOTE — PROGRESS NOTES
Pt assisted to chair from bed by stand pivot and sit with 2 person assist. Gait belt used. Pt does minimal effort. Pt secured in chair, eating lunch, chair alarm applied. Pt has significant other at bedside. Pt eating lunch, alert and oriented to self and hospital location. Pt thanks staff, bed change completed.

## 2022-04-26 NOTE — PROGRESS NOTES
Infectious Disease Progress Note  2022   Patient Name: Carolina Mckeon : 1929   Impression  · K.oxytoca Bacteremia Likely Secondary to Intra-Abdominal Abscess s/p 4/3/22 Subtotal Colon Resection with End Ileostomy:     § Afebrile  § Leukocytosis on DWT  § -Blood cultures  Klebsiella oxytoca  § -BC pending  § -CT A&P W IV Contrast: Large rim enhancing fluid collection is seen within the abdomen and pelvis which extends from the presacral region to the level of the pancreas, concerning for an abscess. Full report below. § -per IR, Dr. Yadiel Reeves, Intra-abdominal Abscess drain placement, 30 cc of viera fluid removed. Body Fluid culture pending  § Dr. Laura Shannon, general surgery, onboard, removed ERNIE drain      ? MARIELOS on CKD3     ? Macrocytic Anemia:     ? PAF     ? AAA     ? BPH     ? CAD     ? History of Rectal Cancer s/p Colostomy 2019     ? Psoriatic Arthritis     ? S/p CVA 2019     ? Allergy to sulfa     · Multi-morbidity: per PMHx: AAA, BPH, CAD, rectal cancer 2019, GERD, HTN, psoriatic arthritis, pyelonephritis, CVA 2019     Plan:  · DC IV linezolid and Flagyl  · Continue IV cefepime, decreased dose to 1 gm q8h due to slight confusion, AMS  · Plan for 2 weeks total duration of ABX therapy (end date 22)  ? Trend CRP and Pct, trending down, repeat in am    Ongoing Antimicrobial Therapy  Cefepime -   ?  Completed Antimicrobial Therapy  Cefepime ? Metronidazole   Zosyn ,   Vancomycin   Eraxis -  Meropenem ? Po vancomycin 4/10-12  Zyvox -  Dificid -  Linezolid   Flagyl   ? History:? Interval history noted. Chief complaint: K.oxytoca bacteremia secondary to Postop intra-abdominal abscess. Denies n/v/d/f or untoward effects of antibiotics. Appears to be intermittently confused today.   States has no abdominal pain this am.    Physical Exam:  Vital Signs: BP (!) 153/71   Pulse 76   Temp 97.9 °F (36.6 °C) (Oral)   Resp 25   Ht 5' 9\" (1.753 m)   Wt 173 lb 8 oz (78.7 kg)   SpO2 98%   BMI 25.62 kg/m²     Gen: Slightly confused, able to easily re-orient. No distress. Resting quietly. Wounds: C/D/I mid abdominal incision well approximated, no erythema or edema at site, no drainage at site. Ileostomy: intact RUQ draining brown liquid stool  Chest: no distress and CTA. Good air movement. Heart: RRR and no MRG. Abd: soft, non-distended, no tenderness, no hepatomegaly. Normoactive bowel sounds. Ext: no clubbing, cyanosis, or edema  Catheter Site: without erythema or tenderness draining clear yellow urine. Neuro: Mental status intact. CN 2-12 intact and no focal sensory or motor deficits        Radiologic / Imaging / TESTING  4/22/22 CT Abdomen Pelvis W IV Contrast:  Impression   1. Large rim enhancing fluid collection is seen within the abdomen and pelvis   which extends from the presacral region to the level of the pancreas,   concerning for an abscess. 2. Small amount of free fluid is seen within the abdomen. 3. Infrarenal abdominal aortic aneurysm measuring 5.6 cm.  Please see   recommendations below. 4. Small bilateral pleural effusions with bibasilar atelectasis. 5. Focally dilated small bowel loops with air-fluid levels are seen within   the left upper quadrant which could be related to ileus.       RECOMMENDATIONS:   5.6 cm infrarenal abdominal aortic aneurysm. Recommend referral to a vascular   specialist.       Reference: Khushboo Platts Radiol [id number].      4/22/22 VL Dup Lower Extremity Venous Right:  Impression   No evidence of DVT in the right lower extremity.      4/23/22 XR Chest Portable:  Impression   Small bilateral pleural effusions.       Moderate left basilar atelectasis and mild right basilar atelectasis.      4/24/22 XR Chest Portable:  Impression   Stable trace left pleural effusion with left basilar airspace disease or   atelectasis.                Labs:    Recent Results (from the past 24 hour(s))   Comprehensive Metabolic Panel    Collection Time: 04/26/22  8:28 AM   Result Value Ref Range    Sodium 134 (L) 135 - 145 MMOL/L    Potassium 4.4 3.5 - 5.1 MMOL/L    Chloride 98 (L) 99 - 110 mMol/L    CO2 21 21 - 32 MMOL/L    BUN 12 6 - 23 MG/DL    CREATININE 1.3 0.9 - 1.3 MG/DL    Glucose 96 70 - 99 MG/DL    Calcium 8.5 8.3 - 10.6 MG/DL    Albumin 3.0 (L) 3.4 - 5.0 GM/DL    Total Protein 6.2 (L) 6.4 - 8.2 GM/DL    Total Bilirubin 0.4 0.0 - 1.0 MG/DL    ALT 22 10 - 40 U/L    AST 22 15 - 37 IU/L    Alkaline Phosphatase 263 (H) 40 - 128 IU/L    GFR Non- 52 (L) >60 mL/min/1.73m2    GFR African American >60 >60 mL/min/1.73m2    Anion Gap 15 4 - 16   Magnesium    Collection Time: 04/26/22  8:28 AM   Result Value Ref Range    Magnesium 1.7 (L) 1.8 - 2.4 mg/dl   Phosphorus    Collection Time: 04/26/22  8:28 AM   Result Value Ref Range    Phosphorus 3.1 2.5 - 4.9 MG/DL   CBC with Auto Differential    Collection Time: 04/26/22  8:28 AM   Result Value Ref Range    WBC 12.7 (H) 4.0 - 10.5 K/CU MM    RBC 3.08 (L) 4.6 - 6.2 M/CU MM    Hemoglobin 9.7 (L) 13.5 - 18.0 GM/DL    Hematocrit 31.3 (L) 42 - 52 %    .6 (H) 78 - 100 FL    MCH 31.5 (H) 27 - 31 PG    MCHC 31.0 (L) 32.0 - 36.0 %    RDW 19.8 (H) 11.7 - 14.9 %    Platelets 966 (H) 102 - 440 K/CU MM    MPV 9.6 7.5 - 11.1 FL    Differential Type AUTOMATED DIFFERENTIAL     Segs Relative 81.2 (H) 36 - 66 %    Lymphocytes % 6.8 (L) 24 - 44 %    Monocytes % 7.5 (H) 0 - 4 %    Eosinophils % 2.6 0 - 3 %    Basophils % 0.6 0 - 1 %    Segs Absolute 10.3 K/CU MM    Lymphocytes Absolute 0.9 K/CU MM    Monocytes Absolute 1.0 K/CU MM    Eosinophils Absolute 0.3 K/CU MM    Basophils Absolute 0.1 K/CU MM    Nucleated RBC % 0.0 %    Total Nucleated RBC 0.0 K/CU MM    Total Immature Neutrophil 0.17 K/CU MM    Immature Neutrophil % 1.3 (H) 0 - 0.43 %   C-Reactive Protein    Collection Time: 04/26/22  8:28 AM   Result Value Ref Range    CRP, High Sensitivity 33.0 mg/L     CULTURE results: Invalid input(s): BLOOD CULTURE,  URINE CULTURE, SURGICAL CULTURE    Diagnosis:  Patient Active Problem List   Diagnosis    Hypertension    Benign prostatic hyperplasia    P[facility]iatic arthritis ([facility] [address]    Primary malignant neoplasm of rectum (HCC)    Psoriasis    Chronic myeloproliferative disease ([facility] [address]    Monocytosis (symptomatic)    Gastroesophageal reflux disease without esophagitis    Urinary retention    H/O: CVA (cerebrovascular accident)    Irregular heart beat    Nonrheumatic aortic valve stenosis    Paroxysmal atrial fibrillation (HCC)    GI bleed    Melena    Arteriovenous malformation of jejunum    History of rectal cancer    Benign neoplasm of cecum    Benign neoplasm of ascending colon    Pseudomonas urinary tract infection    Partial small bowel obstruction (HCC)    SBO (small bowel obstruction) (HCC)    Atrial fibrillation with RVR (HCC)    Hypotension    Ischemic colitis ([facility] [address]    Hypocalcemia    MARIELOS (acute kidney injury) ([facility] [address]    Probable Bacterial Pneumonia    CAD (coronary artery disease)    Anemia    Delirium    Severe protein-calorie malnutrition (HCC)    On total parenteral nutrition    Colitis    Postoperative intra-abdominal abscess    Gram-negative bacteremia       Active Problems  Principal Problem:    Postoperative intra-abdominal abscess  Active Problems:    Hypertension    H/O: CVA (cerebrovascular accident)    Gram-negative bacteremia  Resolved Problems:    * No resolved hospital problems. *    Electronically signed by: Electronically signed by Ha Smith.  CHEMA Camara CNP on 4/26/2022 at 11:49 AM

## 2022-04-26 NOTE — CARE COORDINATION
Spoke with patient's nurse Victor M to obtain information that Joie Wolf from [facility] was asking for .  Phoned Joie Wolf at [facility] to provide update and had to leave a message for return call to AZEB

## 2022-04-26 NOTE — PROGRESS NOTES
Addendum- I dw dr Sylvia Emery and as na stable consult was cancel- call if need thank you      Patient seen and examined full note to follow sodium slightly low stable discussed with daughter at bedside patient more lucid and awake at this time we will monitor and see if improves on his own gentle hydration we will follow closely full note to follow

## 2022-04-26 NOTE — PROGRESS NOTES
Progress Note    Subjective:      Reji Larsen   I visited patient today. Patient claims that he is feeling like he wants to give up he is very tired and weak no complaints of specific abdominal pain. His drain which was placed for loculated ascites is draining a minimal amount of clear noninfected fluid so I remove the drain today. His ileostomy is functioning he has had no further bleeding. Objective:     BP (!) 153/71   Pulse 76   Temp 97.9 °F (36.6 °C) (Oral)   Resp 25   Ht 5' 9\" (1.753 m)   Wt 173 lb 8 oz (78.7 kg)   SpO2 98%   BMI 25.62 kg/m²     In: -   Out: 1975 [Urine:1825]         General: Awake alert appropriate but very tired  Abdomen: Soft incisions well-healed no blood in ileostomy bag bile and some stool within ileostomy bag  Lungs: Clear decreased breath sounds at the base  Other: Some pitting edema of the lower extremity    Labs:   CBC:   Lab Results   Component Value Date    WBC 11.9 04/25/2022    RBC 3.06 04/25/2022    RBC 5.19 08/18/2017    HGB 9.5 04/25/2022    HCT 31.0 04/25/2022    .3 04/25/2022    MCH 31.0 04/25/2022    MCHC 30.6 04/25/2022    RDW 19.9 04/25/2022     04/25/2022    MPV 9.7 04/25/2022        Assessment:     1. Bleeding from ileostomy   2. Past history of pseudomembranous colitis requiring total colectomy  3. Protein calorie malnutrition  4. Sterile loculated ascites  5.  1 positive blood culture for Klebsiella of questionable significance. Further cultures have been no growth       Plan:     The patient overall clinically is doing well and discharge plan should be made for the end of the week. The fluid from the peritoneal cavity did not show any growth and other blood cultures also have been no growth. Infectious disease may make recommendations for an oral antibiotic. Overall long-term prognosis is fair  There are no surgical issues at this time. Will consider discussing with the family palliative care if appropriate.   Dr. Kimberly Cordero will continue to follow this patient.

## 2022-04-26 NOTE — PROGRESS NOTES
Hospitalist Progress Note      Name:  Dmitriy Reed /Age/Sex: 1929  (80 y.o. male)   MRN & CSN:  [id number] & [id number] Admission Date/Time: 2022 12:38 AM   Location:  -A PCP: Cintia De Los Santos MD         Hospital Day: 5  Discharge barrier/Reason for continued hospitalization: When cleared by surgery. Will need ECF. Assessment and Plan:   Dmitriy Reed is a 80 y.o.  male paroxysmal A. fib on chronic anticoagulation with Eliquis, CKD 3A, infrarenal AAA, BPH, rectal cancer, GERD, hypertension, SBO, CVA, CAD recent hospitalization for 3 weeks where he was managed for severe colitis needing exploratory laparotomy, small bowel resection, colon resection and creation of end ileostomy on 4/3/2022. Biopsy results came back consistent with pseudomembranous colitis as the patient was treated with Dificid under the guidance of ID. He represented to the ED on on 2022 with bleeding at ostomy site and CT abdomen and pelvis was concerning for large rim-enhancing fluid collection within the abdomen and pelvis extending from the presacral region to the level of the pancreas concerning for Postoperative intra-abdominal abscess  On the same day, IR consult was done and a drain was placed in the abscess bed with removal of 30 cc of viera fluid. Blood cultures obtained at admit came back positive for Klebsiella oxytoca. 1.  Intra-abdominal abscess: Postoperative  IV antibiotics for 2 weeks per ID-IV cefepime to end on 2022    2. Acute blood loss anemia: Reason for hospital presentation  Hgb nadired at 6.7. S/p 1 unit PRBC on 2022. Hgb around 9 g currently    3. Klebsiella bacteremia: 2 out of 2 blood cultures on 2020  Due to GI translocation. Antibiotic management per ID    4. Dysgeusia with poor appetite: Liberalize diet. Discussed with daughter at bedside. Patient states nothing tastes good. Zyvox discontinued.     5.  Paroxysmal A. fib: Anticoagulation on hold due to blood loss. Will resume for now as there is no further evidence of bleeding. Currently NSR  Continue Cardizem/Lopressor/digoxin. 6.  Goals of care: Patient continues to talk about end-of-life. Palliative care consult ongoing. Remains full code for now. 7.  Physical deconditioning: PT/OT/SNF. Diet ADULT DIET; Regular   DVT Prophylaxis [] Lovenox, []  Heparin, [] SCDs, [] Ambulation. Eliquis   GI Prophylaxis [] PPI,  [] H2 Blocker,  [] Carafate,  [x] Diet/Tube Feeds   Code Status Full Code   [facility] [] Low, [] Moderate,[x]  High       History of Present Illness:     Chief Complaint: Postoperative intra-abdominal abscess     Karin Rodriguez is a 80 y.o.  male  who presents with bleeding into ostomy     4/26/2022: Patient is seen and examined, is alert and oriented x3. He is very weak and requires assistance with feeding and grooming. He has told several staff members that he is ready to go to [facility]. Ten point ROS reviewed, negative, unless as noted above    Objective: Intake/Output Summary (Last 24 hours) at 4/26/2022 1235  Last data filed at 4/26/2022 1234  Gross per 24 hour   Intake 360 ml   Output 2275 ml   Net -1915 ml        Vitals:   Vitals:    04/26/22 0803 04/26/22 0902 04/26/22 0955 04/26/22 1100   BP: (!) 172/136 (!) 151/72  (!) 145/107   Pulse:  81  107   Resp:  15  19   Temp:  98 °F (36.7 °C)  98 °F (36.7 °C)   TempSrc:  Oral  Oral   SpO2:  99% 98% 99%   Weight:       Height:            Physical Exam:   GEN: Awake male, alert and oriented x3 in no apparent distress. Appears given age. HEENT: Normal  RESP: Clear lung fields bilaterally. Symmetric chest movement while on room air  CVS: Irregularly irregular, S1, S2  GI/: Abdomen is soft, right colostomy with bilious material.  Midline surgical wound with well-healed scar  MSK: No gross joint deformities. No tenderness  SKIN: Normal coloration, warm, dry.   NEURO: Cranial nerves appear grossly intact, normal speech, no lateralizing weakness.   PSYCH:  Affect appropriate    Medications:   Medications:    cefepime  1,000 mg IntraVENous Q8H    digoxin  125 mcg Oral Daily    dilTIAZem  120 mg Oral Daily    ferrous sulfate  325 mg Oral BID WC    folic acid  1 mg Oral Daily    metoprolol tartrate  25 mg Oral BID    therapeutic multivitamin-minerals  1 tablet Oral Daily    pantoprazole  40 mg Oral Daily    sodium chloride flush  5-40 mL IntraVENous 2 times per day    lactobacillus  1 capsule Oral TID WC      Infusions:   PRN Meds: sodium chloride flush, 10 mL, PRN  ondansetron, 4 mg, Q8H PRN   Or  ondansetron, 4 mg, Q6H PRN  acetaminophen, 650 mg, Q6H PRN   Or  acetaminophen, 650 mg, Q6H PRN          Electronically signed by Fatoumata Rubio MD on 4/26/2022 at 12:35 PM

## 2022-04-26 NOTE — PROGRESS NOTES
Pt is alert and oriented with surgeon and removal of ERNIE drain, no complications. PT resting eyes closed. Daughter arrives, pt somewhat lethargic and sleepy, able to tolerate medication without complication. Physician at bedside. PT has colostomy output, no complications. Pt has all needs met, family at bedside, resting eyes closed, eats a small amount of breakfast. PT call light within reach, door open, pt and family deny complaints thanks staff for care.

## 2022-04-26 NOTE — CARE COORDINATION
Phoned [employer] back and spoke with Kaur in Admissions .  provided information for  her as requested by [place] and she voiced understanding. Kaur confirmed that patient is approved for admission once medically stable and once she has an open bed. Kaur stated hopes for open bed by the end of the week. Patient does not need a precert but will need a rapid COVID. Kaur requested to be kept updated on if patient will discharge on IV ATB's .

## 2022-04-26 NOTE — PROGRESS NOTES
Patient awake, bundled under multiple blankets. He states he's been praying and it's time to go home to [facility]. He denies any pain, states he's old and weak and couldn't get out of bed yesterday. When I asked if he wanted to eat anything he repeated that he's old and weak. PE:  Vitals:    04/26/22 0000 04/26/22 0300 04/26/22 0543 04/26/22 0600   BP: (!) 153/82 (!) 153/71     Pulse: 64 75 76    Resp: 21 17 25    Temp: 98.3 °F (36.8 °C) 97.9 °F (36.6 °C)     TempSrc: Oral Oral     SpO2:  98%     Weight:    173 lb 8 oz (78.7 kg)   Height:         Abd: soft, non-distended, stoma pink/viable working well, green loose stool in appliance, drain LLQ was removed this am    Labs pending    A/P:  Patient with decreased PO intake  Positive blood culture on admission, sterile ascites, no intra-abdominal infection, drain removed this am  Low sodium, recommend correcting that as may be contributing to patient's weakness, patient has been seen by nephrology previously, will re-consult. May be medication causing this. No acute surgical issues    Addendum:  Repeat blood cultures no growth to date (drawn 4/25/22)  Patient's sodium has improved, noted on today's labs, will cancel nephrology consult.

## 2022-04-26 NOTE — PROGRESS NOTES
Goals of care discussion completed, patient will be okay with vasopressor support and antiarrhythmics in the event of shock. He does not want chest compressions, defibrillation, intubation or mechanical ventilation. Verified with daughter at bedside. Palliative care staff, Raylene Claude was witness. Electronic medical record updated.   Total time: 16 mins

## 2022-04-27 NOTE — PROGRESS NOTES
Palliative Care follow up visit. Patient has had a change in metal status over night. He has been telling family he is seeing angels and loved ones that passed away Family has decided to make him comfort care and accepted a Hospice consult. Patient is now [facility]. Family is receptive of IV antibiotics and tele monitoring as long as it does not cause patient discomfort. They are concerned with his burst of agitation. He is yelling shut up and and talking about seeing the devil. Discussed small dose of Haldol with family and .  will order with permission from family. Emotional support and compassion provided. Patient's vital signs are stable and he is communicating continuously. He denies pain, SOB, anxiety but does exhibit agitation. I explained to the family that patient may not qualify for [facility] at this time. Explained Hospice at home and at the [facility]. Will await Hospice Eval from [facility] as family's preference is for him to stay here.

## 2022-04-27 NOTE — PROGRESS NOTES
Physical Therapy    Physical Therapy Treatment Note  Name: Natan Turk MRN: [id number] :   1929   Date:  2022   Admission Date: 2022 Room:  -A     PT attempt to see pt for eval at this time, per chart review PT called RN to confirm appropriate. RN request HOLD at this time d/t medical status.    Electronically signed by:    Erich Angelucci, PT  2022, 10:12 AM

## 2022-04-27 NOTE — PROGRESS NOTES
Change in clinical status of the patient. Response clearly to questions but has eyes closed and not moving. He indicates that he is in heaven and he is with Delon. Daughter at bedside who corroborates that he believes patient is ready to depart this World. Blood glucose is normal.  Blood pressure is normal.  Telemetry review showed short run of V. tach just before change in patient's clinical status. Currently A. fib on auscultation. Rate controlled. She request to talk to her siblings. She requires no further testing at this time and for us to give them some time. She will let staff know if they change their mind. CODE STATUS changed to DNR. Daughter is agreeable. Staff at bedside witnessed to discussions.

## 2022-04-27 NOTE — PROGRESS NOTES
Occupational Therapy    Pt is not medically stable at this time per nursing.  Will attempt to see pt when medically appropriate    Dallas Chowdhury OTR/L [id number]  10:11 AM,4/27/2022

## 2022-04-27 NOTE — PROGRESS NOTES
Infectious Disease Progress Note  2022   Patient Name: Irene Rollins : 1929   Impression  · K.oxytoca Bacteremia Likely Secondary to Intra-Abdominal Abscess s/p 4/3/22 Subtotal Colon Resection with End Ileostomy:     § Afebrile  § Leukocytosis on DWT  § -Blood cultures 2/ Klebsiella oxytoca  § -BC 0/-NGTD  § -CT A&P W IV Contrast: Large rim enhancing fluid collection is seen within the abdomen and pelvis which extends from the presacral region to the level of the pancreas, concerning for an abscess. Full report below. § -per IR, Dr. Malik Simental, Intra-abdominal Abscess drain placement, 30 cc of viera fluid removed. Body Fluid culture Kvng  Dr. Aishwarya Jordan, general surgery, onboard, removed ERNIE drain      ? MARIELOS on CKD3     ? Macrocytic Anemia:     ? PAF     ? AAA     ? BPH     ? CAD     ? History of Rectal Cancer s/p Colostomy      ? Psoriatic Arthritis     ? S/p CVA 2019     ? Allergy to sulfa     · Multi-morbidity: per PMHx: AAA, BPH, CAD, rectal cancer 2019, GERD, HTN, psoriatic arthritis, pyelonephritis, CVA 2019     Plan:  · Continue IV cefepime 1 gm q8h with duration of 2 weeks from surgical intervention (end date 22)  ? Trend CRP and Pct, trending down, repeat in am  ? Following with care team, family changed Code status to [facility]. Ongoing Antimicrobial Therapy  Cefepime -   ?  Completed Antimicrobial Therapy  Cefepime ? Metronidazole   Zosyn ,   Vancomycin   Eraxis -  Meropenem ? Po vancomycin 4/10-12  Zyvox -  Dificid -  Linezolid   Flagyl   ? History:? Interval history noted. Chief complaint: K.oxytoca bacteremia secondary to Postop intra-abdominal abscess. Denies n/v/d/f or untoward effects of antibiotics. Letharigic, intermittently confused.      Physical Exam:  Vital Signs: BP (!) 166/110   Pulse 97   Temp 97.8 °F (36.6 °C) (Axillary)   Resp 21   Ht 5' 9\" (1.753 m)   Wt 177 lb 14.6 oz (80.7 kg)   SpO2 98%   BMI 26.27 kg/m²     Gen: resting quietly with intermittent confusion. Wounds: C/D/I mid abdominal incision well approximated, no erythema or edema at site, no drainage at site. Ileostomy: intact RUQ draining brown liquid stool  Chest: no distress and CTA. Good air movement. Heart: RRR and no MRG. Abd: soft, non-distended, no tenderness, no hepatomegaly. Normoactive bowel sounds. Ext: no clubbing, cyanosis, or edema  Catheter Site: without erythema or tenderness draining clear yellow urine. Neuro: Mental status intact. CN 2-12 intact and no focal sensory or motor deficits        Radiologic / Imaging / TESTING  4/22/22 CT Abdomen Pelvis W IV Contrast:  Impression   1. Large rim enhancing fluid collection is seen within the abdomen and pelvis   which extends from the presacral region to the level of the pancreas,   concerning for an abscess. 2. Small amount of free fluid is seen within the abdomen. 3. Infrarenal abdominal aortic aneurysm measuring 5.6 cm.  Please see   recommendations below. 4. Small bilateral pleural effusions with bibasilar atelectasis. 5. Focally dilated small bowel loops with air-fluid levels are seen within   the left upper quadrant which could be related to ileus.       RECOMMENDATIONS:   5.6 cm infrarenal abdominal aortic aneurysm. Recommend referral to a vascular   specialist.       Reference: Carlos Moss Radiol [id number].      4/22/22 VL Dup Lower Extremity Venous Right:  Impression   No evidence of DVT in the right lower extremity.      4/23/22 XR Chest Portable:  Impression   Small bilateral pleural effusions.       Moderate left basilar atelectasis and mild right basilar atelectasis.      4/24/22 XR Chest Portable:  Impression   Stable trace left pleural effusion with left basilar airspace disease or   atelectasis.                Labs:    Recent Results (from the past 24 hour(s))   POCT Glucose    Collection Time: 04/27/22  9:16 AM   Result Value Ref Range    POC Glucose 109 (H) 70 - 99 MG/DL   Procalcitonin    Collection Time: 04/27/22  9:20 AM   Result Value Ref Range    Procalcitonin 0.122    Lactic Acid    Collection Time: 04/27/22  9:20 AM   Result Value Ref Range    Lactate 1.2 0.4 - 2.0 mMOL/L   Brain Natriuretic Peptide    Collection Time: 04/27/22  9:20 AM   Result Value Ref Range    Pro-BNP 2,348 (H) <300 PG/ML   Troponin    Collection Time: 04/27/22  9:20 AM   Result Value Ref Range    Troponin T 0.015 (H) <0.01 NG/ML     CULTURE results: Invalid input(s): BLOOD CULTURE,  URINE CULTURE, SURGICAL CULTURE    Diagnosis:  Patient Active Problem List   Diagnosis    Hypertension    Benign prostatic hyperplasia    Psoriatic arthritis ([address]    Primary malignant neoplasm of rectum (HCC)    Psoriasis    Chronic myeloproliferative disease ([address]    Monocytosis (symptomatic)    Gastroesophageal reflux disease without esophagitis    Urinary retention    H/O: CVA (cerebrovascular accident)    Irregular heart beat    Nonrheumatic aortic valve stenosis    Paroxysmal atrial fibrillation (HCC)    GI bleed    Melena    Arteriovenous malformation of jejunum    History of rectal cancer    Benign neoplasm of cecum    Benign neoplasm of ascending colon    Pseudomonas urinary tract infection    Partial small bowel obstruction (HCC)    SBO (small bowel obstruction) (HCC)    Atrial fibrillation with RVR (HCC)    Hypotension    Ischemic colitis ([address]    Hypocalcemia    MARIELOS (acute kidney injury) ([address]    Probable Bacterial Pneumonia    CAD (coronary artery disease)    Anemia    Delirium    Severe protein-calorie malnutrition (HCC)    On total parenteral nutrition    Colitis    Postoperative intra-abdominal abscess    Gram-negative bacteremia       Active Problems  Principal Problem:    Postoperative intra-abdominal abscess  Active Problems:    Hypertension    H/O: CVA (cerebrovascular accident)    Gram-negative bacteremia  Resolved Problems:    * No resolved hospital problems. *    Electronically signed by: Electronically signed by Fito Iraheta.  CHEMA Camara CNP on 4/27/2022 at 11:32 AM

## 2022-04-27 NOTE — CONSULTS
[address] [place] [address] Consult Note    Date: 2022  Name: Manish Oro  MRN: [id number]  YOB: 1929   Patient's PCP: Seema Richardson MD   Consultants during acute care: ID, General Surgery (Dr Dalia Lincoln and Dr Abhilash Alejandro), Gastroenterology, Cardiology, Nephrology, Palliative Medicine  Referring Physician: Dr. Veronica Witt care admission date: 2022 (4th admission in the past 2 months)     Informant: Chart reviewed, discussed with Dr. Rudy Ross, the patient's nurse, LAKEISHA Mcpherson RN. I examined the patient who provide minimal information due to clinical status. I met with the patient's daughter, Paramjit Reyes, at the bedside, and a close friend, Janna Page, arrived towards the end on my visit. CC: intermittent confusion and failure to thrive    [place]: This is a 80 y.o. male with history of rectal cancer with abdominoperineal resection in  (per daughter), CKD-3, BPH, psoriatic arthritis on Humira, hypertension, CVA, COVID 2022. The patient had severe colitis with subtotal colectomy and end ileostomy 4/3/22 per Dr Dalia Lincoln. Pathology showed pseudomembranous colitis and the patient was treated with Dificid. The patient was readmitted on 2022 after admission , from [facility] with bleeding from the ostomy site. CT abdomen showed a fluid collection that was concerning for abscess. Interventional Radiology placed a drain that is now removed, and fluid culture is negative. The patient had Klebsiella bacteremia and has been followed by ID. The inflammatory biomarkers are trending down. The patient had some delirium. He had told staff and family that he was going to [facility], and had talked with  family members. The patient's daughter reports that the patient had been a . Kuratur wanted to accept the patients wishes, and hospice was asked to meet with her.       Kuratur reports that the patient became very lucid on , ate augustine, and that there was a good conversation with her and the patient's granddaughter, Josefa Oseguera. The patient has told his daughter that he wants to get better. We discussed that time will tell whether he will continue to improve. For now the plan is to continue medical and surgical supportive care. Hospice philosophy was discussed with the patient's daughter regarding care and comfort at the end of life. Questions were answered, and emotional support was provided. She is aware that Hospice does not provide for the patients 24 hour care giving needs. The patient is DNR-comfort care status. 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    Delirium 4/7/2022    Gastroesophageal reflux disease without esophagitis 05/21/2020    Hx of cardiovascular stress test 08/12/2021    EF 62% Normal study    Hypertension     On total parenteral nutrition 4/9/2022    Partial small bowel obstruction ([address] 3/23/2022    Primary malignant neoplasm of rectum ([address] 08/17/2019    Probable Bacterial Pneumonia 4/5/2022    Psoriasis     Psoriatic arthritis ([address] 08/17/2019    Pyelonephritis 03/24/2018    Severe protein-calorie malnutrition (HCC) 4/9/2022    Stable angina ([address] 08/17/2019    Unspecified cerebral artery occlusion with cerebral infarction        Past Surgical History:   Procedure Laterality Date    APPENDECTOMY      COLONOSCOPY N/A 3/2/2022    COLONOSCOPY POLYPECTOMY REMOVAL ABLATION/STOMA with EMR and placed 7 hemoclips.  performed by Terry Garvey MD at [address] N/A 4/3/2022    LAPAROTOMY EXPLORATORY COLON RESECTION performed by Nina Abdul Wade Billings MD at [address] N/A 2022    ENTEROSCOPY PUSH CONTROL HEMORRHAGE WITH APC ABLATION OF AVM performed by Ankit Gaines MD at [address] [place] [address] History     Socioeconomic History    Marital status:      Spouse name: Not on file    Number of children: Not on file    Years of education: Not on file    Highest education level: Not on file   Occupational History    Not on file   Tobacco Use    Smoking status: Former Smoker     Packs/day: 1.50     Years: 40.00     Pack years: 60.00     Types: Cigarettes     Quit date: 80     Years since quittin.3    Smokeless tobacco: Never Used   Vaping Use    Vaping Use: Never used   Substance and Sexual Activity    Alcohol use: No    Drug use: No    Sexual activity: Never   Other Topics Concern    Not on file   Social History Narrative    Not on file     Social Determinants of Health     Financial Resource Strain:     Difficulty of Paying Living Expenses: Not on file   Food Insecurity:     Worried About [address] in the Last Year: Not on file    Haily of Food in the Last Year: Not on file   Transportation Needs:     Lack of Transportation (Medical): Not on file    Lack of Transportation (Non-Medical):  Not on file   Physical Activity:     Days of Exercise per Week: Not on file    Minutes of Exercise per Session: Not on file   Stress:     Feeling of Stress : Not on file   Social Connections:     Frequency of Communication with Friends and Family: Not on file    Frequency of Social Gatherings with Friends and Family: Not on file    Attends Jewish Services: Not on file    Active Member of Clubs or Organizations: Not on file    Attends Club or Organization Meetings: Not on file    Marital Status: Not on file   Intimate Partner Violence:     Fear of Current or Ex-Partner: Not on file    Emotionally Abused: Not on file    Physically Abused: Not on file    Sexually Abused: Not 178 lb 4.8 oz (80.9 kg)   03/10/22 181 lb (82.1 kg)   03/04/22 172 lb (78 kg)       Data reviewed 4/28/2022:  Transthoracic Echocardiogram 8/24/21:   Left ventricular function and size is normal, EF is estimated at 55-60%. Normal diastolic filling pattern for age. No regional wall motion abnormalities were detected. Bi atrial enlargement noted. Mildly sclerotic aortic valve with mild aortic stenosis mean gradient is   13mmHg, NADEEM 1.6 cm2. Mild mitral , tricuspid and moderate aortic regurgitation is present. RVSP is 29 mmHg. No evidence of pericardial effusion. Chest X-ray 4/24/22:  Trace left pleural effusion is stable. Moderate left basilar atelectasis or airspace disease remains. Right lung is clear. No pneumothorax. Heart size is normal.     CT-scan of abdomen and pelvis 4/22/22:  1. Large rim enhancing fluid collection is seen within the abdomen and pelvis which extends from the presacral region to the level of the pancreas, concerning for an abscess. 2. Small amount of free fluid is seen within the abdomen. 3. Infrarenal abdominal aortic aneurysm measuring 5.6 cm. Please see recommendations below. 4. Small bilateral pleural effusions with bibasilar atelectasis. 5. Focally dilated small bowel loops with air-fluid levels are seen within the left upper quadrant which could be related to ileus. Pathology 4/3/22: Final Pathologic Diagnosis:   A.  Segment of small intestine:          Recent perforations.        Focal serosal fibrosis with chronic inflammation, associated with one the above.              Segment of small intestine:          Focal narrowed segment with serosal fibrosis.          Neuronal hypertrophy and hyperplasia, associated with the above.     B.  Segment of colon:          Multifocal acute pseudomembranous colitis.      Blood cultures 4/22/22: Klebsiella oxytoca  Blood cultures 4/25/22: no growth to date     Hepatic Function Panel:    Lab Results   Component Value Date ALKPHOS 263 04/26/2022    ALT 22 04/26/2022    AST 22 04/26/2022    PROT 6.2 04/26/2022    PROT 6.2 12/11/2011    BILITOT 0.4 04/26/2022    BILIDIR 0.2 04/11/2022    IBILI 0.1 04/11/2022    LABALBU 3.0 04/26/2022     CBC:   Recent Labs     04/25/22  1010 04/26/22  0828   WBC 11.9* 12.7*   HGB 9.5* 9.7*   HCT 31.0* 31.3*   .3* 101.6*   * 524*     BMP:   Recent Labs     04/25/22  1010 04/26/22  0828   * 134*   K 4.1 4.4   CL 95* 98*   CO2 22 21   BUN 11 12   CREATININE 1.4* 1.3   GLUCOSE 114* 96       Physical Exam:   BP (!) 144/101   Pulse 101   Temp 97.4 °F (36.3 °C) (Oral)   Resp 26   Ht 5' 9\" (1.753 m)   Wt 177 lb 14.6 oz (80.7 kg)   SpO2 98%   BMI 26.27 kg/m²   General: Comfortable, drowsy but arousable, minimal speech, in no distress  Skin: scattered bruising  HEENT: Mucous membranes are mildly dry, sclerae are clear   Neck: carotid upstrokes are diminished without bruit  Heart: distant tones, mildly tachycardic IRRR, S1S2, no murmurs  Lungs:  Equal breath sounds bilaterally, diminished at the bases, without rales, rhonchi   Abdomen: ostomy present with output, the abdomen is soft, bowel sounds present, no apparent tenderness, nondistended  Extremities:  No mottling, no edema  Neurologic: drowsy but arousable, generally weak,     Assessment/Plan:  1. Klebsiella bacteremia, sterile postop intra abdominal fluid accumulation and adult failure to thrive after critical illness. Hospice information was provided, and the plan is to continue medical and surgical supportive care. PPS 30%. Will follow peripherally. 2. Anemia, stabilized  3.  Paroxysmal atrial fibrillation     Patient Active Problem List   Diagnosis Code    Hypertension I10    Benign prostatic hyperplasia N40.0    Psoriatic arthritis ([facility] L40.50    Primary malignant neoplasm of rectum (HCC) C20    Psoriasis L40.9    Chronic myeloproliferative disease (HCC) D47.1    Monocytosis (symptomatic) T36.862    Gastroesophageal reflux disease without esophagitis K21.9    Urinary retention R33.9    H/O: CVA (cerebrovascular accident) Z80.78    Irregular heart beat I49.9    Nonrheumatic aortic valve stenosis I35.0    Paroxysmal atrial fibrillation ([facility]) I48.0    GI bleed K92.2    Melena K92.1    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    History of rectal cancer Z85.048    Benign neoplasm of cecum D12.0    Benign neoplasm of ascending colon D12.2    Pseudomonas urinary tract infection N39.0, B96.5    Partial small bowel obstruction ([facility]) K56.600    SBO (small bowel obstruction) ([facility]) K56.609    Atrial fibrillation with RVR ([facility]) I48.91    Hypotension I95.9    Ischemic colitis ([facility]) K55.9    Hypocalcemia E83.51    MARIELOS (acute kidney injury) ([facility]) N17.9    Probable Bacterial Pneumonia J18.9    CAD (coronary artery disease) I25.10    Anemia D64.9    Delirium R41.0    Severe protein-calorie malnutrition ([facility]) E43    On total parenteral nutrition Z78.9    Colitis K52.9    Postoperative intra-abdominal abscess T81.43XA    Gram-negative bacteremia R78.81       OLGA Echeverria MD

## 2022-04-27 NOTE — PROGRESS NOTES
Messaged  about consult and asked that he talk with family in the am. Donell Hairston will call referral in when she gets a chance.

## 2022-04-27 NOTE — PROGRESS NOTES
Tele notified me that pt had 11 beats V-TACH immediately after I Received call from Traxorachel to come to pt's room. Primary RN Jacinta in room as well. He is less responsive than he was just moments ago. Upon arrival to room pt is in chair, eyes closed, responsive to voice. Pt placed back in chair. Elevated /91 (114). SpO2 96% HR between 80-90. Pt states \"I'm dead\". Pt has no complaints of pain. Hospitalist to room, new orders for labs and STAT CT of the head. Dght to room informed hospitalist that she believes pt is \"ready to go\". Palliative contacted to come to room, STAT CT of head canceled.      Code status changed to [facility]

## 2022-04-27 NOTE — PROGRESS NOTES
Hospitalist Progress Note      Name:  Rose Ya /Age/Sex: 1929  (80 y.o. male)   MRN & CSN:  [id number] & [id number] Admission Date/Time: 2022 12:38 AM   Location:  -A PCP: Nawaf García MD         Hospital Day: 6  Discharge barrier/Reason for continued hospitalization: Plan to transition to hospice. Assessment and Plan:   Rose Ya is a 80 y.o.  male paroxysmal A. fib on chronic anticoagulation with Eliquis, CKD 3A, infrarenal AAA, BPH, rectal cancer, GERD, hypertension, SBO, CVA, CAD recent hospitalization for 3 weeks where he was managed for severe colitis needing exploratory laparotomy, small bowel resection, colon resection and creation of end ileostomy on 4/3/2022. Biopsy results came back consistent with pseudomembranous colitis as the patient was treated with Dificid under the guidance of ID. He represented to the ED on on 2022 with bleeding at ostomy site and CT abdomen and pelvis was concerning for large rim-enhancing fluid collection within the abdomen and pelvis extending from the presacral region to the level of the pancreas concerning for Postoperative intra-abdominal abscess  On the same day, IR consult was done and a drain was placed in the abscess bed with removal of 30 cc of viera fluid. Blood cultures obtained at admit came back positive for Klebsiella oxytoca. On 2022, family requested for us to focus on comfort measures as patient was deemed to be terminal.    1.  Concern for intra-abdominal abscess: As seen on CT. Intra-abdominal collection cultured, no growth. Abscess ruled out. IV antibiotics for 2 weeks for bacteremia per ID-IV cefepime to end on 2022  We will continue with antibiotic as long as family is agreeable    2. Acute blood loss anemia: Reason for hospital presentation  Hgb nadired at 6.7. S/p 1 unit PRBC on 2022.   Hgb around 9 g currently  Stop all blood draws/testing consistent with current goals of care.    3.  Klebsiella bacteremia: 2 out of 2 blood cultures on 4/22/2020  See progress note 4/26/2022.    4.  Dysgeusia with poor appetite: See progress note 4/26/2022. Diet as tolerated for comfort    5. Paroxysmal A. fib: Anticoagulation on hold due to blood loss. Will resume for now as there is no further evidence of bleeding. Currently NSR  Continue Cardizem/Lopressor/digoxin as long as patient is agreeable  Noted run of NSVT this AM.  Stat labs ordered. No further labs after this per patient's family's wishes. 6.  Goals of care: Now DNR. Please see separate progress note authored by me today. Discussed with palliative care team.  We will work on transition to hospice    7. Physical deconditioning: PT/OT/SNF. Diet ADULT DIET; Regular   DVT Prophylaxis [] Lovenox, []  Heparin, [] SCDs, [] Ambulation. Eliquis   GI Prophylaxis [] PPI,  [] H2 Blocker,  [] Carafate,  [x] Diet/Tube Feeds   Code Status DNR-CC   MDM [] Low, [] Moderate,[x]  High  >50% of encounter time spent in counseling/coordination of care     History of Present Illness:     Chief Complaint: Postoperative intra-abdominal abscess     Cele Pritchett is a 80 y.o.  male  who presents with bleeding into ostomy     4/26/2022: Patient is seen and examined, is alert and oriented x3. He is very weak and requires assistance with feeding and grooming. He has told several staff members that he is ready to go to [facility]. 4/27/2022: Patient is seen and examined several times today. Patient reportedly became altered and was found to have run of V. tach on monitor. Patient was transferred to bed. Upon arrival, patient had normal blood pressure, was euglycemic and will respond to questions but has eyes closed and will not follow any other commands. He sounded lucid. Ten point ROS reviewed, negative, unless as noted above    Objective:        Intake/Output Summary (Last 24 hours) at 4/27/2022 1053  Last data filed at 4/27/2022 0508  Gross per 24 hour   Intake 360 ml   Output 1350 ml   Net -990 ml        Vitals:   Vitals:    04/27/22 1043 04/27/22 1044 04/27/22 1047 04/27/22 1048   BP:       Pulse: 98 100 95 97   Resp: 18 21 27 21   Temp:       TempSrc:       SpO2:       Weight:       Height:            Physical Exam:   GEN: Elderly male, eyes closed, not oriented. He tells us that he is in heaven several times. He tells me that he is dead. He reaffirms this with daughter that he is seeing [employer] [facility] [employer]. HEENT: Normal  RESP: Clear lung fields bilaterally. Symmetric chest movement while on room air  CVS: Irregularly irregular, S1, S2  GI/: Abdomen is soft, right colostomy with bilious material.  Midline surgical wound with well-healed scar  MSK: No gross joint deformities. No tenderness  SKIN: Normal coloration, warm, dry. NEURO: Cranial nerves appear grossly intact, normal speech, no lateralizing weakness.   PSYCH:  Affect appropriate    Medications:   Medications:    cefepime  1,000 mg IntraVENous Q8H    apixaban  2.5 mg Oral BID    dronabinol  2.5 mg Oral BID    digoxin  125 mcg Oral Daily    dilTIAZem  120 mg Oral Daily    ferrous sulfate  325 mg Oral BID WC    folic acid  1 mg Oral Daily    metoprolol tartrate  25 mg Oral BID    therapeutic multivitamin-minerals  1 tablet Oral Daily    pantoprazole  40 mg Oral Daily    sodium chloride flush  5-40 mL IntraVENous 2 times per day    lactobacillus  1 capsule Oral TID WC      Infusions:   PRN Meds: haloperidol lactate, 0.5 mg, Q4H PRN  sodium chloride flush, 10 mL, PRN  ondansetron, 4 mg, Q8H PRN   Or  ondansetron, 4 mg, Q6H PRN  acetaminophen, 650 mg, Q6H PRN   Or  acetaminophen, 650 mg, Q6H PRN          Electronically signed by Tania Bacon MD on 4/27/2022 at 10:53 AM

## 2022-04-28 NOTE — PROGRESS NOTES
Infectious Disease Progress Note  2022   Patient Name: Sven Ellis : 1929   Impression  · K.oxytoca Bacteremia Likely Secondary to Intra-Abdominal Abscess s/p 4/3/22 Subtotal Colon Resection with End Ileostomy:     § Afebrile, leukocytosis trended up slightly  this am.  Still having intermittent confusion, but able to re-orient. Concern is still with possibility of cefepime causing AMS, DW Dr. Marci Bruno, will DC cefepime and transition to levofloxacin (renal dosing) po to finish out regimen with end date 22. § -Blood cultures  Klebsiella oxytoca  § -BC 0-NGTD  § -CT A&P W IV Contrast: Large rim enhancing fluid collection is seen within the abdomen and pelvis which extends from the presacral region to the level of the pancreas, concerning for an abscess. Full report below. § -per IR, Dr. Tray Caban, Intra-abdominal Abscess drain placement, 30 cc of viera fluid removed. Body Fluid culture Cecily Shields, general surgery, onboard, removed ERNIE drain      ? MARIELOS on CKD3     ? Macrocytic Anemia:     ? PAF     ? AAA     ? BPH     ? CAD     ? History of Rectal Cancer s/p Colostomy 2019     ? Psoriatic Arthritis     ? S/p CVA 2019     ? Allergy to sulfa     · Multi-morbidity: per PMHx: AAA, BPH, CAD, rectal cancer 2019, GERD, HTN, psoriatic arthritis, pyelonephritis, CVA 2019     Plan:  · DC IV cefepime  · Start po levofloxacin 500 mg po once today, then follow with 250 mg q24h  (end date 22), QTc is not prolonged at 471  · Start lactobacillus 1 po x 30 days (end date 22)  ? Trend CRP and Pct, trending down, repeat in am  ? Following with care team, [facility] ER[facility] daughter, will hold off on Hospice at this time  ? Will place Midline if family wants to continue with IV ABX therapy, if needed for DC    Ongoing Antimicrobial Therapy  Levofloxacin -   ?  Completed Antimicrobial Therapy  Cefepime ?   Metronidazole -  Zosyn -, 4/22-23  Vancomycin 4/2-8  Eraxis 4/8-12  Meropenem 4/8-12? Po vancomycin 4/10-12  Zyvox 4/12-14  Dificid 4/14-20  Linezolid 4/22-26  Flagyl 4/23-26  Cefepime 4/23-28  ? History:? Interval history noted. Chief complaint: K.oxytoca bacteremia secondary to Postop intra-abdominal abscess. Denies n/v/d/f or untoward effects of antibiotics. Resting in bed, c/o excoriation around testicles, otherwise no other areas of pain. Nursing placing ointment to perineum. Physical Exam:  Vital Signs: BP (!) 140/78   Pulse 96   Temp 97.4 °F (36.3 °C) (Oral)   Resp 14   Ht 5' 9\" (1.753 m)   Wt 177 lb 14.6 oz (80.7 kg)   SpO2 98%   BMI 26.27 kg/m²     Gen: remains lethargic but arouses to name and able to answer some questions. Wounds: C/D/I mid abdominal incision well approximated, no erythema or edema at site, no drainage at site. Excoriation surrounding testicles/perineum. Ileostomy: intact RUQ draining brown liquid stool  Chest: no distress and CTA. Good air movement. Heart: RRR and no MRG. Abd: soft, non-distended, no tenderness, no hepatomegaly. Normoactive bowel sounds. Ext: no clubbing, cyanosis, or edema  Catheter Site: without erythema or tenderness draining clear yellow urine. Neuro: Mental status intact. CN 2-12 intact and no focal sensory or motor deficits        Radiologic / Imaging / TESTING  4/22/22 CT Abdomen Pelvis W IV Contrast:  Impression   1. Large rim enhancing fluid collection is seen within the abdomen and pelvis   which extends from the presacral region to the level of the pancreas,   concerning for an abscess. 2. Small amount of free fluid is seen within the abdomen. 3. Infrarenal abdominal aortic aneurysm measuring 5.6 cm.  Please see   recommendations below. 4. Small bilateral pleural effusions with bibasilar atelectasis.    5. Focally dilated small bowel loops with air-fluid levels are seen within   the left upper quadrant which could be related to ileus.     RECOMMENDATIONS:   5.6 cm infrarenal abdominal aortic aneurysm. Recommend referral to a vascular   specialist.       Reference: Cammie Cook Radiol [id number];[id number].      4/22/22 VL Dup Lower Extremity Venous Right:  Impression   No evidence of DVT in the right lower extremity.      4/23/22 XR Chest Portable:  Impression   Small bilateral pleural effusions.       Moderate left basilar atelectasis and mild right basilar atelectasis. 4/24/22 XR Chest Portable:  Impression   Stable trace left pleural effusion with left basilar airspace disease or   atelectasis.                Labs:    No results found for this or any previous visit (from the past 24 hour(s)).   CULTURE results: Invalid input(s): BLOOD CULTURE,  URINE CULTURE, SURGICAL CULTURE    Diagnosis:  Patient Active Problem List   Diagnosis    Hypertension    Benign prostatic hyperplasia    Psoriatic arthritis ([address]    Primary malignant neoplasm of rectum (HCC)    Psoriasis    Chronic myeloproliferative disease ([address]    Monocytosis (symptomatic)    Gastroesophageal reflux disease without esophagitis    Urinary retention    H/O: CVA (cerebrovascular accident)    Irregular heart beat    Nonrheumatic aortic valve stenosis    Paroxysmal atrial fibrillation (HCC)    GI bleed    Melena    Arteriovenous malformation of jejunum    History of rectal cancer    Benign neoplasm of cecum    Benign neoplasm of ascending colon    Pseudomonas urinary tract infection    Partial small bowel obstruction (HCC)    SBO (small bowel obstruction) (HCC)    Atrial fibrillation with RVR (HCC)    Hypotension    Ischemic colitis (HCC)    Hypocalcemia    MARIELOS (acute kidney injury) ([address]    Probable Bacterial Pneumonia    CAD (coronary artery disease)    Anemia    Delirium    Severe protein-calorie malnutrition (HCC)    On total parenteral nutrition    Colitis    Postoperative intra-abdominal abscess    Gram-negative bacteremia       Active Problems  Principal Problem:    Postoperative intra-abdominal abscess  Active Problems:    Hypertension    H/O: CVA (cerebrovascular accident)    Gram-negative bacteremia  Resolved Problems:    * No resolved hospital problems. *    Electronically signed by: Electronically signed by CHEMA Mesa CNP on 4/28/2022 at 9:42 AM

## 2022-04-28 NOTE — PROGRESS NOTES
Occupational Therapy    Pt and pt's family have decided upon hospice services. Pt is no longer appropriate for acute care OT services.  Please re-order in future if pt requires skilled therapy services    Ignacio Sutherland OTR/L [id number]  8:07 AM,4/28/2022

## 2022-04-28 NOTE — PROGRESS NOTES
Palliative Care follow up visit. Patient is resting in bed with eyes closed on room air. He easily arouses and states he is tired. He has pain in his scrotal area but otherwise no other complaints. Applied barrier cream to scrotal area. Cleansed raghav area and performed silevira care. Emptied ostomy. Recorded output.  to bedside discussed goals of care with Lv Escobar and myself. Jey Holcomb was in this am. Patient did better over night. He was eating and taking about wanting to live. Lv Escobar is okay with getting a head CT today as  would like to proceed with this prior to getting up to a chair. She will meet with [facility] Liaison tomorrow at 4pm for more information.

## 2022-04-28 NOTE — CARE COORDINATION
Received call from [facility] at [facility]. She stated that they have been unsuccessful at reaching family but will continue to try.

## 2022-04-28 NOTE — PROGRESS NOTES
Patient was able to swallow Marinol, gave break between meds and he then spit the rest out and stated he did not want to take them at this time.

## 2022-04-28 NOTE — PROGRESS NOTES
negative. Discussed with surgery, appreciate assistance and recommendations. 3.  Klebsiella bacteremia: Undetermined source. Total colectomy makes GI source unlikely. 2 out of 2 blood cultures on 4/22/2020  Antibiotics as above. 4.  Acute blood loss anemia: Reason for hospital presentation  Hgb nadired at 6.7. S/p 1 unit PRBC on 4/23/2022. Hgb around 9 g currently  Lab as needed    5. Dysgeusia with poor appetite: See progress note 4/26/2022. Daughter reported that he ate last evening 4/27/2022  RN reports that he he ate some breakfast this morning 4/28/2020  Continue diet as tolerated. 6.  Paroxysmal A. fib: Intermittently rate uncontrolled. Inconsistently taking anticoagulation or rate controlling agents. 7.  NSVT: On 4/27/2022. Reassuring labs. 8.  Goals of care: Remains DNR. Again discussed extensively with daughter Bruce Saini at bedside today. Okay to continue testing. We will pursue ECF if patient demonstrates improvement otherwise hospice will remain as backup. Eloisa Gómez, palliative care RN witness  Total time: 20 minutes. 9.  Physical deconditioning: PT/OT/SNF. 10.  Chronic Weaver:  Diet ADULT DIET; Regular   DVT Prophylaxis [] Lovenox, []  Heparin, [] SCDs, [] Ambulation. Eliquis   GI Prophylaxis [] PPI,  [] H2 Blocker,  [] Carafate,  [x] Diet/Tube Feeds   Code Status DNR-CC   MDM [] Low, [] Moderate,[x]  High  >50% of encounter time spent in counseling/coordination of care     History of Present Illness:     Chief Complaint: Postoperative intra-abdominal abscess     Cele Pritchett is a 80 y.o.  male  who presents with bleeding into ostomy     4/26/2022: Patient is seen and examined, is alert and oriented x3. He is very weak and requires assistance with feeding and grooming. He has told several staff members that he is ready to go to [facility]. 4/27/2022: Patient is seen and examined several times today.   Patient reportedly became altered and was found to have run of V. tach on monitor. Patient was transferred to bed. Upon arrival, patient had normal blood pressure, was euglycemic and will respond to questions but has eyes closed and will not follow any other commands. He sounded lucid. 4/28/2022: Patient is seen and examined, sleeping. Daughter at bedside reports this is his first time of finally resting. Daughter also report that last evening, he was more lucid and clear minded. He had stated that he did not want to die at that time. He wanted to continue with the program, eat, participate with therapies and eventually go to rehab. Ten point ROS reviewed, negative, unless as noted above    Objective: Intake/Output Summary (Last 24 hours) at 4/28/2022 1109  Last data filed at 4/28/2022 0959  Gross per 24 hour   Intake --   Output 880 ml   Net -880 ml        Vitals:   Vitals:    04/28/22 0558 04/28/22 0738 04/28/22 0800 04/28/22 0959   BP: (!) 163/79 (!) 144/101 (!) 140/78    Pulse:  101 96 111   Resp:  26 14 18   Temp: 97.9 °F (36.6 °C) 97.4 °F (36.3 °C) 97.4 °F (36.3 °C)    TempSrc: Axillary Oral Oral    SpO2:       Weight:       Height:            Physical Exam:   GEN: Elderly male, eyes closed, somnolent. Was able to eat some breakfast today even with eyes closed  HEENT: Normal  RESP: Clear lung fields bilaterally. Symmetric chest movement while on room air  CVS: Irregularly irregular, intermittent RVR, S1, S2  GI/: Abdomen is soft, right colostomy with bilious material.  Midline surgical wound with well-healed scar  MSK: No gross joint deformities. No tenderness  SKIN: Normal coloration, warm, dry. NEURO: Cranial nerves appear grossly intact, normal speech, no lateralizing weakness.   PSYCH:  Affect appropriate    Medications:   Medications:    [START ON 4/29/2022] lactobacillus  1 capsule Oral Daily with breakfast    [START ON 4/29/2022] levoFLOXacin  250 mg Oral Daily    levoFLOXacin  500 mg Oral Once    apixaban  2.5 mg Oral BID    dronabinol  2.5 mg Oral BID    digoxin  125 mcg Oral Daily    dilTIAZem  120 mg Oral Daily    ferrous sulfate  325 mg Oral BID WC    folic acid  1 mg Oral Daily    metoprolol tartrate  25 mg Oral BID    therapeutic multivitamin-minerals  1 tablet Oral Daily    pantoprazole  40 mg Oral Daily    sodium chloride flush  5-40 mL IntraVENous 2 times per day    lactobacillus  1 capsule Oral TID WC      Infusions:   PRN Meds: haloperidol lactate, 0.5 mg, Q4H PRN  sodium chloride flush, 10 mL, PRN  ondansetron, 4 mg, Q8H PRN   Or  ondansetron, 4 mg, Q6H PRN  acetaminophen, 650 mg, Q6H PRN   Or  acetaminophen, 650 mg, Q6H PRN          Electronically signed by Francesco Winters MD on 4/28/2022 at 11:09 AM

## 2022-04-28 NOTE — PLAN OF CARE
Problem: Pain  Goal: Verbalizes/displays adequate comfort level or baseline comfort level  Outcome: Progressing     Problem: Skin/Tissue Integrity  Goal: Absence of new skin breakdown  Description: 1. Monitor for areas of redness and/or skin breakdown  2. Assess vascular access sites hourly  3. Every 4-6 hours minimum:  Change oxygen saturation probe site  4. Every 4-6 hours:  If on nasal continuous positive airway pressure, respiratory therapy assess nares and determine need for appliance change or resting period.   Outcome: Progressing     Problem: Chronic Conditions and Co-morbidities  Goal: Patient's chronic conditions and co-morbidity symptoms are monitored and maintained or improved  Outcome: Progressing

## 2022-04-29 NOTE — PROGRESS NOTES
This RN spoke with family, agreeable to plan of care, agreeable to this RN as primary for Mr. Luann Lind. Family thankful for care, and this RN grateful to care for Mr. Luann Lind. Spoke with Dr. Malena Jimenez, updated on plan of pain control. Urology cart now at bedside, awaiting urology for placement. Emotional support given, patient resting eyes closed, vitals stable.

## 2022-04-29 NOTE — PROGRESS NOTES
Pt medicated for pain based on sanchez-baker and other nonverbal pain scale, PAIN-AD. Order at ready for Narcan to reverse fentanyl if any negative effects, pt monitored post medication admin and no complication, Respiratory rate remains greater than 10 per minute, spO2 is 96%. Pt on telemetry. Pt remains agitated and pulling at his  Silveira catheter, iv line, sheets, and other medical equiptment. Family present and observes the behaviour of patient. Pt medicated with ativan see mar and agitation charting. Family requests nursing at bedside over concerns with patient. Attending at bedside, and palliative care nurse also presnt. Family has no direct complaints to this nurse or nursing care being provided. Education provided about medication treatment, family agreeable to this treatment, denies wanting to use narcan on patient, but has physician change orders for future admins. Education provided about silveira care, cleansing, securing silveira, clear output, no blood or visible damage; family still feels there is something wrong about the silveira and requests urology consult, consult placed by attedning. Family educated about consult placement. Emotional support provided to patient, family, and silent prayer. Family denies any further needs or concerns with this RN. Pt turned and in position of comfort, still making inappropraite pulling movements towards silveira. Education provided towards potential damaged if line pulled. Call light in place at patient/family request.  Bed alarm on for safety.

## 2022-04-29 NOTE — PROGRESS NOTES
Hospitalist Progress Note      Name:  Cele Pritchett /Age/Sex: 1929  (80 y.o. male)   MRN & CSN:  [id number] & [id number] Admission Date/Time: 2022 12:38 AM   Location:  -A PCP: Cherylene Collet, MD         Hospital Day: 8  Discharge barrier/Reason for continued hospitalization: Acute recurrent encephalopathy. Assessment and Plan:   Cele Pritchett is a 80 y.o.  male paroxysmal A. fib on chronic anticoagulation with Eliquis, CKD 3A, infrarenal AAA, BPH, chronic Weaver catheter, rectal cancer, GERD, hypertension, SBO, CVA, CAD recent hospitalization for 3 weeks where he was managed for severe colitis needing exploratory laparotomy, small bowel resection, colon resection and creation of end ileostomy on 4/3/2022. Biopsy results came back consistent with pseudomembranous colitis as the patient was treated with Dificid under the guidance of ID. He represented to the ED on on 2022 with bleeding at ostomy site and CT abdomen and pelvis was concerning for large rim-enhancing fluid collection within the abdomen and pelvis extending from the presacral region to the level of the pancreas concerning for Postoperative intra-abdominal abscess  On the same day, IR consult was done and a drain was placed in the abscess bed with removal of 30 cc of viera fluid. Blood cultures obtained at admit came back positive for Klebsiella oxytoca. On 2022, family requested for us to focus on comfort measures as patient was deemed to be terminal.    1.  Acute toxic metabolic encephalopathy: Recurrent since 2022  ? terminal delirium-this remains by far my most likely diagnosis. ? cefepime neurotoxicity-discussed with ID, switch to Levaquin on 2022. CNS insult ruled out on MRI on 2022  Weaver catheter exchanged on 2022-family requests urology eval.  Consult placed    2.   Advance care planning: Discussed extensively with family today-3 children out of 4 as well as son-in-law all in the room, with bedside RN and palliative care RN. Stop fentanyl for pain. Continue Tylenol for pain as needed. Continue therapeutic trial of Ativan for agitation. 0.25 mg daily as needed and 0.5 mg nightly as needed. Meet with hospice team today. Total time: 46 minutes    3. Severe protein energy malnutrition: In context of acute illness  Initially started on Marinol a few days ago, family concerned about this contributing to #1 above  DC Marinol    4. Concern for intra-abdominal abscess: Seen on CT. Ruled out as cultures were negative. Discussed with surgery, appreciate assistance and recommendations. 5.  Klebsiella bacteremia: Undetermined source. Total colectomy makes GI source unlikely. 2 out of 2 blood cultures on 4/22/2020  Antibiotics as above. 6.  Acute blood loss anemia: Reason for hospital presentation  Hgb nadired at 6.7. S/p 1 unit PRBC on 4/23/2022. Hgb around 9 g currently  Lab as needed    7. Dysgeusia with poor appetite: See progress note 4/27/2022.    8.  Paroxysmal A. fib: Intermittently rate uncontrolled. Inconsistently taking anticoagulation or rate controlling agents. 9.  NSVT: On 4/27/2022. Reassuring labs. 10.  Physical deconditioning: PT/OT/SNF. Diet ADULT DIET; Regular  ADULT ORAL NUTRITION SUPPLEMENT; Breakfast, Lunch, Dinner; Standard High Calorie/High Protein Oral Supplement   DVT Prophylaxis [] Lovenox, []  Heparin, [] SCDs, [] Ambulation. Eliquis   GI Prophylaxis [] PPI,  [] H2 Blocker,  [] Carafate,  [x] Diet/Tube Feeds   Code Status DNR-CC   MDM [] Low, [] Moderate,[x]  High  >50% of encounter time spent in counseling/coordination of care     History of Present Illness:     Chief Complaint: Postoperative intra-abdominal abscess     Reji Larsen is a 80 y.o.  male  who presents with bleeding into ostomy     4/26/2022: Patient is seen and examined, is alert and oriented x3. He is very weak and requires assistance with feeding and grooming.   He has told several staff members that he is ready to go to [facility]. 4/27/2022: Patient is seen and examined several times today. Patient reportedly became altered and was found to have run of V. tach on monitor. Patient was transferred to bed. Upon arrival, patient had normal blood pressure, was euglycemic and will respond to questions but has eyes closed and will not follow any other commands. He sounded lucid. 4/28/2022: Patient is seen and examined, sleeping. Daughter at bedside reports this is his first time of finally resting. Daughter also report that last evening, he was more lucid and clear minded. He had stated that he did not want to die at that time. He wanted to continue with the program, eat, participate with therapies and eventually go to rehab.    4/29/2022: Patient is seen, not examined. Long family discussion, see assessment and plan #2 above. Ten point ROS reviewed, negative, unless as noted above    Objective: Intake/Output Summary (Last 24 hours) at 4/29/2022 1355  Last data filed at 4/29/2022 0802  Gross per 24 hour   Intake 360 ml   Output 900 ml   Net -540 ml        Vitals:   Vitals:    04/29/22 0401 04/29/22 0721 04/29/22 0802 04/29/22 1214   BP: 130/88  124/77    Pulse: 98 100 121    Resp: 15  28    Temp: 97.2 °F (36.2 °C)  97.3 °F (36.3 °C)    TempSrc: Oral  Oral    SpO2:   99%    Weight:       Height:    5' 9.02\" (1.753 m)        Physical Exam:   GEN: Elderly male, eyes closed, sedated. HEENT: Normal  RESP: Clear lung fields bilaterally. Symmetric chest movement while on room air  CVS: Irregularly irregular, , S1, S2  GI/: Abdomen is soft, right colostomy with bilious material.  Midline surgical wound with well-healed scar  MSK: No gross joint deformities. No tenderness  SKIN: Normal coloration, warm, dry. NEURO: Cranial nerves appear grossly intact, normal speech, no lateralizing weakness.   PSYCH:  Affect appropriate    Medications:   Medications:    lactobacillus  1 capsule Oral Daily with breakfast    levoFLOXacin  250 mg Oral Daily    Or    levofloxacin  250 mg IntraVENous Daily    diclofenac sodium  2 g Topical BID    apixaban  2.5 mg Oral BID    digoxin  125 mcg Oral Daily    dilTIAZem  120 mg Oral Daily    ferrous sulfate  325 mg Oral BID WC    folic acid  1 mg Oral Daily    metoprolol tartrate  25 mg Oral BID    therapeutic multivitamin-minerals  1 tablet Oral Daily    pantoprazole  40 mg Oral Daily    sodium chloride flush  5-40 mL IntraVENous 2 times per day      Infusions:   PRN Meds: LORazepam, 0.5 mg, Nightly PRN  LORazepam, 0.25 mg, Daily PRN  sodium chloride flush, 10 mL, PRN  ondansetron, 4 mg, Q8H PRN   Or  ondansetron, 4 mg, Q6H PRN  acetaminophen, 650 mg, Q6H PRN          Electronically signed by Nba Jackson MD on 4/29/2022 at 1:55 PM

## 2022-04-29 NOTE — CARE COORDINATION
Received VM from [facility] at [facility] requesting update on patient. Returned call to [facility] and spoke with Janak. She stated that no one in Admissions is available and have left for the day and can leave a message for Kaur that CM returned her call.

## 2022-04-29 NOTE — PROGRESS NOTES
Patient is sleeping, 5 family members at bedside. Patient last night had a good night, was up in a chair eating pizza, was oriented, told his family that it's not his time to go. Patient's catheter was recently changed (this is a long term indwelling catheter since 2019 that he has changed 2 times a month at his urologists office). The patient was then complaining of severe pain, he received fentanyl, then he became agitated and was pulling at his catheter and received ativan and he is now sleeping, still pulling at his catheter. His family is upset with the course of events. As I was leaving, Dr. Nai Kelley from urology was coming to see the patient and evaluate the catheter. PE:    Vitals:    04/29/22 0401 04/29/22 0721 04/29/22 0802 04/29/22 1214   BP: 130/88  124/77    Pulse: 98 100 121    Resp: 15  28    Temp: 97.2 °F (36.2 °C)  97.3 °F (36.3 °C)    TempSrc: Oral  Oral    SpO2:   99%    Weight:       Height:    5' 9.02\" (1.753 m)     Abd: soft, non-distended, stoma pink and viable, liquid stool in appliance. Perienum: no ulceration at meatus, no drainage or leakage, scrotum without ulceration, has ointment on area    Labs reviewed    A/P:  -Patient had an acute abdomen prompting surgery with completion of a total abdominal colectomy on 4/2/2022 (patient had previous abdominal-perineal resection for rectal carcinoma). Pathology was consistent with pseudomembranous colitis, likely c.diff but not culture proven  -Patient was septic in the raghav-operative and post-op period, (he was extubated immediately post-op and never required re-intubation) he did receive pressors and was on TPN for nutrition. He slowly improved (he had rapid a-fib, acute renal failure and a small bowel enteritis). He was discharge to a rehab facility and returned within 24 hours due to acute blood loss from trauma to his ileostomy (4/22/2022).  Reportedly the patient had pressure/trauma to his ileostomy and this was treated in the ER with silver nitrate and 2 sutures but patient did require a blood transfusion. He had a CT scan of his abdomen and pelvis at that admission and had fluid in is abdomen concerning for abscess. Patient had a percutaneous drain placed and it was clear serous fluid, culture was no growth to date. Patient does not have an intra-abdominal abscess. He did have positive blood cultures for klebsiella at that admission, unfortunately, no urinalysis nor urine culture was obtained to identify the source of the bacteremia. Currently the patient has de-escalated antibiotic to levoquin per ID.  -Patient is medically stable, but a few days ago he was saying he was old and weak and ready to see [facility] Agustin, I last saw patient Tuesday and he told me this, but he also greeted me by name and told me what happened to him overnight and that his food didn't taste good. He is a very spiritual man and I believe he was having a prayerful moment. Because of his comments that he was ready to die and was going to die he was seen by palliative care and the family decided to proceed with Hospice care. Yesterday I was notified he no longer was going to Hospice care as the patient states he was reaching for Delon' hand and was told it's not his time. He then was able to sit in a chair and ate regular food by himself (pizza). Today he is somnolent, likely form medication and being up late at night. In discussion with the family, we have agreed to no narcotics, and to use ativan sparingly. I spoke with pharmacy to see if IV tylenol could be used (I thought it was only available to anesthesia providers) however, the hospital has stopped using this medication all together. The patient has baseline elevated creatinine, I will write a low dose of toradol to be given if patient in severe pain and cannot safely take PO tylenol. This will be in lieu of a narcotic.  The patient has had rectal cancer and has no anal orifice and a suppository will likely be pushed out of his ileostomy. The families questions were answered, they are agreeable with the current treatment plan. I did speak with his nurse Cherrie Douglass as well about the current plan. Hopefully we can continue with rehab and transition to a rehab facility.

## 2022-04-29 NOTE — PROGRESS NOTES
Infectious Disease Progress Note  2022   Patient Name: Chacorta Chakraborty : 1929   Impression  · K.oxytoca Bacteremia Likely Secondary to Intra-Abdominal Abscess s/p 4/3/22 Subtotal Colon Resection with End Ileostomy:     § Afebrile, leukocytosis trended up slightly  this am.  Still having intermittent confusion, but able to re-orient. Transitioned to levofloxacin (renal dosing) po to finish out regimen with end date 22. Will observe WBC, not as concerned as his inflammatory markers have trended down and continue to do so. § -Blood cultures / Klebsiella oxytoca  § -BC 0-NGTD  § -CT A&P W IV Contrast: Large rim enhancing fluid collection is seen within the abdomen and pelvis which extends from the presacral region to the level of the pancreas, concerning for an abscess. Full report below. § -per IR, Dr. Giles Gan, Intra-abdominal Abscess drain placement, 30 cc of viera fluid removed. Body Fluid culture Victor Hugo Goodman, general surgery, onboard, removed ERNIE drain      ? MARIELOS on CKD3     ? Macrocytic Anemia:     ? PAF     ? AAA     ? BPH     ? CAD     ? History of Rectal Cancer s/p Colostomy 2019     ? Psoriatic Arthritis     ? S/p CVA 2019     ? Allergy to sulfa     · Multi-morbidity: per PMHx: AAA, BPH, CAD, rectal cancer 2019, GERD, HTN, psoriatic arthritis, pyelonephritis, CVA 2019     Plan:  · Continue po levofloxacin 250 mg q24h  (end date 22), QTc is not prolonged at 471  · Continue lactobacillus 1 po x 30 days (end date 22)  ? Trend CRP and Pct, trending down, repeat in am  ? Reviewed CT Head and MRI, no acute issues. ? Following with care team, family changed Code status to [facility] [place], DW daughter, will hold off on Hospice at this time    Ongoing Antimicrobial Therapy  Levofloxacin -   ?  Completed Antimicrobial Therapy  Cefepime ? Metronidazole -  Zosyn ,   Vancomycin -  Eraxis -  Meropenem ?   Po vancomycin 4/10-12  Zyvox 4/12-14  Dificid 4/14-20  Linezolid 4/22-26  Flagyl 4/23-26  Cefepime 4/23-28  ? History:? Interval history noted. Chief complaint: K.oxytoca bacteremia secondary to Postop intra-abdominal abscess. Denies n/v/d/f or untoward effects of antibiotics. Review of Systems   Unable to perform ROS: Mental status change     Physical Exam:  Vital Signs: /88   Pulse 100   Temp 97.2 °F (36.2 °C) (Oral)   Resp 15   Ht 5' 9\" (1.753 m)   Wt 177 lb (80.3 kg)   SpO2 97%   BMI 26.14 kg/m²     Gen: More alert but remains confused. No distress. Taking po meds without difficulty. Wounds: C/D/I mid abdominal incision well approximated, no erythema or edema at site, no drainage at site. Excoriation surrounding testicles/perineum. Ileostomy: intact RUQ draining brown liquid stool  Chest: no distress and CTA. Good air movement. Heart: RRR and no MRG. Abd: soft, non-distended, no tenderness, no hepatomegaly. Normoactive bowel sounds. Ext: no clubbing, cyanosis, or edema  Catheter Site: without erythema or tenderness draining clear yellow urine. Neuro: Mental status intact. CN 2-12 intact and no focal sensory or motor deficits        Radiologic / Imaging / TESTING  4/22/22 CT Abdomen Pelvis W IV Contrast:  Impression   1. Large rim enhancing fluid collection is seen within the abdomen and pelvis   which extends from the presacral region to the level of the pancreas,   concerning for an abscess. 2. Small amount of free fluid is seen within the abdomen. 3. Infrarenal abdominal aortic aneurysm measuring 5.6 cm.  Please see   recommendations below. 4. Small bilateral pleural effusions with bibasilar atelectasis. 5. Focally dilated small bowel loops with air-fluid levels are seen within   the left upper quadrant which could be related to ileus.       RECOMMENDATIONS:   5.6 cm infrarenal abdominal aortic aneurysm. Recommend referral to a vascular   specialist.       Reference: J Am Abad Radiol 2417;25:366-512.    4/22/22 VL Dup Lower Extremity Venous Right:  Impression   No evidence of DVT in the right lower extremity.      4/23/22 XR Chest Portable:  Impression   Small bilateral pleural effusions.       Moderate left basilar atelectasis and mild right basilar atelectasis. 4/24/22 XR Chest Portable:  Impression   Stable trace left pleural effusion with left basilar airspace disease or   atelectasis.         4/28/22 CT Head WO Contrast:  Impression   No noncontrast CT evidence of acute intracranial pathology or significant   interval change compared to 03/20/2022.       Cortical atrophy, white matter changes consistent with microvascular   degenerative disease, atherosclerotic calcification in the terminal internal   carotid arteries of uncertain hemodynamic significance incidentally noted. .     4/28/22 MRI Brain WO Contrast:  mpression   1. No acute infarct or other acute intracranial process.    2. Senescent changes including moderate generalized parenchymal volume loss   and chronic small vessel ischemia.  Remote bilateral lacunar infarcts   throughout the basal ganglia.                Labs:    Recent Results (from the past 24 hour(s))   Urinalysis    Collection Time: 04/28/22  2:30 PM   Result Value Ref Range    Color, UA YELLOW YELLOW    Clarity, UA SLIGHTLY CLOUDY (A) CLEAR    Glucose, Urine NEGATIVE NEGATIVE MG/DL    Bilirubin Urine NEGATIVE NEGATIVE MG/DL    Ketones, Urine TRACE NEGATIVE MG/DL    Specific Gravity, UA 1.025 1.001 - 1.035    Blood, Urine MODERATE (A) NEGATIVE    pH, Urine 5.0 5.0 - 8.0    Protein, UA 30 (A) NEGATIVE MG/DL    Urobilinogen, Urine 0.2 0.2 - 1.0 MG/DL    Nitrite Urine, Quantitative POSITIVE (A) NEGATIVE    Leukocyte Esterase, Urine SMALL NEGATIVE    RBC, UA 15 (H) 0 - 3 /HPF    WBC,  (H) 0 - 2 /HPF    Bacteria, UA RARE (A) NEGATIVE /HPF    WBC Clumps, UA FEW /HPF    Squam Epithel, UA 1 /HPF    Mucus, UA RARE (A) NEGATIVE HPF    Trichomonas, UA NONE SEEN NONE SEEN /HPF   Basic Metabolic Panel    Collection Time: 04/29/22  6:04 AM   Result Value Ref Range    Sodium 134 (L) 135 - 145 MMOL/L    Potassium 4.6 3.5 - 5.1 MMOL/L    Chloride 99 99 - 110 mMol/L    CO2 22 21 - 32 MMOL/L    Anion Gap 13 4 - 16    BUN 18 6 - 23 MG/DL    CREATININE 1.4 (H) 0.9 - 1.3 MG/DL    Glucose 97 70 - 99 MG/DL    Calcium 8.6 8.3 - 10.6 MG/DL    GFR Non- 47 (L) >60 mL/min/1.73m2    GFR  57 (L) >60 mL/min/1.73m2   C-Reactive Protein    Collection Time: 04/29/22  6:04 AM   Result Value Ref Range    CRP, High Sensitivity 17.4 mg/L   CBC with Auto Differential    Collection Time: 04/29/22  6:04 AM   Result Value Ref Range    WBC 14.0 (H) 4.0 - 10.5 K/CU MM    RBC 3.22 (L) 4.6 - 6.2 M/CU MM    Hemoglobin 9.9 (L) 13.5 - 18.0 GM/DL    Hematocrit 32.2 (L) 42 - 52 %    .0 78 - 100 FL    MCH 30.7 27 - 31 PG    MCHC 30.7 (L) 32.0 - 36.0 %    RDW 19.3 (H) 11.7 - 14.9 %    Platelets 077 (H) 600 - 440 K/CU MM    MPV 9.2 7.5 - 11.1 FL    Differential Type AUTOMATED DIFFERENTIAL     Segs Relative 74.7 (H) 36 - 66 %    Lymphocytes % 10.5 (L) 24 - 44 %    Monocytes % 9.7 (H) 0 - 4 %    Eosinophils % 2.7 0 - 3 %    Basophils % 0.7 0 - 1 %    Segs Absolute 10.4 K/CU MM    Lymphocytes Absolute 1.5 K/CU MM    Monocytes Absolute 1.4 K/CU MM    Eosinophils Absolute 0.4 K/CU MM    Basophils Absolute 0.1 K/CU MM    Nucleated RBC % 0.0 %    Total Nucleated RBC 0.0 K/CU MM    Total Immature Neutrophil 0.24 K/CU MM    Immature Neutrophil % 1.7 (H) 0 - 0.43 %   Procalcitonin    Collection Time: 04/29/22  6:04 AM   Result Value Ref Range    Procalcitonin 0.128      CULTURE results: Invalid input(s): BLOOD CULTURE,  URINE CULTURE, SURGICAL CULTURE    Diagnosis:  Patient Active Problem List   Diagnosis    Hypertension    Benign prostatic hyperplasia    [facility]    Primary malignant neoplasm of rectum (HCC)    Psoriasis    Chronic myeloproliferative disease ([facility]    Monocytosis (symptomatic)    Gastroesophageal reflux disease without esophagitis    Urinary retention    H/O: CVA (cerebrovascular accident)    Irregular heart beat    Nonrheumatic aortic valve stenosis    Paroxysmal atrial fibrillation (HCC)    GI bleed    Melena    Arteriovenous malformation of jejunum    History of rectal cancer    Benign neoplasm of cecum    Benign neoplasm of ascending colon    Pseudomonas urinary tract infection    Partial small bowel obstruction (HCC)    SBO (small bowel obstruction) (HCC)    Atrial fibrillation with RVR (HCC)    Hypotension    Ischemic colitis (HCC)    Hypocalcemia    MARIELOS (acute kidney injury) ([facility] [address]    Probable Bacterial Pneumonia    CAD (coronary artery disease)    Anemia    Delirium    Severe protein-calorie malnutrition (HCC)    On total parenteral nutrition    Colitis    Postoperative intra-abdominal abscess    Gram-negative bacteremia       Active Problems  Principal Problem:    Postoperative intra-abdominal abscess  Active Problems:    Hypertension    H/O: CVA (cerebrovascular accident)    Gram-negative bacteremia  Resolved Problems:    * No resolved hospital problems. *    Electronically signed by: Electronically signed by Amish Earl.  CHEMA Camara CNP on 4/29/2022 at 10:20 AM

## 2022-04-29 NOTE — PROGRESS NOTES
Pt daughter leaving for her office briefly but states will return. Pt is more alert, but still disoriented and speech inappropriate. Pt bathed, barrier cream applied to coccyx, one small open spot noted and creamed and covered with mepilex foam border dressing. Sheets changed. Pt placed supine after q 2 turns during day. Pt refusing oral care or oral intake. Silveira care completed, 50mL of clear yellow liquid drained from silveira. Ostomy care completed, 150mL out. Pt resting at ease on bed with eyes closed, one sheet on. Pt looks peaceful and in no apparent distress.

## 2022-04-29 NOTE — PROGRESS NOTES
Palliative Care follow up visit. Patient is resting in bed with eyes closed and respirations unlabored. I walked into conversation being held with , Rina Moss RN, patient's daughter's Reche Boast, son Carina Villatoro, son in law and Robin Durhamclair. Family expressed concern about patient having fentanyl and ativan this am. Linda Wharton also expressed concern about patient pulling at silveira catheter and requested urology consult as patient has a chronic silveira and silveira was exchanged yesterday which is when discomfort started. Fentanyl has now been dcd and family request that tylenol be given po prn if patient will take it. They would like to be notified before narcotics are ordered and given. Ativan order was adjusted to 0.25 mg iv prn daily for agitation and 0.5 mg nightly prn for agitation. Family would like ativan to be administered fugally.  offered for patient to be re-assigned to another physician as family has expressed dissatisfaction. Linda Wharton has expressed to me that she would like  to resume care. Informed her that I would reach out to the Hospitalist group but Abel Riedel is in ICU so it may be . Linda Wharton is okay with  if  is unavailable. Linda Wharton asked that I speak with patient's RN as he seemed agitated and wanted to make sure he would be able to continue to provide care that she was not complaining to  about him. Re-assured with Rina Moss that everything was okay and he is happy to resume care of patient. He stated he would talk with Linda Wharton as well. Marcelo Dominique has now spoken with family and they have expressed that they feel better about things. We discussed days and nights being mixed up but vitals and labs otherwise stable. No other concerns at this time.

## 2022-04-29 NOTE — CONSULTS
[facility] [address]   Consult Note  [facility] 1 2 3 4 5    Date: 2022   Patient: Alec Mix   : 1929   DOA: 2022   MRN: [id number]   ROOM#: [id number]     Reason for Consult: Silveira problems  Requesting Physician:  Dr. Jose L Chatman  Collaborating Urologist on Call at time of admission: Lorena    CHIEF COMPLAINT:  Pain from silveira    History Obtained From:  family member - daughter    HISTORY OF PRESENT ILLNESS:                The patient is a 80 y.o. male with significant past medical history of chronic urinary retention who presented with a post op intra-abdominal abscess underwent silveira exchange yesterday. Since catheter change, he has been repeatedly complaining of pain and pulling on the silveira. Pt was given pain meds and now is very lethargic.      Past Medical History:        Diagnosis Date    AAA (abdominal aortic aneurysm) ([address]     MARIELOS (acute kidney injury) ([address] 2022    Angina, class I (HCC)     Benign prostatic hyperplasia 2011     Updating Deprecated Diagnoses    Bowel obstruction ([address]     CAD (coronary artery disease)     Cancer ([address]     rectal    CCC (chronic calculous cholecystitis) 2018    Delirium 2022    Gastroesophageal reflux disease without esophagitis 2020    Hx of cardiovascular stress test 2021    EF 62% Normal study    Hypertension     On total parenteral nutrition 2022    Partial small bowel obstruction ([address] 3/23/2022    Primary malignant neoplasm of rectum ([address] 2019    Probable Bacterial Pneumonia 2022    Psoriasis     Psoriatic arthritis ([address] 2019    Pyelonephritis 2018    Severe protein-calorie malnutrition (HCC) 2022    Stable angina ([address] 2019    Unspecified cerebral artery occlusion with cerebral infarction      Past Surgical History:        Procedure Laterality Date    APPENDECTOMY      COLONOSCOPY N/A 3/2/2022    COLONOSCOPY POLYPECTOMY REMOVAL ABLATION/STOMA with EMR and placed 7 hemoclips.  performed by Estrellita Gay MD at [facility] N/A 4/3/2022    LAPAROTOMY EXPLORATORY COLON RESECTION performed by Haseeb Campbell MD at [facility] N/A 2/28/2022    ENTEROSCOPY PUSH CONTROL HEMORRHAGE WITH APC ABLATION OF AVM performed by Estrellita Gay MD at [facility] ENDOSCOPY     Current Medications:   Current Facility-Administered Medications: LORazepam (ATIVAN) injection 0.5 mg, 0.5 mg, IntraVENous, Nightly PRN  LORazepam (ATIVAN) injection 0.25 mg, 0.25 mg, IntraVENous, Daily PRN  ketorolac (TORADOL) injection 15 mg, 15 mg, IntraVENous, Q12H PRN  lactobacillus (CULTURELLE) capsule 1 capsule, 1 capsule, Oral, Daily with breakfast  levoFLOXacin (LEVAQUIN) tablet 250 mg, 250 mg, Oral, Daily **OR** levoFLOXacin (LEVAQUIN) 250 MG/50ML infusion 250 mg, 250 mg, IntraVENous, Daily  diclofenac sodium (VOLTAREN) 1 % gel 2 g, 2 g, Topical, BID  apixaban (ELIQUIS) tablet 2.5 mg, 2.5 mg, Oral, BID  digoxin (LANOXIN) tablet 125 mcg, 125 mcg, Oral, Daily  dilTIAZem (CARDIZEM CD) extended release capsule 120 mg, 120 mg, Oral, Daily  ferrous sulfate (IRON 325) tablet 325 mg, 325 mg, Oral, BID WC  folic acid (FOLVITE) tablet 1 mg, 1 mg, Oral, Daily  metoprolol tartrate (LOPRESSOR) tablet 25 mg, 25 mg, Oral, BID  therapeutic multivitamin-minerals 1 tablet, 1 tablet, Oral, Daily  pantoprazole (PROTONIX) tablet 40 mg, 40 mg, Oral, Daily  sodium chloride flush 0.9 % injection 5-40 mL, 5-40 mL, IntraVENous, 2 times per day  sodium chloride flush 0.9 % injection 10 mL, 10 mL, IntraVENous, PRN  ondansetron (ZOFRAN-ODT) disintegrating tablet 4 mg, 4 mg, Oral, Q8H PRN **OR** ondansetron (ZOFRAN) injection 4 mg, 4 mg, IntraVENous, Q6H PRN  acetaminophen (TYLENOL) tablet 650 mg, 650 mg, Oral, Q6H PRN **OR** [DISCONTINUED] acetaminophen (TYLENOL) suppository 650 mg, 650 mg, Rectal, Q6H PRN    Allergies:  Morphine and Sulfa antibiotics    Social History:   TOBACCO:   reports that he quit smoking about 49 years ago. His smoking use included cigarettes. He has a 60.00 pack-year smoking history. He has never used smokeless tobacco.  ETOH:   reports no history of alcohol use. DRUGS:   reports no history of drug use. Family History:       Problem Relation Age of Onset    Cancer Mother     Cancer Father        REVIEW OF SYSTEMS:     CONSTITUTIONAL:  negative  GENITOURINARY:  negative    PHYSICAL EXAM:      VITALS:  /77   Pulse 121   Temp 97.3 °F (36.3 °C) (Oral)   Resp 28   Ht 5' 9.02\" (1.753 m)   Wt 177 lb (80.3 kg)   SpO2 99%   BMI 26.13 kg/m²      TEMPERATURE:  Current - Temp: 97.3 °F (36.3 °C);  Max - Temp  Av.5 °F (36.4 °C)  Min: 97.2 °F (36.2 °C)  Max: 98 °F (36.7 °C)  24HR BLOOD PRESSURE RANGE:  Systolic (18BVW), YRI:407 , Min:124 , WWL:222   ; Diastolic (96RQX), AQH:583, Min:77, Max:152      /77   Pulse 121   Temp 97.3 °F (36.3 °C) (Oral)   Resp 28   Ht 5' 9.02\" (1.753 m)   Wt 177 lb (80.3 kg)   SpO2 99%   BMI 26.13 kg/m²   General appearance: alert, appears stated age and cooperative  Abdomen: soft, non-tender; bowel sounds normal; no masses,  no organomegaly  Male genitalia: normal    DATA:    WBC:    Lab Results   Component Value Date    WBC 14.0 2022     Hemoglobin/Hematocrit:    Lab Results   Component Value Date    HGB 9.9 2022    HCT 32.2 2022     BMP:    Lab Results   Component Value Date     2022    K 4.6 2022    CL 99 2022    CO2 22 2022    BUN 18 2022    LABALBU 3.0 2022    CREATININE 1.4 2022    CALCIUM 8.6 2022    GFRAA 57 2022    LABGLOM 47 2022     PT/INR:    Lab Results   Component Value Date    PROTIME 15.7 2022    PROTIME 10.4 2011    INR 1.21 2022       Imaging:     Assessment & Plan:      Ulysses Masters is a 80y.o. year old male admitted 2022 for post op intra-abdominal

## 2022-04-29 NOTE — PROGRESS NOTES
Comprehensive Nutrition Assessment    Type and Reason for Visit:  Initial (los)    Nutrition Recommendations/Plan:   1. Add standard oral nutrition supplement  2. Please document all po intake  3. Will monitor po intake, weight trends, poc     Malnutrition Assessment:  Malnutrition Status:  Severe malnutrition (04/29/22 1228)    Context:  Acute Illness       Nutrition Assessment:    Pt admitted for postop intra-abdominal abscess, hemorrhage from ileostomy, PMH: HTN, AAA, CAD, Rectal Ca, pt currenly on regular diet, pt consuming 1-25% x 4 meals documented and 1 meal of % in the past 72 hr, pt will agreeable to trial Ensure mixed with ice cream, will continue to follow at high nutrition risk    Nutrition Related Findings:      Wound Type: Surgical Incision       Current Nutrition Intake & Therapies:    Average Meal Intake: 1-25%,%  Average Supplements Intake: None Ordered  ADULT DIET; Regular    Anthropometric Measures:  Height: 5' 9.02\" (175.3 cm)  Ideal Body Weight (IBW): 160 lbs (73 kg)    Admission Body Weight: 165 lb 9.1 oz (75.1 kg) (first measured weight)  Current Body Weight: 177 lb 0.5 oz (80.3 kg), 110.6 % IBW. Weight Source: Bed Scale  Current BMI (kg/m2): 26.1  Usual Body Weight: 187 lb 6.3 oz (85 kg) ((10/4/21) per chart review)  % Weight Change (Calculated): -5.5  Weight Adjustment For: No Adjustment  BMI Categories: Overweight (BMI 25.0-29. 9)    Estimated Daily Nutrient Needs:  Energy Requirements Based On: Kcal/kg  Weight Used for Energy Requirements: Current  Energy (kcal/day): 9926-0387 (22-25 kcal/kg)  Weight Used for Protein Requirements: Current  Protein (g/day): 80-88 (1.0-1.1 g/kg)  Nutrition Diagnosis:   · Inadequate oral intake related to acute injury/trauma as evidenced by intake 0-25%    Nutrition Interventions:   Food and/or Nutrient Delivery: Continue Current Diet,Start Oral Nutrition Supplement  Nutrition Education/Counseling: No recommendation at this time  Coordination of Nutrition Care: Continue to monitor while inpatient     Goals:     Goals: PO intake 50% or greater,within 2 days     Nutrition Monitoring and Evaluation:   Behavioral-Environmental Outcomes: None Identified  Food/Nutrient Intake Outcomes: Food and Nutrient Intake,Supplement Intake  Physical Signs/Symptoms Outcomes: Biochemical Data,GI Status,Hemodynamic Status,Fluid Status or Edema,Weight,Skin    Discharge Planning:     Too soon to determine     Dianelys Diaz [address], [address] [place] [address],   Contact: [phone]

## 2022-04-29 NOTE — PROGRESS NOTES
Pt is alert, but disoriented. He is speaking as if he is home, and then states he is paralyzed but moves his legs and body, pt asks about calling the police to help him. He is oriented to the hospital through education and emotional support. Pt is receptivebut remains agitated and confused, He is orally medicated without complication, but refuses to eat any breakfast, does get an applesauce in as intake. And 360ml of water. Pt repositioned, alarm on for safety, silveira care completed and securing device reinforced. Pt has all needs met, physicain updated to condition, call light in reach, alarm on for safety.

## 2022-04-30 NOTE — PROGRESS NOTES
Pt's granddaughter Tiffanie Del Rosario) called to check on pt's status as she was very concerned about his current condition d/t the group text she is receiving from her Mother, Leslee Judd. Abel Lowry states she received a text from her Mother Sukumar Toussaint, stating  \"He was given 100x the normal fentanyl dose. Nurses are giving him multiple doses of narcan trying to reverse the fentanyl. \" Abel Lowry continued to express concern for her grandpa and asked if she needs to come to the hospital so she can be with him if he is dying. This nurse informed Abel Lowry the information she has received is incorrect, pt is currently stable, he is drowsy but responsive. This nurse reassured Abel Lowry that the pt is stable and arrangements can be made if she wanted to come see her loved one. Yesy declined the offer to visit and voiced understanding that her grandpa is currently stable and not showing any signs of actively dying at this time. Caro Gan nursing supervisor notified and charge nurse made aware.

## 2022-04-30 NOTE — PROGRESS NOTES
[facility] [address]   Progress Note  [facility] 0 1 2      Date: 2022   Patient: Jorge Alberto Anderson   : 1929   DOA: 2022   MRN: [id number]   ROOM#: -[id number]     Admit Date: 2022     Collaborating Urologist on Call at time of admission: Lorena    CC:     Subjective:     Pain: none, no nausea and no vomiting,   NO pain overnight    Objective:      Vitals:    BP (!) 150/115   Pulse 102   Temp 97.5 °F (36.4 °C) (Axillary)   Resp 17   Ht 5' 9.02\" (1.753 m)   Wt 164 lb 14.5 oz (74.8 kg)   SpO2 95%   BMI 24.34 kg/m²    Temp  Av.5 °F (36.4 °C)  Min: 97.3 °F (36.3 °C)  Max: 97.8 °F (36.6 °C)     Intake/Output Summary (Last 24 hours) at 2022 1143  Last data filed at 2022 0427  Gross per 24 hour   Intake 120 ml   Output 575 ml   Net -455 ml       Physical Exam:    BP (!) 150/115   Pulse 102   Temp 97.5 °F (36.4 °C) (Axillary)   Resp 17   Ht 5' 9.02\" (1.753 m)   Wt 164 lb 14.5 oz (74.8 kg)   SpO2 95%   BMI 24.34 kg/m²   General appearance: alert, appears stated age and cooperative    Labs:   WBC:    Lab Results   Component Value Date    WBC 14.0 2022      Hemoglobin/Hematocrit:    Lab Results   Component Value Date    HGB 9.9 2022    HCT 32.2 2022      BMP:   Lab Results   Component Value Date     2022    K 4.6 2022    CL 99 2022    CO2 22 2022    BUN 18 2022    LABALBU 3.0 2022    CREATININE 1.4 2022    CALCIUM 8.6 2022    GFRAA 57 2022    LABGLOM 47 2022      PT/INR:    Lab Results   Component Value Date    PROTIME 15.7 2022    PROTIME 10.4 2011    INR 1.21 2022      PTT:    Lab Results   Component Value Date    APTT 26.2 2022           Imaging:     Assessment & Plan:      Jorge Alberto Anderson is a 80y.o. year old male admitted 2022 for  post op intra-abdominal abscess.       1) Gu: 18F coude tip silveira inserted without difficulty   PT comfortable

## 2022-04-30 NOTE — PROGRESS NOTES
Hospitalist Progress Note      Name:  Dante Sullivan /Age/Sex: 1929  (80 y.o. male)   MRN & CSN:  [id number] & [id number] Admission Date/Time: 2022 12:38 AM   Location:  -A PCP: Shaun Williamson, [address] Day: 9    Assessment and Plan:   Dante Sullivan is a 80 y.o.  male paroxysmal A. fib on chronic anticoagulation with Eliquis, CKD 3A, infrarenal AAA, BPH, chronic Weaver catheter, rectal cancer, GERD, hypertension, SBO, CVA, CAD recent hospitalization for 3 weeks where he was managed for severe colitis needing exploratory laparotomy, small bowel resection, colon resection and creation of end ileostomy on 4/3/2022. Biopsy results came back consistent with pseudomembranous colitis as the patient was treated with Dificid under the guidance of ID. He represented to the ED on on 2022 with bleeding at ostomy site and CT abdomen and pelvis was concerning for large rim-enhancing fluid collection within the abdomen and pelvis extending from the presacral region to the level of the pancreas concerning for Postoperative intra-abdominal abscess  On the same day, IR consult was done and a drain was placed in the abscess bed with removal of 30 cc of viera fluid c/w ascitic fluid, not an abscess  Blood cultures obtained at admit came back positive for Klebsiella oxytoca. On 2022, family requested for us to focus on comfort measures but still would like to treat patient medically     Acute toxic metabolic encephalopathy: Recurrent since 2022  UTI   Toxic Encephalopathy  -Weaver catheter exchanged on 2022 by Urology, greatly appreciated  -UA from  with evidence of UTI; is already on abx and ID is following; no UCx sent but as per below will continue abx  -Received Fentanyl  and since has been increasingly confused;  Fentanyl d/c'd only Tylenol PRN pain given patient's age and prolonged hospitalization; Ativan also d/c'd per family request today which I agree with given patient's age and is not agitated but very pleasant this morning     Hx of Severe Pseudomembranous Colitis s/p SBR on 4/3  Concern for intra-abdominal abscess on admitting CT A/P  -Ruled out abscess with negative cultures  -General Surgery following    Hx Chronic Urinary Retention  -s/p silveira exchange on 4/28 per Urology  -Will need q3 week exchanges and f/u as outpatient     Klebsiella bacteremia: Undetermined source  -2 out of 2 blood cultures on 4/22/2020  -Antibiotics as above  -ID following with stop date of 5/7 for Levofloxacin (previously on Cefepime but concerned it was affecting patient's mentation so has since been d/c'd)  -Repeat BCx so far NGTD      Acute blood loss anemia: Reason for hospital presentation  -Last Hgb stable  -Given ileostomy had dark stool (cassialey Iron related) GI consulted but have since signed off  -I did note prior charts regarding lab draws and I spoke with family today and are ok with every other day lab draws which we will continue    Paresthesias  LE Weakness  -CTH and MRI H completed on 4/28 with no acute pathology  -Will continue to monitor    MARIELOS  -Cr 1.4  -Continue to encourage PO as able and in AM with continued every other day labs     Paroxysmal A. Fib  -Continue PO rate control medications  -AC recently restarted and will monitor Hgb in this setting     Severe protein energy malnutrition: In context of acute illness  -Continue to encourage PO as able     Physical Deconditioning  -PT/OT    Spoke with daughter Bright Rogel today about today's plan; family will be coming in today and I will meet with them at bedside as well       Diet ADULT DIET;  Regular  ADULT ORAL NUTRITION SUPPLEMENT; Breakfast, Lunch, Dinner; Standard High Calorie/High Protein Oral Supplement   DVT Prophylaxis [] Lovenox, []  Heparin, [] SCDs, [] Ambulation   GI Prophylaxis [] PPI,  [] H2 Blocker,  [] Carafate,  [] Diet/Tube Feeds   Code Status DNR-CC   Disposition Patient requires continued admission due to    MDM [] Low, [] Moderate,[]  High  Patient's risk as above due to      History of Present Illness: Will answer questions appropriately but does seem confused this AM; states that he feels his legs are paralyzed and has some numbness of his RLE; no chest pains or abdominal pain this AM    Objective: Intake/Output Summary (Last 24 hours) at 4/30/2022 1044  Last data filed at 4/30/2022 0427  Gross per 24 hour   Intake 120 ml   Output 575 ml   Net -455 ml      Vitals:   Vitals:    04/30/22 0947   BP:    Pulse: 102   Resp:    Temp:    SpO2:      Physical Exam:   GEN Awake male, sitting upright in bed in no apparent distress. Appears given age. NECK Supple, no apparent thyromegaly or masses. RESP Clear to auscultation, no wheezes, rales or rhonchi. Symmetric chest movement while on room air. CARDIO/VASC S1/S2 auscultated. Irregular without appreciable murmurs, rubs, or gallops. GI Abdomen is soft without significant tenderness   Weaver in place  MSK No gross joint deformities. SKIN Normal coloration, warm, dry.   NEURO Cranial nerves appear grossly intact, normal speech, decreased sensory over RLE and some noticeable weakness compared to the left  PSYCH Awake, alert, oriented x 2 today    Medications:   Medications:    lactobacillus  1 capsule Oral Daily with breakfast    levoFLOXacin  250 mg Oral Daily    Or    levofloxacin  250 mg IntraVENous Daily    diclofenac sodium  2 g Topical BID    apixaban  2.5 mg Oral BID    digoxin  125 mcg Oral Daily    dilTIAZem  120 mg Oral Daily    ferrous sulfate  325 mg Oral BID WC    folic acid  1 mg Oral Daily    metoprolol tartrate  25 mg Oral BID    therapeutic multivitamin-minerals  1 tablet Oral Daily    pantoprazole  40 mg Oral Daily    sodium chloride flush  5-40 mL IntraVENous 2 times per day      Infusions:   PRN Meds: ketorolac, 15 mg, Q12H PRN  sodium chloride flush, 10 mL, PRN  ondansetron, 4 mg, Q8H PRN   Or  ondansetron, 4 mg, Q6H PRN  acetaminophen, 650 mg, Q6H PRN          Electronically signed by Ramez Wright MD on 4/30/2022 at 10:44 AM

## 2022-04-30 NOTE — PROGRESS NOTES
Patient seen and examined. Stable overnight. Dr Marleen Ruiz consultation note reviewed and he did remove a prior Silveira catheter and replaced this with an 18F coude silveira without difficulty. Patient reports no complaints regarding his Silveira catheter this a.m. on rounds. AF. Hemodynamically stable. He still is slightly confused. Ileostomy output is 100/150/175  abd is soft, NR, NG, ND, NT, ileostomy in right lower quadrant. Agree with my partner that the patient does not have an intra-abdominal abscess and he still continues to be on Levaquin per ID recommendations for positive blood cultures with Klebsiella. WBC at 14 yesterday and will continue to follow clinical course  Continue to use Toradol for pain control. PAF-Lopressor, Cardizem and Eliquis. Anemia of chronic disease-monitor H&H  Diet as tolerated,CPM for comorbidities. Discharge planning for possible rehab facility secondary to physical deconditioning. Patient has no acute surgical issues at this time.

## 2022-04-30 NOTE — PROGRESS NOTES
During assessment pt stated he was having numbness in face and legs he said it started last night, this nurse notified Dr. Marlyn Arrington immediately. Dr. Hallie Yanes stated he came to see pt and that pt told him about the numbness in face in hands, but stated that it started 4 days ago. Dr. Hallie Yanes stated he was waiting on family to tell him if they wanted pt to have an MRI or not. Dr. Marlyn Arrington is aware of numbness. No further orders at this time. Charge nurse notified.

## 2022-05-01 NOTE — FLOWSHEET NOTE
RN found patient upon initial assessment with leaking ostomy bag at ostomy site. Ostomy bag setup changed. Peristomal skin cleansed and skin barrier protectant used. Patient then given partial bed bath, raghav care, partial linen change and clean gown. Patient turned and repositioned. Patient stated to RN that he was comfortable prior to RN leaving room. Call light within reach. RN will continue to monitor.

## 2022-05-01 NOTE — PROGRESS NOTES
Patient seen and examined. Stable overnight. No issues with Weaver catheter. AF and hemodynamically stable. Ileostomy output is 175/475  abd is soft, NR, NG, ND, NT, ileostomy in right lower quadrant. Small amount of bilious contents in appliance. Agree with my partner that the patient does not have an intra-abdominal abscess and he still continues to be on Levaquin per ID recommendations for positive blood cultures with Klebsiella. WBC at 13.8 today and will continue to follow clinical course  PAF-Lopressor, Cardizem and Eliquis. Anemia of chronic disease-monitor H&H  Diet as tolerated,CPM for comorbidities. Discharge planning for possible rehab facility secondary to physical deconditioning. Patient has no acute surgical issues at this time. Dr. Celia Miles to resume surgical care tomorrow.

## 2022-05-01 NOTE — PROGRESS NOTES
Hospitalist Progress Note      Name:  Conine Schneider /Age/Sex: 1929  (80 y.o. male)   MRN & CSN:  [id number] & [id number] Admission Date/Time: 2022 12:38 AM   Location:  -A PCP: Chang Avila [address] Day: 10    Assessment and Plan:   Connie Schneider is a 80 y.o.  male paroxysmal A. fib on chronic anticoagulation with Eliquis, CKD 3A, infrarenal AAA, BPH, chronic Weaver catheter, rectal cancer, GERD, hypertension, SBO, CVA, CAD recent hospitalization for 3 weeks where he was managed for severe colitis needing exploratory laparotomy, small bowel resection, colon resection and creation of end ileostomy on 4/3/2022. Biopsy results came back consistent with pseudomembranous colitis as the patient was treated with Dificid under the guidance of ID. He represented to the ED on on 2022 with bleeding at ostomy site and CT abdomen and pelvis was concerning for large rim-enhancing fluid collection within the abdomen and pelvis concerning for post-op abscess. This was drained and viera fluid drained and based on cultures was not an abscess. Blood cultures obtained at admit came back positive for Klebsiella for which ID was consulted and is following. On 2022, family requested for us to focus on comfort measures but still would like to treat patient medically.     Acute toxic metabolic encephalopathy: Recurrent since 2022  UTI   Toxic Encephalopathy  -Weaver catheter exchanged on 2022 by Urology, greatly appreciated  -UA from  with evidence of UTI; is already on abx and ID is following; no UCx sent but as per below will continue abx  -Received Fentanyl  and since has been increasingly confused;  Fentanyl d/c'd only Tylenol PRN pain given patient's age and prolonged hospitalization; Ativan also d/c'd per family request today which I agree with given patient's age and is not agitated but very pleasant this morning     Hx of Severe Pseudomembranous Colitis s/p SBR on 4/3  Concern for intra-abdominal abscess on admitting CT A/P  -Ruled out abscess with negative cultures  -General Surgery following    Hx Chronic Urinary Retention  -s/p silveira exchange on 4/28 per Urology  -Will need q3 week exchanges and f/u as outpatient     Klebsiella bacteremia: Undetermined source  -2 out of 2 blood cultures on 4/22/2020  -Antibiotics as above  -ID following with stop date of 5/7 for Levofloxacin (previously on Cefepime but concerned it was affecting patient's mentation so has since been d/c'd)  -Repeat BCx so far NGTD      Acute blood loss anemia: Reason for hospital presentation  -Last Hgb stable  -Given ileostomy had dark stool (alejandro Iron related) GI consulted but have since signed off  -I did note prior charts regarding lab draws and I spoke with family today and are ok with every other day lab draws which we will continue    Paresthesias  LE Weakness  Hx CVA  -CTH and MRI H completed on 4/28 with no acute pathology however does show remote lacunar infarcts  -Was taken off of ASA per specialists in past due to increase risk of bleeding given age, continue Eliquis  -Will continue to monitor    Hx Jak2+ Thrombocytosis  -Follows with Heme/Onc  -Was previously on ASA but after discussions with specialists in the past this was stopped as patient is currently on Eliquis    MARIELOS  -Cr 1.6; all cell lines in CBC increased with hyponatremia and hypochloremia c/w hypovolemia so will begin IVF today for 24 hours and will repeat BMP in AM     Paroxysmal A. Fib  -Continue PO rate control medications  -AC recently restarted and will monitor Hgb in this setting     Severe protein energy malnutrition: In context of acute illness  -Continue to encourage PO as able     Physical Deconditioning  -PT/OT  -OOB TID         Diet ADULT DIET;  Regular  ADULT ORAL NUTRITION SUPPLEMENT; Breakfast, Lunch, Dinner; Standard High Calorie/High Protein Oral Supplement   DVT Prophylaxis [] Lovenox, [] Heparin, [] SCDs, [] Ambulation   GI Prophylaxis [] PPI,  [] H2 Blocker,  [] Carafate,  [] Diet/Tube Feeds   Code Status DNR-CC   Disposition Patient requires continued admission due to    MDM [] Low, [] Moderate,[]  High  Patient's risk as above due to      History of Present Illness:     Appears improved and more comfortable this AM; still states that he feels very weak and does feel \"confused. \"  No fevers recorded overnight; no nausea or vomiting; was taking some PO yesterday    Objective: Intake/Output Summary (Last 24 hours) at 5/1/2022 0901  Last data filed at 5/1/2022 0833  Gross per 24 hour   Intake 20 ml   Output 975 ml   Net -955 ml      Vitals:   Vitals:    05/01/22 0833   BP:    Pulse: 96   Resp: 17   Temp:    SpO2: 100%     Physical Exam:   GEN Awake male, sitting upright in bed in no apparent distress. Appears given age. NECK Supple, no apparent thyromegaly or masses. RESP Clear to auscultation, no wheezes, rales or rhonchi. Symmetric chest movement while on room air. CARDIO/VASC S1/S2 auscultated. Irregular without appreciable murmurs, rubs, or gallops. GI Abdomen is soft without significant tenderness   Weaver in place  MSK No gross joint deformities. SKIN Normal coloration, warm, dry.   NEURO Cranial nerves appear grossly intact, normal speech, decreased sensory over RLE and some noticeable weakness compared to the left  PSYCH Awake, alert, oriented x 2 today    Medications:   Medications:    lactobacillus  1 capsule Oral Daily with breakfast    levoFLOXacin  250 mg Oral Daily    Or    levofloxacin  250 mg IntraVENous Daily    diclofenac sodium  2 g Topical BID    apixaban  2.5 mg Oral BID    digoxin  125 mcg Oral Daily    dilTIAZem  120 mg Oral Daily    ferrous sulfate  325 mg Oral BID WC    folic acid  1 mg Oral Daily    metoprolol tartrate  25 mg Oral BID    therapeutic multivitamin-minerals  1 tablet Oral Daily    pantoprazole  40 mg Oral Daily    sodium chloride flush 5-40 mL IntraVENous 2 times per day      Infusions:    sodium chloride 100 mL/hr at 05/01/22 0713     PRN Meds: ketorolac, 15 mg, Q12H PRN  sodium chloride flush, 10 mL, PRN  ondansetron, 4 mg, Q8H PRN   Or  ondansetron, 4 mg, Q6H PRN  acetaminophen, 650 mg, Q6H PRN          Electronically signed by Lyssa Tello MD on 5/1/2022 at 9:01 AM

## 2022-05-01 NOTE — PROGRESS NOTES
Pt has attempted to climb out of bed twice in the last hour. Redirected and put a more sensitive bed alarm setting on.

## 2022-05-01 NOTE — PLAN OF CARE
of fever/infection during anticipated neutropenic period  Outcome: Progressing     Problem: Confusion  Goal: Confusion, delirium, dementia, or psychosis is improved or at baseline  Description: INTERVENTIONS:  1. Assess for possible contributors to thought disturbance, including medications, impaired vision or hearing, underlying metabolic abnormalities, dehydration, psychiatric diagnoses, and notify attending LIP  2. [place] high risk fall precautions, as indicated  3. Provide frequent short contacts to provide reality reorientation, refocusing and direction  4. Decrease environmental stimuli, including noise as appropriate  5. Monitor and intervene to maintain adequate nutrition, hydration, elimination, sleep and activity  6. If unable to ensure safety without constant attention obtain sitter and review sitter guidelines with assigned personnel  7.  Initiate Psychosocial CNS and Spiritual Care consult, as indicated  Outcome: Progressing

## 2022-05-01 NOTE — PLAN OF CARE
Problem: Discharge Planning  Goal: Discharge to home or other facility with appropriate resources  5/1/2022 1141 by Sarah Nicole LPN  Outcome: Progressing  5/1/2022 0543 by Anish Snowden RN  Outcome: Progressing     Problem: Pain  Goal: Verbalizes/displays adequate comfort level or baseline comfort level  5/1/2022 1141 by Sarah Nicole LPN  Outcome: Progressing  5/1/2022 0543 by Anish Snowden RN  Outcome: Progressing     Problem: ABCDS Injury Assessment  Goal: Absence of physical injury  5/1/2022 1141 by Sarah Nicole LPN  Outcome: Progressing  5/1/2022 0543 by Anish Snowden RN  Outcome: Progressing     Problem: Skin/Tissue Integrity  Goal: Absence of new skin breakdown  Description: 1. Monitor for areas of redness and/or skin breakdown  2. Assess vascular access sites hourly  3. Every 4-6 hours minimum:  Change oxygen saturation probe site  4. Every 4-6 hours:  If on nasal continuous positive airway pressure, respiratory therapy assess nares and determine need for appliance change or resting period.   5/1/2022 1141 by Sarah Nicole LPN  Outcome: Progressing  5/1/2022 0543 by Anish Snowden RN  Outcome: Progressing     Problem: Chronic Conditions and Co-morbidities  Goal: Patient's chronic conditions and co-morbidity symptoms are monitored and maintained or improved  5/1/2022 1141 by Sarah Nicole LPN  Outcome: Progressing  5/1/2022 0543 by Anish Snowden RN  Outcome: Progressing     Problem: Safety - Adult  Goal: Free from fall injury  5/1/2022 1141 by Sarah Nicole LPN  Outcome: Progressing  5/1/2022 0543 by Anish Snowden RN  Outcome: Progressing     Problem: Musculoskeletal - Adult  Goal: Return mobility to safest level of function  5/1/2022 1141 by Sarah Nicole LPN  Outcome: Progressing  5/1/2022 0543 by Anish Snowden RN  Outcome: Progressing  Goal: Maintain proper alignment of affected body part  5/1/2022 1141 by aSrah Nicole LPN  Outcome: Progressing  5/1/2022 0543 by Oalyinka Vargas RN  Outcome: Progressing  Goal: Return ADL status to a safe level of function  5/1/2022 1141 by See Brown LPN  Outcome: Progressing  5/1/2022 0543 by Olayinka Vargas RN  Outcome: Progressing     Problem: Gastrointestinal - Adult  Goal: Minimal or absence of nausea and vomiting  5/1/2022 1141 by See Brown LPN  Outcome: Progressing  5/1/2022 0543 by Olayinka Vargas RN  Outcome: Progressing  Goal: Maintains or returns to baseline bowel function  5/1/2022 1141 by See Brown LPN  Outcome: Progressing  5/1/2022 0543 by Olayinka Vargas RN  Outcome: Progressing  Goal: Maintains adequate nutritional intake  5/1/2022 1141 by See Brown LPN  Outcome: Progressing  5/1/2022 0543 by Olayinka Vargas RN  Outcome: Progressing  Goal: Establish and maintain optimal ostomy function  5/1/2022 1141 by See Brown LPN  Outcome: Progressing  5/1/2022 0543 by Olayinka Vargas RN  Outcome: Progressing     Problem: Genitourinary - Adult  Goal: Absence of urinary retention  5/1/2022 1141 by See Brown LPN  Outcome: Progressing  5/1/2022 0543 by Olayinka Vargas RN  Outcome: Progressing  Goal: Urinary catheter remains patent  5/1/2022 1141 by See Brown LPN  Outcome: Progressing  5/1/2022 0543 by Olayinka Vargas RN  Outcome: Progressing     Problem: Infection - Adult  Goal: Absence of infection at discharge  5/1/2022 1141 by See Brown LPN  Outcome: Progressing  5/1/2022 0543 by Olayinka Vargas RN  Outcome: Progressing  Goal: Absence of infection during hospitalization  5/1/2022 1141 by See Brown LPN  Outcome: Progressing  5/1/2022 0543 by Olayinka Vargas RN  Outcome: Progressing  Goal: Absence of fever/infection during anticipated neutropenic period  5/1/2022 1141 by See Brown LPN  Outcome: Progressing  5/1/2022 0543 by Olayinka Vargas RN  Outcome: Progressing     Problem: Confusion  Goal: Confusion, delirium, dementia, or psychosis is improved or at baseline  Description: INTERVENTIONS:  1. Assess for possible contributors to thought disturbance, including medications, impaired vision or hearing, underlying metabolic abnormalities, dehydration, psychiatric diagnoses, and notify attending LIP  2. [place] high risk fall precautions, as indicated  3. Provide frequent short contacts to provide reality reorientation, refocusing and direction  4. Decrease environmental stimuli, including noise as appropriate  5. Monitor and intervene to maintain adequate nutrition, hydration, elimination, sleep and activity  6. If unable to ensure safety without constant attention obtain sitter and review sitter guidelines with assigned personnel  7.  Initiate Psychosocial CNS and Spiritual Care consult, as indicated  5/1/2022 1141 by Terrence Landin LPN  Outcome: Progressing  5/1/2022 0543 by Ev Rodriguez RN  Outcome: Progressing

## 2022-05-02 NOTE — PROGRESS NOTES
Hospitalist Progress Note      Name:  Sven Ellis /Age/Sex: 1929  (80 y.o. male)   MRN & CSN:  [id number] & [id number] Admission Date/Time: 2022 12:38 AM   Location:  -A PCP: Josr Ruvalcaba, [address] Day: 11    Assessment and Plan:   Sven Ellis is a 80 y.o.  male  who presents with Postoperative intra-abdominal abscess    Acute toxic metabolic encephalopathy: Recurrent since 2022  UTI   Toxic Encephalopathy  -Silveira catheter exchanged on 2022 by Urology, greatly appreciated  -UA from  with evidence of UTI; is already on abx and ID is following; no UCx sent but as per below will continue abx  -Received Fentanyl  and since has been increasingly confused;  Fentanyl d/c'd only Tylenol PRN pain given patient's age and prolonged hospitalization; Ativan also d/c'd per family request today which I agree with given patient's age and is not agitated but very pleasant this morning     Hx of Severe Pseudomembranous Colitis s/p SBR on 4/3  Concern for intra-abdominal abscess on admitting CT A/P  -Ruled out abscess with negative cultures  -General Surgery following     Hx Chronic Urinary Retention  -s/p silveira exchange on  per Urology  -Will need q3 week exchanges and f/u as outpatient     Klebsiella bacteremia: Undetermined source  -2 out of 2 blood cultures on 2020  -Antibiotics as above  -ID following with stop date of  for Levofloxacin (previously on Cefepime but concerned it was affecting patient's mentation so has since been d/c'd)  -Repeat BCx so far NGTD      Acute blood loss anemia: Reason for hospital presentation  -Last Hgb stable  -Given ileostomy had dark stool (likley Iron related) GI consulted but have since signed off  -I did note prior charts regarding lab draws and I spoke with family today and are ok with every other day lab draws which we will continue     Paresthesias  LE Weakness  Hx CVA  -CTH and MRI H completed on  with no acute pathology however does show remote lacunar infarcts  -Was taken off of ASA per specialists in past due to increase risk of bleeding given age, continue Eliquis  -Will continue to monitor     Hx Jak2+ Thrombocytosis  -Follows with Heme/Onc  -Was previously on ASA but after discussions with specialists in the past this was stopped as patient is currently on Eliquis     MARIELOS  -Cr 1.6; all cell lines in CBC increased with hyponatremia and hypochloremia c/w hypovolemia so will begin IVF today for 24 hours and will repeat BMP in AM     Paroxysmal A. Fib  -Continue PO rate control medications  -AC recently restarted and will monitor Hgb in this setting      Severe protein energy malnutrition: In context of acute illness  -Continue to encourage PO as able     Physical Deconditioning  -PT/OT  -OOB TID    Diet ADULT ORAL NUTRITION SUPPLEMENT; Breakfast, Lunch, Dinner; Standard High Calorie/High Protein Oral Supplement  ADULT DIET; Easy to Chew   DVT Prophylaxis [] Lovenox, []  Heparin, [] SCDs, [] Ambulation   GI Prophylaxis [] PPI,  [] H2 Blocker,  [] Carafate,  [] Diet/Tube Feeds   Code Status DNR-CC   Disposition Patient requires continued admission due to Bleeding   MDM [] Low, [] Moderate,[]  High  Patient's risk as above due to Abscess     History of Present Illness:     Chief Complaint: Postoperative intra-abdominal abscess  Patient seen and evaluated in the room. He is observed sitting on the chair. Family at bedside. Discuss plan of care with Daughter Christian Mar at bedside. Objective: Intake/Output Summary (Last 24 hours) at 5/2/2022 1701  Last data filed at 5/2/2022 1036  Gross per 24 hour   Intake 240 ml   Output 875 ml   Net -635 ml      Vitals:   Vitals:    05/02/22 1200   BP: 120/79   Pulse: 85   Resp: 22   Temp: 98.1 °F (36.7 °C)   SpO2: 99%     Physical Exam:   GEN Awake male, sitting upright in bed in no apparent distress. Appears given age. EYES Pupils are equally round.   No scleral erythema, discharge, or conjunctivitis. HENT Mucous membranes are moist. Oral pharynx without exudates, no evidence of thrush. NECK Supple, no apparent thyromegaly or masses. RESP Clear to auscultation, no wheezes, rales or rhonchi. Symmetric chest movement while on room air. CARDIO/VASC S1/S2 auscultated. Regular rate without appreciable murmurs, rubs, or gallops. No JVD or carotid bruits. Peripheral pulses equal bilaterally and palpable. No peripheral edema. GI Abdomen is soft without significant tenderness, masses, or guarding. Bowel sounds are normoactive. Rectal exam deferred.  No costovertebral angle tenderness. Normal appearing external genitalia. Weaver catheter is not present. HEME/LYMPH No palpable cervical lymphadenopathy and no hepatosplenomegaly. No petechiae or ecchymoses. MSK No gross joint deformities. SKIN Normal coloration, warm, dry. NEURO Cranial nerves appear grossly intact, normal speech, no lateralizing weakness. PSYCH Awake, alert, oriented x 4. Affect appropriate. Medications:   Medications:    lactobacillus  1 capsule Oral Daily with breakfast    levoFLOXacin  250 mg Oral Daily    Or    levofloxacin  250 mg IntraVENous Daily    diclofenac sodium  2 g Topical BID    apixaban  2.5 mg Oral BID    digoxin  125 mcg Oral Daily    dilTIAZem  120 mg Oral Daily    ferrous sulfate  325 mg Oral BID WC    folic acid  1 mg Oral Daily    metoprolol tartrate  25 mg Oral BID    therapeutic multivitamin-minerals  1 tablet Oral Daily    pantoprazole  40 mg Oral Daily    sodium chloride flush  5-40 mL IntraVENous 2 times per day      Infusions:    sodium chloride 100 mL/hr at 05/01/22 0713     PRN Meds: sodium chloride flush, 10 mL, PRN  ondansetron, 4 mg, Q8H PRN   Or  ondansetron, 4 mg, Q6H PRN  acetaminophen, 650 mg, Q6H PRN          Patient is still admitted because Bleeding ostomy site. . The anticipated discharge is in greater than 24 hours.      Electronically signed by Evan Daly DO Anjel on 5/2/2022 at 5:01 PM

## 2022-05-02 NOTE — PLAN OF CARE
Problem: Discharge Planning  Goal: Discharge to home or other facility with appropriate resources  Outcome: Progressing     Problem: Pain  Goal: Verbalizes/displays adequate comfort level or baseline comfort level  Outcome: Progressing     Problem: ABCDS Injury Assessment  Goal: Absence of physical injury  Outcome: Progressing     Problem: Skin/Tissue Integrity  Goal: Absence of new skin breakdown  Description: 1. Monitor for areas of redness and/or skin breakdown  2. Assess vascular access sites hourly  3. Every 4-6 hours minimum:  Change oxygen saturation probe site  4. Every 4-6 hours:  If on nasal continuous positive airway pressure, respiratory therapy assess nares and determine need for appliance change or resting period.   Outcome: Progressing     Problem: Chronic Conditions and Co-morbidities  Goal: Patient's chronic conditions and co-morbidity symptoms are monitored and maintained or improved  Outcome: Progressing     Problem: Safety - Adult  Goal: Free from fall injury  Outcome: Progressing     Problem: Musculoskeletal - Adult  Goal: Return mobility to safest level of function  Outcome: Progressing  Goal: Maintain proper alignment of affected body part  Outcome: Progressing  Goal: Return ADL status to a safe level of function  Outcome: Progressing     Problem: Gastrointestinal - Adult  Goal: Minimal or absence of nausea and vomiting  Outcome: Progressing  Goal: Maintains or returns to baseline bowel function  Outcome: Progressing  Goal: Maintains adequate nutritional intake  Outcome: Progressing  Goal: Establish and maintain optimal ostomy function  Outcome: Progressing     Problem: Genitourinary - Adult  Goal: Absence of urinary retention  Outcome: Progressing  Goal: Urinary catheter remains patent  Outcome: Progressing     Problem: Infection - Adult  Goal: Absence of infection at discharge  Outcome: Progressing  Goal: Absence of infection during hospitalization  Outcome: Progressing  Goal: Absence of fever/infection during anticipated neutropenic period  Outcome: Progressing     Problem: Confusion  Goal: Confusion, delirium, dementia, or psychosis is improved or at baseline  Description: INTERVENTIONS:  1. Assess for possible contributors to thought disturbance, including medications, impaired vision or hearing, underlying metabolic abnormalities, dehydration, psychiatric diagnoses, and notify attending LIP  2. [place] high risk fall precautions, as indicated  3. Provide frequent short contacts to provide reality reorientation, refocusing and direction  4. Decrease environmental stimuli, including noise as appropriate  5. Monitor and intervene to maintain adequate nutrition, hydration, elimination, sleep and activity  6. If unable to ensure safety without constant attention obtain sitter and review sitter guidelines with assigned personnel  7.  Initiate Psychosocial CNS and Spiritual Care consult, as indicated  Outcome: Progressing

## 2022-05-02 NOTE — PROGRESS NOTES
Palliative Care follow up visit. Patient is alert and pleasantly confused today. Bruce Saini and Annetta present at bedside. PT/OT Lisa working with patient and reports he did very well would like to recommend ARU. Patient denies pain and just states he is tired. Assisted patient with a drink of water. Noted his bottom dentures to be loose possibly d/t weight loss? Laura  is going to ask for denture securement now. Patient states he is comfortable in bed. Bruce Saini is pleased with his care and no concerns expressed. Will continue to follow.

## 2022-05-02 NOTE — PROGRESS NOTES
Speech Language Pathology  Facility/Department: [facility] ICU STEPDOWN   CLINICAL BEDSIDE SWALLOW EVALUATION    NAME: Nery Mariee  : 1929  MRN: [id number]    IMPRESSIONS: Nery Mariee was referred for a bedside swallow evaluation following admission to [address] [place] [address] with concern for postoperative intra-abdominal abscess s/p ex lap, small bowel resection, colon resection, and end ileostomy 4/3/2022. He was seen by SLP during previous/recent admission and was last recommended regular diet/thin liquids . Other medical hx includes afib, AAA, CAD, CVA, GERD, HTN. Pt seen for evaluation seated upright in chair, initially lethargic but increasingly alert as evaluation progressed. Family present throughout. Baseline vocal quality weak/reduced breath support, reduced articulatory precision 2/2 fatigue. Oral mechanism examination reveals generalized weakness/reduced ROM without asymmetry. Pt presented with PO trials of ice chips, thin liquids via tsp/cup/straw, puree, soft solids and regular solids. Oral stage is mild-moderately impaired, characterized by intact labial seal, slow mastication with reduced bolus formation, adequate AP transit, piecemeal deglutition of solids d/t weakness and reduced oral clearance. Requires cues for liquid wash to clear residue. Suspect pharyngeal dysphagia with adequate swallow initiation/laryngeal elevation, multiple swallows inconsistently; suspect reduced pharyngeal clearance. No definite s/s aspiration noted across all trials. Pt reports difficulty swallowing all consistencies presented. Family reports pt will consume preferred textures more easily and requests to bring in foods from home. Recommend easy to chew diet/thin liquids, general aspiration precautions, 1:1 assist with meals d/t weakness and need for cuing for oral clearance. Recommend giving pills in pureed/pudding consistency (whole okay). SLP will follow.       ADMISSION DATE: 2022  ADMITTING DIAGNOSIS: has Hypertension; Benign prostatic hyperplasia; Psoriatic arthritis ([address] Primary malignant neoplasm of rectum ([address] Psoriasis; Chronic myeloproliferative disease ([address] Monocytosis (symptomatic); Gastroesophageal reflux disease without esophagitis; Urinary retention; H/O: CVA (cerebrovascular accident); Irregular heart beat; Nonrheumatic aortic valve stenosis; Paroxysmal atrial fibrillation ([address] GI bleed; Melena; Arteriovenous malformation of jejunum; History of rectal cancer; Benign neoplasm of cecum; Benign neoplasm of ascending colon; Pseudomonas urinary tract infection; Partial small bowel obstruction (HCC); SBO (small bowel obstruction) ([address] Atrial fibrillation with RVR ([address] Hypotension; Ischemic colitis ([address] Hypocalcemia; MARIELOS (acute kidney injury) ([facility] Probable Bacterial Pneumonia; CAD (coronary artery disease); Anemia; Delirium; Severe protein-calorie malnutrition ([address]  On total parenteral nutrition; Colitis; Postoperative intra-abdominal abscess; and Gram-negative bacteremia on their problem list.  ONSET DATE: this admission    Recent Chest Xray/CT of Chest: see chart    Date of Eval: 5/2/2022  Evaluating Therapist: OLIVA Ware    Current Diet level:  Current Diet : Regular      Primary Complaint  Patient Complaint: fatigue, reduced appetite    Pain:  Pain Assessment  Pain Assessment: None - Denies Pain  Pain Level: 10  Martínez-Baker Pain Rating: No hurt  Patient's Stated Pain Goal: 0 - No pain  Pain Location: Neck  Pain Orientation: Mid,Lower  Pain Descriptors: Patient unable to describe  Functional Pain Assessment: Prevents or interferes with all active and some passive activities  Pain Type: Acute pain  Pain Frequency: Continuous  Pain Onset: On-going  Non-Pharmaceutical Pain Intervention(s): Repositioned,Rest,Food,Ice,Emotional support,Prayer  Response to Pain Intervention: Pain improved but above pain goal  Side Effects: No reported side effects (rr >10)  Multiple Pain Sites: No  Pain Assessment in Advanced Dementia (PAINAD)  Non-Pharmaceutical Pain Intervention(s): Repositioned,Rest,Food,Ice,Emotional support,Prayer  Response to Pain Intervention: Pain improved but above pain goal  Side Effects: No reported side effects (rr >10)  Faces, Legs, Activity, Cry, and Consolability (FLACC)  Non-Pharmaceutical Pain Intervention(s): Repositioned,Rest,Food,Ice,Emotional support,Prayer  Response to Pain Intervention: Pain improved but above pain goal  Side Effects: No reported side effects (rr >10)    Reason for Referral  Kelly Alexandre was referred for a bedside swallow evaluation to assess the efficiency of his swallow function, identify signs and symptoms of aspiration and make recommendations regarding safe dietary consistencies, effective compensatory strategies, and safe eating environment. Impression  Dysphagia Diagnosis: Mild to moderate oral stage dysphagia;Mild pharyngeal stage dysphagia  Dysphagia Outcome Severity Scale: Level 4: Mild moderate dysphagia- Intermittent supervision/cueing. One - two diet consistencies restricted     Treatment Plan  Requires SLP Intervention: Yes  Duration of Treatment: LOS  D/C Recommendations: To be determined; Ongoing speech therapy is recommended during this hospitalization       Recommended Diet and Intervention        Recommended Form of Meds: Meds in puree     Therapeutic Interventions: Diet tolerance monitoring;Patient/Family education; Other (comment); Therapeutic PO trials with SLP (MBS/instrumental evaluation as indicated)    Compensatory Swallowing Strategies  Compensatory Swallowing Strategies : Upright as possible for all oral intake;Remain upright for 30-45 minutes after meals;Eat/Feed slowly; Alternate solids and liquids;Small bites/sips;Assist feed    Treatment/Goals  Short-term Goals  Timeframe for Short-term Goals: length of admission  Goal 1: Pt will tolerate easy to chew diet/thin liquids with adequate oral manipulation/clearance and no s/s aspiration. Goal 2: Pt/caregivers will implement recommended strategies 90% given min cues. Goal 3: Pt will participate in instrumental swallow evaluation as indicated. Goal 4: Pt/caregivers will demonstrate understanding of education/recommendations. General  Chart Reviewed: Yes  Behavior/Cognition: Lethargic;Requires cueing  Respiratory Status: Room air  O2 Device: None (Room air)  Communication Observation:  (cognitive communication deficits; reduced intelligibility)  Follows Directions: Simple (with cues)  Dentition: Dentures top;Dentures bottom  Patient Positioning: Upright in chair  Baseline Vocal Quality: Weak (inconsistent, d/t reduced breath support)  Prior Dysphagia History: yes; recently seen by SLP during previous admission, last recommended regular diet/thin liquids  Consistencies Administered: Soft and Bite-Sized;Pureed; Thin - teaspoon; Thin - cup; Thin - straw; Ice Chips;Regular           Vision/Hearing  Hearing  Hearing: Within functional limits    Oral Motor Deficits       Oral Phase Dysfunction  Oral Phase  Oral Phase: Exceptions     Indicators of Pharyngeal Phase Dysfunction        Prognosis  Individuals consulted  Consulted and agree with results and recommendations: Patient; Family member    Education  Patient Education: results, recommendations  Patient Education Response: Needs reinforcement  Safety Devices in place: Yes  Type of devices: Left in chair       Therapy Time  SLP Individual Minutes  Time In: 8393  Time Out: 3331  Minutes: [address] [place] [address] Road  5/2/2022 3:11 PM

## 2022-05-02 NOTE — PROGRESS NOTES
complaint: K.oxytoca bacteremia secondary to Postop intra-abdominal abscess. Denies n/v/d/f or untoward effects of antibiotics. Sitting up in the chair, states tolerated po meds well, states still having generalized abdominal tenderness but is feeling improved. Physical Exam:  Vital Signs: BP (!) 147/99   Pulse 87   Temp 97.3 °F (36.3 °C) (Axillary)   Resp 27   Ht 5' 9.02\" (1.753 m)   Wt 162 lb 7.7 oz (73.7 kg)   SpO2 99%   BMI 23.98 kg/m²     Gen: A&O x4, up in the chair, no distress. Wounds: C/D/I mid abdominal incision well approximated, no erythema or edema at site, no drainage at site. Excoriation surrounding testicles/perineum has resolved. Ileostomy: intact RUQ draining brown liquid stool  Chest: no distress and CTA. Good air movement. Heart: RRR and no MRG. Abd: soft, non-distended, no tenderness, no hepatomegaly. Normoactive bowel sounds. Ext: no clubbing, cyanosis, or edema  Catheter Site: without erythema or tenderness draining clear yellow urine. Neuro: Mental status intact. CN 2-12 intact and no focal sensory or motor deficits        Radiologic / Imaging / TESTING  4/22/22 CT Abdomen Pelvis W IV Contrast:  Impression   1. Large rim enhancing fluid collection is seen within the abdomen and pelvis   which extends from the presacral region to the level of the pancreas,   concerning for an abscess. 2. Small amount of free fluid is seen within the abdomen. 3. Infrarenal abdominal aortic aneurysm measuring 5.6 cm.  Please see   recommendations below. 4. Small bilateral pleural effusions with bibasilar atelectasis. 5. Focally dilated small bowel loops with air-fluid levels are seen within   the left upper quadrant which could be related to ileus.       RECOMMENDATIONS:   5.6 cm infrarenal abdominal aortic aneurysm.  Recommend referral to a vascular   specialist.       Reference: [facility] Expose Radiol [id number].      4/22/22 VL Dup Lower Extremity Venous Right:  Impression   No evidence of DVT in the right lower extremity.      4/23/22 XR Chest Portable:  Impression   Small bilateral pleural effusions.       Moderate left basilar atelectasis and mild right basilar atelectasis. 4/24/22 XR Chest Portable:  Impression   Stable trace left pleural effusion with left basilar airspace disease or   atelectasis.         4/28/22 CT Head WO Contrast:  Impression   No noncontrast CT evidence of acute intracranial pathology or significant   interval change compared to 03/20/2022.       Cortical atrophy, white matter changes consistent with microvascular   degenerative disease, atherosclerotic calcification in the terminal internal   carotid arteries of uncertain hemodynamic significance incidentally noted. .     4/28/22 MRI Brain WO Contrast:  mpression   1. No acute infarct or other acute intracranial process.    2. Senescent changes including moderate generalized parenchymal volume loss   and chronic small vessel ischemia.  Remote bilateral lacunar infarcts   throughout the basal ganglia.                Labs:    Recent Results (from the past 24 hour(s))   Procalcitonin    Collection Time: 05/02/22  2:05 AM   Result Value Ref Range    Procalcitonin 0.117    C-Reactive Protein    Collection Time: 05/02/22  2:05 AM   Result Value Ref Range    CRP, High Sensitivity 11.8 mg/L   Basic Metabolic Panel w/ Reflex to MG    Collection Time: 05/02/22  2:05 AM   Result Value Ref Range    Sodium 134 (L) 135 - 145 MMOL/L    Potassium 4.6 3.5 - 5.1 MMOL/L    Chloride 100 99 - 110 mMol/L    CO2 19 (L) 21 - 32 MMOL/L    Anion Gap 15 4 - 16    BUN 24 (H) 6 - 23 MG/DL    CREATININE 1.6 (H) 0.9 - 1.3 MG/DL    Glucose 98 70 - 99 MG/DL    Calcium 8.8 8.3 - 10.6 MG/DL    GFR Non- 41 (L) >60 mL/min/1.73m2    GFR  49 (L) >60 mL/min/1.73m2     CULTURE results: Invalid input(s): BLOOD CULTURE,  URINE CULTURE, SURGICAL CULTURE    Diagnosis:  Patient Active Problem List   Diagnosis   

## 2022-05-02 NOTE — PROGRESS NOTES
Occupational Therapy    [facility] ACUTE CARE OCCUPATIONAL THERAPY EVALUATION  Kandy Sanchez, 8/23/1929, 2024/2024-A, 5/2/2022    History  [place]:  The primary encounter diagnosis was Hemorrhage from ileostomy [facility]). A diagnosis of Postoperative intra-abdominal abscess was also pertinent to this visit. Patient  has a past medical history of AAA (abdominal aortic aneurysm) ([address] MARIELOS (acute kidney injury) ([address] Angina, class I ([address] Benign prostatic hyperplasia, Bowel obstruction ([address] CAD (coronary artery disease), Cancer ([address] CCC (chronic calculous cholecystitis), Delirium, Gastroesophageal reflux disease without esophagitis, Hx of cardiovascular stress test, Hypertension, On total parenteral nutrition, Partial small bowel obstruction ([address] Primary malignant neoplasm of rectum ([address] Probable Bacterial Pneumonia, Psoriasis, Psoriatic arthritis ([address] Pyelonephritis, Severe protein-calorie malnutrition ([address] Stable angina ([address] and Unspecified cerebral artery occlusion with cerebral infarction. Patient  has a past surgical history that includes Appendectomy; colostomy; Upper gastrointestinal endoscopy (N/A, 2/28/2022); Colonoscopy (N/A, 3/2/2022); and laparotomy (N/A, 4/3/2022). Subjective:  Patient states:  \"I want to get back to bed I've been sitting up a while\". Pt mumbling requiring frequent re-direction to conversation    Pain:  9/10 pain in abdomen related to surgery  Pain Intervention: Increased movement, repositioned, RN notified.     Communication with other providers:  Handoff to RN,  Restrictions: Abdominal Surgical Precautions, General Precautions, Fall Risk    Home Setup/Prior level of function  Social/Functional History  Lives With: Alone  Type of Home: Condo  Home Layout: One level  Home Access: Level entry  Bathroom Shower/Tub: Walk-in shower  Bathroom Toilet: Standard  Bathroom Accessibility: Accessible  Home Equipment: Cane (PRN)  Has the patient had two or more falls in the past year or any fall with injury in the past year?: No  ADL Assistance: Independent  Homemaking Assistance: Independent  Homemaking Responsibilities: Yes  Ambulation Assistance: Independent  Transfer Assistance: Independent  Active : Yes  Mode of Transportation: Car  Occupation: Retired    Examination of body systems (includes body structures/functions, activity/participation limitations):  · Observation:  Sitting upright in chair, agreeable to therapy, family in room  · Vision:  Glasses  · Hearing:  Slightly [place] w/ Bilateral Hearing Aides  · Cardiopulmonary:  No 02 needs      Body Systems and functions:  · ROM R/L:  WFL. · Strength R/L:  4+/5,   · Sensation: Denies numbness  · Tone: Normal  · Coordination: Decreased speed BUE  · Perception: mod decreased initiation/sequencing w/ multiple tactile cues to overcome. Activities of Daily Living (ADLs):  · Feeding: Setup (pt has uppers/lowers)  · Grooming: Setup/CGA (washing face/hands w/ warm washcloth)  · UB bathing: SBA  · LB bathing: maxA (washing raghav area/buttocks in stand after colostomy leak)  · UB dressing: SBA (doffing/donning gown sitting upright in chair)  · LB dressing: maxA   · Toileting: maxA (washing raghav area/buttocks in stand, colostomy bag leak, nursing assisted w/ replacement of colostomy)    Cognitive and Psychosocial Functioning:  · Overall cognitive status: AxO to self only, decreased problem solving/safety awareness/short term memory, decreased insight into deficits, increased processing time. Pt's family reports that conversation with doctor revealed concerns over medication management and cognition during stay at hospital. Pt's family reports pt is normally WNL at baseline  · Affect: Flat       Mobility:  · Supine to sit:  DNT pt received sitting upright in chair  · Transfers: maxA STS from reclining chair up to handrail EOB ~5 STS  · Sitting balance:  SBA. · Standing balance:  maxA ~2 minutes max.   · Functional Mobility DNT  · Toilet/Shower Transfers: DNT  · Scooting to HOB: maxA x 2    · Rolling L/R: maxA           AM-PAC Daily Activity Inpatient   How much help for putting on and taking off regular lower body clothing?: Total  How much help for Bathing?: A Lot  How much help for Toileting?: Total  How much help for putting on and taking off regular upper body clothing?: Total  How much help for taking care of personal grooming?: A Little  How much help for eating meals?: A Little  AM-[facility] Inpatient Daily Activity Raw Score: 11  AM-PAC Inpatient ADL T-Scale Score : 29.04  ADL Inpatient CMS 0-100% Score: 70.42  ADL Inpatient CMS G-Code Modifier : CL    Treatment:  Self Care Training:   Cues were given for safety, sequence, UE/LE placement, visual cues, and balance. Activities performed today included grooming, LB bathing/dressing, toileting    Therapeutic Activity Training:   Therapeutic activity training was instructed today. Cues were given for safety, sequence, UE/LE placement, awareness, and balance. Activities performed today included bed mobility training, sup-sit, sit-stand, stand to sit, scooting to HOB, rolling L/R      Safety: patient left in bed with bed alarm, call light within reach, RN notified, gait belt used. Assessment:  Pt is a 79 yo male admitted from home for post operative intra abdominal abscess. Pt at baseline is Independent for ADLs Independent for high level IADLs and Independent for functional transfers/mobility w/ AD. Pt currently presents w/ deficits in ADL and high level IADL independence, functional activity tolerance, dynamic sitting and standing balance and tolerance and functional transfers, BUE strength/coordination, initiation/sequencing and cognition. Pt would benefit from continued acute care OT services w/ discharge to ARU  Complexity: Moderate  Prognosis: Good, no significant barriers to participation at this time.    Plan  Times per Week: 4x+  Times per Day: Daily  Current Treatment Recommendations: Strengthening,Balance training,Functional mobility training,Endurance training,Cognitive reorientation,Pain management,Neuromuscular re-education,Safety education & training,Patient/Caregiver education & training,Equipment evaluation, education, & procurement,Positioning,Self-Care / ADL,Home management training,Cognitive/Perceptual training,Coordination training     Equipment: defer    Goals:  Pt goal: go home  Time Frame for STGs: discharge  Goal 1: Pt will perform UE ADLs Independent  Goal 2: Pt will perform LE ADLs Independent  Goal 3: Pt will perform toileting Independent  Goal 4: Pt will perform functional transfer w/ AD Bebe  Goal 5: Pt will perform functional mobility w/ AD Bebe  Goal 6: Pt will perform therex/theract in order to increase functional activity tolerance and dynamic standing balance    Treatment plan:  Pt will perform functional task in sit reaching in all 3 planes in order to increase dynamic sitting balance and functional activity tolerance    Recommendations for NURSING activity: KOFI to chair    Time:   Time in: 1349  Time out: 1502  Timed treatment minutes: 58 minutes  Total time: 73 minutes    Electronically signed by:    Darek CEDILLO/L [id number]  4:47 PM,5/2/2022

## 2022-05-03 NOTE — PLAN OF CARE
Problem: Discharge Planning  Goal: Discharge to home or other facility with appropriate resources  Outcome: Progressing     Problem: ABCDS Injury Assessment  Goal: Absence of physical injury  Outcome: Progressing

## 2022-05-03 NOTE — PROGRESS NOTES
Tech tried to feed pt. Pt was falling asleep and pocketing food. Tech let nurse and  know and hospialist said to wait till family gets here to help pt eat when pt is more awake and not falling asleep. Tech tried to give insure and patient was falling asleep while trying to take a drink.

## 2022-05-03 NOTE — FLOWSHEET NOTE
Occupational Therapy Treatment Note  Name: Dante Sullivan MRN: [id number] :   1929   Date:  5/3/2022   Admission Date: 2022 Room:  -A     Primary Problem:  Hemorrhage from ileostomy     Restrictions/Precautions:  Abdominal Surgical Precautions, General Precautions, Fall Risk    Communication with other providers:  RN approved session. Cotx with PT Catalina for safety. Subjective:  Patient states:  Difficult to assess with soft voice  Pain: non verbal indicator of pain present (location, type, intensity)    Objective:    Observation:  Pt supine in bed with family present  Objective Measures:  Pt alert to self and location    Treatment, including education:  Therapeutic Activity Training:   Therapeutic activity training was instructed today. Cues were given for safety, sequence, UE/LE placement, awareness, and balance. Activities performed today included bed mobility training, sup-sit, sit-stand, SPT. Pt supine in bed prior to arrival and completed supine to sit with head of bed elevated and use of bed rails with verbal cues for hand placement MOD A x1. Pt seated edge of bed with close with MIN to MOD A. Pt completed 4 sit to stand with heavy posterior lean. Pt noted decreased ability to shift weight forward and decreased balance with use of [employer]. Pt seated after each stand for rest break and educated patient on anterior seated lean to increased proper sit to stand and weight shifting. PT required MAX A x2 with arm in arm assist with each stand. Pt completed pivot to chair with MAX A x2. Pt needs seated in chair with all needs met and chair alarm on. Call light in reach and stefanie in place.     Assessment / Impression:    Patient's tolerance of treatment: fair  Adverse Reaction: none  Significant change in status and impact:  none  Barriers to improvement: cognition and balance    Goals:  Pt goal: go home  Time Frame for STGs: discharge  Goal 1: Pt will perform UE ADLs Independent  Goal 2: Pt will perform LE ADLs Independent  Goal 3: Pt will perform toileting Independent  Goal 4: Pt will perform functional transfer w/ AD Bebe- addressed  Goal 5: Pt will perform functional mobility w/ AD Bebe  Goal 6: Pt will perform therex/theract in order to increase functional activity tolerance and dynamic standing balance-addressed    Plan for Next Session:    Continue OT POC and progress edge of bed self care skills and OOB tolerance    Time in:  1339  Time out:  1412  Timed treatment minutes:  33  Total treatment time:  35    Previously filed items:  Social/Functional History  Lives With: Alone  Type of Home: Condo  Home Layout: One level  Home Access: Level entry  Bathroom Shower/Tub: Walk-in shower  Bathroom Toilet: Standard  Bathroom Accessibility: Accessible  Home Equipment: Cane (PRN)  Has the patient had two or more falls in the past year or any fall with injury in the past year?: No  ADL Assistance: [address] [place] [address] Avenue: Independent  Homemaking Responsibilities: Yes  Ambulation Assistance: Independent  Transfer Assistance: Independent  Active : Yes  Mode of Transportation: Car  Occupation: Retired     Electronically signed by:    KINA Valdez [address],   5/3/2022, 4:07 PM

## 2022-05-03 NOTE — PROGRESS NOTES
Physical Therapy  [facility] ACUTE CARE PHYSICAL THERAPY EVALUATION  Rose Ya, 8/23/1929, 2024/2024-A, 5/3/2022    History  [place]:  The primary encounter diagnosis was Hemorrhage from ileostomy [facility]). A diagnosis of Postoperative intra-abdominal abscess was also pertinent to this visit. Patient  has a past medical history of AAA (abdominal aortic aneurysm) ([address] MARIELOS (acute kidney injury) ([address] Angina, class I ([address] Benign prostatic hyperplasia, Bowel obstruction ([address] CAD (coronary artery disease), Cancer ([address] CCC (chronic calculous cholecystitis), Delirium, Gastroesophageal reflux disease without esophagitis, Hx of cardiovascular stress test, Hypertension, On total parenteral nutrition, Partial small bowel obstruction ([address] Primary malignant neoplasm of rectum ([address] Probable Bacterial Pneumonia, Psoriasis, Psoriatic arthritis ([address] Pyelonephritis, Severe protein-calorie malnutrition ([address] Stable angina ([address] and Unspecified cerebral artery occlusion with cerebral infarction. Patient  has a past surgical history that includes Appendectomy; colostomy; Upper gastrointestinal endoscopy (N/A, 2/28/2022); Colonoscopy (N/A, 3/2/2022); and laparotomy (N/A, 4/3/2022).     Subjective:  Patient states:  \"I'm going home\"   Pain:  Did not specify where pain was or rate   Communication with other providers: Tequila Tuttle   Restrictions: general precautions, falls, colostomy, abdominal protection (recent ex lap)     Home Setup/Prior level of function  Social/Functional History  Lives With: Alone  Type of Home: Condo  Home Layout: One level  Home Access: Level entry  Bathroom Shower/Tub: Walk-in shower  Bathroom Toilet: Standard  Bathroom Accessibility: Accessible  Home Equipment: Cane (PRN)  Has the patient had two or more falls in the past year or any fall with injury in the past year?: No  ADL Assistance: [address] [place] [address] Avenue: Independent  Homemaking Responsibilities: Yes  Ambulation Assistance: Independent  Transfer Assistance: Independent  Active : Yes  Mode of Transportation: Car  Occupation: Retired    Examination of body systems (includes body structures/functions, activity/participation limitations):  · Observation:  Supine in bed upon arrival. Multiple family members visiting and supportive. Pt pleasantly confused. Slightly impulsive. Dec comprehension and command following with dec insight to deficits, problem solving, and safety awareness. Soft spoke and difficult to understand at times. Oriented to city and birthday. · Vision:  glasses  · Hearing:  [employer]  · Cardiopulmonary:  Stable vitals on room air     Musculoskeletal  · ROM R/L:  Grossly WFL BLEs though tightness to bilat knees into extension. · Strength R/L: Unable to formally assess with MMT due to dec command following and comprehension. Pt strong though poor with proper use of strength for mobility    Mobility/treatment:   · Rolling L/R:  NT   · Supine to sit:  ModA for hip scooting and uprighting trunk. HOB elevated ~60 deg. Able to self advance BLEs to EOB with inc time. Difficult comprehending cues for use of bed rail to assist with trunk. · Transfers:   · Sit to stand: performed 4x from EOB. 2x with RW and 2x without AD stabilizing self on therapist's arms. MaxA x 2 for balance and weight shift due to heavy retropulsion. Blocked bilat feet from sliding fwd as well as stabilized walker when used. Pt tends to push walker in front of him and heavily lean posterior. Cued for inc trunk flexion when setting up for transfers. · Stand to sit: maxA x 2 for controlling sit to EOB  · Stand pivot: bed to recliner; maxA x 2 for fwd weight shift, blocking feet, lift, and sequencing turn. Impulsive and eager to get to chair once he saw it. · Sitting balance:  Variable from min-modA. Increased posterior lean and LOB. Maximal cues for fwd weight shift over SHAY.  Performed multiple fwd weight shifts over knees but hard for pt to sustain (may be slightly fearful). BUE support with static sitting. · Standing balance:  maxA x 2 at RW and with BUE support on therapist's arms. Heavy retropulsion. Needed LEs blocked from sliding due to such heavy push posterior. Tolerates ~10 seconds in standing   · Gait: Not safe to attempt at this time  · Educated pt and family on POC, role of PT, DME use, discharge. Cues provided for sequencing to inc safety and indep with mobility     [facility] 6 Clicks Inpatient Mobility:  AM-PAC Inpatient Mobility Raw Score : 9    Safety: patient left in chair with alarm, call light within reach,  gait belt used. Assessment:  Pt is a 80year old male admitted with post operative abdominal abscess (s/p ex lap with bowel resection and ileostomy 4/3). Increased confusion after receiving fentanyl. Diagnosed with a UTI after silveira catheter exchange. Recommend ARU as long as mentation improves during hospital admission to allow full participation. He is typically very independent with mobility, ADLs,, IADLS, and drives. He performed below his baseline this date and would benefit from continued therapy to address his current deficits, dec potential fall risk, and restore function. Complexity: Moderate  Prognosis: Good, no significant barriers to participation at this time.    Plan Plan: 3-5 times per week  Discharge Recommendations: IP Rehab  Equipment: continue to assess     Goals:  Long Term Goals  Time Frame for Long term goals : 1 week  Long term goal 1: Pt will peform sit><supine Nichol  Long term goal 2: Pt will perform light dynamic sitting activity x 5 minutes with single UE support Nichol  Long term goal 3: Pt will perform sit><Stand maxA  Long term goal 4: Pt will transfer between surfaces maxA  Long term goal 5: Pt will ambulate 10ft with RW modA    Treatment plan:  Bed mobility, transfers, balance, gait, TA, TX     Recommendations for NURSING mobility: stefanie     Time:  Time in: 1335  Time out: 1412  Timed treatment minutes: 25  Total time: 37    Electronically signed by:    Harley Escamilla, [id number]  5/3/2022, 4:02 PM

## 2022-05-03 NOTE — PROGRESS NOTES
Palliative Care follow up visit. Patient is sitting up right in the chair on room air. His voice is low volume and drink offered. His voice does seem to improve some after a drink. He denies pain. He is oriented to person and place. He states it is 2024 and does not remember why he is here. Re-orientation provided. He is pleasant and willing to get better today. Getachew Mcadams RN has been at bedside. No needs expressed per patient. Call light in reach.

## 2022-05-03 NOTE — PROGRESS NOTES
Hospitalist Progress Note      Name:  Elver Cordoba /Age/Sex: 1929  (80 y.o. male)   MRN & CSN:  [id number] & [id number] Admission Date/Time: 2022 12:38 AM   Location:  -A PCP: Minor Burciaga, [address] Day: 12    Assessment and Plan:   Elver Cordoba is a 80 y.o.  male  who presents with Postoperative intra-abdominal abscess      5/3/2022 : Patient oral intake is still low. Consider starting TPN tomorrow if intake not improved. I discussed this with Lillian Asp the daughter today and she is in agreement. Her phone number [phone]. Acute toxic metabolic encephalopathy: Recurrent since 2022  UTI   Toxic Encephalopathy  -Silveira catheter exchanged on 2022 by Urology, greatly appreciated  -UA from  with evidence of UTI; is already on abx and ID is following; no UCx sent but as per below will continue abx  -5/3/2022: If Patient mentation is not cleared by tomorrow,need to consider stopping Levaquin. Will check ammonia level today. -Received Fentanyl  and since has been increasingly confused;  Fentanyl d/c'd only Tylenol PRN pain given patient's age and prolonged hospitalization; Ativan also d/c'd per family request.No Agitation     Hx of Severe Pseudomembranous Colitis s/p SBR on 4/3  Concern for intra-abdominal abscess on admitting CT A/P  -Ruled out abscess with negative cultures  -General Surgery following     Hx Chronic Urinary Retention  -s/p silveira exchange on  per Urology  -Will need q3 week exchanges and f/u as outpatient     Klebsiella bacteremia: Undetermined source  -2 out of 2 blood cultures on 2020  -Antibiotics as above  -ID following with stop date of  for Levofloxacin (previously on Cefepime but concerned it was affecting patient's mentation so has since been d/c'd)  -Repeat BCx so far NGTD      Acute blood loss anemia: Reason for hospital presentation  -Last Hgb stable  -Given ileostomy had dark stool (likley Iron related) GI consulted but have since signed off  -I did note prior charts regarding lab draws and I spoke with family today and are ok with every other day lab draws which we will continue     Paresthesias  LE Weakness  Hx CVA  -CTH and MRI H completed on 4/28 with no acute pathology however does show remote lacunar infarcts  -Was taken off of ASA per specialists in past due to increase risk of bleeding given age, continue Eliquis  -Will continue to monitor     Hx Jak2+ Thrombocytosis  -Follows with Heme/Onc  -Was previously on ASA but after discussions with specialists in the past this was stopped as patient is currently on Eliquis     MARIELOS  -Cr 1.6; all cell lines in CBC increased with hyponatremia and hypochloremia c/w hypovolemia so will begin IVF today for 24 hours and will repeat BMP in AM     Paroxysmal A. Fib  -Continue PO rate control medications  -AC recently restarted and will monitor Hgb in this setting      Severe protein energy malnutrition: In context of acute illness  -Continue to encourage PO as able     Physical Deconditioning  -PT/OT  -OOB TID    Diet ADULT ORAL NUTRITION SUPPLEMENT; Breakfast, Lunch, Dinner; Standard High Calorie/High Protein Oral Supplement  ADULT DIET; Easy to Chew   DVT Prophylaxis [] Lovenox, []  Heparin, [] SCDs, [] Ambulation   GI Prophylaxis [] PPI,  [] H2 Blocker,  [] Carafate,  [] Diet/Tube Feeds   Code Status DNR-CC   Disposition Patient requires continued admission due to Bleeding   MDM [] Low, [] Moderate,[]  High  Patient's risk as above due to Abscess     History of Present Illness:     Chief Complaint: Postoperative intra-abdominal abscess  Patient seen and evaluated in the room. He is observed sitting on the chair. He appears more lethargic today. He is easily arousable and follows command. Objective:        Intake/Output Summary (Last 24 hours) at 5/3/2022 1433  Last data filed at 5/3/2022 1020  Gross per 24 hour   Intake --   Output 200 ml   Net -200 ml      Vitals: Vitals:    05/03/22 1312   BP: 123/87   Pulse: 81   Resp: 16   Temp:    SpO2:      Physical Exam:   GEN Awake male, sitting upright in bed in no apparent distress. Appears given age. EYES Pupils are equally round. No scleral erythema, discharge, or conjunctivitis. HENT Mucous membranes are moist. Oral pharynx without exudates, no evidence of thrush. NECK Supple, no apparent thyromegaly or masses. RESP Clear to auscultation, no wheezes, rales or rhonchi. Symmetric chest movement while on room air. CARDIO/VASC S1/S2 auscultated. Regular rate without appreciable murmurs, rubs, or gallops. No JVD or carotid bruits. Peripheral pulses equal bilaterally and palpable. No peripheral edema. GI Abdomen is soft without significant tenderness, masses, or guarding. Bowel sounds are normoactive. Rectal exam deferred.  No costovertebral angle tenderness. Normal appearing external genitalia. Weaver catheter is not present. HEME/LYMPH No palpable cervical lymphadenopathy and no hepatosplenomegaly. No petechiae or ecchymoses. MSK No gross joint deformities. SKIN Normal coloration, warm, dry. NEURO Cranial nerves appear grossly intact, normal speech, no lateralizing weakness. PSYCH Awake, alert, oriented x 4. Affect appropriate.     Medications:   Medications:    lactobacillus  1 capsule Oral Daily with breakfast    levoFLOXacin  250 mg Oral Daily    Or    levofloxacin  250 mg IntraVENous Daily    diclofenac sodium  2 g Topical BID    apixaban  2.5 mg Oral BID    digoxin  125 mcg Oral Daily    dilTIAZem  120 mg Oral Daily    ferrous sulfate  325 mg Oral BID WC    folic acid  1 mg Oral Daily    metoprolol tartrate  25 mg Oral BID    therapeutic multivitamin-minerals  1 tablet Oral Daily    pantoprazole  40 mg Oral Daily    sodium chloride flush  5-40 mL IntraVENous 2 times per day      Infusions:    sodium chloride 100 mL/hr at 05/01/22 0713     PRN Meds: sodium chloride flush, 10 mL, PRN  ondansetron, 4 mg, Q8H PRN   Or  ondansetron, 4 mg, Q6H PRN  acetaminophen, 650 mg, Q6H PRN          Patient is still admitted because Abscess. The anticipated discharge is in greater than 24 hours.      Electronically signed by Shona Osgood, DO on 5/3/2022 at 2:33 PM

## 2022-05-03 NOTE — PROGRESS NOTES
Comprehensive Nutrition Assessment    Type and Reason for Visit:  Reassess    Nutrition Recommendations/Plan:   1. If medically appropriate, please consider enteral nutrition via NGT before TPN. 2. Continue current diet and supplements. 3. Consider adding appetite stimulant  4. Continue MVI    5. Please consult Dietitian for nutrition support recommendations as needed. Malnutrition Assessment:  Malnutrition Status:  Severe malnutrition (04/29/22 1228)    Context:  Acute Illness     Findings of the 6 clinical characteristics of malnutrition:  Energy Intake:  50% or less of estimated energy requirements for 5 or more days  Weight Loss:  Unable to assess     Body Fat Loss: Moderate body fat loss Orbital   Muscle Mass Loss: Moderate muscle mass loss Temples (temporalis),Clavicles (pectoralis & deltoids)  Fluid Accumulation:  Unable to assess     Strength:  Not Performed    Nutrition Assessment:    Pt continues with poor oral intake. Falling asleep while eating a meal today. Noted per MD plan to start TPN tomorrow if pts PO intake not improved. Nutrition Related Findings:      Wound Type: Surgical Incision       Current Nutrition Intake & Therapies:    Average Meal Intake: Refusing to eat,26-50%  Average Supplements Intake: None Ordered  ADULT ORAL NUTRITION SUPPLEMENT; Breakfast, Lunch, Dinner; Standard High Calorie/High Protein Oral Supplement  ADULT DIET; Easy to Chew    Anthropometric Measures:  Height: 5' 9.02\" (175.3 cm)  Ideal Body Weight (IBW): 160 lbs (73 kg)    Admission Body Weight: 165 lb 9.1 oz (75.1 kg) (first measured weight)  Current Body Weight: 162 lb 7.7 oz (73.7 kg), 101.5 % IBW.  Weight Source: Bed Scale  Current BMI (kg/m2): 24  Usual Body Weight: 187 lb 6.3 oz (85 kg) (10/4/21)  % Weight Change (Calculated): -13.3  Weight Adjustment For: No Adjustment                 BMI Categories: Normal Weight (BMI 22.0 to 24.9) age over 72    Estimated Daily Nutrient Needs:  Energy Requirements Based On: Kcal/kg  Weight Used for Energy Requirements: Current  Energy (kcal/day): 3472-1927 (22-25 kcal/kg)  Weight Used for Protein Requirements: Current  Protein (g/day): 80-88 (1.0-1.1 g/kg)       Nutrition Diagnosis:   · Inadequate oral intake related to acute injury/trauma as evidenced by intake 0-25%      Nutrition Interventions:   Food and/or Nutrient Delivery: Continue Current Diet,Continue Oral Nutrition Supplement,Start Tube Feeding  Nutrition Education/Counseling: No recommendation at this time  Coordination of Nutrition Care: Continue to monitor while inpatient       Goals:  Previous Goal Met: Progress towards Goal(s) Declining  Goals: PO intake 50% or greater,within 2 days       Nutrition Monitoring and Evaluation:   Behavioral-Environmental Outcomes: None Identified  Food/Nutrient Intake Outcomes: Food and Nutrient Intake,Supplement Intake  Physical Signs/Symptoms Outcomes: Biochemical Data,GI Status,Hemodynamic Status,Fluid Status or Edema,Weight,Skin    Discharge Planning:     Too soon to determine     Emmy Rivera RD, LD  Contact: [phone]

## 2022-05-03 NOTE — CARE COORDINATION
Spoke with patient and  patient's daughter Palmajulysharon Hinton in room  regarding discharge plan. Patient up in bedside chair . Mohsen Hinton stated that they are now interested in ARU since PT/OT did a re eval and are recommending it  . Mohsen Hinton asked patient and he stated ARU as well. Referral called to Mayte in ARU and had to leave a VM .

## 2022-05-03 NOTE — CONSULTS
Consult completed. Nexiva 20g 1.00 inch catheter inserted via ultrasound in patient's RFA. Brisk blood return noted and catheter flushes with ease. Patient tolerated well. Consult IV/PICC team if patient's needs change.

## 2022-05-03 NOTE — PROGRESS NOTES
Infectious Disease Progress Note  5/3/2022   Patient Name: Obi Payton : 1929   Impression  · K.oxytoca Bacteremia Likely Secondary to Intra-Abdominal Abscess s/p 4/3/22 Subtotal Colon Resection with End Ileostomy:     § Afebrile, leukocytosis trended up, inflammatory markers low, and patient is pleasantly confused today, has had 650 cc green liquid stool from ileostomy, which has increased the last 2 days. Evaluate for increase in intra-abdominal abscess or infectious process, ordered CT A&P W oral contrast.     § -Blood cultures / Klebsiella oxytoca  § -BC 0-NGTD  § -CT A&P W IV Contrast: Large rim enhancing fluid collection is seen within the abdomen and pelvis which extends from the presacral region to the level of the pancreas, concerning for an abscess. Full report below. § -per IR, Dr. China Aranda, Intra-abdominal Abscess drain placement, 30 cc of viera fluid removed. Body Fluid culture Wendy Rosas, general surgery, onboard, removed ERNIE drain      ? MARIELOS on CKD3     ? Macrocytic Anemia:     ? PAF     ? AAA     ? BPH     ? CAD     ? History of Rectal Cancer s/p Colostomy 2019     ? Psoriatic Arthritis     ? S/p CVA 2019     ? Allergy to sulfa     · Multi-morbidity: per PMHx: AAA, BPH, CAD, rectal cancer 2019, GERD, HTN, psoriatic arthritis, pyelonephritis, CVA 2019     Plan:  · Continue po levofloxacin 250 mg q24h  (end date 22), QTc is not prolonged at 471  · Continue lactobacillus 1 po x 30 days (end date 22)  ? Trend CRP and Pct, trending down, repeat CRP qod  ? Ordered CT A&P with oral contrast to evaluate for increase or new infectious intra-abdominal process. Ongoing Antimicrobial Therapy  Levofloxacin -   ?  Completed Antimicrobial Therapy  Cefepime ? Metronidazole   Zosyn ,   Vancomycin   Eraxis -  Meropenem -?   Po vancomycin 4/10-12  Zyvox -  Dificid -  Linezolid   Flagyl   Cefepime Venous Right:  Impression   No evidence of DVT in the right lower extremity.      4/23/22 XR Chest Portable:  Impression   Small bilateral pleural effusions.       Moderate left basilar atelectasis and mild right basilar atelectasis. 4/24/22 XR Chest Portable:  Impression   Stable trace left pleural effusion with left basilar airspace disease or   atelectasis.         4/28/22 CT Head WO Contrast:  Impression   No noncontrast CT evidence of acute intracranial pathology or significant   interval change compared to 03/20/2022.       Cortical atrophy, white matter changes consistent with microvascular   degenerative disease, atherosclerotic calcification in the terminal internal   carotid arteries of uncertain hemodynamic significance incidentally noted. .     4/28/22 MRI Brain WO Contrast:  mpression   1. No acute infarct or other acute intracranial process.    2. Senescent changes including moderate generalized parenchymal volume loss   and chronic small vessel ischemia.  Remote bilateral lacunar infarcts   throughout the basal ganglia.                Labs:    Recent Results (from the past 24 hour(s))   CBC with Auto Differential    Collection Time: 05/03/22  6:56 AM   Result Value Ref Range    WBC 16.1 (H) 4.0 - 10.5 K/CU MM    RBC 3.86 (L) 4.6 - 6.2 M/CU MM    Hemoglobin 12.0 (L) 13.5 - 18.0 GM/DL    Hematocrit 39.0 (L) 42 - 52 %    .0 (H) 78 - 100 FL    MCH 31.1 (H) 27 - 31 PG    MCHC 30.8 (L) 32.0 - 36.0 %    RDW 19.3 (H) 11.7 - 14.9 %    Platelets 257 (H) 431 - 440 K/CU MM    MPV 9.3 7.5 - 11.1 FL    Differential Type AUTOMATED DIFFERENTIAL     Segs Relative 84.0 (H) 36 - 66 %    Lymphocytes % 5.8 (L) 24 - 44 %    Monocytes % 7.6 (H) 0 - 4 %    Eosinophils % 1.4 0 - 3 %    Basophils % 0.4 0 - 1 %    Segs Absolute 13.5 K/CU MM    Lymphocytes Absolute 0.9 K/CU MM    Monocytes Absolute 1.2 K/CU MM    Eosinophils Absolute 0.2 K/CU MM    Basophils Absolute 0.1 K/CU MM    Nucleated RBC % 0.0 %    Total Nucleated RBC 0.0 K/CU MM    Total Immature Neutrophil 0.13 K/CU MM    Immature Neutrophil % 0.8 (H) 0 - 0.43 %     CULTURE results: Invalid input(s): BLOOD CULTURE,  URINE CULTURE, SURGICAL CULTURE    Diagnosis:  Patient Active Problem List   Diagnosis    Hypertension    Benign prostatic hyperplasia    [facility]    Primary malignant neoplasm of rectum (HCC)    Psoriasis    Chronic myeloproliferative disease ([facility] [address]    Monocytosis (symptomatic)    Gastroesophageal reflux disease without esophagitis    Urinary retention    H/O: CVA (cerebrovascular accident)    Irregular heart beat    Nonrheumatic aortic valve stenosis    Paroxysmal atrial fibrillation (HCC)    GI bleed    Melena    Arteriovenous malformation of jejunum    History of rectal cancer    Benign neoplasm of cecum    Benign neoplasm of ascending colon    Pseudomonas urinary tract infection    Partial small bowel obstruction (HCC)    [facility]    Atrial fibrillation with RVR (HCC)    Hypotension    Ischemic colitis ([facility] [address]    Hypocalcemia    MARIELOS (acute kidney injury) ([facility] [address] [facility]    Probable Bacterial Pneumonia    CAD (coronary artery disease)    Anemia    Delirium    Severe protein-calorie malnutrition (HCC)    On total parenteral nutrition    Colitis    Postoperative intra-abdominal abscess    Gram-negative bacteremia       Active Problems  Principal Problem:    Postoperative intra-abdominal abscess  Active Problems:    Hypertension    H/O: CVA (cerebrovascular accident)    Gram-negative bacteremia  Resolved Problems:    * No resolved hospital problems. *    Electronically signed by: Electronically signed by CHEMA Hinton CNP on 5/3/2022 at 9:00 AM

## 2022-05-04 NOTE — PROGRESS NOTES
Patient sitting up in bed, nurses at bedside. Patient about to have a bed bath. He states he's weak but doing better, he states he  twice last week, I did explain to him that he did not. I told him his heart never stopped. He states he can't wait til he gets out of here. He did eat some breakfast this am and is drinking fluids better per his nurse. PE:  Vitals:    22 0745 22 0826 22 0827 22 1000   BP: 121/79      Pulse: 105  86 78   Resp: 17 (!) 31     Temp: 97.8 °F (36.6 °C)      TempSrc: Oral      SpO2: 98% 99%     Weight:       Height:         Abd; soft, well healed midline incision, non-distended, stoma pink, flatus in appliance and liquid stool    CT abd/pelvis5/3/2022  Impression   1. Slight interval decrease in volume of a fluid collection extending from   the presacral soft tissues into the left mid abdominal cavity, which is   suboptimally evaluated due to lack of intravenous contrast.   2. Stable fluid collection along the inferior margin of the mid to distal   pancreas. 3. Gas in the urinary bladder lumen is favored to be related to Weaver   catheter placement, although enterovesical fistula cannot be completely   excluded. 4. Trace residual left pleural effusion. 5. Stable 5.6 cm abdominal aortic aneurysm.       RECOMMENDATIONS:   5.6 cm infrarenal abdominal aortic aneurysm. Recommend referral to a vascular   specialist.     A/P;  -Patient continues to improve. Encourage PO. Continue with PT/OT. CT scan shows decrease in abdominal ascites. Continue current treatment plan.

## 2022-05-04 NOTE — PLAN OF CARE
Problem: Discharge Planning  Goal: Discharge to home or other facility with appropriate resources  5/4/2022 5913 by Orville Hadley RN  Outcome: Progressing  5/3/2022 2303 by Harvinder Avitia RN  Outcome: Progressing     Problem: Pain  Goal: Verbalizes/displays adequate comfort level or baseline comfort level  5/4/2022 1943 by Orville Hadley RN  Outcome: Progressing  5/3/2022 2303 by Harvinder Avitia RN  Outcome: Progressing     Problem: ABCDS Injury Assessment  Goal: Absence of physical injury  5/4/2022 5999 by Orville Hadley RN  Outcome: Progressing  5/3/2022 2303 by Harvinder Avitia RN  Outcome: Progressing     Problem: Skin/Tissue Integrity  Goal: Absence of new skin breakdown  Description: 1. Monitor for areas of redness and/or skin breakdown  2. Assess vascular access sites hourly  3. Every 4-6 hours minimum:  Change oxygen saturation probe site  4. Every 4-6 hours:  If on nasal continuous positive airway pressure, respiratory therapy assess nares and determine need for appliance change or resting period.   5/4/2022 2523 by Orville Hadley RN  Outcome: Progressing  5/3/2022 2303 by Harvinder Avitia RN  Outcome: Progressing     Problem: Chronic Conditions and Co-morbidities  Goal: Patient's chronic conditions and co-morbidity symptoms are monitored and maintained or improved  5/4/2022 5797 by Orville Hadley RN  Outcome: Progressing  5/3/2022 2303 by Harvinder Avitia RN  Outcome: Progressing     Problem: Safety - Adult  Goal: Free from fall injury  5/4/2022 4999 by Orville Hadley RN  Outcome: Progressing  5/3/2022 2303 by Harvinder Avitia RN  Outcome: Progressing     Problem: Musculoskeletal - Adult  Goal: Return mobility to safest level of function  5/4/2022 3872 by Orville Hadley RN  Outcome: Progressing  5/3/2022 2303 by Harvinder Avitia RN  Outcome: Progressing  Goal: Maintain proper alignment of affected body part  5/4/2022 3281 by Orville Hadley RN  Outcome: Progressing  5/3/2022 2303 by Avery Snow RN  Outcome: Progressing  Goal: Return ADL status to a safe level of function  5/4/2022 2129 by Umair Francis RN  Outcome: Progressing  5/3/2022 2303 by Avery Snow RN  Outcome: Progressing     Problem: Gastrointestinal - Adult  Goal: Minimal or absence of nausea and vomiting  5/4/2022 6763 by Umair Francis RN  Outcome: Progressing  5/3/2022 2303 by Avery Snow RN  Outcome: Progressing  Goal: Maintains or returns to baseline bowel function  5/4/2022 5231 by Umair Francis RN  Outcome: Progressing  5/3/2022 2303 by Avery Snow RN  Outcome: Progressing  Goal: Maintains adequate nutritional intake  5/4/2022 5218 by Umair Francis RN  Outcome: Progressing  5/3/2022 2303 by Avery Snow RN  Outcome: Progressing  Goal: Establish and maintain optimal ostomy function  5/4/2022 8329 by Umair Francis RN  Outcome: Progressing  5/3/2022 2303 by Avery Snow RN  Outcome: Progressing     Problem: Genitourinary - Adult  Goal: Absence of urinary retention  5/4/2022 0272 by Umair Francis RN  Outcome: Progressing  5/3/2022 2303 by Avery Snow RN  Outcome: Progressing  Goal: Urinary catheter remains patent  5/4/2022 2935 by Umair Francis RN  Outcome: Progressing  5/3/2022 2303 by Avery Snow RN  Outcome: Progressing     Problem: Infection - Adult  Goal: Absence of infection at discharge  5/4/2022 7222 by Umair Francis RN  Outcome: Progressing  5/3/2022 2303 by Avery Snow RN  Outcome: Progressing  Goal: Absence of infection during hospitalization  5/4/2022 6486 by Umair Francis RN  Outcome: Progressing  5/3/2022 2303 by Avery Snow RN  Outcome: Progressing  Goal: Absence of fever/infection during anticipated neutropenic period  5/4/2022 5493 by Umair Francis RN  Outcome: Progressing  5/3/2022 2303 by Avery Snow RN  Outcome: Progressing     Problem: Confusion  Goal: Confusion, delirium, dementia, or psychosis is improved or at baseline  Description: INTERVENTIONS:  1. Assess for possible contributors to thought disturbance, including medications, impaired vision or hearing, underlying metabolic abnormalities, dehydration, psychiatric diagnoses, and notify attending LIP  2. [place] high risk fall precautions, as indicated  3. Provide frequent short contacts to provide reality reorientation, refocusing and direction  4. Decrease environmental stimuli, including noise as appropriate  5. Monitor and intervene to maintain adequate nutrition, hydration, elimination, sleep and activity  6. If unable to ensure safety without constant attention obtain sitter and review sitter guidelines with assigned personnel  7.  Initiate Psychosocial CNS and Spiritual Care consult, as indicated  5/4/2022 1263 by Maty Moraes RN  Outcome: Progressing  5/3/2022 2303 by Liat Kaufman RN  Outcome: Progressing

## 2022-05-04 NOTE — PROGRESS NOTES
Comprehensive Nutrition Assessment    Type and Reason for Visit:  Reassess    Nutrition Recommendations/Plan:   1. Recommend initiating TF to maintain gut integrity   2. Will closely monitor for ability to initiate nutrition support  3. Document all po intake  4. Will continue to closely monitor nutrition status, poc     Malnutrition Assessment:  Malnutrition Status:  Severe malnutrition (04/29/22 1228)    Context:  Acute Illness       Nutrition Assessment:    Pt remains on easy to chew diet with standard oral nutrition supplements ordered, continued inadequate oral intake (0-25%) documented recently, malnourished, perfectserved attending [address] [place] [address] regarding nutrition plan, per physician, 'We discussed with family and TPN is what they wanted but granddaughter didnt want us to start just yet,' will continue to follow at high nutrition risk    Nutrition Related Findings:      Wound Type: Surgical Incision,Multiple       Current Nutrition Intake & Therapies:    Average Meal Intake: 1-25%,0%  Average Supplements Intake: Unable to assess  ADULT ORAL NUTRITION SUPPLEMENT; Breakfast, Lunch, Dinner; Standard High Calorie/High Protein Oral Supplement  ADULT DIET; Easy to Chew    Anthropometric Measures:  Height: 5' 9.02\" (175.3 cm)  Ideal Body Weight (IBW): 160 lbs (73 kg)    Admission Body Weight: 165 lb 9.1 oz (75.1 kg) (first measured weight)  Current Body Weight: 162 lb 7.7 oz (73.7 kg) (5/2), 101.5 % IBW.  Weight Source: Bed Scale  Current BMI (kg/m2): 24  Usual Body Weight: 187 lb 6.3 oz (85 kg) (10/4/21)  % Weight Change (Calculated): -13.3  Weight Adjustment For: No Adjustment  BMI Categories: Normal Weight (BMI 22.0 to 24.9) age over 72    Estimated Daily Nutrient Needs:  Energy Requirements Based On: Kcal/kg  Weight Used for Energy Requirements: Current  Energy (kcal/day): 1499-6680 (22-25 kcal/kg)  Weight Used for Protein Requirements: Current  Protein (g/day): 80-88 (1.0-1.1 g/kg)    Nutrition Diagnosis: · Inadequate oral intake related to acute injury/trauma as evidenced by intake 0-25%    Nutrition Interventions:   Food and/or Nutrient Delivery: Start Tube Feeding  Nutrition Education/Counseling: No recommendation at this time  Coordination of Nutrition Care: Continue to monitor while inpatient     Goals:  Previous Goal Met: No Progress toward Goal(s)  Goals: Initiate nutrition support,within 2 days     Nutrition Monitoring and Evaluation:   Behavioral-Environmental Outcomes: None Identified  Food/Nutrient Intake Outcomes: Enteral Nutrition Intake/Tolerance,Food and Nutrient Intake  Physical Signs/Symptoms Outcomes: Biochemical Data,GI Status,Hemodynamic Status,Fluid Status or Edema,Skin,Weight    Discharge Planning:     Too soon to determine     Dianelys Kovacs [address], [address] [place] [address],   Contact: [phone]

## 2022-05-04 NOTE — PLAN OF CARE
Problem: Discharge Planning  Goal: Discharge to home or other facility with appropriate resources  5/3/2022 2303 by Logan Chris RN  Outcome: Progressing  5/3/2022 1254 by Jo Ann RN  Outcome: Progressing     Problem: Pain  Goal: Verbalizes/displays adequate comfort level or baseline comfort level  Outcome: Progressing     Problem: ABCDS Injury Assessment  Goal: Absence of physical injury  5/3/2022 2303 by Logan Chris RN  Outcome: Progressing  5/3/2022 1254 by Jo Ann RN  Outcome: Progressing     Problem: Skin/Tissue Integrity  Goal: Absence of new skin breakdown  Description: 1. Monitor for areas of redness and/or skin breakdown  2. Assess vascular access sites hourly  3. Every 4-6 hours minimum:  Change oxygen saturation probe site  4. Every 4-6 hours:  If on nasal continuous positive airway pressure, respiratory therapy assess nares and determine need for appliance change or resting period.   Outcome: Progressing     Problem: Chronic Conditions and Co-morbidities  Goal: Patient's chronic conditions and co-morbidity symptoms are monitored and maintained or improved  Outcome: Progressing     Problem: Safety - Adult  Goal: Free from fall injury  Outcome: Progressing     Problem: Musculoskeletal - Adult  Goal: Return mobility to safest level of function  Outcome: Progressing  Goal: Maintain proper alignment of affected body part  Outcome: Progressing  Goal: Return ADL status to a safe level of function  Outcome: Progressing     Problem: Gastrointestinal - Adult  Goal: Minimal or absence of nausea and vomiting  Outcome: Progressing  Goal: Maintains or returns to baseline bowel function  Outcome: Progressing  Goal: Maintains adequate nutritional intake  Outcome: Progressing  Goal: Establish and maintain optimal ostomy function  Outcome: Progressing     Problem: Genitourinary - Adult  Goal: Absence of urinary retention  Outcome: Progressing  Goal: Urinary catheter remains patent  Outcome: Progressing     Problem: Infection - Adult  Goal: Absence of infection at discharge  Outcome: Progressing  Goal: Absence of infection during hospitalization  Outcome: Progressing  Goal: Absence of fever/infection during anticipated neutropenic period  Outcome: Progressing     Problem: Confusion  Goal: Confusion, delirium, dementia, or psychosis is improved or at baseline  Description: INTERVENTIONS:  1. Assess for possible contributors to thought disturbance, including medications, impaired vision or hearing, underlying metabolic abnormalities, dehydration, psychiatric diagnoses, and notify attending LIP  2. [place] high risk fall precautions, as indicated  3. Provide frequent short contacts to provide reality reorientation, refocusing and direction  4. Decrease environmental stimuli, including noise as appropriate  5. Monitor and intervene to maintain adequate nutrition, hydration, elimination, sleep and activity  6. If unable to ensure safety without constant attention obtain sitter and review sitter guidelines with assigned personnel  7.  Initiate Psychosocial CNS and Spiritual Care consult, as indicated  Outcome: Progressing

## 2022-05-04 NOTE — CARE COORDINATION
Received referral for ARU. Will review patients clinicals and PT/OT notes. Thank you for the referral.     0900: After reviewing updated therapy notes. Per therapy recommendations, \"Recommend ARU as long as mentation improves during hospital admission to allow full participation\". ARU will continue to follow to assess patients mentation status. Will need updated therapy notes closer to patient being medically ready for discharge. Discussed above with Nataly CARROLL

## 2022-05-04 NOTE — CARE COORDINATION
Spoke with Mayte from [facility]. She stated that she will follow patient for improved mentation status and will need updated therapy notes when patient is closer to being medically ready for discharge.  Case Management to follow

## 2022-05-04 NOTE — PROGRESS NOTES
Pt more alert this evening. Holding full conversations. Pt eating dinner at this time and appears to have a much better appetite. Encouraging fluids.

## 2022-05-04 NOTE — PROGRESS NOTES
Infectious Disease Progress Note  2022   Patient Name: Villa Benedict : 1929   Impression  · K.oxytoca Bacteremia Likely Secondary to Intra-Abdominal Abscess s/p 4/3/22 Subtotal Colon Resection with End Ileostomy:     § Afebrile, leukocytosis trended up, inflammatory markers low. Labs to repeat in am (CBC with dif, CRP)     § -Blood cultures 2/ Klebsiella oxytoca  § -BC 0-NGTD  § -CT A&P W IV Contrast: Large rim enhancing fluid collection is seen within the abdomen and pelvis which extends from the presacral region to the level of the pancreas, concerning for an abscess. Full report below. § -per IR, Dr. Mima Epps, Intra-abdominal Abscess drain placement, 30 cc of viera fluid removed. Body Fluid culture Hayley Emerson, general surgery, onboard, removed ERNIE drain      ? MARIELOS on CKD3    ? Infrarenal AAA:    ? Acute Encephalopathy:     ? Macrocytic Anemia:     ? PAF     ? AAA     ? BPH     ? CAD     ? History of Rectal Cancer s/p Colostomy 2019     ? Psoriatic Arthritis     ? S/p CVA 2019     ? Allergy to sulfa     · Multi-morbidity: per PMHx: AAA, BPH, CAD, rectal cancer 2019, GERD, HTN, psoriatic arthritis, pyelonephritis, CVA 2019     Plan:  · Continue po levofloxacin 250 mg q24h  (end date 22), QTc is not prolonged at 471  · Continue lactobacillus 1 po x 30 days (end date 22)  ? Trend CRP and Pct, trending down, repeat CRP qod  ? DW patient's daughter, at bedside, the plan of care  ? Reviewed CT A&P with oral contrast, no acute new infectious process   ? Recommend CTS consult to evaluate 5.6 cm infrarenal AAA from CT on 5/3/22    Ongoing Antimicrobial Therapy  Levofloxacin -   ?  Completed Antimicrobial Therapy  Cefepime ? Metronidazole -  Zosyn -,   Vancomycin -  Eraxis -  Meropenem -? Po vancomycin 4/10-  Zyvox -  Dificid 4/  Linezolid   Flagyl   Cefepime   ? History:? Interval history noted. Chief complaint: K.oxytoca bacteremia secondary to Postop intra-abdominal abscess. Denies n/v/d/f or untoward effects of antibiotics. Review of Systems   Unable to perform ROS: Mental status change       Physical Exam:  Vital Signs: /87   Pulse 69   Temp 97.5 °F (36.4 °C) (Oral)   Resp 19   Ht 5' 9.02\" (1.753 m)   Wt 162 lb 7.7 oz (73.7 kg)   SpO2 100%   BMI 23.98 kg/m²     Gen: Somnolent up in the chair, no distress, awakens to name   Wounds: C/D/I mid abdominal incision well approximated, no erythema or edema at site, no drainage at site. Excoriation surrounding testicles/perineum has resolved. Ileostomy: intact RUQ draining green liquid stool  Chest: no distress and CTA. Good air movement. Heart: RRR and no MRG. Abd: soft, non-distended, no tenderness, no hepatomegaly. Normoactive bowel sounds. Ext: no clubbing, cyanosis, or edema  Catheter Site: without erythema or tenderness draining clear yellow urine. Neuro: CN 2-12 intact and no focal sensory or motor deficits        Radiologic / Imaging / TESTING  4/22/22 CT Abdomen Pelvis W IV Contrast:  Impression   1. Large rim enhancing fluid collection is seen within the abdomen and pelvis   which extends from the presacral region to the level of the pancreas,   concerning for an abscess. 2. Small amount of free fluid is seen within the abdomen. 3. Infrarenal abdominal aortic aneurysm measuring 5.6 cm.  Please see   recommendations below. 4. Small bilateral pleural effusions with bibasilar atelectasis. 5. Focally dilated small bowel loops with air-fluid levels are seen within   the left upper quadrant which could be related to ileus.       RECOMMENDATIONS:   5.6 cm infrarenal abdominal aortic aneurysm.  Recommend referral to a vascular   specialist.       Reference: Arn Sol Abad Radiol 2013;10:789-794.      4/22/22 VL Dup Lower Extremity Venous Right:  Impression   No evidence of DVT in the right lower extremity.      4/23/22 XR Chest Portable:  Impression   Small bilateral pleural effusions.       Moderate left basilar atelectasis and mild right basilar atelectasis. 4/24/22 XR Chest Portable:  Impression   Stable trace left pleural effusion with left basilar airspace disease or   atelectasis.         4/28/22 CT Head WO Contrast:  Impression   No noncontrast CT evidence of acute intracranial pathology or significant   interval change compared to 03/20/2022.       Cortical atrophy, white matter changes consistent with microvascular   degenerative disease, atherosclerotic calcification in the terminal internal   carotid arteries of uncertain hemodynamic significance incidentally noted. .     4/28/22 MRI Brain WO Contrast:  mpression   1. No acute infarct or other acute intracranial process. 2. Senescent changes including moderate generalized parenchymal volume loss   and chronic small vessel ischemia.  Remote bilateral lacunar infarcts   throughout the basal ganglia.         5/4/22 CT Abdomen Pelvis WO Contrast:  Impression   1. Slight interval decrease in volume of a fluid collection extending from   the presacral soft tissues into the left mid abdominal cavity, which is   suboptimally evaluated due to lack of intravenous contrast.   2. Stable fluid collection along the inferior margin of the mid to distal   pancreas. 3. Gas in the urinary bladder lumen is favored to be related to Weaver   catheter placement, although enterovesical fistula cannot be completely   excluded. 4. Trace residual left pleural effusion. 5. Stable 5.6 cm abdominal aortic aneurysm.       RECOMMENDATIONS:   5.6 cm infrarenal abdominal aortic aneurysm. Recommend referral to a vascular   specialist.   Reference: J Am Abad Radiol 0002;34:[id number].             Labs:    Recent Results (from the past 24 hour(s))   C-Reactive Protein    Collection Time: 05/04/22 11:33 AM   Result Value Ref Range    CRP, High Sensitivity 27.0 mg/L     CULTURE results: Invalid input(s): BLOOD CULTURE,  URINE CULTURE, SURGICAL CULTURE    Diagnosis:  Patient Active Problem List   Diagnosis    Hypertension    Benign prostatic hyperplasia    [facility]atic arthritis [facility]    Primary malignant neoplasm of rectum (HCC)    Psoriasis    Chronic myeloproliferative disease ([address]    Monocytosis (symptomatic)    Gastroesophageal reflux disease without esophagitis    Urinary retention    H/O: CVA (cerebrovascular accident)    Irregular heart beat    Nonrheumatic aortic valve stenosis    Paroxysmal atrial fibrillation (HCC)    GI bleed    Melena    Arteriovenous malformation of jejunum    History of rectal cancer    Benign neoplasm of cecum    Benign neoplasm of ascending colon    Pseudomonas urinary tract infection    Partial small bowel obstruction (HCC)    SBO (small bowel obstruction) (HCC)    Atrial fibrillation with RVR (HCC)    Hypotension    Ischemic colitis ([address]    Hypocalcemia    MARIELOS (acute kidney injury) ([facility]    Probable Bacterial Pneumonia    CAD (coronary artery disease)    Anemia    Delirium    Severe protein-calorie malnutrition (HCC)    On total parenteral nutrition    Colitis    Postoperative intra-abdominal abscess    Gram-negative bacteremia       Active Problems  Principal Problem:    Postoperative intra-abdominal abscess  Active Problems:    Hypertension    H/O: CVA (cerebrovascular accident)    Gram-negative bacteremia  Resolved Problems:    * No resolved hospital problems. *    Electronically signed by: Electronically signed by CHEMA Shay CNP on 5/4/2022 at 3:54 PM

## 2022-05-04 NOTE — PROGRESS NOTES
Low output noted approximately 25 mL /hour. Dr. Florentino Ronquillo and hospitalist notified. Dr. Isra Musa states to continue to encourage PO fluids, BMP in the AM if MARIELOS is worse then nephrology will be involved. Physician will revisit discussion about TPN tomorrow.

## 2022-05-04 NOTE — PROGRESS NOTES
Hospitalist Progress Note      Name:  Tarah Cunha /Age/Sex: 1929  (80 y.o. male)   MRN & CSN:  [id number] & [id number] Admission Date/Time: 2022 12:38 AM   Location:  -A PCP: Delmy Newsome MD         Hospital Day: 13    Assessment and Plan:   Tarah Cunha is a 80 y.o.  male  who presents with Postoperative intra-abdominal abscess  David Chow is a 57-year-old male with PMH significant for chronic urinary retention with Weaver catheter in place, recent diagnosis of Klebsiella bacteremia, history of CVA with lower extremity weakness and paresthesias, history of Amilcar 2+ thrombocytosis paroxysmal A. fib and severe protein energy malnutrition physical deconditioning was initially admitted to ICU on 2022 for postoperative intra-abdominal abscess following a recent CVA colitis status post exploratory laparotomy and small bowel resection with colon resection and ileostomy placement 4/3/2022. Was downgraded from ICU. Acute toxic metabolic encephalopathy: Recurrent since 2022  -2/2 UTI and intra-abdominal infection//- treatment with opioids including fentanyl.  -No urine cultures were sent. Will continue with Levaquin  -Consider discontinuation of fluoroquinolone if mental status worsens. Klebsiella oxytoca bacteremia most likely 2/2 intra-abdominal abscess  History of CVA pseudomembranous colitis status post small bowel resection 4/3  -Patient is afebrile. Worsening leukocytosis. -2 out of 2 blood cultures 2022 growing Klebsiella. Blood culture so far NGTD  -CT abdomen 8/3 with slight decrease in volume of fluid collection in the abdominal cavity. Also notes 5.6 cm infrarenal infrarenal AAA  -General surgery following; recommendations reviewed and appreciated.     History of chronic urinary retention:   -Status post Weaver catheter placement  per urology  -Will need every 3 week exchanges and follow-up outpatient with urology    Acute kidney injury: Creatinine of 1.6.  Baseline at 1.1  -2/2 dehydration in setting of poor p.o.   -Continue IV fluids. Avoid nephrotoxic medications and hypotension. -BMP every other day. Acute blood loss anemia: On admission and reason for hospitalization  -No acute bleeding noted at this time. Hemoglobin stable in the 9 range.  -Was evaluated by GI due to melena. GI has since signed off.  -Reviewed K with blood work every other day.     History of CVA with residual paresthesias and lower extremity weakness:  -CT head and MRI brain on 4/28 with no acute findings. Showed remote lacunar infarcts. -Off aspirin due to increased risk of bleeding given age and while on Eliquis. Continue Eliquis  -Monitor for any bleeding.     Hx Jak2+ Thrombocytosis  -Follows with Heme/Onc. Was taken off aspirin while on Eliquis to decrease risk of bleeding.     Paroxysmal A. Fib  -Continue PO Cardiazem, Lopressor, Digoxin, Eliquis rate control medications  -Monitor Hgb while on Eliquis. 5.6 cm infrarenal AAA: CT abdomen 5/3/2022. Patient had size of 5.5 cm in March/2022.  -Dr. Mehnaz Salamanca consulted     Severe protein energy malnutrition: In context of acute illness  -Continue to encourage PO as able. Minimal improvement in PO intake   -Will start on Remeron. Continue Ensure and multivitamins  -Other options for nutrition were discussed with patient's daughterCary Mini on 5/3 grade with TPN today if p.o. intake did not improve. On reevaluation today patient's granddaughter endorsing mild improvement of p.o. intake and would like to hold off on TPN for now. -Nutrition following.   Threshold for TPN versus NG tube placement  -Patient's daughter can be contacted at [phone].     Physical Deconditioning  -PT/OT  -OOB TID      Disposition: Discharge> 2 days pending final ID and general surgery recommendations and improvement in p.o. intake    Diet ADULT ORAL NUTRITION SUPPLEMENT; Breakfast, Lunch, Dinner; Standard High Calorie/High Protein Oral Supplement  ADULT DIET; Easy to Chew   DVT Prophylaxis [] Lovenox, []  Heparin, [] SCDs, [] Ambulation   GI Prophylaxis [] PPI,  [] H2 Blocker,  [] Carafate,  [] Diet/Tube Feeds   Code Status DNR-CC   Disposition Patient requires continued admission due to Bleeding   MDM [] Low, [] Moderate,[]  High  Patient's risk as above due to Abscess     History of Present Illness:     Chief Complaint: Postoperative intra-abdominal abscess  Patient evaluated at bedside this morning. He is sitting up in chair and drinking Ensure with the help of his granddaughter who is present at bedside. He states he is doing so-so and denies any pain or concerns at this time. Endorses mild abdominal pain. Patient's daughter states she has noted mild improvement of p.o. intake like to monitor his p.o. intake some more before deciding on whether or not to pursue TPN or NG tube placement if possible. Objective: Intake/Output Summary (Last 24 hours) at 5/4/2022 1627  Last data filed at 5/4/2022 1020  Gross per 24 hour   Intake 140 ml   Output 375 ml   Net -235 ml      Vitals:   Vitals:    05/04/22 1200   BP: 118/87   Pulse: 69   Resp: 19   Temp: 97.5 °F (36.4 °C)   SpO2: 100%     Physical Exam:   GEN: Awake, alert and oriented to self and place. In NAD. Answers simple questions and follow simple commands. Slow mentation. HEENT: PERRLA, sclera is anicteric and atraumatic at failure moist mucous membranes with no oral lesions. RESP normal work of breathing. Mildly decreased air movement in bilateral lower lung lobes. No wheezes, rales or rhonchi appreciated. CARDIO/VASC:Normal M6/B3 with systolic murmur   GI: Colostomy bag noted in right lower quadrant draining greenish/darkish colored stool. Abdomen is mildly tender to palpation but no guarding. : No costovertebral angle tenderness. Weaver catheter in place and draining yellow-colored urine.     MSK moves extremities spontaneously with decreased strength in bilateral lower extremities. NEURO: Slow mentation. Slow speech but normal.  Follows simple commands and answers simple questions.      Medications:   Medications:    lactobacillus  1 capsule Oral Daily with breakfast    levoFLOXacin  250 mg Oral Daily    Or    levofloxacin  250 mg IntraVENous Daily    diclofenac sodium  2 g Topical BID    apixaban  2.5 mg Oral BID    digoxin  125 mcg Oral Daily    dilTIAZem  120 mg Oral Daily    ferrous sulfate  325 mg Oral BID WC    folic acid  1 mg Oral Daily    metoprolol tartrate  25 mg Oral BID    therapeutic multivitamin-minerals  1 tablet Oral Daily    pantoprazole  40 mg Oral Daily    sodium chloride flush  5-40 mL IntraVENous 2 times per day      Infusions:    dextrose 5 % and 0.45 % NaCl 125 mL/hr at 05/04/22 1535     PRN Meds: sodium chloride flush, 10 mL, PRN  ondansetron, 4 mg, Q8H PRN   Or  ondansetron, 4 mg, Q6H PRN  acetaminophen, 650 mg, Q6H PRN          Disposition: Discharge> 2 days pending final ID and general surgery recommendations and improvement in p.o. intake    Electronically signed by Azam Trejo MD on 5/4/2022 at 4:27 PM

## 2022-05-05 NOTE — CONSULTS
Patient:   Obi Payton    Date:  22  :  1929, 80 y.o. 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 Alfreda Miller is a 55-year-old male who was sent to the emergency room from nursing home on , with bleeding from ostomy bag and abdominal discomfort. The emergency department he was hemodynamically stable but underwent imaging study which include CT of the abdomen pelvis, which showed intra-abdominal abscess. He was subsequently admitted for further evaluation    Prior to that he stayed in the hospital between  to 2022, his hospital course was complicated by C. difficile colitis, necessitating intestinal resection, colectomy and end ileostomy, she was sent to the nursing home day prior to her ER presentation. He also had another admission in March presumably with Pseudomonas cystitis and treated with several antibiotic. This time his hospital course was complicated by Klebsiella oxytocin bacteremia, from abdominal abscess which thought to be postoperative complication, he underwent draining of abscess by interventional radiologist.  And treated appropriately with multiple antimicrobial agent. At the time of admission his creatinine was 1.1, unfortunately it increased to 3.2, hence the kidney consult was requested, and he is transferred to ARU was postponed     I was able to review his kidney data, his creatinine was 1.0 back in , it did fluctuate but remained in the vicinity of 1.1-1.3 range, he might have lost a lot of muscle mass. He did have an acute kidney injury with peak creatinine 1.8 back in 2022. PMH :   1. 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  Recent C. difficile colitis considering colectomy and ostomy  3. History of rectal cancer also had resection and ostomy which had been reversed in the past  4. Atrial fibrillation  5. Longstanding history of hypertension  6. BPH  7. Atherosclerotic cardiovascular disease-infrarenal aortic aneurysm        PSH :  1. Recent colectomy and ileostomy  2. Prior rectal and colon resection with ostomy which had been reversed  3. Appendectomy          Habits :   1. He smoked between age 15 to age 61, he quit smoking at age 61.  2. Used to drink a lot but also stopped age 60  2. No history of illicit drug abuse    Soc Hx:  1. He was born in [facility], but he moved to [place] [address] in 1960s, he was  twice but currently . He was living alone up until his admission in April 2022. He was relatively active given his age of 80. 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 Partly noncontributory        REVIEW OF SYSTEMS:     All pertinent ROS neg except   Ostomy bleeding which had been stopped  Abdominal pain also better  Poor oral intake  No overt shortness of breath  Decreased urine output    Current Facility-Administered Medications   Medication Dose Route Frequency Provider Last Rate Last Admin    mirtazapine (REMERON SOL-TAB) disintegrating tablet 15 mg  15 mg Oral Nightly Ruby Mendoza MD   15 mg at 05/04/22 2110    dextrose 5 % and 0.45 % sodium chloride infusion   IntraVENous Continuous Oldann Hobbs,  mL/hr at 05/05/22 1639 New Bag at 05/05/22 1639    lactobacillus (CULTURELLE) capsule 1 capsule  1 capsule Oral Daily with breakfast Davina Camara, APRN - CNP   1 capsule at 05/05/22 0849    diclofenac sodium (VOLTAREN) 1 % gel 2 g  2 g Topical BID Emerson Akins MD   2 g at 05/05/22 1120    apixaban (ELIQUIS) tablet 2.5 mg  2.5 mg Oral BID Emerson Akins MD   2.5 mg at 05/05/22 0850    digoxin (LANOXIN) tablet 125 mcg  125 mcg Oral Daily Margoth Méndez MD   125 mcg at 05/05/22 0849    dilTIAZem (CARDIZEM CD) extended release capsule 120 mg  120 mg Oral Daily Margoth Méndez MD   120 mg at 05/05/22 0849    ferrous sulfate (IRON 325) tablet 325 mg  325 mg Oral BID WC Margoth Méndez MD   325 mg at 29/85/93 9838    folic acid (FOLVITE) tablet 1 mg  1 mg Oral Daily Margoth Méndez MD   1 mg at 05/05/22 0849    metoprolol tartrate (LOPRESSOR) tablet 25 mg  25 mg Oral BID Margoth Méndez MD   25 mg at 05/05/22 0849    therapeutic multivitamin-minerals 1 tablet  1 tablet Oral Daily Margoth Méndez MD   1 tablet at 05/05/22 0849    pantoprazole (PROTONIX) tablet 40 mg  40 mg Oral Daily Margoth Méndez MD   40 mg at 05/05/22 7542    sodium chloride flush 0.9 % injection 5-40 mL  5-40 mL IntraVENous 2 times per day Margoth Méndez MD   10 mL at 05/05/22 0858    sodium chloride flush 0.9 % injection 10 mL  10 mL IntraVENous PRN Margoth Méndez MD   10 mL at 05/04/22 0821    ondansetron (ZOFRAN-ODT) disintegrating tablet 4 mg  4 mg Oral Q8H PRN Margoth Méndez MD        Or    ondansetron (ZOFRAN) injection 4 mg  4 mg IntraVENous Q6H PRN Margoth Méndez MD   4 mg at 04/29/22 0914    acetaminophen (TYLENOL) tablet 650 mg  650 mg Oral Q6H PRN Margoth Méndez MD   650 mg at 04/28/22 2106       /82   Pulse 90   Temp 97.8 °F (36.6 °C) (Oral)   Resp 18   Ht 5' 9.02\" (1.753 m)   Wt 165 lb 9.1 oz (75.1 kg)   SpO2 100%   BMI 24.44 kg/m²     PHYSICAL EXAM:  General appearance: Alert, awake and oriented  HEENT: Positive conjunctival pallor  Neck: Seems supple  Heart: Irregularly irregular with tachycardia  LUNGS: Very poor airflow  Abdomen: Soft, positive bowel sound, ostomy  Extremities:  At least 1+ leg edema    LABS:  CBC:   Lab Results   Component Value Date    WBC 10.1 05/05/2022    WBC 16.1 05/03/2022    WBC 13.8 05/01/2022    HGB 9.8 05/05/2022    HGB 12.0 05/03/2022    HGB 10.4 05/01/2022     05/05/2022     05/03/2022     05/01/2022     Renal Panel:   Lab Results   Component Value Date     05/05/2022     05/02/2022     05/01/2022    K 4.3 05/05/2022    K 4.6 05/02/2022    K 4.6 05/01/2022    CL 99 05/05/2022     05/02/2022    CL 96 05/01/2022    CO2 19 05/05/2022    CO2 19 05/02/2022    CO2 23 05/01/2022    BUN 39 05/05/2022    BUN 24 05/02/2022    BUN 23 05/01/2022    CREATININE 3.2 05/05/2022    CREATININE 1.6 05/02/2022    CREATININE 1.6 05/01/2022    GFRAA 22 05/05/2022    GFRAA 49 05/02/2022    GFRAA 49 05/01/2022    LABGLOM 18 05/05/2022    LABGLOM 41 05/02/2022    LABGLOM 41 05/01/2022    LABALBU 3.0 04/26/2022    LABALBU 3.0 04/25/2022    LABALBU 2.6 04/24/2022         Calcium:    Lab Results   Component Value Date    CALCIUM 8.4 05/05/2022     Phosphorus:    Lab Results   Component Value Date    PHOS 3.1 04/26/2022       U/A:    Lab Results   Component Value Date    PROTEINU 30 04/28/2022    NITRU POSITIVE 04/28/2022    COLORU YELLOW 04/28/2022    WBCUA 109 04/28/2022    RBCUA 15 04/28/2022    MUCUS RARE 04/28/2022    TRICHOMONAS NONE SEEN 04/28/2022    BACTERIA RARE 04/28/2022    CLARITYU SLIGHTLY CLOUDY 04/28/2022    SPECGRAV 1.025 04/28/2022    UROBILINOGEN 0.2 04/28/2022    BILIRUBINUR NEGATIVE 04/28/2022    BLOODU MODERATE 04/28/2022    KETUA TRACE 04/28/2022           IMPRESSION:  1. Acute kidney injury with underlying CKD stage II-III-nonoliguric but urine output dropping-he has prolonged hospitalization, back-to-back twice, what happened to his body, potential etiology, tubular injury, acute interstitial nephritis, renal vein congestion. Unlikely bladder obstruction-we will have to rule it out. He does have atrial fibrillation, it is a remote possibility of atheroemboli to kidney, we will check an LDH and complement level tomorrow morning  2.  Recent intra-abdominal abscess-with gram-negative sahra sepsis, preceded by C. difficile colitis, necessitating colectomy  3. Underlying A. fib and multiple other comorbid disease    PLAN:    1. Start with UA and urinary indicis  2. Stop IV fluid after current bag runs out  3. proBNP level tomorrow morning  4. Hold transfer to ARU  5. Hold TPN for now  6. Avoid any nonessential medication  7. We do the lab tomorrow-review of his course little bit in details-has not happened to him  8.  Follow clinically and biochemically

## 2022-05-05 NOTE — PROGRESS NOTES
Infectious Disease Progress Note  2022   Patient Name: Natan Turk : 1929   Impression  · K.oxytoca Bacteremia Likely Secondary to Intra-Abdominal Abscess s/p 4/3/22 Subtotal Colon Resection with End Ileostomy:     § Afebrile, normalized WBC. BMP obtained today, , showing increasing MARIELOS with Cr now 3.2 (last check was -1.6). Patient appears clincally to have improved on ABX therapy, will DC Levaquin now due to MARIELOS. Will observe patient in am off ABX therapy. § -Blood cultures  Klebsiella oxytoca  § -BC 0-NGTD  § -CT A&P W IV Contrast: Large rim enhancing fluid collection is seen within the abdomen and pelvis which extends from the presacral region to the level of the pancreas, concerning for an abscess. Full report below. § -per IR, Dr. Kenton Hay, Intra-abdominal Abscess drain placement, 30 cc of viera fluid removed. Body Fluid culture Dhiraj Obrien, general surgery, onboard, removed ERNIE drain      ? MARIELOS on CKD3    ? Infrarenal AAA:  ? Dr. Ann-Marie Funes, cardiology, onboard  ? Imp of 4.5 cm in 2018, now 5.6 cm ,will follow up     ? Acute Encephalopathy:     ? Macrocytic Anemia:     ? PAF     ? AAA     ? BPH     ? CAD     ? History of Rectal Cancer s/p Colostomy 2019     ? Psoriatic Arthritis     ? S/p CVA 2019     ? Allergy to sulfa     · Multi-morbidity: per PMHx: AAA, BPH, CAD, rectal cancer 2019, GERD, HTN, psoriatic arthritis, pyelonephritis, CVA 2019     Plan:  · DC po levofloxacin due to new MARIELOS with creatinine from -1.6 to -3.2. · Continue lactobacillus 1 po x 30 days (end date 22)  ? Trend CRP and Pct, trending down, repeat CRP qod  ? DW patient's daughter, at bedside, the plan of care  ? Appreciate Dr. Ann-Marie Funes onboard for AAA evaluation. Ongoing Antimicrobial Therapy  DCd    ?  Completed Antimicrobial Therapy  Cefepime ? Metronidazole -  Zosyn -8,   Vancomycin   Eraxis   Meropenem ?   Po vancomycin 4/10-12  Zyvox 4/12-14  Dificid 4/14-20  Linezolid 4/22-26  Flagyl 4/23-26  Cefepime 4/23-28  Levofloxacin 4/28-5/5  ? History:? Interval history noted. Chief complaint: K.oxytoca bacteremia secondary to Postop intra-abdominal abscess. Denies n/v/d/f or untoward effects of antibiotics. Review of Systems   Unable to perform ROS: Mental status change       Physical Exam:  Vital Signs: /73   Pulse 116   Temp 97.8 °F (36.6 °C) (Oral)   Resp 18   Ht 5' 9.02\" (1.753 m)   Wt 165 lb 9.1 oz (75.1 kg)   SpO2 97%   BMI 24.44 kg/m²     Gen: up in the chair, lethargic, remains pleasantly confused but able to reorient. Wounds: C/D/I mid abdominal incision well approximated, no erythema or edema at site, no drainage at site. Excoriation surrounding testicles/perineum has resolved. Ileostomy: intact RUQ draining green liquid stool  Chest: no distress and CTA. Good air movement. Heart: RRR and no MRG. Abd: soft, non-distended, no tenderness, no hepatomegaly. Normoactive bowel sounds. Ext: no clubbing, cyanosis, or edema  Catheter Site: without erythema or tenderness draining clear yellow urine. Neuro: CN 2-12 intact and no focal sensory or motor deficits        Radiologic / Imaging / TESTING  4/22/22 CT Abdomen Pelvis W IV Contrast:  Impression   1. Large rim enhancing fluid collection is seen within the abdomen and pelvis   which extends from the presacral region to the level of the pancreas,   concerning for an abscess. 2. Small amount of free fluid is seen within the abdomen. 3. Infrarenal abdominal aortic aneurysm measuring 5.6 cm.  Please see   recommendations below. 4. Small bilateral pleural effusions with bibasilar atelectasis. 5. Focally dilated small bowel loops with air-fluid levels are seen within   the left upper quadrant which could be related to ileus.       RECOMMENDATIONS:   5.6 cm infrarenal abdominal aortic aneurysm.  Recommend referral to a vascular   specialist.       Reference: J Am Abad Radiol 1221;31:519-130.      4/22/22 VL Dup Lower Extremity Venous Right:  Impression   No evidence of DVT in the right lower extremity.      4/23/22 XR Chest Portable:  Impression   Small bilateral pleural effusions.       Moderate left basilar atelectasis and mild right basilar atelectasis. 4/24/22 XR Chest Portable:  Impression   Stable trace left pleural effusion with left basilar airspace disease or   atelectasis.         4/28/22 CT Head WO Contrast:  Impression   No noncontrast CT evidence of acute intracranial pathology or significant   interval change compared to 03/20/2022.       Cortical atrophy, white matter changes consistent with microvascular   degenerative disease, atherosclerotic calcification in the terminal internal   carotid arteries of uncertain hemodynamic significance incidentally noted. .     4/28/22 MRI Brain WO Contrast:  mpression   1. No acute infarct or other acute intracranial process. 2. Senescent changes including moderate generalized parenchymal volume loss   and chronic small vessel ischemia.  Remote bilateral lacunar infarcts   throughout the basal ganglia.         5/4/22 CT Abdomen Pelvis WO Contrast:  Impression   1. Slight interval decrease in volume of a fluid collection extending from   the presacral soft tissues into the left mid abdominal cavity, which is   suboptimally evaluated due to lack of intravenous contrast.   2. Stable fluid collection along the inferior margin of the mid to distal   pancreas. 3. Gas in the urinary bladder lumen is favored to be related to Weaver   catheter placement, although enterovesical fistula cannot be completely   excluded. 4. Trace residual left pleural effusion. 5. Stable 5.6 cm abdominal aortic aneurysm.       RECOMMENDATIONS:   5.6 cm infrarenal abdominal aortic aneurysm. Recommend referral to a vascular   specialist.   Reference: J Am Abad Radiol [id number];16:342-620.             Labs:    Recent Results (from the past 24 hour(s))   C-Reactive Protein    Collection Time: 05/04/22 11:33 AM   Result Value Ref Range    CRP, High Sensitivity 27.0 mg/L   CBC with Auto Differential    Collection Time: 05/05/22  3:55 AM   Result Value Ref Range    WBC 10.1 4.0 - 10.5 K/CU MM    RBC 3.18 (L) 4.6 - 6.2 M/CU MM    Hemoglobin 9.8 (L) 13.5 - 18.0 GM/DL    Hematocrit 34.6 (L) 42 - 52 %    .8 (H) 78 - 100 FL    MCH 30.8 27 - 31 PG    MCHC 28.3 (L) 32.0 - 36.0 %    RDW 19.1 (H) 11.7 - 14.9 %    Platelets 220 697 - 636 K/CU MM    MPV 9.5 7.5 - 11.1 FL    Differential Type AUTOMATED DIFFERENTIAL     Segs Relative 76.0 (H) 36 - 66 %    Lymphocytes % 7.9 (L) 24 - 44 %    Monocytes % 11.4 (H) 0 - 4 %    Eosinophils % 3.6 (H) 0 - 3 %    Basophils % 0.3 0 - 1 %    Segs Absolute 7.7 K/CU MM    Lymphocytes Absolute 0.8 K/CU MM    Monocytes Absolute 1.2 K/CU MM    Eosinophils Absolute 0.4 K/CU MM    Basophils Absolute 0.0 K/CU MM    Nucleated RBC % 0.0 %    Total Nucleated RBC 0.0 K/CU MM    Total Immature Neutrophil 0.08 K/CU MM    Immature Neutrophil % 0.8 (H) 0 - 0.43 %   Basic Metabolic Panel    Collection Time: 05/05/22  3:55 AM   Result Value Ref Range    Sodium 130 (L) 135 - 145 MMOL/L    Potassium 4.3 3.5 - 5.1 MMOL/L    Chloride 99 99 - 110 mMol/L    CO2 19 (L) 21 - 32 MMOL/L    Anion Gap 12 4 - 16    BUN 39 (H) 6 - 23 MG/DL    CREATININE 3.2 (H) 0.9 - 1.3 MG/DL    Glucose 106 (H) 70 - 99 MG/DL    Calcium 8.4 8.3 - 10.6 MG/DL    GFR Non- 18 (L) >60 mL/min/1.73m2    GFR  22 (L) >60 mL/min/1.73m2     CULTURE results: Invalid input(s): BLOOD CULTURE,  URINE CULTURE, SURGICAL CULTURE    Diagnosis:  Patient Active Problem List   Diagnosis    Hypertension    Benign prostatic hyperplasia    Psoriatic arthritis ([facility] [address]    Primary malignant neoplasm of rectum (HCC)    Psoriasis    Chronic myeloproliferative disease (HCC)    Monocytosis (symptomatic)    Gastroesophageal reflux disease without esophagitis    Urinary retention    H/O: CVA (cerebrovascular accident)    Irregular heart beat    Nonrheumatic aortic valve stenosis    Paroxysmal atrial fibrillation (HCC)    GI bleed    Melena    Arteriovenous malformation of jejunum    History of rectal cancer    Benign neoplasm of cecum    Benign neoplasm of ascending colon    Pseudomonas urinary tract infection    Partial small bowel obstruction (HCC)    SBO (small bowel obstruction) (HCC)    Atrial fibrillation with RVR (HCC)    Hypotension    Ischemic colitis (HCC)    Hypocalcemia    MARIELOS (acute kidney injury) ([facility] [address]    Probable Bacterial Pneumonia    CAD (coronary artery disease)    Anemia    Delirium    Severe protein-calorie malnutrition (HCC)    On total parenteral nutrition    Colitis    Postoperative intra-abdominal abscess    Gram-negative bacteremia       Active Problems  Principal Problem:    Postoperative intra-abdominal abscess  Active Problems:    Hypertension    H/O: CVA (cerebrovascular accident)    Gram-negative bacteremia  Resolved Problems:    * No resolved hospital problems. *    Electronically signed by: Electronically signed by CHEMA Walters CNP on 5/5/2022 at 10:01 AM

## 2022-05-05 NOTE — PROGRESS NOTES
Notified patients attending, Dr. America Rodriguez about increase in patients BUN and Creatinine--On 5/2/2022, BUN was 24 and Creatinine was 1.6. Today his BUN is 39 and his creatinine was 3.2. His appetite is poor and he is on D5 0.45 NS @ 125 mL/hour. His total output yesterday (5/4/22) on dayshift was 300 mL and had 300 mL output on night shift. So far this shift he has 275 mL urine output.

## 2022-05-05 NOTE — CARE COORDINATION
Met with patient to discuss ARU, nurse at bedside. Nurse assessing patient d/t being lethargic. Per nurse patient was lethargic yesterday morning and then was more alert and awake in the afternoon. Notified nurse that I'll come back this afternoon to assess patients status.

## 2022-05-05 NOTE — PROGRESS NOTES
Physical Therapy  Name: Irene Rollins MRN: [id number] :   1929   Date:  2022   Admission Date: 2022 Room:  -A   Restrictions/Precautions:        general precautions, falls, colostomy, abdominal protection (recent ex lap)  Communication with other providers:  RN present mid session for medication. Cotx with Wayne Samayoa for patient safety  Subjective:  Patient states:  Patient is agreeable for rehab, c/o tender LLE  Pain:   Location, Type, Intensity (0/10 to 10/10): Tenderness LLE  Objective:    Observation:  Patient Supine in bed with HOB elevated. Nausea with activity today  Treatment, including education/measures:  Supine Exercises: Ankle pumps x 10 AAROM  Quad sets x 10 Min A  SLR: x10 AAROM  Heel slides x 10 ARROM    Therapeutic Exercise:  Therapeutic exercises were instructed today. Cues were given for technique, safety, recruitment, and rationale. Cues were verbal and/or tactile. Transfers with line management of Tele Monitor, Pulse Ox, BP cuff  Supine to sit :Long sit pivot to EOB  Seated balance: Patient needs cues for forward lean. Mod A x1- Min A x2 to steady  Scooting : Max A x1 seated to EOB, assistance with scrotum clearance. Sit to stand : Mod A x2, cues for hand placement and forward lean. Impulsively stands before warning therapist x2  Standing balance: Mod A x2 with cues for forward weight shift   Stand to sit :Min A x2 for safety  SPT:Mod A x2 with cues for walker management, assist needed for walker assistance and BLE guidance from EOB to recliner. Leland pad under patient    Sitting Exercises: Ankle pumps x 5, decrease participation today d/t nausea   LAQ's x 5, decrease participation today d/t nausea    Therapeutic Exercise:  Therapeutic exercises were instructed today. Cues were given for technique, safety, recruitment, and rationale. Cues were verbal and/or tactile. Safety  Patient left safely in the recliner, with call light/phone in reach with alarm applied. Gait belt and mask were used for transfers and gait. Assessment / Impression:     Patient's tolerance of treatment:  fair   Adverse Reaction: nausea  Significant change in status and impact:  none  Barriers to improvement:  weakness  Plan for Next Session:    Will cont to work towards pt's goals per patient tolerance  Time in:  0758  Time out:  0836  Timed treatment minutes:  38  Total treatment time:  45  Previously filed items:  Social/Functional History  Lives With: Alone  Type of Home: Condo  Home Layout: One level  Home Access: Level entry  Bathroom Shower/Tub: Walk-in shower  Bathroom Toilet: Standard  Bathroom Accessibility: Accessible  Home Equipment: Cane (PRN)  Has the patient had two or more falls in the past year or any fall with injury in the past year?: No  ADL Assistance: [address] [place] [address] Avenue: Independent  Homemaking Responsibilities: Yes  Ambulation Assistance: Independent  Transfer Assistance: Independent  Active : Yes  Mode of Transportation: Car  Occupation: Retired     Long Term Goals  Time Frame for Long term goals : 1 week  Long term goal 1: Pt will peform sit><supine Nichol  Long term goal 2: Pt will perform light dynamic sitting activity x 5 minutes with single UE support Nichol  Long term goal 3: Pt will perform sit><Stand maxA  Long term goal 4: Pt will transfer between surfaces maxA  Long term goal 5: Pt will ambulate 10ft with RW modA  Electronically signed by:     Alysa Hardwick PTA   5/5/2022, 10:00 AM

## 2022-05-05 NOTE — PROGRESS NOTES
Comprehensive Nutrition Assessment    Type and Reason for Visit:  Reassess    Nutrition Recommendations/Plan:   1. Continue diet as tolerates with oral nutrition supplement  2. Assist/supervise meals to encourage intake  3. If alternate nutrition to start, consider tube feeding  4. Will continue to follow up to monitor plan of care, nutrition status     Malnutrition Assessment:  Malnutrition Status:  Severe malnutrition (04/29/22 1228)    Context:  Acute Illness     Findings of the 6 clinical characteristics of malnutrition:  Energy Intake:  50% or less of estimated energy requirements for 5 or more days  Weight Loss:  Unable to assess     Body Fat Loss: Moderate body fat loss Orbital   Muscle Mass Loss: Moderate muscle mass loss Temples (temporalis),Clavicles (pectoralis & deltoids)  Fluid Accumulation:  Unable to assess     Strength:  Not Performed    Nutrition Assessment:    No nutriton suport started at this time, discussion this week for TPN but held off per family request. Did not eat lunch today, lethargic. Appetite has been poor during stay with inadequate intake. Friend in room reporting trouble chewing due to poor fit dentures and trouble with swallow. Noted patient followed by speech therapy during stay. Remains on easy to chew diet with standard oral nutrition supplement. Remains high nutrition risk with inadequate intake, family to decide about alternate nutrition.     Nutrition Related Findings:    asleep in bed-patient not arouse on visit, girlfriend in room Wound Type: Surgical Incision,Multiple       Current Nutrition Intake & Therapies:    Average Meal Intake: 1-25%,0%  Average Supplements Intake: 0% (lunch today observed)  ADULT ORAL NUTRITION SUPPLEMENT; Breakfast, Lunch, Dinner; Standard High Calorie/High Protein Oral Supplement  ADULT DIET; Easy to Chew    Anthropometric Measures:  Height: 5' 9.02\" (175.3 cm)  Ideal Body Weight (IBW): 160 lbs (73 kg)    Admission Body Weight: 165 lb 9.1 oz (75.1 kg) (first measured weight)  Current Body Weight: 165 lb 9.1 oz (75.1 kg), 101.5 % IBW. Weight Source: Bed Scale  Current BMI (kg/m2): 24.4  Usual Body Weight: 187 lb 6.3 oz (85 kg) (10/4/21)  % Weight Change (Calculated): -13.3  Weight Adjustment For: No Adjustment                 BMI Categories: Normal Weight (BMI 22.0 to 24.9) age over 72    Estimated Daily Nutrient Needs:  Energy Requirements Based On: Kcal/kg  Weight Used for Energy Requirements: Current  Energy (kcal/day): 6639-7779 (22-25 kcal/kg)  Weight Used for Protein Requirements: Current  Protein (g/day): 80-88 (1.0-1.1 g/kg)     Fluid (ml/day):      Nutrition Diagnosis:   · Inadequate oral intake related to acute injury/trauma as evidenced by intake 0-25%      Nutrition Interventions:   Food and/or Nutrient Delivery: Continue Current Diet,Start Tube Feeding  Nutrition Education/Counseling: No recommendation at this time  Coordination of Nutrition Care: Continue to monitor while inpatient,Feeding Assistance/Environment Change       Goals:  Previous Goal Met: No Progress toward Goal(s)  Goals: Initiate nutrition support,within 2 days       Nutrition Monitoring and Evaluation:   Behavioral-Environmental Outcomes: None Identified  Food/Nutrient Intake Outcomes: Enteral Nutrition Intake/Tolerance,Food and Nutrient Intake  Physical Signs/Symptoms Outcomes: Biochemical Data,Skin,Weight,Meal Time Behavior    Discharge Planning:     Too soon to determine     Shonna Millan RD, LD  Contact: [phone]

## 2022-05-05 NOTE — PROGRESS NOTES
No significant events occurred throughout the evening. Pt rested well in bed, was much more alert and oriented than what was reported during the day. Pt was able to have conversations with this RN and was able to ask for any assistance if he needed any. Urine output still low, creatinine levels also increased from last set of labs drawn 3 days ago. Will pass along to day shift RN.

## 2022-05-05 NOTE — FLOWSHEET NOTE
Occupational Therapy Treatment Note  Name: Dennis Read MRN: [id number] :   1929   Date:  2022   Admission Date: 2022 Room:  -A     Primary Problem:  Hemorrhage from ileostomy     Restrictions/Precautions:  Abdominal Surgical Precautions, General Precautions, Fall Risk    Communication with other providers:  RN approved session. Cotx with PTA Micheal Shahid for safety. Subjective:  Patient states:  \" Good morning\"  Pain: denies pain (location, type, intensity)    Objective:    Observation:  Pt alert and oriented supine in bed. Objective Measures:  Pt alert and oriented to self and place. Treatment, including education:  Therapeutic Activity Training:   Therapeutic activity training was instructed today. Cues were given for safety, sequence, UE/LE placement, awareness, and balance. Activities performed today included bed mobility training, sup-sit, sit-stand, SPT. Pt supine in bed prior to arrival and completed supine to sit with head of bed elevated and use of bed rails with verbal cues for hand placement MAX A x2 with noted pain increased in BLE. Pt seated edge of bed with close with MIN Ax 2 to MOD A x1 with assist to position scrotom. Pt completed 2-3 sit to stand with moderate posterior lean with use of [employer]. Pt noted decreased ability to shift weight forward and decreased balance with use of [employer]. Pt demonstrated ability to take 3-5 steps to chair. Pt needs seated in chair with all needs met and chair alarm on. Call light in reach and stefanie in place. Self Care Training:   Cues were given for safety, sequence, UE/LE placement, visual cues, and balance. Activities performed today included grooming. Pt seated in chair with stand by assist and completed oral hygiene with stand by assist and set up to completed oral hygiene. Pt noted what appeared to be increased nausea and RN notified. Pt needs seated in chair with all needs met and chair alarm on.  Call light in reach and stefanie in place. Assessment / Impression:    Patient's tolerance of treatment: fair  Adverse Reaction: none  Significant change in status and impact:  none  Barriers to improvement: balance and activity tolerance. Goals:  Pt goal: go home  Time Frame for STGs: discharge  Goal 1: Pt will perform UE ADLs Independent-addressed  Goal 2: Pt will perform LE ADLs Independent  Goal 3: Pt will perform toileting Independent  Goal 4: Pt will perform functional transfer w/ AD Bebe- addressed  Goal 5: Pt will perform functional mobility w/ AD Bebe- addressed  Goal 6: Pt will perform therex/theract in order to increase functional activity tolerance and dynamic standing balance-addressed    Plan for Next Session:    Continue OT POC and progress seated ADL edge of bed.     Time in:  0758  Time out:  0836  Timed treatment minutes:  38  Total treatment time:  45    Previously filed items:  Social/Functional History  Lives With: Alone  Type of Home: Condo  Home Layout: One level  Home Access: Level entry  Bathroom Shower/Tub: Walk-in shower  Bathroom Toilet: Standard  Bathroom Accessibility: Accessible  Home Equipment: Cane (PRN)  Has the patient had two or more falls in the past year or any fall with injury in the past year?: No  ADL Assistance: [address] [place] [address] Avenue: Independent  Homemaking Responsibilities: Yes  Ambulation Assistance: Independent  Transfer Assistance: Independent  Active : Yes  Mode of Transportation: Car  Occupation: Retired       Electronically signed by:    KINA Munoz [address],   5/5/2022, 10:10 AM

## 2022-05-06 NOTE — PROGRESS NOTES
Hospitalist    Received message re: had 6 second run of SVT    Ma.6 today - will give 2 g IV    Dig level: 1.1 this am - last dose was yesterday am, held yesterday, will give another dose now, and recheck level in am.   -will give half his usual dose now - 625 mcg and recheck level in am.     K: 4.2 today. Phos: 2.9 today    BB: metoprolol 25 mg BID  -will give 50 mg now instead of 25     Other cardiac meds: digoxin, diltiazem    EF: 55 to 60% on TTE 2021    Ischemic workup:  Stress test read as normal 2021      Noted IV Lasix 80 mg ordered this am by nephrology, appreciate consult. Creat improving today.

## 2022-05-06 NOTE — PROGRESS NOTES
Physical Therapy  Name: Abe Trammell MRN: [id number] :   [id number]   Date:  2022   Admission Date: 2022 Room:  -A   Restrictions/Precautions:         general precautions, falls, colostomy, abdominal protection (recent ex lap)  Communication with other providers:  Arrived to patient's room with upset family members, patient noticeably in distress; writhing, tachycardia and SOB. Reported to RN that patient is to be transferred out of bed with RW and out of the recliner via stefanie by staff other than therapy. Engineering called d/t desire lift being inoperable. RN notified that patient's mike pad is almost fully saturated with urine despite having indwelling catheter. *This session is not characteristic to the patient's normal abilities. This therapist believes that today he is medically limited and a medical issue is causing discomfort and pain. This has been brought to the attention of the RN  Subjective:  Patient states:  Patient unable to clarify his source of pain. After palpation, Left flank pain  Pain:   Location, Type, Intensity (0/10 to 10/10): Unable to quantify    Objective:    Observation:  Patient is recliner in recliner, low position. He is noticeably in distress. -160. Eyes closed with poor response. Patient HR decreased/improved to 85 HR in supine with HOB slightly elevated. PAteint comfortable and is not noticeably writhing   Treatment, including education/measures:  Transfers with line management of [employer]  Squat pivot: from recliner to EOB. Max A x2   Sit to supine: Dep x2 with trunk and BLE assistance  Scooting: supine boost to [facility] dep x2  Positioning: patient blanket tucked under hips to prevent patient from pulling on catheter    Safety  Patient left safely in the bed, with call light/phone in reach with alarm applied. Gait belt and mask were used for transfers and gait.   Assessment / Impression:     Patient's tolerance of treatment:  Poor   Adverse Reaction: pain, decrease participation   Significant change in status and impact:  Unknown medical issue  Barriers to improvement:  Medical status  Plan for Next Session:    Will cont to work towards pt's goals per patient tolerance  Time in:  1410  Time out:  1430  Timed treatment minutes:  20  Total treatment time:  20  Previously filed items:  Social/Functional History  Lives With: Alone  Type of Home: Condo  Home Layout: One level  Home Access: Level entry  Bathroom Shower/Tub: Walk-in shower  Bathroom Toilet: Standard  Bathroom Accessibility: Accessible  Home Equipment: Cane (PRN)  Has the patient had two or more falls in the past year or any fall with injury in the past year?: No  ADL Assistance: [facility]: Independent  Homemaking Responsibilities: Yes  Ambulation Assistance: Independent  Transfer Assistance: Independent  Active : Yes  Mode of Transportation: Car  Occupation: Retired     Long Term Goals  Time Frame for Long term goals : 1 week  Long term goal 1: Pt will peform sit><supine Nichol  Long term goal 2: Pt will perform light dynamic sitting activity x 5 minutes with single UE support Nichol  Long term goal 3: Pt will perform sit><Stand maxA  Long term goal 4: Pt will transfer between surfaces maxA  Long term goal 5: Pt will ambulate 10ft with RW modA  Electronically signed by:     Kal Light PTA   5/6/2022, 3:26 PM

## 2022-05-06 NOTE — CARE COORDINATION
Received VM from [facility]. She was requesting and update on patient.  CM returned her call and had to leave a VM

## 2022-05-06 NOTE — CARE COORDINATION
Presented to patients room this AM.  Patient able to open his eyes, but still presenting lethargic and confused. Spoke with patients nurse who states patient has been confused this AM and refusing to eat or drink anything. Discussed with Dr. José Antonio Holloway who feels that the Remeron that was ordered and given the last couple nights could be contributing to patients lethargy. Dr. José Antonio Holloway states he's asking cardiology to recheck patient d/t Afib and HR in 120's. Neurology also being consulted at this time. Discussed with Dr. José Antonio Holloway that patient has not been presented to Dr. Greyson Saunders yet d/t the lethargy and medical issues that are still present. ARU will continue to follow to assess possible improvement in cognition. Also placed whiteboard asking therapy to work with patient this weekend to assess participation and tolerance. Left Nataly herrera  requesting call back to discuss above information.

## 2022-05-06 NOTE — PROGRESS NOTES
Nephrology Progress Note  5/6/2022 8:34 AM        Subjective:   Admit Date: 4/22/2022  PCP: Elpidio Bruner MD    Interval History: No major event, that I am aware of. Diet: Eating and drinking some with assistance    ROS: No overt confusion, shortness of breath, PND or orthopnea  Urine output very low still about 300/day, also however still about 400 cc/day  No fever, acceptable blood pressure    Data:     Current meds:    mirtazapine  15 mg Oral Nightly    lactobacillus  1 capsule Oral Daily with breakfast    apixaban  2.5 mg Oral BID    [Held by provider] digoxin  125 mcg Oral Daily    dilTIAZem  120 mg Oral Daily    ferrous sulfate  325 mg Oral BID WC    folic acid  1 mg Oral Daily    metoprolol tartrate  25 mg Oral BID    therapeutic multivitamin-minerals  1 tablet Oral Daily    pantoprazole  40 mg Oral Daily    sodium chloride flush  5-40 mL IntraVENous 2 times per day           I/O last 3 completed shifts: In: 1257 [P.O.:60; I.V.:1197]  Out: 1570 [Urine:770; Stool:800]    CBC:   Recent Labs     05/05/22  0355 05/06/22  0626   WBC 10.1 13.9*   HGB 9.8* 10.2*    363          Recent Labs     05/05/22  0355 05/06/22  0626   * 128*   K 4.3 4.2   CL 99 97*   CO2 19* 17*   BUN 39* 33*   CREATININE 3.2* 2.8*   GLUCOSE 106* 89       Lab Results   Component Value Date    CALCIUM 8.6 05/06/2022    PHOS 2.9 05/06/2022       Objective:     Vitals: BP (!) 152/80   Pulse 90   Temp 97.6 °F (36.4 °C) (Axillary)   Resp 21   Ht 5' 9.02\" (1.753 m)   Wt 160 lb 11.5 oz (72.9 kg)   SpO2 95%   BMI 23.72 kg/m²     General appearance: Thin, alert, awake and oriented, without any distress  HEENT: Positive conjunctival pallor  Neck: Seemed supple  Lungs: Adventitious breath sound  Heart: Irregularly, irregular  Abdomen: Soft, tender on palpation, ostomy  Extremities: Positive ankle edema      Problem List :         Impression :     1.  Acute kidney injury with underlying CKD stage G2 or 3-nonoliguric but low urine output-unfortunately no urine specimen are sent yesterday, will try to call the nurse and see if we can send in specimen. But given his proBNP level, prior history of heart failure, he may have pulmonary edema/excess water in his body-which can essentially cause renal vein congestion and reduced renal perfusion. 2. Recent intra-abdominal abscess with appropriate therapy, preceded by C. difficile colitis necessitating colectomy, as well as atrial fibrillation or other comorbid disease    Recommendation/Plan  :     1. Empirically try Lasix 80 mg IV x1-we will see how much urine he produces 2 hours after the injection-so-called \"modified furosemide stress test\"  2. Also request the nurse to send the urine specimen  3. Okay to send him to ARU, I can follow him up there  4. Need better oral nutrition  5. Watch for iatrogenic nosocomial complication  6.  Follow clinically and biochemically      Larry Son MD MD

## 2022-05-06 NOTE — CONSULTS
Neurology Service Consult Note  [facility]  Patient Name: Natan Turk  : 1929        Subjective:   Reason for consult: Confusion  Patient seen and examined. Chart reviewed in detail. 80 y.o. -male with PMH of AAA, MARIELOS, BPH, CAD, HTN, pyelonephritis, GERD, and A. fib on Eliquis presenting to [facility] for bleeding around ostomy site. Neurology consulted for their evaluation of patient's confusion. Patient was recently discharged on 2022 after being inpatient for  approximately 3-week hospital stay. During this time he had an exploratory laparotomy, small bowel resection, colon resection, management of severe colitis and an end ileostomy on 04/3/2022. After patient discharge, he went to a skilled care nursing facility. At bedside, patient is accompanied with his daughter and son, in which she tells me that upon going to skilled nursing facility at [facility], his stoma was broken which prompted him to return here to the hospital.  Patient states that he has been given Ativan, and fentanyl on occasions which has made him confused and sleepy. Patient is sitting up in bedside recliner awake, but dozes in and out of sleep during exam.  Patient has not greatly cooperative with exam, as it takes next encouragement for patient to remain focused on task at hand. Once patient opens eyes and focuses, he does show me that he can follow some commands such as wiggling toes, and fingers, and right and left hand . Patient is verbal, however minimally at times and does not answer questions often throughout the exam.  Patient daughter at bedside tells me that whenever he receives strong medications he becomes weak and tired, and does not eat or drink. Prior work-up includes CBC, shows leukocytosis, CMP that shows creatinine elevation consistent with MARIELOS, and hyponatremia.     Past Medical History:   Diagnosis Date    AAA (abdominal aortic Meds:.polyethylene glycol, sodium chloride flush, ondansetron **OR** ondansetron, acetaminophen **OR** [DISCONTINUED] acetaminophen    Allergies   Allergen Reactions    Morphine Hallucinations    Sulfa Antibiotics      Social History     Socioeconomic History    Marital status:      Spouse name: Not on file    Number of children: Not on file    Years of education: Not on file    Highest education level: Not on file   Occupational History    Not on file   Tobacco Use    Smoking status: Former Smoker     Packs/day: 1.50     Years: 40.00     Pack years: 60.00     Types: Cigarettes     Quit date: 80     Years since quittin.3    Smokeless tobacco: Never Used   Vaping Use    Vaping Use: Never used   Substance and Sexual Activity    Alcohol use: No    Drug use: No    Sexual activity: Never   Other Topics Concern    Not on file   Social History Narrative    Not on file     Social Determinants of Health     Financial Resource Strain:     Difficulty of Paying Living Expenses: Not on file   Food Insecurity:     Worried About [employer] in the Last Year: Not on file    Haily of Food in the Last Year: Not on file   Transportation Needs:     Lack of Transportation (Medical): Not on file    Lack of Transportation (Non-Medical):  Not on file   Physical Activity:     Days of Exercise per Week: Not on file    Minutes of Exercise per Session: Not on file   Stress:     Feeling of Stress : Not on file   Social Connections:     Frequency of Communication with Friends and Family: Not on file    Frequency of Social Gatherings with Friends and Family: Not on file    Attends Taoist Services: Not on file    Active Member of Clubs or Organizations: Not on file    Attends Club or Organization Meetings: Not on file    Marital Status: Not on file   Intimate Partner Violence:     Fear of Current or Ex-Partner: Not on file    Emotionally Abused: Not on file    Physically Abused: Not on file    Sexually Abused: Not on file Housing Stability:     Unable to Pay for Housing in the Last Year: Not on file    Number of Places Lived in the Last Year: Not on file    Unstable Housing in the Last Year: Not on file      Family History   Problem Relation Age of Onset    Cancer Mother     Cancer Father          ROS (10 systems)  Unable to assess ROS questions secondary to patient's current mentation. Patient is confused and alert to self only. Physical Exam:       Vitals:    05/06/22 1009 05/06/22 1155 05/06/22 1402 05/06/22 1439   BP:  111/89 129/87    Pulse: 101 105 109 101   Resp: 23 20 23 30   Temp:  97.3 °F (36.3 °C)     TempSrc:  Axillary     SpO2:       Weight:       Height:           Wt Readings from Last 3 Encounters:   05/06/22 160 lb 11.5 oz (72.9 kg)   04/28/22 177 lb (80.3 kg)   04/21/22 166 lb 4 oz (75.4 kg)     Temp Readings from Last 3 Encounters:   05/06/22 97.3 °F (36.3 °C) (Axillary)   04/21/22 96.3 °F (35.7 °C) (Oral)   04/03/22 97.4 °F (36.3 °C)     BP Readings from Last 3 Encounters:   05/06/22 129/87   04/21/22 105/76   04/03/22 93/60     Pulse Readings from Last 3 Encounters:   05/06/22 101   04/21/22 82   03/25/22 77        Gen: Awake, with some drowsiness and confusion,  oriented to self. HEENT: NC/AT, EOMI, PERRL, mmm, neck supple, no meningeal signs; Heart: Irregular rhythm  Lungs: Respirations Unlabored  Ext: no edema, no calf tenderness b/l  Psych: Confusion, mild agitation at times  Skin: no rashes or lesions    NEUROLOGIC EXAM:    Mental Status: A&O to self, orientations is difficult to obtain, as patient's patient waxes and wanes at times, and does not answer questions. When patient does answer, he mumbles. NAD, speech is mumbled, and garbled. repetition and naming not intact, follows some commands appropriately with max encouragement. Cranial Nerve Exam:   CN II-XII: PERRL, VFF, no nystagmus, no gaze paresis, sensation V1-V3 intact b/l, face appears metrical, tongue appears midline.   Motor Exam: Strength moves all extremities purposefully, not following commands ;antigravity x4. Tone and bulk normal   YUMI pronator drift    Deep Tendon Reflexes: 1/4 biceps, triceps, brachioradialis, patellar, and achilles b/l; flexor plantar responses b/l    Sensation: Intact light touch to upper and lower extremity, antigravity x4    Coordination/Cerebellum:       Tremors--none      Rapidly alternating movements: YUMI             Heel-to-Shin: YUMI      Finger-to-Nose: YUMI    Gait and stance:      Gait: deferred      LABS:        CBC:   Recent Labs     05/06/22 0626   WBC 13.9*   RBC 3.28*   HGB 10.2*   HCT 33.3*      .5*     BMP:    Recent Labs     05/06/22 0626   *   K 4.2   CL 97*   CO2 17*   BUN 33*   CREATININE 2.8*   GLUCOSE 89   CALCIUM 8.6       IMAGING:    CTH ordered     Above imaging personally reviewed in detail. ASSESSMENT/PLAN:     80 y.o. -male with PMH of AAA, MARIELOS, BPH, CAD, HTN, pyelonephritis, GERD, and A. fib on Eliquis presenting to [facility] for bleeding around ostomy site. Neurology consulted for their evaluation of patient's confusion. Confusion likely due to acute toxic metabolic encephalopathy v.  Acute delirium in the setting of hyponatremia, leukocytosis, and MARIELOS. --Mental status likely to improve slowly as metabolic derangements improve. -- Patient is at risk for acute delirium. Please adhere to acute delirium protocol. Please limit sedating medications, open blinds and keep lights on during the day, and decrease stimuli and close blinds and lower lights at bedtime, and decrease interruptions as safely as possible at night to encourage proper sleep/wake cycle. --We will continue to follow for serial neurologic exam to examine for changes. Neurodiagnostics  --CT ordered per IM    Patient discussed with attending physician Dr. Heavenly Flores     Thank you for allowing us to participate in the care of your patient.   If there are any questions regarding evaluation please feel free to contact us. (Please note that portions of this note were completed with a voice recognition program.  Efforts were made to edit the dictations but occasionally words are mistranscribed.)      CHEMA Alva - CNP, 5/6/2022    Attending Note:  I have rounded on this patient with Stephanie Garcia AGACNPAOLA. I have reviewed the chart and we have discussed this case in detail. The patient was seen and examined by myself. Pertinent labs and imaging have been personally reviewed. Our findings and impressions were discussed with the patient. I concur with the NP's assessment and plan.     Jessica Sanders, DO

## 2022-05-06 NOTE — PROGRESS NOTES
Received phone call from Dr. Tmi Avilez regarding a new order for furosemide IV push and contact physician to report urine output amount. Patient had a total of 625 mL urinary output. Dr. Tim Avilez notified.

## 2022-05-06 NOTE — PROGRESS NOTES
[id number] [place] OF SPEECH/LANGUAGE PATHOLOGY    Carolina Mckeon  5/6/2022  [id number]    Attempted to see Carolina Fofanajerson for dysphagia follow-up. Pt sleeping upon SLP entering room. RN and family present. Both report that pt has been very lethargic today and not adequately responsive. Will reattempt as indicated.     Lily Langley, OLIVA  5/6/2022  1:34 PM

## 2022-05-06 NOTE — PROGRESS NOTES
Nurses called that patient complaining of abdominal pain L>R and pulling at silveira catheter- new today  -hx chronic indwelling catheter for hypotonic bladder. Catheter placed 2019 and was doing well with it  -catheter was draining clear urine but leakage around catheter this PM  -by report had 600ml of yellow urine output after lasix    - bowel resection and abdominal abscess by surgery- 4/2022  -by report doing ok 1-2 days ago but then pain occurred today  -ostomy in RLQ with output    Bladder scan by nurses with 10ml  Silveira catheter had been removed by nursing staff this PM  I performed straight cath this PM with minimal urine return. He has some discomfort with straight cath    He indicates global abdominal pain but L>R    Awake, appears in discomfort  Afebrile, resp 30, p: 101, /87  Ostomy in RLQ  Diffuse abdominal tenderness, mild distension      Unclear source of his abdominal pain  Recent bowel obstruction with ostomy    Plan  D/c silveira and monitor PVR and straight cath volumes > 400ml  Discussed with medicine.   I recommend CT Scan a/p  Consider surgery consult for abdominal findings and pain  Will continue to follow

## 2022-05-06 NOTE — PROGRESS NOTES
Hospitalist Progress Note      Name:  Georgina Parker /Age/Sex: 1929  (80 y.o. male)   MRN & CSN:  [id number] & [id number] Admission Date/Time: 2022 12:38 AM   Location:  -A PCP: Filipe Brown MD         Hospital Day: 14    Assessment and Plan:   Georgina Parker is a 80 y.o.  male  who presents with a postoperative intra-abdominal abscess. 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 Fib, and severe protein energy malnutrition, physical deconditioning - was initially admitted to ICU on 2022 for postoperative intra-abdominal abscess following a recent colitis status post subtotal colon resection and ileostomy placement 4/3/2022. Also had removal of existing colostomy and small bowel resection as well at the same time. Was downgraded from ICU. Acute toxic metabolic encephalopathy: Recurrent since 2022  -2/2 UTI and intra-abdominal infection and treatment with opioids including fentanyl.  -Levaquin stopped  by ID -on this date developed MARIELOS    Klebsiella oxytoca bacteremia most likely 2/2 intra-abdominal abscess  -History of pseudomembranous colitis status post subtotal colectomy 4/3  -2 out of 2 blood cultures 2022 growing Klebsiella. Blood culture so far NGTD  -CT abdomen 5/3 compared to  with decrease in fluid collection   -5.6 cm AAA on CT  -General surgery following    History of chronic urinary retention:   -Status post Weaver catheter placement  per urology  -Will need every 3 week exchanges and follow-up outpatient with urology    Acute kidney injury: Creatinine of 1.6.   Baseline at 1.1  -Creat is 3.2 on  - consulted nephrology    Acute blood loss anemia: On admission and reason for hospitalization  -No acute bleeding noted at this time.    -Was evaluated by GI due to melena. GI has since signed off.     History of CVA with residual paresthesias and lower extremity weakness:  -CT head and MRI brain on 4/28 with no acute findings. Showed remote lacunar infarcts. -Off aspirin due to increased risk of bleeding given age and while on Eliquis. Continue Eliquis  -Monitor for any bleeding.     Hx Jak2+ Thrombocytosis  -Follows with Heme/Onc. Was taken off aspirin while on Eliquis to decrease risk of bleeding.     Paroxysmal A. Fib  -Digoxin-check level  -Diltiazem  -Metoprolol  -Apixaban-monitor for bleeding    5.6 cm infrarenal AAA: CT abdomen 5/3/2022. Patient had size of 5.5 cm in March/2022.  -Dr. Cruz Bautista consulted     Severe protein energy malnutrition: In context of acute illness  -Continue to encourage PO as able. Minimal improvement in PO intake   -Will start on Remeron. Continue Ensure and multivitamins  -Mirtazapine 15 mg nightly started on May 4  -Consider TPN. On May 5 he only had 240 mL p.o. intake per nurse. Mental status is improving on May 5. Hopefully he will start eating more  -TPN ordered on May 5, but does not have  Yet. Defer ultimate plan to nephrology.     Physical Deconditioning  -Accepted to the acute rehab unit when medically stable        Diet ADULT ORAL NUTRITION SUPPLEMENT; Breakfast, Lunch, Dinner; Standard High Calorie/High Protein Oral Supplement  ADULT DIET; Easy to Chew   Code Status DNR-CC   Disposition Patient requires continued admission due to worsening renal function. History of Present Illness:     Chief Complaint: Postoperative intra-abdominal abscess    May 5:    Met with nephrology in the room. Nephrology consult done today. Spoke with daughter Abdirahman Solano over the phone today who was pleased that his mental status was improving. He was asking her about his condo, and if she is paying his bills. She indicated that this was a great sign.       Prior notes:    Patient evaluated at bedside this

## 2022-05-06 NOTE — PROGRESS NOTES
Hospitalist     Perfect Serve Messages from today:        From nurse - 5/05/2022 2:47 PM   RE: Pushpa Duarte On 5/2/2022, BUN was 24 and Creatinine was 1.6. Today his BUN is 39 and his creatinine was 3.2. His appetite is poor and he is on D5 0.45 NS @ 125 mL/hour. His total output yesterday (5/4/22) on dayshift was 300 mL and had 300 mL output on night shift. So far this shift he has 275 mL urine output.  -consulted nephrology    Message to infectious disease from me on  5/05/2022 3:26 PM    Azam Romano and Dr. Balbina Landers, please see above, pt with MARIELOS, on Levaquin, just wanted to make you aware, thanks. Nephrology Dr. Mike Wolff will see him    Infectious disease NP reply: - 5/5/2022 3:44 PM  Thankyou. I DCd the levofloxacin as he has had a 7 day course. I will evaluate Im the am off ABX. He appears clinically improved from infection.     5/5/2022 3:57 PM

## 2022-05-06 NOTE — PROGRESS NOTES
Hospitalist    In AF with RVR - 's. Approved for ARU. Will ask cardiology to recheck prior to transfer. Also, got up to chair today but confused, lethargic - not very safe for PO meds right now per staff. Will give lopressor IV. Check CT STAT - he is on Eliquis. Suspect encephalopathy     Started on Remeron 15 mg HS - has had 2 doses so far. GFR is 17 today. Held Remeron. Consult neurology for complete work up.

## 2022-05-06 NOTE — PROGRESS NOTES
Infectious Disease Progress Note  2022   Patient Name: Reji Larsen : 1929   Impression  · K.oxytoca Bacteremia Likely Secondary to Intra-Abdominal Abscess s/p 4/3/22 Subtotal Colon Resection with End Ileostomy:     § Afebrile, normalized WBC. BMP obtained today, , showing increasing MARIELOS with Cr now 3.2 (last check was -1.6). Patient appears clincally to have improved on ABX therapy, will DC Levaquin now due to MARIELOS. Will observe patient in am off ABX therapy. § -Blood cultures  Klebsiella oxytoca  § -BC 0-NGTD  § -CT A&P W IV Contrast: Large rim enhancing fluid collection is seen within the abdomen and pelvis which extends from the presacral region to the level of the pancreas, concerning for an abscess. Full report below. § -per IR, Dr. Paolo Jeter, Intra-abdominal Abscess drain placement, 30 cc of viera fluid removed. Body Fluid culture Gonzalo Cordero, general surgery, onboard, removed ERNIE drain      ? MARIELOS on CKD3    ? Infrarenal AAA:  ? Dr. Keturah Ramirez, cardiology, onboard  ? Imp of 4.5 cm in 2018, now 5.6 cm ,will follow up     ? Acute Encephalopathy:  ? Neuro onboard      ? Macrocytic Anemia:     ? PAF     ? AAA     ? BPH     ? CAD     ? History of Rectal Cancer s/p Colostomy      ? Psoriatic Arthritis     ? S/p CVA 2019     ? Allergy to sulfa     · Multi-morbidity: per PMHx: AAA, BPH, CAD, rectal cancer 2019, GERD, HTN, psoriatic arthritis, pyelonephritis, CVA 2019     Plan:   · Continue lactobacillus 1 po x 30 days (end date 22)  ? Continue off ABX therapy at this time, has had a 7 day course of levofloxacin, DC'd , inflammatory markers have trended down off therapy. ? Will sign off and not follow the patient actively, please reconsult as needed. Ongoing Antimicrobial Therapy  DCd    ?  Completed Antimicrobial Therapy  Cefepime ? Metronidazole -  Zosyn -,   Vancomycin -8  Eraxis   Meropenem ?   Po vancomycin 4/10-12  Zyvox 4/12-14  Dificid 4/14-20  Linezolid 4/22-26  Flagyl 4/23-26  Cefepime 4/23-28  Levofloxacin 4/28-5/5  ? History:? Interval history noted. Chief complaint: K.oxytoca bacteremia secondary to Postop intra-abdominal abscess. Denies n/v/d/f or untoward effects of antibiotics. Review of Systems   Unable to perform ROS: Mental status change       Physical Exam:  Vital Signs: /89   Pulse 105   Temp 97.3 °F (36.3 °C) (Axillary)   Resp 20   Ht 5' 9.02\" (1.753 m)   Wt 160 lb 11.5 oz (72.9 kg)   SpO2 98%   BMI 23.72 kg/m²     Gen: up in the chair, lethargic, remains pleasantly confused but able to reorient. Wounds: C/D/I mid abdominal incision well approximated, no erythema or edema at site, no drainage at site. Excoriation surrounding testicles/perineum has resolved. Ileostomy: intact RUQ draining green liquid stool  Chest: no distress and CTA. Good air movement. Heart: RRR and no MRG. Abd: soft, non-distended, no tenderness, no hepatomegaly. Normoactive bowel sounds. Ext: no clubbing, cyanosis, or edema  Catheter Site: without erythema or tenderness draining clear yellow urine. Neuro: CN 2-12 intact and no focal sensory or motor deficits        Radiologic / Imaging / TESTING  4/22/22 CT Abdomen Pelvis W IV Contrast:  Impression   1. Large rim enhancing fluid collection is seen within the abdomen and pelvis   which extends from the presacral region to the level of the pancreas,   concerning for an abscess. 2. Small amount of free fluid is seen within the abdomen. 3. Infrarenal abdominal aortic aneurysm measuring 5.6 cm.  Please see   recommendations below. 4. Small bilateral pleural effusions with bibasilar atelectasis. 5. Focally dilated small bowel loops with air-fluid levels are seen within   the left upper quadrant which could be related to ileus.       RECOMMENDATIONS:   5.6 cm infrarenal abdominal aortic aneurysm.  Recommend referral to a vascular   specialist.     Reference: Amanda Coffman [id number].      4/22/22 VL Dup Lower Extremity Venous Right:  Impression   No evidence of DVT in the right lower extremity.      4/23/22 XR Chest Portable:  Impression   Small bilateral pleural effusions.       Moderate left basilar atelectasis and mild right basilar atelectasis. 4/24/22 XR Chest Portable:  Impression   Stable trace left pleural effusion with left basilar airspace disease or   atelectasis.         4/28/22 CT Head WO Contrast:  Impression   No noncontrast CT evidence of acute intracranial pathology or significant   interval change compared to 03/20/2022.       Cortical atrophy, white matter changes consistent with microvascular   degenerative disease, atherosclerotic calcification in the terminal internal   carotid arteries of uncertain hemodynamic significance incidentally noted. .     4/28/22 MRI Brain WO Contrast:  mpression   1. No acute infarct or other acute intracranial process. 2. Senescent changes including moderate generalized parenchymal volume loss   and chronic small vessel ischemia.  Remote bilateral lacunar infarcts   throughout the basal ganglia.         5/4/22 CT Abdomen Pelvis WO Contrast:  Impression   1. Slight interval decrease in volume of a fluid collection extending from   the presacral soft tissues into the left mid abdominal cavity, which is   suboptimally evaluated due to lack of intravenous contrast.   2. Stable fluid collection along the inferior margin of the mid to distal   pancreas. 3. Gas in the urinary bladder lumen is favored to be related to Weaver   catheter placement, although enterovesical fistula cannot be completely   excluded. 4. Trace residual left pleural effusion. 5. Stable 5.6 cm abdominal aortic aneurysm.       RECOMMENDATIONS:   5.6 cm infrarenal abdominal aortic aneurysm.  Recommend referral to a vascular   specialist.   Reference: Ehsan Gonzalez Abad Radiol [id number].     5/5/22 XR Chest Portable:  Impression No acute process.                Labs:    Recent Results (from the past 24 hour(s))   POCT Glucose    Collection Time: 05/05/22 11:02 PM   Result Value Ref Range    POC Glucose 113 (H) 70 - 99 MG/DL   POCT Glucose    Collection Time: 05/06/22 12:49 AM   Result Value Ref Range    POC Glucose 114 (H) 70 - 99 MG/DL   Procalcitonin    Collection Time: 05/06/22  6:26 AM   Result Value Ref Range    Procalcitonin 0.117    C-Reactive Protein    Collection Time: 05/06/22  6:26 AM   Result Value Ref Range    CRP, High Sensitivity 17.9 mg/L   CBC    Collection Time: 05/06/22  6:26 AM   Result Value Ref Range    WBC 13.9 (H) 4.0 - 10.5 K/CU MM    RBC 3.28 (L) 4.6 - 6.2 M/CU MM    Hemoglobin 10.2 (L) 13.5 - 18.0 GM/DL    Hematocrit 33.3 (L) 42 - 52 %    .5 (H) 78 - 100 FL    MCH 31.1 (H) 27 - 31 PG    MCHC 30.6 (L) 32.0 - 36.0 %    RDW 18.6 (H) 11.7 - 14.9 %    Platelets 257 067 - 746 K/CU MM    MPV 9.5 7.5 - 11.1 FL   Magnesium    Collection Time: 05/06/22  6:26 AM   Result Value Ref Range    Magnesium 1.6 (L) 1.8 - 2.4 mg/dl   Phosphorus    Collection Time: 05/06/22  6:26 AM   Result Value Ref Range    Phosphorus 2.9 2.5 - 4.9 MG/DL   Triglycerides    Collection Time: 05/06/22  6:26 AM   Result Value Ref Range    Triglycerides 194 (H) <150 MG/DL   TSH    Collection Time: 05/06/22  6:26 AM   Result Value Ref Range    TSH, High Sensitivity 1.990 0.270 - 4.20 uIu/ml   Vitamin B12    Collection Time: 05/06/22  6:26 AM   Result Value Ref Range    Vitamin B-12 1143 (H) 211 - 911 pg/ml   Ammonia    Collection Time: 05/06/22  6:26 AM   Result Value Ref Range    Ammonia 17 16 - 60 UMOL/L   Digoxin Level    Collection Time: 05/06/22  6:26 AM   Result Value Ref Range    Digoxin Lvl 1.1 0.8 - 2.0 ng/mL    DOSE AMOUNT DOSE AMT.  GIVEN - ADD     DOSE TIME DOSE TIME GIVEN - ADD    Comprehensive Metabolic Panel    Collection Time: 05/06/22  6:26 AM   Result Value Ref Range    Sodium 128 (L) 135 - 145 MMOL/L    Potassium 4.2 3.5 - 5.1 MMOL/L    Chloride 97 (L) 99 - 110 mMol/L    CO2 17 (L) 21 - 32 MMOL/L    BUN 33 (H) 6 - 23 MG/DL    CREATININE 2.8 (H) 0.9 - 1.3 MG/DL    Glucose 89 70 - 99 MG/DL    Calcium 8.6 8.3 - 10.6 MG/DL    Albumin 3.4 3.4 - 5.0 GM/DL    Total Protein 6.4 6.4 - 8.2 GM/DL    Total Bilirubin 0.5 0.0 - 1.0 MG/DL    ALT 46 (H) 10 - 40 U/L    AST 29 15 - 37 IU/L    Alkaline Phosphatase 210 (H) 40 - 128 IU/L    GFR Non- 21 (L) >60 mL/min/1.73m2    GFR  26 (L) >60 mL/min/1.73m2    Anion Gap 14 4 - 16   Brain Natriuretic Peptide    Collection Time: 05/06/22  6:26 AM   Result Value Ref Range    Pro-BNP 2,585 (H) <300 PG/ML   Lactate Dehydrogenase    Collection Time: 05/06/22  6:26 AM   Result Value Ref Range     120 - 246 IU/L   POCT Glucose    Collection Time: 05/06/22  7:36 AM   Result Value Ref Range    POC Glucose 95 70 - 99 MG/DL   Urinalysis    Collection Time: 05/06/22 11:10 AM   Result Value Ref Range    Color, UA YELLOW YELLOW    Clarity, UA CLEAR CLEAR    Glucose, Urine NEGATIVE NEGATIVE MG/DL    Bilirubin Urine NEGATIVE NEGATIVE MG/DL    Ketones, Urine NEGATIVE NEGATIVE MG/DL    Specific Gravity, UA 1.010 1.001 - 1.035    Blood, Urine TRACE (A) NEGATIVE    pH, Urine 5.0 5.0 - 8.0    Protein, UA NEGATIVE NEGATIVE MG/DL    Urobilinogen, Urine 0.2 0.2 - 1.0 MG/DL    Nitrite Urine, Quantitative NEGATIVE NEGATIVE    Leukocyte Esterase, Urine NEGATIVE NEGATIVE    RBC, UA 2 0 - 3 /HPF    WBC, UA <1 0 - 2 /HPF    Bacteria, UA OCCASIONAL (A) NEGATIVE /HPF    Mucus, UA RARE (A) NEGATIVE HPF    Trichomonas, UA NONE SEEN NONE SEEN /HPF    Granular Casts, UA 3 /LPF   Osmolality, urine    Collection Time: 05/06/22 11:10 AM   Result Value Ref Range    Osmolality, Ur 285 (L) 292 - 1090 MOS/L   Sodium, urine, random - (Necessary for FENa calculation)    Collection Time: 05/06/22 11:10 AM   Result Value Ref Range    Sodium, Ur 111 35 - 167 MMOL/L   Eosinophil Smear, Urine    Collection Time: 05/06/22 11:10 AM   Result Value Ref Range    Eosinophil, Urine POSITIVE /HPF   Protein / creatinine ratio, urine    Collection Time: 05/06/22 11:10 AM   Result Value Ref Range    Urine Total Protein 5.7 <12 MG/DL    Creatinine, Ur 20.4 (L) 39 - 259 MG/DL    Prot/Creat Ratio, Ur 0.3 (H) <0.2     CULTURE results: Invalid input(s): BLOOD CULTURE,  URINE CULTURE, SURGICAL CULTURE    Diagnosis:  Patient Active Problem List   Diagnosis    Hypertension    Benign prostatic hyperplasia    P[facility]iatic arthritis ([facility]    Primary malignant neoplasm of rectum (HCC)    Psoriasis    Chronic myeloproliferative disease ([facility]    Monocytosis (symptomatic)    Gastroesophageal reflux disease without esophagitis    Urinary retention    H/O: CVA (cerebrovascular accident)    Irregular heart beat    Nonrheumatic aortic valve stenosis    Paroxysmal atrial fibrillation (HCC)    GI bleed    Melena    Arteriovenous malformation of jejunum    History of rectal cancer    Benign neoplasm of cecum    Benign neoplasm of ascending colon    Pseudomonas urinary tract infection    Partial small bowel obstruction (HCC)    SBO (small bowel obstruction) (HCC)    Atrial fibrillation with RVR (HCC)    Hypotension    Ischemic colitis (HCC)    Hypocalcemia    MARIELOS (acute kidney injury) ([facility]    Probable Bacterial Pneumonia    CAD (coronary artery disease)    Anemia    Delirium    Severe protein-calorie malnutrition (HCC)    On total parenteral nutrition    Colitis    Postoperative intra-abdominal abscess    Gram-negative bacteremia       Active Problems  Principal Problem:    Postoperative intra-abdominal abscess  Active Problems:    Hypertension    H/O: CVA (cerebrovascular accident)    Gram-negative bacteremia  Resolved Problems:    * No resolved hospital problems. *    Electronically signed by: Electronically signed by Juan Kelly.  CHEMA Camara CNP on 5/6/2022 at 2:03 PM

## 2022-05-06 NOTE — CARE COORDINATION
Received VM from [employer] from [facility]. CM returned call and spoke with [employer]. She stated that she will continue to follow patient for improved mentation status and has placed a white board for  therapy to work with patient this weekend .  Case Management to follow

## 2022-05-06 NOTE — PROGRESS NOTES
Upon assessment this morning, pt was lethargic, alert and oriented to self only. Dr. Narayan Massey was notified and came to see the pt at bedside. Dr. Narayan Massey gave verbal order for NIH Stroke Scale to be performed, Dr. Narayan Massey was made aware that pt was unable to follow commands at this time. Dr. Narayan Massey placed orders for stat head CT. Pt not stable at that time due to elevated HR, Dr. Narayan Massey made aware and agreed that pt should go to CT when more stable. Pt went to CT around 1720 with two RNs. Pt heart rate jumped from 80s-140s, sustaining in the 100s (went to 160s for a few seconds briefly). Dr. Alcira Juarez notified and updated throughout the day. Pt heart rate went to normal values when external stimulus was minimal.    Family was very upset that pt got remeron without them being notified. Family states, \"NO DRUGS THAT CAUSE SEDATION SHOULD BE GIVEN TO THE PT.\" And that they need to be notified of any medication changes. Dr. Sena Villareal consulted and contacted around 1 pm. Pt awakened and communicated that he had pain from his silveira catheter and his left side (Dr. Sena Villareal informed). Family repeatedly demanded that urology come to bedside. Dr. Sena Villareal was also informed that pt's silveira was leaking. Dr. Sena Villareal verbally ordered to remove the catheter and to bladder scan pt Q6 hours and straight cath for volumes above 350 mL. Charge nurse notified and updated of situation throughout the day.

## 2022-05-06 NOTE — PROGRESS NOTES
Patient seen and examined. José Miguel Rollins at bedside. Patient received remeron the last 2 evenings, he has been sedated both yesterday and today. Since patient is sedated he has had minimal PO intake. He cannot participate in PT/OT. The family is concerned with his care. Earlier today patient complained of abdominal pain, his silveira was removed by Dr. Fredy Taveras and the patient has been more comfortable. PE:    Vitals:    05/06/22 1402 05/06/22 1439 05/06/22 1640 05/06/22 1659   BP: 129/87   107/82   Pulse: 109 101 104 98   Resp: 23 30 30 14   Temp:    97 °F (36.1 °C)   TempSrc:    Axillary   SpO2:    97%   Weight:       Height:         Abd: soft, flat, non-tender    CT head report reviewed    CT abd/pelvis: films and report reviewed    A/P:  -CT scan did show air filled stomach, I did write for reglan x 1 and simethicone prn  -I had a long conversation with Saavn and Jenny King at bedside. They have requested no narcotics (order written last week but had been d/c'd so re-written), no sedatives unless they are called. I placed a sticky note with these requests and both of their phone numbers. I wrote orders with both of these requests.   -Dr. Yimi Matamoros wrote for q6H bladder scan, currently the bladder scan machine is not available on this floor. I did write for straight cath every 6 h with checking bladder scan first but if that's not available and patient uncomfortable to go ahead with straight cath. Patient is due for lasix now so he will need to empty his bladder. This plan was discussed with the family at bedside  -patient needs to increase PO and get to rehab. Hopefully without sedation/narcotics he can be awake enough to eat. The family is aware that his safety is important and if at night he gets confused or tries to climb out of bed if they cannot be present or come in to sit with him he may need sedation, they understand but want to know before he gets it since he is slow to metabolize these medications.

## 2022-05-06 NOTE — PROGRESS NOTES
He has an ileostomy    He had another ostomy in the past    TPN order was placed    IV fluids at 100    He said that this May  He said he is in [place]  He was on room air  He smoked from age [age]  The [facility] was clamped as staff was trying to collect urine  He has a history of rectal cancer  He used to be a preacher  He is in and out of atrial fibrillation on the monitor  Has low urine output today  He needs a line if TPN ordered  He was a volunteer here for 16 years    Daughter said his voice is back today whereas he was whispering before and that   He was asking and if that his bills are paid if he went to the [employer]

## 2022-05-06 NOTE — PROGRESS NOTES
Today's plan: Restart Cardizem drip, patient is n.p.o. heart rate is relatively not controlled      Admit Date:  4/22/2022    Subjective: Tired and lethargic      Chief complaints on admission  Chief Complaint   Patient presents with    Other     bleeding at new ostomy site         History of present illness:Denis is a 80 y. o.year old who  presents with had concerns including Other (bleeding at new ostomy site). Past medical history:    has a past medical history of AAA (abdominal aortic aneurysm) ([address] MARIELOS (acute kidney injury) ([address] Angina, class I ([address] Benign prostatic hyperplasia, Bowel obstruction ([address] CAD (coronary artery disease), Cancer ([address] CCC (chronic calculous cholecystitis), Delirium, Gastroesophageal reflux disease without esophagitis, Hx of cardiovascular stress test, Hypertension, On total parenteral nutrition, Partial small bowel obstruction ([address] Primary malignant neoplasm of rectum ([address] Probable Bacterial Pneumonia, Psoriasis, Psoriatic arthritis ([address] Pyelonephritis, Severe protein-calorie malnutrition ([address] Stable angina ([address] and Unspecified cerebral artery occlusion with cerebral infarction. Past surgical history:   has a past surgical history that includes Appendectomy; colostomy; Upper gastrointestinal endoscopy (N/A, 2/28/2022); Colonoscopy (N/A, 3/2/2022); and laparotomy (N/A, 4/3/2022). Social History:   reports that he quit smoking about 49 years ago. His smoking use included cigarettes. He has a 60.00 pack-year smoking history. He has never used smokeless tobacco. He reports that he does not drink alcohol and does not use drugs. Family history:  family history includes Cancer in his father and mother.     Allergies   Allergen Reactions    Morphine Hallucinations    Sulfa Antibiotics          Objective:   /87   Pulse 104   Temp 97.3 °F (36.3 °C) (Axillary)   Resp 30   Ht 5' 9.02\" (1.753 m)   Wt 160 lb 11.5 oz (72.9 kg)   SpO2 98%   BMI 23.72 kg/m² Intake/Output Summary (Last 24 hours) at 5/6/2022 1650  Last data filed at 5/6/2022 1025  Gross per 24 hour   Intake --   Output 995 ml   Net -995 ml       TELEMETRY: A. deborah    Physical Exam:  Constitutional:  Well developed, Well nourished, No acute distress, Non-toxic appearance. HENT:  Normocephalic, Atraumatic, Bilateral external ears normal, Oropharynx moist, No oral exudates, Nose normal. Neck- Normal range of motion, No tenderness, Supple, No stridor. Eyes:  PERRL, EOMI, Conjunctiva normal, No discharge. Respiratory:  Normal breath sounds, No respiratory distress, No wheezing, No chest tenderness. ,no use of accessory muscles, diaphragm movement is normal  Cardiovascular: (PMI) apex non displaced,no lifts no thrills, no s3,no s4, Normal heart rate, Normal rhythm, No murmurs, No rubs, No gallops. Carotid arteries pulse and amplitude are normal no bruit, no abdominal bruit noted ( normal abdominal aorta ausculation), femoral arteries pulse and amplitude are normal no bruit, pedal pulses are normal  GI:  Bowel sounds normal, Soft, No tenderness, No masses, No pulsatile masses. : External genitalia appear normal, No masses or lesions. No discharge. No CVA tenderness. Musculoskeletal:  Intact distal pulses, No edema, No tenderness, No cyanosis, No clubbing. Good range of motion in all major joints. No tenderness to palpation or major deformities noted. Back- No tenderness. Integument:  Warm, Dry, No erythema, No rash. Lymphatic:  No lymphadenopathy noted. Neurologic:  Alert & oriented x 3, Normal motor function, Normal sensory function, No focal deficits noted.    Psychiatric:  Affect normal, Judgment normal, Mood normal.     Medications:    lactobacillus  1 capsule Oral Daily with breakfast    apixaban  2.5 mg Oral BID    [Held by provider] digoxin  125 mcg Oral Daily    dilTIAZem  120 mg Oral Daily    ferrous sulfate  325 mg Oral BID WC    folic acid  1 mg Oral Daily    metoprolol tartrate  25 mg Oral BID    therapeutic multivitamin-minerals  1 tablet Oral Daily    pantoprazole  40 mg Oral Daily    sodium chloride flush  5-40 mL IntraVENous 2 times per day       polyethylene glycol, sodium chloride flush, ondansetron **OR** ondansetron, acetaminophen **OR** [DISCONTINUED] acetaminophen    Lab Data:  CBC:   Recent Labs     05/05/22  0355 05/06/22  0626   WBC 10.1 13.9*   HGB 9.8* 10.2*   HCT 34.6* 33.3*   .8* 101.5*    363     BMP:   Recent Labs     05/05/22  0355 05/06/22  0626   * 128*   K 4.3 4.2   CL 99 97*   CO2 19* 17*   PHOS  --  2.9   BUN 39* 33*   CREATININE 3.2* 2.8*     LIVER PROFILE:   Recent Labs     05/06/22 0626   AST 29   ALT 46*   BILITOT 0.5   ALKPHOS 210*     PT/INR: No results for input(s): PROTIME, INR in the last 72 hours. APTT: No results for input(s): APTT in the last 72 hours. BNP:  No results for input(s): BNP in the last 72 hours. TROPONIN: @TROPONINI:3@      Assessment:  80 y. o.year old who is admitted for          Plan:  1. A. fib patient is not controlled, restart Cardizem drip, continue Eliquis for anticoagulation,   2. History of expiratory laparotomy and ileostomy, received  transfusion in the hospital  3. AAA, will medically manage at this time he is not in any shape for an endovascular surgery  All labs, medications and tests reviewed, continue all other medications of all above medical condition listed as is.       Marquise Pierson MD, MD 5/6/2022 4:50 PM

## 2022-05-07 PROBLEM — K94.11 HEMORRHAGE FROM ILEOSTOMY (HCC): Status: ACTIVE | Noted: 2022-01-01

## 2022-05-07 NOTE — PROGRESS NOTES
Nephrology Progress Note  5/7/2022 1:10 PM        Subjective:   Admit Date: 4/22/2022  PCP: Max Donis MD    Interval History: Patient seen earlier this morning, this is a late entry. Apparently had some confusion yesterday. This morning he is alert awake but not fully oriented    Diet: Unsure how much oral intake he has    ROS: Probably mild confusion  Urine output recorded at 1100 cc for the last 24 hours albeit with 2 doses of IV loop  No fever, variable blood pressure recorded    Data:     Current meds:    lactobacillus  1 capsule Oral Daily with breakfast    apixaban  2.5 mg Oral BID    [Held by provider] digoxin  125 mcg Oral Daily    dilTIAZem  120 mg Oral Daily    ferrous sulfate  325 mg Oral BID WC    folic acid  1 mg Oral Daily    metoprolol tartrate  25 mg Oral BID    therapeutic multivitamin-minerals  1 tablet Oral Daily    pantoprazole  40 mg Oral Daily    sodium chloride flush  5-40 mL IntraVENous 2 times per day      dilTIAZem (CARDIZEM) 100 mg in dextrose 5% 100 mL (ADD-[place])           I/O last 3 completed shifts:   In: 400 [P.O.:300; IV Piggyback:100]  Out: 2668 [Urine:1373; Stool:100]    CBC:   Recent Labs     05/05/22  0355 05/06/22  0626   WBC 10.1 13.9*   HGB 9.8* 10.2*    363          Recent Labs     05/05/22  0355 05/06/22  0626   * 128*   K 4.3 4.2   CL 99 97*   CO2 19* 17*   BUN 39* 33*   CREATININE 3.2* 2.8*   GLUCOSE 106* 89       Lab Results   Component Value Date    CALCIUM 8.6 05/06/2022    PHOS 2.9 05/06/2022       Objective:     Vitals: BP (!) 95/17   Pulse 107   Temp 98.4 °F (36.9 °C) (Oral)   Resp 20   Ht 5' 9.02\" (1.753 m)   Wt 154 lb 1.6 oz (69.9 kg)   SpO2 97%   BMI 22.75 kg/m²     General appearance: Thin, alert, awake but not fully oriented-he does follow simple command this morning  HEENT: Striking conjunctival pallor perhaps 3+  Neck: Seemed supple  Lungs: Bilateral crackles on auscultation  Heart: Tachycardia and irregular  Abdomen: Soft, tender on palpation, ostomy with some output  Extremities: Mild ankle edema, he is wearing diaper      Problem List :         Impression :     1. Acute kidney injury with underlying CKD stage G2 or 3-nonoliguric and responded to urine to IV loop-his urine had no active sediment yesterday-had very minimal proteinuria-likely all from renal vein congestion and hemodynamic changes.-Left renal questionable mass-he is 80, conservative management medically would be the way to go-settle discussed with him when he is more lucent and perhaps his family  2. Probably fluid overload-clinically doing better now with 2 doses of IV loop-I will sit tight and watch him clinically  3. Recent abdominal abscess preceded by C. difficile, also history of dysrhythmia and advanced age-as well as history of rectal cancer and atherosclerotic cardiovascular disease, has infrarenal aortic aneurysm  4. Delirium/encephalopathy-likely multifactorial, avoid any nonessential medication    Recommendation/Plan  :     1. No diuretics for now  2. Avoid any nonessential medication  3. Follow clinically and biochemically  4. Renal function test tomorrow morning  5.  Daily weight, watch urine output      Diann La MD MD

## 2022-05-07 NOTE — PROGRESS NOTES
Occupational Therapy    Occupational Therapy Treatment Note  Name: Rose Ya MRN: [id number] :   1929   Date:  2022   Admission Date: 2022 Room:  -A     Primary Problem:  Hemorrhage from ileostomy    Restrictions/Precautions:         General Precautions; Fall risk    Communication with other providers:  Checked with nursing, okay to treat    Subjective:    Patient states:  \"I'm so cold\"  Pain: None stated    Objective:      Observation:  Laying in semi-kee position  Objective Measures:  Patient lethargic, unable to orient     Treatment, including education:  Self Care Training:   Self care training was performed today. TA for washing face, v/c to hold washcloth providing [employer] [place] assist to place hand on washcloth with patient pulling hand away. TA holding cup, v/c given to hold with hands with no carryover of instruction. Therapeutic Activity Training:   Therapeutic activity training was instructed today. Cues were given for safety, awareness, and positioning. Patient continued lethargic state not responding to cues on positioning. Assessment / Impression:      Atttempted to engage patient in positioning in bed with no carryover of instruction due to lethargic condtion, patient responded to loud voice multiple times, but would immediately close eyes again. Patient made no attempt to respond to commands, would verbally respond to question, unable to comprehend what he was saying d/t slurring words.        Patient's tolerance of treatment: Low  Adverse Reaction: None  Significant change in status and impact: Lethargic during intervention, unable to stay alert  Barriers to improvement: Inability to stay alert    Plan for Next Session:    Continue OT POC encouraging OOB activity    Time in:  9:17am  Time out:  9:32am  Timed treatment minutes:  15 min  Total treatment time:  15 min       Electronically signed by:    SCARLETT Esparza COTA/L [id number]  2022, 10:09 AM

## 2022-05-07 NOTE — PROGRESS NOTES
Neurology Service Consult Note  [address] [place] [address]  Patient Name: Kehinde Roach  : 1929        Subjective:   Reason for consult: Confusion  Patient seen and examined. Chart reviewed in detail. 80 y.o. -male with PMH of AAA, MARIELOS, BPH, CAD, HTN, pyelonephritis, GERD, and A. fib on Eliquis presenting to [address] [place] [address] for bleeding around ostomy site. Neurology consulted for their evaluation of patient's confusion. Patient was recently discharged on 2022 after being inpatient for  approximately 3-week hospital stay. During this time he had an exploratory laparotomy, small bowel resection, colon resection, management of severe colitis and an end ileostomy on 04/3/2022. After patient discharge, he went to a skilled care nursing facility. At bedside, patient is accompanied with his daughter and son, in which she tells me that upon going to skilled nursing facility at [address] [place] [address], his stoma was broken which prompted him to return here to the hospital.  Patient states that he has been given Ativan, and fentanyl on occasions which has made him confused and sleepy. Patient is sitting up in bedside recliner awake, but dozes in and out of sleep during exam.  Patient has not greatly cooperative with exam, as it takes next encouragement for patient to remain focused on task at hand. Once patient opens eyes and focuses, he does show me that he can follow some commands such as wiggling toes, and fingers, and right and left hand . Patient is verbal, however minimally at times and does not answer questions often throughout the exam.  Patient daughter at bedside tells me that whenever he receives strong medications he becomes weak and tired, and does not eat or drink. Prior work-up includes CBC, shows leukocytosis, CMP that shows creatinine elevation consistent with MARIELOS, and hyponatremia.     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    Delirium 4/7/2022    Gastroesophageal reflux disease without esophagitis 05/21/2020    Hx of cardiovascular stress test 08/12/2021    EF 62% Normal study    Hypertension     On total parenteral nutrition 4/9/2022    Partial small bowel obstruction ([address] 3/23/2022    Primary malignant neoplasm of rectum ([address] 08/17/2019    Probable Bacterial Pneumonia 4/5/2022    Psoriasis     Psoriatic arthritis ([facility] 08/17/2019    Pyelonephritis 03/24/2018    Severe protein-calorie malnutrition (HCC) 4/9/2022    Stable angina ([facility] 08/17/2019    Unspecified cerebral artery occlusion with cerebral infarction     :   Past Surgical History:   Procedure Laterality Date    APPENDECTOMY      COLONOSCOPY N/A 3/2/2022    COLONOSCOPY POLYPECTOMY REMOVAL ABLATION/STOMA with EMR and placed 7 hemoclips.  performed by Efrain Lino MD at [address] N/A 4/3/2022    LAPAROTOMY EXPLORATORY COLON RESECTION performed by Sung Nieves MD at [facility] N/A 2/28/2022    ENTEROSCOPY PUSH CONTROL HEMORRHAGE WITH APC ABLATION OF AVM performed by Efrain Lino MD at [facility] ENDOSCOPY     Medications:  Scheduled Meds:   lactobacillus  1 capsule Oral Daily with breakfast    apixaban  2.5 mg Oral BID    [Held by provider] digoxin  125 mcg Oral Daily    dilTIAZem  120 mg Oral Daily    ferrous sulfate  325 mg Oral BID WC    folic acid  1 mg Oral Daily    metoprolol tartrate  25 mg Oral BID    therapeutic multivitamin-minerals  1 tablet Oral Daily    pantoprazole  40 mg Oral Daily    sodium chloride flush  5-40 mL IntraVENous 2 times per day Continuous Infusions:   dilTIAZem (CARDIZEM) 100 mg in dextrose 5% 100 mL (ADD-[place])       PRN Meds:.simethicone, sodium chloride flush, ondansetron **OR** ondansetron, acetaminophen **OR** [DISCONTINUED] acetaminophen    Allergies   Allergen Reactions    Morphine Hallucinations    Sulfa Antibiotics      Social History     Socioeconomic History    Marital status:      Spouse name: Not on file    Number of children: Not on file    Years of education: Not on file    Highest education level: Not on file   Occupational History    Not on file   Tobacco Use    Smoking status: Former Smoker     Packs/day: 1.50     Years: 40.00     Pack years: 60.00     Types: Cigarettes     Quit date: 80     Years since quittin.3    Smokeless tobacco: Never Used   Vaping Use    Vaping Use: Never used   Substance and Sexual Activity    Alcohol use: No    Drug use: No    Sexual activity: Never   Other Topics Concern    Not on file   Social History Narrative    Not on file     Social Determinants of Health     Financial Resource Strain:     Difficulty of Paying Living Expenses: Not on file   Food Insecurity:     Worried About [address] in the Last Year: Not on file    Haily of Food in the Last Year: Not on file   Transportation Needs:     Lack of Transportation (Medical): Not on file    Lack of Transportation (Non-Medical):  Not on file   Physical Activity:     Days of Exercise per Week: Not on file    Minutes of Exercise per Session: Not on file   Stress:     Feeling of Stress : Not on file   Social Connections:     Frequency of Communication with Friends and Family: Not on file    Frequency of Social Gatherings with Friends and Family: Not on file    Attends Protestant Services: Not on file    Active Member of Clubs or Organizations: Not on file    Attends Club or Organization Meetings: Not on file    Marital Status: Not on file   Intimate Partner Violence:     Fear of Current or Ex-Partner: Not on file    Emotionally Abused: Not on file    Physically Abused: Not on file    Sexually Abused: Not on file   Housing Stability:     Unable to Pay for Housing in the Last Year: Not on file    Number of Places Lived in the Last Year: Not on file    Unstable Housing in the Last Year: Not on file      Family History   Problem Relation Age of Onset    Cancer Mother     Cancer Father          ROS (10 systems)  Unable to assess ROS questions secondary to patient's current mentation. Patient is confused and alert to self only. Physical Exam:       Vitals:    22 0000 22 0400 22 0546 22 0700   BP: 132/87 (!) 9517     Pulse: 102 107     Resp: 18 20     Temp: 98.4 °F (36.9 °C)      TempSrc: Oral Oral     SpO2: 98% 97%  97%   Weight:   154 lb 1.6 oz (69.9 kg)    Height:           Wt Readings from Last 3 Encounters:   22 154 lb 1.6 oz (69.9 kg)   22 177 lb (80.3 kg)   22 166 lb 4 oz (75.4 kg)     Temp Readings from Last 3 Encounters:   22 98.4 °F (36.9 °C) (Oral)   22 96.3 °F (35.7 °C) (Oral)   22 97.4 °F (36.3 °C)     BP Readings from Last 3 Encounters:   22 (!) 95/17   22 105/76   22 93/60     Pulse Readings from Last 3 Encounters:   22 107   22 82   22 77        Gen: Awake,alert to name and , he was ablet to tell me he is in [facility], falls back asleep easily  HEENT: NC/AT, EOMI, PERRL, mmm, neck supple, no meningeal signs; Heart: Irregular rhythm  Lungs: Respirations Unlabored  Ext: no edema, no calf tenderness b/l  Psych: Confusion, mild agitation at times  Skin: no rashes or lesions    NEUROLOGIC EXAM:    Mental Status: Awake,alert to name and , he was ablet to tell me he is in [facility], falls back asleep easily    When patient answers questions he mumbles. speech is garbled. follows some commands appropriately with encouragement.     Cranial Nerve Exam:   CN II-XII: PERRL, VFF, no nystagmus, no gaze paresis, sensation V1-V3 intact b/l, face appears metrical, tongue appears midline. Motor Exam:       Strength moves all extremities purposefully overall weak, follows simple commands;antigravity x4. Tone and bulk normal   YUMI pronator drift    Deep Tendon Reflexes: 2/4 biceps, triceps, brachioradialis, patellar, and achilles b/l; flexor plantar responses b/l    Sensation: Intact light touch to upper and lower extremity, antigravity x4    Coordination/Cerebellum:       Tremors--none      Rapidly alternating movements: YUMI             Heel-to-Shin: YUMI      Finger-to-Nose: YUMI    Gait and stance:      Gait: deferred      LABS:        CBC:   Recent Labs     05/06/22  0626   WBC 13.9*   RBC 3.28*   HGB 10.2*   HCT 33.3*      .5*     BMP:    Recent Labs     05/06/22  0626   *   K 4.2   CL 97*   CO2 17*   BUN 33*   CREATININE 2.8*   GLUCOSE 89   CALCIUM 8.6       Vitals:    05/07/22 0700   BP:    Pulse:    Resp:    Temp:    SpO2: 97%   107/87BP  98        Pulse    IMAGING:    CTH   1. No acute intracranial abnormality. 2. Stable moderate chronic white matter microvascular ischemic changes and   chronic lacunar infarct in the left caudate nucleus           Above imaging personally reviewed in detail. ASSESSMENT/PLAN:     80 y.o. -male with PMH of AAA, MARIELOS, BPH, CAD, HTN, pyelonephritis, GERD, and A. fib on Eliquis presenting to [facility] for bleeding around ostomy site. Neurology consulted for their evaluation of patient's confusion. 1. Confusion likely due to acute toxic metabolic encephalopathy  Superimposed on hyponatremia, leukocytosis, and MARIELOS. --Mental status likely to improve slowly as metabolic derangements improve. -- Patient is at risk for acute delirium. Please adhere to acute delirium protocol.   Please limit sedating medications, open blinds and keep lights on during the day, and decrease stimuli and close blinds and lower lights at bedtime, and decrease interruptions as safely as possible at night to encourage proper sleep/wake cycle. --We will continue to follow     Neurodiagnostics  As mentioned above    Patient discussed with Dr. Jami Sever    Thank you for allowing us to participate in the care of your patient. If there are any questions regarding evaluation please feel free to contact us.        CHEMA Alvarez CNP, 5/7/2022        CHEMA Alvarez CNP

## 2022-05-07 NOTE — PROGRESS NOTES
Cardiology Note    Today's plan: continue current medical management. Heart rate is controlled     Admit Date:  4/22/2022    Admission diagnosis / Complaint : tired and lethargic      Subjective:  Mr. Alfreda Miller is resting in bed. No cardiac complaints. Heart rate is controlled. BP is soft. Assessment and Plan:    PAF: rate is controlled. 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 Objective:   BP (!) 95/17   Pulse 107   Temp 98.4 °F (36.9 °C) (Oral)   Resp 20   Ht 5' 9.02\" (1.753 m)   Wt 154 lb 1.6 oz (69.9 kg)   SpO2 97%   BMI 22.75 kg/m²     Intake/Output Summary (Last 24 hours) at 5/7/2022 0813  Last data filed at 5/7/2022 0200  Gross per 24 hour   Intake 400 ml   Output 1103 ml   Net -703 ml       TELEMETRY: Atrial fibrillation    has a past medical history of AAA (abdominal aortic aneurysm) ([address] MARIELOS (acute kidney injury) ([address] Angina, class I ([address] Benign prostatic hyperplasia, Bowel obstruction ([address] CAD (coronary artery disease), Cancer ([address] CCC (chronic calculous cholecystitis), Delirium, Gastroesophageal reflux disease without esophagitis, Hx of cardiovascular stress test, Hypertension, On total parenteral nutrition, Partial small bowel obstruction ([address] Primary malignant neoplasm of rectum ([address] Probable Bacterial Pneumonia, Psoriasis, Psoriatic arthritis ([address] Pyelonephritis, Severe protein-calorie malnutrition ([address] Stable angina ([address] and Unspecified cerebral artery occlusion with cerebral infarction. has a past surgical history that includes Appendectomy; colostomy; Upper gastrointestinal endoscopy (N/A, 2/28/2022); Colonoscopy (N/A, 3/2/2022); and laparotomy (N/A, 4/3/2022).   Physical Exam:  General:  Awake, alert,  Skin:  Warm and dry  Neck:  JVD not appreciated  Chest:  Clear to auscultation, respiration easy  Cardiovascular: Irregular rate and rhythm,  S1S2  Abdomen:  Soft nontender, ostomy noted  Extremities:   No edema    Medications:    lactobacillus  1 capsule Oral Daily with breakfast    apixaban  2.5 mg Oral BID    [Held by provider] digoxin  125 mcg Oral Daily    dilTIAZem  120 mg Oral Daily    ferrous sulfate  325 mg Oral BID WC    folic acid  1 mg Oral Daily    metoprolol tartrate  25 mg Oral BID    therapeutic multivitamin-minerals  1 tablet Oral Daily    pantoprazole  40 mg Oral Daily    sodium chloride flush  5-40 mL IntraVENous 2 times per day      dilTIAZem (CARDIZEM) 100 mg in dextrose 5% 100 mL (ADD-[place])       simethicone, sodium chloride flush, ondansetron **OR** ondansetron, acetaminophen **OR** [DISCONTINUED] acetaminophen    Lab Data:  CBC:   Recent Labs     22   WBC 10.1 13.9*   HGB 9.8* 10.2*   HCT 34.6* 33.3*   .8* 101.5*    363     BMP:   Recent Labs     22   * 128*   K 4.3 4.2   CL 99 97*   CO2 19* 17*   PHOS  --  2.9   BUN 39* 33*   CREATININE 3.2* 2.8*     LIVER PROFILE:   Recent Labs     22   AST 29   ALT 46*   BILITOT 0.5   ALKPHOS 210*         Luke Debby, APRN - CNP, APRN-CNP 2022 8:13 AM     I have seen ,spoken to  and examined this patient personally, independently of the HEMANTH. I have reviewed the hospital care given to date and reviewed all pertinent labs and imaging. I have spoken with patient, nursing staff and provided written and verbal instructions . The above note has been reviewed     CARDIOLOGY ATTENDING ADDENDUM    HPI:  I have reviewed the above HPI  And agree with above     Pulse Range: Pulse  Av.8  Min: 88  Max: 107    Blood Presuure Range:  Systolic (41UTV), CST:396 , Min:95 , TTR:449   ; Diastolic (09IDO), XLX:56, Min:17, Max:87      Pulse ox Range: SpO2  Av.2 %  Min: 96 %  Max: 98 %    24hr I & O:    Intake/Output Summary (Last 24 hours) at 2022 1618  Last data filed at 5/7/2022 0200  Gross per 24 hour   Intake 400 ml   Output 478 ml   Net -78 ml         /61   Pulse 88   Temp 98.3 °F (36.8 °C) (Axillary)   Resp 19   Ht 5' 9.02\" (1.753 m)   Wt 154 lb 1.6 oz (69.9 kg)   SpO2 97%   BMI 22.75 kg/m²       Physical Exam:  General:  Awake, alert, NAD  Head:normal  Eye:normal  Neck:  No JVD   Chest:  Clear to auscultation, respiration easy  Cardiovascular:  RRR S1S2  Abdomen:   nontender  Extremities:  tr edema  Pulses; palpable  Neuro: grossly normal      MEDICAL DECISION MAKING;    Pt assessed , chart reviewed, patient examined examined , all available data was reviewed, following is the plan which was discussed with HEMANTH as well:    -Atrial fibrillation heart rate is well controlled patient on digoxin, Eliquis and Cardizem which will be continued    -Hypertension blood pressure is well controlled on beta-blocker i.e. metoprolol 25 mg p.o. twice daily      -CVA on CT has old infarcts    -Valvular heart disease has mild aortic stenosis and arctic valve area 1.6 on the last echo              Josh Borjas MD [facility]

## 2022-05-07 NOTE — PROGRESS NOTES
Hospitalist Progress Note      Name:  Carolina Mckeon /Age/Sex: 1929  (80 y.o. male)   MRN & CSN:  [id number] & [id number] Admission Date/Time: 2022 12:38 AM   Location:  -A PCP: Jaime Mendoza MD         Hospital Day: 15    Assessment and Plan:   Carolina Mckeon is a 80 y.o.  male  who presents with a postoperative intra-abdominal abscess. 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 Fib, and severe protein energy malnutrition, physical deconditioning - was initially admitted to ICU on 2022 for postoperative intra-abdominal abscess following a recent colitis status post subtotal colon resection and ileostomy placement 4/3/2022. Also had removal of existing colostomy and small bowel resection as well at the same time. Was downgraded from ICU. No sedating meds unless family called    Acute toxic metabolic encephalopathy: Recurrent since 2022  -2/2 UTI and intra-abdominal infection and treatment with opioids including fentanyl.  -Levaquin stopped  by ID -on this date developed MARIELOS  -May 6--Had Remeron the last 2 nights and was lethargic today. Remeron stopped. Klebsiella oxytoca bacteremia most likely 2/2 intra-abdominal abscess  -History of pseudomembranous colitis status post subtotal colectomy 4/3  -2 out of 2 blood cultures 2022 growing Klebsiella. Blood culture so far NGTD  -CT abdomen 5/3 compared to  with decrease in fluid collection   -5.6 cm AAA on CT  -General surgery following    History of chronic urinary retention:   -Status post Weaver catheter placement  per urology  -Will need every 3 week exchanges and follow-up outpatient with urology  -May 6-Weaver catheter removed as he was uncomfortable. CIC.     Acute kidney injury: Creatinine of 1.6. Baseline at 1.1  -Creat is 3.2 on 5/5 - consulted nephrology  -Improved to 2.8 on May 6. Acute blood loss anemia: On admission and reason for hospitalization  -No acute bleeding noted at this time.    -Was evaluated by GI due to melena. GI has since signed off.     History of CVA with residual paresthesias and lower extremity weakness:  -CT head and MRI brain on 4/28 with no acute findings. Showed remote lacunar infarcts. -Off aspirin due to increased risk of bleeding given age and while on Eliquis. Continue Eliquis  -Monitor for any bleeding.     Hx Jak2+ Thrombocytosis  -Follows with Heme/Onc. Was taken off aspirin while on Eliquis to decrease risk of bleeding.     Paroxysmal A. Fib  -Digoxin-check level  -Diltiazem  -Metoprolol  -Apixaban-monitor for bleeding    5.6 cm infrarenal AAA: CT abdomen 5/3/2022. Patient had size of 5.5 cm in March/2022.  -Dr. Alayna Alanis consulted     Severe protein energy malnutrition: In context of acute illness  -Continue to encourage PO as able. Minimal improvement in PO intake   -Will start on Remeron. Continue Ensure and multivitamins  -Mirtazapine 15 mg nightly started on May 4  -Consider TPN. On May 5 he only had 240 mL p.o. intake per nurse. Mental status is improving on May 5. Hopefully he will start eating more  -TPN ordered on May 5, but does not have  Yet. Defer ultimate plan to nephrology.     Physical Deconditioning  -Accepted to the acute rehab unit when medically stable    Imaging results:    1. Postoperative changes from prior colectomy. Fluid collection in the pelvis is slightly decreased in size. 2. Infrarenal abdominal aortic aneurysm measuring 5.5 cm. 3. Peripancreatic fluid collection is not significantly changed in size in the interval, possibly a pseudocyst. 4. Small bilateral pleural effusions. 5. Indeterminate left renal lesion measures 1.9 cm. Renal protocol MRI or CT could provide further information as clinically indicated. RECOMMENDATIONS: 5.5 cm infrarenal abdominal aortic aneurysm. Recommend referral to a vascular specialist. Reference: J Am Abad Radiol 1497;99:473-362. CT head with chronic lacunar infarct in the left caudate nucleus. Diet ADULT ORAL NUTRITION SUPPLEMENT; Breakfast, Lunch, Dinner; Standard High Calorie/High Protein Oral Supplement  ADULT DIET; Easy to Chew   Code Status DNR-CC   Disposition Patient requires continued admission due to worsening renal function and ongoing encephalopathy     History of Present Illness:     Chief Complaint: Postoperative intra-abdominal abscess      May 6: Was lethargic today. Remeron stopped. May 5:    Met with nephrology in the room. Nephrology consult done today. Spoke with daughter Melita Wasserman over the phone today who was pleased that his mental status was improving. He was asking her about his condo, and if she is paying his bills. She indicated that this was a great sign. Prior notes:    Patient evaluated at bedside this morning. He is sitting up in chair and drinking Ensure with the help of his granddaughter who is present at bedside. He states he is doing so-so and denies any pain or concerns at this time. Endorses mild abdominal pain. Patient's daughter states she has noted mild improvement of p.o. intake like to monitor his p.o. intake some more before deciding on whether or not to pursue TPN or NG tube placement if possible. Objective: Intake/Output Summary (Last 24 hours) at 5/6/2022 2311  Last data filed at 5/6/2022 2038  Gross per 24 hour   Intake 400 ml   Output 745 ml   Net -345 ml      Vitals:   Vitals:    05/06/22 2000   BP: 133/86   Pulse: 100   Resp: 25   Temp: 97 °F (36.1 °C)   SpO2:      Physical Exam:     Physical Exam  Constitutional:       Appearance: He is not diaphoretic. HENT:      Nose: No rhinorrhea. Eyes:      General:         Right eye: No discharge. Left eye: No discharge.    Pulmonary:      Effort: Pulmonary effort is normal.   Skin:     Coloration: Skin is not jaundiced. Neurological:      Cranial Nerves: No cranial nerve deficit.          Medications:   Medications:    lactobacillus  1 capsule Oral Daily with breakfast    apixaban  2.5 mg Oral BID    [Held by provider] digoxin  125 mcg Oral Daily    dilTIAZem  120 mg Oral Daily    ferrous sulfate  325 mg Oral BID WC    folic acid  1 mg Oral Daily    metoprolol tartrate  25 mg Oral BID    therapeutic multivitamin-minerals  1 tablet Oral Daily    pantoprazole  40 mg Oral Daily    sodium chloride flush  5-40 mL IntraVENous 2 times per day      Infusions:    dilTIAZem (CARDIZEM) 100 mg in dextrose 5% 100 mL (ADD-[place])       PRN Meds: simethicone, 40 mg, 4x Daily PRN  sodium chloride flush, 10 mL, PRN  ondansetron, 4 mg, Q8H PRN   Or  ondansetron, 4 mg, Q6H PRN  acetaminophen, 650 mg, Q6H PRN    \    Electronically signed by Evan Sweet MD on 5/6/2022 at 11:11 PM

## 2022-05-07 NOTE — PROGRESS NOTES
Comprehensive Nutrition Assessment    Type and Reason for Visit:  Reassess    Nutrition Recommendations/Plan:   1. PN Order: 5/15 at 84 mL/hr  2. Please consider discussing hospice over PN     Malnutrition Assessment:  Malnutrition Status:  Severe malnutrition (04/29/22 1228)    Context:  Acute Illness     Findings of the 6 clinical characteristics of malnutrition:  Energy Intake:  50% or less of estimated energy requirements for 5 or more days  Weight Loss:  Unable to assess     Body Fat Loss: Moderate body fat loss Orbital   Muscle Mass Loss: Moderate muscle mass loss Temples (temporalis),Clavicles (pectoralis & deltoids)  Fluid Accumulation:  Unable to assess     Strength:  Not Performed    Nutrition Assessment:    RD has been consulted for PN recommendations. Unsure the long term goal here as it is clinically inappropriate for the pt to go home with TPN as he has a working GI tract. Pt would do better with a PEG tube but this also is difficult with the pts dementia. Please consider having a conversation with the pt family regarding long term goals of care. Nutrition Related Findings:    . Wound Type: Surgical Incision,Multiple       Current Nutrition Intake & Therapies:    Average Meal Intake: 0%  Average Supplements Intake: Refusing to take  ADULT ORAL NUTRITION SUPPLEMENT; Breakfast, Lunch, Dinner; Standard High Calorie/High Protein Oral Supplement  ADULT DIET; Easy to Chew    Anthropometric Measures:  Height: 5' 9.02\" (175.3 cm)  Ideal Body Weight (IBW): 160 lbs (73 kg)    Admission Body Weight: 165 lb 9.1 oz (75.1 kg) (first measured weight)  Current Body Weight: 160 lb (72.6 kg), 101.5 % IBW.  Weight Source: Bed Scale  Current BMI (kg/m2): 23.6  Usual Body Weight: 187 lb 6.3 oz (85 kg) (10/4/21)  % Weight Change (Calculated): -13.3  Weight Adjustment For: No Adjustment                 BMI Categories: Normal Weight (BMI 22.0 to 24.9) age over 72    Estimated Daily Nutrient Needs:  Energy Requirements Based On: Kcal/kg  Weight Used for Energy Requirements: Current  Energy (kcal/day): 3085-6478 (22-25 kcal/kg)  Weight Used for Protein Requirements: Current  Protein (g/day): 80-88 (1.0-1.1 g/kg)     Fluid (ml/day):      Nutrition Diagnosis:   · Inadequate oral intake related to acute injury/trauma as evidenced by intake 0-25%      Nutrition Interventions:   Food and/or Nutrient Delivery: Continue Current Diet,Start Tube Feeding  Nutrition Education/Counseling: No recommendation at this time  Coordination of Nutrition Care: Continue to monitor while inpatient,Feeding Assistance/Environment Change       Goals:  Previous Goal Met: No Progress toward Goal(s)  Goals: Initiate nutrition support,within 2 days       Nutrition Monitoring and Evaluation:   Behavioral-Environmental Outcomes: None Identified  Food/Nutrient Intake Outcomes: Enteral Nutrition Intake/Tolerance,Food and Nutrient Intake  Physical Signs/Symptoms Outcomes: Biochemical Data,Skin,Weight,Meal Time Behavior    Discharge Planning:     Too soon to determine     Omaira Guan RD, LD  Contact: [phone]

## 2022-05-07 NOTE — PROGRESS NOTES
He is comfortable this AM with removal of silveira catheter yesterday  Straight cath overnight for 100ml. Bladder scan for 350ml    He was pulling at catheter yesterday and may have been dislodged  Similar catheter dislodgement 1-2 weeks ago    He is voiding/chronic incontinence  (secondary to prior rectal surgery)  Was managed with chronic indwelling catheter since 2019 but last month have issues with pelvic pain and pulling at catheter.   Neurology is seeing for mental status changes    Urine culture 5/2/2022 negative  Office cystoscopy 2019      Urinary pads with clear, yellow urine    CT Scan a/p 5//6/2022  Surgery is following    Awake, more comfortable  Urinary pads with clear urine  Soft, benign    Recommendations  Avoid indwelling urinary catheter at this time due to patient's mental status changes/recent catheter dislodgement issues  He is much more comfortable with urinary catheter out  Bladder scan (now available) and straight cath bladder volume > 350ml  Possible replacement of indwelling catheter in future if mental status returns to baseline  Chronic indwelling catheter before due to urinary incontinence, neurogenic bladder

## 2022-05-07 NOTE — PROGRESS NOTES
Hospitalist Progress Note      Name:  Dennis Read /Age/Sex: 1929  (80 y.o. male)   MRN & CSN:  [id number] & [id number] Admission Date/Time: 2022 12:38 AM   Location:  -A PCP: Zari Vega MD         Hospital Day: 16    Assessment and Plan:   Dennis Read is a 80 y.o.  male  who presents with a postoperative intra-abdominal abscess. 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 Fib, and severe protein energy malnutrition, physical deconditioning - was initially admitted to ICU on 2022 for postoperative intra-abdominal abscess following a recent colitis status post subtotal colon resection and ileostomy placement 4/3/2022. Also had removal of existing colostomy and small bowel resection as well at the same time. Was downgraded from ICU. No psychoactive  meds unless family called per their request    Acute toxic metabolic encephalopathy: Recurrent since 2022  -2/2 UTI and intra-abdominal infection and treatment with opioids including fentanyl.  -Levaquin stopped  by ID -on this date developed MARIELOS  -May 6--Had Remeron the last 2 nights and was lethargic today. Remeron stopped. Klebsiella oxytoca bacteremia most likely 2/2 intra-abdominal abscess  -History of pseudomembranous colitis status post subtotal colectomy 4/3  -2 out of 2 blood cultures 2022 growing Klebsiella. Blood culture so far NGTD  -CT abdomen 5/3 compared to  with decrease in fluid collection   -5.6 cm AAA on CT  -General surgery following    History of chronic urinary retention:   -Status post Weaver catheter placement  per urology  -Will need every 3 week exchanges and follow-up outpatient with urology  -May 6-Weaver catheter removed as he was uncomfortable. CIC.    Acute kidney injury: Creatinine of 1.6. Baseline at 1.1  -Creat is 3.2 on 5/5 - consulted nephrology  -Improved to 2.8 on May 6.  -May 7-creatinine increased to 3.5-continue to monitor. Acute blood loss anemia: On admission and reason for hospitalization  -No acute bleeding noted at this time.    -Was evaluated by GI due to melena. GI has since signed off.     History of CVA with residual paresthesias and lower extremity weakness:  -CT head and MRI brain on 4/28 with no acute findings. Showed remote lacunar infarcts. -Off aspirin due to increased risk of bleeding given age and while on Eliquis. Continue Eliquis  -Monitor for any bleeding.     Hx Jak2+ Thrombocytosis  -Follows with Heme/Onc. Was taken off aspirin while on Eliquis to decrease risk of bleeding.     Paroxysmal A. Fib  -Digoxin-check level  -Diltiazem  -Metoprolol  -Apixaban-monitor for bleeding    5.6 cm infrarenal AAA: CT abdomen 5/3/2022. Patient had size of 5.5 cm in March/2022.  -Dr. Rupal Ragland consulted     Severe protein energy malnutrition: In context of acute illness  -Continue to encourage PO as able. Minimal improvement in PO intake   -Will start on Remeron. Continue Ensure and multivitamins  -Mirtazapine 15 mg nightly started on May 4  -Consider TPN. On May 5 he only had 240 mL p.o. intake per nurse. Mental status is improving on May 5. Hopefully he will start eating more  -TPN ordered on May 5, but does not have  Yet. Defer ultimate plan to nephrology.     Physical Deconditioning  -Accepted to the acute rehab unit when medically stable    Imaging results:    1. Postoperative changes from prior colectomy. Fluid collection in the pelvis is slightly decreased in size. 2. Infrarenal abdominal aortic aneurysm measuring 5.5 cm. 3. Peripancreatic fluid collection is not significantly changed in size in the interval, possibly a pseudocyst. 4. Small bilateral pleural effusions. 5. Indeterminate left renal lesion measures 1.9 cm. Renal protocol MRI or CT could provide further information as clinically indicated. RECOMMENDATIONS: 5.5 cm infrarenal abdominal aortic aneurysm. Recommend referral to a vascular specialist. Reference: J Am Abad Radiol 3568;96:328-268. CT head with chronic lacunar infarct in the left caudate nucleus. Diet ADULT ORAL NUTRITION SUPPLEMENT; Breakfast, Lunch, Dinner; Standard High Calorie/High Protein Oral Supplement  ADULT DIET; Dysphagia - Pureed; Mildly Thick (Nectar)   Code Status DNR-CC   Disposition Patient requires continued admission due to worsening renal function and ongoing encephalopathy     History of Present Illness:     Chief Complaint: Postoperative intra-abdominal abscess    May 7: Digoxin level came back 3.0 today. This was discussed with cardiology. He was not interacting with me when I saw him    May 6: Was lethargic today. Remeron stopped. May 5:    Met with nephrology in the room. Nephrology consult done today. Spoke with daughter Ave Rivers over the phone today who was pleased that his mental status was improving. He was asking her about his condo, and if she is paying his bills. She indicated that this was a great sign. Prior notes:    Patient evaluated at bedside this morning. He is sitting up in chair and drinking Ensure with the help of his granddaughter who is present at bedside. He states he is doing so-so and denies any pain or concerns at this time. Endorses mild abdominal pain. Patient's daughter states she has noted mild improvement of p.o. intake like to monitor his p.o. intake some more before deciding on whether or not to pursue TPN or NG tube placement if possible. Objective:        Intake/Output Summary (Last 24 hours) at 5/7/2022 1835  Last data filed at 5/7/2022 0200  Gross per 24 hour   Intake 400 ml   Output 478 ml   Net -78 ml      Vitals:   Vitals:    05/07/22 0736   BP: 102/61   Pulse: 88   Resp: 19   Temp: 98.3 °F (36.8 °C)   SpO2: Physical Exam:     Physical Exam  Constitutional:       Appearance: He is not diaphoretic. HENT:      Nose: No rhinorrhea. Eyes:      General:         Right eye: No discharge. Left eye: No discharge. Pulmonary:      Effort: Pulmonary effort is normal.   Skin:     Coloration: Skin is not jaundiced. Neurological:      Cranial Nerves: No cranial nerve deficit.          Medications:   Medications:    lactobacillus  1 capsule Oral Daily with breakfast    apixaban  2.5 mg Oral BID    [Held by provider] digoxin  125 mcg Oral Daily    dilTIAZem  120 mg Oral Daily    ferrous sulfate  325 mg Oral BID WC    folic acid  1 mg Oral Daily    metoprolol tartrate  25 mg Oral BID    therapeutic multivitamin-minerals  1 tablet Oral Daily    pantoprazole  40 mg Oral Daily    sodium chloride flush  5-40 mL IntraVENous 2 times per day      Infusions:    dilTIAZem (CARDIZEM) 100 mg in dextrose 5% 100 mL (ADD-[place])       PRN Meds: simethicone, 40 mg, 4x Daily PRN  sodium chloride flush, 10 mL, PRN  ondansetron, 4 mg, Q8H PRN   Or  ondansetron, 4 mg, Q6H PRN  acetaminophen, 650 mg, Q6H PRN    \    Electronically signed by Adonis Fontana MD on 5/7/2022 at 6:35 PM

## 2022-05-08 NOTE — PROGRESS NOTES
Nephrology Progress Note  5/8/2022 12:09 PM        Subjective:   Admit Date: 4/22/2022  PCP: Hilaria Parra MD    Interval History: Alert, awake this morning but not completely oriented, frequently goes back to sleep    Diet: Could be eating some    ROS: No overt shortness of breath, still 100% oriented. Urine output recorded 1.1 L  Afebrile, acceptable blood pressure      Data:     Current meds:    lactobacillus  1 capsule Oral Daily with breakfast    apixaban  2.5 mg Oral BID    [Held by provider] digoxin  125 mcg Oral Daily    dilTIAZem  120 mg Oral Daily    ferrous sulfate  325 mg Oral BID WC    folic acid  1 mg Oral Daily    metoprolol tartrate  25 mg Oral BID    therapeutic multivitamin-minerals  1 tablet Oral Daily    pantoprazole  40 mg Oral Daily    sodium chloride flush  5-40 mL IntraVENous 2 times per day           I/O last 3 completed shifts: In: 640 [P.O.:540; IV Piggyback:100]  Out: 528 [Urine:478; Stool:50]    CBC:   Recent Labs     05/06/22  0626 05/07/22  1332   WBC 13.9* 15.2*   HGB 10.2* 11.3*    444*          Recent Labs     05/06/22  0626 05/07/22  1332   * 127*   K 4.2 4.3   CL 97* 94*   CO2 17* 19*   BUN 33* 39*   CREATININE 2.8* 3.5*   GLUCOSE 89 104*       Lab Results   Component Value Date    CALCIUM 8.9 05/07/2022    PHOS 4.1 05/07/2022       Objective:     Vitals: /65   Pulse 96   Temp 97.3 °F (36.3 °C) (Oral)   Resp 17   Ht 5' 9.02\" (1.753 m)   Wt 153 lb (69.4 kg)   SpO2 97%   BMI 22.58 kg/m²     General appearance: Alert, awake but not completely oriented  HEENT: Positive conjunctival pallor  Neck: Supple  Lungs: Few adventitial breath sound  Heart: Irregular  Abdomen: Soft, nontender to palpation, he has ostomy  Extremities: No overt edema  He is wearing diaper      Problem List :         Impression :     1.  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 Currently does not seem fluid overloaded. Recent C. difficile colitis and abdominal abscess. 3. Underlying encephalopathy/delirium  4. Hyponatremia-difficult to tell whether is from excess total body water    Recommendation/Plan  :     1. Monitor renal function daily  2. Avoid any nonessential medication. 3. proBNP level tomorrow morning, encourage p.o. food and fluid. 4. Follow clinically and biochemically  5.  Hopefully direction level will continue to go down      Micha Ha MD MD

## 2022-05-08 NOTE — CONSULTS
Endocrinology   Consult Note  4/22/2022 12:38 AM     Primary Care provider: Minor Burciaga MD     Referring physician:  Lesa Clay MD     Dear Doctor    Thank You for the Consult     Pt. Was Admitted for :    Reason for Consult:  Hypoglycemia       History Obtained From:  Patient/ EMR       HISTORY OF PRESENT ILLNESS:                The patient is a 80 y.o. male with significant past very complicated medical history of abdominal aortic aneurysm, CAD, rectal cancer. Small bowel obstruction, atrial fibrillation, admitted initiated April severe C. difficile colitis and had laparotomy done bowel was resected and colostomy done. .  Was treated in between with TPN not able to eat much. Has multiple complications during the process for last 4 to 6 weeks time when he was in the hospital in and out. .  Subsequently he developed he was to be abdominal abscess which was drained by IR and noted to have hypoglycemia this morning. Has not been receiving any insulin or oral hypoglycemics drugs. His appetite has been very poor has not not been eating much. I was  consulted for better control of blood glucose. He was seen site hospitalist by multiple consultants including surgery cardiology nephrology GI urology also hospice care. Status change to DNR CCA. ROS:   Pt's ROS done in detail. 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    Delirium 4/7/2022    Gastroesophageal reflux disease without esophagitis 05/21/2020    Hx of cardiovascular stress test 08/12/2021    EF 62% Normal study    Hypertension  On total parenteral nutrition 4/9/2022    Partial small bowel obstruction ([facility]) 3/23/2022    Primary malignant neoplasm of rectum ([facility]) 08/17/2019    Probable Bacterial Pneumonia 4/5/2022    Psoriasis     Psoriatic arthritis ([facility]) 08/17/2019    Pyelonephritis 03/24/2018    Seizure ([facility])     Severe protein-calorie malnutrition ([facility]) 4/9/2022    Stable angina ([facility]) 08/17/2019    Unspecified cerebral artery occlusion with cerebral infarction      Surgical History:        Procedure Laterality Date    APPENDECTOMY      COLONOSCOPY N/A 3/2/2022    COLONOSCOPY POLYPECTOMY REMOVAL ABLATION/STOMA with EMR and placed 7 hemoclips. performed by Ankit Gaines MD at [address] [facility] N/A 4/3/2022    LAPAROTOMY EXPLORATORY COLON RESECTION performed by Magdalene Pride MD at [address] [facility] N/A 2/28/2022    ENTEROSCOPY PUSH CONTROL HEMORRHAGE WITH APC ABLATION OF AVM performed by Ankit Gaines MD at [address] [facility] ENDOSCOPY       Allergies:  Morphine and Sulfa antibiotics    Family History:       Problem Relation Age of Onset    Cancer Mother     Cancer Father      REVIEW OF SYSTEMS:  Review of System Done as noted above     PHYSICAL EXAM:      Vitals:    /65   Pulse 96   Temp 97.3 °F (36.3 °C) (Oral)   Resp 17   Ht 5' 9.02\" (1.753 m)   Wt 153 lb (69.4 kg)   SpO2 97%   BMI 22.58 kg/m²     CONSTITUTIONAL:  awake, alert, cooperative, appears stated age   EYES:  vision intact Fundoscopic Exam not performed   ENT:Normal  NECK:  Supple, No JVD. Thyroid Exam:Normal   LUNGS:  Has Vesicular Breath Sounds,   CARDIOVASCULAR: Atrial fibrillation  ABDOMEN:  No scars, normal bowel sounds, soft, non-distended, non-tender, no masses palpated, no hepatolienomegaly  Has colostomy  Musculoskeletal: Normal  Extremities: Normal, peripheral pulses normal, , has no edema   NEUROLOGIC:  Awake, alert, oriented to name, place and time.   Cranial nerves II-XII are grossly intact. Motor iweakness ct. Sensory is intact. ,  and gait is abnormal.    DATA:    CBC:   Recent Labs     05/06/22  0626 05/07/22  1332   WBC 13.9* 15.2*   HGB 10.2* 11.3*    444*    CMP:  Recent Labs     05/06/22  0626 05/07/22  1332 05/08/22  1308   * 127* 129*   K 4.2 4.3 4.4   CL 97* 94* 94*   CO2 17* 19* 19*   BUN 33* 39* 50*   CREATININE 2.8* 3.5* 3.9*   CALCIUM 8.6 8.9 8.7   PROT 6.4  --   --    LABALBU 3.4  --   --    BILITOT 0.5  --   --    ALKPHOS 210*  --   --    AST 29  --   --    ALT 46*  --   --      Lipids:   Lab Results   Component Value Date    CHOL 140 05/07/2022    HDL 53 05/07/2022    TRIG 118 05/07/2022     Glucose:   Recent Labs     05/08/22  0619 05/08/22  0656 05/08/22  1309   POCGLU 50* 111* 128*     Hemoglobin A1C:   Lab Results   Component Value Date    LABA1C 5.2 05/06/2022     Free T4: No results found for: T4FREE  Free T3: No results found for: FT3  TSH High Sensitivity:   Lab Results   Component Value Date    TSHHS 1.990 05/06/2022       CT ABDOMEN PELVIS WO CONTRAST Additional Contrast? None   Final Result   1. Postoperative changes from prior colectomy. Fluid collection in the   pelvis is slightly decreased in size. 2. Infrarenal abdominal aortic aneurysm measuring 5.5 cm. 3. Peripancreatic fluid collection is not significantly changed in size in   the interval, possibly a pseudocyst.   4. Small bilateral pleural effusions. 5. Indeterminate left renal lesion measures 1.9 cm. Renal protocol MRI or CT   could provide further information as clinically indicated. RECOMMENDATIONS:   5.5 cm infrarenal abdominal aortic aneurysm. Recommend referral to a vascular   specialist.   Reference: J Am Abad Radiol [id number];11:274-988. CT HEAD WO CONTRAST   Final Result   1. No acute intracranial abnormality. 2. Stable moderate chronic white matter microvascular ischemic changes and   chronic lacunar infarct in the left caudate nucleus.          XR CHEST PORTABLE Final Result   No acute process. CT ABDOMEN PELVIS WO CONTRAST Additional Contrast? Oral   Final Result   1. Slight interval decrease in volume of a fluid collection extending from   the presacral soft tissues into the left mid abdominal cavity, which is   suboptimally evaluated due to lack of intravenous contrast.   2. Stable fluid collection along the inferior margin of the mid to distal   pancreas. 3. Gas in the urinary bladder lumen is favored to be related to Weaver   catheter placement, although enterovesical fistula cannot be completely   excluded. 4. Trace residual left pleural effusion. 5. Stable 5.6 cm abdominal aortic aneurysm. RECOMMENDATIONS:   5.6 cm infrarenal abdominal aortic aneurysm. Recommend referral to a vascular   specialist.   Reference: J Am Abad Radiol 5713;[id number]. MRI BRAIN WO CONTRAST   Final Result   1. No acute infarct or other acute intracranial process. 2. Senescent changes including moderate generalized parenchymal volume loss   and chronic small vessel ischemia. Remote bilateral lacunar infarcts   throughout the basal ganglia. CT HEAD WO CONTRAST   Final Result   No noncontrast CT evidence of acute intracranial pathology or significant   interval change compared to 03/20/2022. Cortical atrophy, white matter changes consistent with microvascular   degenerative disease, atherosclerotic calcification in the terminal internal   carotid arteries of uncertain hemodynamic significance incidentally noted. .         XR CHEST PORTABLE   Final Result   Stable trace left pleural effusion with left basilar airspace disease or   atelectasis. XR CHEST PORTABLE   Final Result   Small bilateral pleural effusions. Moderate left basilar atelectasis and mild right basilar atelectasis. VL DUP LOWER EXTREMITY VENOUS RIGHT   Final Result   No evidence of DVT in the right lower extremity.          CT ABDOMEN PELVIS W IV CONTRAST Additional Contrast? None   Final Result   1. Large rim enhancing fluid collection is seen within the abdomen and pelvis   which extends from the presacral region to the level of the pancreas,   concerning for an abscess. 2. Small amount of free fluid is seen within the abdomen. 3. Infrarenal abdominal aortic aneurysm measuring 5.6 cm. Please see   recommendations below. 4. Small bilateral pleural effusions with bibasilar atelectasis. 5. Focally dilated small bowel loops with air-fluid levels are seen within   the left upper quadrant which could be related to ileus. RECOMMENDATIONS:   5.6 cm infrarenal abdominal aortic aneurysm. Recommend referral to a vascular   specialist.      Reference: J Am Abad Radiol 1368;91:810-212.             IR GUIDED FLUID DRAINAGE BY CATH SOFT TISSUE PERC    (Results Pending)          Scheduled Medicines   Medications:    lactobacillus  1 capsule Oral Daily with breakfast    apixaban  2.5 mg Oral BID    [Held by provider] digoxin  125 mcg Oral Daily    dilTIAZem  120 mg Oral Daily    ferrous sulfate  325 mg Oral BID WC    folic acid  1 mg Oral Daily    metoprolol tartrate  25 mg Oral BID    therapeutic multivitamin-minerals  1 tablet Oral Daily    pantoprazole  40 mg Oral Daily    sodium chloride flush  5-40 mL IntraVENous 2 times per day      Infusions:       IMPRESSION    Patient Active Problem List   Diagnosis    Hypertension    Benign prostatic hyperplasia    Psoriatic arthritis ([address]    Primary malignant neoplasm of rectum (HCC)    Psoriasis    Chronic myeloproliferative disease (HCC)    Monocytosis (symptomatic)    Gastroesophageal reflux disease without esophagitis    Urinary retention    H/O: CVA (cerebrovascular accident)    Irregular heart beat    Nonrheumatic aortic valve stenosis    Paroxysmal atrial fibrillation (HCC)    Hemorrhage from ileostomy ([address]    Melena    Arteriovenous malformation of jejunum    History of rectal cancer    Benign neoplasm of cecum    Benign neoplasm of ascending colon    Pseudomonas urinary tract infection    Partial small bowel obstruction (HCC)    SBO (small bowel obstruction) (HCC)    Atrial fibrillation with RVR (HCC)    Hypotension    Ischemic colitis (HCC)    Hypocalcemia    MARIELOS (acute kidney injury) ([address]    Probable Bacterial Pneumonia    CAD (coronary artery disease)    Anemia    Delirium    Severe protein-calorie malnutrition (HCC)    On total parenteral nutrition    Colitis    Postoperative intra-abdominal abscess    Gram-negative bacteremia    Seizure ([address]         RECOMMENDATIONS:      1. Reviewed POC blood glucose . Labs and X ray results   2. Reviewed Home and Current Medicines   3. Pain reason his blood glucose was dropped this morning versus lack of eating enough food. Encouraged him to eat better. Indicates he is not getting eating better may consider either putting a NG tube feeding for preferably TPN monitor  4. Will monitor blood glucose frequently   5. Controlled blood glucose seem to be acceptable       Will follow with you  Again thank you for sharing pt's care with me.      Truly yours,       Juancarlos Hickman MD

## 2022-05-08 NOTE — PROGRESS NOTES
Patient voided in depends ; daily bladder scan not done. Straight cathed for UA purposes.  Post residual void was 75cc

## 2022-05-08 NOTE — PROGRESS NOTES
He is comfortable this AM after removal of silveira catheter 5/6/2022  -mental status improved this AM.  Alert  -voiding/incontinence  -PVR < 300ml several times and straight cath once daily now       He was pulling at catheter and may have dislodged catheter with transfers  Similar catheter dislodgement 1-2 weeks ago     He is voiding/chronic incontinence  (secondary to prior rectal surgery)  Was managed with chronic indwelling catheter since 2019 but last month having issues with pelvic pain and pulling at catheter. Neurology is seeing for mental status changes     Urine culture 5/2/2022 negative  Office cystoscopy 2019         CT Scan a/p 5/6/2022  Surgery is following     Awake, more comfortable, more alertSoft, benign     Recommendations  Avoid indwelling urinary catheter at this time due to patient's mental status changes/recent catheter dislodgement issues  He is much more comfortable with urinary catheter out  Straight cath PRN retention and once daily  Possible replacement of indwelling catheter in future if mental status returns to baseline  Chronic indwelling catheter before due to urinary incontinence, neurogenic bladder   Acute renal insufficiency.   Nephrology following  Outpatient office cystoscopy to re-evaluate lower urinary tract

## 2022-05-08 NOTE — PROGRESS NOTES
Cardiology Note    Today's plan: continue current medical management. Heart rate is controlled. Will sign off. Admit Date:  4/22/2022    Admission diagnosis / Complaint : tired and lethargic      Subjective:  Mr. Jerad Handy is resting in bed. No cardiac complaints. Heart rate is controlled. BP is soft. Assessment and Plan:    PAF: rate is controlled. 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 Will sign off, please call if further consultation is needed. Objective:   /66   Pulse 111   Temp 97.9 °F (36.6 °C) (Oral)   Resp 18   Ht 5' 9.02\" (1.753 m)   Wt 153 lb (69.4 kg)   SpO2 98%   BMI 22.58 kg/m²       Intake/Output Summary (Last 24 hours) at 5/8/2022 0850  Last data filed at 5/8/2022 6237  Gross per 24 hour   Intake 240 ml   Output 50 ml   Net 190 ml       TELEMETRY: Atrial fibrillation    has a past medical history of AAA (abdominal aortic aneurysm) ([address] MARIELOS (acute kidney injury) ([address] Angina, class I ([address] Benign prostatic hyperplasia, Bowel obstruction ([address] CAD (coronary artery disease), Cancer ([address] CCC (chronic calculous cholecystitis), Delirium, Gastroesophageal reflux disease without esophagitis, Hx of cardiovascular stress test, Hypertension, On total parenteral nutrition, Partial small bowel obstruction ([address] Primary malignant neoplasm of rectum ([address] Probable Bacterial Pneumonia, Psoriasis, Psoriatic arthritis ([address] Pyelonephritis, Seizure ([address] Severe protein-calorie malnutrition ([address] Stable angina ([address] and Unspecified cerebral artery occlusion with cerebral infarction. has a past surgical history that includes Appendectomy; colostomy; Upper gastrointestinal endoscopy (N/A, 2/28/2022); Colonoscopy (N/A, 3/2/2022); and laparotomy (N/A, 4/3/2022).   Physical Exam:  General:  Awake, alert,  Skin:  Warm and dry  Neck:  JVD not appreciated  Chest:  Clear to auscultation, respiration easy  Cardiovascular:  Irregular rate and rhythm,  S1S2  Abdomen:  Soft nontender, ostomy noted  Extremities:   No edema    Medications:    lactobacillus  1 capsule Oral Daily with breakfast    apixaban  2.5 mg Oral BID    [Held by provider] digoxin  125 mcg Oral Daily    dilTIAZem  120 mg Oral Daily    ferrous sulfate  325 mg Oral BID WC    folic acid  1 mg Oral Daily    metoprolol tartrate  25 mg Oral BID    therapeutic multivitamin-minerals  1 tablet Oral Daily    pantoprazole  40 mg Oral Daily    sodium chloride flush  5-40 mL IntraVENous 2 times per day       simethicone, sodium chloride flush, ondansetron **OR** ondansetron, acetaminophen **OR** [DISCONTINUED] acetaminophen    Lab Data:  CBC:   Recent Labs     22  0622  1332   WBC 13.9* 15.2*   HGB 10.2* 11.3*   HCT 33.3* 36.3*   .5* 98.9    444*     BMP:   Recent Labs     22  0626 22  1332   * 127*   K 4.2 4.3   CL 97* 94*   CO2 17* 19*   PHOS 2.9 4.1   BUN 33* 39*   CREATININE 2.8* 3.5*     LIVER PROFILE:   Recent Labs     22  06   AST 29   ALT 46*   BILITOT 0.5   ALKPHOS 210*         Herb Eduin, APRN - CNP, APRN-CNP 2022 8:50 AM     I have seen ,spoken to  and examined this patient personally, independently of the HEMANTH. I have reviewed the hospital care given to date and reviewed all pertinent labs and imaging. I have spoken with patient, nursing staff and provided written and verbal instructions . The above note has been reviewed     CARDIOLOGY ATTENDING ADDENDUM    HPI:  I have reviewed the above HPI  And agree with above     Pulse Range: Pulse  Av.4  Min: 96  Max: 122    Blood Presuure Range:  Systolic (71HOL), WAL:802 , Min:105 , NVK:968   ; Diastolic (84JXJ), QIY:89, Min:65, Max:91      Pulse ox Range: SpO2  Av.3 %  Min: 97 %  Max: 98 %    24hr I & O:    Intake/Output Summary (Last 24 hours) at 2022 1203  Last data filed at 5/8/2022 4524  Gross per 24 hour   Intake 240 ml   Output 50 ml   Net 190 ml         /65   Pulse 96   Temp 97.3 °F (36.3 °C) (Oral)   Resp 17   Ht 5' 9.02\" (1.753 m)   Wt 153 lb (69.4 kg)   SpO2 97%   BMI 22.58 kg/m²       Physical Exam:  General:  Awake, alert, NAD  Head:normal  Eye:normal  Neck:  No JVD   Chest:  Clear to auscultation, respiration easy  Cardiovascular:  RRR S1S2  Abdomen:   nontender  Extremities:  tr edema  Pulses; palpable  Neuro: grossly normal      MEDICAL DECISION MAKING;    Pt assessed , chart reviewed, patient examined examined , all available data was reviewed, following is the plan which was discussed with HEMANTH as well:    -Atrial fibrillation heart rate is well controlled patient on digoxin, Eliquis and Cardizem which will be continued  Heart rate well controlled we will continue with present medical therapy     -Hypertension blood pressure is well controlled on beta-blocker i.e. metoprolol 25 mg p.o. twice daily        -CVA on CT has old infarcts     -Valvular heart disease has mild aortic stenosis and arctic valve area 1.6 on the last echo  Aortic stenosis being monitored as an outpatient    -Antibiotics for UTI and abscess as per primary team    -Patient is DNR CC continue supportive care    -We will sign off follow-up as needed          Aby Cesar MD [facility]

## 2022-05-08 NOTE — PROGRESS NOTES
Hospitalist    Discussed with daughter in room. Family stated they are interested in giving patient one more chance. Blood sugar was 50 this am.  Per nursing patient eating less than 25 % of meals today. Pocketing food at times. Will start TPN. D/W Dr. Ct HELLER for PICC line.

## 2022-05-08 NOTE — CONSULTS
Consult completed. Procedure/rationale explained to pt & family @ bedside including benefits vs potential risks/complications; questions answered & consent obtained. #5.5Fr Double Lumen ArrowG+hakeem Blue Advance PICC initiated to RUE Basilic Vein using sterile, UltraSound-guided technique without difficulty/complications. Positioning verified via VPSg4 & 3CG with appropriate P-wave changes, US cathedral waves, and blue bullseye noted. Sterile dressing with SkinPrep, StatLock Securing Device, BioPatch, SwabCaps, and Limb Precautions band applied. Pt tolerated well & denies other c/o or needs. Tatum Chaudhari, Primary RN notified.

## 2022-05-08 NOTE — PROGRESS NOTES
Neurology Service Consult Note  [facility]  Patient Name: Sher Baltazar  : 1929        Subjective:   Reason for consult: Confusion  Patient seen and examined. Chart reviewed in detail. 80 y.o. -male with PMH of AAA, MARIELOS, BPH, CAD, HTN, pyelonephritis, GERD, and A. fib on Eliquis presenting to [facility] for bleeding around ostomy site. Neurology consulted for their evaluation of patient's confusion. Mr. Nidia Somers is more alert today, still appears encephalopathic. He was able to tell me his name today, he is disoriented to time but new he was at [facility]. He is still having some difficulty following complex commands but overall following commands better on exam today. Prior work-up includes CBC, shows leukocytosis, CMP that shows creatinine elevation consistent with MARIELOS, and hyponatremia.     Past Medical History:   Diagnosis Date    AAA (abdominal aortic aneurysm) ([address]     MARIELOS (acute kidney injury) ([address] 2022    Angina, class I (HCC)     Benign prostatic hyperplasia 2011     Updating Deprecated Diagnoses    Bowel obstruction ([address]     CAD (coronary artery disease)     Cancer ([address]     rectal    CCC (chronic calculous cholecystitis) 2018    Delirium 2022    Gastroesophageal reflux disease without esophagitis 2020    Hx of cardiovascular stress test 2021    EF 62% Normal study    Hypertension     On total parenteral nutrition 2022    Partial small bowel obstruction ([address] 3/23/2022    Primary malignant neoplasm of rectum ([address] 2019    Probable Bacterial Pneumonia 2022    Psoriasis     Psoriatic arthritis ([address] 2019    Pyelonephritis 2018    Seizure ([address]     Severe protein-calorie malnutrition ([address] 2022    Stable angina ([address] 2019    Unspecified cerebral artery occlusion with cerebral infarction     :   Past Surgical History:   Procedure Laterality Date    APPENDECTOMY      COLONOSCOPY N/A 3/2/2022    COLONOSCOPY POLYPECTOMY REMOVAL ABLATION/STOMA with EMR and placed 7 hemoclips. performed by Nilson Matthews MD at [facility] N/A 4/3/2022    LAPAROTOMY EXPLORATORY COLON RESECTION performed by Caden Granados MD at [facility]. N/A 2022    ENTEROSCOPY PUSH CONTROL HEMORRHAGE WITH APC ABLATION OF AVM performed by Nilson Matthews MD at [facility] ENDOSCOPY     Medications:  Scheduled Meds:   lactobacillus  1 capsule Oral Daily with breakfast    apixaban  2.5 mg Oral BID    [Held by provider] digoxin  125 mcg Oral Daily    dilTIAZem  120 mg Oral Daily    ferrous sulfate  325 mg Oral BID WC    folic acid  1 mg Oral Daily    metoprolol tartrate  25 mg Oral BID    therapeutic multivitamin-minerals  1 tablet Oral Daily    pantoprazole  40 mg Oral Daily    sodium chloride flush  5-40 mL IntraVENous 2 times per day     Continuous Infusions:   dilTIAZem (CARDIZEM) 100 mg in dextrose 5% 100 mL (ADD-Girard)       PRN Meds:.simethicone, sodium chloride flush, ondansetron **OR** ondansetron, acetaminophen **OR** [DISCONTINUED] acetaminophen    Allergies   Allergen Reactions    Morphine Hallucinations    Sulfa Antibiotics      Social History     Socioeconomic History    Marital status:       Spouse name: Not on file    Number of children: Not on file    Years of education: Not on file    Highest education level: Not on file   Occupational History    Not on file   Tobacco Use    Smoking status: Former Smoker     Packs/day: 1.50     Years: 40.00     Pack years: 60.00     Types: Cigarettes     Quit date: 80     Years since quittin.3    Smokeless tobacco: Never Used   Vaping Use    Vaping Use: Never used   Substance and Sexual Activity    Alcohol use: No    Drug use: No    Sexual activity: Never   Other Topics Concern    Not on file   Social History Narrative    Not on file     Social Determinants of Health     Financial Resource Strain:     Difficulty of Paying Living Expenses: Not on file   Food Insecurity:     Worried About Running Out of Food in the Last Year: Not on file    Haily of Food in the Last Year: Not on file   Transportation Needs:     Lack of Transportation (Medical): Not on file    Lack of Transportation (Non-Medical): Not on file   Physical Activity:     Days of Exercise per Week: Not on file    Minutes of Exercise per Session: Not on file   Stress:     Feeling of Stress : Not on file   Social Connections:     Frequency of Communication with Friends and Family: Not on file    Frequency of Social Gatherings with Friends and Family: Not on file    Attends Anabaptism Services: Not on file    Active Member of [address] [place] [address] [employer] or Organizations: Not on file    Attends Club or Organization Meetings: Not on file    Marital Status: Not on file   Intimate Partner Violence:     Fear of Current or Ex-Partner: Not on file    Emotionally Abused: Not on file    Physically Abused: Not on file    Sexually Abused: Not on file   Housing Stability:     Unable to Pay for Housing in the Last Year: Not on file    Number of [place] in the Last Year: Not on file    Unstable Housing in the Last Year: Not on file      Family History   Problem Relation Age of Onset    Cancer Mother     Cancer Father          ROS (10 systems)  Unable to assess ROS questions secondary to patient's current mentation. Patient is confused and alert to self only.     Physical Exam:       Vitals:    05/07/22 2313 05/08/22 0005 05/08/22 0405 05/08/22 0455   BP: 105/79 (!) 116/91 112/66    Pulse: 100 103 111    Resp: 20 17 18    Temp: 97.5 °F (36.4 °C) 97.5 °F (36.4 °C) 97.9 °F (36.6 °C)    TempSrc: Axillary Axillary Oral    SpO2: 97%  98%    Weight:    153 lb (69.4 kg)   Height:           Wt Readings from Last 3 Encounters:   05/08/22 153 lb (69.4 kg)   04/28/22 177 lb (80.3 kg)   04/21/22 166 lb 4 oz (75.4 kg)     Temp Readings from Last 3 Encounters:   22 97.9 °F (36.6 °C) (Oral)   22 96.3 °F (35.7 °C) (Oral)   22 97.4 °F (36.3 °C)     BP Readings from Last 3 Encounters:   22 112/66   22 105/76   22 93/60     Pulse Readings from Last 3 Encounters:   22 111   22 82   22 77        Gen: Awake,alert to name and , he was ablet to tell me he is in [facility]    HEENT: NC/AT, EOMI, PERRL, mmm, neck supple, no meningeal signs; Heart: Irregular rhythm  Lungs: Respirations Unlabored  Ext: no edema, no calf tenderness b/l  Psych: more alert and pleasant today  Skin: no rashes or lesions    NEUROLOGIC EXAM:    Mental Status: Awake,alert to name and , he was able to tell me he is in [facility], answering questions more appropriately today. speech is clear today but lower tone. Cranial Nerve Exam:   CN II-XII: PERRL, VFF, no nystagmus, no gaze paresis, sensation V1-V3 intact b/l, face appears metrical, tongue appears midline. Motor Exam:       Strength moves all extremities purposefully overall weak, follows simple commands;antigravity x4. Tone and bulk normal   No pronator drift able to hold arms up and close eyes today.      Deep Tendon Reflexes: 2/4 biceps, triceps, brachioradialis, patellar, and achilles b/l; flexor plantar responses b/l    Sensation: Intact light touch to upper and lower extremity, antigravity x4    Coordination/Cerebellum:       Tremors--none       Finger-to-Nose: slight dysmetria but able to follow commands  Gait and stance:      Gait: deferred for safety      LABS:        CBC:   Recent Labs     22  1332   WBC 15.2*   RBC 3.67*   HGB 11.3*   HCT 36.3*   *   MCV 98.9     BMP:    Recent Labs     22  1332   *   K 4.3   CL 94*   CO2 19*   BUN 39*   CREATININE 3.5*   GLUCOSE 104*   CALCIUM 8.9       Vitals:    22 0405   BP: 112/66   Pulse: 111   Resp: 18   Temp: 97.9 °F (36.6 °C)   SpO2: 98%   107/87BP  98        Pulse  Vitals:    05/08/22 0405   BP: 112/66   Pulse: 111   Resp: 18   Temp: 97.9 °F (36.6 °C)   SpO2: 98%          IMAGING:    CTH   1. No acute intracranial abnormality. 2. Stable moderate chronic white matter microvascular ischemic changes and   chronic lacunar infarct in the left caudate nucleus     MRI  1. No acute infarct or other acute intracranial process. 2. Senescent changes including moderate generalized parenchymal volume loss   and chronic small vessel ischemia.  Remote bilateral lacunar infarcts   throughout the basal ganglia.             Above imaging personally reviewed in detail. ASSESSMENT/PLAN:     80 y.o. -male with PMH of AAA, MARIELOS, BPH, CAD, HTN, pyelonephritis, GERD, and A. fib on Eliquis presenting to [facility] for bleeding around ostomy site. Neurology consulted for their evaluation of patient's confusion. 1. Confusion likely due to acute toxic metabolic encephalopathy  Superimposed on hyponatremia, leukocytosis, Klebsiella oxytoca bacteremia, intra- abdominal abscess and MARIELOS. 2.   --Mental status likely to improve slowly as metabolic derangements improve. -- Patient is at risk for acute delirium. Please adhere to acute delirium protocol. Please limit sedating medications, open blinds and keep lights on during the day, and decrease stimuli and close blinds and lower lights at bedtime, and decrease interruptions as safely as possible at night to encourage proper sleep/wake cycle. We will sign off no acute neurological findings appears to be metabolic. Neurodiagnostics  As mentioned above  MRI as mentioned above    Patient discussed with Dr. Merlin Erm    Thank you for allowing us to participate in the care of your patient. If there are any questions regarding evaluation please feel free to contact us.        CHEMA Taylor CNP, 5/8/2022        CHEMA Taylor CNP

## 2022-05-09 NOTE — PROGRESS NOTES
Occupational Therapy  . Occupational Therapy Treatment Note  Name: Soumya Heard MRN: [id number] :   1929   Date:  2022   Admission Date: 2022 Room:  -A       D/c rec- Pt would benefit from continued acute care OT services w/ discharge to ARU    Primary Problem:      Restrictions/Precautions:          General precautions, fall risk  Aspiration, nectar thick    Communication with other providers:  cotx with PTA Brianna Garvey for safety and assist.    Subjective:  Patient states:  Is that coffee spilled. Pain: none stated   (location, type, intensity)  Objective:    Observation:  Patient supine. Family present. Some family members unaware of aspiration precautions and need to be on nectar thick via Speach. Objective Measures:  HR- 130-164 while EOB    Treatment, including education:    ADL activity training was instructed today. Cues were given for safety, sequence, UE/LE placement, visual cues, and balance. Activities performed today included  and grooming. Grooming- Mod A for hair brush with cues for initiation. Therapeutic activity training was instructed today. Cues were given for safety, sequence, UE/LE placement, awareness, and balance. Activities performed today included bed mobility training, sup-sit, sit-stand, SPT. Supine to EOB- Max x2 with max cues to initiate. Patient does like to \"go at own pace and does not want to be pulled on. \"  Sitting balance- CGA-Mod A intermittently tolerated x10 min. HR increased by sitting EOB. While EOB PTA performed PROM on BLE (see note)  Stand NO AD- HHA. - Mod x2. SPT- Mod x2 with cues to not sit too soon. Scooting hips back in recliner- CGA with cues. Patient/family educated on role of OT , benefits of OT and rationale for therapeutic intervention.  Aspiration precautions, nectar thick liquids    All therapeutic intervention performed c emphasis on dynamic balance / standing tolerance to increase strength, endurance and activity tolerance for increased [place] c ADL tasks and func transfers / mobility. Patient left safely in bedside chair at end of session, with call light in reach, alarm on and nursing aware. Gait belt was used for func transfers / mobility.       Assessment / Impression:      Patient's tolerance of treatment: ok  Adverse Reaction: none  Significant change in status and impact:  none  Barriers to improvement: weakness    Plan for Next Session:    Continue with OT POC    Time in:  1101  Time out:  1132  Timed treatment minutes:  31  Total treatment time:  31    Previously filed items:  Social/Functional History  Lives With: Alone  Type of Home: Condo  Home Layout: One level  Home Access: Level entry  Bathroom Shower/Tub: Walk-in shower  Bathroom Toilet: Standard  Bathroom Accessibility: Accessible  Home Equipment: Cane (PRN)  Has the patient had two or more falls in the past year or any fall with injury in the past year?: No  ADL Assistance: [address] [place] [address] Avenue: Independent  Homemaking Responsibilities: Yes  Ambulation Assistance: Independent  Transfer Assistance: Independent  Active : Yes  Mode of Transportation: Car  Occupation: Retired             Electronically signed by:    KINA Hunt 175, [address]    5/9/2022, 10:58 AM

## 2022-05-09 NOTE — PROGRESS NOTES
Physical Therapy  Name: Chacorta Chakraborty MRN: [id number] :   1929   Date:  2022   Admission Date: 2022 Room:  -A   Restrictions/Precautions:        general precautions, falls, colostomy, abdominal protection (recent ex lap)  Communication with other providers:  Cotx with Forrestine Sima for safety. Family educated on importance of adhering to aspiration precautions set forth from ST; Puree diet, Nectar thick liquids, strict aspiration precautions including upright positioning, slow pacing, alternating solids and liquids, and small bites and sips. RN notified of patient's intermittent tachycardia and that patient is Mod A x2 to transfer from Recliner<>EOB, but leland pad has been placed as precaution. Family in the room and supportive  Subjective:  Patient states:  Patient is agreeable for rehab, c/o tender abdomen and discomfort  Pain:   Location, Type, Intensity (0/10 to 10/10): Abdomen discomfort does not state pain  Objective:    Observation:  Patient Supine in bed with HOB elevated. PAteint resting 's, patient has tachycardia in 160's with seated activities   Treatment, including education/measures:    Transfers with line management of Tele Monitor, Pulse Ox, BP cuff  Rolling: Max A x2 partial roll to the Lt with max cues for hand placement and encourage  Supine to sit : Max A x2 with BLE effort to move to EOB and for trunk rise  Seated balance: Patient needs cues for forward lean. Min-Mod A to steady. Intermittent BUE support on EOB  Scooting : Max A x1 seated to EOB   Sit to stand : Mod A x2, cues for hand placement and forward lean. Impulsively stands before warning therapist x2  Standing balance: Mod A x2 with cues for forward weight shift. Tolerates ~5-8 mins before requesting the recliner   Stand to sit :Min A x2 for safety  SPT:Mod A x2 with cues for walker management, assist needed for walker assistance and BLE guidance from EOB to recliner.  Leland pad under patient    Sitting Exercises:  LAQ's x 5, AAROM     Therapeutic Exercise:  Therapeutic exercises were instructed today. Cues were given for technique, safety, recruitment, and rationale. Cues were verbal and/or tactile. Safety  Patient left safely in the recliner, with call light/phone in reach with alarm applied. Gait belt and mask were used for transfers and gait. Assessment / Impression:     Patient's tolerance of treatment:  fair   Adverse Reaction: tachycardia  Significant change in status and impact:  none  Barriers to improvement:  weakness  Plan for Next Session:    Will cont to work towards pt's goals per patient tolerance  Time in:  1101  Time out:  1132  Timed treatment minutes:  31  Total treatment time:  31  Previously filed items:  Social/Functional History  Lives With: Alone  Type of Home: Condo  Home Layout: One level  Home Access: Level entry  Bathroom Shower/Tub: Walk-in shower  Bathroom Toilet: Standard  Bathroom Accessibility: Accessible  Home Equipment: Cane (PRN)  Has the patient had two or more falls in the past year or any fall with injury in the past year?: No  ADL Assistance: [address] [place] [address] Avenue: Independent  Homemaking Responsibilities: Yes  Ambulation Assistance: Independent  Transfer Assistance: Independent  Active : Yes  Mode of Transportation: Car  Occupation: Retired     Long Term Goals  Time Frame for Long term goals : 1 week  Long term goal 1: Pt will peform sit><supine Nichol  Long term goal 2: Pt will perform light dynamic sitting activity x 5 minutes with single UE support Nichol  Long term goal 3: Pt will perform sit><Stand maxA  Long term goal 4: Pt will transfer between surfaces maxA  Long term goal 5: Pt will ambulate 10ft with RW modA  Electronically signed by:     Alysa Hardwick PTA   5/9/2022, 11:39 AM

## 2022-05-09 NOTE — CARE COORDINATION
Discussed with Izabela Oh that patient now has a 1:1 sitter and not making much progress with therapy. Concerned that patient will not be able to tolerate 3 hours of therapy a day with inconsistency of tolerance and participation with therapy. Patient not ARU appropriate at this time. D/w Leila Dakins CM that if patient makes progress with therapy to notify ARU and will reassess at that time.

## 2022-05-09 NOTE — PROGRESS NOTES
[facility]   Progress Note  [facility] 0 1 2      Date: 2022   Patient: Dipesh Palomo   : 1929   DOA: 2022   MRN: [id number]   ROOM#: [id number]     Admit Date: 2022     Collaborating Urologist on Call at time of admission: Dr. Elliott Mcdermott    CC: Pain from Weaver    Subjective:     Pain: mild, no nausea and no vomiting,   Bowel Movement/Flatus:   Yes  Voiding:  Voiding easily, episodes of incontinence    The patient is resting comfortably at this time with family at the bedside. No new complaints. He continues to void as he feels he needs to but does have episodes of incontinence in between which is his baseline. Spoke with family who is unsure if they will want to replace the catheter for further management of incontinence.      Objective:      Vitals:    /72   Pulse 117   Temp 97.8 °F (36.6 °C) (Oral)   Resp 17   Ht 5' 9.02\" (1.753 m)   Wt 151 lb 7.3 oz (68.7 kg)   SpO2 97%   BMI 22.36 kg/m²    Temp  Av.5 °F (36.4 °C)  Min: 97.3 °F (36.3 °C)  Max: 97.8 °F (36.6 °C)     Intake/Output Summary (Last 24 hours) at 2022 0841  Last data filed at 2022 0600  Gross per 24 hour   Intake 160 ml   Output 475 ml   Net -315 ml       Physical Exam:   General appearance: appears stated age, cooperative and confused  Head: Normocephalic, without obvious abnormality, atraumatic  Back: No CVA tenderness   Abdomen: Soft, nontender, nondistended    Labs:   WBC:    Lab Results   Component Value Date    WBC 19.0 2022      Hemoglobin/Hematocrit:    Lab Results   Component Value Date    HGB 9.7 2022    HCT 30.5 2022      BMP:   Lab Results   Component Value Date     2022    K 4.0 2022    CL 99 2022    CO2 17 2022    BUN 54 2022    LABALBU 3.3 2022    CREATININE 3.8 2022    CALCIUM 8.6 2022    GFRAA 18 2022    LABGLOM 15 2022      PT/INR:    Lab Results   Component Value Date    PROTIME 15.7 04/23/2022    PROTIME 10.4 12/11/2011    INR 1.21 04/23/2022      PTT:    Lab Results   Component Value Date    APTT 26.2 04/22/2022      Blood Culture:  No growth   Urine Culture:  No growth    Imaging:   CT ABDOMEN PELVIS WO CONTRAST Additional Contrast? None    Result Date: 5/6/2022  EXAMINATION: CT OF THE ABDOMEN AND PELVIS WITHOUT CONTRAST 5/6/2022 5:37 pm TECHNIQUE: CT of the abdomen and pelvis was performed without the administration of intravenous contrast. Multiplanar reformatted images are provided for review. Dose modulation, iterative reconstruction, and/or weight based adjustment of the mA/kV was utilized to reduce the radiation dose to as low as reasonably achievable. COMPARISON: 05/03/2022 HISTORY: ORDERING SYSTEM PROVIDED HISTORY: abdominal pain TECHNOLOGIST PROVIDED HISTORY: Reason for exam:->abdominal pain Additional Contrast?->None Reason for Exam: abdominal pain Additional signs and symptoms: unable to follow commands Relevant Medical/Surgical History: poor historian FINDINGS: Lower Chest: There is small bilateral pleural effusions. There is adjacent passive atelectasis. Coronary artery atherosclerosis. Organs: Prior cholecystectomy. Multiple cystic hepatic lesions are similar prior examination. The largest of these are in the left hepatic lobe. Within the limitations of a noncontrast examination, no acute abnormality within the spleen, pancreas, or right adrenal gland. Left adrenal thickening is similar to prior examination. No obstructing stones or hydronephrosis. 1.9 cm indeterminate left renal lesion. 5.1 x 3.1 cm peripancreatic fluid collection is not significantly changed in size in the interval. GI/Bowel: Stomach is partially distended. The small bowel is nondilated. 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 . Pelvis: Bladder is partially distended there is a small amount of gas in the urinary bladder, which may be due to recent catheterization.   Prostate is mildly enlarged. Peritoneum/Retroperitoneum: Loculated fluid collection in the pelvis has decreased in size, measuring 8.6 x 3.8 cm (previously 9.5 x 3.7 cm). This extends superiorly into the left mid abdomen, similar prior examination. No pneumoperitoneum. Infrarenal abdominal aortic aneurysm measures 5.5 cm. Bones/Soft Tissues: Multilevel degenerative changes of the lumbar spine. 1. Postoperative changes from prior colectomy. Fluid collection in the pelvis is slightly decreased in size. 2. Infrarenal abdominal aortic aneurysm measuring 5.5 cm. 3. Peripancreatic fluid collection is not significantly changed in size in the interval, possibly a pseudocyst. 4. Small bilateral pleural effusions. 5. Indeterminate left renal lesion measures 1.9 cm. Renal protocol MRI or CT could provide further information as clinically indicated. RECOMMENDATIONS: 5.5 cm infrarenal abdominal aortic aneurysm. Recommend referral to a vascular specialist. Reference: J Am Abad Radiol 6045;81:[id number]. CT ABDOMEN PELVIS WO CONTRAST Additional Contrast? Oral    Result Date: 5/3/2022  EXAMINATION: CT OF THE ABDOMEN AND PELVIS WITHOUT CONTRAST 5/3/2022 12:52 pm TECHNIQUE: CT of the abdomen and pelvis was performed without the administration of intravenous contrast. Multiplanar reformatted images are provided for review. Dose modulation, iterative reconstruction, and/or weight based adjustment of the mA/kV was utilized to reduce the radiation dose to as low as reasonably achievable. COMPARISON: 04/22/2022 HISTORY: ORDERING SYSTEM PROVIDED HISTORY: evaluate intra-abdominal abscess as had ERNIE removed 4/29, elevated WBC today TECHNOLOGIST PROVIDED HISTORY: Reason for exam:->evaluate intra-abdominal abscess as had ERNIE removed 4/29, elevated WBC today Additional Contrast?->Oral Reason for Exam: evaluate intra-abdominal abscess as had ERNIE removed 4/29, elevated WBC today FINDINGS: Lower Chest: Trace residual layering left pleural effusion.   No right pleural effusion. Organs: Limited evaluation due lack of intravenous contrast.  Scattered a patent cysts appear unchanged. Prior cholecystectomy. No biliary ductal dilatation. Fluid collection along the inferior margin of the mid to distal pancreas is not substantially changed measuring 5.4 x 3.1 cm in transaxial dimensions by 3.6 cm craniocaudal.  There is no pancreatic ductal dilatation. Chronic calcified granulomatous changes are noted in the otherwise unremarkable spleen. Subcentimeter benign left adrenal adenoma is unchanged. Right adrenal gland is unremarkable. No suspect renal lesion is evident. There are renal hilar vascular calcifications without definite urinary tract calculus. There is no hydronephrosis. GI/Bowel: No abnormally dilated bowel loops. Changes of prior colectomy with right lower quadrant ileostomy. Pelvis: Weaver catheter in place with gas in the urinary bladder lumen. Peritoneum/Retroperitoneum: Slight interval decrease in volume of a fluid collection extending from the presacral soft tissues into the left mid abdomen to approximate the level of the umbilicus. Evaluation of this fluid collection is limited by lack of contrast as the most superior aspect of the collection is indistinguishable from adjacent bowel loops. The collection measures approximately 9.5 x 3.7 cm in transaxial dimensions at the level of the sacroiliac joints compared to 14.5 x 6.8 cm previously. There is no gas within the fluid collection. There is no free air or free fluid. 5.6 cm diameter fusiform infrarenal abdominal aortic aneurysm is redemonstrated with moderate calcific atherosclerosis. Bones/Soft Tissues: No new osseous or soft tissue abnormality.      1. Slight interval decrease in volume of a fluid collection extending from the presacral soft tissues into the left mid abdominal cavity, which is suboptimally evaluated due to lack of intravenous contrast. 2. Stable fluid collection along the inferior margin of the mid to distal pancreas. 3. Gas in the urinary bladder lumen is favored to be related to Weaver catheter placement, although enterovesical fistula cannot be completely excluded. 4. Trace residual left pleural effusion. 5. Stable 5.6 cm abdominal aortic aneurysm. RECOMMENDATIONS: 5.6 cm infrarenal abdominal aortic aneurysm. Recommend referral to a vascular specialist. Reference: J Am Abad Radiol 8094;74:[id number]. XR ABDOMEN (KUB) (SINGLE AP VIEW)    Result Date: 4/13/2022  EXAMINATION: ONE SUPINE XRAY VIEW(S) OF THE ABDOMEN 4/13/2022 9:53 am COMPARISON: 04/07/2022 HISTORY: ORDERING SYSTEM PROVIDED HISTORY: abdominal distension TECHNOLOGIST PROVIDED HISTORY: Reason for exam:->abdominal distension Reason for Exam: abdominal distension FINDINGS: Nonspecific dilated air-filled loops of small bowel measuring up to proximally 4.7 cm in the left upper quadrant. Findings may be compatible with small-bowel obstruction. Postoperative clips overlie the ventral abdomen. Ileus could appear similar. Findings are slightly increased from prior study on April 7, 2022. evaluation for free air limited with a supine view only. Calcified abdominal aortic aneurysm partially seen along the left lower lumbar spine. See recent CT. Increased small bowel dilatation to suggest small bowel obstruction, ileus or enteritis. CT HEAD WO CONTRAST    Result Date: 5/6/2022  EXAMINATION: CT OF THE HEAD WITHOUT CONTRAST  5/6/2022 5:35 pm TECHNIQUE: CT of the head was performed without the administration of intravenous contrast. Dose modulation, iterative reconstruction, and/or weight based adjustment of the mA/kV was utilized to reduce the radiation dose to as low as reasonably achievable. COMPARISON: 04/28/2022 HISTORY: ORDERING SYSTEM PROVIDED HISTORY: confusion, lethargy TECHNOLOGIST PROVIDED HISTORY: Reason for exam:->confusion, lethargy Reason for exam:->as above Has a \"code stroke\" or \"stroke alert\" been called? ->No Reason for Exam: confusion, lethargy; as above Additional signs and symptoms: none Relevant Medical/Surgical History: poor historian FINDINGS: BRAIN/VENTRICLES: There is no acute intracranial hemorrhage, mass effect or midline shift. No abnormal extra-axial fluid collection. The gray-white differentiation is maintained without evidence of an acute infarct. There is no evidence of hydrocephalus. Stable diffuse parenchymal volume loss with moderate chronic white matter microvascular ischemic changes. 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 ORBITS: The visualized portion of the orbits demonstrate no acute abnormality. SINUSES: The visualized paranasal sinuses and mastoid air cells demonstrate no acute abnormality. SOFT TISSUES/SKULL:  No acute abnormality of the visualized skull or soft tissues. 1. No acute intracranial abnormality. 2. Stable moderate chronic white matter microvascular ischemic changes and chronic lacunar infarct in the left caudate nucleus. CT HEAD WO CONTRAST    Result Date: 4/28/2022  EXAMINATION: CT OF THE HEAD WITHOUT CONTRAST  4/28/2022 10:58 am TECHNIQUE: CT of the head was performed without the administration of intravenous contrast. Dose modulation, iterative reconstruction, and/or weight based adjustment of the mA/kV was utilized to reduce the radiation dose to as low as reasonably achievable. COMPARISON: 03/20/2022 HISTORY: ORDERING SYSTEM PROVIDED HISTORY: acute change in MS TECHNOLOGIST PROVIDED HISTORY: Reason for exam:->acute change in MS Has a \"code stroke\" or \"stroke alert\" been called? ->No Reason for Exam: acute change in MS, CONFUSION FINDINGS: BRAIN/VENTRICLES: There is no acute intracranial hemorrhage, mass effect or midline shift. No abnormal extra-axial fluid collection. The gray-white differentiation is maintained without evidence of an acute infarct. There is no evidence of hydrocephalus.  Cortical atrophy, white matter changes consistent with microvascular degenerative disease noted and similar to prior study. Atherosclerotic calcification noted in the terminal internal carotid arteries of uncertain hemodynamic significance. ORBITS: The visualized portion of the orbits demonstrate no acute abnormality. SINUSES: Stable subcentimeter mucous retention cysts ethmoid air cells bilaterally. Otherwise, visualized paranasal sinuses, mastoid air cells and middle ear cavities demonstrate no acute abnormality. SOFT TISSUES/SKULL:  No acute abnormality. No noncontrast CT evidence of acute intracranial pathology or significant interval change compared to 03/20/2022. Cortical atrophy, white matter changes consistent with microvascular degenerative disease, atherosclerotic calcification in the terminal internal carotid arteries of uncertain hemodynamic significance incidentally noted. .     CT ABDOMEN PELVIS W IV CONTRAST Additional Contrast? None    Result Date: 4/22/2022  EXAMINATION: CT OF THE ABDOMEN AND PELVIS WITH CONTRAST 4/22/2022 2:15 am TECHNIQUE: CT of the abdomen and pelvis was performed with the administration of intravenous contrast. Multiplanar reformatted images are provided for review. Dose modulation, iterative reconstruction, and/or weight based adjustment of the mA/kV was utilized to reduce the radiation dose to as low as reasonably achievable. COMPARISON: CT abdomen and pelvis dated April 10, 2022 HISTORY: ORDERING SYSTEM PROVIDED HISTORY: Abdominal pain, vomiting, bleeding from ostomy site TECHNOLOGIST PROVIDED HISTORY: Reason for exam:->Abdominal pain, vomiting, bleeding from ostomy site Additional Contrast?->None Decision Support Exception - unselect if not a suspected or confirmed emergency medical condition->Emergency Medical Condition (MA) Reason for Exam: Abdominal pain, vomiting, bleeding from ostomy site FINDINGS: Lower Chest: Small bilateral pleural effusions with bibasilar atelectasis is noted.   Extensive coronary artery calcifications are seen. Organs: There has been a cholecystectomy. Stable multiple hypodense cystic lesions are seen within the liver without significant change. These are likely benign and no follow-up is indicated. Calcified granulomas are seen within the spleen. The adrenal glands are unremarkable. The pancreas is unremarkable. There is no evidence of hydronephrosis. GI/Bowel: Postsurgical changes are seen to the bowel loops. There is a right lower quadrant ostomy. There is no evidence of bowel obstruction. Mildly dilated focal loops of small bowel are noted with air-fluid levels within the left upper quadrant which could be related to reactive ileus. Pelvis: A Weaver catheter is seen within the bladder. Minimal gas is seen within the lumen of the bladder, likely rib related to catheter placement. Peritoneum/Retroperitoneum: There is no evidence of free intraperitoneal air. Small amount of free fluid is seen within the right lower quadrant. There is an infrarenal abdominal aortic aneurysm with mural thrombus which measures 5.6 x 5.6 cm. There is a large rim enhancing fluid collection within the abdomen and pelvis. This collection extends from the presacral region to the level of the pancreas and is concerning for an abscess. Bones/Soft Tissues: No destructive osseous lesions are identified. 1. Large rim enhancing fluid collection is seen within the abdomen and pelvis which extends from the presacral region to the level of the pancreas, concerning for an abscess. 2. Small amount of free fluid is seen within the abdomen. 3. Infrarenal abdominal aortic aneurysm measuring 5.6 cm. Please see recommendations below. 4. Small bilateral pleural effusions with bibasilar atelectasis. 5. Focally dilated small bowel loops with air-fluid levels are seen within the left upper quadrant which could be related to ileus. RECOMMENDATIONS: 5.6 cm infrarenal abdominal aortic aneurysm.  Recommend referral to a vascular specialist. Reference: [facility] Led [id number]. CT ABDOMEN PELVIS W IV CONTRAST Additional Contrast? None    Result Date: 4/10/2022  EXAMINATION: CT OF THE ABDOMEN AND PELVIS WITH CONTRAST 4/10/2022 9:12 am TECHNIQUE: CT of the abdomen and pelvis was performed with the administration of intravenous contrast. Multiplanar reformatted images are provided for review. Dose modulation, iterative reconstruction, and/or weight based adjustment of the mA/kV was utilized to reduce the radiation dose to as low as reasonably achievable. COMPARISON: None HISTORY: ORDERING SYSTEM PROVIDED HISTORY: abd pain , s/p colectomy with end ileostomy TECHNOLOGIST PROVIDED HISTORY: Reason for exam:->abd pain , s/p colectomy with end ileostomy Additional Contrast?->None Reason for Exam: abd pain , s/p colectomy with end ileostomy FINDINGS: Lower Chest: Moderate bilateral pleural effusions on partial imaging of the chest with adjacent atelectasis. Organs: Liver contains well-defined areas of low attenuation left hemiliver and smaller areas in the right hepatic lobe most consistent with hepatic cysts, unchanged from prior in configuration or size. Gallbladder surgically absent. Pancreas and spleen unremarkable. Adrenals without nodule. Kidneys without suspicious renal lesion and no hydronephrosis. GI/Bowel: Nasogastric tube terminates within the peripyloric region. Small bowel demonstrates fluid-filled segments with areas of mucosal hyperenhancement, acute inflammation of likely enteritis extending to the right lower quadrant ileostomy without parastomal hernia or obstructive process evident. Postsurgical changes status post colectomy. Stable postsurgical changes in the presacral region at the rectum with interval development of small volume abdominopelvic ascites. Pelvis: No suspicious pelvic lesion or bulky pelvic adenopathy/free fluid.  Peritoneum/Retroperitoneum: Atherosclerotic aneurysmal infrarenal abdominal aorta measuring up to maximum transaxial diameter 5.7 cm, similar to prior. No bulky retroperitoneal adenopathy. Bones/Soft Tissues: No acute osseous findings. Diffuse mild body wall edema. No mechanical obstructive process however inflammatory findings of small bowel with mucosal hyperenhancement and wall thickening throughout the distended small bowel loops to the right lower quadrant ileostomy with small volume abdominopelvic ascites most consistent with enteritis. No obvious abscess formation. Moderate bilateral pleural effusions along with body wall edema. XR CHEST PORTABLE    Result Date: 5/5/2022  EXAMINATION: ONE XRAY VIEW OF THE CHEST 5/5/2022 7:36 pm COMPARISON: Chest radiograph 04/24/2022. HISTORY: ORDERING SYSTEM PROVIDED HISTORY: sob TECHNOLOGIST PROVIDED HISTORY: Reason for exam:->sob Reason for Exam: sob Additional signs and symptoms: NA Relevant Medical/Surgical History: HYPERTENSION, RECTAL CANCER FINDINGS: The lungs are without acute focal process. There is no effusion or pneumothorax. The cardiomediastinal silhouette is stable. The osseous structures are stable. No acute process. XR CHEST PORTABLE    Result Date: 4/24/2022  EXAMINATION: ONE XRAY VIEW OF THE CHEST 4/24/2022 6:40 am COMPARISON: 4/23/2022 HISTORY: ORDERING SYSTEM PROVIDED HISTORY: f/u on pleural effusion TECHNOLOGIST PROVIDED HISTORY: Reason for exam:->f/u on pleural effusion Reason for Exam: f/u on pleural effusion Additional signs and symptoms: f/u on pleural effusion FINDINGS: Trace left pleural effusion is stable. Moderate left basilar atelectasis or airspace disease remains. Right lung is clear. No pneumothorax. Heart size is normal.     Stable trace left pleural effusion with left basilar airspace disease or atelectasis.      XR CHEST PORTABLE    Result Date: 4/23/2022  EXAMINATION: ONE XRAY VIEW OF THE CHEST 4/23/2022 7:31 am COMPARISON: 04/16/2022 HISTORY: ORDERING SYSTEM PROVIDED HISTORY: sob TECHNOLOGIST COMPARISON: None. HISTORY: ORDERING SYSTEM PROVIDED HISTORY: Elevated alkaline phosphatase TECHNOLOGIST PROVIDED HISTORY: Reason for exam:->Elevated alkaline phosphatase Specify organ?->LIVER Reason for Exam: Abd pain Additional signs and symptoms: Cholecystectomy FINDINGS: LIVER:  The liver demonstrates normal echogenicity without evidence of intrahepatic biliary ductal dilatation. BILIARY SYSTEM:  Cholecystectomy is noted. Common bile duct is within normal limits measuring 5.1 mm. RIGHT KIDNEY: The right kidney is grossly unremarkable without evidence of hydronephrosis. PANCREAS:  The pancreas is not visualized. OTHER: No evidence of right upper quadrant ascites. The examination is limited due to patient's body habitus. 1. Limited exam. 2. Cholecystectomy. 3. Common bile duct is within normal limits. No intrahepatic bile duct dilatation. VL DUP LOWER EXTREMITY VENOUS RIGHT    Result Date: 4/22/2022  EXAMINATION: DUPLEX VENOUS ULTRASOUND OF THE RIGHT LOWER EXTREMITY 4/22/2022 3:00 am TECHNIQUE: Duplex ultrasound using B-mode/gray scaled imaging and Doppler spectral analysis and color flow was obtained of the deep venous structures of the right extremity. COMPARISON: None. HISTORY: ORDERING SYSTEM PROVIDED HISTORY: swelling TECHNOLOGIST PROVIDED HISTORY: Reason for exam:->swelling Reason for Exam: swelling FINDINGS: The visualized veins of the right lower extremity are patent and free of echogenic thrombus. The veins demonstrate good compressibility with normal color flow study and spectral analysis. No evidence of DVT in the right lower extremity.      MRI BRAIN WO CONTRAST    Result Date: 4/28/2022  EXAMINATION: MRI OF THE BRAIN WITHOUT CONTRAST  4/28/2022 2:37 pm TECHNIQUE: Multiplanar multisequence MRI of the brain was performed without the administration of intravenous contrast. COMPARISON: Concurrent head CT HISTORY: ORDERING SYSTEM PROVIDED HISTORY: acute change in MS FINDINGS: INTRACRANIAL STRUCTURES/VENTRICLES: No evidence of an acute infarct or other acute parenchymal process. No evidence of acute intracranial hemorrhage. There is no evidence of an intracranial mass or extraaxial fluid collection. No significant mass effect or midline shift. Patchy and early confluent foci of T2/FLAIR hyperintensity are present within supratentorial white matter which is a nonspecific finding but likely represents moderate chronic microvascular ischemia. Scattered remote bilateral lacunar infarcts throughout the basal ganglia. There is moderate generalized volume loss. Ventricular enlargement concordant with the degree of parenchymal volume loss. The sellar/suprasellar regions appear unremarkable. The normal signal voids within the major intracranial vessels appear maintained. ORBITS: The visualized portion of the orbits demonstrate no acute abnormality. Bilateral pseudophakia. SINUSES: The visualized paranasal sinuses and mastoid air cells are well aerated. BONES/SOFT TISSUES: The bone marrow signal intensity appears normal. The soft tissues demonstrate no acute abnormality. 1. No acute infarct or other acute intracranial process. 2. Senescent changes including moderate generalized parenchymal volume loss and chronic small vessel ischemia. Remote bilateral lacunar infarcts throughout the basal ganglia. IR GUIDED THORACENTESIS PLEURAL    Result Date: 4/15/2022  PROCEDURE: ULTRASOUNDGUIDED bilateral THORACENTESIS 4/13/2022 HISTORY: ORDERING SYSTEM PROVIDED HISTORY: pleural effusion TECHNOLOGIST PROVIDED HISTORY: bilateral Reason for exam:->pleural effusion Which side should the procedure be performed?->Radiologist Recommendation TECHNIQUE: Maximum sterile barrier technique including hand hygiene, skin prep and sterile ultrasound technique utilized for procedure.  Sterile ultrasound technique also utilized for procedure Ultrasound guidance required to confirm presence of pleural fluid, puncture site selection, real-time intra procedural guidance. Images made for patient's medical file. Following informed consent, pause a confirm/time-out and sequential right and left lower thoracic puncture site selection, skin and ultrasound probe were prepped draped in sterile fashion. 10 mL 1% lidocaine without epinephrine for local anesthesia the puncture sites. Sequential ultrasound-guided access right and left pleural spaces was achieved using 5 [place] trocar mounted catheters. Catheter was advanced off trocar introducers and-evacuated containers. 520 mL clear dashawn fluid removed from the right pleural space with 120 mL sample sent for laboratory evaluation. 800 mL clear yellow fluid removed from left pleural space with 700 cc sample sent for laboratory evaluation. Access catheters removed. Dressing applied. Postprocedure chest radiograph ordered. Patient tolerated procedure well. Juanjo Glow FINDINGS: Pre and intraprocedural images demonstrate moderate bilateral pleural effusions with underlying pulmonary consolidation. Thoracentesis catheters seen within pleural fluid collections. No complication suggested. A total of 520 mL clear dashawn fluid removed from the right pleural space with 120 mL sample sent for laboratory evaluation. 800 mL clear dashawn fluid removed from left pleural space with 700 mL sample sent for laboratory evaluation. Successful ultrasound guided bilateral thoracentesis with specimen sent for laboratory evaluation per prior order. .       Assessment & Plan:      Soumya Heard is a 80 y.o. male admitted 4/22/2022 for postoperative intra-abdominal abscess    1) Chronic urinary incontinence: h/o urinary incontinence s/p rectal surgery, managed by chronic indwelling Weaver catheter since 2019. Now pulling at catheter due to pelvic discomfort and mental status change   Weaver removed 5/6, voiding/incontinence with PVR less than 300 mL several times. Straight cath once daily and PRN retention.   Possible replacement of indwelling catheter in the future when mental status returns to baseline, if desired. Neurology consulted for AMS   MARIELOS: Improving, nephrology following   Urine culture 5/2/22: Negative   Outpatient cystoscopy for re-evaluation of urinary tract    Will follow  Patient seen and examined, chart reviewed.      Electronically signed by RAJ Pepe on 5/9/2022 at 8:41 AM

## 2022-05-09 NOTE — CONSULTS
Comprehensive Nutrition Assessment    Type and Reason for Visit:  Reassess,Consult (PN Recs Only)    Nutrition Recommendations/Plan:   1. Continue to progress current CPN to 84 ml/hr to provide 1705 kcal and 100 gm protein  2. Continue current diet and oral nutrition supplement thickened as per SLP order  3. Assist with feeding as needed, PO intakes appreciated  4. If po intake does not improve, consider need for NG/PEG tube feeding if consistent with goals of care     Malnutrition Assessment:  Malnutrition Status:  Severe malnutrition (04/29/22 1228)    Context:  Acute Illness     Findings of the 6 clinical characteristics of malnutrition:  Energy Intake:  50% or less of estimated energy requirements for 5 or more days  Weight Loss:  Unable to assess     Body Fat Loss: Moderate body fat loss Orbital   Muscle Mass Loss: Moderate muscle mass loss Temples (temporalis),Clavicles (pectoralis & deltoids)  Fluid Accumulation:  Unable to assess     Strength:  Not Performed    Nutrition Assessment:    CPN has been ordered and is running today at 42 ml/hr. PO intake remains poor on dysphagia diet, consuming 1-25% of meals with assistance and encouragement. Is taking some Ensure and likes over ice noted. Thickener provided to Pt room. Will provide PN recs and continue to follow as high nutrition risk. Nutrition Related Findings:    BUN 54, Cr 3.8 Wound Type: Surgical Incision,Multiple       Current Nutrition Intake & Therapies:    Average Meal Intake: 1-25%  Average Supplements Intake: 26-50%,51-75%  ADULT ORAL NUTRITION SUPPLEMENT; Breakfast, Lunch, Dinner; Standard High Calorie/High Protein Oral Supplement  ADULT DIET;  Dysphagia - Pureed; Mildly Thick (Nectar)  PN-Adult Premix 5/15 - Central  Current Parenteral Nutrition Orders:  · Type and Formula: Premix Central (5/15)   · Lipids: 250ml (Four times weekly)  · Duration: Continuous  · Rate/Volume: 42/1008  · Current PN Order Provides: 1000 kcal and 50 gm protein  · Goal PN Orders Provides: 1705 kcal and 100 gm protein      Anthropometric Measures:  Height: 5' 9.02\" (175.3 cm)  Ideal Body Weight (IBW): 160 lbs (73 kg)    Admission Body Weight: 165 lb 9.1 oz (75.1 kg) (first measured weight)  Current Body Weight: 151 lb 7.3 oz (68.7 kg), 101.5 % IBW.  Weight Source: Bed Scale  Current BMI (kg/m2): 22.4  Usual Body Weight: 187 lb 6.3 oz (85 kg) (10/4/21)  % Weight Change (Calculated): -13.3  Weight Adjustment For: No Adjustment                 BMI Categories: Normal Weight (BMI 22.0 to 24.9) age over 72    Estimated Daily Nutrient Needs:  Energy Requirements Based On: Kcal/kg  Weight Used for Energy Requirements: Current  Energy (kcal/day): 2758-7478 (22-25 kcal/kg)  Weight Used for Protein Requirements: Current  Protein (g/day): 80-88 (1.0-1.1 g/kg)     Fluid (ml/day):  2047-7880 (1 ml/kcal)    Nutrition Diagnosis:   · Severe malnutrition,In context of chronic illness related to inadequate protein-energy intake as evidenced by poor intake prior to admission,weight loss,moderate loss of subcutaneous fat,moderate muscle loss    Nutrition Interventions:   Food and/or Nutrient Delivery: Continue Current Diet,Continue Oral Nutrition Supplement,Continue Current Parenteral Nutrition  Nutrition Education/Counseling: No recommendation at this time  Coordination of Nutrition Care: Continue to monitor while inpatient,Feeding Assistance/Environment Change,Speech Therapy,Swallow Evaluation  Plan of Care discussed with: Family    Goals:  Previous Goal Met: Progressing toward Goal(s)  Goals: Meet at least 75% of estimated needs,Tolerate nutrition support at goal rate       Nutrition Monitoring and Evaluation:   Behavioral-Environmental Outcomes: None Identified  Food/Nutrient Intake Outcomes: Diet Advancement/Tolerance,Food and Nutrient Intake,Supplement Intake,Parenteral Nutrition Intake/Tolerance  Physical Signs/Symptoms Outcomes: Biochemical Data,Chewing or Swallowing,GI Status,Meal Time Behavior,Nutrition Focused Physical Findings,Skin,Weight    Discharge Planning:    Continue Oral Nutrition Supplement     Lawrence Ho, [address], [place]  Contact: [phone]

## 2022-05-09 NOTE — PROGRESS NOTES
Palliative Care follow up visit. Patient is resting in bed on room air with sitter and granddaughter at bedside. He easily arouses and no signs of distress noted. Per report he didn't get much rest last night so he has been sleeping on and off most of the day to day. No concerns at this time.

## 2022-05-09 NOTE — PROGRESS NOTES
Nephrology Progress Note  5/9/2022 10:43 AM        Subjective:   Admit Date: 4/22/2022  PCP: Elpidio Bruner MD    Interval History:  patient seen and early morning, this is a late entry. He has intermittent confusion needing sitter in the room. He was alert and awake and partially oriented this morning. Diet:   He is on TPN now through PICC    ROS:   confusion requiring sitter,   urine output recorded only 50 cc, he has diaper and no Weaver, likely unable to measure the urine output. He has no fever and acceptable blood pressure    Data:     Current meds:    fat emulsion  250 mL IntraVENous Once per day on Mon Tue Thu Fri    lidocaine PF  5 mL IntraDERmal Once    sodium chloride flush  5-40 mL IntraVENous 2 times per day    lactobacillus  1 capsule Oral Daily with breakfast    apixaban  2.5 mg Oral BID    [Held by provider] digoxin  125 mcg Oral Daily    dilTIAZem  120 mg Oral Daily    ferrous sulfate  325 mg Oral BID WC    folic acid  1 mg Oral Daily    metoprolol tartrate  25 mg Oral BID    therapeutic multivitamin-minerals  1 tablet Oral Daily    pantoprazole  40 mg Oral Daily    sodium chloride flush  5-40 mL IntraVENous 2 times per day      PN-Adult Premix 5/15 - Central 42 mL/hr at 05/08/22 1904    sodium chloride           I/O last 3 completed shifts:   In: 400 [P.O.:400]  Out: 525 [Urine:75; Stool:450]    CBC:   Recent Labs     05/07/22  1332 05/09/22  0420   WBC 15.2* 19.0*   HGB 11.3* 9.7*   * 437          Recent Labs     05/07/22  1332 05/08/22  1308 05/09/22  0420   * 129* 133*   K 4.3 4.4 4.0   CL 94* 94* 99   CO2 19* 19* 17*   BUN 39* 50* 54*   CREATININE 3.5* 3.9* 3.8*   GLUCOSE 104* 120* 124*       Lab Results   Component Value Date    CALCIUM 8.6 05/09/2022    PHOS 3.9 05/09/2022       Objective:     Vitals: /72   Pulse 125   Temp 97.8 °F (36.6 °C) (Oral)   Resp 26   Ht 5' 9.02\" (1.753 m)   Wt 151 lb 7.3 oz (68.7 kg)   SpO2 97%   BMI 22.36 kg/m² General appearance:   seen, alert, awake but not completely oriented  HEENT:   at least 2+ conjunctival pallor  Neck:   seemed supple  Lungs:   Limited exam, no gross crackles  Heart:   tachycardia this morning  Abdomen:  soft, ostomy, tender on deep palpation  Extremities:   he has at least 1+ edema now      Problem List :         Impression :     1.  stage III acute kidney injury- unsure about urine output. his repeat urine analysis yesterday still without any active sediment, so the precise etiology is unknown to me, also unlikely obstruction, as he had CT of the abdomen on 6 May- toxic and ischemic tubular injury as well as acute interstitial nephritis is always a possibility- if the creatinine does not start to go down the line have to look for other etiology  2.  recent C. difficile and abdominal abscess- he is  off all antibiotics  3.  underlying advanced age, dysrhythmia, atherosclerotic cardiovascular disease and infrarenal aortic aneurysm as well as longstanding hypertension    Recommendation/Plan  :     1.  we will watch his volume status closely, as well as electrolytes specially TPN on board, also watch for refeeding syndrome.   2. I will discuss with his daughter, also make iron once a day, as more than that does not really help with absorption  3.   watch for iatrogenic and nosocomial complication  4.  digoxin level is down now  5.  follow clinically and biochemical      Alemanhilario Hurtado MD MD

## 2022-05-09 NOTE — PROGRESS NOTES
Speech Language Pathology  [facility]  DEPARTMENT OF SPEECH/LANGUAGE PATHOLOGY  DAILY PROGRESS NOTE  Ye Lara  5/9/2022  [id number]  Postoperative intra-abdominal abscess [T81.43XA]  Hemorrhage from ileostomy (HCC) [K94.11]  Allergies   Allergen Reactions    Morphine Hallucinations    Sulfa Antibiotics          Pt was seen this date for dysphagia treatment. IMPRESSION AND RECOMMENDATIONS:   Pt seen this date for diet tolerance monitoring and trials for advancement. Per/nsg family would like pt to be advanced to thin liquids. Family present for the visit and report pt's overall status is significantly improved. Dtr reports she has been giving pt thin liquids and he is tolerating. Pt was alert, seated upright in chair, alert, and agreeable to participation. Following repositioning, completed PO trials of thin liquids by straw and regular solids with and without lower denture in place. Pt demonstrated prolonged and disorganized mastication, both with ill-fitting lower denture which moved with pt's attempt to masticate, and without lower denture. Moderate residue of regular solids cleared with multiple sips of thin liquids. Pt demonstrated mild cough with solid trials likely r/t premature pharyngeal entry during prolonged oral phase. Recommend:  Advance to Minced/moist and Thin liquids. Aspiration precautions. Assist with feeding. Dtr to bring in denture adhesive, however, discussed denture may still be loose and we will have to compensate with modified textures. GOALS (current status in bold):  Short-term Goals  Timeframe for Short-term Goals: length of admission  Goal 1: Pt will tolerate pureed diet with nectar thick liquids with adequate oral manipulation/clearance and no s/s aspiration. Met, Discontinue  Goal 2: Pt/caregivers will implement recommended strategies 90% given min cues. Meeting, Continue  Goal 3: Pt will participate in instrumental swallow evaluation as indicated. Not warranted at this time  Goal 4: Pt/caregivers will demonstrate understanding of education/recommendations.  Meeting, Continue    EDUCATION:  POC including diet level change - dtr    PAIN RATING (0-10 Scale): did not express pain  Time in/Time out: SLP Individual Minutes  Time In: 1340  Time Out: 1410  Minutes: 30    Visit number: 400 OLIVA Real  5/9/2022  2:04 PM

## 2022-05-10 NOTE — PROGRESS NOTES
Hospitalist Progress Note      Name:  Chacorta Chakraborty /Age/Sex: 1929  (80 y.o. male)   MRN & CSN:  [id number] & [id number] Admission Date/Time: 2022 12:38 AM   Location:  -A PCP: Hetal Stinson MD         Hospital Day: 18    Assessment and Plan:   Chacorta Chakraborty is a 80 y.o.  male  who presents with a postoperative intra-abdominal abscess. 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 Fib, and severe protein energy malnutrition, physical deconditioning - was initially admitted to ICU on 2022 for postoperative intra-abdominal abscess following a recent colitis status post subtotal colon resection and ileostomy placement 4/3/2022. Also had removal of existing colostomy and small bowel resection as well at the same time. Was downgraded from ICU. No psychoactive  meds unless family called per their request    May 9 up-to-date  -Mehnaz Cisse is a 80-year-old who is s/p colon resection and end ileostomy on April 3 with pathology showing acute pseudomembranous colitis. He was here from  until . He was transferred to nursing facility on  and returned a few hours later hemorrhaging from the ostomy site-Per ED report he lost approximately 2 pints of blood. Bleeding from the ostomy site was controlled in the ED after stitches were placed.  -Acute rehab unit had approved patient for admission, but became tachycardic with therapy today. Acute rehab unit signed off today, and notify case management to call back if he were to improve  -IV Cardizem bolus ordered twice today because of heart rate near 130. Cardiology signed off May 8. Reconsult cardiology as needed if this problem persists  -Patient is in the midst of a severe MARIELOS.   Creatinine reached a maximum of 3.9 yesterday. Appears to have plateaued. Creatinine 3.8 today. Hopefully will continue to improve. GFR about 11 today. Appreciate nephrology consult.  -Ongoing encephalopathy is significant. Supportive care. -TPN started May 8 because of poor nutrition. If oral intake does not improve soon consider other options such as enteral nutrition.  -Hospice was mentioned by me to the family on May 8 after nephrology asked about it. Family is aware of the situation, and wanted to give the 1 more chance as he was very functional prior to the recent illness. Bleeding at the ostomy site --the presenting problem  -Status post 1 unit PRBC 4/23  -Was evaluated by GI due to melena. GI has since signed off. Acute toxic metabolic encephalopathy: Recurrent since 4/26/2022  -2/2 UTI and intra-abdominal infection and treatment with opioids including fentanyl.  -Levaquin stopped 5/5 by ID -on this date developed MARIELOS  -May 6--Had Remeron the last 2 nights and was lethargic today. Remeron stopped. -May 9- now has a significant MARIELOS. May be encephalopathic from that. 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 Blood culture so far NGTD  -CT abdomen 5/3 compared to 4/22 with decrease in fluid collection   -General surgery following  -May 9-off all antibiotics    History of chronic urinary retention with chronic Weaver:   -Status post Weaver catheter placement 4/28 per urology  -Will need every 3 week exchanges and follow-up outpatient with urology  -May 6-Weaver catheter removed as he was uncomfortable. Needs CIC as he does have chronic urinary retention. Acute kidney injury: Creatinine of 1.6.   Baseline at 1.1  -Creat is 3.2 on 5/5 - consulted nephrology  -Improved to 2.8 on May 6.  -May 7-creatinine increased to 3.5-continue to monitor.  -May 9 creatinine 3.8    History of CVA with residual paresthesias and lower extremity weakness:  -CT head and MRI brain on 4/28 with no acute findings. Showed remote lacunar infarcts. -Off aspirin due to increased risk of bleeding given age and while on Eliquis. Continue Eliquis  -Monitor for any bleeding.     Hx Jak2+ Thrombocytosis  -Follows with Heme/Onc. Was taken off aspirin while on Eliquis to decrease risk of bleeding.     Paroxysmal A. Fib  -Digoxin- level was high up to 3.0 on 5/7. Level on 5/9 is 2.1. This is probably due to the MARIELOS. -Diltiazem  -Metoprolol  -Apixaban- continue and monitor for bleeding    5.6 cm infrarenal AAA: CT abdomen 5/3/2022. Patient had size of 5.5 cm in March 2022.   -Dr. Roselinda Hamman consulted-outpatient follow-up     Severe protein energy malnutrition: In context of acute illness  -Continue to encourage PO as able. Minimal improvement in PO intake   -Remeron started then stopped after 2 doses after he became lethargic  -TPN started on May 8     Physical Deconditioning  -Accepted to the acute rehab unit when medically stable    Imaging results:    1. Postoperative changes from prior colectomy. Fluid collection in the pelvis is slightly decreased in size. 2. Infrarenal abdominal aortic aneurysm measuring 5.5 cm. 3. Peripancreatic fluid collection is not significantly changed in size in the interval, possibly a pseudocyst. 4. Small bilateral pleural effusions. 5. Indeterminate left renal lesion measures 1.9 cm. Renal protocol MRI or CT could provide further information as clinically indicated. RECOMMENDATIONS: 5.5 cm infrarenal abdominal aortic aneurysm. Recommend referral to a vascular specialist. Reference: J Am Abad Radiol 3334;30:228-465. CT head with chronic lacunar infarct in the left caudate nucleus. Diet ADULT ORAL NUTRITION SUPPLEMENT; Breakfast, Lunch, Dinner; Standard High Calorie/High Protein Oral Supplement  ADULT DIET;  Dysphagia - Minced and Moist  PN-Adult Premix 5/15 - Central   Code Status DNR-CC   Disposition Patient requires continued admission due to worsening renal function and ongoing encephalopathy     History of Present Illness:     Chief Complaint: Postoperative intra-abdominal abscess     May 9: Had rapid rates and given IV Cardizem twice. Was resting with eyes closed when I saw him. May 7: Digoxin level came back 3.0 today. This was discussed with cardiology. He was not interacting with me when I saw him    May 6: Was lethargic today. Remeron stopped. May 5:    Met with nephrology in the room. Nephrology consult done today. Spoke with daughter Joanne Arambula over the phone today who was pleased that his mental status was improving. He was asking her about his condo, and if she is paying his bills. She indicated that this was a great sign. Prior notes:    Patient evaluated at bedside this morning. He is sitting up in chair and drinking Ensure with the help of his granddaughter who is present at bedside. He states he is doing so-so and denies any pain or concerns at this time. Endorses mild abdominal pain. Patient's daughter states she has noted mild improvement of p.o. intake like to monitor his p.o. intake some more before deciding on whether or not to pursue TPN or NG tube placement if possible. Objective: Intake/Output Summary (Last 24 hours) at 5/9/2022 2345  Last data filed at 5/9/2022 1446  Gross per 24 hour   Intake 180 ml   Output 300 ml   Net -120 ml      Vitals:   Vitals:    05/09/22 2000   BP: (!) 141/90   Pulse: 125   Resp: 17   Temp: 97.9 °F (36.6 °C)   SpO2: 96%     Physical Exam:     Physical Exam  Constitutional:       Appearance: He is not diaphoretic. HENT:      Nose: No rhinorrhea. Eyes:      General:         Right eye: No discharge. Left eye: No discharge. Pulmonary:      Effort: Pulmonary effort is normal.   Skin:     Coloration: Skin is not jaundiced. Neurological:      Cranial Nerves:  No cranial nerve deficit.          Medications:   Medications:    [START ON 5/10/2022] ferrous sulfate  325 mg Oral Daily    fat emulsion  250 mL IntraVENous Once per day on Mon Tue Thu Fri    lidocaine PF  5 mL IntraDERmal Once    sodium chloride flush  5-40 mL IntraVENous 2 times per day    lactobacillus  1 capsule Oral Daily with breakfast    apixaban  2.5 mg Oral BID    [Held by provider] digoxin  125 mcg Oral Daily    dilTIAZem  120 mg Oral Daily    folic acid  1 mg Oral Daily    metoprolol tartrate  25 mg Oral BID    therapeutic multivitamin-minerals  1 tablet Oral Daily    pantoprazole  40 mg Oral Daily    sodium chloride flush  5-40 mL IntraVENous 2 times per day      Infusions:    PN-Adult Premix 5/15 - Central 42 mL/hr at 05/09/22 1920    sodium chloride       PRN Meds: sodium chloride flush, 5-40 mL, PRN  sodium chloride, , PRN  simethicone, 40 mg, 4x Daily PRN  sodium chloride flush, 10 mL, PRN  ondansetron, 4 mg, Q8H PRN   Or  ondansetron, 4 mg, Q6H PRN  acetaminophen, 650 mg, Q6H PRN    \    Electronically signed by Carol Carrillo MD on 5/9/2022 at 11:45 PM

## 2022-05-10 NOTE — PROGRESS NOTES
Occupational Therapy    Attempted to see pt on this date for OT session. Pt noted increased heart rate supine in bed at 158 bpm.  Will attempt as able and appropriate.

## 2022-05-10 NOTE — PROGRESS NOTES
[facility]   Progress Note  [facility] 0 1 2      Date: 5/10/2022   Patient: Christelle Campbell   : 1929   DOA: 2022   MRN: [id number]   ROOM#: [id number]     Admit Date: 2022     Collaborating Urologist on Call at time of admission: Dr. Kaci Santamaria    CC: Pain from Weaver    Subjective:     Pain: mild, no nausea and no vomiting,   Bowel Movement/Flatus:   Yes  Voiding:  Voiding easily, episodes of incontinence     The patient is resting comfortably at this time with family at the bedside. No new complaints. Mild abdominal pain seems to be related to his ostomy. No flank pain. No fever or chills. No nausea or vomiting. Has not been urinating as much today. Pad is mostly dry.      Objective:      Vitals:    BP (!) 133/102   Pulse 121   Temp 97.2 °F (36.2 °C) (Axillary)   Resp 21   Ht 5' 9.02\" (1.753 m)   Wt 151 lb 7.3 oz (68.7 kg)   SpO2 98%   BMI 22.36 kg/m²    Temp  Av.4 °F (36.3 °C)  Min: 96.6 °F (35.9 °C)  Max: 97.9 °F (36.6 °C)       Intake/Output Summary (Last 24 hours) at 5/10/2022 1003  Last data filed at 5/10/2022 1000  Gross per 24 hour   Intake 1467.51 ml   Output 400 ml   Net 1067.51 ml       Physical Exam:   General appearance: appears stated age, cooperative and confused  Head: Normocephalic, without obvious abnormality, atraumatic  Back: No CVA tenderness   Abdomen: Soft, nontender, nondistended    Labs:   WBC:    Lab Results   Component Value Date    WBC 19.0 2022      Hemoglobin/Hematocrit:    Lab Results   Component Value Date    HGB 9.7 2022    HCT 30.5 2022      BMP:   Lab Results   Component Value Date     05/10/2022    K 3.8 05/10/2022    CL 96 05/10/2022    CO2 18 05/10/2022    BUN 67 05/10/2022    LABALBU 3.3 2022    CREATININE 4.0 05/10/2022    CALCIUM 8.8 05/10/2022    GFRAA 17 05/10/2022    LABGLOM 14 05/10/2022      PT/INR:    Lab Results   Component Value Date    PROTIME 15.7 2022    PROTIME 10.4 2011 INR 1.21 04/23/2022      PTT:    Lab Results   Component Value Date    APTT 26.2 04/22/2022      Blood Culture:  No growth   Urine Culture:  No growth    Imaging:   CT ABDOMEN PELVIS WO CONTRAST Additional Contrast? None    Result Date: 5/6/2022  EXAMINATION: CT OF THE ABDOMEN AND PELVIS WITHOUT CONTRAST 5/6/2022 5:37 pm TECHNIQUE: CT of the abdomen and pelvis was performed without the administration of intravenous contrast. Multiplanar reformatted images are provided for review. Dose modulation, iterative reconstruction, and/or weight based adjustment of the mA/kV was utilized to reduce the radiation dose to as low as reasonably achievable. COMPARISON: 05/03/2022 HISTORY: ORDERING SYSTEM PROVIDED HISTORY: abdominal pain TECHNOLOGIST PROVIDED HISTORY: Reason for exam:->abdominal pain Additional Contrast?->None Reason for Exam: abdominal pain Additional signs and symptoms: unable to follow commands Relevant Medical/Surgical History: poor historian FINDINGS: Lower Chest: There is small bilateral pleural effusions. There is adjacent passive atelectasis. Coronary artery atherosclerosis. Organs: Prior cholecystectomy. Multiple cystic hepatic lesions are similar prior examination. The largest of these are in the left hepatic lobe. Within the limitations of a noncontrast examination, no acute abnormality within the spleen, pancreas, or right adrenal gland. Left adrenal thickening is similar to prior examination. No obstructing stones or hydronephrosis. 1.9 cm indeterminate left renal lesion. 5.1 x 3.1 cm peripancreatic fluid collection is not significantly changed in size in the interval. GI/Bowel: Stomach is partially distended. The small bowel is nondilated. 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 . Pelvis: Bladder is partially distended there is a small amount of gas in the urinary bladder, which may be due to recent catheterization. Prostate is mildly enlarged.  Peritoneum/Retroperitoneum: Loculated fluid collection in the pelvis has decreased in size, measuring 8.6 x 3.8 cm (previously 9.5 x 3.7 cm). This extends superiorly into the left mid abdomen, similar prior examination. No pneumoperitoneum. Infrarenal abdominal aortic aneurysm measures 5.5 cm. Bones/Soft Tissues: Multilevel degenerative changes of the lumbar spine. 1. Postoperative changes from prior colectomy. Fluid collection in the pelvis is slightly decreased in size. 2. Infrarenal abdominal aortic aneurysm measuring 5.5 cm. 3. Peripancreatic fluid collection is not significantly changed in size in the interval, possibly a pseudocyst. 4. Small bilateral pleural effusions. 5. Indeterminate left renal lesion measures 1.9 cm. Renal protocol MRI or CT could provide further information as clinically indicated. RECOMMENDATIONS: 5.5 cm infrarenal abdominal aortic aneurysm. Recommend referral to a vascular specialist. Reference: J Am Abad Radiol 1495;04:327-961. CT ABDOMEN PELVIS WO CONTRAST Additional Contrast? Oral    Result Date: 5/3/2022  EXAMINATION: CT OF THE ABDOMEN AND PELVIS WITHOUT CONTRAST 5/3/2022 12:52 pm TECHNIQUE: CT of the abdomen and pelvis was performed without the administration of intravenous contrast. Multiplanar reformatted images are provided for review. Dose modulation, iterative reconstruction, and/or weight based adjustment of the mA/kV was utilized to reduce the radiation dose to as low as reasonably achievable. COMPARISON: 04/22/2022 HISTORY: ORDERING SYSTEM PROVIDED HISTORY: evaluate intra-abdominal abscess as had ERNIE removed 4/29, elevated WBC today TECHNOLOGIST PROVIDED HISTORY: Reason for exam:->evaluate intra-abdominal abscess as had ERNIE removed 4/29, elevated WBC today Additional Contrast?->Oral Reason for Exam: evaluate intra-abdominal abscess as had ERNIE removed 4/29, elevated WBC today FINDINGS: Lower Chest: Trace residual layering left pleural effusion. No right pleural effusion.  Organs: Limited evaluation due lack of intravenous contrast.  Scattered a patent cysts appear unchanged. Prior cholecystectomy. No biliary ductal dilatation. Fluid collection along the inferior margin of the mid to distal pancreas is not substantially changed measuring 5.4 x 3.1 cm in transaxial dimensions by 3.6 cm craniocaudal.  There is no pancreatic ductal dilatation. Chronic calcified granulomatous changes are noted in the otherwise unremarkable spleen. Subcentimeter benign left adrenal adenoma is unchanged. Right adrenal gland is unremarkable. No suspect renal lesion is evident. There are renal hilar vascular calcifications without definite urinary tract calculus. There is no hydronephrosis. GI/Bowel: No abnormally dilated bowel loops. Changes of prior colectomy with right lower quadrant ileostomy. Pelvis: Weaver catheter in place with gas in the urinary bladder lumen. Peritoneum/Retroperitoneum: Slight interval decrease in volume of a fluid collection extending from the presacral soft tissues into the left mid abdomen to approximate the level of the umbilicus. Evaluation of this fluid collection is limited by lack of contrast as the most superior aspect of the collection is indistinguishable from adjacent bowel loops. The collection measures approximately 9.5 x 3.7 cm in transaxial dimensions at the level of the sacroiliac joints compared to 14.5 x 6.8 cm previously. There is no gas within the fluid collection. There is no free air or free fluid. 5.6 cm diameter fusiform infrarenal abdominal aortic aneurysm is redemonstrated with moderate calcific atherosclerosis. Bones/Soft Tissues: No new osseous or soft tissue abnormality.      1. Slight interval decrease in volume of a fluid collection extending from the presacral soft tissues into the left mid abdominal cavity, which is suboptimally evaluated due to lack of intravenous contrast. 2. Stable fluid collection along the inferior margin of the mid to distal pancreas. 3. Gas in the urinary bladder lumen is favored to be related to Weaver catheter placement, although enterovesical fistula cannot be completely excluded. 4. Trace residual left pleural effusion. 5. Stable 5.6 cm abdominal aortic aneurysm. RECOMMENDATIONS: 5.6 cm infrarenal abdominal aortic aneurysm. Recommend referral to a vascular specialist. Reference: J Am Abad Radiol 6962;28:[id number]. XR ABDOMEN (KUB) (SINGLE AP VIEW)    Result Date: 4/13/2022  EXAMINATION: ONE SUPINE XRAY VIEW(S) OF THE ABDOMEN 4/13/2022 9:53 am COMPARISON: 04/07/2022 HISTORY: ORDERING SYSTEM PROVIDED HISTORY: abdominal distension TECHNOLOGIST PROVIDED HISTORY: Reason for exam:->abdominal distension Reason for Exam: abdominal distension FINDINGS: Nonspecific dilated air-filled loops of small bowel measuring up to proximally 4.7 cm in the left upper quadrant. Findings may be compatible with small-bowel obstruction. Postoperative clips overlie the ventral abdomen. Ileus could appear similar. Findings are slightly increased from prior study on April 7, 2022. evaluation for free air limited with a supine view only. Calcified abdominal aortic aneurysm partially seen along the left lower lumbar spine. See recent CT. Increased small bowel dilatation to suggest small bowel obstruction, ileus or enteritis. CT HEAD WO CONTRAST    Result Date: 5/6/2022  EXAMINATION: CT OF THE HEAD WITHOUT CONTRAST  5/6/2022 5:35 pm TECHNIQUE: CT of the head was performed without the administration of intravenous contrast. Dose modulation, iterative reconstruction, and/or weight based adjustment of the mA/kV was utilized to reduce the radiation dose to as low as reasonably achievable. COMPARISON: 04/28/2022 HISTORY: ORDERING SYSTEM PROVIDED HISTORY: confusion, lethargy TECHNOLOGIST PROVIDED HISTORY: Reason for exam:->confusion, lethargy Reason for exam:->as above Has a \"code stroke\" or \"stroke alert\" been called? ->No Reason for Exam: confusion, lethargy; as above Additional signs and symptoms: none Relevant Medical/Surgical History: poor historian FINDINGS: BRAIN/VENTRICLES: There is no acute intracranial hemorrhage, mass effect or midline shift. No abnormal extra-axial fluid collection. The gray-white differentiation is maintained without evidence of an acute infarct. There is no evidence of hydrocephalus. Stable diffuse parenchymal volume loss with moderate chronic white matter microvascular ischemic changes. 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 ORBITS: The visualized portion of the orbits demonstrate no acute abnormality. SINUSES: The visualized paranasal sinuses and mastoid air cells demonstrate no acute abnormality. SOFT TISSUES/SKULL:  No acute abnormality of the visualized skull or soft tissues. 1. No acute intracranial abnormality. 2. Stable moderate chronic white matter microvascular ischemic changes and chronic lacunar infarct in the left caudate nucleus. CT HEAD WO CONTRAST    Result Date: 4/28/2022  EXAMINATION: CT OF THE HEAD WITHOUT CONTRAST  4/28/2022 10:58 am TECHNIQUE: CT of the head was performed without the administration of intravenous contrast. Dose modulation, iterative reconstruction, and/or weight based adjustment of the mA/kV was utilized to reduce the radiation dose to as low as reasonably achievable. COMPARISON: 03/20/2022 HISTORY: ORDERING SYSTEM PROVIDED HISTORY: acute change in MS TECHNOLOGIST PROVIDED HISTORY: Reason for exam:->acute change in MS Has a \"code stroke\" or \"stroke alert\" been called? ->No Reason for Exam: acute change in MS, CONFUSION FINDINGS: BRAIN/VENTRICLES: There is no acute intracranial hemorrhage, mass effect or midline shift. No abnormal extra-axial fluid collection. The gray-white differentiation is maintained without evidence of an acute infarct. There is no evidence of hydrocephalus.  Cortical atrophy, white matter changes consistent with microvascular degenerative disease noted and similar to prior study. Atherosclerotic calcification noted in the terminal internal carotid arteries of uncertain hemodynamic significance. ORBITS: The visualized portion of the orbits demonstrate no acute abnormality. SINUSES: Stable subcentimeter mucous retention cysts ethmoid air cells bilaterally. Otherwise, visualized paranasal sinuses, mastoid air cells and middle ear cavities demonstrate no acute abnormality. SOFT TISSUES/SKULL:  No acute abnormality. No noncontrast CT evidence of acute intracranial pathology or significant interval change compared to 03/20/2022. Cortical atrophy, white matter changes consistent with microvascular degenerative disease, atherosclerotic calcification in the terminal internal carotid arteries of uncertain hemodynamic significance incidentally noted. .     CT ABDOMEN PELVIS W IV CONTRAST Additional Contrast? None    Result Date: 4/22/2022  EXAMINATION: CT OF THE ABDOMEN AND PELVIS WITH CONTRAST 4/22/2022 2:15 am TECHNIQUE: CT of the abdomen and pelvis was performed with the administration of intravenous contrast. Multiplanar reformatted images are provided for review. Dose modulation, iterative reconstruction, and/or weight based adjustment of the mA/kV was utilized to reduce the radiation dose to as low as reasonably achievable. COMPARISON: CT abdomen and pelvis dated April 10, 2022 HISTORY: ORDERING SYSTEM PROVIDED HISTORY: Abdominal pain, vomiting, bleeding from ostomy site TECHNOLOGIST PROVIDED HISTORY: Reason for exam:->Abdominal pain, vomiting, bleeding from ostomy site Additional Contrast?->None Decision Support Exception - unselect if not a suspected or confirmed emergency medical condition->Emergency Medical Condition (MA) Reason for Exam: Abdominal pain, vomiting, bleeding from ostomy site FINDINGS: Lower Chest: Small bilateral pleural effusions with bibasilar atelectasis is noted. Extensive coronary artery calcifications are seen. Organs:  There has been a cholecystectomy. Stable multiple hypodense cystic lesions are seen within the liver without significant change. These are likely benign and no follow-up is indicated. Calcified granulomas are seen within the spleen. The adrenal glands are unremarkable. The pancreas is unremarkable. There is no evidence of hydronephrosis. GI/Bowel: Postsurgical changes are seen to the bowel loops. There is a right lower quadrant ostomy. There is no evidence of bowel obstruction. Mildly dilated focal loops of small bowel are noted with air-fluid levels within the left upper quadrant which could be related to reactive ileus. Pelvis: A Weaver catheter is seen within the bladder. Minimal gas is seen within the lumen of the bladder, likely rib related to catheter placement. Peritoneum/Retroperitoneum: There is no evidence of free intraperitoneal air. Small amount of free fluid is seen within the right lower quadrant. There is an infrarenal abdominal aortic aneurysm with mural thrombus which measures 5.6 x 5.6 cm. There is a large rim enhancing fluid collection within the abdomen and pelvis. This collection extends from the presacral region to the level of the pancreas and is concerning for an abscess. Bones/Soft Tissues: No destructive osseous lesions are identified. 1. Large rim enhancing fluid collection is seen within the abdomen and pelvis which extends from the presacral region to the level of the pancreas, concerning for an abscess. 2. Small amount of free fluid is seen within the abdomen. 3. Infrarenal abdominal aortic aneurysm measuring 5.6 cm. Please see recommendations below. 4. Small bilateral pleural effusions with bibasilar atelectasis. 5. Focally dilated small bowel loops with air-fluid levels are seen within the left upper quadrant which could be related to ileus. RECOMMENDATIONS: 5.6 cm infrarenal abdominal aortic aneurysm.  Recommend referral to a vascular specialist. Reference: J Am Abad Radiol 5024;90:550-184. CT ABDOMEN PELVIS W IV CONTRAST Additional Contrast? None    Result Date: 4/10/2022  EXAMINATION: CT OF THE ABDOMEN AND PELVIS WITH CONTRAST 4/10/2022 9:12 am TECHNIQUE: CT of the abdomen and pelvis was performed with the administration of intravenous contrast. Multiplanar reformatted images are provided for review. Dose modulation, iterative reconstruction, and/or weight based adjustment of the mA/kV was utilized to reduce the radiation dose to as low as reasonably achievable. COMPARISON: None HISTORY: ORDERING SYSTEM PROVIDED HISTORY: abd pain , s/p colectomy with end ileostomy TECHNOLOGIST PROVIDED HISTORY: Reason for exam:->abd pain , s/p colectomy with end ileostomy Additional Contrast?->None Reason for Exam: abd pain , s/p colectomy with end ileostomy FINDINGS: Lower Chest: Moderate bilateral pleural effusions on partial imaging of the chest with adjacent atelectasis. Organs: Liver contains well-defined areas of low attenuation left hemiliver and smaller areas in the right hepatic lobe most consistent with hepatic cysts, unchanged from prior in configuration or size. Gallbladder surgically absent. Pancreas and spleen unremarkable. Adrenals without nodule. Kidneys without suspicious renal lesion and no hydronephrosis. GI/Bowel: Nasogastric tube terminates within the peripyloric region. Small bowel demonstrates fluid-filled segments with areas of mucosal hyperenhancement, acute inflammation of likely enteritis extending to the right lower quadrant ileostomy without parastomal hernia or obstructive process evident. Postsurgical changes status post colectomy. Stable postsurgical changes in the presacral region at the rectum with interval development of small volume abdominopelvic ascites. Pelvis: No suspicious pelvic lesion or bulky pelvic adenopathy/free fluid.  Peritoneum/Retroperitoneum: Atherosclerotic aneurysmal infrarenal abdominal aorta measuring up to maximum transaxial diameter 5.7 cm, similar to prior. No bulky retroperitoneal adenopathy. Bones/Soft Tissues: No acute osseous findings. Diffuse mild body wall edema. No mechanical obstructive process however inflammatory findings of small bowel with mucosal hyperenhancement and wall thickening throughout the distended small bowel loops to the right lower quadrant ileostomy with small volume abdominopelvic ascites most consistent with enteritis. No obvious abscess formation. Moderate bilateral pleural effusions along with body wall edema. XR CHEST PORTABLE    Result Date: 5/5/2022  EXAMINATION: ONE XRAY VIEW OF THE CHEST 5/5/2022 7:36 pm COMPARISON: Chest radiograph 04/24/2022. HISTORY: ORDERING SYSTEM PROVIDED HISTORY: sob TECHNOLOGIST PROVIDED HISTORY: Reason for exam:->sob Reason for Exam: sob Additional signs and symptoms: NA Relevant Medical/Surgical History: HYPERTENSION, RECTAL CANCER FINDINGS: The lungs are without acute focal process. There is no effusion or pneumothorax. The cardiomediastinal silhouette is stable. The osseous structures are stable. No acute process. XR CHEST PORTABLE    Result Date: 4/24/2022  EXAMINATION: ONE XRAY VIEW OF THE CHEST 4/24/2022 6:40 am COMPARISON: 4/23/2022 HISTORY: ORDERING SYSTEM PROVIDED HISTORY: f/u on pleural effusion TECHNOLOGIST PROVIDED HISTORY: Reason for exam:->f/u on pleural effusion Reason for Exam: f/u on pleural effusion Additional signs and symptoms: f/u on pleural effusion FINDINGS: Trace left pleural effusion is stable. Moderate left basilar atelectasis or airspace disease remains. Right lung is clear. No pneumothorax. Heart size is normal.     Stable trace left pleural effusion with left basilar airspace disease or atelectasis.      XR CHEST PORTABLE    Result Date: 4/23/2022  EXAMINATION: ONE XRAY VIEW OF THE CHEST 4/23/2022 7:31 am COMPARISON: 04/16/2022 HISTORY: ORDERING SYSTEM PROVIDED HISTORY: sob TECHNOLOGIST PROVIDED HISTORY: Reason for exam:->sob Reason for Exam: sob FINDINGS: Moderate left basilar airspace disease or atelectasis remains. Small bilateral pleural effusions. No pneumothorax. Minimal right basilar atelectasis. Mild stable cardiomegaly. Small bilateral pleural effusions. Moderate left basilar atelectasis and mild right basilar atelectasis. XR CHEST PORTABLE    Result Date: 4/16/2022  EXAMINATION: ONE XRAY VIEW OF THE CHEST 4/16/2022 12:50 pm COMPARISON: None. HISTORY: ORDERING SYSTEM PROVIDED HISTORY: SOB TECHNOLOGIST PROVIDED HISTORY: Reason for exam:->SOB Reason for Exam: SOB Additional signs and symptoms: SOB Relevant Medical/Surgical History: SOB FINDINGS: There is a small left basilar infiltrate and probable effusion which appears new compared to the previous exam 04/30/2022. Mild pulmonary venous congestion. Cardiac silhouette and osseous structures unchanged. Right IJ line terminates in the SVC/atrium     Small left basilar pneumonia suspected     XR CHEST PORTABLE    Result Date: 4/13/2022  EXAMINATION: ONE XRAY VIEW OF THE CHEST 4/13/2022 9:53 am COMPARISON: Chest radiograph 04/08/2022 HISTORY: ORDERING SYSTEM PROVIDED HISTORY: bilateral thoracentesis TECHNOLOGIST PROVIDED HISTORY: Reason for exam:->bilateral thoracentesis Reason for Exam: bilateral thoracentesis FINDINGS: Interval decrease in size of bilateral pleural effusion status post thoracentesis, with associated improved aeration at the lung bases. No visualized pneumothorax. Cardiomediastinal silhouette is unchanged. Right IJ central venous catheter remains in place with tip in the right atrium. No acute osseous abnormality. Interval decrease in size of bilateral pleural effusion status post thoracentesis, with improved aeration at the lung bases. No visualized pneumothorax. US ABDOMEN LIMITED Specify organ? LIVER    Result Date: 4/19/2022  EXAMINATION: RIGHT UPPER QUADRANT ULTRASOUND 4/19/2022 5:53 am COMPARISON: None.  HISTORY: ORDERING SYSTEM PROVIDED HISTORY: Elevated alkaline phosphatase TECHNOLOGIST PROVIDED HISTORY: Reason for exam:->Elevated alkaline phosphatase Specify organ?->LIVER Reason for Exam: Abd pain Additional signs and symptoms: Cholecystectomy FINDINGS: LIVER:  The liver demonstrates normal echogenicity without evidence of intrahepatic biliary ductal dilatation. BILIARY SYSTEM:  Cholecystectomy is noted. Common bile duct is within normal limits measuring 5.1 mm. RIGHT KIDNEY: The right kidney is grossly unremarkable without evidence of hydronephrosis. PANCREAS:  The pancreas is not visualized. OTHER: No evidence of right upper quadrant ascites. The examination is limited due to patient's body habitus. 1. Limited exam. 2. Cholecystectomy. 3. Common bile duct is within normal limits. No intrahepatic bile duct dilatation. VL DUP LOWER EXTREMITY VENOUS RIGHT    Result Date: 4/22/2022  EXAMINATION: DUPLEX VENOUS ULTRASOUND OF THE RIGHT LOWER EXTREMITY 4/22/2022 3:00 am TECHNIQUE: Duplex ultrasound using B-mode/gray scaled imaging and Doppler spectral analysis and color flow was obtained of the deep venous structures of the right extremity. COMPARISON: None. HISTORY: ORDERING SYSTEM PROVIDED HISTORY: swelling TECHNOLOGIST PROVIDED HISTORY: Reason for exam:->swelling Reason for Exam: swelling FINDINGS: The visualized veins of the right lower extremity are patent and free of echogenic thrombus. The veins demonstrate good compressibility with normal color flow study and spectral analysis. No evidence of DVT in the right lower extremity.      MRI BRAIN WO CONTRAST    Result Date: 4/28/2022  EXAMINATION: MRI OF THE BRAIN WITHOUT CONTRAST  4/28/2022 2:37 pm TECHNIQUE: Multiplanar multisequence MRI of the brain was performed without the administration of intravenous contrast. COMPARISON: Concurrent head CT HISTORY: ORDERING SYSTEM PROVIDED HISTORY: acute change in MS FINDINGS: INTRACRANIAL STRUCTURES/VENTRICLES: No evidence of an acute infarct or other acute parenchymal process. No evidence of acute intracranial hemorrhage. There is no evidence of an intracranial mass or extraaxial fluid collection. No significant mass effect or midline shift. Patchy and early confluent foci of T2/FLAIR hyperintensity are present within supratentorial white matter which is a nonspecific finding but likely represents moderate chronic microvascular ischemia. Scattered remote bilateral lacunar infarcts throughout the basal ganglia. There is moderate generalized volume loss. Ventricular enlargement concordant with the degree of parenchymal volume loss. The sellar/suprasellar regions appear unremarkable. The normal signal voids within the major intracranial vessels appear maintained. ORBITS: The visualized portion of the orbits demonstrate no acute abnormality. Bilateral pseudophakia. SINUSES: The visualized paranasal sinuses and mastoid air cells are well aerated. BONES/SOFT TISSUES: The bone marrow signal intensity appears normal. The soft tissues demonstrate no acute abnormality. 1. No acute infarct or other acute intracranial process. 2. Senescent changes including moderate generalized parenchymal volume loss and chronic small vessel ischemia. Remote bilateral lacunar infarcts throughout the basal ganglia. IR GUIDED THORACENTESIS PLEURAL    Result Date: 4/15/2022  PROCEDURE: ULTRASOUNDGUIDED bilateral THORACENTESIS 4/13/2022 HISTORY: ORDERING SYSTEM PROVIDED HISTORY: pleural effusion TECHNOLOGIST PROVIDED HISTORY: bilateral Reason for exam:->pleural effusion Which side should the procedure be performed?->Radiologist Recommendation TECHNIQUE: Maximum sterile barrier technique including hand hygiene, skin prep and sterile ultrasound technique utilized for procedure. Sterile ultrasound technique also utilized for procedure Ultrasound guidance required to confirm presence of pleural fluid, puncture site selection, real-time intra procedural guidance.   Images made for patient's medical file. Following informed consent, pause a confirm/time-out and sequential right and left lower thoracic puncture site selection, skin and ultrasound probe were prepped draped in sterile fashion. 10 mL 1% lidocaine without epinephrine for local anesthesia the puncture sites. Sequential ultrasound-guided access right and left pleural spaces was achieved using 5 Stateless trocar mounted catheters. Catheter was advanced off trocar introducers and-evacuated containers. 520 mL clear dashawn fluid removed from the right pleural space with 120 mL sample sent for laboratory evaluation. 800 mL clear yellow fluid removed from left pleural space with 700 cc sample sent for laboratory evaluation. Access catheters removed. Dressing applied. Postprocedure chest radiograph ordered. Patient tolerated procedure well. [employer] FINDINGS: Pre and intraprocedural images demonstrate moderate bilateral pleural effusions with underlying pulmonary consolidation. Thoracentesis catheters seen within pleural fluid collections. No complication suggested. A total of 520 mL clear dashawn fluid removed from the right pleural space with 120 mL sample sent for laboratory evaluation. 800 mL clear dashawn fluid removed from left pleural space with 700 mL sample sent for laboratory evaluation. Successful ultrasound guided bilateral thoracentesis with specimen sent for laboratory evaluation per prior order. .     Assessment & Plan:      Carolina Mckeon is a 80 y.o. male admitted 4/22/2022 for postoperative intra-abdominal abscess    1) Chronic urinary incontinence: h/o urinary incontinence s/p rectal surgery, managed by chronic indwelling Weaver catheter since 2019. Now pulling at catheter due to pelvic discomfort and mental status change. Neurology consulted for AMS. Weaevr removed 5/6, voiding/incontinence with PVR less than 300 mL several times. Straight cath once daily and PRN retention.   Decreased UOP noted past 24 hours, bladder scan and renal ultrasound ordered. Bladder scan 180cc this afternoon. Continue to monitor UOP and PVRs. MARIELOS: Improving overall but increased to 4.0 this a.m. Nephrology following   WBC increased to 19.0. Afebrile. Not on abx, Repeat UA is ordered. Urine culture 5/2/22: Negative. Possible replacement of indwelling catheter in the future when mental status returns to baseline, if desired. Not required at this time. Outpatient cystoscopy for re-evaluation of urinary tract    Will follow. Patient seen and examined, chart reviewed.      Electronically signed by RAJ Arnold on 5/10/2022 at 10:03 AM

## 2022-05-10 NOTE — PROGRESS NOTES
V2.0  [facility] [place]      Name:  Kelly Alexandre /Age/Sex: 1929  (80 y.o. male)   MRN & CSN:  [id number] & [id number] Encounter Date/Time: 5/10/2022 5:20 PM EDT    Location:  [address] [place] [address] PCP: Marquis Wong MD       Hospital Day: 19    Assessment and Plan:   Kelly Alexandre is a 80 y.o. 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 fib, severe protein calorie malnutrition, and physical debility. He was seen and admitted on 2022 on account of bleeding from the ostomy site. He was later diagnosed with Postoperative intra-abdominal abscess. Of note, on 4/3/2022, the patient did not have subtotal colon resection, small bowel resection, and ileostomy placement for management of colitis. 1. Bleeding from the ostomy site: Controlled with stitches on admission. 2. Acute blood loss anemia: The patient did require transfusion of 1 unit of PRBC. 3. Acute metabolic encephalopathy/delirium: The patient's fluctuating mental status has been primarily attributed to his ongoing infections (UTI, intra-abdominal infection, etc.) and narcotic pain medications. 4. Klebsiella oxytoca bacteremia secondary to intra-abdominal abscess: Reported on blood cultures 2 out of 2 sets from 2022. The patient status post completion of broad-spectrum antibiotic regimen. 5. 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 Urology following. 6. Acute kidney injury: Secondary to above. Creatinine up to 4.0 today.   7. Previous CVA with residual paresthesias and lower extremity weakness: Head CT and brain MRI from  were negative for any acute intracranial findings. 8. JAK2 positive thrombocytosis. 9. Paroxysmal A. fib on systemic anticoagulation with Eliquis. 10. 5.6 and amiodarone for now AAA: Reported on CT scan of the abdomen and pelvis from 5/2/2022. Patient follows up with vascular surgery as an outpatient. 11. Severe protein calorie malnutrition in the setting of acute illness: Patient currently on TPN secondary to poor oral intake. 12. Physical deconditioning. Plan:  1. The patient's transfer to the acute rehabilitation unit has been delayed because of tachycardia secondary to uncontrolled A. Fib. 2. So far today, his heart rate has been fluctuating widely going as high as the 160s. BP has been relatively soft with systolic in the low 826U. 3. Had a long discussion with the patient's daughter at the bedside. She voiced concerns regarding the uncontrolled heart rate. 4. Called and discussed with on-call cardiologist.  They will stop by and assess the patient. In the meantime, will discontinue extended release Cardizem and start him on short acting Cardizem 60 mg every 6 hours scheduled. 5. Continue p.o. metoprolol. 6. His digoxin is currently on hold. 7. Will also start as needed IV metoprolol 5 mg every 6 hours as needed for heart rate over 130.  8. Continue nutritional support with TPN. Monitoring per pharmacy. 9. Gastric protection with lansoprazole. 10. No signs of ongoing infection at this time. Continue to monitor off of antibiotics. Diet ADULT ORAL NUTRITION SUPPLEMENT; Breakfast, Lunch, Dinner; Standard High Calorie/High Protein Oral Supplement  ADULT DIET; Dysphagia - Minced and Moist  PN-Adult Premix 5/15 - Central  PN-Adult Premix 5/15 - Central   DVT Prophylaxis [] Lovenox, []  Heparin, [] SCDs, [] Ambulation,  [x] Eliquis, [] Xarelto  [] Coumadin   Code Status DNR-CC   Disposition From: Skilled nursing facility  Expected Disposition: Skilled nursing facility versus ARU  Estimated Date of Discharge:  To be determined  Patient requires continued admission due to tachycardia   Surrogate Decision Maker/ POA      Subjective:     Chief Complaint: Other (bleeding at new ostomy site)       Sarwat Garcia   Has been tachycardic throughout the day. However, he has remained hemodynamically stable. Review of Systems:    Review of Systems    Patient confused and unable to provide a review of system. Objective: Intake/Output Summary (Last 24 hours) at 5/10/2022 1720  Last data filed at 5/10/2022 1500  Gross per 24 hour   Intake 1517.51 ml   Output 702 ml   Net 815.51 ml        Vitals:   Vitals:    05/10/22 1526   BP: (!) 127/105   Pulse: 105   Resp: 22   Temp: 97.2 °F (36.2 °C)   SpO2: 99%       Physical Exam:     General: NAD  Eyes: EOMI  ENT: neck supple  Cardiovascular: Irregular rate and rhythm. Respiratory: Clear to auscultation  Gastrointestinal: Soft, non tender. Ostomy bag in place. Surgical scar noted. Pulsatile structure palpated mid abdomen. Genitourinary: no suprapubic tenderness  Musculoskeletal: No edema  Skin: warm, dry  Neuro: Alert but confused. Psych: Mood appropriate.      Medications:   Medications:    lansoprazole  30 mg Oral QAM AC    ferrous sulfate  325 mg Oral Daily    fat emulsion  250 mL IntraVENous Once per day on Mon Tue Thu Fri    lidocaine PF  5 mL IntraDERmal Once    sodium chloride flush  5-40 mL IntraVENous 2 times per day    lactobacillus  1 capsule Oral Daily with breakfast    apixaban  2.5 mg Oral BID    [Held by provider] digoxin  125 mcg Oral Daily    dilTIAZem  120 mg Oral Daily    folic acid  1 mg Oral Daily    metoprolol tartrate  25 mg Oral BID    therapeutic multivitamin-minerals  1 tablet Oral Daily    sodium chloride flush  5-40 mL IntraVENous 2 times per day      Infusions:    PN-Adult Premix 5/15 - Central      PN-Adult Premix 5/15 - Central 42 mL/hr at 05/10/22 0600    sodium chloride       PRN Meds: sodium chloride flush, 5-40 mL, PRN  sodium chloride, , PRN  simethicone, 40 mg, 4x Daily PRN  sodium chloride flush, 10 mL, PRN  ondansetron, 4 mg, Q8H PRN   Or  ondansetron, 4 mg, Q6H PRN  acetaminophen, 650 mg, Q6H PRN        Labs      Recent Results (from the past 24 hour(s))   POCT Glucose    Collection Time: 05/09/22  8:16 PM   Result Value Ref Range    POC Glucose 147 (H) 70 - 99 MG/DL   Basic Metabolic Panel    Collection Time: 05/10/22  5:00 AM   Result Value Ref Range    Sodium 129 (L) 135 - 145 MMOL/L    Potassium 3.8 3.5 - 5.1 MMOL/L    Chloride 96 (L) 99 - 110 mMol/L    CO2 18 (L) 21 - 32 MMOL/L    Anion Gap 15 4 - 16    BUN 67 (H) 6 - 23 MG/DL    CREATININE 4.0 (H) 0.9 - 1.3 MG/DL    Glucose 125 (H) 70 - 99 MG/DL    Calcium 8.8 8.3 - 10.6 MG/DL    GFR Non- 14 (L) >60 mL/min/1.73m2    GFR  17 (L) >60 mL/min/1.73m2   Magnesium    Collection Time: 05/10/22  5:00 AM   Result Value Ref Range    Magnesium 1.9 1.8 - 2.4 mg/dl   Phosphorus    Collection Time: 05/10/22  5:00 AM   Result Value Ref Range    Phosphorus 3.0 2.5 - 4.9 MG/DL   Procalcitonin    Collection Time: 05/10/22  5:00 AM   Result Value Ref Range    Procalcitonin 0.314    POCT Glucose    Collection Time: 05/10/22  8:39 AM   Result Value Ref Range    POC Glucose 142 (H) 70 - 99 MG/DL        Imaging/Diagnostics Last 24 Hours   US RETROPERITONEAL LIMITED    Result Date: 5/10/2022  EXAMINATION: ULTRASOUND OF THE KIDNEYS 5/10/2022 1:58 pm COMPARISON: CT abdomen 05/06/2022 HISTORY: ORDERING SYSTEM PROVIDED HISTORY: renal failure TECHNOLOGIST PROVIDED HISTORY: Reason for exam:->renal failure Reason for Exam: ARF FINDINGS: The right kidney measures 9.4 cm in length and the left kidney measures 10.5 cm in length. Kidneys demonstrate normal cortical echogenicity. No hydronephrosis or intrarenal stones. No focal lesions right kidney. Inferior pole left kidney hypoechoic 1.3 cm lesion corresponds to that measuring about 19 mm on CT, mild irregularity of the margins. Nonspecific cystic lesion inferior pole left kidney. Recommend noncontrast CT abdomen surveillance versus ultrasound in 6 months to ensure stability. Otherwise unremarkable ultrasound of the kidneys.        Electronically signed by Edmond Jones MD on 5/10/2022 at 5:20 PM

## 2022-05-10 NOTE — PROGRESS NOTES
Nephrology Progress Note  5/10/2022 8:36 AM        Subjective:   Admit Date: 4/22/2022  PCP: Zari Vega MD    Interval History:  patient remains slightly confused and needing sitter in his room. Diet:   He is with TPN    ROS:   slight confusion, no overt shortness of breath, PND or orthopnea   urine output recorded minimal, has about 350 cc of ostomy output    Data:     Current meds:    lansoprazole  30 mg Oral QAM AC    ferrous sulfate  325 mg Oral Daily    fat emulsion  250 mL IntraVENous Once per day on Mon Tue Thu Fri    lidocaine PF  5 mL IntraDERmal Once    sodium chloride flush  5-40 mL IntraVENous 2 times per day    lactobacillus  1 capsule Oral Daily with breakfast    apixaban  2.5 mg Oral BID    [Held by provider] digoxin  125 mcg Oral Daily    dilTIAZem  120 mg Oral Daily    folic acid  1 mg Oral Daily    metoprolol tartrate  25 mg Oral BID    therapeutic multivitamin-minerals  1 tablet Oral Daily    sodium chloride flush  5-40 mL IntraVENous 2 times per day      PN-Adult Premix 5/15 - Central 42 mL/hr at 05/10/22 0600    sodium chloride           I/O last 3 completed shifts:   In: 1597.3 [P.O.:180]  Out: 600 [Urine:150; Stool:450]    CBC:   Recent Labs     05/07/22  1332 05/09/22  0420   WBC 15.2* 19.0*   HGB 11.3* 9.7*   * 437          Recent Labs     05/08/22  1308 05/09/22  0420 05/10/22  0500   * 133* 129*   K 4.4 4.0 3.8   CL 94* 99 96*   CO2 19* 17* 18*   BUN 50* 54* 67*   CREATININE 3.9* 3.8* 4.0*   GLUCOSE 120* 124* 125*       Lab Results   Component Value Date    CALCIUM 8.8 05/10/2022    PHOS 3.0 05/10/2022       Objective:     Vitals: /72   Pulse 108   Temp 96.6 °F (35.9 °C) (Axillary)   Resp 19   Ht 5' 9.02\" (1.753 m)   Wt 151 lb 7.3 oz (68.7 kg)   SpO2 98%   BMI 22.36 kg/m²     General appearance:   thin, alert, awake but not fully oriented  HEENT:   at least 2+ conjunctival pallor  Neck:   seemed supple  Lungs:   Limited exam, no gross crackles  Heart:   tachycardia and irregular this morning  Abdomen:  soft, minimally tender on palpation, ostomy  Extremities:   no overt leg edema      Problem List :         Impression :     1.  stage III acute kidney injury- probably oliguric- based on the available data likely has toxic and ischemic acute tubular injury- recent CT did not show any obstruction, but I will add another kidney ultrasound, just to make sure there is no new process, also recent urine was bland- but we will redo again- also add a bladder scan- I talked to one of his daughter, and had to leave message for the other daughter. hopefully I can discuss with all of them today again  2.  recent C. difficile as well as abdominal abscess, he is off all antibiotic  3.  underlying dysrhythmia, atherosclerotic cardiovascular disease, aortic aneurysm as well as advanced stage and perhaps underlying dementia    Recommendation/Plan  :     1.  redo UA and urinary indicis  2.  bladder scan and a kidney ultrasound  3.   I will try discussed with his daughters  4.  watch for iatrogenic and nosocomial complication  5.  follow clinically and biochemically      Alvin Tejada MD MD

## 2022-05-10 NOTE — ACP (ADVANCE CARE PLANNING)
Palliative Care follow up visit. Patient is resting in bed on room air with sitter at bedside. Daughter Paramjit Reyes and son Jossy Cantor also at bedside. Dr. Tim Avilez spoke with Lupe Tran this am about MARIELOS and possible dialysis. Paramjit Reyes stated she declined possible dialysis to [facility] and supports medical management and TPN. She inquired about ambulation in the ly via wheel chair with nurse/aide assist as patient has benefited from this previously in ICU. He has had delirium on and off during his prolonged stay and benefits from getting out of his room. He is unable to do long distance ambulation with the walker. Spoke with Karen Reyes he gave permission for PRN order to mobilize patient in the hallway via wheelchair as a Palliative Care plan. Order placed. Updated primary RN Jonathan Richards and Charge RN Jeannie. Family updated as well.

## 2022-05-10 NOTE — PROGRESS NOTES
Speech Language Pathology  [facility]  DEPARTMENT OF SPEECH/LANGUAGE PATHOLOGY  DAILY PROGRESS NOTE  Reji Larsen  5/10/2022  [id number]  Postoperative intra-abdominal abscess [T81.43XA]  Hemorrhage from ileostomy (HCC) [K94.11]  Allergies   Allergen Reactions    Morphine Hallucinations    Sulfa Antibiotics          Pt was seen this date for dysphagia treatment. IMPRESSION AND RECOMMENDATIONS:   Pt seen this date for diet tolerance monitoring and trials for advancement. Pt seen by ST yesterday and advanced to Thins and Minced and Moist diet level. Pt seen with sitter present today who reports pt was awake most of the night and refused breakfast other than sips of Ensure. Pt was alert and reports he has not felt well today. He was agreeable to limited PO trials of thin liquids by straw x 4 which he tolerated with minimal throat clearing no overt s/s aspiration. He refused all offers of solids at this time. Completed education with diana regarding new diet level and need for aspiration precautions. Discussed that lower denture should not be used due to poor fit. Dtr to bring in denture adhesive. Recommend:  Continue Minced/moist and Thin liquids. Aspiration precautions. Assist with feeding. Dtr to bring in denture adhesive, however, discussed denture may still be loose and we will have to compensate with modified textures. GOALS (current status in bold):  Short-term Goals  Timeframe for Short-term Goals: length of admission  Goal 1: Pt will tolerate Minced and moist diet with thin liquids with adequate oral manipulation/clearance and no s/s aspiration Partially Met, Continue; unable to assess solids at this time  Goal 2: Pt/caregivers will implement recommended strategies 90% given min cues. Discussed aspiration precautions  Goal 3: Pt will participate in instrumental swallow evaluation as indicated. Not warranted at this time  Goal 4: Pt/caregivers will demonstrate understanding of education/recommendations.  Meeting, Continue    EDUCATION:  POC including diet level change - sitter, RN not available    PAIN RATING (0-10 Scale): did not express pain  Time in/Time out: SLP Individual Minutes  Time In: 0900  Time Out: 0920  Minutes: 20    Visit number: 97 [address] OLIVA Donnelly  5/10/2022  9:20 AM

## 2022-05-10 NOTE — PROGRESS NOTES
Progress Note( Dr. David Ch)  5/9/2022  Subjective:   Admit Date: 4/22/2022  PCP: Krystyna Armendariz MD    Admitted For : Intra abdominal abscess patient who has bowel resection and colostomy    Consulted For: Hypoglycemic episodes    Interval History: Patient is confused this morning attendant to watch him pull out tubes and IVs    Denies any chest pains,   Denies SOB . Denies nausea or vomiting. No new bowel or bladder symptoms. Intake/Output Summary (Last 24 hours) at 5/9/2022 2144  Last data filed at 5/9/2022 1446  Gross per 24 hour   Intake 180 ml   Output 300 ml   Net -120 ml       DATA    CBC:   Recent Labs     05/07/22  1332 05/09/22  0420   WBC 15.2* 19.0*   HGB 11.3* 9.7*   * 437    CMP:  Recent Labs     05/07/22  1332 05/08/22  1308 05/09/22  0420   * 129* 133*   K 4.3 4.4 4.0   CL 94* 94* 99   CO2 19* 19* 17*   BUN 39* 50* 54*   CREATININE 3.5* 3.9* 3.8*   CALCIUM 8.9 8.7 8.6   PROT  --   --  6.2*   LABALBU  --   --  3.3*   BILITOT  --   --  0.4   ALKPHOS  --   --  203*   AST  --   --  17   ALT  --   --  28     Lipids:   Lab Results   Component Value Date    CHOL 140 05/07/2022    HDL 53 05/07/2022    TRIG 113 05/09/2022     Glucose:  Recent Labs     05/09/22  0628 05/09/22  1042 05/09/22  2016   POCGLU 127* 156* 147*     [id number]:  Lab Results   Component Value Date    LABA1C 5.2 05/06/2022     High Sensitivity TSH:   Lab Results   Component Value Date    TSHHS 1.990 05/06/2022     Free T3: No results found for: FT3  Free T4:No results found for: T4FREE    CT ABDOMEN PELVIS WO CONTRAST Additional Contrast? None   Final Result   1. Postoperative changes from prior colectomy. Fluid collection in the   pelvis is slightly decreased in size. 2. Infrarenal abdominal aortic aneurysm measuring 5.5 cm. 3. Peripancreatic fluid collection is not significantly changed in size in   the interval, possibly a pseudocyst.   4. Small bilateral pleural effusions.    5. Indeterminate left renal lesion measures 1.9 cm. Renal protocol MRI or CT   could provide further information as clinically indicated. RECOMMENDATIONS:   5.5 cm infrarenal abdominal aortic aneurysm. Recommend referral to a vascular   specialist.   Reference: J Am Abad Radiol 2255;14:[id number]. CT HEAD WO CONTRAST   Final Result   1. No acute intracranial abnormality. 2. Stable moderate chronic white matter microvascular ischemic changes and   chronic lacunar infarct in the left caudate nucleus. XR CHEST PORTABLE   Final Result   No acute process. CT ABDOMEN PELVIS WO CONTRAST Additional Contrast? Oral   Final Result   1. Slight interval decrease in volume of a fluid collection extending from   the presacral soft tissues into the left mid abdominal cavity, which is   suboptimally evaluated due to lack of intravenous contrast.   2. Stable fluid collection along the inferior margin of the mid to distal   pancreas. 3. Gas in the urinary bladder lumen is favored to be related to Weaver   catheter placement, although enterovesical fistula cannot be completely   excluded. 4. Trace residual left pleural effusion. 5. Stable 5.6 cm abdominal aortic aneurysm. RECOMMENDATIONS:   5.6 cm infrarenal abdominal aortic aneurysm. Recommend referral to a vascular   specialist.   Reference: J Am Abad Radiol 7843;03:[id number]. MRI BRAIN WO CONTRAST   Final Result   1. No acute infarct or other acute intracranial process. 2. Senescent changes including moderate generalized parenchymal volume loss   and chronic small vessel ischemia. Remote bilateral lacunar infarcts   throughout the basal ganglia. CT HEAD WO CONTRAST   Final Result   No noncontrast CT evidence of acute intracranial pathology or significant   interval change compared to 03/20/2022.       Cortical atrophy, white matter changes consistent with microvascular   degenerative disease, atherosclerotic calcification in the terminal internal carotid arteries of uncertain hemodynamic significance incidentally noted. .         XR CHEST PORTABLE   Final Result   Stable trace left pleural effusion with left basilar airspace disease or   atelectasis. XR CHEST PORTABLE   Final Result   Small bilateral pleural effusions. Moderate left basilar atelectasis and mild right basilar atelectasis. VL DUP LOWER EXTREMITY VENOUS RIGHT   Final Result   No evidence of DVT in the right lower extremity. CT ABDOMEN PELVIS W IV CONTRAST Additional Contrast? None   Final Result   1. Large rim enhancing fluid collection is seen within the abdomen and pelvis   which extends from the presacral region to the level of the pancreas,   concerning for an abscess. 2. Small amount of free fluid is seen within the abdomen. 3. Infrarenal abdominal aortic aneurysm measuring 5.6 cm. Please see   recommendations below. 4. Small bilateral pleural effusions with bibasilar atelectasis. 5. Focally dilated small bowel loops with air-fluid levels are seen within   the left upper quadrant which could be related to ileus. RECOMMENDATIONS:   5.6 cm infrarenal abdominal aortic aneurysm. Recommend referral to a vascular   specialist.      Reference: J Am Abad Radiol 0765;62:[id number].             IR GUIDED FLUID DRAINAGE BY CATH SOFT TISSUE PERC    (Results Pending)        Scheduled Medicines   Medications:    [START ON 5/10/2022] ferrous sulfate  325 mg Oral Daily    dilTIAZem  2.5 mg IntraVENous Once    fat emulsion  250 mL IntraVENous Once per day on Mon Tue Thu Fri    lidocaine PF  5 mL IntraDERmal Once    sodium chloride flush  5-40 mL IntraVENous 2 times per day    lactobacillus  1 capsule Oral Daily with breakfast    apixaban  2.5 mg Oral BID    [Held by provider] digoxin  125 mcg Oral Daily    dilTIAZem  120 mg Oral Daily    folic acid  1 mg Oral Daily    metoprolol tartrate  25 mg Oral BID    therapeutic multivitamin-minerals  1 tablet Oral Daily    pantoprazole  40 mg Oral Daily    sodium chloride flush  5-40 mL IntraVENous 2 times per day      Infusions:    PN-Adult Premix 5/15 - Central 42 mL/hr at 05/09/22 1920    sodium chloride           Objective:   Vitals: BP (!) 141/90   Pulse 125   Temp 97.9 °F (36.6 °C) (Oral)   Resp 17   Ht 5' 9.02\" (1.753 m)   Wt 151 lb 7.3 oz (68.7 kg)   SpO2 96%   BMI 22.36 kg/m²   General appearance: alert and cooperative with exam to be more confused this morning  Neck: no JVD or bruit  Thyroid : Normal lobes   Lungs: Has Vesicular Breath sounds diminished breath sounds  Heart: Atrial fibrillation   abdomen: soft, non-tender; bowel sounds normal; no masses,  no organomegaly  Musculoskeletal: Normal  Extremities: extremities normal, , no edema  Neurologic:  Awake, alert, oriented to name, place and time. Cranial nerves II-XII are grossly intact. Motor is weakness t. Sensory is intact. ,  and gait is abnormal.    Assessment:     Patient Active Problem List:     Hypertension     Benign prostatic hyperplasia     Psoriatic arthritis ([address]     Primary malignant neoplasm of rectum (HCC)     Psoriasis     Chronic myeloproliferative disease (HCC)     Monocytosis (symptomatic)     Gastroesophageal reflux disease without esophagitis     Urinary retention     H/O: CVA (cerebrovascular accident)     Irregular heart beat     Nonrheumatic aortic valve stenosis     Paroxysmal atrial fibrillation (HCC)     Hemorrhage from ileostomy (HCC)     Melena     Arteriovenous malformation of jejunum     History of rectal cancer     Benign neoplasm of cecum     Benign neoplasm of ascending colon     Pseudomonas urinary tract infection     Partial small bowel obstruction (HCC)     SBO (small bowel obstruction) (HCC)     Atrial fibrillation with RVR (HCC)     Hypotension     Ischemic colitis (HCC)     Hypocalcemia     MARIELOS (acute kidney injury) ([address]     Probable Bacterial Pneumonia     CAD (coronary artery disease)     Anemia Delirium     Severe protein-calorie malnutrition (HCC)     On total parenteral nutrition     Colitis     Postoperative intra-abdominal abscess     Gram-negative bacteremia     Seizure ([facility]      Plan:     1. Reviewed POC blood glucose . Labs and X ray results   2. Reviewed Current Medicines   3. Patient started on TPN  4. Monitor Blood glucose frequently   5. Modified  the dose of Insulin/ other medicines as needed   6. Will follow     .      Natalie Walter MD, MD

## 2022-05-10 NOTE — PROGRESS NOTES
Physical Therapy  Attempted to see pt for PT/OT treatment. HR up to 158bpm at rest. Spoke with nursing and requested we hold today until pt medically stable for mobility.

## 2022-05-11 NOTE — PROGRESS NOTES
V2.0  [facility] Hospitalist Progress Note      Name:  Dante Sullivan /Age/Sex: 1929  (80 y.o. male)   MRN & CSN:  [id number] & [id number] Encounter Date/Time: 2022 5:20 PM EDT    Location:  [address] [place] [address] PCP: Shaun Williamson MD       Hospital Day: 20    Assessment and Plan:   Dante Sullivan is a 80 y.o. 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 fib, severe protein calorie malnutrition, and physical debility. He was seen and admitted on 2022 on account of bleeding from the ostomy site. He was later diagnosed with Postoperative intra-abdominal abscess. Of note, on 4/3/2022, the patient did not have subtotal colon resection, small bowel resection, and ileostomy placement for management of colitis. 1. Bleeding from the ostomy site: Controlled with stitches on admission. 2. Acute blood loss anemia: The patient did require transfusion of 1 unit of PRBC. 3. Acute metabolic encephalopathy/delirium: The patient's fluctuating mental status has been primarily attributed to his ongoing infections (UTI, intra-abdominal infection, etc.) and narcotic pain medications. 4. Klebsiella oxytoca bacteremia secondary to intra-abdominal abscess: Reported on blood cultures 2 out of 2 sets from 2022. The patient status post completion of broad-spectrum antibiotic regimen. 5. 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 Urology following. 6. Acute kidney injury: Secondary to above. Creatinine up to 5.1 today.   7. Previous CVA with residual paresthesias and lower extremity weakness: Head CT and brain MRI from  were negative for any acute intracranial findings. 8. JAK2 positive thrombocytosis. 9. Paroxysmal A. fib on systemic anticoagulation with Eliquis. 10. 5.6 cm infrarenal AAA: Reported on CT scan of the abdomen and pelvis from 5/2/2022. Patient follows up with vascular surgery as an outpatient. 11. Severe protein calorie malnutrition in the setting of acute illness: Patient currently on TPN secondary to poor oral intake. 12. Physical deconditioning. Plan:  · The patient is heart rate is currently well controlled on current regimen of diltiazem 60 mg p.o. every 6 hours and metoprolol 25 mg daily. Blood pressure well controlled. We will continue to monitor. · Continue as needed metoprolol for breakthrough episodes of tachycardia. · Labs reviewed: The patient's renal function continues to deteriorate. His creatinine is now up to 5.1. Nephrology following. Per chart, at this point, the patient's daughter and Mary Sagastume is wishing to only pursue conservative management for now. · The patient also has some hyponatremia with serum sodium of 124 today. Unclear if secondary to hypervolemia. We will continue to monitor. · Continue nutritional support with TPN. Monitoring per pharmacy. · Gastric protection with lansoprazole. · No signs of ongoing infection at this time. Continue to monitor off of antibiotics. · Discussed with the patient's granddaughter at the bedside. Diet ADULT ORAL NUTRITION SUPPLEMENT; Breakfast, Lunch, Dinner; Standard High Calorie/High Protein Oral Supplement  ADULT DIET; Dysphagia - Minced and Moist  PN-Adult Premix 5/15 - Central  PN-Adult Premix 5/15 - Central   DVT Prophylaxis [] Lovenox, []  Heparin, [] SCDs, [] Ambulation,  [x] Eliquis, [] Xarelto  [] Coumadin   Code Status DNR-CC   Disposition From: Skilled nursing facility  Expected Disposition: Skilled nursing facility versus ARU  Estimated Date of Discharge:  To be determined  Patient requires continued admission due to worsening renal function. Surrogate Decision Maker/ POA      Subjective:     Chief Complaint: Other (bleeding at new ostomy site)       Alec Mix was found resting in bed. His granddaughter was present at the bedside. Review of Systems:    Review of Systems    Patient remains confused and unable to provide a review of system. Objective: Intake/Output Summary (Last 24 hours) at 5/11/2022 1056  Last data filed at 5/11/2022 0550  Gross per 24 hour   Intake 1251.31 ml   Output 522 ml   Net 729.31 ml        Vitals:   Vitals:    05/11/22 0729   BP: 100/66   Pulse: 77   Resp: 22   Temp: 97.8 °F (36.6 °C)   SpO2: 94%       Physical Exam:     General: NAD  Eyes: EOMI  ENT: neck supple  Cardiovascular: Irregular rate and rhythm. Respiratory: Clear to auscultation  Gastrointestinal: Soft, non tender. Ostomy bag in place. Surgical scar noted. Pulsatile structure palpated mid abdomen. Genitourinary: no suprapubic tenderness  Musculoskeletal: No edema  Skin: warm, dry  Neuro: Alert but confused. Psych: Mood appropriate.      Medications:   Medications:    lansoprazole  30 mg Oral QAM AC    dilTIAZem  60 mg Oral 4 times per day    ferrous sulfate  325 mg Oral Daily    fat emulsion  250 mL IntraVENous Once per day on Mon Tue Thu Fri    lidocaine PF  5 mL IntraDERmal Once    sodium chloride flush  5-40 mL IntraVENous 2 times per day    lactobacillus  1 capsule Oral Daily with breakfast    apixaban  2.5 mg Oral BID    [Held by provider] digoxin  125 mcg Oral Daily    folic acid  1 mg Oral Daily    metoprolol tartrate  25 mg Oral BID    therapeutic multivitamin-minerals  1 tablet Oral Daily    sodium chloride flush  5-40 mL IntraVENous 2 times per day      Infusions:    PN-Adult Premix 5/15 - Central      PN-Adult Premix 5/15 - Central 42 mL/hr at 05/11/22 0530    sodium chloride       PRN Meds: metoprolol, 5 mg, Q6H PRN  sodium chloride flush, 5-40 mL, PRN  sodium chloride, , PRN  simethicone, 40 mg, 4x Daily PRN  sodium chloride flush, 10 mL, PRN  ondansetron, 4 mg, Q8H PRN   Or  ondansetron, 4 mg, Q6H PRN  acetaminophen, 650 mg, Q6H PRN        Labs      Recent Results (from the past 24 hour(s))   Basic Metabolic Panel    Collection Time: 05/11/22  5:50 AM   Result Value Ref Range    Sodium 124 (L) 135 - 145 MMOL/L    Potassium 4.0 3.5 - 5.1 MMOL/L    Chloride 92 (L) 99 - 110 mMol/L    CO2 16 (L) 21 - 32 MMOL/L    Anion Gap 16 4 - 16    BUN 83 (H) 6 - 23 MG/DL    CREATININE 5.1 (H) 0.9 - 1.3 MG/DL    Glucose 131 (H) 70 - 99 MG/DL    Calcium 8.8 8.3 - 10.6 MG/DL    GFR Non- 11 (L) >60 mL/min/1.73m2    GFR  13 (L) >60 mL/min/1.73m2   CBC with Auto Differential    Collection Time: 05/11/22  5:50 AM   Result Value Ref Range    WBC 19.4 (H) 4.0 - 10.5 K/CU MM    RBC 3.20 (L) 4.6 - 6.2 M/CU MM    Hemoglobin 9.9 (L) 13.5 - 18.0 GM/DL    Hematocrit 31.3 (L) 42 - 52 %    MCV 97.8 78 - 100 FL    MCH 30.9 27 - 31 PG    MCHC 31.6 (L) 32.0 - 36.0 %    RDW 18.6 (H) 11.7 - 14.9 %    Platelets 838 (H) 154 - 440 K/CU MM    MPV 9.9 7.5 - 11.1 FL    Differential Type AUTOMATED DIFFERENTIAL     Segs Relative 87.7 (H) 36 - 66 %    Lymphocytes % 3.5 (L) 24 - 44 %    Monocytes % 7.0 (H) 0 - 4 %    Eosinophils % 0.2 0 - 3 %    Basophils % 0.3 0 - 1 %    Segs Absolute 17.0 K/CU MM    Lymphocytes Absolute 0.7 K/CU MM    Monocytes Absolute 1.4 K/CU MM    Eosinophils Absolute 0.0 K/CU MM    Basophils Absolute 0.1 K/CU MM    Nucleated RBC % 0.0 %    Total Nucleated RBC 0.0 K/CU MM    Total Immature Neutrophil 0.26 K/CU MM    Immature Neutrophil % 1.3 (H) 0 - 0.43 %   Magnesium    Collection Time: 05/11/22  5:50 AM   Result Value Ref Range    Magnesium 1.9 1.8 - 2.4 mg/dl   Phosphorus    Collection Time: 05/11/22  5:50 AM   Result Value Ref Range    Phosphorus 2.8 2.5 - 4.9 MG/DL   Brain Natriuretic Peptide    Collection Time: 05/11/22  5:50 AM   Result Value Ref Range    Pro-BNP 3,534 (H) <300 PG/ML Imaging/Diagnostics Last 24 Hours   US RETROPERITONEAL LIMITED    Result Date: 5/10/2022  EXAMINATION: ULTRASOUND OF THE KIDNEYS 5/10/2022 1:58 pm COMPARISON: CT abdomen 05/06/2022 HISTORY: ORDERING SYSTEM PROVIDED HISTORY: renal failure TECHNOLOGIST PROVIDED HISTORY: Reason for exam:->renal failure Reason for Exam: ARF FINDINGS: The right kidney measures 9.4 cm in length and the left kidney measures 10.5 cm in length. Kidneys demonstrate normal cortical echogenicity. No hydronephrosis or intrarenal stones. No focal lesions right kidney. Inferior pole left kidney hypoechoic 1.3 cm lesion corresponds to that measuring about 19 mm on CT, mild irregularity of the margins. Nonspecific cystic lesion inferior pole left kidney. Recommend noncontrast CT abdomen surveillance versus ultrasound in 6 months to ensure stability. Otherwise unremarkable ultrasound of the kidneys.        Electronically signed by Alpesh Thorne MD on 5/11/2022 at 10:56 AM

## 2022-05-11 NOTE — PROGRESS NOTES
Progress Note( Dr. Shanna Cheng)  5/10/2022  Subjective:   Admit Date: 4/22/2022  PCP: Rachael Monique MD    Admitted For : Intra abdominal abscess patient who has bowel resection and colostomy    Consulted For: Hypoglycemic episodes    Interval History: Patient is confused this morning attendant to watch him pull out tubes and IVs  Patient attendant to watch to observe him    Denies any chest pains,   Denies SOB . Denies nausea or vomiting. No new bowel or bladder symptoms. Intake/Output Summary (Last 24 hours) at 5/10/2022 2126  Last data filed at 5/10/2022 2040  Gross per 24 hour   Intake 1517.51 ml   Output 852 ml   Net 665.51 ml       DATA    CBC:   Recent Labs     05/09/22  0420   WBC 19.0*   HGB 9.7*       CMP:  Recent Labs     05/08/22  1308 05/09/22  0420 05/10/22  0500   * 133* 129*   K 4.4 4.0 3.8   CL 94* 99 96*   CO2 19* 17* 18*   BUN 50* 54* 67*   CREATININE 3.9* 3.8* 4.0*   CALCIUM 8.7 8.6 8.8   PROT  --  6.2*  --    LABALBU  --  3.3*  --    BILITOT  --  0.4  --    ALKPHOS  --  203*  --    AST  --  17  --    ALT  --  28  --      Lipids:   Lab Results   Component Value Date    CHOL 140 05/07/2022    HDL 53 05/07/2022    TRIG 113 05/09/2022     Glucose:  Recent Labs     05/09/22  1042 05/09/22  2016 05/10/22  0839   POCGLU 156* 147* 142*     [id number]:  Lab Results   Component Value Date    LABA1C 5.2 05/06/2022     High Sensitivity TSH:   Lab Results   Component Value Date    TSHHS 1.990 05/06/2022     Free T3: No results found for: FT3  Free T4:No results found for: T4FREE    US RETROPERITONEAL LIMITED   Final Result   Nonspecific cystic lesion inferior pole left kidney. Recommend noncontrast   CT abdomen surveillance versus ultrasound in 6 months to ensure stability. Otherwise unremarkable ultrasound of the kidneys. CT ABDOMEN PELVIS WO CONTRAST Additional Contrast? None   Final Result   1. Postoperative changes from prior colectomy.   Fluid collection in the pelvis is slightly decreased in size. 2. Infrarenal abdominal aortic aneurysm measuring 5.5 cm. 3. Peripancreatic fluid collection is not significantly changed in size in   the interval, possibly a pseudocyst.   4. Small bilateral pleural effusions. 5. Indeterminate left renal lesion measures 1.9 cm. Renal protocol MRI or CT   could provide further information as clinically indicated. RECOMMENDATIONS:   5.5 cm infrarenal abdominal aortic aneurysm. Recommend referral to a vascular   specialist.   Reference: J Am Abad Radiol 4227;88:[id number]. CT HEAD WO CONTRAST   Final Result   1. No acute intracranial abnormality. 2. Stable moderate chronic white matter microvascular ischemic changes and   chronic lacunar infarct in the left caudate nucleus. XR CHEST PORTABLE   Final Result   No acute process. CT ABDOMEN PELVIS WO CONTRAST Additional Contrast? Oral   Final Result   1. Slight interval decrease in volume of a fluid collection extending from   the presacral soft tissues into the left mid abdominal cavity, which is   suboptimally evaluated due to lack of intravenous contrast.   2. Stable fluid collection along the inferior margin of the mid to distal   pancreas. 3. Gas in the urinary bladder lumen is favored to be related to Weaver   catheter placement, although enterovesical fistula cannot be completely   excluded. 4. Trace residual left pleural effusion. 5. Stable 5.6 cm abdominal aortic aneurysm. RECOMMENDATIONS:   5.6 cm infrarenal abdominal aortic aneurysm. Recommend referral to a vascular   specialist.   Reference: J Am Abad Radiol 4212;06:[id number]. MRI BRAIN WO CONTRAST   Final Result   1. No acute infarct or other acute intracranial process. 2. Senescent changes including moderate generalized parenchymal volume loss   and chronic small vessel ischemia. Remote bilateral lacunar infarcts   throughout the basal ganglia.          CT HEAD WO CONTRAST   Final day    lactobacillus  1 capsule Oral Daily with breakfast    apixaban  2.5 mg Oral BID    [Held by provider] digoxin  125 mcg Oral Daily    folic acid  1 mg Oral Daily    metoprolol tartrate  25 mg Oral BID    therapeutic multivitamin-minerals  1 tablet Oral Daily    sodium chloride flush  5-40 mL IntraVENous 2 times per day      Infusions:    PN-Adult Premix 5/15 - Central 42 mL/hr at 05/10/22 1737    sodium chloride           Objective:   Vitals: /65   Pulse 125   Temp 101.1 °F (38.4 °C) (Axillary)   Resp 25   Ht 5' 9.02\" (1.753 m)   Wt 151 lb 7.3 oz (68.7 kg)   SpO2 95%   BMI 22.36 kg/m²   General appearance: alert and cooperative with exam to be more confused this morning  Neck: no JVD or bruit  Thyroid : Normal lobes   Lungs: Has Vesicular Breath sounds diminished breath sounds  Heart: Atrial fibrillation   abdomen: soft, non-tender; bowel sounds normal; no masses,  no organomegaly  Musculoskeletal: Normal  Extremities: extremities normal, , no edema  Neurologic:  Awake, alert, oriented to name, place and time. Cranial nerves II-XII are grossly intact. Motor is weakness t. Sensory is intact. ,  and gait is abnormal.    Assessment:     Patient Active Problem List:     Hypertension     Benign prostatic hyperplasia     Psoriatic arthritis ([address]     Primary malignant neoplasm of rectum (HCC)     Psoriasis     Chronic myeloproliferative disease (HCC)     Monocytosis (symptomatic)     Gastroesophageal reflux disease without esophagitis     Urinary retention     H/O: CVA (cerebrovascular accident)     Irregular heart beat     Nonrheumatic aortic valve stenosis     Paroxysmal atrial fibrillation (HCC)     Hemorrhage from ileostomy ([address]     Melena     Arteriovenous malformation of jejunum     History of rectal cancer     Benign neoplasm of cecum     Benign neoplasm of ascending colon     Pseudomonas urinary tract infection     Partial small bowel obstruction (HCC)     SBO (small bowel obstruction) (HCC)     Atrial fibrillation with RVR (HCC)     Hypotension     Ischemic colitis (HCC)     Hypocalcemia     MAR[facility]ELOS (acute kidney injury) ([facility]     Probable Bacterial Pneumonia     CAD (coronary artery disease)     Anemia     Delirium     Severe protein-calorie malnutrition (HCC)     On total parenteral nutrition     Colitis     Postoperative intra-abdominal abscess     Gram-negative bacteremia     Seizure ([facility]      Plan:     1. Reviewed POC blood glucose . Labs and X ray results   2. Reviewed Current Medicines   3. Patient started on TPN  4. Monitor Blood glucose frequently   5. Modified  the dose of Insulin/ other medicines as needed   6. Will follow     .      Giorgio Merida MD, MD

## 2022-05-11 NOTE — PROGRESS NOTES
Occupational Therapy Treatment Note  Name: Villa Benedict MRN: [id number] :   [id number]   Date:  2022   Admission Date: 2022 Room:  -A       Restrictions/Precautions:  General precautions, fall risk  Aspiration, nectar thick    Communication with other providers:  Per chart review and Nurse patient is appropriate for therapeutic intervention. Subjective:  Patient states:  \"I want to get back to bed. \" Denied to do any exercises. Per sitter, pt has almost slid out of recliner 3x times this morning. Pain: Denied (location, type, intensity)    Objective:    Observation:  In recliner with sitter present. Treatment, including education:  Therapeutic Activity Training:   Therapeutic activity training was instructed today. Cues were given for safety, sequence, UE/LE placement, awareness, and balance. Bed mobility:Max A sit>supine and scooting    Sit/stand transfers: Mod A x2 reps, perform static standing balance wit upright posture ~30 secs x2 Mod A, req [place] for hand placement prior to transfers.  Max A SPT utilizing RW recliner>bed     Activities performed today included bed mobility training, transfers, functional mobility  to increase strength, activity tolerance to facilitate IND c ADL tasks, func transfers / mobility with G safety awareness carryover    Assessment / Impression:  P carryover with transfers, demo significant retropulsion, pt trying to sit down prior to squaring up to bed  Patient's tolerance of treatment: poor  Adverse Reaction: none  Significant change in status and impact:  none  Barriers to improvement: activity tolerance, transfers, balance    Plan for Next Session:    Time in:  1031  Time out:  1045  Timed treatment minutes:  14  Total treatment time:  14    Previously filed items:  Social/Functional History  Lives With: Alone  Type of Home: Condo  Home Layout: One level  Home Access: Level entry  Bathroom Shower/Tub: Walk-in shower  Bathroom Toilet: Standard  Bathroom Accessibility: Accessible  Home Equipment: Cane (PRN)  Has the patient had two or more falls in the past year or any fall with injury in the past year?: No  ADL Assistance: [address]: Independent  Homemaking Responsibilities: Yes  Ambulation Assistance: Independent  Transfer Assistance: Independent  Active : Yes  Mode of Transportation: Car  Occupation: Retired             Electronically signed by:    SCARLETT Shahid   5/11/2022, 10:47 AM

## 2022-05-11 NOTE — PROGRESS NOTES
Nephrology Progress Note  5/11/2022 7:27 AM        Subjective:   Admit Date: 4/22/2022  PCP: Krystyna Armendariz MD    Interval History: Still requiring sitter, he is more lethargic this morning-could not answer the question that he did yesterday    Diet: Remains with TPN    ROS: Low urine output per diaper checking, as I spoke with the sitter, also more lethargic  Has almost 550 cc of ostomy output  No fever      Data:     Current meds:    lansoprazole  30 mg Oral QAM AC    dilTIAZem  60 mg Oral 4 times per day    ferrous sulfate  325 mg Oral Daily    fat emulsion  250 mL IntraVENous Once per day on Mon Tue Thu Fri    lidocaine PF  5 mL IntraDERmal Once    sodium chloride flush  5-40 mL IntraVENous 2 times per day    lactobacillus  1 capsule Oral Daily with breakfast    apixaban  2.5 mg Oral BID    [Held by provider] digoxin  125 mcg Oral Daily    folic acid  1 mg Oral Daily    metoprolol tartrate  25 mg Oral BID    therapeutic multivitamin-minerals  1 tablet Oral Daily    sodium chloride flush  5-40 mL IntraVENous 2 times per day      PN-Adult Premix 5/15 - Central 42 mL/hr at 05/11/22 0530    sodium chloride           I/O last 3 completed shifts:   In: 2718.8 [P.O.:100.3]  Out: 139 [Urine:170; Emesis/NG output:2; BJNDO:884]    CBC:   Recent Labs     05/09/22  0420 05/11/22  0550   WBC 19.0* 19.4*   HGB 9.7* 9.9*    444*          Recent Labs     05/08/22  1308 05/09/22  0420 05/10/22  0500   * 133* 129*   K 4.4 4.0 3.8   CL 94* 99 96*   CO2 19* 17* 18*   BUN 50* 54* 67*   CREATININE 3.9* 3.8* 4.0*   GLUCOSE 120* 124* 125*       Lab Results   Component Value Date    CALCIUM 8.8 05/10/2022    PHOS 3.0 05/10/2022       Objective:     Vitals: /71   Pulse 100   Temp 98.3 °F (36.8 °C) (Oral)   Resp 20   Ht 5' 9.02\" (1.753 m)   Wt 156 lb 4.9 oz (70.9 kg)   SpO2 95%   BMI 23.07 kg/m²     General appearance: Thin, little lethargic this morning with sitter in his room  HEENT: Positive

## 2022-05-11 NOTE — PROGRESS NOTES
CARDIOLOGY  NOTE        Name:  Jessica Salamanca /Age/Sex: 1929  (80 y.o. male)   MRN & CSN:  [id number] & [id number] Admission Date/Time: 2022 12:38 AM   Location:  -A PCP: García Nobles, 29 [address] [place] Day: 20        PLAN FROM CARDIOLOGY FOR TODAY:   Monitor heart rate      - cardiology consult is for: Tachycardia    -  Interval history: Had intermittent episodes of tachycardia    · ASSESSMENT/ PLAN:  · I told the patient's attendant to give me a call once the patient's family comes so we can discuss regarding further management      PAF: rate is moderately controlled controlled. 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 Subjective: Todays complain: Patient confused    HPI:  Krys Luo is a 80 y. o.year old who and presents with had concerns including Other (bleeding at new ostomy site). Chief Complaint   Patient presents with    Other     bleeding at new ostomy site           Objective: Temperature:  Current - Temp: 97.3 °F (36.3 °C);  Max - Temp  Av.5 °F (36.9 °C)  Min: 97.3 °F (36.3 °C)  Max: 101.1 °F (38.4 °C)    Respiratory Rate : Resp  Av.8  Min: 20  Max: 30    Pulse Range: Pulse  Av.3  Min: 69  Max: 155    Blood Presuure Range:  Systolic (76PHA), YCA:047 , Min:80 , AUJ:289   ; Diastolic (37ODW), LCT:79, Min:54, Max:105      Pulse ox Range: SpO2  Av.4 %  Min: 93 %  Max: 95 %    24hr I & O:      Intake/Output Summary (Last 24 hours) at 2022 1754  Last data filed at 2022 1325  Gross per 24 hour   Intake 1201.31 ml   Output 445 ml   Net 756.31 ml         /70   Pulse 77   Temp 97.3 °F (36.3 °C) (Oral)   Resp 21   Ht 5' 9.02\" (1.753 m)   Wt 156 lb 4.9 oz (70.9 kg)   SpO2 93%   BMI 23.07 kg/m²           Review of Systems: Confused    TELEMETRY:    has a past medical history of AAA (abdominal aortic aneurysm) ([facility] [address] MARIELOS (acute kidney injury) ([facility] [address] Angina, class I ([facility] [address] Benign prostatic hyperplasia, Bowel obstruction ([address] CAD (coronary artery disease), Cancer ([address] CCC (chronic calculous cholecystitis), Delirium, Gastroesophageal reflux disease without esophagitis, Hx of cardiovascular stress test, Hypertension, On total parenteral nutrition, Partial small bowel obstruction ([address] Primary malignant neoplasm of rectum ([facility] [address] Probable Bacterial Pneumonia, Psoriasis, Psoriatic arthritis ([facility] [address] Pyelonephritis, Seizure ([facility] [address] Severe protein-calorie malnutrition ([facility] [address] Stable angina ([facility] [address] and Unspecified cerebral artery occlusion with cerebral infarction. has a past surgical history that includes Appendectomy; colostomy; Upper gastrointestinal endoscopy (N/A, 2/28/2022); Colonoscopy (N/A, 3/2/2022); and laparotomy (N/A, 4/3/2022). Physical Exam:  General:  Awake, confused  Head:normal  Eye: Pupils equal and round  Neck:  No JVD, no carotid bruit noted   Chest:  Clear to auscultation, no signs of respiratory distress  Cardiovascular:  Normal rate and rhythm. S1 and S2 noted.  No murmurs rubs or gallops  Abdomen:   nontender  Extremities:  tr edema  Pulses; palpable  Neuro: grossly normal    Medications:    furosemide  80 mg IntraVENous BID    lansoprazole  30 mg Oral QAM AC    dilTIAZem  60 mg Oral 4 times per day    ferrous sulfate  325 mg Oral Daily    fat emulsion  250 mL IntraVENous Once per day on Mon Tue Thu Fri    lidocaine PF  5 mL IntraDERmal Once    sodium chloride flush  5-40 mL IntraVENous 2 times per day    lactobacillus  1 capsule Oral Daily with breakfast    apixaban  2.5 mg Oral BID    [Held by provider] digoxin  125 mcg Oral Daily    folic acid  1 mg Oral Daily    metoprolol tartrate  25 mg Oral BID    therapeutic multivitamin-minerals  1 tablet Oral Daily    sodium chloride flush  5-40 mL IntraVENous 2 times per day      PN-Adult Premix 5/15 - Central      PN-Adult Premix 5/15 - Central 42 mL/hr at 05/11/22 0530    sodium chloride       metoprolol, sodium chloride flush, sodium chloride, simethicone, sodium chloride flush, ondansetron **OR** ondansetron, acetaminophen **OR** [DISCONTINUED] acetaminophen    Lab Data:  CBC:   Recent Labs     05/09/22  0420 05/11/22  0550   WBC 19.0* 19.4*   HGB 9.7* 9.9*   HCT 30.5* 31.3*   MCV 97.1 97.8    444*     BMP:   Recent Labs     05/09/22  0420 05/10/22  0500 05/11/22  0550   * 129* 124*   K 4.0 3.8 4.0   CL 99 96* 92*   CO2 17* 18* 16*   PHOS 3.9 3.0 2.8   BUN 54* 67* 83*   CREATININE 3.8* 4.0* 5.1*     LIVER PROFILE:   Recent Labs     05/09/22  0420   AST 17   ALT 28   BILITOT 0.4   ALKPHOS 203*     PT/INR: No results for input(s): PROTIME, INR in the last 72 hours. APTT: No results for input(s): APTT in the last 72 hours. BNP:  No results for input(s): BNP in the last 72 hours. TROPONIN: No results for input(s): TROPONINI in the last 72 hours. No results for input(s): TROPONINT in the last 72 hours. Labs, consult, tests reviewed                    Rekha Mari MD, PA-C 5/11/2022 5:54 PM     Please note this report has been partially produced using speech recognition software and may contain errors related to that system including errors in grammar, punctuation, and spelling, as well as words and phrases that may be inappropriate. If there are any questions or concerns please feel free to contact the dictating provider for clarification.

## 2022-05-11 NOTE — PROGRESS NOTES
Niru Blank called for update this morning. Tech able to give update of pt having bath and getting up to chair. Tech then took number for nurse to return call. This nurse returned called and gave update answering all question. Per Niru Blank she would like to speak with Nephrology and discuss the plan of care.  Perfect serve sent to Dr Isma Hernandez

## 2022-05-11 NOTE — PROGRESS NOTES
[facility] [address]   Progress Note  [facility] 0 1 2      Date: 2022   Patient: Kehinde Roach   : 1929   DOA: 2022   MRN: [id number]   ROOM#: [id number]     Admit Date: 2022     Collaborating Urologist on Call at time of admission: Dr. Cuong Solano    CC: Pain from Weaver    Subjective:     Pain: mild, no nausea and no vomiting,   Bowel Movement/Flatus:   Yes  Voiding:  Voiding easily, episodes of incontinence     The patient is asleep at this time with family at the bedside. Less interactive and more somnolent today. No complaints. Has not been urinating as much the last couple days.      Objective:      Vitals:    BP (!) 80/54   Pulse 69   Temp 97.4 °F (36.3 °C) (Axillary)   Resp 21   Ht 5' 9.02\" (1.753 m)   Wt 156 lb 4.9 oz (70.9 kg)   SpO2 94%   BMI 23.07 kg/m²    Temp  Av.7 °F (37.1 °C)  Min: 97.4 °F (36.3 °C)  Max: 101.1 °F (38.4 °C)       Intake/Output Summary (Last 24 hours) at 2022 1630  Last data filed at 2022 1325  Gross per 24 hour   Intake 1201.31 ml   Output 445 ml   Net 756.31 ml       Physical Exam:   General appearance: appears stated age, cooperative and confused  Head: Normocephalic, without obvious abnormality, atraumatic  Back: No CVA tenderness   Abdomen: Soft, nontender, nondistended    Labs:   WBC:    Lab Results   Component Value Date    WBC 19.4 2022      Hemoglobin/Hematocrit:    Lab Results   Component Value Date    HGB 9.9 2022    HCT 31.3 2022      BMP:   Lab Results   Component Value Date     2022    K 4.0 2022    CL 92 2022    CO2 16 2022    BUN 83 2022    LABALBU 3.3 2022    CREATININE 5.1 2022    CALCIUM 8.8 2022    GFRAA 13 2022    LABGLOM 11 2022      PT/INR:    Lab Results   Component Value Date    PROTIME 15.7 2022    PROTIME 10.4 2011    INR 1.21 2022      PTT:    Lab Results   Component Value Date    APTT 26.2 2022 Blood Culture:  No growth   Urine Culture:  No growth    Imaging:   CT ABDOMEN PELVIS WO CONTRAST Additional Contrast? None    Result Date: 5/6/2022  EXAMINATION: CT OF THE ABDOMEN AND PELVIS WITHOUT CONTRAST 5/6/2022 5:37 pm TECHNIQUE: CT of the abdomen and pelvis was performed without the administration of intravenous contrast. Multiplanar reformatted images are provided for review. Dose modulation, iterative reconstruction, and/or weight based adjustment of the mA/kV was utilized to reduce the radiation dose to as low as reasonably achievable. COMPARISON: 05/03/2022 HISTORY: ORDERING SYSTEM PROVIDED HISTORY: abdominal pain TECHNOLOGIST PROVIDED HISTORY: Reason for exam:->abdominal pain Additional Contrast?->None Reason for Exam: abdominal pain Additional signs and symptoms: unable to follow commands Relevant Medical/Surgical History: poor historian FINDINGS: Lower Chest: There is small bilateral pleural effusions. There is adjacent passive atelectasis. Coronary artery atherosclerosis. Organs: Prior cholecystectomy. Multiple cystic hepatic lesions are similar prior examination. The largest of these are in the left hepatic lobe. Within the limitations of a noncontrast examination, no acute abnormality within the spleen, pancreas, or right adrenal gland. Left adrenal thickening is similar to prior examination. No obstructing stones or hydronephrosis. 1.9 cm indeterminate left renal lesion. 5.1 x 3.1 cm peripancreatic fluid collection is not significantly changed in size in the interval. GI/Bowel: Stomach is partially distended. The small bowel is nondilated. 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 . Pelvis: Bladder is partially distended there is a small amount of gas in the urinary bladder, which may be due to recent catheterization. Prostate is mildly enlarged.  Peritoneum/Retroperitoneum: Loculated fluid collection in the pelvis has decreased in size, measuring 8.6 x 3.8 cm (previously 9.5 x 3.7 cm). This extends superiorly into the left mid abdomen, similar prior examination. No pneumoperitoneum. Infrarenal abdominal aortic aneurysm measures 5.5 cm. Bones/Soft Tissues: Multilevel degenerative changes of the lumbar spine. 1. Postoperative changes from prior colectomy. Fluid collection in the pelvis is slightly decreased in size. 2. Infrarenal abdominal aortic aneurysm measuring 5.5 cm. 3. Peripancreatic fluid collection is not significantly changed in size in the interval, possibly a pseudocyst. 4. Small bilateral pleural effusions. 5. Indeterminate left renal lesion measures 1.9 cm. Renal protocol MRI or CT could provide further information as clinically indicated. RECOMMENDATIONS: 5.5 cm infrarenal abdominal aortic aneurysm. Recommend referral to a vascular specialist. Reference: J Am Abad Radiol 8697;50:437-448. CT ABDOMEN PELVIS WO CONTRAST Additional Contrast? Oral    Result Date: 5/3/2022  EXAMINATION: CT OF THE ABDOMEN AND PELVIS WITHOUT CONTRAST 5/3/2022 12:52 pm TECHNIQUE: CT of the abdomen and pelvis was performed without the administration of intravenous contrast. Multiplanar reformatted images are provided for review. Dose modulation, iterative reconstruction, and/or weight based adjustment of the mA/kV was utilized to reduce the radiation dose to as low as reasonably achievable. COMPARISON: 04/22/2022 HISTORY: ORDERING SYSTEM PROVIDED HISTORY: evaluate intra-abdominal abscess as had ERNIE removed 4/29, elevated WBC today TECHNOLOGIST PROVIDED HISTORY: Reason for exam:->evaluate intra-abdominal abscess as had ERNIE removed 4/29, elevated WBC today Additional Contrast?->Oral Reason for Exam: evaluate intra-abdominal abscess as had ERNIE removed 4/29, elevated WBC today FINDINGS: Lower Chest: Trace residual layering left pleural effusion. No right pleural effusion. Organs: Limited evaluation due lack of intravenous contrast.  Scattered a patent cysts appear unchanged. Prior cholecystectomy. No biliary ductal dilatation. Fluid collection along the inferior margin of the mid to distal pancreas is not substantially changed measuring 5.4 x 3.1 cm in transaxial dimensions by 3.6 cm craniocaudal.  There is no pancreatic ductal dilatation. Chronic calcified granulomatous changes are noted in the otherwise unremarkable spleen. Subcentimeter benign left adrenal adenoma is unchanged. Right adrenal gland is unremarkable. No suspect renal lesion is evident. There are renal hilar vascular calcifications without definite urinary tract calculus. There is no hydronephrosis. GI/Bowel: No abnormally dilated bowel loops. Changes of prior colectomy with right lower quadrant ileostomy. Pelvis: Weaver catheter in place with gas in the urinary bladder lumen. Peritoneum/Retroperitoneum: Slight interval decrease in volume of a fluid collection extending from the presacral soft tissues into the left mid abdomen to approximate the level of the umbilicus. Evaluation of this fluid collection is limited by lack of contrast as the most superior aspect of the collection is indistinguishable from adjacent bowel loops. The collection measures approximately 9.5 x 3.7 cm in transaxial dimensions at the level of the sacroiliac joints compared to 14.5 x 6.8 cm previously. There is no gas within the fluid collection. There is no free air or free fluid. 5.6 cm diameter fusiform infrarenal abdominal aortic aneurysm is redemonstrated with moderate calcific atherosclerosis. Bones/Soft Tissues: No new osseous or soft tissue abnormality. 1. Slight interval decrease in volume of a fluid collection extending from the presacral soft tissues into the left mid abdominal cavity, which is suboptimally evaluated due to lack of intravenous contrast. 2. Stable fluid collection along the inferior margin of the mid to distal pancreas.  3. Gas in the urinary bladder lumen is favored to be related to Weaver catheter placement, although enterovesical fistula cannot be completely excluded. 4. Trace residual left pleural effusion. 5. Stable 5.6 cm abdominal aortic aneurysm. RECOMMENDATIONS: 5.6 cm infrarenal abdominal aortic aneurysm. Recommend referral to a vascular specialist. Reference: J Am Abad Radiol 0916;09:827-774. XR ABDOMEN (KUB) (SINGLE AP VIEW)    Result Date: 4/13/2022  EXAMINATION: ONE SUPINE XRAY VIEW(S) OF THE ABDOMEN 4/13/2022 9:53 am COMPARISON: 04/07/2022 HISTORY: ORDERING SYSTEM PROVIDED HISTORY: abdominal distension TECHNOLOGIST PROVIDED HISTORY: Reason for exam:->abdominal distension Reason for Exam: abdominal distension FINDINGS: Nonspecific dilated air-filled loops of small bowel measuring up to proximally 4.7 cm in the left upper quadrant. Findings may be compatible with small-bowel obstruction. Postoperative clips overlie the ventral abdomen. Ileus could appear similar. Findings are slightly increased from prior study on April 7, 2022. evaluation for free air limited with a supine view only. Calcified abdominal aortic aneurysm partially seen along the left lower lumbar spine. See recent CT. Increased small bowel dilatation to suggest small bowel obstruction, ileus or enteritis. CT HEAD WO CONTRAST    Result Date: 5/6/2022  EXAMINATION: CT OF THE HEAD WITHOUT CONTRAST  5/6/2022 5:35 pm TECHNIQUE: CT of the head was performed without the administration of intravenous contrast. Dose modulation, iterative reconstruction, and/or weight based adjustment of the mA/kV was utilized to reduce the radiation dose to as low as reasonably achievable. COMPARISON: 04/28/2022 HISTORY: ORDERING SYSTEM PROVIDED HISTORY: confusion, lethargy TECHNOLOGIST PROVIDED HISTORY: Reason for exam:->confusion, lethargy Reason for exam:->as above Has a \"code stroke\" or \"stroke alert\" been called? ->No Reason for Exam: confusion, lethargy; as above Additional signs and symptoms: none Relevant Medical/Surgical History: poor historian FINDINGS: BRAIN/VENTRICLES: There is no acute intracranial hemorrhage, mass effect or midline shift. No abnormal extra-axial fluid collection. The gray-white differentiation is maintained without evidence of an acute infarct. There is no evidence of hydrocephalus. Stable diffuse parenchymal volume loss with moderate chronic white matter microvascular ischemic changes. 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 ORBITS: The visualized portion of the orbits demonstrate no acute abnormality. SINUSES: The visualized paranasal sinuses and mastoid air cells demonstrate no acute abnormality. SOFT TISSUES/SKULL:  No acute abnormality of the visualized skull or soft tissues. 1. No acute intracranial abnormality. 2. Stable moderate chronic white matter microvascular ischemic changes and chronic lacunar infarct in the left caudate nucleus. CT HEAD WO CONTRAST    Result Date: 4/28/2022  EXAMINATION: CT OF THE HEAD WITHOUT CONTRAST  4/28/2022 10:58 am TECHNIQUE: CT of the head was performed without the administration of intravenous contrast. Dose modulation, iterative reconstruction, and/or weight based adjustment of the mA/kV was utilized to reduce the radiation dose to as low as reasonably achievable. COMPARISON: 03/20/2022 HISTORY: ORDERING SYSTEM PROVIDED HISTORY: acute change in MS TECHNOLOGIST PROVIDED HISTORY: Reason for exam:->acute change in MS Has a \"code stroke\" or \"stroke alert\" been called? ->No Reason for Exam: acute change in MS, CONFUSION FINDINGS: BRAIN/VENTRICLES: There is no acute intracranial hemorrhage, mass effect or midline shift. No abnormal extra-axial fluid collection. The gray-white differentiation is maintained without evidence of an acute infarct. There is no evidence of hydrocephalus. Cortical atrophy, white matter changes consistent with microvascular degenerative disease noted and similar to prior study.   Atherosclerotic calcification noted in the terminal internal carotid arteries of uncertain hemodynamic significance. ORBITS: The visualized portion of the orbits demonstrate no acute abnormality. SINUSES: Stable subcentimeter mucous retention cysts ethmoid air cells bilaterally. Otherwise, visualized paranasal sinuses, mastoid air cells and middle ear cavities demonstrate no acute abnormality. SOFT TISSUES/SKULL:  No acute abnormality. No noncontrast CT evidence of acute intracranial pathology or significant interval change compared to 03/20/2022. Cortical atrophy, white matter changes consistent with microvascular degenerative disease, atherosclerotic calcification in the terminal internal carotid arteries of uncertain hemodynamic significance incidentally noted. .     CT ABDOMEN PELVIS W IV CONTRAST Additional Contrast? None    Result Date: 4/22/2022  EXAMINATION: CT OF THE ABDOMEN AND PELVIS WITH CONTRAST 4/22/2022 2:15 am TECHNIQUE: CT of the abdomen and pelvis was performed with the administration of intravenous contrast. Multiplanar reformatted images are provided for review. Dose modulation, iterative reconstruction, and/or weight based adjustment of the mA/kV was utilized to reduce the radiation dose to as low as reasonably achievable. COMPARISON: CT abdomen and pelvis dated April 10, 2022 HISTORY: ORDERING SYSTEM PROVIDED HISTORY: Abdominal pain, vomiting, bleeding from ostomy site TECHNOLOGIST PROVIDED HISTORY: Reason for exam:->Abdominal pain, vomiting, bleeding from ostomy site Additional Contrast?->None Decision Support Exception - unselect if not a suspected or confirmed emergency medical condition->Emergency Medical Condition (MA) Reason for Exam: Abdominal pain, vomiting, bleeding from ostomy site FINDINGS: Lower Chest: Small bilateral pleural effusions with bibasilar atelectasis is noted. Extensive coronary artery calcifications are seen. Organs: There has been a cholecystectomy.   Stable multiple hypodense cystic lesions are seen within the liver without significant change. These are likely benign and no follow-up is indicated. Calcified granulomas are seen within the spleen. The adrenal glands are unremarkable. The pancreas is unremarkable. There is no evidence of hydronephrosis. GI/Bowel: Postsurgical changes are seen to the bowel loops. There is a right lower quadrant ostomy. There is no evidence of bowel obstruction. Mildly dilated focal loops of small bowel are noted with air-fluid levels within the left upper quadrant which could be related to reactive ileus. Pelvis: A Weaver catheter is seen within the bladder. Minimal gas is seen within the lumen of the bladder, likely rib related to catheter placement. Peritoneum/Retroperitoneum: There is no evidence of free intraperitoneal air. Small amount of free fluid is seen within the right lower quadrant. There is an infrarenal abdominal aortic aneurysm with mural thrombus which measures 5.6 x 5.6 cm. There is a large rim enhancing fluid collection within the abdomen and pelvis. This collection extends from the presacral region to the level of the pancreas and is concerning for an abscess. Bones/Soft Tissues: No destructive osseous lesions are identified. 1. Large rim enhancing fluid collection is seen within the abdomen and pelvis which extends from the presacral region to the level of the pancreas, concerning for an abscess. 2. Small amount of free fluid is seen within the abdomen. 3. Infrarenal abdominal aortic aneurysm measuring 5.6 cm. Please see recommendations below. 4. Small bilateral pleural effusions with bibasilar atelectasis. 5. Focally dilated small bowel loops with air-fluid levels are seen within the left upper quadrant which could be related to ileus. RECOMMENDATIONS: 5.6 cm infrarenal abdominal aortic aneurysm. Recommend referral to a vascular specialist. Reference: J Am Abad Radiol 2954;54:501-310.      CT ABDOMEN PELVIS W IV CONTRAST Additional Contrast? Tissues: No acute osseous findings. Diffuse mild body wall edema. No mechanical obstructive process however inflammatory findings of small bowel with mucosal hyperenhancement and wall thickening throughout the distended small bowel loops to the right lower quadrant ileostomy with small volume abdominopelvic ascites most consistent with enteritis. No obvious abscess formation. Moderate bilateral pleural effusions along with body wall edema. XR CHEST PORTABLE    Result Date: 5/5/2022  EXAMINATION: ONE XRAY VIEW OF THE CHEST 5/5/2022 7:36 pm COMPARISON: Chest radiograph 04/24/2022. HISTORY: ORDERING SYSTEM PROVIDED HISTORY: sob TECHNOLOGIST PROVIDED HISTORY: Reason for exam:->sob Reason for Exam: sob Additional signs and symptoms: NA Relevant Medical/Surgical History: HYPERTENSION, RECTAL CANCER FINDINGS: The lungs are without acute focal process. There is no effusion or pneumothorax. The cardiomediastinal silhouette is stable. The osseous structures are stable. No acute process. XR CHEST PORTABLE    Result Date: 4/24/2022  EXAMINATION: ONE XRAY VIEW OF THE CHEST 4/24/2022 6:40 am COMPARISON: 4/23/2022 HISTORY: ORDERING SYSTEM PROVIDED HISTORY: f/u on pleural effusion TECHNOLOGIST PROVIDED HISTORY: Reason for exam:->f/u on pleural effusion Reason for Exam: f/u on pleural effusion Additional signs and symptoms: f/u on pleural effusion FINDINGS: Trace left pleural effusion is stable. Moderate left basilar atelectasis or airspace disease remains. Right lung is clear. No pneumothorax. Heart size is normal.     Stable trace left pleural effusion with left basilar airspace disease or atelectasis.      XR CHEST PORTABLE    Result Date: 4/23/2022  EXAMINATION: ONE XRAY VIEW OF THE CHEST 4/23/2022 7:31 am COMPARISON: 04/16/2022 HISTORY: ORDERING SYSTEM PROVIDED HISTORY: sob TECHNOLOGIST PROVIDED HISTORY: Reason for exam:->sob Reason for Exam: sob FINDINGS: Moderate left basilar airspace disease or atelectasis remains. Small bilateral pleural effusions. No pneumothorax. Minimal right basilar atelectasis. Mild stable cardiomegaly. Small bilateral pleural effusions. Moderate left basilar atelectasis and mild right basilar atelectasis. XR CHEST PORTABLE    Result Date: 4/16/2022  EXAMINATION: ONE XRAY VIEW OF THE CHEST 4/16/2022 12:50 pm COMPARISON: None. HISTORY: ORDERING SYSTEM PROVIDED HISTORY: SOB TECHNOLOGIST PROVIDED HISTORY: Reason for exam:->SOB Reason for Exam: SOB Additional signs and symptoms: SOB Relevant Medical/Surgical History: SOB FINDINGS: There is a small left basilar infiltrate and probable effusion which appears new compared to the previous exam 04/30/2022. Mild pulmonary venous congestion. Cardiac silhouette and osseous structures unchanged. Right IJ line terminates in the SVC/atrium     Small left basilar pneumonia suspected     XR CHEST PORTABLE    Result Date: 4/13/2022  EXAMINATION: ONE XRAY VIEW OF THE CHEST 4/13/2022 9:53 am COMPARISON: Chest radiograph 04/08/2022 HISTORY: ORDERING SYSTEM PROVIDED HISTORY: bilateral thoracentesis TECHNOLOGIST PROVIDED HISTORY: Reason for exam:->bilateral thoracentesis Reason for Exam: bilateral thoracentesis FINDINGS: Interval decrease in size of bilateral pleural effusion status post thoracentesis, with associated improved aeration at the lung bases. No visualized pneumothorax. Cardiomediastinal silhouette is unchanged. Right IJ central venous catheter remains in place with tip in the right atrium. No acute osseous abnormality. Interval decrease in size of bilateral pleural effusion status post thoracentesis, with improved aeration at the lung bases. No visualized pneumothorax. US ABDOMEN LIMITED Specify organ? LIVER    Result Date: 4/19/2022  EXAMINATION: RIGHT UPPER QUADRANT ULTRASOUND 4/19/2022 5:53 am COMPARISON: None.  HISTORY: ORDERING SYSTEM PROVIDED HISTORY: Elevated alkaline phosphatase TECHNOLOGIST PROVIDED HISTORY: Reason for exam:->Elevated alkaline phosphatase Specify organ?->LIVER Reason for Exam: Abd pain Additional signs and symptoms: Cholecystectomy FINDINGS: LIVER:  The liver demonstrates normal echogenicity without evidence of intrahepatic biliary ductal dilatation. BILIARY SYSTEM:  Cholecystectomy is noted. Common bile duct is within normal limits measuring 5.1 mm. RIGHT KIDNEY: The right kidney is grossly unremarkable without evidence of hydronephrosis. PANCREAS:  The pancreas is not visualized. OTHER: No evidence of right upper quadrant ascites. The examination is limited due to patient's body habitus. 1. Limited exam. 2. Cholecystectomy. 3. Common bile duct is within normal limits. No intrahepatic bile duct dilatation. VL DUP LOWER EXTREMITY VENOUS RIGHT    Result Date: 4/22/2022  EXAMINATION: DUPLEX VENOUS ULTRASOUND OF THE RIGHT LOWER EXTREMITY 4/22/2022 3:00 am TECHNIQUE: Duplex ultrasound using B-mode/gray scaled imaging and Doppler spectral analysis and color flow was obtained of the deep venous structures of the right extremity. COMPARISON: None. HISTORY: ORDERING SYSTEM PROVIDED HISTORY: swelling TECHNOLOGIST PROVIDED HISTORY: Reason for exam:->swelling Reason for Exam: swelling FINDINGS: The visualized veins of the right lower extremity are patent and free of echogenic thrombus. The veins demonstrate good compressibility with normal color flow study and spectral analysis. No evidence of DVT in the right lower extremity. MRI BRAIN WO CONTRAST    Result Date: 4/28/2022  EXAMINATION: MRI OF THE BRAIN WITHOUT CONTRAST  4/28/2022 2:37 pm TECHNIQUE: Multiplanar multisequence MRI of the brain was performed without the administration of intravenous contrast. COMPARISON: Concurrent head CT HISTORY: ORDERING SYSTEM PROVIDED HISTORY: acute change in MS FINDINGS: INTRACRANIAL STRUCTURES/VENTRICLES: No evidence of an acute infarct or other acute parenchymal process. No evidence of acute intracranial hemorrhage. There is no evidence of an intracranial mass or extraaxial fluid collection. No significant mass effect or midline shift. Patchy and early confluent foci of T2/FLAIR hyperintensity are present within supratentorial white matter which is a nonspecific finding but likely represents moderate chronic microvascular ischemia. Scattered remote bilateral lacunar infarcts throughout the basal ganglia. There is moderate generalized volume loss. Ventricular enlargement concordant with the degree of parenchymal volume loss. The sellar/suprasellar regions appear unremarkable. The normal signal voids within the major intracranial vessels appear maintained. ORBITS: The visualized portion of the orbits demonstrate no acute abnormality. Bilateral pseudophakia. SINUSES: The visualized paranasal sinuses and mastoid air cells are well aerated. BONES/SOFT TISSUES: The bone marrow signal intensity appears normal. The soft tissues demonstrate no acute abnormality. 1. No acute infarct or other acute intracranial process. 2. Senescent changes including moderate generalized parenchymal volume loss and chronic small vessel ischemia. Remote bilateral lacunar infarcts throughout the basal ganglia. IR GUIDED THORACENTESIS PLEURAL    Result Date: 4/15/2022  PROCEDURE: ULTRASOUNDGUIDED bilateral THORACENTESIS 4/13/2022 HISTORY: ORDERING SYSTEM PROVIDED HISTORY: pleural effusion TECHNOLOGIST PROVIDED HISTORY: bilateral Reason for exam:->pleural effusion Which side should the procedure be performed?->Radiologist Recommendation TECHNIQUE: Maximum sterile barrier technique including hand hygiene, skin prep and sterile ultrasound technique utilized for procedure. Sterile ultrasound technique also utilized for procedure Ultrasound guidance required to confirm presence of pleural fluid, puncture site selection, real-time intra procedural guidance. Images made for patient's medical file.  Following informed consent, pause a confirm/time-out and sequential right and left lower thoracic puncture site selection, skin and ultrasound probe were prepped draped in sterile fashion. 10 mL 1% lidocaine without epinephrine for local anesthesia the puncture sites. Sequential ultrasound-guided access right and left pleural spaces was achieved using 5 [place] trocar mounted catheters. Catheter was advanced off trocar introducers and-evacuated containers. 520 mL clear dashawn fluid removed from the right pleural space with 120 mL sample sent for laboratory evaluation. 800 mL clear yellow fluid removed from left pleural space with 700 cc sample sent for laboratory evaluation. Access catheters removed. Dressing applied. Postprocedure chest radiograph ordered. Patient tolerated procedure well. Ling Berumen FINDINGS: Pre and intraprocedural images demonstrate moderate bilateral pleural effusions with underlying pulmonary consolidation. Thoracentesis catheters seen within pleural fluid collections. No complication suggested. A total of 520 mL clear dashawn fluid removed from the right pleural space with 120 mL sample sent for laboratory evaluation. 800 mL clear dashawn fluid removed from left pleural space with 700 mL sample sent for laboratory evaluation. Successful ultrasound guided bilateral thoracentesis with specimen sent for laboratory evaluation per prior order. .     Assessment & Plan:      Manish Oro is a 80 y.o. male admitted 4/22/2022 for postoperative intra-abdominal abscess    1) Chronic urinary incontinence: h/o urinary incontinence s/p rectal surgery, managed by chronic indwelling Weaver catheter since 2019. Now pulling at catheter due to pelvic discomfort and mental status change. Neurology consulted for AMS. Weaver removed 5/6, voiding/incontinence with PVR less than 300 mL several times. Straight cath once daily and PRN retention. Bladder scan 180cc yesterday afternoon. Renal US without hydronephrosis. WBC increased to 19.0. Afebrile.  Not on abx, Repeat UA is unremarkable. Urine culture 5/2/22: negative. Renal function deteriorating. Cr now up to 5.1. No obstructive uropathy. Patient's daughter and [place] is wishing to only pursue conservative management for now. No  intervention necessary at this time. Will sign off. Please call with any questions or changes. Patient seen and examined, chart reviewed.      Electronically signed by RAJ Heredia on 5/11/2022 at 4:30 PM

## 2022-05-12 NOTE — CARE COORDINATION
Spoke with Kaur in Admissions at [facility] . AZEB provided update for her . Kaur stated that patient is still on her referral list and she is following for potential admission.

## 2022-05-12 NOTE — PROGRESS NOTES
Nephrology Progress Note  5/12/2022 9:07 AM        Subjective:   Admit Date: 4/22/2022  PCP: Vito Vega MD    Interval History: Still with a sitter, some fatigue and tired, alert, awake but not oriented    Diet: Minimal Per sitter-also has TPN    ROS: Not oriented, looks little lethargic this morning. Probably low urine output. Relatively low blood pressure, afebrile    Data:     Current meds:    furosemide  80 mg IntraVENous BID    lansoprazole  30 mg Oral QAM AC    dilTIAZem  60 mg Oral 4 times per day    ferrous sulfate  325 mg Oral Daily    fat emulsion  250 mL IntraVENous Once per day on Mon Tue Thu Fri    lidocaine PF  5 mL IntraDERmal Once    sodium chloride flush  5-40 mL IntraVENous 2 times per day    lactobacillus  1 capsule Oral Daily with breakfast    apixaban  2.5 mg Oral BID    [Held by provider] digoxin  125 mcg Oral Daily    folic acid  1 mg Oral Daily    metoprolol tartrate  25 mg Oral BID    therapeutic multivitamin-minerals  1 tablet Oral Daily    sodium chloride flush  5-40 mL IntraVENous 2 times per day      PN-Adult Premix 5/15 - Central 42 mL/hr at 05/12/22 0600    sodium chloride           I/O last 3 completed shifts:   In: 2291.3 [P.O.:60]  Out: 820 [Urine:20; Stool:800]    CBC:   Recent Labs     05/11/22  0550   WBC 19.4*   HGB 9.9*   *          Recent Labs     05/10/22  0500 05/11/22  0550   * 124*   K 3.8 4.0   CL 96* 92*   CO2 18* 16*   BUN 67* 83*   CREATININE 4.0* 5.1*   GLUCOSE 125* 131*       Lab Results   Component Value Date    CALCIUM 8.8 05/11/2022    PHOS 3.0 05/12/2022       Objective:     Vitals: BP (!) 97/57   Pulse 89   Temp 97.5 °F (36.4 °C) (Oral)   Resp 22   Ht 5' 9.02\" (1.753 m)   Wt 152 lb 12.5 oz (69.3 kg)   SpO2 95%   BMI 22.55 kg/m²     General appearance: Thin, alert, awake in bed, with a sitter  HEENT: At least 2+ conjunctival pallor  Neck: Seemed supple  Lungs: Anterior exam, few rhonchi  Heart: Irregularly irregular  Abdomen: Soft, ostomy  Extremities: 1+ ankle and pedal edema      Problem List :         Impression :     1. Stage III acute kidney injury-likely oliguric-toxic and ischemic tubular injury were suspected-no labs available today. 2. Recent abdominal abscess and C. difficile, off all antimicrobial agent  3. Underlying dysrhythmia, also advanced age, atherosclerotic cardiovascular disease    Recommendation/Plan  :     1. I had discussed with his daughter Samantha Interiano yesterday. We both understand that he is not doing very well. The goal still conservative medical therapy. I will just follow clinically and biochemically for any kidney recovery, we will not do kidney replacement therapy for him  2. Would be to keep him comfortable  3. Follow clinically, continue to do ongoing discussion with the family and make a good decision together, moral support from them, and answer any questions of the half.   We will try our best to help them in  the difficult time      Madhuri Rivera MD MD

## 2022-05-12 NOTE — PROGRESS NOTES
Cardiology Progress Note     Today's Plan continue short acting CCB and metoprolol    Admit Date:  4/22/2022    Consult reason/ Seen today for: tachycardia     Subjective and  Overnight Events:  Up in chair resting - awakens easily however falls back to sleep readily. Note tenderness across chest when palpated. Sitter at bedside   Daughter at bedside : updated given    Telemetry  Atrial fib   Rate is controlled at rest       DNR CC       Assessment / Plan:     PAF- rate is better controlled; on Cardizem and metoprolol:  holding on dig d/t MARIELOS ; continue with short acting CCB d/t soft bp: on low dose eliquis for stroke prevention    HTN: blood pressure is soft; continue to monitor     H/o CVA- on     Intra abdominal abscess; ATB now off- afebrile     Poor intake / malnutrition:  on TPN: album 3.3/  protein 6.2    MARIELOS/ CKD- nephrology following        History of Presenting Illness:    Chief complain on admission : 80 y. o.year old who is admitted for  Chief Complaint   Patient presents with    Other     bleeding at new ostomy site        Past medical history:    has a past medical history of AAA (abdominal aortic aneurysm) ([address] MARIELOS (acute kidney injury) ([address] Angina, class I ([address] Benign prostatic hyperplasia, Bowel obstruction ([address] CAD (coronary artery disease), Cancer ([address] CCC (chronic calculous cholecystitis), Delirium, Gastroesophageal reflux disease without esophagitis, Hx of cardiovascular stress test, Hypertension, On total parenteral nutrition, Partial small bowel obstruction ([address] Primary malignant neoplasm of rectum ([address] Probable Bacterial Pneumonia, Psoriasis, Psoriatic arthritis ([address] Pyelonephritis, Seizure ([address] Severe protein-calorie malnutrition ([address] Stable angina ([address] and Unspecified cerebral artery occlusion with cerebral infarction.   Past surgical history:   has a past surgical history that includes Appendectomy; colostomy; Upper gastrointestinal endoscopy (N/A, 2/28/2022); Colonoscopy (N/A, 3/2/2022); and laparotomy (N/A, 4/3/2022). Social History:   reports that he quit smoking about 49 years ago. His smoking use included cigarettes. He has a 60.00 pack-year smoking history. He has never used smokeless tobacco. He reports that he does not drink alcohol and does not use drugs. Family history:  family history includes Cancer in his father and mother. Allergies   Allergen Reactions    Morphine Hallucinations    Sulfa Antibiotics        Review of Systems:   All 14 systems were reviewed and are negative  Except for the positive findings which are documented     BP (!) 97/57   Pulse 89   Temp 97.5 °F (36.4 °C) (Oral)   Resp 21   Ht 5' 9.02\" (1.753 m)   Wt 152 lb 12.5 oz (69.3 kg)   SpO2 95%   BMI 22.55 kg/m²       Intake/Output Summary (Last 24 hours) at 5/12/2022 1321  Last data filed at 5/12/2022 0600  Gross per 24 hour   Intake 1090.03 ml   Output 650 ml   Net 440.03 ml       Physical Exam:  Physical Exam  Vitals reviewed. Constitutional:       Appearance: Normal appearance. HENT:      Head: Normocephalic. Eyes:      Extraocular Movements: Extraocular movements intact. Cardiovascular:      Rate and Rhythm: Rhythm irregular. Pulses: Normal pulses. Heart sounds: Normal heart sounds. Pulmonary:      Effort: Pulmonary effort is normal.      Breath sounds: Normal breath sounds. No wheezing. Abdominal:      Tenderness: There is abdominal tenderness. Musculoskeletal:         General: Tenderness (chest wall ) present. Right lower leg: No edema. Left lower leg: No edema. Skin:     General: Skin is warm and dry. Capillary Refill: Capillary refill takes less than 2 seconds. Neurological:      Mental Status: He is lethargic.           Medications:    furosemide  80 mg IntraVENous BID    lansoprazole  30 mg Oral QAM AC    dilTIAZem  60 mg Oral 4 times per have seen ,spoken to  and examined this patient personally, independently of the HEMANTH. I have reviewed the hospital care given to date and reviewed all pertinent labs and imaging. I have spoken with patient, nursing staff and provided written and verbal instructions . The above note has been reviewed     CARDIOLOGY ATTENDING ADDENDUM    HPI:  I have reviewed the above HPI  And agree with above     Pulse Range: Pulse  Av.8  Min: 86  Max: 98    Blood Presuure Range:  Systolic (22OIO), ANALI:161 , Min:97 , KXU:589   ; Diastolic (75EKD), PYI:05, Min:55, Max:68      Pulse ox Range: SpO2  Av %  Min: 93 %  Max: 95 %    24hr I & O:    Intake/Output Summary (Last 24 hours) at 2022 1701  Last data filed at 2022 1300  Gross per 24 hour   Intake 1170.03 ml   Output 375 ml   Net 795.03 ml         BP (!) 97/57   Pulse 89   Temp 97.5 °F (36.4 °C) (Oral)   Resp 21   Ht 5' 9.02\" (1.753 m)   Wt 152 lb 12.5 oz (69.3 kg)   SpO2 95%   BMI 22.55 kg/m²       Physical Exam:  General:  Awake, alert, NAD  Head:normal  Eye:normal  Neck:  No JVD   Chest:  Clear to auscultation, respiration easy  Cardiovascular:  RRR S1S2  Abdomen:   nontender  Extremities:  tr edema  Pulses; palpable  Neuro: grossly normal      MEDICAL DECISION MAKING;    Pt assessed , chart reviewed, patient examined examined , all available data was reviewed, following is the plan which was discussed with HEMANTH as well:    -Paroxysmal atrial fibrillation patient's rate is better and is controlled patient is on Cardizem and beta-blocker the dig is being on hold secondary to kidney injury patient is on low-dose Eliquis already  Please noted disease EKG patient in sinus rhythm with APCs  [id number] Score for Atrial Fibrillation Stroke Risk   Risk   Factors  Component Value   C CHF No 0   H HTN Yes 1   A2 Age >= 76 Yes,  (80 y.o.) 2   D DM No 0   S2 Prior Stroke/TIA Yes 2   V Vascular Disease No 0   A Age 74-69 No,  (80 y.o.) 0   Sc Sex male 0 [id number]  Score  5     -Hypertension blood pressure is on the lower side we will continue to monitor  -Intra-abdominal abscess off antibiotics patient is afebrile     -Malnutrition be on TPN     -Acute kidney injury being then managed by nephrology    - HTN: blood pressure is soft; continue to monitor      -CVA stable          Lauren Uriostegui MD [facility]

## 2022-05-12 NOTE — PROGRESS NOTES
V2.0  [facility] – [place], [place] Hospitalist Progress Note      Name:  Ye Lara /Age/Sex: 1929  (80 y.o. male)   MRN & CSN:  [id number] & [id number] Encounter Date/Time: 2022 5:20 PM EDT    Location:  [id number]- PCP: Tawnya Melton MD       Hospital Day: 21    Assessment and Plan:   Ye Lara is a 80 y.o. 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 fib, severe protein calorie malnutrition, and physical debility. He was seen and admitted on 2022 on account of bleeding from the ostomy site. He was later diagnosed with Postoperative intra-abdominal abscess. Of note, on 4/3/2022, the patient did not have subtotal colon resection, small bowel resection, and ileostomy placement for management of colitis. 1. Bleeding from the ostomy site: Controlled with stitches on admission. 2. Acute blood loss anemia: The patient did require transfusion of 1 unit of PRBC. 3. Acute metabolic encephalopathy/delirium: The patient's fluctuating mental status has been primarily attributed to his ongoing infections (UTI, intra-abdominal infection, etc.) and narcotic pain medications. 4. Klebsiella oxytoca bacteremia secondary to intra-abdominal abscess: Reported on blood cultures 2 out of 2 sets from 2022. The patient status post completion of broad-spectrum antibiotic regimen. 5. 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 Urology following. 6. Acute kidney injury: Secondary to above. Creatinine up to 5.1 today.   7. Previous CVA with residual paresthesias and lower extremity weakness: Head CT and brain MRI from  were negative for any acute intracranial findings. 8. JAK2 positive thrombocytosis. 9. Paroxysmal A. fib on systemic anticoagulation with Eliquis. 10. 5.6 cm infrarenal AAA: Reported on CT scan of the abdomen and pelvis from 5/2/2022. Patient follows up with vascular surgery as an outpatient. 11. Severe protein calorie malnutrition in the setting of acute illness: Patient currently on TPN secondary to poor oral intake. 12. Physical deconditioning. Plan:  · Labs reviewed: Unfortunately, the patient's renal function continues to deteriorate. His BUN is now up to 101 and his creatinine 5.9. He also has an anion gap metabolic acidosis with a gap of 20 and a serum bicarbonate of 12. He has hyponatremia at 123. · Discussed with nephrology. At this time, the exact etiology of the patient's deteriorating renal function is unclear. Suspect some possible acute tubular necrosis versus interstitial nephritis. · Nonetheless, at this time, considering the uptrending BUN, the patient might need to be started on hemodialysis soon. Nephrology discussing with the patient's family regarding best treatment approach. So far, the decision has been to pursue conservative/medical management. · The patient is heart rate is currently well controlled on current regimen of diltiazem 60 mg p.o. every 6 hours and metoprolol 25 mg daily. Blood pressure well controlled. We will continue to monitor. · Continue as needed metoprolol for breakthrough episodes of tachycardia. · Gastric protection with lansoprazole. · No signs of ongoing infection at this time. Continue to monitor off of antibiotics. · Discussed with the patient's daughter at the bedside. Diet ADULT ORAL NUTRITION SUPPLEMENT; Breakfast, Lunch, Dinner; Standard High Calorie/High Protein Oral Supplement  ADULT DIET;  Dysphagia - Minced and Moist  PN-Adult Premix 5/15 - Central  PN-Adult Premix 5/15 - Central   DVT Prophylaxis [] Lovenox, []  Heparin, [] SCDs, [] Ambulation, [x] Eliquis, [] Xarelto  [] Coumadin   Code Status DNR-CC   Disposition From: Skilled nursing facility  Expected Disposition: Skilled nursing facility versus ARU  Estimated Date of Discharge: To be determined  Patient requires continued admission due to worsening renal function. Surrogate Decision Maker/ POA      Subjective:     Chief Complaint: Other (bleeding at new ostomy site)       Manish Oro was seen today. He remains confused. No acute issues overnight. Review of Systems:    Review of Systems    Patient seems more alert today but remains confused. Objective: Intake/Output Summary (Last 24 hours) at 5/12/2022 1428  Last data filed at 5/12/2022 0600  Gross per 24 hour   Intake 1090.03 ml   Output 375 ml   Net 715.03 ml        Vitals:   Vitals:    05/12/22 1210   BP:    Pulse:    Resp: 21   Temp:    SpO2:        Physical Exam:     General: NAD  Eyes: EOMI  ENT: neck supple  Cardiovascular: Irregular rate and rhythm. Respiratory: Clear to auscultation  Gastrointestinal: Soft, non tender. Ostomy bag in place. Surgical scar noted. Pulsatile structure palpated mid abdomen. Genitourinary: no suprapubic tenderness  Musculoskeletal: No edema  Skin: warm, dry  Neuro: Alert but confused. Psych: Mood appropriate.      Medications:   Medications:    furosemide  80 mg IntraVENous BID    lansoprazole  30 mg Oral QAM AC    dilTIAZem  60 mg Oral 4 times per day    ferrous sulfate  325 mg Oral Daily    fat emulsion  250 mL IntraVENous Once per day on Mon Tue Thu Fri    lidocaine PF  5 mL IntraDERmal Once    sodium chloride flush  5-40 mL IntraVENous 2 times per day    lactobacillus  1 capsule Oral Daily with breakfast    apixaban  2.5 mg Oral BID    [Held by provider] digoxin  125 mcg Oral Daily    folic acid  1 mg Oral Daily    metoprolol tartrate  25 mg Oral BID    therapeutic multivitamin-minerals  1 tablet Oral Daily    sodium chloride flush  5-40 mL IntraVENous 2 times per day Infusions:    PN-Adult Premix 5/15 - Central      PN-Adult Premix 5/15 - Central 42 mL/hr at 05/12/22 0600    sodium chloride       PRN Meds: metoprolol, 5 mg, Q6H PRN  sodium chloride flush, 5-40 mL, PRN  sodium chloride, , PRN  simethicone, 40 mg, 4x Daily PRN  sodium chloride flush, 10 mL, PRN  ondansetron, 4 mg, Q8H PRN   Or  ondansetron, 4 mg, Q6H PRN  acetaminophen, 650 mg, Q6H PRN        Labs      Recent Results (from the past 24 hour(s))   POCT Glucose    Collection Time: 05/11/22 10:05 PM   Result Value Ref Range    POC Glucose 150 (H) 70 - 99 MG/DL   POCT Glucose    Collection Time: 05/12/22  1:52 AM   Result Value Ref Range    POC Glucose 142 (H) 70 - 99 MG/DL   Magnesium    Collection Time: 05/12/22  4:41 AM   Result Value Ref Range    Magnesium 1.9 1.8 - 2.4 mg/dl   Phosphorus    Collection Time: 05/12/22  4:41 AM   Result Value Ref Range    Phosphorus 3.0 2.5 - 4.9 MG/DL   POCT Glucose    Collection Time: 05/12/22  7:59 AM   Result Value Ref Range    POC Glucose 146 (H) 70 - 99 MG/DL   EKG 12 Lead    Collection Time: 05/12/22  9:07 AM   Result Value Ref Range    Ventricular Rate 83 BPM    Atrial Rate 159 BPM    QRS Duration 82 ms    Q-T Interval 342 ms    QTc Calculation (Bazett) 401 ms    R Axis -11 degrees    T Axis 42 degrees    Diagnosis       Atrial fibrillation  ST & T wave abnormality, consider anterior ischemia  Abnormal ECG  When compared with ECG of 08-APR-2022 00:40,  Non-specific change in ST segment in Inferior leads  ST now depressed in Anterolateral leads     POCT Glucose    Collection Time: 05/12/22 11:33 AM   Result Value Ref Range    POC Glucose 175 (H) 70 - 99 MG/DL   Basic Metabolic Panel    Collection Time: 05/12/22 11:45 AM   Result Value Ref Range    Sodium 123 (L) 135 - 145 MMOL/L    Potassium 4.0 3.5 - 5.1 MMOL/L    Chloride 91 (L) 99 - 110 mMol/L    CO2 12 (L) 21 - 32 MMOL/L    Anion Gap 20 (H) 4 - 16     (H) 6 - 23 MG/DL    CREATININE 5.9 (H) 0.9 - 1.3 MG/DL Glucose 122 (H) 70 - 99 MG/DL    Calcium 8.7 8.3 - 10.6 MG/DL    GFR Non- 9 (L) >60 mL/min/1.73m2    GFR  11 (L) >60 mL/min/1.73m2        Imaging/Diagnostics Last 24 Hours   US RETROPERITONEAL LIMITED    Result Date: 5/10/2022  EXAMINATION: ULTRASOUND OF THE KIDNEYS 5/10/2022 1:58 pm COMPARISON: CT abdomen 05/06/2022 HISTORY: ORDERING SYSTEM PROVIDED HISTORY: renal failure TECHNOLOGIST PROVIDED HISTORY: Reason for exam:->renal failure Reason for Exam: ARF FINDINGS: The right kidney measures 9.4 cm in length and the left kidney measures 10.5 cm in length. Kidneys demonstrate normal cortical echogenicity. No hydronephrosis or intrarenal stones. No focal lesions right kidney. Inferior pole left kidney hypoechoic 1.3 cm lesion corresponds to that measuring about 19 mm on CT, mild irregularity of the margins. Nonspecific cystic lesion inferior pole left kidney. Recommend noncontrast CT abdomen surveillance versus ultrasound in 6 months to ensure stability. Otherwise unremarkable ultrasound of the kidneys.        Electronically signed by Bo Velasquez MD on 5/12/2022 at 2:28 PM

## 2022-05-13 NOTE — PROGRESS NOTES
Progress Note( Dr. Eileen Hilliard)  5/12/2022  Subjective:   Admit Date: 4/22/2022  PCP: Anatoliy Herring MD    Admitted For : Intra abdominal abscess patient who has bowel resection and colostomy    Consulted For: Hypoglycemic episodes    Interval History: Patient is confused this morning attendant to watch him pull out tubes and IVs  Patient attendant to watch to observe him    Denies any chest pains,   Denies SOB . Denies nausea or vomiting. No new bowel or bladder symptoms. Intake/Output Summary (Last 24 hours) at 5/12/2022 2153  Last data filed at 5/12/2022 1815  Gross per 24 hour   Intake 1210.03 ml   Output 375 ml   Net 835.03 ml       DATA    CBC:   Recent Labs     05/11/22  0550   WBC 19.4*   HGB 9.9*   *    CMP:  Recent Labs     05/10/22  0500 05/11/22  0550 05/12/22  1145   * 124* 123*   K 3.8 4.0 4.0   CL 96* 92* 91*   CO2 18* 16* 12*   BUN 67* 83* 101*   CREATININE 4.0* 5.1* 5.9*   CALCIUM 8.8 8.8 8.7     Lipids:   Lab Results   Component Value Date    CHOL 140 05/07/2022    HDL 53 05/07/2022    TRIG 113 05/09/2022     Glucose:  Recent Labs     05/12/22  1133 05/12/22  1713 05/12/22  2128   POCGLU 175* 137* 145*     [id number]:  Lab Results   Component Value Date    LABA1C 5.2 05/06/2022     High Sensitivity TSH:   Lab Results   Component Value Date    TSHHS 1.990 05/06/2022     Free T3: No results found for: FT3  Free T4:No results found for: T4FREE    US RETROPERITONEAL LIMITED   Final Result   Nonspecific cystic lesion inferior pole left kidney. Recommend noncontrast   CT abdomen surveillance versus ultrasound in 6 months to ensure stability. Otherwise unremarkable ultrasound of the kidneys. CT ABDOMEN PELVIS WO CONTRAST Additional Contrast? None   Final Result   1. Postoperative changes from prior colectomy. Fluid collection in the   pelvis is slightly decreased in size. 2. Infrarenal abdominal aortic aneurysm measuring 5.5 cm.    3. Peripancreatic fluid collection is not significantly changed in size in   the interval, possibly a pseudocyst.   4. Small bilateral pleural effusions. 5. Indeterminate left renal lesion measures 1.9 cm. Renal protocol MRI or CT   could provide further information as clinically indicated. RECOMMENDATIONS:   5.5 cm infrarenal abdominal aortic aneurysm. Recommend referral to a vascular   specialist.   Reference: J Am Abad Radiol 2884;04:613-746. CT HEAD WO CONTRAST   Final Result   1. No acute intracranial abnormality. 2. Stable moderate chronic white matter microvascular ischemic changes and   chronic lacunar infarct in the left caudate nucleus. XR CHEST PORTABLE   Final Result   No acute process. CT ABDOMEN PELVIS WO CONTRAST Additional Contrast? Oral   Final Result   1. Slight interval decrease in volume of a fluid collection extending from   the presacral soft tissues into the left mid abdominal cavity, which is   suboptimally evaluated due to lack of intravenous contrast.   2. Stable fluid collection along the inferior margin of the mid to distal   pancreas. 3. Gas in the urinary bladder lumen is favored to be related to Weaver   catheter placement, although enterovesical fistula cannot be completely   excluded. 4. Trace residual left pleural effusion. 5. Stable 5.6 cm abdominal aortic aneurysm. RECOMMENDATIONS:   5.6 cm infrarenal abdominal aortic aneurysm. Recommend referral to a vascular   specialist.   Reference: J Am Abad Radiol 7126;93:167-750. MRI BRAIN WO CONTRAST   Final Result   1. No acute infarct or other acute intracranial process. 2. Senescent changes including moderate generalized parenchymal volume loss   and chronic small vessel ischemia. Remote bilateral lacunar infarcts   throughout the basal ganglia. CT HEAD WO CONTRAST   Final Result   No noncontrast CT evidence of acute intracranial pathology or significant   interval change compared to 03/20/2022. Cortical atrophy, white matter changes consistent with microvascular   degenerative disease, atherosclerotic calcification in the terminal internal   carotid arteries of uncertain hemodynamic significance incidentally noted. .         XR CHEST PORTABLE   Final Result   Stable trace left pleural effusion with left basilar airspace disease or   atelectasis. XR CHEST PORTABLE   Final Result   Small bilateral pleural effusions. Moderate left basilar atelectasis and mild right basilar atelectasis. VL DUP LOWER EXTREMITY VENOUS RIGHT   Final Result   No evidence of DVT in the right lower extremity. CT ABDOMEN PELVIS W IV CONTRAST Additional Contrast? None   Final Result   1. Large rim enhancing fluid collection is seen within the abdomen and pelvis   which extends from the presacral region to the level of the pancreas,   concerning for an abscess. 2. Small amount of free fluid is seen within the abdomen. 3. Infrarenal abdominal aortic aneurysm measuring 5.6 cm. Please see   recommendations below. 4. Small bilateral pleural effusions with bibasilar atelectasis. 5. Focally dilated small bowel loops with air-fluid levels are seen within   the left upper quadrant which could be related to ileus. RECOMMENDATIONS:   5.6 cm infrarenal abdominal aortic aneurysm. Recommend referral to a vascular   specialist.      Reference: J Am Abad Radiol 2687;04:468-280.             IR GUIDED FLUID DRAINAGE BY CATH SOFT TISSUE PERC    (Results Pending)        Scheduled Medicines   Medications:    furosemide  80 mg IntraVENous BID    lansoprazole  30 mg Oral QAM AC    dilTIAZem  60 mg Oral 4 times per day    ferrous sulfate  325 mg Oral Daily    fat emulsion  250 mL IntraVENous Once per day on Mon Tue Thu Fri    lidocaine PF  5 mL IntraDERmal Once    sodium chloride flush  5-40 mL IntraVENous 2 times per day    lactobacillus  1 capsule Oral Daily with breakfast    apixaban  2.5 mg Oral BID    [Held by provider] digoxin  125 mcg Oral Daily    folic acid  1 mg Oral Daily    metoprolol tartrate  25 mg Oral BID    therapeutic multivitamin-minerals  1 tablet Oral Daily    sodium chloride flush  5-40 mL IntraVENous 2 times per day      Infusions:    PN-Adult Premix 5/15 - Central 42 mL/hr at 05/12/22 1840    sodium chloride           Objective:   Vitals: BP (!) 97/57   Pulse 78   Temp 97.5 °F (36.4 °C) (Oral)   Resp 25   Ht 5' 9.02\" (1.753 m)   Wt 152 lb 12.5 oz (69.3 kg)   SpO2 95%   BMI 22.55 kg/m²   General appearance: alert and cooperative with exam to be more confused this morning  Neck: no JVD or bruit  Thyroid : Normal lobes   Lungs: Has Vesicular Breath sounds diminished breath sounds  Heart: Atrial fibrillation   abdomen: soft, non-tender; bowel sounds normal; no masses,  no organomegaly  Musculoskeletal: Normal  Extremities: extremities normal, , no edema  Neurologic:  Awake, alert, oriented to name, place and time. Cranial nerves II-XII are grossly intact. Motor is weakness t. Sensory is intact. ,  and gait is abnormal.    Assessment:     Patient Active Problem List:     Hypertension     Benign prostatic hyperplasia     Psoriatic arthritis ([address]     Primary malignant neoplasm of rectum (HCC)     Psoriasis     Chronic myeloproliferative disease (HCC)     Monocytosis (symptomatic)     Gastroesophageal reflux disease without esophagitis     Urinary retention     H/O: CVA (cerebrovascular accident)     Irregular heart beat     Nonrheumatic aortic valve stenosis     Paroxysmal atrial fibrillation (HCC)     Hemorrhage from ileostomy ([address]     Melena     Arteriovenous malformation of jejunum     History of rectal cancer     Benign neoplasm of cecum     Benign neoplasm of ascending colon     Pseudomonas urinary tract infection     Partial small bowel obstruction (HCC)     SBO (small bowel obstruction) (HCC)     Atrial fibrillation with RVR (HCC)     Hypotension     Ischemic colitis ([address] Hypocalcemia     MARIELOS (acute kidney injury) ([facility]     Probable Bacterial Pneumonia     CAD (coronary artery disease)     Anemia     Delirium     Severe protein-calorie malnutrition (HCC)     On total parenteral nutrition     Colitis     Postoperative intra-abdominal abscess     Gram-negative bacteremia     Seizure ([facility]      Plan:     1. Reviewed POC blood glucose . Labs and X ray results   2. Reviewed Current Medicines   3. Patient started on TPN  4. Monitor Blood glucose frequently   5. Modified  the dose of Insulin/ other medicines as needed   6. Will follow     .      Kassy Sánchez MD, MD

## 2022-05-13 NOTE — PROGRESS NOTES
Occupational Therapy  . Occupational Therapy Treatment Note  Name: Cele Pritchett MRN: [id number] :   1929   Date:  2022   Admission Date: 2022 Room:  -A       D/c rec- Pt would benefit from continued acute care OT services w/ discharge to ARU    Primary Problem:      Restrictions/Precautions:          General precautions, fall risk      Communication with other providers:  cotx with PT Catalina for assist and safety. Subjective:  Patient states:  Mumbled throughout. Pain: denied   (location, type, intensity)  Objective:    Observation:  patienbt asleep. Easily aroused. Family present. Objective Measures:  Tele- HR- 118-129    Treatment, including education:    Therapeutic activity training was instructed today. Cues were given for safety, sequence, UE/LE placement, awareness, and balance. Activities performed today included bed mobility training, sup-sit, sit-stand, SPT.    supine to EOB- via log roll- Max A  Sitting EOB- Min- Max A intermittently. Patient with retro lean throughout. Cues for correction. Patient very anxious of falling. Tolerated x10 min. Stand No AD- Max x2. x2 trials. Patient fearful of falling and would not keep LLE on ground. Was reaching for IV pole in front of patient despite cues for safety. Cues for erect posture. Stand to [address]- Max x2. Cues for erect posture and safety. SPT to recliner- Mod x2 with cues. While EOB prompted patient to perform BLE ex. patient needing PROM to perform straight leg raises and marching. Patient educated on role of OT , benefits of OT and rationale for therapeutic intervention. All therapeutic intervention performed c emphasis on dynamic balance / standing tolerance to increase strength, endurance and activity tolerance for increased [place] c ADL tasks and func transfers / mobility. Patient left safely in bedside chair at end of session, with call light in reach, alarm on and nursing aware.  Gait belt was used for func transfers / mobility. Assessment / Impression:      Patient's tolerance of treatment: fair  Adverse Reaction: none  Significant change in status and impact:  none  Barriers to improvement: weakness, confusion lethargy.   Plan for Next Session:    Continue with OT POC    Time in:  1015  Time out:  1039  Timed treatment minutes:  24  Total treatment time:  24    Previously filed items:  Social/Functional History  Lives With: Alone  Type of Home: Condo  Home Layout: One level  Home Access: Level entry  Bathroom Shower/Tub: Walk-in shower  Bathroom Toilet: Standard  Bathroom Accessibility: Accessible  Home Equipment: Cane (PRN)  Has the patient had two or more falls in the past year or any fall with injury in the past year?: No  ADL Assistance: [address] [place] [address] Avenue: Independent  Homemaking Responsibilities: Yes  Ambulation Assistance: Independent  Transfer Assistance: Independent  Active : Yes  Mode of Transportation: Car  Occupation: Retired             Electronically signed by:    KINA [address], [address]    5/13/2022, 11:22 AM

## 2022-05-13 NOTE — PROGRESS NOTES
Cardiology Progress Note     Today's Plan will sign off and be available if needed     Admit Date:  4/22/2022    Consult reason/ Seen today for: tachycardia     Subjective and  Overnight Events:  More awake this am - confused - picking at the air-     Sitter at bedside     Telemetry SR with frequent PAC and short bust of  Atrial fib     DNR CC       Assessment / Plan:     PAF- in SR with frequent PAC- short runs of PAF- goal is for rate control-continue with Cardizem and metoprolol: on AC eliquis low dose:     HTN: blood pressure is soft; stable    H/o CVA    Intra abdominal abscess; ATB now off- afebrile     Poor intake / malnutrition:  on TPN: album 3.3/  protein 6.2    MARIELOS/ CKD- nephrology following    Hypo magnesium- cautious replacement d/t CKD        History of Presenting Illness:    Chief complain on admission : 80 y. o.year old who is admitted for  Chief Complaint   Patient presents with    Other     bleeding at new ostomy site        Past medical history:    has a past medical history of AAA (abdominal aortic aneurysm) ([address] MARIELOS (acute kidney injury) ([address] Angina, class I ([address] Benign prostatic hyperplasia, Bowel obstruction ([address] CAD (coronary artery disease), Cancer ([address] CCC (chronic calculous cholecystitis), Delirium, Gastroesophageal reflux disease without esophagitis, Hx of cardiovascular stress test, Hypertension, On total parenteral nutrition, Partial small bowel obstruction ([address] Primary malignant neoplasm of rectum ([address] Probable Bacterial Pneumonia, Psoriasis, Psoriatic arthritis ([address] Pyelonephritis, Seizure ([address] Severe protein-calorie malnutrition ([address] Stable angina ([address] and Unspecified cerebral artery occlusion with cerebral infarction. Past surgical history:   has a past surgical history that includes Appendectomy; colostomy; Upper gastrointestinal endoscopy (N/A, 2/28/2022);  Colonoscopy (N/A, 3/2/2022); and laparotomy (N/A, 4/3/2022). Social History:   reports that he quit smoking about 49 years ago. His smoking use included cigarettes. He has a 60.00 pack-year smoking history. He has never used smokeless tobacco. He reports that he does not drink alcohol and does not use drugs. Family history:  family history includes Cancer in his father and mother. Allergies   Allergen Reactions    Morphine Hallucinations    Sulfa Antibiotics        Review of Systems:   All 14 systems were reviewed and are negative  Except for the positive findings which are documented     BP 92/65   Pulse 96   Temp 97.3 °F (36.3 °C) (Axillary)   Resp 27   Ht 5' 9.02\" (1.753 m)   Wt 151 lb 3.8 oz (68.6 kg)   SpO2 98%   BMI 22.32 kg/m²       Intake/Output Summary (Last 24 hours) at 5/13/2022 0849  Last data filed at 5/13/2022 0263  Gross per 24 hour   Intake 200 ml   Output 175 ml   Net 25 ml       Physical Exam:  Physical Exam  Vitals reviewed. Constitutional:       Appearance: Normal appearance. HENT:      Head: Normocephalic. Mouth/Throat:      Mouth: Mucous membranes are dry. Eyes:      Extraocular Movements: Extraocular movements intact. Cardiovascular:      Rate and Rhythm: Normal rate. Rhythm irregular. Pulses: Normal pulses. Heart sounds: Normal heart sounds. Pulmonary:      Effort: Pulmonary effort is normal.      Breath sounds: Normal breath sounds. No wheezing. Abdominal:      Tenderness: There is no abdominal tenderness. Comments: colostomy to right abd- out put noted    Musculoskeletal:         General: No tenderness. Right lower leg: No edema. Left lower leg: No edema. Skin:     General: Skin is warm and dry. Capillary Refill: Capillary refill takes less than 2 seconds. Neurological:      Mental Status: He is confused. Motor: Weakness present.           Medications:    furosemide  80 mg IntraVENous BID    lansoprazole  30 mg Oral QAM AC    dilTIAZem 60 mg Oral 4 times per day    ferrous sulfate  325 mg Oral Daily    fat emulsion  250 mL IntraVENous Once per day on Mon Tue Thu Fri    lidocaine PF  5 mL IntraDERmal Once    sodium chloride flush  5-40 mL IntraVENous 2 times per day    lactobacillus  1 capsule Oral Daily with breakfast    apixaban  2.5 mg Oral BID    [Held by provider] digoxin  125 mcg Oral Daily    folic acid  1 mg Oral Daily    metoprolol tartrate  25 mg Oral BID    therapeutic multivitamin-minerals  1 tablet Oral Daily    sodium chloride flush  5-40 mL IntraVENous 2 times per day      PN-Adult Premix 5/15 - Central 42 mL/hr at 05/12/22 1840    sodium chloride       metoprolol, sodium chloride flush, sodium chloride, simethicone, sodium chloride flush, ondansetron **OR** ondansetron, acetaminophen **OR** [DISCONTINUED] acetaminophen    Lab Data:  CBC:   Recent Labs     05/11/22  0550 05/13/22  0440   WBC 19.4* 19.2*   HGB 9.9* 9.1*   HCT 31.3* 27.9*   MCV 97.8 94.3   * 384     BMP:   Recent Labs     05/11/22  0550 05/12/22  0441 05/12/22  1145 05/13/22  0440   *  --  123*  --    K 4.0  --  4.0  --    CL 92*  --  91*  --    CO2 16*  --  12*  --    PHOS 2.8 3.0  --  2.5   BUN 83*  --  101*  --    CREATININE 5.1*  --  5.9*  --      PT/INR: No results for input(s): PROTIME, INR in the last 72 hours. BNP:    Recent Labs     05/11/22  0550   PROBNP 3,534*     TROPONIN: No results for input(s): TROPONINT in the last 72 hours. Impression:  Principal Problem:    Postoperative intra-abdominal abscess  Active Problems:    Seizure ([facility]    Hypertension    H/O: CVA (cerebrovascular accident)    Hemorrhage from ileostomy ([facility]    Gram-negative bacteremia  Resolved Problems:    * No resolved hospital problems. *       All labs, medications and tests reviewed by myself, continue all other medications of all above medical condition listed as is except for changes mentioned above.     Thank you   Please call with questions. Electronically signed by CHEMA Shankar CNP on 2022 at 8:49 AM     I have seen ,spoken to  and examined this patient personally, independently of the HEMANTH. I have reviewed the hospital care given to date and reviewed all pertinent labs and imaging. I have spoken with patient, nursing staff and provided written and verbal instructions . The above note has been reviewed     CARDIOLOGY ATTENDING ADDENDUM    HPI:  I have reviewed the above HPI  And agree with above     Pulse Range: Pulse  Av.2  Min: 62  Max: 107    Blood Presuure Range:  Systolic (76QZE), ZURI:991 , Min:92 , IGK:451   ; Diastolic (51ADA), ITV:48, Min:62, Max:85      Pulse ox Range: SpO2  Av.7 %  Min: 96 %  Max: 98 %    24hr I & O:    Intake/Output Summary (Last 24 hours) at 2022 2937  Last data filed at 2022 4912  Gross per 24 hour   Intake 200 ml   Output 225 ml   Net -25 ml         /62   Pulse 96   Temp 97.3 °F (36.3 °C) (Oral)   Resp 27   Ht 5' 9.02\" (1.753 m)   Wt 151 lb 3.8 oz (68.6 kg)   SpO2 98%   BMI 22.32 kg/m²       Physical Exam:  General:  Awake, alert, NAD  Head:normal  Eye:normal  Neck:  No JVD   Chest:  Clear to auscultation, respiration easy  Cardiovascular:  RRR S1S2  Abdomen:   nontender  Extremities:  tr edema  Pulses; palpable  Neuro: grossly normal      MEDICAL DECISION MAKING;    Pt assessed , chart reviewed, patient examined examined , all available data was reviewed, following is the plan which was discussed with HEMANTH as well:    Paroxysmal atrial fibrillation patient's rate is better and is controlled patient is on Cardizem and beta-blocker the dig is being on hold secondary to kidney injury patient is on low-dose Eliquis already  Please noted disease EKG patient in sinus rhythm with AP sinus rhythm Cs    [id number] Score for Atrial Fibrillation Stroke Risk    Risk   Factors   Component Value   C CHF No 0   H HTN Yes 1   A2 Age >= 76 Yes,  (80 y.o.) 2   D DM No 0   S2 Prior Stroke/TIA Yes 2   V Vascular Disease No 0   A Age 74-69 No,  (80 y.o.) 0   Sc Sex male 0     [id number]  Score   5      -Hypertension blood pressure is on the lower side we will continue to monitor  -Intra-abdominal abscess off antibiotics patient is afebrile      -Malnutrition be on TPN      -Acute kidney injury being then managed by nephrology     - HTN: blood pressure is soft; continue to monitor      -CVA stable            Roxy Morrison MD [facility]

## 2022-05-13 NOTE — PROGRESS NOTES
Comprehensive Nutrition Assessment    Type and Reason for Visit:  Reassess    Nutrition Recommendations/Plan:   1. PN Recommendation: Clinimix 5/15 @ 84 ml/hr which will provide ~1718 kcal and 101 g protein   Advance as tolerated  Will monitor lytes, TG, glucose, nutrition status, po intake, poc     Malnutrition Assessment:  Malnutrition Status:  Severe malnutrition (04/29/22 1228)    Context:  Acute Illness     Findings of the 6 clinical characteristics of malnutrition:  Energy Intake:  50% or less of estimated energy requirements for 5 or more days  Weight Loss:  Unable to assess     Body Fat Loss: Moderate body fat loss Orbital   Muscle Mass Loss: Moderate muscle mass loss Temples (temporalis),Clavicles (pectoralis & deltoids)  Fluid Accumulation:  Unable to assess     Strength:  Not Performed    Nutrition Assessment:    pt remains on dysphagia minced and moist diet with standard oral nutrition supplements conuming 0-25% of meals documented in the past 48 hr, PN ordered at 42 ml/hr, will continue to follow at high nutrition risk    Nutrition Related Findings:    Mag 1.7 Wound Type: Surgical Incision       Current Nutrition Intake & Therapies:    Average Meal Intake: 1-25%,0%  Average Supplements Intake: Unable to assess  ADULT ORAL NUTRITION SUPPLEMENT; Breakfast, Lunch, Dinner; Standard High Calorie/High Protein Oral Supplement  ADULT DIET; Dysphagia - Minced and Moist  PN-Adult Premix 5/15 - Central  PN-Adult Premix 5/15 - Central  Current Parenteral Nutrition Orders:  · Type and Formula:  (Clinimix 5/15)   · Lipids: 250ml  · Duration: Continuous  · Rate/Volume: 42/1008  · Current PN Order Provides: ~1000 kcal and 50 g protein    Anthropometric Measures:  Height: 5' 9.02\" (175.3 cm)  Ideal Body Weight (IBW): 160 lbs (73 kg)    Admission Body Weight: 165 lb 9.1 oz (75.1 kg)  Current Body Weight: 151 lb 3.8 oz (68.6 kg), 94.5 % IBW.  Weight Source: Bed Scale  Current BMI (kg/m2): 22.3  Usual Body Weight: 187 lb 6.3 oz (85 kg) (10/4/21)  % Weight Change (Calculated): -13.3  Weight Adjustment For: No Adjustment  BMI Categories: Normal Weight (BMI 22.0 to 24.9) age over 72    Estimated Daily Nutrient Needs:  Energy Requirements Based On: Kcal/kg  Weight Used for Energy Requirements: Current  Energy (kcal/day): 3921-3895 (22-25 kcal/kg)  Weight Used for Protein Requirements: Current  Protein (g/day): 80-88 (1.0-1.1 g/kg)  Nutrition Diagnosis:   · Severe malnutrition,In context of chronic illness related to inadequate protein-energy intake as evidenced by poor intake prior to admission,weight loss,moderate loss of subcutaneous fat,moderate muscle loss    Nutrition Interventions:   Food and/or Nutrient Delivery: Modify Parenteral Nutrition  Nutrition Education/Counseling: No recommendation at this time  Coordination of Nutrition Care: Continue to monitor while inpatient  Plan of Care discussed with: Family    Goals:  Previous Goal Met: Progressing toward Goal(s)  Goals: Tolerate nutrition support at goal rate,within 2 days     Nutrition Monitoring and Evaluation:   Behavioral-Environmental Outcomes: None Identified  Food/Nutrient Intake Outcomes: Parenteral Nutrition Intake/Tolerance,Food and Nutrient Intake,Supplement Intake  Physical Signs/Symptoms Outcomes: Biochemical Data,GI Status,Weight,Skin,Fluid Status or Edema,Hemodynamic Status    Discharge Planning:     Too soon to determine     Dianelys Hernandez [address], [address] [place] [address],   Contact: [phone]

## 2022-05-13 NOTE — PROGRESS NOTES
Progress Note( Dr. Humberto Salcido)    Subjective:   Admit Date: 4/22/2022  PCP: Max Donis MD    Admitted For : Intra abdominal abscess patient who has bowel resection and colostomy    Consulted For: Hypoglycemic episodes    Interval History: Patient is confused this morning attendant to watch him pull out tubes and IVs  Patient attendant to watch to observe him    Denies any chest pains,   Denies SOB . Denies nausea or vomiting. No new bowel or bladder symptoms. Intake/Output Summary (Last 24 hours) at 5/12/2022 2154  Last data filed at 5/12/2022 1815  Gross per 24 hour   Intake 1210.03 ml   Output 375 ml   Net 835.03 ml       DATA    CBC:   Recent Labs     05/11/22  0550   WBC 19.4*   HGB 9.9*   *    CMP:  Recent Labs     05/10/22  0500 05/11/22  0550 05/12/22  1145   * 124* 123*   K 3.8 4.0 4.0   CL 96* 92* 91*   CO2 18* 16* 12*   BUN 67* 83* 101*   CREATININE 4.0* 5.1* 5.9*   CALCIUM 8.8 8.8 8.7     Lipids:   Lab Results   Component Value Date    CHOL 140 05/07/2022    HDL 53 05/07/2022    TRIG 113 05/09/2022     Glucose:  Recent Labs     05/12/22  1133 05/12/22  1713 05/12/22  2128   POCGLU 175* 137* 145*     [id number]:  Lab Results   Component Value Date    LABA1C 5.2 05/06/2022     High Sensitivity TSH:   Lab Results   Component Value Date    TSHHS 1.990 05/06/2022     Free T3: No results found for: FT3  Free T4:No results found for: T4FREE    US RETROPERITONEAL LIMITED   Final Result   Nonspecific cystic lesion inferior pole left kidney. Recommend noncontrast   CT abdomen surveillance versus ultrasound in 6 months to ensure stability. Otherwise unremarkable ultrasound of the kidneys. CT ABDOMEN PELVIS WO CONTRAST Additional Contrast? None   Final Result   1. Postoperative changes from prior colectomy. Fluid collection in the   pelvis is slightly decreased in size. 2. Infrarenal abdominal aortic aneurysm measuring 5.5 cm.    3. Peripancreatic fluid collection is not significantly changed in size in   the interval, possibly a pseudocyst.   4. Small bilateral pleural effusions. 5. Indeterminate left renal lesion measures 1.9 cm. Renal protocol MRI or CT   could provide further information as clinically indicated. RECOMMENDATIONS:   5.5 cm infrarenal abdominal aortic aneurysm. Recommend referral to a vascular   specialist.   Reference: J Am Abad Radiol 9120;61:[id number]. CT HEAD WO CONTRAST   Final Result   1. No acute intracranial abnormality. 2. Stable moderate chronic white matter microvascular ischemic changes and   chronic lacunar infarct in the left caudate nucleus. XR CHEST PORTABLE   Final Result   No acute process. CT ABDOMEN PELVIS WO CONTRAST Additional Contrast? Oral   Final Result   1. Slight interval decrease in volume of a fluid collection extending from   the presacral soft tissues into the left mid abdominal cavity, which is   suboptimally evaluated due to lack of intravenous contrast.   2. Stable fluid collection along the inferior margin of the mid to distal   pancreas. 3. Gas in the urinary bladder lumen is favored to be related to Weaver   catheter placement, although enterovesical fistula cannot be completely   excluded. 4. Trace residual left pleural effusion. 5. Stable 5.6 cm abdominal aortic aneurysm. RECOMMENDATIONS:   5.6 cm infrarenal abdominal aortic aneurysm. Recommend referral to a vascular   specialist.   Reference: J Am Abad Radiol 5990;60:[id number]. MRI BRAIN WO CONTRAST   Final Result   1. No acute infarct or other acute intracranial process. 2. Senescent changes including moderate generalized parenchymal volume loss   and chronic small vessel ischemia. Remote bilateral lacunar infarcts   throughout the basal ganglia. CT HEAD WO CONTRAST   Final Result   No noncontrast CT evidence of acute intracranial pathology or significant   interval change compared to 03/20/2022.       Cortical atrophy, white matter changes consistent with microvascular   degenerative disease, atherosclerotic calcification in the terminal internal   carotid arteries of uncertain hemodynamic significance incidentally noted. .         XR CHEST PORTABLE   Final Result   Stable trace left pleural effusion with left basilar airspace disease or   atelectasis. XR CHEST PORTABLE   Final Result   Small bilateral pleural effusions. Moderate left basilar atelectasis and mild right basilar atelectasis. VL DUP LOWER EXTREMITY VENOUS RIGHT   Final Result   No evidence of DVT in the right lower extremity. CT ABDOMEN PELVIS W IV CONTRAST Additional Contrast? None   Final Result   1. Large rim enhancing fluid collection is seen within the abdomen and pelvis   which extends from the presacral region to the level of the pancreas,   concerning for an abscess. 2. Small amount of free fluid is seen within the abdomen. 3. Infrarenal abdominal aortic aneurysm measuring 5.6 cm. Please see   recommendations below. 4. Small bilateral pleural effusions with bibasilar atelectasis. 5. Focally dilated small bowel loops with air-fluid levels are seen within   the left upper quadrant which could be related to ileus. RECOMMENDATIONS:   5.6 cm infrarenal abdominal aortic aneurysm. Recommend referral to a vascular   specialist.      Reference: J Am Abad Radiol 4058;19:[id number].             IR GUIDED FLUID DRAINAGE BY CATH SOFT TISSUE PERC    (Results Pending)        Scheduled Medicines   Medications:    furosemide  80 mg IntraVENous BID    lansoprazole  30 mg Oral QAM AC    dilTIAZem  60 mg Oral 4 times per day    ferrous sulfate  325 mg Oral Daily    fat emulsion  250 mL IntraVENous Once per day on Mon Tue Thu Fri    lidocaine PF  5 mL IntraDERmal Once    sodium chloride flush  5-40 mL IntraVENous 2 times per day    lactobacillus  1 capsule Oral Daily with breakfast    apixaban  2.5 mg Oral BID    [Held by provider] digoxin  125 mcg Oral Daily    folic acid  1 mg Oral Daily    metoprolol tartrate  25 mg Oral BID    therapeutic multivitamin-minerals  1 tablet Oral Daily    sodium chloride flush  5-40 mL IntraVENous 2 times per day      Infusions:    PN-Adult Premix 5/15 - Central 42 mL/hr at 05/12/22 1840    sodium chloride           Objective:   Vitals: BP (!) 97/57   Pulse 78   Temp 97.5 °F (36.4 °C) (Oral)   Resp 25   Ht 5' 9.02\" (1.753 m)   Wt 152 lb 12.5 oz (69.3 kg)   SpO2 95%   BMI 22.55 kg/m²   General appearance: alert and cooperative with exam to be more confused this morning  Neck: no JVD or bruit  Thyroid : Normal lobes   Lungs: Has Vesicular Breath sounds diminished breath sounds  Heart: Atrial fibrillation   abdomen: soft, non-tender; bowel sounds normal; no masses,  no organomegaly  Musculoskeletal: Normal  Extremities: extremities normal, , no edema  Neurologic:  Awake, alert, oriented to name, place and time. Cranial nerves II-XII are grossly intact. Motor is weakness t. Sensory is intact. ,  and gait is abnormal.    Assessment:     Patient Active Problem List:     Hypertension     Benign prostatic hyperplasia     Psoriatic arthritis ([address]     Primary malignant neoplasm of rectum (HCC)     Psoriasis     Chronic myeloproliferative disease (HCC)     Monocytosis (symptomatic)     Gastroesophageal reflux disease without esophagitis     Urinary retention     H/O: CVA (cerebrovascular accident)     Irregular heart beat     Nonrheumatic aortic valve stenosis     Paroxysmal atrial fibrillation (HCC)     Hemorrhage from ileostomy ([address]     Melena     Arteriovenous malformation of jejunum     History of rectal cancer     Benign neoplasm of cecum     Benign neoplasm of ascending colon     Pseudomonas urinary tract infection     Partial small bowel obstruction (HCC)     SBO (small bowel obstruction) (HCC)     Atrial fibrillation with RVR (HCC)     Hypotension     Ischemic colitis ([address] Hypocalcemia     MARIELOS (acute kidney injury) ([facility]     Probable Bacterial Pneumonia     CAD (coronary artery disease)     Anemia     Delirium     Severe protein-calorie malnutrition (HCC)     On total parenteral nutrition     Colitis     Postoperative intra-abdominal abscess     Gram-negative bacteremia     Seizure ([facility]      Plan:     1. Reviewed POC blood glucose . Labs and X ray results   2. Reviewed Current Medicines   3. Patient started on TPN  4. Monitor Blood glucose frequently   5. Modified  the dose of Insulin/ other medicines as needed   6. Will follow     .      Papo Marti MD, MD

## 2022-05-13 NOTE — PROGRESS NOTES
Physical Therapy    Physical Therapy Treatment Note  Name: Jorge Alberto Anderson MRN: [id number] :   1929   Date:  2022   Admission Date: 2022 Room:  -A   Restrictions/Precautions:          general precautions, falls   Communication with other providers:  RN, OWENS   Subjective:  Patient states:  \"I'm going to fall\"   Pain:   Location, Type, Intensity (0/10 to 10/10):  denies  Objective:    Observation:    Supine in bed. Dec arousal and tired throughout session. Fairly cooperative with therapy with encouragement. Very fearful of falling and slightly resistant to mobility. Family visiting and supportive. HR slightly tachy up to 135bpm with activity. Treatment, including education/measures:  Rolling: to the left maxA for initiation and facilitation of Bilat UE/LE  Supine to sit: (from side lying) modA x 2 for guidance of BLEs and uprighting trunk   Sitting balance: variable from CGA up to modA. Tendency to lean bkwds. Constant hands on for tactile cues along with verbal cues intermittently for fwd weight shift. BUE support. Pt performed light LE movement with AAROM due to poor comprehension, initiation, and sequencing. X ~8 minutes   Sit to/from stand: Performed 2x from EOB with HHA blocking bilat feet and knees with maxA x 2. Performed 1x from EOB to RW maxA x 2. Blocked walker and feet. Standing balance:Heavy posterior push and began stepping with LLE. Very fearful of falling stating he was \"going to fall\" constantly. Re-assured pt the bed was right behind him and he was not going to fall. Tolerated ~20-30 seconds over 3 trials, maxA x 2  Stand pivot: modA x 2 for fwd weight shift, lift, steadying, and sequencing turn from bed to recliner   Educated pt and family on POC ,role of PT.  Cues provided for sequencing to inc safety and indep with mobility     Assessment / Impression:    Patient's tolerance of treatment:  Good    Adverse Reaction: no  Significant change in status and impact:

## 2022-05-13 NOTE — PROGRESS NOTES
`  Progress Note( Dr. Priya Shaikh)  5/13/2022  Subjective:   Admit Date: 4/22/2022  PCP: Hetal Stinson MD    Admitted For : Intra abdominal abscess patient who has bowel resection and colostomy    Consulted For: Hypoglycemic episodes    Interval History: Patient is still confused this morning attendant to watch him   Patient attendant to watch to observe him    Denies any chest pains,   Denies SOB . Denies nausea or vomiting. No new bowel or bladder symptoms. Intake/Output Summary (Last 24 hours) at 5/13/2022 1908  Last data filed at 5/13/2022 1707  Gross per 24 hour   Intake 200 ml   Output 275 ml   Net -75 ml       DATA    CBC:   Recent Labs     05/11/22  0550 05/13/22  0440   WBC 19.4* 19.2*   HGB 9.9* 9.1*   * 384    CMP:  Recent Labs     05/11/22  0550 05/12/22  1145   * 123*   K 4.0 4.0   CL 92* 91*   CO2 16* 12*   BUN 83* 101*   CREATININE 5.1* 5.9*   CALCIUM 8.8 8.7     Lipids:   Lab Results   Component Value Date    CHOL 140 05/07/2022    HDL 53 05/07/2022    TRIG 113 05/09/2022     Glucose:  Recent Labs     05/13/22  0132 05/13/22  0937 05/13/22  1625   POCGLU 129* 134* 154*     [id number]:  Lab Results   Component Value Date    LABA1C 5.2 05/06/2022     High Sensitivity TSH:   Lab Results   Component Value Date    TSHHS 1.990 05/06/2022     Free T3: No results found for: FT3  Free T4:No results found for: T4FREE    US RETROPERITONEAL LIMITED   Final Result   Nonspecific cystic lesion inferior pole left kidney. Recommend noncontrast   CT abdomen surveillance versus ultrasound in 6 months to ensure stability. Otherwise unremarkable ultrasound of the kidneys. CT ABDOMEN PELVIS WO CONTRAST Additional Contrast? None   Final Result   1. Postoperative changes from prior colectomy. Fluid collection in the   pelvis is slightly decreased in size. 2. Infrarenal abdominal aortic aneurysm measuring 5.5 cm.    3. Peripancreatic fluid collection is not significantly changed in size in   the interval, possibly a pseudocyst.   4. Small bilateral pleural effusions. 5. Indeterminate left renal lesion measures 1.9 cm. Renal protocol MRI or CT   could provide further information as clinically indicated. RECOMMENDATIONS:   5.5 cm infrarenal abdominal aortic aneurysm. Recommend referral to a vascular   specialist.   Reference: J Am Abad Radiol 2938;73:[id number]. CT HEAD WO CONTRAST   Final Result   1. No acute intracranial abnormality. 2. Stable moderate chronic white matter microvascular ischemic changes and   chronic lacunar infarct in the left caudate nucleus. XR CHEST PORTABLE   Final Result   No acute process. CT ABDOMEN PELVIS WO CONTRAST Additional Contrast? Oral   Final Result   1. Slight interval decrease in volume of a fluid collection extending from   the presacral soft tissues into the left mid abdominal cavity, which is   suboptimally evaluated due to lack of intravenous contrast.   2. Stable fluid collection along the inferior margin of the mid to distal   pancreas. 3. Gas in the urinary bladder lumen is favored to be related to Weaver   catheter placement, although enterovesical fistula cannot be completely   excluded. 4. Trace residual left pleural effusion. 5. Stable 5.6 cm abdominal aortic aneurysm. RECOMMENDATIONS:   5.6 cm infrarenal abdominal aortic aneurysm. Recommend referral to a vascular   specialist.   Reference: J Am Abad Radiol 8294;15:[id number]. MRI BRAIN WO CONTRAST   Final Result   1. No acute infarct or other acute intracranial process. 2. Senescent changes including moderate generalized parenchymal volume loss   and chronic small vessel ischemia. Remote bilateral lacunar infarcts   throughout the basal ganglia. CT HEAD WO CONTRAST   Final Result   No noncontrast CT evidence of acute intracranial pathology or significant   interval change compared to 03/20/2022.       Cortical atrophy, white matter changes consistent with microvascular   degenerative disease, atherosclerotic calcification in the terminal internal   carotid arteries of uncertain hemodynamic significance incidentally noted. .         XR CHEST PORTABLE   Final Result   Stable trace left pleural effusion with left basilar airspace disease or   atelectasis. XR CHEST PORTABLE   Final Result   Small bilateral pleural effusions. Moderate left basilar atelectasis and mild right basilar atelectasis. VL DUP LOWER EXTREMITY VENOUS RIGHT   Final Result   No evidence of DVT in the right lower extremity. CT ABDOMEN PELVIS W IV CONTRAST Additional Contrast? None   Final Result   1. Large rim enhancing fluid collection is seen within the abdomen and pelvis   which extends from the presacral region to the level of the pancreas,   concerning for an abscess. 2. Small amount of free fluid is seen within the abdomen. 3. Infrarenal abdominal aortic aneurysm measuring 5.6 cm. Please see   recommendations below. 4. Small bilateral pleural effusions with bibasilar atelectasis. 5. Focally dilated small bowel loops with air-fluid levels are seen within   the left upper quadrant which could be related to ileus. RECOMMENDATIONS:   5.6 cm infrarenal abdominal aortic aneurysm. Recommend referral to a vascular   specialist.      Reference: J Am Abad Radiol 6861;76:[id number].             IR GUIDED FLUID DRAINAGE BY CATH SOFT TISSUE PERC    (Results Pending)        Scheduled Medicines   Medications:    furosemide  80 mg IntraVENous BID    lansoprazole  30 mg Oral QAM AC    dilTIAZem  60 mg Oral 4 times per day    ferrous sulfate  325 mg Oral Daily    fat emulsion  250 mL IntraVENous Once per day on Mon Tue Thu Fri    lidocaine PF  5 mL IntraDERmal Once    sodium chloride flush  5-40 mL IntraVENous 2 times per day    lactobacillus  1 capsule Oral Daily with breakfast    apixaban  2.5 mg Oral BID    [Held by provider] digoxin  125 mcg Oral Daily    folic acid  1 mg Oral Daily    metoprolol tartrate  25 mg Oral BID    therapeutic multivitamin-minerals  1 tablet Oral Daily    sodium chloride flush  5-40 mL IntraVENous 2 times per day      Infusions:    PN-Adult Premix 5/15 - Central 42 mL/hr at 05/13/22 1645    sodium chloride           Objective:   Vitals: BP (!) 104/58   Pulse 110   Temp 97.3 °F (36.3 °C) (Oral)   Resp 22   Ht 5' 9.02\" (1.753 m)   Wt 151 lb 3.8 oz (68.6 kg)   SpO2 90%   BMI 22.32 kg/m²   General appearance: alert and cooperative with exam to be more confused this morning  Neck: no JVD or bruit  Thyroid : Normal lobes   Lungs: Has Vesicular Breath sounds diminished breath sounds  Heart: Atrial fibrillation   abdomen: soft, non-tender; bowel sounds normal; no masses,  no organomegaly  Musculoskeletal: Normal  Extremities: extremities normal, , no edema  Neurologic:  Awake, alert, oriented to name, place and time. Cranial nerves II-XII are grossly intact. Motor is weakness t. Sensory is intact. ,  and gait is abnormal.    Assessment:     Patient Active Problem List:     Hypertension     Benign prostatic hyperplasia     Psoriatic arthritis ([facility]     Primary malignant neoplasm of rectum (HCC)     Psoriasis     Chronic myeloproliferative disease (HCC)     Monocytosis (symptomatic)     Gastroesophageal reflux disease without esophagitis     Urinary retention     H/O: CVA (cerebrovascular accident)     Irregular heart beat     Nonrheumatic aortic valve stenosis     Paroxysmal atrial fibrillation (HCC)     Hemorrhage from ileostomy (HCC)     Melena     Arteriovenous malformation of jejunum     History of rectal cancer     Benign neoplasm of cecum     Benign neoplasm of ascending colon     Pseudomonas urinary tract infection     Partial small bowel obstruction (HCC)     SBO (small bowel obstruction) (HCC)     Atrial fibrillation with RVR (HCC)     Hypotension     Ischemic colitis (HCC)     Hypocalcemia     MARIELOS (acute kidney injury) ([facility]     Probable Bacterial Pneumonia     CAD (coronary artery disease)     Anemia     Delirium     Severe protein-calorie malnutrition (HCC)     On total parenteral nutrition     Colitis     Postoperative intra-abdominal abscess     Gram-negative bacteremia     Seizure ([facility]      Plan:     1. Reviewed POC blood glucose . Labs and X ray results   2. Reviewed Current Medicines   3. Patient started on TPN  4. Monitor Blood glucose frequently   5. Modified  the dose of Insulin/ other medicines as needed   6. Will follow     .      Montana Castillo MD, MD

## 2022-05-13 NOTE — PROGRESS NOTES
Palliative Care follow up visit. Patient is alert resting in bed with no signs of distress on room air. He is more confused today but pleasant . Renal function is deteriorating ,creat 5.9 and  yesterday. No urine output recorded since 5/11. Per notes family has still declining Dialysis. TPN continues to be infusing. Made patient a milk shake but he was unable to tolerate it because he was holding it in his mouth. Heated up his lunch , Patient took prolonged time to chew minced chicken. Offered Ice cream, patient tolerated this while I was in the room. Called [employer]. Talked with her for a while. Modesta Vasquez discussed care yesterday and will touch base again with her in a few days regarding renal function as he is medically managing it at this time. Modesto Pitt asked that patient have oral benadryl and tyelnol at night as needed for sleep and or agitation. She reiterated no narcotics or benzos should be given d/t adverse reactions and prolonged recovery time.  Added narcotics and benzos to [employer] .

## 2022-05-13 NOTE — PROGRESS NOTES
Nephrology Progress Note  5/13/2022 8:03 AM        Subjective:   Admit Date: 4/22/2022  PCP: Cathie Lyons MD    Interval History: Making urine more urine with IV loop    Diet: Remains with TPN    ROS: Per nurses little bit more urine, he is with IV loop now, also has some ostomy output, afebrile acceptable blood pressure, all things considered    Data:     Current meds:    furosemide  80 mg IntraVENous BID    lansoprazole  30 mg Oral QAM AC    dilTIAZem  60 mg Oral 4 times per day    ferrous sulfate  325 mg Oral Daily    fat emulsion  250 mL IntraVENous Once per day on Mon Tue Thu Fri    lidocaine PF  5 mL IntraDERmal Once    sodium chloride flush  5-40 mL IntraVENous 2 times per day    lactobacillus  1 capsule Oral Daily with breakfast    apixaban  2.5 mg Oral BID    [Held by provider] digoxin  125 mcg Oral Daily    folic acid  1 mg Oral Daily    metoprolol tartrate  25 mg Oral BID    therapeutic multivitamin-minerals  1 tablet Oral Daily    sodium chloride flush  5-40 mL IntraVENous 2 times per day      PN-Adult Premix 5/15 - Central 42 mL/hr at 05/12/22 1840    sodium chloride           I/O last 3 completed shifts:   In: 1330 [P.O.:300]  Out: 550 [Stool:550]    CBC:   Recent Labs     05/11/22  0550 05/13/22  0440   WBC 19.4* 19.2*   HGB 9.9* 9.1*   * 384          Recent Labs     05/11/22  0550 05/12/22  1145   * 123*   K 4.0 4.0   CL 92* 91*   CO2 16* 12*   BUN 83* 101*   CREATININE 5.1* 5.9*   GLUCOSE 131* 122*       Lab Results   Component Value Date    CALCIUM 8.7 05/12/2022    PHOS 2.5 05/13/2022       Objective:     Vitals: BP 92/65   Pulse 96   Temp 97.3 °F (36.3 °C) (Axillary)   Resp 27   Ht 5' 9.02\" (1.753 m)   Wt 151 lb 3.8 oz (68.6 kg)   SpO2 98%   BMI 22.32 kg/m²     General appearance: He is thin, alert, awake but has fatigue and tiredness  HEENT: Positive conjunctival pallor  Neck: Seemed supple  Lungs: Some rhonchi and coarse crackles at the base  Heart: Irregular  Abdomen: Soft, ostomy  Extremities: Positive ankle edema      Problem List :         Impression :     1. Progressive stage III acute kidney injury-we will probably fluid overload now given the TPN and low urine output, he is with IV loop-also has significant metabolic acidosis-no sign of recovery yet-thought to be from either toxic and ischemic acute tubular injury and or acute interstitial nephritis-those are all presumptive diagnosis of course  2. Recent C. difficile, abdominal abscess, and several aftermath of all this illness and hospitalization  3. He does have underlying atherosclerotic cardiovascular disease and dysrhythmia  4. Low magnesium likely from GI loss from ostomy-replete magnesium either orally or intravenously-although with acute kidney injury, magnesium level may go up    Recommendation/Plan  :     1. I had a long talk with his daughter and rest of the family yesterday again. Goal still conservative medical therapy. We will mainly treat him medically, keep him comfortable, and watch clinically and if necessary biochemically  2. Patient is DNR comfort care, palliative care did see the family and the patient.       Monika Roberts MD MD

## 2022-05-13 NOTE — PROGRESS NOTES
V2.0  [facility] Hospitalist Progress Note      Name:  Feliciano Sherwood /Age/Sex: 1929  (80 y.o. male)   MRN & CSN:  [id number] & [id number] Encounter Date/Time: 2022 5:20 PM EDT    Location:  [id number] PCP: Vito Vega MD       Hospital Day:     Assessment and Plan:   Feliciano Sherwood is a 80 y.o. 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 fib, severe protein calorie malnutrition, and physical debility. He was seen and admitted on 2022 on account of bleeding from the ostomy site. He was later diagnosed with Postoperative intra-abdominal abscess. Of note, on 4/3/2022, the patient did not have subtotal colon resection, small bowel resection, and ileostomy placement for management of colitis. 1. Bleeding from the ostomy site: Controlled with stitches on admission. 2. Acute blood loss anemia: The patient did require transfusion of 1 unit of PRBC. 3. Acute metabolic encephalopathy/delirium: The patient's fluctuating mental status has been primarily attributed to his ongoing infections (UTI, intra-abdominal infection, etc.) and narcotic pain medications. 4. Klebsiella oxytoca bacteremia secondary to intra-abdominal abscess: Reported on blood cultures 2 out of 2 sets from 2022. The patient status post completion of broad-spectrum antibiotic regimen. 5. 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 Urology following. 6. Acute kidney injury: Secondary to above. Creatinine up to 5.1 today.   7. Previous CVA with residual paresthesias and lower extremity weakness: Head CT and brain MRI from  were negative for any acute intracranial findings. 8. JAK2 positive thrombocytosis. 9. Paroxysmal A. fib on systemic anticoagulation with Eliquis. 10. 5.6 cm infrarenal AAA: Reported on CT scan of the abdomen and pelvis from 5/2/2022. Patient follows up with vascular surgery as an outpatient. 11. Severe protein calorie malnutrition in the setting of acute illness: Patient currently on TPN secondary to poor oral intake. 12. Physical deconditioning. Plan:  · Per discussion with palliative care, the patient's family is requesting that night time Benadryl be started. They do not want any benzo or opiates. Benadryl ordered for nighttime. · The patient's heart rate has remained well controlled for the past 72 hours. Cardiology signing off. · Nephrology following as his renal function continues to deteriorate. Discussed with granddaughter at the bedside. At this time, apparently, family would not be interested in pursuing dialysis. · Continue current regimen of diltiazem 60 mg p.o. every 6 hours and metoprolol 25 mg daily. Blood pressure well controlled. We will continue to monitor. · Continue as needed metoprolol for breakthrough episodes of tachycardia. · Gastric protection with lansoprazole. · No signs of ongoing infection at this time. Continue to monitor off of antibiotics. · Continue nutritional support with TPN. Diet ADULT ORAL NUTRITION SUPPLEMENT; Breakfast, Lunch, Dinner; Standard High Calorie/High Protein Oral Supplement  ADULT DIET; Dysphagia - Minced and Moist  PN-Adult Premix 5/15 - Central   DVT Prophylaxis [] Lovenox, []  Heparin, [] SCDs, [] Ambulation,  [x] Eliquis, [] Xarelto  [] Coumadin   Code Status DNR-CC   Disposition From: Skilled nursing facility  Expected Disposition: Skilled nursing facility versus ARU  Estimated Date of Discharge: To be determined  Patient requires continued admission due to worsening renal function.    Surrogate Decision Maker/ POA      Subjective:     Chief Complaint: Other (bleeding at new ostomy site)       Georgina Parker was found resting calmly in a recliner. His speech is still not understandable. He does not voice any new complaints. Review of Systems:    Review of Systems    Patient seems more alert today but remains confused. Objective: Intake/Output Summary (Last 24 hours) at 5/13/2022 0942  Last data filed at 5/13/2022 0927  Gross per 24 hour   Intake 200 ml   Output 225 ml   Net -25 ml        Vitals:   Vitals:    05/13/22 0922   BP: 111/62   Pulse:    Resp:    Temp: 97.3 °F (36.3 °C)   SpO2:        Physical Exam:     General: NAD  Eyes: EOMI  ENT: neck supple  Cardiovascular: Irregular rate and rhythm. Respiratory: Clear to auscultation  Gastrointestinal: Soft, non tender. Ostomy bag in place. Surgical scar noted. Pulsatile structure palpated mid abdomen. Genitourinary: no suprapubic tenderness  Musculoskeletal: No edema  Skin: warm, dry  Neuro: Alert but confused. Psych: Mood appropriate.      Medications:   Medications:    furosemide  80 mg IntraVENous BID    lansoprazole  30 mg Oral QAM AC    dilTIAZem  60 mg Oral 4 times per day    ferrous sulfate  325 mg Oral Daily    fat emulsion  250 mL IntraVENous Once per day on Mon Tue Thu Fri    lidocaine PF  5 mL IntraDERmal Once    sodium chloride flush  5-40 mL IntraVENous 2 times per day    lactobacillus  1 capsule Oral Daily with breakfast    apixaban  2.5 mg Oral BID    [Held by provider] digoxin  125 mcg Oral Daily    folic acid  1 mg Oral Daily    metoprolol tartrate  25 mg Oral BID    therapeutic multivitamin-minerals  1 tablet Oral Daily    sodium chloride flush  5-40 mL IntraVENous 2 times per day      Infusions:    PN-Adult Premix 5/15 - Central 42 mL/hr at 05/12/22 1840    sodium chloride       PRN Meds: metoprolol, 5 mg, Q6H PRN  sodium chloride flush, 5-40 mL, PRN  sodium chloride, , PRN  simethicone, 40 mg, 4x Daily PRN  sodium chloride flush, 10 mL, PRN  ondansetron, 4 mg, Q8H PRN   Or  ondansetron, 4 mg, Q6H PRN  acetaminophen, 650 mg, Q6H PRN        Labs      Recent Results (from the past 24 hour(s))   POCT Glucose    Collection Time: 05/12/22 11:33 AM   Result Value Ref Range    POC Glucose 175 (H) 70 - 99 MG/DL   Basic Metabolic Panel    Collection Time: 05/12/22 11:45 AM   Result Value Ref Range    Sodium 123 (L) 135 - 145 MMOL/L    Potassium 4.0 3.5 - 5.1 MMOL/L    Chloride 91 (L) 99 - 110 mMol/L    CO2 12 (L) 21 - 32 MMOL/L    Anion Gap 20 (H) 4 - 16     (H) 6 - 23 MG/DL    CREATININE 5.9 (H) 0.9 - 1.3 MG/DL    Glucose 122 (H) 70 - 99 MG/DL    Calcium 8.7 8.3 - 10.6 MG/DL    GFR Non- 9 (L) >60 mL/min/1.73m2    GFR  11 (L) >60 mL/min/1.73m2   POCT Glucose    Collection Time: 05/12/22  5:13 PM   Result Value Ref Range    POC Glucose 137 (H) 70 - 99 MG/DL   POCT Glucose    Collection Time: 05/12/22  9:28 PM   Result Value Ref Range    POC Glucose 145 (H) 70 - 99 MG/DL   POCT Glucose    Collection Time: 05/13/22  1:32 AM   Result Value Ref Range    POC Glucose 129 (H) 70 - 99 MG/DL   CBC with Auto Differential    Collection Time: 05/13/22  4:40 AM   Result Value Ref Range    WBC 19.2 (H) 4.0 - 10.5 K/CU MM    RBC 2.96 (L) 4.6 - 6.2 M/CU MM    Hemoglobin 9.1 (L) 13.5 - 18.0 GM/DL    Hematocrit 27.9 (L) 42 - 52 %    MCV 94.3 78 - 100 FL    MCH 30.7 27 - 31 PG    MCHC 32.6 32.0 - 36.0 %    RDW 18.3 (H) 11.7 - 14.9 %    Platelets 226 060 - 241 K/CU MM    MPV 10.1 7.5 - 11.1 FL    Differential Type AUTOMATED DIFFERENTIAL     Segs Relative 87.0 (H) 36 - 66 %    Lymphocytes % 3.0 (L) 24 - 44 %    Monocytes % 5.2 (H) 0 - 4 %    Eosinophils % 2.2 0 - 3 %    Basophils % 0.2 0 - 1 %    Segs Absolute 16.7 K/CU MM    Lymphocytes Absolute 0.6 K/CU MM    Monocytes Absolute 1.0 K/CU MM    Eosinophils Absolute 0.4 K/CU MM    Basophils Absolute 0.0 K/CU MM    Nucleated RBC % 0.0 %    Total Nucleated RBC 0.0 K/CU MM    Total Immature Neutrophil 0.46 K/CU MM    Immature Neutrophil % 2.4 (H) 0 - 0.43 %   Magnesium    Collection Time: 05/13/22  4:40 AM   Result Value Ref Range    Magnesium 1.7 (L) 1.8 - 2.4 mg/dl   Phosphorus    Collection Time: 05/13/22  4:40 AM   Result Value Ref Range    Phosphorus 2.5 2.5 - 4.9 MG/DL        Imaging/Diagnostics Last 24 Hours   US RETROPERITONEAL LIMITED    Result Date: 5/10/2022  EXAMINATION: ULTRASOUND OF THE KIDNEYS 5/10/2022 1:58 pm COMPARISON: CT abdomen 05/06/2022 HISTORY: ORDERING SYSTEM PROVIDED HISTORY: renal failure TECHNOLOGIST PROVIDED HISTORY: Reason for exam:->renal failure Reason for Exam: ARF FINDINGS: The right kidney measures 9.4 cm in length and the left kidney measures 10.5 cm in length. Kidneys demonstrate normal cortical echogenicity. No hydronephrosis or intrarenal stones. No focal lesions right kidney. Inferior pole left kidney hypoechoic 1.3 cm lesion corresponds to that measuring about 19 mm on CT, mild irregularity of the margins. Nonspecific cystic lesion inferior pole left kidney. Recommend noncontrast CT abdomen surveillance versus ultrasound in 6 months to ensure stability. Otherwise unremarkable ultrasound of the kidneys.        Electronically signed by Kait Sanchez MD on 5/13/2022 at 9:42 AM

## 2022-05-14 NOTE — PROGRESS NOTES
V2.0  [facility] [place] Hospitalist Progress Note      Name:  Rose Ya /Age/Sex: 1929  (80 y.o. male)   MRN & CSN:  [id number] & [id number] Encounter Date/Time: 2022 5:20 PM EDT    Location:  [id number] PCP: Nawaf García MD       Hospital Day: 23    Assessment and Plan:   Rose Ya is a 80 y.o. 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 fib, severe protein calorie malnutrition, and physical debility. He was seen and admitted on 2022 on account of bleeding from the ostomy site. He was later diagnosed with Postoperative intra-abdominal abscess. Of note, on 4/3/2022, the patient did not have subtotal colon resection, small bowel resection, and ileostomy placement for management of colitis. 1. Bleeding from the ostomy site: Controlled with stitches on admission. 2. Acute blood loss anemia: The patient did require transfusion of 1 unit of PRBC. 3. Acute metabolic encephalopathy/delirium: The patient's fluctuating mental status has been primarily attributed to his ongoing infections (UTI, intra-abdominal infection, etc.) and narcotic pain medications. 4. Klebsiella oxytoca bacteremia secondary to intra-abdominal abscess: Reported on blood cultures 2 out of 2 sets from 2022. The patient status post completion of broad-spectrum antibiotic regimen. 5. 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 Urology following. 6. Acute kidney injury: Secondary to above. Creatinine up to 5.1 today.   7. Previous CVA with residual paresthesias and lower extremity weakness: Head CT and brain MRI from  were negative for any acute intracranial findings. 8. JAK2 positive thrombocytosis. 9. Paroxysmal A. fib on systemic anticoagulation with Eliquis. 10. 5.6 cm infrarenal AAA: Reported on CT scan of the abdomen and pelvis from 5/2/2022. Patient follows up with vascular surgery as an outpatient. 11. Severe protein calorie malnutrition in the setting of acute illness: Patient currently on TPN secondary to poor oral intake. 12. Physical deconditioning. Plan:  · Called by the patient's nurse to come assess him on the bedside because of noticeable decline in his mental status. Apparently, this morning, the patient has not been able to stay awake enough to take his oral medications. · When we got to the bedside, the patient was drowsy and confused. · On the monitor, his vital signs were stable. · Had another discussion with the patient's family at the bedside. At this time, they are agreeable to get hospice care involved, understanding that the patient might be coming closer to the end. · Inpatient consult to hospice placed. · The patient's renal panel is pending for today. However, at this time, we will suspect that his worsening mental status is secondary to a critically high BUN. When last measured, his BUN was already 101. · Continue current regimen of diltiazem 60 mg p.o. every 6 hours and metoprolol 25 mg daily. Blood pressure well controlled. · Continue as needed metoprolol for breakthrough episodes of tachycardia. · Gastric protection with lansoprazole. · No signs of ongoing infection at this time. Continue to monitor off of antibiotics. · Continue nutritional support with TPN. Diet ADULT ORAL NUTRITION SUPPLEMENT; Breakfast, Lunch, Dinner; Standard High Calorie/High Protein Oral Supplement  ADULT DIET;  Dysphagia - Minced and Moist  PN-Adult Premix 5/15 - Central  PN-Adult Premix 5/15 - Central   DVT Prophylaxis [] Lovenox, []  Heparin, [] SCDs, [] Ambulation,  [x] Eliquis, [] Xarelto  [] Coumadin Code Status DNR-CC   Disposition From: Skilled nursing facility  Expected Disposition: Most likely inpatient hospice  Estimated Date of Discharge: The day  Patient requires continued admission due to worsening renal function. Surrogate Decision Maker/ POA      Subjective:     Chief Complaint: Other (bleeding at new ostomy site)       Reji Garciaort with declining mental status. Cannot stay awake enough to take his oral medications. Review of Systems:    Review of Systems    Patient unable to provide review of system. Objective: Intake/Output Summary (Last 24 hours) at 5/14/2022 1346  Last data filed at 5/14/2022 0605  Gross per 24 hour   Intake 160 ml   Output 125 ml   Net 35 ml        Vitals:   Vitals:    05/14/22 1100   BP: 121/64   Pulse: 73   Resp: 20   Temp: 97.7 °F (36.5 °C)   SpO2: 91%       Physical Exam:     General: NAD  Eyes: EOMI  ENT: neck supple  Cardiovascular: Irregular rate and rhythm. Respiratory: Clear to auscultation  Gastrointestinal: Soft, non tender. Ostomy bag in place. Surgical scar noted. Pulsatile structure palpated mid abdomen. Genitourinary: no suprapubic tenderness  Musculoskeletal: No edema  Skin: warm, dry  Neuro: Alert but confused. Psych: Mood appropriate.      Medications:   Medications:    insulin lispro  0-6 Units SubCUTAneous TID WC    insulin lispro  0-3 Units SubCUTAneous 2 times per day    furosemide  80 mg IntraVENous BID    lansoprazole  30 mg Oral QAM AC    dilTIAZem  60 mg Oral 4 times per day    ferrous sulfate  325 mg Oral Daily    fat emulsion  250 mL IntraVENous Once per day on Mon Tue Thu Fri    lidocaine PF  5 mL IntraDERmal Once    sodium chloride flush  5-40 mL IntraVENous 2 times per day    lactobacillus  1 capsule Oral Daily with breakfast    apixaban  2.5 mg Oral BID    [Held by provider] digoxin  125 mcg Oral Daily    folic acid  1 mg Oral Daily    metoprolol tartrate  25 mg Oral BID    therapeutic multivitamin-minerals 1 tablet Oral Daily    sodium chloride flush  5-40 mL IntraVENous 2 times per day      Infusions:    PN-Adult Premix 5/15 - Central      PN-Adult Premix 5/15 - Central 42 mL/hr at 05/13/22 1645    sodium chloride       PRN Meds: diphenhydrAMINE, 50 mg, Nightly PRN  metoprolol, 5 mg, Q6H PRN  sodium chloride flush, 5-40 mL, PRN  sodium chloride, , PRN  simethicone, 40 mg, 4x Daily PRN  sodium chloride flush, 10 mL, PRN  ondansetron, 4 mg, Q8H PRN   Or  ondansetron, 4 mg, Q6H PRN  acetaminophen, 650 mg, Q6H PRN        Labs      Recent Results (from the past 24 hour(s))   POCT Glucose    Collection Time: 05/13/22  4:25 PM   Result Value Ref Range    POC Glucose 154 (H) 70 - 99 MG/DL   POCT Glucose    Collection Time: 05/13/22  8:26 PM   Result Value Ref Range    POC Glucose 139 (H) 70 - 99 MG/DL   POCT Glucose    Collection Time: 05/14/22  2:41 AM   Result Value Ref Range    POC Glucose 146 (H) 70 - 99 MG/DL   POCT Glucose    Collection Time: 05/14/22  8:58 AM   Result Value Ref Range    POC Glucose 172 (H) 70 - 99 MG/DL   POCT Glucose    Collection Time: 05/14/22  1:17 PM   Result Value Ref Range    POC Glucose 199 (H) 70 - 99 MG/DL        Imaging/Diagnostics Last 24 Hours   US RETROPERITONEAL LIMITED    Result Date: 5/10/2022  EXAMINATION: ULTRASOUND OF THE KIDNEYS 5/10/2022 1:58 pm COMPARISON: CT abdomen 05/06/2022 HISTORY: ORDERING SYSTEM PROVIDED HISTORY: renal failure TECHNOLOGIST PROVIDED HISTORY: Reason for exam:->renal failure Reason for Exam: ARF FINDINGS: The right kidney measures 9.4 cm in length and the left kidney measures 10.5 cm in length. Kidneys demonstrate normal cortical echogenicity. No hydronephrosis or intrarenal stones. No focal lesions right kidney. Inferior pole left kidney hypoechoic 1.3 cm lesion corresponds to that measuring about 19 mm on CT, mild irregularity of the margins. Nonspecific cystic lesion inferior pole left kidney.   Recommend noncontrast CT abdomen surveillance versus ultrasound in 6 months to ensure stability. Otherwise unremarkable ultrasound of the kidneys.        Electronically signed by Graham Lao MD on 5/14/2022 at 1:46 PM

## 2022-05-14 NOTE — PROGRESS NOTES
`  Progress Note( Dr. Radha Allred)  5/14/2022  Subjective:   Admit Date: 4/22/2022  PCP: Shaun Williamson MD    Admitted For : Intra abdominal abscess patient who has bowel resection and colostomy    Consulted For: Hypoglycemic episodes    Interval History: Patient is still confused this morning attendant to watch him   Patient attendant to watch to observe him    Denies any chest pains,   Denies SOB . Denies nausea or vomiting. Not eating much but has TPN going  No new bowel or bladder symptoms. Intake/Output Summary (Last 24 hours) at 5/14/2022 1017  Last data filed at 5/14/2022 9667  Gross per 24 hour   Intake 160 ml   Output 125 ml   Net 35 ml       DATA    CBC:   Recent Labs     05/13/22  0440   WBC 19.2*   HGB 9.1*       CMP:  Recent Labs     05/12/22  1145   *   K 4.0   CL 91*   CO2 12*   *   CREATININE 5.9*   CALCIUM 8.7     Lipids:   Lab Results   Component Value Date    CHOL 140 05/07/2022    HDL 53 05/07/2022    TRIG 113 05/09/2022     Glucose:  Recent Labs     05/13/22  2026 05/14/22  0241 05/14/22  0858   POCGLU 139* 146* 172*     [id number]:  Lab Results   Component Value Date    LABA1C 5.2 05/06/2022     High Sensitivity TSH:   Lab Results   Component Value Date    TSHHS 1.990 05/06/2022     Free T3: No results found for: FT3  Free T4:No results found for: T4FREE    US RETROPERITONEAL LIMITED   Final Result   Nonspecific cystic lesion inferior pole left kidney. Recommend noncontrast   CT abdomen surveillance versus ultrasound in 6 months to ensure stability. Otherwise unremarkable ultrasound of the kidneys. CT ABDOMEN PELVIS WO CONTRAST Additional Contrast? None   Final Result   1. Postoperative changes from prior colectomy. Fluid collection in the   pelvis is slightly decreased in size. 2. Infrarenal abdominal aortic aneurysm measuring 5.5 cm.    3. Peripancreatic fluid collection is not significantly changed in size in   the interval, possibly a pseudocyst.   4. Small bilateral pleural effusions. 5. Indeterminate left renal lesion measures 1.9 cm. Renal protocol MRI or CT   could provide further information as clinically indicated. RECOMMENDATIONS:   5.5 cm infrarenal abdominal aortic aneurysm. Recommend referral to a vascular   specialist.   Reference: J Am Abad Radiol 7249;73:[id number]. CT HEAD WO CONTRAST   Final Result   1. No acute intracranial abnormality. 2. Stable moderate chronic white matter microvascular ischemic changes and   chronic lacunar infarct in the left caudate nucleus. XR CHEST PORTABLE   Final Result   No acute process. CT ABDOMEN PELVIS WO CONTRAST Additional Contrast? Oral   Final Result   1. Slight interval decrease in volume of a fluid collection extending from   the presacral soft tissues into the left mid abdominal cavity, which is   suboptimally evaluated due to lack of intravenous contrast.   2. Stable fluid collection along the inferior margin of the mid to distal   pancreas. 3. Gas in the urinary bladder lumen is favored to be related to Weaver   catheter placement, although enterovesical fistula cannot be completely   excluded. 4. Trace residual left pleural effusion. 5. Stable 5.6 cm abdominal aortic aneurysm. RECOMMENDATIONS:   5.6 cm infrarenal abdominal aortic aneurysm. Recommend referral to a vascular   specialist.   Reference: J Am Abad Radiol 2060;99:[id number]. MRI BRAIN WO CONTRAST   Final Result   1. No acute infarct or other acute intracranial process. 2. Senescent changes including moderate generalized parenchymal volume loss   and chronic small vessel ischemia. Remote bilateral lacunar infarcts   throughout the basal ganglia. CT HEAD WO CONTRAST   Final Result   No noncontrast CT evidence of acute intracranial pathology or significant   interval change compared to 03/20/2022.       Cortical atrophy, white matter changes consistent with microvascular degenerative disease, atherosclerotic calcification in the terminal internal   carotid arteries of uncertain hemodynamic significance incidentally noted. .         XR CHEST PORTABLE   Final Result   Stable trace left pleural effusion with left basilar airspace disease or   atelectasis. XR CHEST PORTABLE   Final Result   Small bilateral pleural effusions. Moderate left basilar atelectasis and mild right basilar atelectasis. VL DUP LOWER EXTREMITY VENOUS RIGHT   Final Result   No evidence of DVT in the right lower extremity. CT ABDOMEN PELVIS W IV CONTRAST Additional Contrast? None   Final Result   1. Large rim enhancing fluid collection is seen within the abdomen and pelvis   which extends from the presacral region to the level of the pancreas,   concerning for an abscess. 2. Small amount of free fluid is seen within the abdomen. 3. Infrarenal abdominal aortic aneurysm measuring 5.6 cm. Please see   recommendations below. 4. Small bilateral pleural effusions with bibasilar atelectasis. 5. Focally dilated small bowel loops with air-fluid levels are seen within   the left upper quadrant which could be related to ileus. RECOMMENDATIONS:   5.6 cm infrarenal abdominal aortic aneurysm. Recommend referral to a vascular   specialist.      Reference: J Am Abad Radiol 4248;75:[id number].             IR GUIDED FLUID DRAINAGE BY CATH SOFT TISSUE PERC    (Results Pending)        Scheduled Medicines   Medications:    insulin lispro  0-6 Units SubCUTAneous TID WC    insulin lispro  0-3 Units SubCUTAneous 2 times per day    furosemide  80 mg IntraVENous BID    lansoprazole  30 mg Oral QAM AC    dilTIAZem  60 mg Oral 4 times per day    ferrous sulfate  325 mg Oral Daily    fat emulsion  250 mL IntraVENous Once per day on Mon Tue Thu Fri    lidocaine PF  5 mL IntraDERmal Once    sodium chloride flush  5-40 mL IntraVENous 2 times per day    lactobacillus  1 capsule Oral Daily with breakfast    apixaban  2.5 mg Oral BID    [Held by provider] digoxin  125 mcg Oral Daily    folic acid  1 mg Oral Daily    metoprolol tartrate  25 mg Oral BID    therapeutic multivitamin-minerals  1 tablet Oral Daily    sodium chloride flush  5-40 mL IntraVENous 2 times per day      Infusions:    PN-Adult Premix 5/15 - Central 42 mL/hr at 05/13/22 1645    sodium chloride           Objective:   Vitals: BP 93/69   Pulse 76   Temp 95.7 °F (35.4 °C) (Axillary)   Resp 17   Ht 5' 9.02\" (1.753 m)   Wt 152 lb 5.4 oz (69.1 kg)   SpO2 96%   BMI 22.49 kg/m²   General appearance: alert and cooperative with exam to be more confused this morning  Neck: no JVD or bruit  Thyroid : Normal lobes   Lungs: Has Vesicular Breath sounds diminished breath sounds  Heart: Atrial fibrillation   abdomen: soft, non-tender; bowel sounds normal; no masses,  no organomegaly  Musculoskeletal: Normal  Extremities: extremities normal, , no edema  Neurologic:  Awake, alert, oriented to name, place and time. Cranial nerves II-XII are grossly intact. Motor is weakness t. Sensory is intact. ,  and gait is abnormal.    Assessment:     Patient Active Problem List:     Hypertension     Benign prostatic hyperplasia     Psoriatic arthritis ([address]     Primary malignant neoplasm of rectum (HCC)     Psoriasis     Chronic myeloproliferative disease (HCC)     Monocytosis (symptomatic)     Gastroesophageal reflux disease without esophagitis     Urinary retention     H/O: CVA (cerebrovascular accident)     Irregular heart beat     Nonrheumatic aortic valve stenosis     Paroxysmal atrial fibrillation (HCC)     Hemorrhage from ileostomy ([address]     Melena     Arteriovenous malformation of jejunum     History of rectal cancer     Benign neoplasm of cecum     Benign neoplasm of ascending colon     Pseudomonas urinary tract infection     Partial small bowel obstruction (HCC)     SBO (small bowel obstruction) (HCC)     Atrial fibrillation with RVR ([address] Hypotension     Ischemic colitis [address]     Hypocalcemia     MARIELOS (acute kidney injury) ([facility] [facility]     Probable Bacterial Pneumonia     CAD (coronary artery disease)     Anemia     Delirium     Severe protein-calorie malnutrition (HCC)     On total parenteral nutrition     Colitis     Postoperative intra-abdominal abscess     Gram-negative bacteremia     Seizure ([facility] [address] [facility]      Plan:     1. Reviewed POC blood glucose . Labs and X ray results   2. Reviewed Current Medicines   3. Patient started on TPN  4. Started on correction bolus Humalog insulin regime  5. Monitor Blood glucose frequently   6. Modified  the dose of Insulin/ other medicines as needed   7. Will follow     .      Natalie Walter MD, MD

## 2022-05-14 NOTE — PROGRESS NOTES
To Dr Ruddy Andrade: Accucheck orders are currently being done for patient. No coverage. Last accucheck was 172. Does coverage need ordered?

## 2022-05-14 NOTE — PROGRESS NOTES
To Dr Gabino Hayes: Terrycesia Lira: change of condition in patient. Unable to give patient any medications by mouth. No response to Lasix IVP from this morning. No urine output. becoming more unresponsive. Not following directions. Family at bedside. Can you come and see pt?

## 2022-05-14 NOTE — PROGRESS NOTES
Noted change in breathing. VS stable Slightly hypotensive 104/70 Pulse 85. Lungs clear. Noted patient is hallucinating.   Called daughter Travis Hart  And son Royal Colindres to update on pt condition

## 2022-05-14 NOTE — PROGRESS NOTES
To Dr Damir Celaya: Critical Alert Sodium 119. Also Hospice will not be here today to see patient.   Family requesting ativan for agitation and morphine for pain till hospice can see patient

## 2022-05-14 NOTE — PROGRESS NOTES
To Dr Katlyn Lara: Thank you so much for your help today. One last clarification: no cardizem po has been given to patient today. HR is now elevated low 100's. Already have Lopressor IVP PRN for HR >125. Any new orders?

## 2022-05-14 NOTE — PROGRESS NOTES
Nephrology Progress Note  5/14/2022 10:30 AM  Subjective: Interval History: Isidro Colvin is a 80 y.o. male with doing okay weak tired on TPN        Data:   Scheduled Meds:   insulin lispro  0-6 Units SubCUTAneous TID WC    insulin lispro  0-3 Units SubCUTAneous 2 times per day    furosemide  80 mg IntraVENous BID    lansoprazole  30 mg Oral QAM AC    dilTIAZem  60 mg Oral 4 times per day    ferrous sulfate  325 mg Oral Daily    fat emulsion  250 mL IntraVENous Once per day on Mon Tue Thu Fri    lidocaine PF  5 mL IntraDERmal Once    sodium chloride flush  5-40 mL IntraVENous 2 times per day    lactobacillus  1 capsule Oral Daily with breakfast    apixaban  2.5 mg Oral BID    [Held by provider] digoxin  125 mcg Oral Daily    folic acid  1 mg Oral Daily    metoprolol tartrate  25 mg Oral BID    therapeutic multivitamin-minerals  1 tablet Oral Daily    sodium chloride flush  5-40 mL IntraVENous 2 times per day     Continuous Infusions:   PN-Adult Premix 5/15 - Central 42 mL/hr at 05/13/22 1645    sodium chloride           CBC   Recent Labs     05/13/22  0440   WBC 19.2*   HGB 9.1*   HCT 27.9*         BMP   Recent Labs     05/12/22  0441 05/12/22  1145 05/13/22  0440   NA  --  123*  --    K  --  4.0  --    CL  --  91*  --    CO2  --  12*  --    PHOS 3.0  --  2.5   BUN  --  101*  --    CREATININE  --  5.9*  --      Hepatic: No results for input(s): AST, ALT, ALB, BILITOT, ALKPHOS in the last 72 hours. Troponin: No results for input(s): TROPONINI in the last 72 hours. BNP: No results for input(s): BNP in the last 72 hours. Lipids: No results for input(s): CHOL, HDL in the last 72 hours. Invalid input(s): LDLCALCU  ABGs: No results found for: PHART, PO2ART, XVK2SEE  INR: No results for input(s): INR in the last 72 hours.   Renal Labs  Albumin:    Lab Results   Component Value Date    LABALBU 3.3 05/09/2022     Calcium:    Lab Results   Component Value Date    CALCIUM 8.7 05/12/2022 Phosphorus:    Lab Results   Component Value Date    PHOS 2.5 05/13/2022     U/A:    Lab Results   Component Value Date    NITRU NEGATIVE 05/08/2022    COLORU YELLOW 05/08/2022    WBCUA <1 05/08/2022    RBCUA 4 05/08/2022    MUCUS RARE 05/06/2022    TRICHOMONAS NONE SEEN 05/08/2022    BACTERIA RARE 05/08/2022    CLARITYU CLEAR 05/08/2022    SPECGRAV 1.015 05/08/2022    UROBILINOGEN 0.2 05/08/2022    BILIRUBINUR NEGATIVE 05/08/2022    BLOODU MODERATE 05/08/2022    KETUA NEGATIVE 05/08/2022     ABG:  No results found for: PHART, BIF0PGZ, PO2ART, AGB7GPU, BEART, THGBART, RAI5DNH, D2CBJDLS  HgBA1c:    Lab Results   Component Value Date    LABA1C 5.2 05/06/2022     Microalbumen/Creatinine ratio:  No components found for: RUCREAT  TSH:  No results found for: TSH  IRON:    Lab Results   Component Value Date    IRON 35 03/01/2022     Iron Saturation:  No components found for: PERCENTFE  TIBC:    Lab Results   Component Value Date    TIBC 280 03/01/2022     FERRITIN:    Lab Results   Component Value Date    FERRITIN 210 03/01/2022     RPR:  No results found for: RPR  SAQIB:  No results found for: ANATITER, SAQIB  24 Hour Urine for Creatinine Clearance:  No components found for: CREAT4, UHRS10, UTV10      Objective:   I/O: 05/13 0701 - 05/14 0700  In: 200 [P.O.:200]  Out: 175   I/O last 3 completed shifts: In: 320 [P.O.:320]  Out: 350 [Stool:350]  No intake/output data recorded. Vitals: BP 93/69   Pulse 76   Temp 95.7 °F (35.4 °C) (Axillary)   Resp 17   Ht 5' 9.02\" (1.753 m)   Wt 152 lb 5.4 oz (69.1 kg)   SpO2 96%   BMI 22.49 kg/m²  {  General appearance: Weak tired confused  HEENT: Head: Normal, normocephalic, atraumatic.   Neck: supple, symmetrical, trachea midline  Lungs: diminished breath sounds bilaterally  Heart: S1, S2 normal  Abdomen: abnormal findings:  soft nt  Extremities: edema trace  Neurologic: Mental status: alertness: Tired        Assessment and Plan:      IMP:  #1 stage III kidney disease with acute renal failure  #2 protein monitor on TPN  #3 C. difficile colitis  #4 coronary artery disease  #5 electrolyte losses  #6 hypotension    Plan     #1 renal function overall worsening not improving low urine output for now supportive care patient is a DNR CC not try to be aggressive at this time  #2 maintain TPN at this time  #3 treated for C.  Difficile  #4 cardiac status monitor on telemetry if needed  #5 no recent repeat labs we will follow-up with family on plan  #6 blood pressure low stable    Patient is 80years old overall decline in health wait on recommendations with family but agree hospice or comfort care may be ideal             Elkin Gay MD, MD

## 2022-05-15 NOTE — CARE COORDINATION
Reviewed chart, spoke with pt's daughter at bedside about hospice. She states interested in inpt hospice, physicians told her he may pass away today. Called referral to [address]  [address]        [address] [facility] [place] [facility] hosp[facility] Javier Gaucher to see pt this pm between 1-2 pm.

## 2022-05-15 NOTE — PROGRESS NOTES
To Dr Toy Souza : Family of patient does not want Hospice. Palliative Care had already been consulted.   Call me if you have any questions

## 2022-05-15 NOTE — PROGRESS NOTES
Nephrology Progress Note  5/15/2022 10:16 AM  Subjective: Interval History: Dmitriy Reed is a 80 y.o. male more congested tired today and daughter in the room    Data:   Scheduled Meds:   scopolamine  1 patch TransDERmal Q72H    [Held by provider] insulin lispro  0-6 Units SubCUTAneous TID WC    [Held by provider] insulin lispro  0-3 Units SubCUTAneous 2 times per day     Continuous Infusions:   sodium chloride           CBC   Recent Labs     05/13/22  0440 05/15/22  0457   WBC 19.2* 17.3*   HGB 9.1* 9.3*   HCT 27.9* 28.5*    492*      BMP   Recent Labs     05/12/22  1145 05/13/22  0440 05/14/22  1420   *  --  119*   K 4.0  --  3.3*   CL 91*  --  86*   CO2 12*  --  15*   PHOS  --  2.5 3.1   *  --  134*   CREATININE 5.9*  --  7.2*     Hepatic: No results for input(s): AST, ALT, ALB, BILITOT, ALKPHOS in the last 72 hours. Troponin: No results for input(s): TROPONINI in the last 72 hours. BNP: No results for input(s): BNP in the last 72 hours. Lipids: No results for input(s): CHOL, HDL in the last 72 hours. Invalid input(s): LDLCALCU  ABGs: No results found for: PHART, PO2ART, RIM2LJV  INR: No results for input(s): INR in the last 72 hours.   Renal Labs  Albumin:    Lab Results   Component Value Date    LABALBU 3.1 05/14/2022     Calcium:    Lab Results   Component Value Date    CALCIUM 9.0 05/14/2022     Phosphorus:    Lab Results   Component Value Date    PHOS 3.1 05/14/2022     U/A:    Lab Results   Component Value Date    NITRU NEGATIVE 05/08/2022    COLORU YELLOW 05/08/2022    WBCUA <1 05/08/2022    RBCUA 4 05/08/2022    MUCUS RARE 05/06/2022    TRICHOMONAS NONE SEEN 05/08/2022    BACTERIA RARE 05/08/2022    CLARITYU CLEAR 05/08/2022    SPECGRAV 1.015 05/08/2022    UROBILINOGEN 0.2 05/08/2022    BILIRUBINUR NEGATIVE 05/08/2022    BLOODU MODERATE 05/08/2022    KETUA NEGATIVE 05/08/2022     ABG:  No results found for: PHART, ZDI9XGY, PO2ART, KYI5CZF, BEART, THGBART, VIQ5UXQ, H2WJIMMR  HgBA1c:    Lab Results   Component Value Date    LABA1C 5.2 05/06/2022     Microalbumen/Creatinine ratio:  No components found for: RUCREAT  TSH:  No results found for: TSH  IRON:    Lab Results   Component Value Date    IRON 35 03/01/2022     Iron Saturation:  No components found for: PERCENTFE  TIBC:    Lab Results   Component Value Date    TIBC 280 03/01/2022     FERRITIN:    Lab Results   Component Value Date    FERRITIN 210 03/01/2022     RPR:  No results found for: RPR  SAQIB:  No results found for: ANATITER, SAQIB  24 Hour Urine for Creatinine Clearance:  No components found for: CREAT4, UHRS10, UTV10      Objective:   I/O: 05/14 0701 - 05/15 0700  In: 504   Out: 50   I/O last 3 completed shifts: In: 12 [P.O.:120]  Out: 125 [Stool:125]  No intake/output data recorded. Vitals: BP (!) 106/59   Pulse 90   Temp 97.7 °F (36.5 °C) (Oral)   Resp 20   Ht 5' 9.02\" (1.753 m)   Wt 158 lb 1.5 oz (71.7 kg)   SpO2 93%   BMI 23.34 kg/m²  {  General appearance: Weak lethargic congested  HEENT: Head: Normal, normocephalic, atraumatic.   Neck: supple, symmetrical, trachea midline  Lungs: diminished breath sounds bilaterally  Heart: S1, S2 normal  Abdomen: abnormal findings:  soft nt  Extremities: edema trace  Neurologic: Mental status: alertness: Tired        Assessment and Plan:      IMP:  #1 stage III kidney disease with acute renal failure  #2 protein monitor on TPN  #3 C. difficile colitis  #4 coronary artery disease  #5 electrolyte losses  #6 hypotension    Plan     #1 overall declining in health  #2 patient is daughter in the room we had a long discussion about overall care and plan  #3 patient is a DNR CC  #4 the family has decided that they would like to make patient comfortable has a plan for meeting with hospice likely tomorrow but they did not want patient to be suffering  #5 we will stop all current medications and telemetry I discussed with patient's nurse as well as patient's daughter and patient was  lethargic and decided to withdraw all therapy except for Ativan morphine and scopolamine patch as needed hold on TPN or nutrition for now.   They prefer to stay on this floor at this time and maintain supportive care until hospice reevaluate in the morning and they are comfortable with patient passing tonight if patient is comfortable and stable    We will follow           Rudy High MD, MD

## 2022-05-15 NOTE — PROGRESS NOTES
Updated Dr Katlyn Lara on the family concern about the change from [facility] versus Hospice. Would like to continue with original plan as outlined with Dr Jenn Foreman on Friday to conitnue TPN and Palliative Care comfort care. Aware discussed this with Dr Viki Torres who reordered the TPN at 42 cc hour.   Will notify Dr Gi Eddy of the discontinuation of the [facility]

## 2022-05-15 NOTE — PROGRESS NOTES
Pt seen this am with daughter and his nurse. Will keep dnr cc and stop tpn and all meds and goal comfort care using morphine, ativan prn and scopolamine patch secretion.  Family agree and hospice to see in am and try keep comfort care,

## 2022-05-15 NOTE — PROGRESS NOTES
V2.0  [facility] Hospitalist Progress Note      Name:  Dmitriy Reed /Age/Sex: 1929  (80 y.o. male)   MRN & CSN:  [id number] & [id number] Encounter Date/Time: 5/15/2022 5:20 PM EDT    Location:  [id number]/[id number] PCP: Cintia De Los Santos MD       Hospital Day: 24    Assessment and Plan:   Dmitriy Reed is a 80 y.o. 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 fib, severe protein calorie malnutrition, and physical debility. He was seen and admitted on 2022 on account of bleeding from the ostomy site. He was later diagnosed with Postoperative intra-abdominal abscess. Of note, on 4/3/2022, the patient did not have subtotal colon resection, small bowel resection, and ileostomy placement for management of colitis. 1. Bleeding from the ostomy site: Controlled with stitches on admission. 2. Acute blood loss anemia: The patient did require transfusion of 1 unit of PRBC. 3. Acute metabolic encephalopathy/delirium: The patient's fluctuating mental status has been primarily attributed to his ongoing infections (UTI, intra-abdominal infection, etc.) and narcotic pain medications. 4. Klebsiella oxytoca bacteremia secondary to intra-abdominal abscess: Reported on blood cultures 2 out of 2 sets from 2022. The patient status post completion of broad-spectrum antibiotic regimen. 5. 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 Urology following. 6. Acute kidney injury: Secondary to above. Creatinine up to 5.1 today.   7. Previous CVA with residual paresthesias and lower extremity weakness: Head CT and brain MRI from  were negative for any acute intracranial findings. 8. JAK2 positive thrombocytosis. 9. Paroxysmal A. fib on systemic anticoagulation with Eliquis. 10. 5.6 cm infrarenal AAA: Reported on CT scan of the abdomen and pelvis from 5/2/2022. Patient follows up with vascular surgery as an outpatient. 11. Severe protein calorie malnutrition in the setting of acute illness: Patient currently on TPN secondary to poor oral intake. 12. Physical deconditioning. Plan:  · Pending evaluation by hospice. · Patient currently on comfort medications with as needed Ativan, morphine, and Robinul. · TPN has been discontinued. Diet ADULT ORAL NUTRITION SUPPLEMENT; Breakfast, Lunch, Dinner; Standard High Calorie/High Protein Oral Supplement  ADULT DIET; Dysphagia - Minced and Moist   DVT Prophylaxis [] Lovenox, []  Heparin, [] SCDs, [] Ambulation,  [x] Eliquis, [] Xarelto  [] Coumadin   Code Status DNR-CC   Disposition From: Skilled nursing facility  Expected Disposition: Most likely inpatient hospice  Estimated Date of Discharge: 5/16. Patient requires continued admission due to worsening renal function. Surrogate Decision Maker/ POA      Subjective:     Chief Complaint: Other (bleeding at new ostomy site)       Dennis Read with          Review of Systems:    Review of Systems    Patient unable to provide review of system. Objective: Intake/Output Summary (Last 24 hours) at 5/15/2022 0927  Last data filed at 5/14/2022 1841  Gross per 24 hour   Intake 504 ml   Output 50 ml   Net 454 ml        Vitals:   Vitals:    05/15/22 0702   BP: (!) 109/97   Pulse: 94   Resp: 18   Temp:    SpO2:        Physical Exam:     Physical examination not performed.     Medications:   Medications:    scopolamine  1 patch TransDERmal Q72H    furosemide  60 mg IntraVENous Once    [Held by provider] insulin lispro  0-6 Units SubCUTAneous TID WC    [Held by provider] insulin lispro  0-3 Units SubCUTAneous 2 times per day      Infusions:    sodium chloride       PRN Meds: LORazepam, 1 mg, Q4H PRN  morphine, 2 mg, Q4H PRN  glycopyrrolate, 0.2 mg, Q4H PRN  diphenhydrAMINE, 50 mg, Nightly PRN  sodium chloride flush, 5-40 mL, PRN  sodium chloride, , PRN  simethicone, 40 mg, 4x Daily PRN  sodium chloride flush, 10 mL, PRN  ondansetron, 4 mg, Q8H PRN   Or  ondansetron, 4 mg, Q6H PRN  acetaminophen, 650 mg, Q6H PRN        Labs      Recent Results (from the past 24 hour(s))   POCT Glucose    Collection Time: 05/14/22  1:17 PM   Result Value Ref Range    POC Glucose 199 (H) 70 - 99 MG/DL   Renal Function Panel    Collection Time: 05/14/22  2:20 PM   Result Value Ref Range    Sodium 119 (LL) 135 - 145 MMOL/L    Potassium 3.3 (L) 3.5 - 5.1 MMOL/L    Chloride 86 (L) 99 - 110 mMol/L    CO2 15 (L) 21 - 32 MMOL/L    Anion Gap 18 (H) 4 - 16     (H) 6 - 23 MG/DL    CREATININE 7.2 (H) 0.9 - 1.3 MG/DL    Glucose 134 (H) 70 - 99 MG/DL    Calcium 9.0 8.3 - 10.6 MG/DL    GFR Non- 7 (L) >60 mL/min/1.73m2    GFR  9 (L) >60 mL/min/1.73m2    Albumin 3.1 (L) 3.4 - 5.0 GM/DL    Phosphorus 3.1 2.5 - 4.9 MG/DL   POCT Glucose    Collection Time: 05/14/22  4:01 PM   Result Value Ref Range    POC Glucose 149 (H) 70 - 99 MG/DL   CBC with Auto Differential    Collection Time: 05/15/22  4:57 AM   Result Value Ref Range    WBC 17.3 (H) 4.0 - 10.5 K/CU MM    RBC 3.10 (L) 4.6 - 6.2 M/CU MM    Hemoglobin 9.3 (L) 13.5 - 18.0 GM/DL    Hematocrit 28.5 (L) 42 - 52 %    MCV 91.9 78 - 100 FL    MCH 30.0 27 - 31 PG    MCHC 32.6 32.0 - 36.0 %    RDW 18.2 (H) 11.7 - 14.9 %    Platelets 849 (H) 872 - 440 K/CU MM    MPV 10.1 7.5 - 11.1 FL        Imaging/Diagnostics Last 24 Hours   US RETROPERITONEAL LIMITED    Result Date: 5/10/2022  EXAMINATION: ULTRASOUND OF THE KIDNEYS 5/10/2022 1:58 pm COMPARISON: CT abdomen 05/06/2022 HISTORY: ORDERING SYSTEM PROVIDED HISTORY: renal failure TECHNOLOGIST PROVIDED HISTORY: Reason for exam:->renal failure Reason for Exam: ARF FINDINGS: The right kidney measures 9.4 cm in length and the left kidney measures 10.5 cm in length. Kidneys demonstrate normal cortical echogenicity. No hydronephrosis or intrarenal stones. No focal lesions right kidney. Inferior pole left kidney hypoechoic 1.3 cm lesion corresponds to that measuring about 19 mm on CT, mild irregularity of the margins. Nonspecific cystic lesion inferior pole left kidney. Recommend noncontrast CT abdomen surveillance versus ultrasound in 6 months to ensure stability. Otherwise unremarkable ultrasound of the kidneys.        Electronically signed by Leisa aFbian MD on 5/15/2022 at 9:27 AM

## 2022-05-15 NOTE — PROGRESS NOTES
`  Progress Note( Dr. Priti David)  5/15/2022  Subjective:   Admit Date: 4/22/2022  PCP: Chang Avila MD    Admitted For : Intra abdominal abscess patient who has bowel resection and colostomy    Consulted For: Hypoglycemic episodes    Interval History: Patient is still confused this morning attendant to watch him   Patient attendant to watch to observe him  Patient is not going for hemodialysis again    Denies any chest pains,   Denies SOB . Denies nausea or vomiting. Not eating much but has TPN going  No new bowel or bladder symptoms. Intake/Output Summary (Last 24 hours) at 5/15/2022 0847  Last data filed at 5/14/2022 1841  Gross per 24 hour   Intake 504 ml   Output 50 ml   Net 454 ml       DATA    CBC:   Recent Labs     05/13/22  0440 05/15/22  0457   WBC 19.2* 17.3*   HGB 9.1* 9.3*    492*    CMP:  Recent Labs     05/12/22  1145 05/14/22  1420   * 119*   K 4.0 3.3*   CL 91* 86*   CO2 12* 15*   * 134*   CREATININE 5.9* 7.2*   CALCIUM 8.7 9.0   LABALBU  --  3.1*     Lipids:   Lab Results   Component Value Date    CHOL 140 05/07/2022    HDL 53 05/07/2022    TRIG 113 05/09/2022     Glucose:  Recent Labs     05/14/22  0858 05/14/22  1317 05/14/22  1601   POCGLU 172* 199* 149*     [id number]:  Lab Results   Component Value Date    LABA1C 5.2 05/06/2022     High Sensitivity TSH:   Lab Results   Component Value Date    TSHHS 1.990 05/06/2022     Free T3: No results found for: FT3  Free T4:No results found for: T4FREE    US RETROPERITONEAL LIMITED   Final Result   Nonspecific cystic lesion inferior pole left kidney. Recommend noncontrast   CT abdomen surveillance versus ultrasound in 6 months to ensure stability. Otherwise unremarkable ultrasound of the kidneys. CT ABDOMEN PELVIS WO CONTRAST Additional Contrast? None   Final Result   1. Postoperative changes from prior colectomy. Fluid collection in the   pelvis is slightly decreased in size.    2. Infrarenal abdominal aortic aneurysm measuring 5.5 cm. 3. Peripancreatic fluid collection is not significantly changed in size in   the interval, possibly a pseudocyst.   4. Small bilateral pleural effusions. 5. Indeterminate left renal lesion measures 1.9 cm. Renal protocol MRI or CT   could provide further information as clinically indicated. RECOMMENDATIONS:   5.5 cm infrarenal abdominal aortic aneurysm. Recommend referral to a vascular   specialist.   Reference: J Am Abad Radiol 9241;48:370-007. CT HEAD WO CONTRAST   Final Result   1. No acute intracranial abnormality. 2. Stable moderate chronic white matter microvascular ischemic changes and   chronic lacunar infarct in the left caudate nucleus. XR CHEST PORTABLE   Final Result   No acute process. CT ABDOMEN PELVIS WO CONTRAST Additional Contrast? Oral   Final Result   1. Slight interval decrease in volume of a fluid collection extending from   the presacral soft tissues into the left mid abdominal cavity, which is   suboptimally evaluated due to lack of intravenous contrast.   2. Stable fluid collection along the inferior margin of the mid to distal   pancreas. 3. Gas in the urinary bladder lumen is favored to be related to Weaver   catheter placement, although enterovesical fistula cannot be completely   excluded. 4. Trace residual left pleural effusion. 5. Stable 5.6 cm abdominal aortic aneurysm. RECOMMENDATIONS:   5.6 cm infrarenal abdominal aortic aneurysm. Recommend referral to a vascular   specialist.   Reference: J Am Abad Radiol 0739;90:124-940. MRI BRAIN WO CONTRAST   Final Result   1. No acute infarct or other acute intracranial process. 2. Senescent changes including moderate generalized parenchymal volume loss   and chronic small vessel ischemia. Remote bilateral lacunar infarcts   throughout the basal ganglia.          CT HEAD WO CONTRAST   Final Result   No noncontrast CT evidence of acute intracranial pathology or significant   interval change compared to 03/20/2022. Cortical atrophy, white matter changes consistent with microvascular   degenerative disease, atherosclerotic calcification in the terminal internal   carotid arteries of uncertain hemodynamic significance incidentally noted. .         XR CHEST PORTABLE   Final Result   Stable trace left pleural effusion with left basilar airspace disease or   atelectasis. XR CHEST PORTABLE   Final Result   Small bilateral pleural effusions. Moderate left basilar atelectasis and mild right basilar atelectasis. VL DUP LOWER EXTREMITY VENOUS RIGHT   Final Result   No evidence of DVT in the right lower extremity. CT ABDOMEN PELVIS W IV CONTRAST Additional Contrast? None   Final Result   1. Large rim enhancing fluid collection is seen within the abdomen and pelvis   which extends from the presacral region to the level of the pancreas,   concerning for an abscess. 2. Small amount of free fluid is seen within the abdomen. 3. Infrarenal abdominal aortic aneurysm measuring 5.6 cm. Please see   recommendations below. 4. Small bilateral pleural effusions with bibasilar atelectasis. 5. Focally dilated small bowel loops with air-fluid levels are seen within   the left upper quadrant which could be related to ileus. RECOMMENDATIONS:   5.6 cm infrarenal abdominal aortic aneurysm. Recommend referral to a vascular   specialist.      Reference: J Am Abad Radiol 9445;36:587-877.             IR GUIDED FLUID DRAINAGE BY CATH SOFT TISSUE PERC    (Results Pending)        Scheduled Medicines   Medications:    scopolamine  1 patch TransDERmal Q72H    furosemide  60 mg IntraVENous Once    insulin lispro  0-6 Units SubCUTAneous TID WC    insulin lispro  0-3 Units SubCUTAneous 2 times per day      Infusions:    sodium chloride           Objective:   Vitals: BP (!) 109/97   Pulse 94   Temp 97.7 °F (36.5 °C) (Axillary)   Resp 18   Ht 5' 9.02\" (1.753 m) Wt 158 lb 1.5 oz (71.7 kg)   SpO2 95%   BMI 23.34 kg/m²   General appearance: alert and cooperative with exam to be more confused this morning  Neck: no JVD or bruit  Thyroid : Normal lobes   Lungs: Has Vesicular Breath sounds diminished breath sounds  Heart: Atrial fibrillation   abdomen: soft, non-tender; bowel sounds normal; no masses,  no organomegaly  Musculoskeletal: Normal  Extremities: extremities normal, , no edema  Neurologic:  Awake, alert, oriented to name, place and time. Cranial nerves II-XII are grossly intact. Motor is weakness t. Sensory is intact. ,  and gait is abnormal.    Assessment:     Patient Active Problem List:     Hypertension     Benign prostatic hyperplasia     Psoriatic arthritis ([address]     Primary malignant neoplasm of rectum (HCC)     Psoriasis     Chronic myeloproliferative disease (HCC)     Monocytosis (symptomatic)     Gastroesophageal reflux disease without esophagitis     Urinary retention     H/O: CVA (cerebrovascular accident)     Irregular heart beat     Nonrheumatic aortic valve stenosis     Paroxysmal atrial fibrillation (HCC)     Hemorrhage from ileostomy (HCC)     Melena     Arteriovenous malformation of jejunum     History of rectal cancer     Benign neoplasm of cecum     Benign neoplasm of ascending colon     Pseudomonas urinary tract infection     Partial small bowel obstruction (HCC)     SBO (small bowel obstruction) (HCC)     Atrial fibrillation with RVR (HCC)     Hypotension     Ischemic colitis (HCC)     Hypocalcemia     MARIELOS (acute kidney injury) ([address]     Probable Bacterial Pneumonia     CAD (coronary artery disease)     Anemia     Delirium     Severe protein-calorie malnutrition (HCC)     On total parenteral nutrition     Colitis     Postoperative intra-abdominal abscess     Gram-negative bacteremia     Seizure ([address]      Plan:     1. Reviewed POC blood glucose . Labs and X ray results   2. Reviewed Current Medicines   3.  Patient started on TPN  4. Started on correction bolus Humalog insulin regime  5. Monitor Blood glucose frequently   6. Modified  the dose of Insulin/ other medicines as needed   7. Discussed with nephrology who did who along with the family decided the patient to go onhospice care  8. We will discontinue TPN and blood glucose monitoring and hospitalist for drip. Hospice care team being consulted  9. Will sign off    .      Liza Lay MD, MD

## 2022-05-16 NOTE — PROGRESS NOTES
Patient with no pulse, HR , Respirations. Confirmed with Yashira Martinez RN verified. Family at bedside. Comfort for family provided.

## 2022-05-16 NOTE — PROGRESS NOTES
RN supervisor, , Covering MD Dr. Jadyn Knight, consulting MD's Dr. Rose Marie Hutson, and Dr. Jose G Renner notified of death at 2118. [employer] notified of death. Case # [id number].

## 2022-05-16 NOTE — PROGRESS NOTES
I was notified patient  at 21:18pm tonight.   Patient was [facility]

## 2022-05-16 NOTE — DISCHARGE SUMMARY
V2.0  Discharge Summary    Name:  Sher Baltazar /Age/Sex: 1929 (82 y.o. male)   Admit Date: 2022  Discharge Date: 22    MRN & CSN:  [id number] & [id number] Encounter Date and Time 22 4:04 PM EDT    Attending:  No att. providers found Discharging Provider: Alvaro Rojas MD       Hospital Course:     Brief HPI: Sher Baltazar is a 80 y.o. 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 fib, severe protein calorie malnutrition, and physical debility. He was seen and admitted on 2022 on account of bleeding from the ostomy site. He was later diagnosed with Postoperative intra-abdominal abscess. Of note, on 4/3/2022, the patient did not have subtotal colon resection, small bowel resection, and ileostomy placement for management of colitis. Brief Problem Based Course:   1. The patient failed to improve clinically as his renal function continued to deteriorate. He remained confused with what seemed to be uremic encephalopathy. He was closely followed by nephrology. Discussions were held with the patient's family. Dialysis was never considered to be an option. Ultimately, the patient's CODE STATUS was changed to DNR comfort care. He will transition to inpatient hospice unit today. The patient expressed appropriate understanding of, and agreement with the discharge recommendations, medications, and plan.      Consults this admission:  IP CONSULT TO GENERAL SURGERY  IP CONSULT TO IV TEAM  IP CONSULT TO HOSPITALIST  IP CONSULT TO INFECTIOUS DISEASES  IP CONSULT TO INTERVENTIONAL RADIOLOGY  IP CONSULT TO CARDIOLOGY  IP CONSULT TO GI  IP CONSULT TO GENERAL SURGERY  IP CONSULT TO IV TEAM  IP CONSULT TO PALLIATIVE CARE  IP CONSULT TO HOSPICE  IP CONSULT TO PHARMACY  IP CONSULT TO UROLOGY  IP CONSULT TO IV TEAM  IP CONSULT TO CARDIOLOGY  IP CONSULT TO NEPHROLOGY  IP CONSULT TO DIETITIAN  IP CONSULT TO PHARMACY  IP CONSULT TO CARDIOLOGY  IP CONSULT TO NEUROLOGY  IP CONSULT TO UROLOGY  IP CONSULT TO DIETITIAN    Discharge Diagnosis:   Postoperative intra-abdominal abscess        Discharge Instruction:       Discharge Medications:        Medication List      ASK your doctor about these medications    adalimumab 40 MG/0.8ML injection  Commonly known as: HUMIRA     apixaban 2.5 MG Tabs tablet  Commonly known as: Eliquis  Take 1 tablet by mouth 2 times daily Please resume this medication on Monday , march 28 th     aspirin 81 MG chewable tablet  Take 1 tablet by mouth daily     digoxin 125 MCG tablet  Commonly known as: LANOXIN  Take 1 tablet by mouth daily     dilTIAZem 120 MG extended release capsule  Commonly known as: Cardizem CD  Take 1 capsule by mouth daily     ferrous sulfate 325 (65 Fe) MG tablet  Commonly known as: IRON 325  Take 1 tablet by mouth 2 times daily     folic acid 1 MG tablet  Commonly known as: FOLVITE  Take 1 tablet by mouth daily     metoprolol tartrate 25 MG tablet  Commonly known as: LOPRESSOR  Take 1 tablet by mouth 2 times daily for 15 days     pantoprazole 40 MG tablet  Commonly known as: PROTONIX  Take 1 tablet by mouth daily     therapeutic multivitamin-minerals tablet     VITAMIN E PO           Objective Findings at Discharge:   BP (!) 106/59   Pulse 90   Temp 97.7 °F (36.5 °C) (Oral)   Resp 10   Ht 5' 9.02\" (1.753 m)   Wt 158 lb 1.5 oz (71.7 kg)   SpO2 93%   BMI 23.34 kg/m²       Physical Exam:   General: NAD  Eyes: EOMI  ENT: neck supple  Cardiovascular: Regular rate. Respiratory: Clear to auscultation  Gastrointestinal: Soft, non tender  Genitourinary: no suprapubic tenderness  Musculoskeletal: No edema  Skin: warm, dry  Neuro: Obtunded, moans. Psych: Mood appropriate.          Labs and Imaging   US RETROPERITONEAL LIMITED    Result Date: 5/10/2022  EXAMINATION: ULTRASOUND OF THE KIDNEYS 5/10/2022 1:58 pm COMPARISON: CT abdomen 05/06/2022 HISTORY: ORDERING SYSTEM PROVIDED HISTORY: renal failure TECHNOLOGIST PROVIDED HISTORY: Reason for exam:->renal failure Reason for Exam: ARF FINDINGS: The right kidney measures 9.4 cm in length and the left kidney measures 10.5 cm in length. Kidneys demonstrate normal cortical echogenicity. No hydronephrosis or intrarenal stones. No focal lesions right kidney. Inferior pole left kidney hypoechoic 1.3 cm lesion corresponds to that measuring about 19 mm on CT, mild irregularity of the margins. Nonspecific cystic lesion inferior pole left kidney. Recommend noncontrast CT abdomen surveillance versus ultrasound in 6 months to ensure stability. Otherwise unremarkable ultrasound of the kidneys. CBC:   Recent Labs     05/15/22  0457   WBC 17.3*   HGB 9.3*   *     BMP:    Recent Labs     05/14/22  1420   *   K 3.3*   CL 86*   CO2 15*   *   CREATININE 7.2*   GLUCOSE 134*     Hepatic: No results for input(s): AST, ALT, ALB, BILITOT, ALKPHOS in the last 72 hours. Lipids:   Lab Results   Component Value Date    CHOL 140 05/07/2022    HDL 53 05/07/2022    TRIG 113 05/09/2022     Hemoglobin A1C:   Lab Results   Component Value Date    LABA1C 5.2 05/06/2022     TSH: No results found for: TSH  Troponin:   Lab Results   Component Value Date    TROPONINT 0.015 04/27/2022    TROPONINT <0.010 04/24/2022    TROPONINT <0.010 04/23/2022     Lactic Acid: No results for input(s): LACTA in the last 72 hours. BNP: No results for input(s): PROBNP in the last 72 hours.   UA:  Lab Results   Component Value Date    NITRU NEGATIVE 05/08/2022    COLORU YELLOW 05/08/2022    WBCUA <1 05/08/2022    RBCUA 4 05/08/2022    MUCUS RARE 05/06/2022    TRICHOMONAS NONE SEEN 05/08/2022    BACTERIA RARE 05/08/2022    CLARITYU CLEAR 05/08/2022    SPECGRAV 1.015 05/08/2022    LEUKOCYTESUR NEGATIVE 05/08/2022    UROBILINOGEN 0.2 05/08/2022    BILIRUBINUR NEGATIVE 05/08/2022    BLOODU MODERATE 05/08/2022    KETUA NEGATIVE 05/08/2022     Urine Cultures: No results found for: LABURIN  Blood Cultures: No results found for: BC  No results found for: BLOODCULT2  Organism: No results found for: ORG    Time Spent Discharging patient 20 minutes    Electronically signed by Rich Mcgill MD on 5/16/2022 at 4:04 PM

## (undated) DEVICE — COUNTER NDL 30 COUNT FOAM STRP SGL MAG

## (undated) DEVICE — GOWN,ECLIPSE,POLYRNF,BRTHSLV,XL,30/CS: Brand: MEDLINE

## (undated) DEVICE — GLOVE SURG SZ 65 CRM LTX FREE POLYISOPRENE POLYMER BEAD ANTI

## (undated) DEVICE — GAUZE,SPONGE,4"X4",16PLY,XRAY,STRL,LF: Brand: MEDLINE

## (undated) DEVICE — FORCEPS BX L240CM JAW DIA2.8MM L CAP W/ NDL MIC MESH TOOTH

## (undated) DEVICE — GLOVE SURG SZ 65 THK91MIL LTX FREE SYN POLYISOPRENE

## (undated) DEVICE — SOLUTION IRRIG 1000ML 0.9% SOD CHL USP POUR PLAS BTL

## (undated) DEVICE — SUBMUCOSAL LIFTING AGENT: Brand: ORISE™ GEL

## (undated) DEVICE — GLOVE SURG SZ 8 L12IN THK75MIL DK GRN LTX FREE

## (undated) DEVICE — RELOAD STPL L75MM OPN H3.8MM CLS 1.5MM WIRE DIA0.2MM REG

## (undated) DEVICE — DRAPE,ABDOMINAL,MAJOR,STERILE: Brand: MEDLINE

## (undated) DEVICE — BLADE ES L6IN ELASTOMERIC COAT EXT DURABLE BEND UPTO 90DEG

## (undated) DEVICE — ELECTRODE ES AD CRDLSS PT RET REM POLYHESIVE

## (undated) DEVICE — FIAPC® PROBE W/ FILTER 2200 A OD 2.3MM/6.9FR; L 2.2M/7.2FT: Brand: ERBE

## (undated) DEVICE — GLOVE ORANGE PI 8   MSG9080

## (undated) DEVICE — MARKER SURG SKIN UTIL REGULAR/FINE 2 TIP W/ RUL AND 9 LBL

## (undated) DEVICE — STAPLER INT L75MM CUT LN L73MM STPL LN L77MM BLU B FRM 8

## (undated) DEVICE — TRAY PREP DRY W/ PREM GLV 2 APPL 6 SPNG 2 UNDPD 1 OVERWRAP

## (undated) DEVICE — GLOVE SURG SZ 6 THK91MIL LTX FREE SYN POLYISOPRENE ANTI

## (undated) DEVICE — STAPLER INT L60MM REG TISS BLU B FRM 8 FIRING 2 ROW AUTO

## (undated) DEVICE — SUTURE VCRL SZ 4-0 L18IN ABSRB UD RB-1 L17MM 1/2 CIR J714D

## (undated) DEVICE — SUTURE VCRL 0 L27IN ABSRB UD CT L40MM 1/2 CIR TAPERPOINT J280H

## (undated) DEVICE — SUTURE PROL SZ 1 L60IN NONABSORBABLE BLU L65MM TP-1 3/8 CIR 8824G

## (undated) DEVICE — DRESSING TRNSPAR W2XL2.75IN FLM SHT SEMIPERMEABLE WIND

## (undated) DEVICE — STERILE POLYISOPRENE POWDER-FREE SURGICAL GLOVES: Brand: PROTEXIS

## (undated) DEVICE — SUTURE CHROMIC GUT SZ 4-0 L27IN ABSRB BRN L22MM SH-1 1/2 G181H

## (undated) DEVICE — CLIP HEMO L235CM WRK CHN DIA OPN 17MM BRAID CATH ROT

## (undated) DEVICE — CLIP LIG L235CM RESOL 360 BX/20

## (undated) DEVICE — APPLIER LIG CLP M L11IN TI STR RNG HNDL FOR 20 CLP DISP

## (undated) DEVICE — NEEDLE SCLERO 23GA L240CM OD0.64MM ID0.32MM CLR INTERJECT

## (undated) DEVICE — PENCIL ES CRD L10FT HND SWCHING ROCK SWCH W/ EDGE COAT BLDE

## (undated) DEVICE — SWAB CULT SGL AMIES W/O CHAR FOR THRT VAG SKIN HRT CULTSWAB

## (undated) DEVICE — BLADE CLIPPER GEN PURP NS

## (undated) DEVICE — SNARE ENDOSCP L240CM SHTH DIA24MM LOOP W10MM POLYP RND REINF

## (undated) DEVICE — STRIP WND PK W0.25INXL5YD IODO GZ TIGHTLY WVN CURAD

## (undated) DEVICE — TOWEL,OR,DSP,ST,BLUE,STD,6/PK,12PK/CS: Brand: MEDLINE

## (undated) DEVICE — SUTURE PERMA-HAND SZ 3-0 L18IN 17 STRND NONABSORBABLE BLK SA64H

## (undated) DEVICE — GLOVE SURG SZ 75 CRM LTX FREE POLYISOPRENE POLYMER BEAD ANTI

## (undated) DEVICE — SNARE ENDOSCP L240CM OD2.4MM LOOP W15MM RND INSUL STIFF

## (undated) DEVICE — YANKAUER,FLEXIBLE HANDLE,REGLR CAPACITY: Brand: MEDLINE INDUSTRIES, INC.

## (undated) DEVICE — TUBING, SUCTION, 9/32" X 10', STRAIGHT: Brand: MEDLINE

## (undated) DEVICE — INTENDED FOR TISSUE SEPARATION, AND OTHER PROCEDURES THAT REQUIRE A SHARP SURGICAL BLADE TO PUNCTURE OR CUT.: Brand: BARD-PARKER ® CARBON RIB-BACK BLADES

## (undated) DEVICE — GLOVE SURG SZ 65 L12IN FNGR THK79MIL GRN LTX FREE

## (undated) DEVICE — GOWN,ECLIPSE,POLYRNF,BRTHSLV,L,30/CS: Brand: MEDLINE

## (undated) DEVICE — SHEET,DRAPE,53X77,STERILE: Brand: MEDLINE

## (undated) DEVICE — PACK,BASIC,SIRUS,V: Brand: MEDLINE

## (undated) DEVICE — DRESSING TRNSPAR W5XL4.5IN FLM SHT SEMIPERMEABLE WIND

## (undated) DEVICE — ENDOSCOPY KIT: Brand: MEDLINE INDUSTRIES, INC.

## (undated) DEVICE — BLADE ES L2.75IN ELASTOMERIC COAT DURABLE BEND UPTO 90DEG

## (undated) DEVICE — SEALER ENDOSCP NANO COAT OPN DIV CRV L JAW LIGASURE IMPACT

## (undated) DEVICE — INSTINCT ENDOSCOPIC HEMOCLIP: Brand: INSTINCT

## (undated) DEVICE — Device

## (undated) DEVICE — SUTURE PERMAHAND SZ 2-0 L17X18IN NONABSORBABLE BLK SILK SA65H

## (undated) DEVICE — Z DISCONTINUED (USE MFG CAT MVABO)  TUBING GAS SAMPLING STD 6.5 FT FEMALE CONN SMRT CAPNOLINE

## (undated) DEVICE — SPONGE LAP W18XL18IN WHT COT 4 PLY FLD STRUNG RADPQ DISP ST